# Patient Record
Sex: MALE | Race: BLACK OR AFRICAN AMERICAN | NOT HISPANIC OR LATINO | URBAN - METROPOLITAN AREA
[De-identification: names, ages, dates, MRNs, and addresses within clinical notes are randomized per-mention and may not be internally consistent; named-entity substitution may affect disease eponyms.]

---

## 2023-09-25 PROBLEM — M16.12 OSTEOARTHRITIS OF LEFT HIP: Status: ACTIVE | Noted: 2018-11-06

## 2023-09-25 PROBLEM — Z96.612 S/P REVERSE TOTAL SHOULDER ARTHROPLASTY, LEFT: Status: ACTIVE | Noted: 2019-03-12

## 2023-09-25 PROBLEM — M47.816 LUMBAR FACET ARTHROPATHY: Status: ACTIVE | Noted: 2018-06-12

## 2023-09-25 PROBLEM — R73.03 PREDIABETES: Status: ACTIVE | Noted: 2022-12-15

## 2023-09-25 PROBLEM — M53.3 SACROILIAC JOINT DYSFUNCTION OF RIGHT SIDE: Status: ACTIVE | Noted: 2018-06-12

## 2023-09-25 PROBLEM — I26.94 MULTIPLE SUBSEGMENTAL PULMONARY EMBOLI WITHOUT ACUTE COR PULMONALE (MULTI): Status: ACTIVE | Noted: 2023-09-06

## 2023-09-25 PROBLEM — M51.37 DDD (DEGENERATIVE DISC DISEASE), LUMBOSACRAL: Status: ACTIVE | Noted: 2018-06-12

## 2023-09-25 PROBLEM — Z86.19 HEPATITIS C VIRUS INFECTION CURED AFTER ANTIVIRAL DRUG THERAPY: Status: ACTIVE | Noted: 2020-03-24

## 2023-09-25 PROBLEM — F11.20 OPIOID DEPENDENCE ON AGONIST THERAPY (MULTI): Status: ACTIVE | Noted: 2020-05-18

## 2023-09-25 PROBLEM — K74.00 LIVER FIBROSIS: Status: ACTIVE | Noted: 2022-12-22

## 2023-09-25 PROBLEM — M47.816 SPONDYLOSIS OF LUMBAR REGION WITHOUT MYELOPATHY OR RADICULOPATHY: Status: ACTIVE | Noted: 2018-11-06

## 2023-09-25 PROBLEM — M51.379 DDD (DEGENERATIVE DISC DISEASE), LUMBOSACRAL: Status: ACTIVE | Noted: 2018-06-12

## 2023-09-25 RX ORDER — LANOLIN ALCOHOL/MO/W.PET/CERES
100 CREAM (GRAM) TOPICAL DAILY
COMMUNITY
Start: 2023-08-31 | End: 2023-10-02 | Stop reason: SDUPTHER

## 2023-09-26 PROBLEM — E43 SEVERE MALNUTRITION (MULTI): Status: ACTIVE | Noted: 2023-09-26

## 2023-09-26 PROBLEM — I27.82 CHRONIC PULMONARY EMBOLISM (MULTI): Status: ACTIVE | Noted: 2023-09-26

## 2023-09-26 PROBLEM — E78.5 HLD (HYPERLIPIDEMIA): Status: ACTIVE | Noted: 2023-09-26

## 2023-09-26 PROBLEM — B19.20 HEPATITIS C: Status: ACTIVE | Noted: 2023-09-26

## 2023-09-26 PROBLEM — Z99.81 OXYGEN DEPENDENT: Status: ACTIVE | Noted: 2023-09-26

## 2023-09-26 PROBLEM — J44.9 COPD (CHRONIC OBSTRUCTIVE PULMONARY DISEASE) (MULTI): Status: ACTIVE | Noted: 2023-09-26

## 2023-09-26 PROBLEM — F10.20 ALCOHOLISM (MULTI): Status: ACTIVE | Noted: 2023-09-26

## 2023-09-26 PROBLEM — I27.20 PULMONARY HYPERTENSION (MULTI): Status: ACTIVE | Noted: 2023-09-26

## 2023-09-26 PROBLEM — R09.02 HYPOXEMIA: Status: ACTIVE | Noted: 2023-09-26

## 2023-09-26 PROBLEM — R16.0 HEPATOMEGALY: Status: ACTIVE | Noted: 2023-09-26

## 2023-09-26 RX ORDER — IPRATROPIUM BROMIDE AND ALBUTEROL 20; 100 UG/1; UG/1
1 SPRAY, METERED RESPIRATORY (INHALATION) EVERY 6 HOURS PRN
COMMUNITY
Start: 2023-08-31 | End: 2023-10-12 | Stop reason: WASHOUT

## 2023-09-26 RX ORDER — FLUOCINONIDE 0.5 MG/G
OINTMENT TOPICAL
COMMUNITY
Start: 2022-09-09 | End: 2023-10-12 | Stop reason: WASHOUT

## 2023-09-26 RX ORDER — FLUOCINONIDE 0.5 MG/G
OINTMENT TOPICAL 2 TIMES DAILY
COMMUNITY
Start: 2012-12-07 | End: 2023-10-02 | Stop reason: SDUPTHER

## 2023-09-26 RX ORDER — FOLIC ACID 1 MG/1
1 TABLET ORAL DAILY
COMMUNITY
Start: 2023-08-31 | End: 2023-10-02 | Stop reason: SDUPTHER

## 2023-09-26 RX ORDER — SENNOSIDES 8.6 MG
TABLET ORAL
COMMUNITY
Start: 2023-08-31 | End: 2023-10-02 | Stop reason: SDUPTHER

## 2023-09-26 RX ORDER — LOSARTAN POTASSIUM 25 MG/1
TABLET ORAL
COMMUNITY
Start: 2023-06-07 | End: 2023-10-12 | Stop reason: ALTCHOICE

## 2023-09-26 RX ORDER — AMMONIUM LACTATE 12 G/100G
LOTION TOPICAL 2 TIMES DAILY
COMMUNITY
Start: 2021-09-09 | End: 2023-10-12 | Stop reason: WASHOUT

## 2023-09-26 RX ORDER — ATORVASTATIN CALCIUM 40 MG/1
40 TABLET, FILM COATED ORAL NIGHTLY
COMMUNITY
End: 2023-10-02 | Stop reason: SDUPTHER

## 2023-09-26 RX ORDER — ACETAMINOPHEN 325 MG/1
TABLET ORAL
COMMUNITY
Start: 2023-08-31 | End: 2023-10-02 | Stop reason: SDUPTHER

## 2023-09-26 RX ORDER — ATENOLOL 50 MG/1
50 TABLET ORAL DAILY
COMMUNITY
Start: 2023-07-31 | End: 2023-10-02 | Stop reason: SDUPTHER

## 2023-09-26 RX ORDER — GUAIFENESIN 600 MG/1
TABLET, EXTENDED RELEASE ORAL
COMMUNITY
Start: 2022-12-14 | End: 2023-10-12 | Stop reason: WASHOUT

## 2023-09-26 RX ORDER — HYDROCHLOROTHIAZIDE 12.5 MG/1
CAPSULE ORAL
COMMUNITY
Start: 2022-12-15 | End: 2023-10-12 | Stop reason: ALTCHOICE

## 2023-09-26 RX ORDER — POLYETHYLENE GLYCOL 3350 17 G/17G
POWDER, FOR SOLUTION ORAL
Status: ON HOLD | COMMUNITY
Start: 2023-08-31 | End: 2023-11-28 | Stop reason: SDUPTHER

## 2023-09-26 RX ORDER — IBUPROFEN 200 MG
TABLET ORAL
COMMUNITY
Start: 2023-08-31 | End: 2023-10-12 | Stop reason: WASHOUT

## 2023-09-26 RX ORDER — IPRATROPIUM BROMIDE AND ALBUTEROL SULFATE 2.5; .5 MG/3ML; MG/3ML
3 SOLUTION RESPIRATORY (INHALATION)
COMMUNITY
End: 2023-10-02 | Stop reason: SDUPTHER

## 2023-09-26 RX ORDER — ATENOLOL 50 MG/1
0.5 TABLET ORAL DAILY
COMMUNITY
Start: 2012-12-07 | End: 2023-10-02 | Stop reason: SDUPTHER

## 2023-09-26 RX ORDER — NALOXONE HYDROCHLORIDE 4 MG/.1ML
SPRAY NASAL
COMMUNITY
Start: 2023-06-07 | End: 2024-03-25 | Stop reason: WASHOUT

## 2023-09-26 RX ORDER — IBUPROFEN 200 MG
TABLET ORAL
COMMUNITY
End: 2023-10-02 | Stop reason: SDUPTHER

## 2023-09-26 RX ORDER — ACITRETIN 25 MG/1
2 CAPSULE ORAL DAILY
COMMUNITY
Start: 2012-12-07 | End: 2023-10-12 | Stop reason: WASHOUT

## 2023-09-26 RX ORDER — ATORVASTATIN CALCIUM 40 MG/1
40 TABLET, FILM COATED ORAL DAILY
COMMUNITY
Start: 2023-06-07 | End: 2024-03-25 | Stop reason: WASHOUT

## 2023-09-26 RX ORDER — AMLODIPINE BESYLATE 5 MG/1
5 TABLET ORAL DAILY
COMMUNITY
Start: 2023-08-30 | End: 2023-10-02 | Stop reason: SDUPTHER

## 2023-09-28 NOTE — PROGRESS NOTES
Consults    Reason For Consult  Initial evaluation for PAH following admission. He was referred by Dr. Magen Nguyen following discharge.     History Of Present Illness  Molina Godinez is a 68 y.o. male presenting with very few symptoms. He has a chronic dry cough. Patient states he had previous BLE edema, but not since receiving a cortisone shot. He denies SOB, fatigue, dizziness, syncope, chest pain, and wheezing. He does require oxygen since his last admission - 2L at rest, 4L with exertion. Recently admitted (8/26 - 9/1) for generalized weakness. Further work up showed hypoxia, COPD , uncontrolled hypertension, electrolyte abnormalities, and withdrawal from chronic alcohol abuse and opioid use. CTPE showed chronic filling defects. Mr. Godinez was scheduled for V/Q today but did not notice it on appointment sheet.      Past Medical History  Patient Active Problem List   Diagnosis    DDD (degenerative disc disease), lumbosacral    Essential hypertension, benign    Hepatitis C virus infection cured after antiviral drug therapy    Liver fibrosis    Lumbar facet arthropathy    Spondylosis of lumbar region without myelopathy or radiculopathy    Multiple subsegmental pulmonary emboli without acute cor pulmonale (CMS/HCC)    Opioid dependence on agonist therapy (CMS/HCC)    Osteoarthritis of left hip    Other psoriasis    Prediabetes    S/P reverse total shoulder arthroplasty, left    Sacroiliac joint dysfunction of right side    Tobacco use disorder    Alcoholism (CMS/HCC)    Chronic pulmonary embolism (CMS/HCC)    COPD (chronic obstructive pulmonary disease) (CMS/HCC)    Hepatitis C    Hepatomegaly    HLD (hyperlipidemia)    Hypoxemia    Oxygen dependent    Pulmonary hypertension (CMS/HCC)    Severe malnutrition (CMS/HCC)        Surgical History  He has no past surgical history on file.     Social History  He reports that he has been smoking cigarettes. He has been smoking an average of .5 packs per day. He does not  have any smokeless tobacco history on file. He reports current alcohol use. No history on file for drug use.    Family History  No family history on file.     Allergies  Aspirin and Nsaids (non-steroidal anti-inflammatory drug)    Meds    Current Outpatient Medications:     acetaminophen (Tylenol) 325 mg tablet, , Disp: , Rfl:     acitretin (Soriatane) 25 mg capsule, Take 2 capsules (50 mg) by mouth once daily. With dinner, Disp: , Rfl:     amLODIPine (Norvasc) 5 mg tablet, Take 1 tablet (5 mg) by mouth once daily. For blood pressure, Disp: , Rfl:     ammonium lactate (Lac-Hydrin) 12 % lotion, Apply topically twice a day. APPLY TOPICALLY 2 TIMES DAILY TO AFFECTED AREA., Disp: , Rfl:     apixaban (Eliquis) 5 mg tablet, Take 1 tablet (5 mg) by mouth 2 times a day., Disp: , Rfl:     atenolol (Tenormin) 50 mg tablet, Take 1 tablet (50 mg) by mouth once daily., Disp: , Rfl:     atenolol (Tenormin) 50 mg tablet, Take 0.5 tablets (25 mg) by mouth once daily., Disp: , Rfl:     atorvastatin (Lipitor) 40 mg tablet, Take 1 tablet (40 mg) by mouth once daily., Disp: , Rfl:     atorvastatin (Lipitor) 40 mg tablet, Take 1 tablet (40 mg) by mouth once daily at bedtime., Disp: , Rfl:     Combivent Respimat  mcg/actuation inhaler, , Disp: , Rfl:     fluocinonide (Lidex) 0.05 % ointment, APPLY THIN LAYER TO AFFECTED AREA TWICE A DAY AS NEEDED, Disp: , Rfl:     fluocinonide (Lidex) 0.05 % ointment, Apply topically twice a day., Disp: , Rfl:     folic acid (Folvite) 1 mg tablet, Take 1 tablet (1 mg) by mouth once daily. As directed., Disp: , Rfl:     hydroCHLOROthiazide (Microzide) 12.5 mg capsule, , Disp: , Rfl:     ipratropium-albuteroL (Duo-Neb) 0.5-2.5 mg/3 mL nebulizer solution, Take 3 mL by nebulization 3 times a day., Disp: , Rfl:     losartan (Cozaar) 25 mg tablet, , Disp: , Rfl:     Mucinex 600 mg 12 hr tablet, , Disp: , Rfl:     multivitamin with minerals tablet, Take 1 tablet by mouth once daily., Disp: , Rfl:      naloxone (Narcan) 4 mg/0.1 mL nasal spray, Use 1 Spray in one nostril (alternate sides) as needed for Drug Overdose (and call 911). every 2-3 min, until assistance arrives., Disp: , Rfl:     nicotine (Nicoderm CQ) 14 mg/24 hr patch, Use as directed, Disp: , Rfl:     nicotine (Nicoderm CQ) 21 mg/24 hr patch, , Disp: , Rfl:     polyethylene glycol (Glycolax, Miralax) 17 gram/dose powder, , Disp: , Rfl:     senna 8.6 mg tablet, Take as directed., Disp: , Rfl:     thiamine 100 mg tablet, Take 1 tablet (100 mg) by mouth once daily., Disp: , Rfl:      Review of Systems   Constitutional:  Positive for fatigue.   HENT:  Negative for nosebleeds.    Eyes:  Negative for visual disturbance.   Respiratory:  Positive for cough. Negative for shortness of breath.    Cardiovascular:  Negative for chest pain, palpitations and leg swelling.   Gastrointestinal:  Negative for diarrhea and nausea.   Endocrine: Negative for polyuria.   Musculoskeletal:  Positive for arthralgias.   Neurological:  Negative for dizziness and syncope.   Hematological:  Bruises/bleeds easily.   Psychiatric/Behavioral:  Positive for dysphoric mood. The patient is nervous/anxious.         Physical Exam  Constitutional:       Appearance: Normal appearance.   HENT:      Head: Normocephalic.      Nose: Nose normal.   Eyes:      Pupils: Pupils are equal, round, and reactive to light.   Neck:      Comments: No JVD  Cardiovascular:      Rate and Rhythm: Normal rate and regular rhythm.      Heart sounds: Murmur (I/VI KATHERINE LLSB) heard.   Pulmonary:      Effort: Pulmonary effort is normal. No respiratory distress.      Breath sounds: No wheezing.   Abdominal:      General: Abdomen is flat. Bowel sounds are normal.   Musculoskeletal:         General: No deformity.   Skin:     General: Skin is dry.   Neurological:      General: No focal deficit present.   Psychiatric:         Mood and Affect: Mood normal.         Judgment: Judgment normal.          Last Recorded  Vitals  There were no vitals taken for this visit.    Relevant Results    Spirometry (10/02/2023, [facility])  FVC = 2.19 L (69%)  FEV1=1.04 L (42%)  DLCO = 29%    6MWT (10/2/2023)  SpO2: 97-93% 4LPM  HR: 78-83  Michaelle: 0-0  Actual meters: 271; predicted 420    Echo (23): normal size RV with normal function, mildly dilated RA, negative bubble study, no evidence of AR, trace MR, mild to moderate TR, RVSP 60.2 mmHg  CTPE (23): multiple nonocclusive chronic appearing filling defects within numerous right upper lobe subsegmental pulmonary arterias, marked enlargement of the right heart     Assessment/Plan     Impression:    I have reviewed the following data during this visit: echocardiogram, CT chest, 6 MW, Pulmonary function testing    1) Pulmonary  A. Very small peripheral peripheral vascular changes without history of PE. These are of unclear significance.  B. Severe stable obstructive lung disease - oxygen dependence. On stable bronchodilators. Using nicotine replacement. CT consistent with apical emphysema and scarring.     C. Dilated RV - I disagree with echo report, his RV:LV ration is approximately 1.1:1. This is consistent with probable PH. Doubt primary TV disease. Considerations PAH vs CTEPH vs? Needs full workup.     2) Cardiovascular - significant hypertension    3) Gastroenterology - treated hepatitis C with liver fibrosis    4) Opiod dependence.    5) Endocrine - hyperlipidemia.    Plan:    1) Needs V/Q, this will stratify treatment of small vessel disease vs CTEPH (Assuming cath confirms)    2) Continue combivent for stable COPD    3) Follow up same day as perfusion (V/Q) scan to [facility] next step. Suspect if will be R and L heart cath, possible NO, possible angiogram depending on V/Q results.

## 2023-10-02 ENCOUNTER — HOSPITAL ENCOUNTER (OUTPATIENT)
Dept: RESPIRATORY THERAPY | Facility: HOSPITAL | Age: 68
Discharge: HOME | End: 2023-10-02
Payer: COMMERCIAL

## 2023-10-02 ENCOUNTER — OFFICE VISIT (OUTPATIENT)
Dept: PULMONOLOGY | Facility: HOSPITAL | Age: 68
End: 2023-10-02
Payer: COMMERCIAL

## 2023-10-02 VITALS — SYSTOLIC BLOOD PRESSURE: 130 MMHG | DIASTOLIC BLOOD PRESSURE: 81 MMHG | WEIGHT: 127 LBS | OXYGEN SATURATION: 84 %

## 2023-10-02 DIAGNOSIS — R93.1 ABNORMAL ECHOCARDIOGRAM: Primary | ICD-10-CM

## 2023-10-02 DIAGNOSIS — J43.8 OTHER EMPHYSEMA (MULTI): ICD-10-CM

## 2023-10-02 DIAGNOSIS — Z99.81 OXYGEN DEPENDENT: ICD-10-CM

## 2023-10-02 DIAGNOSIS — J43.9 PULMONARY EMPHYSEMA, UNSPECIFIED EMPHYSEMA TYPE (MULTI): ICD-10-CM

## 2023-10-02 DIAGNOSIS — R93.89 ABNORMAL CT OF THE CHEST: ICD-10-CM

## 2023-10-02 PROCEDURE — 3075F SYST BP GE 130 - 139MM HG: CPT | Performed by: INTERNAL MEDICINE

## 2023-10-02 PROCEDURE — 94729 DIFFUSING CAPACITY: CPT

## 2023-10-02 PROCEDURE — 3079F DIAST BP 80-89 MM HG: CPT | Performed by: INTERNAL MEDICINE

## 2023-10-02 PROCEDURE — 99215 OFFICE O/P EST HI 40 MIN: CPT | Performed by: INTERNAL MEDICINE

## 2023-10-02 PROCEDURE — 1159F MED LIST DOCD IN RCRD: CPT | Performed by: INTERNAL MEDICINE

## 2023-10-02 PROCEDURE — 99215 OFFICE O/P EST HI 40 MIN: CPT | Mod: 25 | Performed by: INTERNAL MEDICINE

## 2023-10-02 ASSESSMENT — ENCOUNTER SYMPTOMS
SHORTNESS OF BREATH: 0
COUGH: 1
DIARRHEA: 0
DIZZINESS: 0
FATIGUE: 1
DYSPHORIC MOOD: 1
ARTHRALGIAS: 1
NERVOUS/ANXIOUS: 1
BRUISES/BLEEDS EASILY: 1
PALPITATIONS: 0
NAUSEA: 0

## 2023-10-11 ENCOUNTER — HOSPITAL ENCOUNTER (INPATIENT)
Facility: HOSPITAL | Age: 68
LOS: 49 days | Discharge: NURSING, REHAB OR LONG-TERM CARE | End: 2023-11-30
Attending: EMERGENCY MEDICINE | Admitting: NURSE PRACTITIONER
Payer: COMMERCIAL

## 2023-10-11 ENCOUNTER — APPOINTMENT (OUTPATIENT)
Dept: RADIOLOGY | Facility: HOSPITAL | Age: 68
End: 2023-10-11
Payer: COMMERCIAL

## 2023-10-11 DIAGNOSIS — T17.500A MUCUS PLUGGING OF BRONCHI: ICD-10-CM

## 2023-10-11 DIAGNOSIS — N19 UREMIA: ICD-10-CM

## 2023-10-11 DIAGNOSIS — R93.89 ABNORMAL CT OF THE CHEST: ICD-10-CM

## 2023-10-11 DIAGNOSIS — I27.82 CHRONIC PULMONARY EMBOLISM, UNSPECIFIED PULMONARY EMBOLISM TYPE, UNSPECIFIED WHETHER ACUTE COR PULMONALE PRESENT (MULTI): ICD-10-CM

## 2023-10-11 DIAGNOSIS — F10.20 ALCOHOLISM (MULTI): ICD-10-CM

## 2023-10-11 DIAGNOSIS — I10 ESSENTIAL HYPERTENSION, BENIGN: ICD-10-CM

## 2023-10-11 DIAGNOSIS — R78.81 BACTEREMIA: ICD-10-CM

## 2023-10-11 DIAGNOSIS — F11.20 OPIOID DEPENDENCE ON AGONIST THERAPY (MULTI): ICD-10-CM

## 2023-10-11 DIAGNOSIS — J96.01 ACUTE HYPOXIC RESPIRATORY FAILURE (MULTI): ICD-10-CM
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
## 2023-10-11 DIAGNOSIS — J43.9 PULMONARY EMPHYSEMA, UNSPECIFIED EMPHYSEMA TYPE (MULTI): ICD-10-CM

## 2023-10-11 DIAGNOSIS — J18.9 HOSPITAL-ACQUIRED PNEUMONIA: Primary | ICD-10-CM

## 2023-10-11 DIAGNOSIS — B18.2 CHRONIC HEPATITIS C WITHOUT HEPATIC COMA (MULTI): ICD-10-CM

## 2023-10-11 DIAGNOSIS — E78.2 MODERATE MIXED HYPERLIPIDEMIA NOT REQUIRING STATIN THERAPY: ICD-10-CM

## 2023-10-11 DIAGNOSIS — Z13.6 ENCOUNTER FOR SCREENING FOR CARDIOVASCULAR DISORDERS: ICD-10-CM

## 2023-10-11 DIAGNOSIS — A49.8 INFECTION DUE TO STENOTROPHOMONAS MALTOPHILIA: ICD-10-CM

## 2023-10-11 DIAGNOSIS — R09.02 HYPOXEMIA: ICD-10-CM

## 2023-10-11 DIAGNOSIS — E44.1 MILD PROTEIN-CALORIE MALNUTRITION (MULTI): ICD-10-CM

## 2023-10-11 DIAGNOSIS — F17.200 TOBACCO USE DISORDER: ICD-10-CM

## 2023-10-11 DIAGNOSIS — Z99.81 OXYGEN DEPENDENT: ICD-10-CM

## 2023-10-11 DIAGNOSIS — G61.0 GBS (GUILLAIN BARRE SYNDROME) (MULTI): ICD-10-CM

## 2023-10-11 DIAGNOSIS — A49.8 PSEUDOMONAS INFECTION: ICD-10-CM

## 2023-10-11 DIAGNOSIS — D75.1 POLYCYTHEMIA: ICD-10-CM

## 2023-10-11 DIAGNOSIS — J98.11 ATELECTASIS OF LEFT LUNG: ICD-10-CM

## 2023-10-11 DIAGNOSIS — J38.3 VOCAL CORD DYSFUNCTION: ICD-10-CM

## 2023-10-11 DIAGNOSIS — I38 ENDOCARDITIS, VALVE UNSPECIFIED: ICD-10-CM

## 2023-10-11 DIAGNOSIS — I27.20 PULMONARY HYPERTENSION (MULTI): ICD-10-CM

## 2023-10-11 LAB
ALBUMIN SERPL BCP-MCNC: 3.5 G/DL (ref 3.4–5)
ALP SERPL-CCNC: 126 U/L (ref 33–136)
ALT SERPL W P-5'-P-CCNC: 10 U/L (ref 10–52)
ANION GAP SERPL CALC-SCNC: 18 MMOL/L (ref 10–20)
AST SERPL W P-5'-P-CCNC: 31 U/L (ref 9–39)
BASOPHILS # BLD AUTO: 0.09 X10*3/UL (ref 0–0.1)
BASOPHILS NFR BLD AUTO: 0.3 %
BILIRUB SERPL-MCNC: 1.6 MG/DL (ref 0–1.2)
BUN SERPL-MCNC: 17 MG/DL (ref 6–23)
CALCIUM SERPL-MCNC: 8.4 MG/DL (ref 8.6–10.6)
CARDIAC TROPONIN I PNL SERPL HS: 22 NG/L (ref 0–53)
CHLORIDE SERPL-SCNC: 87 MMOL/L (ref 98–107)
CK SERPL-CCNC: 311 U/L (ref 0–325)
CO2 SERPL-SCNC: 28 MMOL/L (ref 21–32)
CREAT SERPL-MCNC: 0.56 MG/DL (ref 0.5–1.3)
EOSINOPHIL # BLD AUTO: 0 X10*3/UL (ref 0–0.7)
EOSINOPHIL NFR BLD AUTO: 0 %
ERYTHROCYTE [DISTWIDTH] IN BLOOD BY AUTOMATED COUNT: 12.8 % (ref 11.5–14.5)
GFR SERPL CREATININE-BSD FRML MDRD: >90 ML/MIN/1.73M*2
GLUCOSE SERPL-MCNC: 101 MG/DL (ref 74–99)
HCT VFR BLD AUTO: 52.1 % (ref 41–52)
HGB BLD-MCNC: 18.5 G/DL (ref 13.5–17.5)
IMM GRANULOCYTES # BLD AUTO: 0.35 X10*3/UL (ref 0–0.7)
IMM GRANULOCYTES NFR BLD AUTO: 1.2 % (ref 0–0.9)
LYMPHOCYTES # BLD AUTO: 0.62 X10*3/UL (ref 1.2–4.8)
LYMPHOCYTES NFR BLD AUTO: 2.2 %
MCH RBC QN AUTO: 30.6 PG (ref 26–34)
MCHC RBC AUTO-ENTMCNC: 35.5 G/DL (ref 32–36)
MCV RBC AUTO: 86 FL (ref 80–100)
MONOCYTES # BLD AUTO: 1.22 X10*3/UL (ref 0.1–1)
MONOCYTES NFR BLD AUTO: 4.3 %
NEUTROPHILS # BLD AUTO: 26.02 X10*3/UL (ref 1.2–7.7)
NEUTROPHILS NFR BLD AUTO: 92 %
NRBC BLD-RTO: 0 /100 WBCS (ref 0–0)
PLATELET # BLD AUTO: 227 X10*3/UL (ref 150–450)
PMV BLD AUTO: 10.1 FL (ref 7.5–11.5)
POTASSIUM SERPL-SCNC: 3.3 MMOL/L (ref 3.5–5.3)
PROT SERPL-MCNC: 7.9 G/DL (ref 6.4–8.2)
RBC # BLD AUTO: 6.04 X10*6/UL (ref 4.5–5.9)
SODIUM SERPL-SCNC: 130 MMOL/L (ref 136–145)
WBC # BLD AUTO: 28.3 X10*3/UL (ref 4.4–11.3)

## 2023-10-11 PROCEDURE — 72148 MRI LUMBAR SPINE W/O DYE: CPT | Mod: 52

## 2023-10-11 PROCEDURE — 96361 HYDRATE IV INFUSION ADD-ON: CPT

## 2023-10-11 PROCEDURE — 71046 X-RAY EXAM CHEST 2 VIEWS: CPT

## 2023-10-11 PROCEDURE — 99285 EMERGENCY DEPT VISIT HI MDM: CPT | Performed by: EMERGENCY MEDICINE

## 2023-10-11 PROCEDURE — 72146 MRI CHEST SPINE W/O DYE: CPT | Mod: REDUCED SERVICES | Performed by: RADIOLOGY

## 2023-10-11 PROCEDURE — 72148 MRI LUMBAR SPINE W/O DYE: CPT | Mod: REDUCED SERVICES | Performed by: RADIOLOGY

## 2023-10-11 PROCEDURE — 70450 CT HEAD/BRAIN W/O DYE: CPT

## 2023-10-11 PROCEDURE — 96375 TX/PRO/DX INJ NEW DRUG ADDON: CPT

## 2023-10-11 PROCEDURE — 99285 EMERGENCY DEPT VISIT HI MDM: CPT | Mod: 25

## 2023-10-11 PROCEDURE — 71046 X-RAY EXAM CHEST 2 VIEWS: CPT | Performed by: RADIOLOGY

## 2023-10-11 PROCEDURE — 2500000004 HC RX 250 GENERAL PHARMACY W/ HCPCS (ALT 636 FOR OP/ED): Mod: SE

## 2023-10-11 PROCEDURE — 84484 ASSAY OF TROPONIN QUANT: CPT

## 2023-10-11 PROCEDURE — 72146 MRI CHEST SPINE W/O DYE: CPT | Mod: 52

## 2023-10-11 PROCEDURE — 85025 COMPLETE CBC W/AUTO DIFF WBC: CPT

## 2023-10-11 PROCEDURE — 80053 COMPREHEN METABOLIC PANEL: CPT

## 2023-10-11 PROCEDURE — 82550 ASSAY OF CK (CPK): CPT

## 2023-10-11 PROCEDURE — 70450 CT HEAD/BRAIN W/O DYE: CPT | Performed by: RADIOLOGY

## 2023-10-11 PROCEDURE — 96374 THER/PROPH/DIAG INJ IV PUSH: CPT

## 2023-10-11 PROCEDURE — 36415 COLL VENOUS BLD VENIPUNCTURE: CPT

## 2023-10-11 RX ORDER — POTASSIUM CHLORIDE 1.5 G/1.58G
20 POWDER, FOR SOLUTION ORAL 2 TIMES DAILY
Status: DISCONTINUED | OUTPATIENT
Start: 2023-10-11 | End: 2023-10-13

## 2023-10-11 RX ORDER — ACETAMINOPHEN 325 MG/1
650 TABLET ORAL ONCE
Status: COMPLETED | OUTPATIENT
Start: 2023-10-11 | End: 2023-10-11

## 2023-10-11 RX ORDER — VANCOMYCIN HYDROCHLORIDE 1 G/200ML
1000 INJECTION, SOLUTION INTRAVENOUS EVERY 12 HOURS
Status: DISCONTINUED | OUTPATIENT
Start: 2023-10-11 | End: 2023-10-11

## 2023-10-11 RX ADMIN — TAZOBACTAM SODIUM AND PIPERACILLIN SODIUM 4.5 G: 500; 4 INJECTION, SOLUTION INTRAVENOUS at 23:55

## 2023-10-11 RX ADMIN — ACETAMINOPHEN 650 MG: 325 TABLET ORAL at 21:57

## 2023-10-11 ASSESSMENT — COLUMBIA-SUICIDE SEVERITY RATING SCALE - C-SSRS
1. IN THE PAST MONTH, HAVE YOU WISHED YOU WERE DEAD OR WISHED YOU COULD GO TO SLEEP AND NOT WAKE UP?: NO
6. HAVE YOU EVER DONE ANYTHING, STARTED TO DO ANYTHING, OR PREPARED TO DO ANYTHING TO END YOUR LIFE?: NO
2. HAVE YOU ACTUALLY HAD ANY THOUGHTS OF KILLING YOURSELF?: NO

## 2023-10-11 NOTE — Clinical Note
Is the patient on isolation?: No   Do you expect the patient to require telemetry (informational-only for bed management): No   Do you expect the patient to require observation or inpt? (informational-only for bed management): Inpatient

## 2023-10-12 PROBLEM — J18.9 HOSPITAL-ACQUIRED PNEUMONIA: Status: ACTIVE | Noted: 2023-10-12
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
## 2023-10-12 PROBLEM — I26.94 MULTIPLE SUBSEGMENTAL PULMONARY EMBOLI WITHOUT ACUTE COR PULMONALE (MULTI): Status: RESOLVED | Noted: 2023-09-06 | Resolved: 2023-10-12

## 2023-10-12 LAB
APPEARANCE UR: CLEAR
APTT PPP: 41 SECONDS (ref 27–38)
BILIRUB UR STRIP.AUTO-MCNC: NEGATIVE MG/DL
COLOR UR: YELLOW
FOLATE SERPL-MCNC: >24 NG/ML
GLUCOSE UR STRIP.AUTO-MCNC: NEGATIVE MG/DL
HOLD SPECIMEN: NORMAL
INR PPP: 1.8 (ref 0.9–1.1)
KETONES UR STRIP.AUTO-MCNC: NEGATIVE MG/DL
LEGIONELLA AG UR QL: NEGATIVE
LEUKOCYTE ESTERASE UR QL STRIP.AUTO: NEGATIVE
MRSA DNA SPEC QL NAA+PROBE: NOT DETECTED
NITRITE UR QL STRIP.AUTO: NEGATIVE
PH UR STRIP.AUTO: 7 [PH]
PROT UR STRIP.AUTO-MCNC: NEGATIVE MG/DL
PROTHROMBIN TIME: 20.2 SECONDS (ref 9.8–12.8)
RBC # UR STRIP.AUTO: NEGATIVE /UL
S PNEUM AG UR QL: POSITIVE
SP GR UR STRIP.AUTO: 1.01
UROBILINOGEN UR STRIP.AUTO-MCNC: 2 MG/DL
VIT B12 SERPL-MCNC: 324 PG/ML (ref 211–911)

## 2023-10-12 PROCEDURE — 87899 AGENT NOS ASSAY W/OPTIC: CPT | Performed by: EMERGENCY MEDICINE

## 2023-10-12 PROCEDURE — 36415 COLL VENOUS BLD VENIPUNCTURE: CPT

## 2023-10-12 PROCEDURE — 81003 URINALYSIS AUTO W/O SCOPE: CPT

## 2023-10-12 PROCEDURE — 82746 ASSAY OF FOLIC ACID SERUM: CPT

## 2023-10-12 PROCEDURE — 2500000001 HC RX 250 WO HCPCS SELF ADMINISTERED DRUGS (ALT 637 FOR MEDICARE OP)

## 2023-10-12 PROCEDURE — 2500000001 HC RX 250 WO HCPCS SELF ADMINISTERED DRUGS (ALT 637 FOR MEDICARE OP): Performed by: INTERNAL MEDICINE

## 2023-10-12 PROCEDURE — 85730 THROMBOPLASTIN TIME PARTIAL: CPT

## 2023-10-12 PROCEDURE — 2500000004 HC RX 250 GENERAL PHARMACY W/ HCPCS (ALT 636 FOR OP/ED)

## 2023-10-12 PROCEDURE — 99222 1ST HOSP IP/OBS MODERATE 55: CPT | Performed by: STUDENT IN AN ORGANIZED HEALTH CARE EDUCATION/TRAINING PROGRAM

## 2023-10-12 PROCEDURE — 87641 MR-STAPH DNA AMP PROBE: CPT

## 2023-10-12 PROCEDURE — 2580000001 HC RX 258 IV SOLUTIONS: Mod: SE

## 2023-10-12 PROCEDURE — 99222 1ST HOSP IP/OBS MODERATE 55: CPT | Performed by: NURSE PRACTITIONER

## 2023-10-12 PROCEDURE — 2500000004 HC RX 250 GENERAL PHARMACY W/ HCPCS (ALT 636 FOR OP/ED): Performed by: EMERGENCY MEDICINE

## 2023-10-12 PROCEDURE — 1200000002 HC GENERAL ROOM WITH TELEMETRY DAILY

## 2023-10-12 PROCEDURE — 2500000004 HC RX 250 GENERAL PHARMACY W/ HCPCS (ALT 636 FOR OP/ED): Performed by: INTERNAL MEDICINE

## 2023-10-12 PROCEDURE — 87449 NOS EACH ORGANISM AG IA: CPT | Performed by: EMERGENCY MEDICINE

## 2023-10-12 PROCEDURE — C9113 INJ PANTOPRAZOLE SODIUM, VIA: HCPCS | Performed by: INTERNAL MEDICINE

## 2023-10-12 PROCEDURE — 82607 VITAMIN B-12: CPT

## 2023-10-12 PROCEDURE — 83921 ORGANIC ACID SINGLE QUANT: CPT

## 2023-10-12 PROCEDURE — 2500000001 HC RX 250 WO HCPCS SELF ADMINISTERED DRUGS (ALT 637 FOR MEDICARE OP): Performed by: NURSE PRACTITIONER

## 2023-10-12 RX ORDER — VANCOMYCIN HYDROCHLORIDE 1 G/200ML
1000 INJECTION, SOLUTION INTRAVENOUS EVERY 12 HOURS
Status: DISCONTINUED | OUTPATIENT
Start: 2023-10-12 | End: 2023-10-13

## 2023-10-12 RX ORDER — LORAZEPAM 2 MG/ML
2 INJECTION INTRAMUSCULAR EVERY 2 HOUR PRN
Status: DISCONTINUED | OUTPATIENT
Start: 2023-10-12 | End: 2023-10-13

## 2023-10-12 RX ORDER — PANTOPRAZOLE SODIUM 40 MG/10ML
40 INJECTION, POWDER, LYOPHILIZED, FOR SOLUTION INTRAVENOUS DAILY
Status: DISCONTINUED | OUTPATIENT
Start: 2023-10-12 | End: 2023-10-18

## 2023-10-12 RX ORDER — ACETAMINOPHEN 325 MG/1
650 TABLET ORAL EVERY 6 HOURS PRN
Status: DISCONTINUED | OUTPATIENT
Start: 2023-10-12 | End: 2023-10-18

## 2023-10-12 RX ORDER — LOPERAMIDE HYDROCHLORIDE 2 MG/1
2 CAPSULE ORAL 4 TIMES DAILY PRN
Status: DISCONTINUED | OUTPATIENT
Start: 2023-10-12 | End: 2023-10-20

## 2023-10-12 RX ORDER — FOLIC ACID 1 MG/1
1 TABLET ORAL DAILY
Status: DISCONTINUED | OUTPATIENT
Start: 2023-10-12 | End: 2023-10-12

## 2023-10-12 RX ORDER — LORAZEPAM 2 MG/ML
0.5 INJECTION INTRAMUSCULAR EVERY 2 HOUR PRN
Status: DISCONTINUED | OUTPATIENT
Start: 2023-10-12 | End: 2023-10-13

## 2023-10-12 RX ORDER — LORAZEPAM 2 MG/ML
1 INJECTION INTRAMUSCULAR EVERY 2 HOUR PRN
Status: DISCONTINUED | OUTPATIENT
Start: 2023-10-12 | End: 2023-10-13

## 2023-10-12 RX ORDER — FOLIC ACID 1 MG/1
1 TABLET ORAL DAILY
Status: DISCONTINUED | OUTPATIENT
Start: 2023-10-12 | End: 2023-10-13

## 2023-10-12 RX ORDER — MULTIVIT-MIN/IRON FUM/FOLIC AC 7.5 MG-4
1 TABLET ORAL DAILY
Status: DISCONTINUED | OUTPATIENT
Start: 2023-10-12 | End: 2023-11-30 | Stop reason: HOSPADM

## 2023-10-12 RX ORDER — CLONIDINE HYDROCHLORIDE 0.1 MG/1
0.1 TABLET ORAL EVERY 12 HOURS SCHEDULED
Status: DISCONTINUED | OUTPATIENT
Start: 2023-10-12 | End: 2023-10-13

## 2023-10-12 RX ORDER — HYDROXYZINE HYDROCHLORIDE 25 MG/1
25 TABLET, FILM COATED ORAL EVERY 4 HOURS PRN
Status: DISCONTINUED | OUTPATIENT
Start: 2023-10-12 | End: 2023-10-18

## 2023-10-12 RX ORDER — POTASSIUM CHLORIDE 1.5 G/1.58G
20 POWDER, FOR SOLUTION ORAL ONCE
Status: COMPLETED | OUTPATIENT
Start: 2023-10-12 | End: 2023-10-12

## 2023-10-12 RX ORDER — ONDANSETRON 4 MG/1
4 TABLET, FILM COATED ORAL EVERY 8 HOURS PRN
Status: DISCONTINUED | OUTPATIENT
Start: 2023-10-12 | End: 2023-10-13

## 2023-10-12 RX ADMIN — POTASSIUM CHLORIDE 20 MEQ: 1.5 FOR SOLUTION ORAL at 21:00

## 2023-10-12 RX ADMIN — POTASSIUM CHLORIDE 20 MEQ: 1.5 FOR SOLUTION ORAL at 09:00

## 2023-10-12 RX ADMIN — ACETAMINOPHEN 650 MG: 325 TABLET ORAL at 15:58

## 2023-10-12 RX ADMIN — Medication 1 TABLET: at 09:07

## 2023-10-12 RX ADMIN — PIPERACILLIN SODIUM AND TAZOBACTAM SODIUM 4.5 G: 4; .5 INJECTION, SOLUTION INTRAVENOUS at 09:09

## 2023-10-12 RX ADMIN — VANCOMYCIN HYDROCHLORIDE 1000 MG: 1 INJECTION, SOLUTION INTRAVENOUS at 13:25

## 2023-10-12 RX ADMIN — CLONIDINE HYDROCHLORIDE 0.1 MG: 0.1 TABLET ORAL at 09:07

## 2023-10-12 RX ADMIN — FOLIC ACID 1 MG: 1 TABLET ORAL at 09:08

## 2023-10-12 RX ADMIN — CLONIDINE HYDROCHLORIDE 0.1 MG: 0.1 TABLET ORAL at 21:00

## 2023-10-12 RX ADMIN — POTASSIUM CHLORIDE 20 MEQ: 1.5 POWDER, FOR SOLUTION ORAL at 09:08

## 2023-10-12 RX ADMIN — SODIUM CHLORIDE, POTASSIUM CHLORIDE, SODIUM LACTATE AND CALCIUM CHLORIDE 1000 ML: 600; 310; 30; 20 INJECTION, SOLUTION INTRAVENOUS at 01:40

## 2023-10-12 RX ADMIN — PIPERACILLIN SODIUM AND TAZOBACTAM SODIUM 4.5 G: 4; .5 INJECTION, SOLUTION INTRAVENOUS at 15:45

## 2023-10-12 RX ADMIN — VANCOMYCIN HYDROCHLORIDE 1000 MG: 1 INJECTION, SOLUTION INTRAVENOUS at 01:25

## 2023-10-12 RX ADMIN — LOPERAMIDE HYDROCHLORIDE 2 MG: 2 CAPSULE ORAL at 08:32

## 2023-10-12 RX ADMIN — PANTOPRAZOLE SODIUM 40 MG: 40 INJECTION, POWDER, FOR SOLUTION INTRAVENOUS at 09:09

## 2023-10-12 RX ADMIN — THIAMINE HYDROCHLORIDE 500 MG: 100 INJECTION, SOLUTION INTRAMUSCULAR; INTRAVENOUS at 09:10

## 2023-10-12 RX ADMIN — FOLIC ACID 1 MG: 5 INJECTION, SOLUTION INTRAMUSCULAR; INTRAVENOUS; SUBCUTANEOUS at 17:25

## 2023-10-12 RX ADMIN — HYDROXYZINE HYDROCHLORIDE 25 MG: 25 TABLET, FILM COATED ORAL at 09:08

## 2023-10-12 RX ADMIN — PIPERACILLIN SODIUM AND TAZOBACTAM SODIUM 4.5 G: 4; .5 INJECTION, SOLUTION INTRAVENOUS at 21:45

## 2023-10-12 ASSESSMENT — LIFESTYLE VARIABLES
REASON UNABLE TO ASSESS: YES
VISUAL DISTURBANCES: NOT PRESENT
AUDITORY DISTURBANCES: NOT PRESENT
REASON UNABLE TO ASSESS: NO
TREMOR: NO TREMOR
AGITATION: NORMAL ACTIVITY
TREMOR: NO TREMOR
TOTAL SCORE: 0
NAUSEA AND VOMITING: NO NAUSEA AND NO VOMITING
NAUSEA AND VOMITING: NO NAUSEA AND NO VOMITING
ANXIETY: NO ANXIETY, AT EASE
TREMOR: NO TREMOR
VISUAL DISTURBANCES: NOT PRESENT
TOTAL SCORE: 0
HEADACHE, FULLNESS IN HEAD: NOT PRESENT
NAUSEA AND VOMITING: NO NAUSEA AND NO VOMITING
ANXIETY: NO ANXIETY, AT EASE
ORIENTATION AND CLOUDING OF SENSORIUM: ORIENTED AND CAN DO SERIAL ADDITIONS
PAROXYSMAL SWEATS: NO SWEAT VISIBLE
TREMOR: NO TREMOR
TREMOR: NO TREMOR
VISUAL DISTURBANCES: NOT PRESENT
PAROXYSMAL SWEATS: NO SWEAT VISIBLE
AUDITORY DISTURBANCES: NOT PRESENT
HEADACHE, FULLNESS IN HEAD: NOT PRESENT
TOTAL SCORE: 0
TOTAL SCORE: 1
TREMOR: NO TREMOR
BLOOD PRESSURE: 126/86
ORIENTATION AND CLOUDING OF SENSORIUM: ORIENTED AND CAN DO SERIAL ADDITIONS
AUDITORY DISTURBANCES: NOT PRESENT
AUDITORY DISTURBANCES: NOT PRESENT
ANXIETY: NO ANXIETY, AT EASE
ORIENTATION AND CLOUDING OF SENSORIUM: ORIENTED AND CAN DO SERIAL ADDITIONS
AUDITORY DISTURBANCES: NOT PRESENT
PAROXYSMAL SWEATS: NO SWEAT VISIBLE
VISUAL DISTURBANCES: NOT PRESENT
ANXIETY: NO ANXIETY, AT EASE
ORIENTATION AND CLOUDING OF SENSORIUM: ORIENTED AND CAN DO SERIAL ADDITIONS
TREMOR: NO TREMOR
VISUAL DISTURBANCES: NOT PRESENT
TOTAL SCORE: 1
ANXIETY: NO ANXIETY, AT EASE
TREMOR: NO TREMOR
ORIENTATION AND CLOUDING OF SENSORIUM: ORIENTED AND CAN DO SERIAL ADDITIONS
AUDITORY DISTURBANCES: NOT PRESENT
PULSE: 94
VISUAL DISTURBANCES: NOT PRESENT
PAROXYSMAL SWEATS: NO SWEAT VISIBLE
AGITATION: NORMAL ACTIVITY
HEADACHE, FULLNESS IN HEAD: NOT PRESENT
AGITATION: NORMAL ACTIVITY
AUDITORY DISTURBANCES: NOT PRESENT
VISUAL DISTURBANCES: NOT PRESENT
AGITATION: NORMAL ACTIVITY
ANXIETY: NO ANXIETY, AT EASE
AGITATION: NORMAL ACTIVITY
BLOOD PRESSURE: 138/87
ANXIETY: NO ANXIETY, AT EASE
PAROXYSMAL SWEATS: NO SWEAT VISIBLE
HEADACHE, FULLNESS IN HEAD: VERY MILD
NAUSEA AND VOMITING: NO NAUSEA AND NO VOMITING
ORIENTATION AND CLOUDING OF SENSORIUM: ORIENTED AND CAN DO SERIAL ADDITIONS
HEADACHE, FULLNESS IN HEAD: NOT PRESENT
PAROXYSMAL SWEATS: NO SWEAT VISIBLE
AUDITORY DISTURBANCES: NOT PRESENT
AGITATION: NORMAL ACTIVITY
REASON UNABLE TO ASSESS: YES
AGITATION: NORMAL ACTIVITY
PULSE: 98
TOTAL SCORE: 0
VISUAL DISTURBANCES: NOT PRESENT
ORIENTATION AND CLOUDING OF SENSORIUM: ORIENTED AND CAN DO SERIAL ADDITIONS
HEADACHE, FULLNESS IN HEAD: NOT PRESENT
TOTAL SCORE: 0
ANXIETY: NO ANXIETY, AT EASE
HEADACHE, FULLNESS IN HEAD: VERY MILD
AGITATION: NORMAL ACTIVITY
NAUSEA AND VOMITING: NO NAUSEA AND NO VOMITING
TOTAL SCORE: 0
NAUSEA AND VOMITING: NO NAUSEA AND NO VOMITING
HEADACHE, FULLNESS IN HEAD: NOT PRESENT
NAUSEA AND VOMITING: NO NAUSEA AND NO VOMITING
NAUSEA AND VOMITING: NO NAUSEA AND NO VOMITING
ORIENTATION AND CLOUDING OF SENSORIUM: ORIENTED AND CAN DO SERIAL ADDITIONS

## 2023-10-12 ASSESSMENT — ENCOUNTER SYMPTOMS
TROUBLE SWALLOWING: 0
WEAKNESS: 1
DIZZINESS: 0
MYALGIAS: 0
HEADACHES: 0
COUGH: 1
NUMBNESS: 1
ABDOMINAL PAIN: 0
NAUSEA: 0
HEMATURIA: 0
SHORTNESS OF BREATH: 0
RHINORRHEA: 0
PALPITATIONS: 0
CHILLS: 0
DIARRHEA: 0
FLANK PAIN: 0
VOMITING: 0
FEVER: 0
DYSURIA: 0
DIAPHORESIS: 0
ACTIVITY CHANGE: 1
FREQUENCY: 0
CONSTIPATION: 0

## 2023-10-12 ASSESSMENT — PAIN SCALES - GENERAL: PAINLEVEL_OUTOF10: 3

## 2023-10-12 ASSESSMENT — PAIN - FUNCTIONAL ASSESSMENT: PAIN_FUNCTIONAL_ASSESSMENT: 0-10

## 2023-10-12 NOTE — PROGRESS NOTES
I received this patient during signout.  Please see previous provider's note for detailed H&P, labs and imaging.    Briefly, this is a 68-year-old male with medical history of PD, hypertension, alcohol use disorder, opioid use disorder, hepatitis C, PE without cor pulmonale on Eliquis who presents with inability to walk.  Patient states his legs gave out 3 days ago and he has not been able to walk since then.  He has full use of his upper body but no strength to stand up.  He denies any numbness or urinary incontinence.  He denies fever, vision changes, urinary or stool changes, abdominal pain, chest pain, shortness breath.  CT head was done prior to my shift which was overall unremarkable, reflected chronic changes.    Plan at the time of signout was await MRI results and consider neurology consult, follow-up urinalysis and final disposition.    Under my care, patient reassessed and remains clinically stable.  Neurology consulted as below.  Please see their full note for recommendations, they have less suspicion for Guillain-Barré syndrome and based off patient's prior presentations and constellation of symptoms suspect malnutrition and vitamin deficiencies may be contributing to weakness seen today.  Further recommendations, MMA, B12 and folate labs ordered given new onset of bilateral lower extremity weakness and hospital-acquired pneumonia requiring IV antibiotics I discussed case with admission coordinator who accepts patient for admission.    ED Course:  @  ED Course as of 10/12/23 0402   Thu Oct 12, 2023   0052 MR lumbar spine wo IV contrast [DW]   0052 MR thoracic spine wo IV contrast [DW]   0111 Coagulation screen negative given patient's history of Eliquis use, we considered lumbar puncture however given anticoagulation use, deferred and follow up inpatient with IR for LP [SA]   0115 Neurology consulted [SA]      ED Course User Index  [DW] Callum Pacheco DO  [SA] Olga Eckert DO         Diagnoses as  of 10/12/23 0402   Hospital-acquired pneumonia   @    Disposition: admit for HAP, new onset of b/l LE weakness and loss of reflexes, neurology following, defer LP to inpatient team due to AC use      Olga Eckert DO   EM PGY2

## 2023-10-12 NOTE — PROGRESS NOTES
"Vancomycin Dosing by Pharmacy- INITIAL    Molina Godinez is a 68 y.o. year old male who Pharmacy has been consulted for vancomycin dosing for pneumonia. Based on the patient's indication and renal status this patient will be dosed based on a goal AUC of 400-600.     Renal function is currently stable.    Visit Vitals  /76 (BP Location: Right arm)   Pulse 99   Resp 16        Lab Results   Component Value Date    CREATININE 0.56 10/11/2023    CREATININE 0.64 09/01/2023    CREATININE 0.61 08/31/2023    CREATININE 0.80 08/30/2023    CREATININE 0.76 08/29/2023        Patient weight is No results found for: \"PTWEIGHT\"    No results found for: \"CULTURE\"     No intake/output data recorded.  [unfilled]    Lab Results   Component Value Date    PATIENTTEMP 37.0 08/27/2023    PATIENTTEMP 37.0 08/26/2023    PATIENTTEMP 37.0 08/26/2023          Assessment/Plan     Patient will not be given a loading dose.  Will initiate vancomycin maintenance,  1000 mg every 12 hours.    This dosing regimen is predicted by [employer] to result in the following pharmacokinetic parameters:  AUC24,ss: 487 mg/L.hr  Probability of AUC24 > 400: 71 %  Ctrough,ss: 14 mg/L  Probability of Ctrough,ss > 20: 23 %    Follow-up level will be ordered on 10/13 at 0500, unless clinically indicated sooner.  Will continue to monitor renal function daily while on vancomycin and order serum creatinine at least every 48 hours if not already ordered.  Follow for continued vancomycin needs, clinical response, and signs/symptoms of toxicity.       Jillian Green, PharmD       "

## 2023-10-12 NOTE — H&P
"History Of Present Illness  Molina Godinez is a 68 y.o. male presenting with with a past medical history of HTN, COPD, EtOH abuse, chronic PE, opioid abuse, and HCV s/p treatment who presented to the ED with bilateral extremity weakness. He is a poor historian. He reports around 4 days ago he became \"wobbly\" when trying to walk. He fell over, and his neighbor picked him up and placed him in his recliner. He reports not eating for the lsat 3 days, however his female friend provided him 0.5 to 1 pints of liquor per day. He notes the weakness has become progressively worse over the last 3 days. He denies any recent illness, sick contacts, or recent travel. He denies any recent trauma. Notably, he was admitted to the hospital at the end of August with bilateral upper extremity weakness and numbness which was attributed to severe electrolyte abnormalities. He reports being inconsistently compliant with his home medications, including Eliquis.     Past Medical History  No past medical history on file.    Surgical History  No past surgical history on file.     Social History  He reports that he has been smoking cigarettes. He has been smoking an average of .5 packs per day. He does not have any smokeless tobacco history on file. He reports current alcohol use. No history on file for drug use.    Family History  No family history on file.     Allergies  Aspirin and Nsaids (non-steroidal anti-inflammatory drug)    Review of Systems   Constitutional:  Negative for chills and fever.   Respiratory:  Positive for cough. Negative for shortness of breath.    Cardiovascular:  Negative for chest pain and palpitations.   Gastrointestinal:  Negative for abdominal pain, constipation, diarrhea, nausea and vomiting.   Genitourinary:  Negative for dysuria, flank pain, frequency, hematuria and urgency.   Neurological:  Positive for weakness and numbness.   All other systems reviewed and are negative.       Physical Exam  Vitals reviewed. " "  Constitutional:       Comments: Older that stated age, thin-appearing   HENT:      Head: Normocephalic and atraumatic.   Cardiovascular:      Rate and Rhythm: Normal rate and regular rhythm.      Heart sounds: Normal heart sounds.   Pulmonary:      Effort: Pulmonary effort is normal.      Breath sounds: Normal air entry. Wheezing and rhonchi present.   Abdominal:      General: Bowel sounds are normal.      Palpations: Abdomen is soft.      Tenderness: There is no abdominal tenderness.   Musculoskeletal:         General: No deformity.   Skin:     General: Skin is warm and dry.   Neurological:      Mental Status: He is alert and oriented to person, place, and time.      Motor: Weakness present.   Psychiatric:         Mood and Affect: Mood normal.         Behavior: Behavior normal.          Last Recorded Vitals  Blood pressure 130/76, pulse 99, resp. rate 16, height 1.702 m (5' 7\"), weight 58.1 kg (128 lb), SpO2 92 %.    Relevant Results  Lab Results   Component Value Date    WBC 28.3 (H) 10/11/2023    HGB 18.5 (H) 10/11/2023    HCT 52.1 (H) 10/11/2023    MCV 86 10/11/2023     10/11/2023     Lab Results   Component Value Date    GLUCOSE 101 (H) 10/11/2023    CALCIUM 8.4 (L) 10/11/2023     (L) 10/11/2023    K 3.3 (L) 10/11/2023    CO2 28 10/11/2023    CL 87 (L) 10/11/2023    BUN 17 10/11/2023    CREATININE 0.56 10/11/2023     MR thoracic spine wo IV contrast, MR lumbar spine wo IV contrast  Narrative: Interpreted By:  Tyron Dixon,  and Luis Javed   STUDY:  MR THORACIC SPINE WO IV CONTRAST; MR LUMBAR SPINE WO IV CONTRAST;  10/11/2023 11:37 pm      INDICATION:  Signs/Symptoms:bilateral LE weakness.      Per EMR: 68-year-old male with a past medical history of PD,  hypertension, alcohol and opioid abuse, hepatitis C, PE without cor  pulmonale on Eliquis who presents today for an inability to walk.  Patient states that about 3 days ago his legs gave out and he has not  been able to walk since and has " been stuck in a recliner.      COMPARISON:  CT chest abdomen pelvis 08/15/2022.      ACCESSION NUMBER(S):  [id number]; [id number]      ORDERING CLINICIAN:  TEE BLAND      TECHNIQUE:  Limited nondiagnostic MRI of the thoracic and lumbar spine. Only T2  sagittal and coronal  images of the thoracic and lumbar spine  were obtained.  The patient requested termination of the examination  due to pain.      FINDINGS:  Limited MRI of thoracic and lumbar spine demonstrates scoliosis of  the thoracic spine. No definitive evidence of substantial central  canal stenosis. Mild cervical narrowing secondary spinal degenerative  change. Evaluation of neural foraminal stenosis is markedly limited.       images demonstrated T2 hyperintense right renal cyst.      Impression: 1. Nondiagnostic MRI of the thoracic and lumbar spine. The patient  requested termination of the examination due to pain. A repeat  examination can be obtained as clinically indicated after adequate  pain control.  2. Within the severe limitations of this examination, there is no  definite evidence of substantial central canal stenosis of the  thoracic or lumbar spine.              I personally reviewed the images/study and I agree with the findings  as stated. This study was interpreted at [place], [place].          Signed by: Tyron Dixon 10/12/2023 12:23 AM  Dictation workstation:   [id number]            Assessment/Plan   Principal Problem:    Hospital-acquired pneumonia  Active Problems:    Essential hypertension, benign    Hepatitis C virus infection cured after antiviral drug therapy    Alcoholism (CMS/HCC)    Chronic pulmonary embolism (CMS/HCC)    COPD (chronic obstructive pulmonary disease) (CMS/HCC)    HLD (hyperlipidemia)      #Bilateral lower extremity weakness  -Neuro input appreciated  -High dose thiamine  -MMA, B12, folate pending  -Further recs per neurology  -Falls  precautions  -PT/OT    #Pneumonia  #COPD  -With leukocytosis  -Vanc, Zosyn given in ED, continue  -Legionella, s. pneumo urine antigen pending    #Electrolyte derangement  -Likely in the setting of EtOH abuse and poor oral intake  -LR given in ED  -20 meq K+ PO given in ED  -LR @ 75 mL/hr  -Repeat BMP    #Polysubstance abuse  -Nicotine, EtOH, opioid  -CIWA protocol  -Encouraged cessation  -Nicotine patch ordered  - for possible treatment  -Zofran, clonidine, Atarax, loperamide for opiate w/d symptoms    #HTN  -Controlled  -Continue home medications    #Chronic PE  -Continue Romi Zambrano, APRN-CNP

## 2023-10-12 NOTE — ED PROVIDER NOTES
HPI   No chief complaint on file.      Patient is a 68-year-old male with a past medical history of PD, hypertension, alcohol and opioid abuse, hepatitis C, PE without cor pulmonale on Eliquis who presents today for an inability to walk.  Patient states that about 3 days ago his legs gave out and he has not been able to walk since and has been stuck in a recliner.  He states that he has full use of his upper body but has no strength to stand up.  He denies numbness or urinary incontinence, stating that a friend cut the top of a pop bottle which she has been urinating into when he needs to.  He denies fever, vision changes, urinary or stool changes, abdominal pain, loss or changes in sensation, or chest pain or shortness of breath.  He does state evidence of a headache and states that he often takes Tylenol for the headache.  He also states that he drinks about a half a pint of alcohol per day that he takes three 15 mg of oxycodone per day both of which she had taken earlier before coming in.  Also stated that he had not eaten in 5 days.                          Beatriz Coma Scale Score: 15                  Patient History   No past medical history on file.  No past surgical history on file.  No family history on file.  Social History     Tobacco Use    Smoking status: Every Day     Packs/day: .5     Types: Cigarettes    Smokeless tobacco: Not on file   Substance Use Topics    Alcohol use: Yes     Comment: 1.5 pints Ju daily    Drug use: Not on file       Physical Exam   ED Triage Vitals [10/11/23 2011]   Temp Heart Rate Resp BP   -- 99 16 130/76      SpO2 Temp src Heart Rate Source Patient Position   92 % -- -- --      BP Location FiO2 (%)     Right arm 36 %       Physical Exam  Vitals and nursing note reviewed.   Constitutional:       General: He is not in acute distress.     Appearance: He is underweight. He is ill-appearing.      Comments: Physical exam noted that the patient looked underweight and  potentially malnourished.   HENT:      Head: Normocephalic and atraumatic.   Eyes:      Conjunctiva/sclera: Conjunctivae normal.   Cardiovascular:      Rate and Rhythm: Normal rate and regular rhythm.      Heart sounds: No murmur heard.  Pulmonary:      Effort: Prolonged expiration present.      Breath sounds: Wheezing present.      Comments: Patient on 2 to 4 L periodically at baseline and physical exam noted for bilateral wheezing throughout lung fields  Abdominal:      Palpations: Abdomen is soft.      Tenderness: There is no abdominal tenderness.   Genitourinary:     Comments: A rectal exam was performed and showed adequate rectal tone without evidence of blood in the stool or vault.  Musculoskeletal:         General: No swelling.      Cervical back: Neck supple.      Comments: Exam exam notable for inability to lift legs against gravity.  Strength 1 out of 5 bilateral lower extremities   Skin:     General: Skin is warm and dry.      Capillary Refill: Capillary refill takes less than 2 seconds.   Neurological:      Mental Status: He is alert and oriented to person, place, and time.      Cranial Nerves: Cranial nerves 2-12 are intact.      Sensory: Sensation is intact.      Motor: No weakness.      Deep Tendon Reflexes: Reflexes abnormal.      Reflex Scores:       Tricep reflexes are 2+ on the right side and 2+ on the left side.       Bicep reflexes are 2+ on the right side and 2+ on the left side.       Brachioradialis reflexes are 2+ on the right side and 2+ on the left side.       Patellar reflexes are 0 on the right side and 0 on the left side.       Achilles reflexes are 0 on the right side and 0 on the left side.     Comments: Patient's exam is notable for lack of lateral patellar and Achilles reflexes reflexes   Psychiatric:         Mood and Affect: Mood normal.         ED Course & [facility]   ED Course as of 10/12/23 1940   Thu Oct 12, 2023   0052 MR lumbar spine wo IV contrast [DW]   0052 MR thoracic spine wo  IV contrast [DW]   0111 Coagulation screen negative given patient's history of Eliquis use, we considered lumbar puncture however given anticoagulation use, deferred and follow up inpatient with IR for LP [SA]   0115 Neurology consulted [SA]      ED Course User Index  [DW] Callum Pacheco DO  [SA] Olga Eckert DO         Diagnoses as of 10/12/23 1940   Hospital-acquired pneumonia       Medical Decision Making  Patient is a 68-year-old male with a past medical history of PD, hypertension, alcohol and opioid abuse, hepatitis C, PE without cor pulmonale on Eliquis who presents today for an inability to walk.  1 L lactated Ringer's was given as a bolus as patient appears dry upon exam.  Wide differential includes severe malnutrition, electrolyte disturbances, brain tumor, GBS, no abscess, spinal cord compression, cauda equina.  Patient states that he is not incontinent of urine and has been able to go on demand without numbness in the perineal region ruling out cauda equina.  Basic labs were ordered in addition to CK troponin urine analysis chest x-ray CK head and an MRI of the thoracic and lumbar spines. Chest X-ray resulted with new patchy left lower lobe airspace opacities, potential for pneumonia.  Troponins were 22 and CK was 211, making MI and rhabdo less likely.  His WBC was noted to be 28.3.  Electrolytes were low, with sodium 130, potassium 3.3, chloride 87.  Potassium chloride was ordered.  Given that he was hospitalized at the end of August is treated for hospital-acquired pneumonia with vancomycin and Zosyn.  Test for Legionella and strep pneumo were also ordered.    CT Head showed no evidence of acute cortical infarct or intracranial hemorrhage, Subcortical and periventricular white matter hypodensities consistent with the sequela of chronic microvascular ischemic changes, and Punctate remote left thalamic and anterior basal ganglia lacunar infarcts.    MRI of the thoracic and lumbar spine were  ordered.    Upon signout patient care was transferred to Dr. Eckert in stable condition.  Neurology was paged and awaiting their return call for possible admission.          Procedure  Procedures none         Callum Pacheco,   Resident  10/12/23 0113       Callum Pacheco DO  Resident  10/12/23 1940    -----------------------------------  This patient was seen by the resident physician. I have seen and examined the patient, agree with the workup, evaluation, management and diagnosis. The care plan has been discussed and I concur.    My assessment reveals the following:    HPI:  Patient is a 69 y/o male presenting via EMS after 3 days of BLE weakness. No h/o similar symptoms. No pain in legs. State that legs gave out. No trauma or injury. Unable to walk. No F/C/N/V. No CP/SOB. No recent infections. H/o drug use, but oral, not IV. H/o alcohol use. No abd pain.     PE:  Vital signs reviewed in nursing triage note, EMR flowsheets, and at patient's bedside  GEN: Patient is awake, alert, calm, cooperative, and in moderate distress.  HEAD: Normocephalic and atraumatic.  EYES: Anicteric sclera.  MOUTH: Mucous membranes moist.  CV: Regular rate and rhythm. (+) s1/s2. No murmurs/rubs/gallops.  PULM: CTAB. No wheezes, rales, or crackles.  GI: Soft, non-tender, non-distended without rebound or guarding.  EXT: No deformities noted.   NEURO: Moves all extremities. 5/5 strength in BUE. 1/5  strength in BLE. Able to lift both legs slightly.   SKIN: Warm, dry. No erythema or ecchymosis.      Labs Reviewed   STREPTOCOCCUS PNEUMONIAE ANTIGEN, URINE - Abnormal       Result Value    Streptococcus pneumoniae Ag, Urine Positive (*)    CBC WITH AUTO DIFFERENTIAL - Abnormal    WBC 28.3 (*)     nRBC 0.0      RBC 6.04 (*)     Hemoglobin 18.5 (*)     Hematocrit 52.1 (*)     MCV 86      MCH 30.6      MCHC 35.5      RDW 12.8      Platelets 227      MPV 10.1      Neutrophils % 92.0      Immature Granulocytes %, Automated 1.2 (*)      Lymphocytes % 2.2      Monocytes % 4.3      Eosinophils % 0.0      Basophils % 0.3      Neutrophils Absolute 26.02 (*)     Immature Granulocytes Absolute, Automated 0.35      Lymphocytes Absolute 0.62 (*)     Monocytes Absolute 1.22 (*)     Eosinophils Absolute 0.00      Basophils Absolute 0.09     COMPREHENSIVE METABOLIC PANEL - Abnormal    Glucose 101 (*)     Sodium 130 (*)     Potassium 3.3 (*)     Chloride 87 (*)     Bicarbonate 28      Anion Gap 18      Urea Nitrogen 17      Creatinine 0.56      eGFR >90      Calcium 8.4 (*)     Albumin 3.5      Alkaline Phosphatase 126      Total Protein 7.9      AST 31      Bilirubin, Total 1.6 (*)     ALT 10     URINALYSIS WITH REFLEX MICROSCOPIC AND CULTURE - Abnormal    Color, Urine Yellow      Appearance, Urine Clear      Specific Gravity, Urine 1.013      pH, Urine 7.0      Protein, Urine NEGATIVE      Glucose, Urine NEGATIVE      Blood, Urine NEGATIVE      Ketones, Urine NEGATIVE      Bilirubin, Urine NEGATIVE      Urobilinogen, Urine 2.0 (*)     Nitrite, Urine NEGATIVE      Leukocyte Esterase, Urine NEGATIVE     COAGULATION SCREEN - Abnormal    Protime 20.2 (*)     INR 1.8 (*)     aPTT 41 (*)     Narrative:     The APTT is no longer used for monitoring Unfractionated Heparin Therapy. For monitoring Heparin Therapy, use the Heparin Assay.  INTERFERENCE DETECTED. The result may be affected due to hemolysis, icterus, lipemia, or other interferents. Clinical correlation is recommended. Repeat testing may be considered.  INTERFERENCE DETECTED. The result may be affected due to hemolysis, icterus, lipemia, or other interferents. Clinical correlation is recommended. Repeat testing may be considered.   MRSA SURVEILLANCE FOR VANCOMYCIN DE-ESCALATION, PCR - Normal    MRSA PCR Not Detected      Narrative:     This assay is an FDA-approved in vitro diagnostic nucleic acid amplification test for the detection of methicillin-resistant Staphylococcus aureus (MRSA) DNA directly from  nasal swabs in patients at risk for nasal colonization. MRSA NxG is intended to aid in the prevention and control of MRSA infections in healthcare settings. This assay is NOT intended to diagnose, guide, or monitor treatment for MRSA infections, or provide results of susceptibility to methicillin. A negative result does not preclude MRSA nasal colonization. Test performance has not been evaluated in patients less than two years of age.   LEGIONELLA ANTIGEN, URINE - Normal    L. pneumophila Urine Ag Negative     TROPONIN I, HIGH SENSITIVITY - Normal    Troponin I, High Sensitivity 22      Narrative:     Less than 99th percentile of normal range cutoff-  Female and children under 18 years old <35 ng/L; Male <54 ng/L: Negative  Repeat testing should be performed if clinically indicated.     Female and children under 18 years old  ng/L; Male  ng/L:  Consistent with possible cardiac damage and possible increased clinical   risk. Serial measurements may help to assess extent of myocardial damage.     >120 ng/L: Consistent with cardiac damage, increased clinical risk and  myocardial infarction. Serial measurements may help assess extent of   myocardial damage.      NOTE: Children less than 1 year old may have higher baseline troponin   levels and results should be interpreted in conjunction with the overall   clinical context.    NOTE: Troponin I testing is performed using a different   testing methodology at [facility] than at other   [facility]. Direct result comparisons should only   be made within the same method.     CREATINE KINASE - Normal    Creatine Kinase 311     VITAMIN B12 - Normal    Vitamin B12 324     FOLATE - Normal    Folate, Serum >24.0      Narrative:     Low           <3.4  Borderline 3.4-5.0  Normal        >5.0    Patients receiving more than 5 mg/day of biotin may have interference in test results. A sample should be taken no sooner than eight hours after previous dose.  Contact the testing laboratory for additional information.    BLOOD CULTURE   URINALYSIS WITH REFLEX MICROSCOPIC AND CULTURE    Narrative:     The following orders were created for panel order Urinalysis with Reflex Microscopic and Culture.  Procedure                               Abnormality         Status                     ---------                               -----------         ------                     Urinalysis with Reflex M...[[id number]]  Abnormal            Final result               Extra Urine Gray Tube[[id number]]                            Final result                 Please view results for these tests on the individual orders.   EXTRA URINE GRAY TUBE    Extra Tube Hold for add-ons.     METHYLMALONIC ACID   VANCOMYCIN, TROUGH   CBC   COMPREHENSIVE METABOLIC PANEL     MR thoracic spine wo IV contrast   Final Result      MR lumbar spine wo IV contrast   Final Result   1. Nondiagnostic MRI of the thoracic and lumbar spine. The patient   requested termination of the examination due to pain. A repeat   examination can be obtained as clinically indicated after adequate   pain control.   2. Within the severe limitations of this examination, there is no   definite evidence of substantial central canal stenosis of the   thoracic or lumbar spine.                  I personally reviewed the images/study and I agree with the findings   as stated. This study was interpreted at [place], [place].             Signed by: Tyron Dixon 10/12/2023 12:23 AM   Dictation workstation:   [id number]      CT head wo IV contrast   Final Result   1. No evidence of acute cortical infarct or intracranial hemorrhage.   2. Subcortical and periventricular white matter hypodensities   consistent with the sequela of chronic microvascular ischemic changes.   3. Punctate remote left thalamic and anterior basal ganglia lacunar   infarcts.             I personally reviewed the  images/study and I agree with the findings   as stated. This study was interpreted at [place], [place].        MACRO:   None        Signed by: Tyron Dixon 10/11/2023 9:52 PM   Dictation workstation:   [id number]      XR chest 2 views   Final Result   1. New patchy left lower lobe airspace opacities. Consider pneumonia.   2. Findings of COPD/emphysema.        I personally reviewed the images/study and I agree with the findings   as stated. This study was interpreted at [place], [place].        MACRO:   None        Signed by: Tyron Dixon 10/11/2023 9:42 PM   Dictation workstation:   [id number]          Medical Decision Making:  - IVF  - Labs  - CXR  - CT head without contrast  - MRI T/L spine with and without contrast  - Abx for PNA  - Signed out to Dr. Ramon pending MRI results.   - Likely admit due to inability to ambulate.     Differential Diagnoses Considered: Spinal cord pathology, Guillain-Denmark Syndrome, Epidural abscess.    Chronic Medical Conditions Significantly Affecting Care: Alcohol and substance abuse.     Independent Interpretation of Studies:  I independently interpreted: CXR shows LLL infiltrate    Escalation of Care:  Appropriate for inpatient management    MD Gwyn Vogel MD  10/12/23 [id number]

## 2023-10-12 NOTE — ED TRIAGE NOTES
pt came in with bilateral leg weakness. pt has feeling in both of his legs but states thtat he has more feeling in his left side. endorses COPD with 4 L oxygen.

## 2023-10-12 NOTE — CONSULTS
"Consults    History Of Present Illness  Molina Godinez is a 68 y.o. male  with PMH HTN, alcohol use disorder, opioid use disorder, HCV s/p treatment, presenting with bilateral lower extremity weakness.    Mr. Godinez is a poor historian and was unable to elaborate much on his history of present illness. He describes that around 4 days ago he became \"wobbly\" when trying to walk (unable to describe the wobbliness further). He then fell over and his neighbor picked him up and put him in his recliner. He had been unable to leave the recliner for the past 3 days due to weakness in his legs. He reports that he was using a carton next to his recliner to urinate into and did not eat for the past 3 days, stating he had appetite. He does endorse that his \"girl\" came by and provided him which alcohol, and he has been drinking his typical 0.5 to 1 pints of liquor per day. He states that he has some numbness in his legs as well, but it appears to be near his baseline numbness. He says that his legs have felt they became \"progressively more weak\" over the past 3 days, but it is not apparent that there is any evidence of an ascending weakness, rather a progressive severity of weakness.    He denies any preceding illness, sick symptoms, or sick contacts. No recent vaccinations or travel. No recent trauma or injuries to his back.     In the ED, labs demonstrated a white count of 28.3, Hgb 18.5, plt 227, Na 130, K 3.3, Cl 87. . MRI T and L spine were performed without contrast and were motion degraded but no clear evidence of cord abnormality.     Of note, he was admitted at the end of August after waking up with weakness and numbness in his bilateral upper extremities, which started after multiple days of drinking heavily and using a large amount of opioids and cocaine, and decreased oral intake. He was found to have severe electrolyte abnormalities, and was admitted for electrolyte replacement, re-nourishment, and monitor for " refeeding syndrome. He was eventually discharged home.    Past Medical History  No past medical history on file.  Surgical History  No past surgical history on file.  Social History  Social History     Tobacco Use    Smoking status: Every Day     Packs/day: .5     Types: Cigarettes   Substance Use Topics    Alcohol use: Yes     Comment: 1.5 pints Ju daily     Allergies  Aspirin and Nsaids (non-steroidal anti-inflammatory drug)  (Not in a hospital admission)      Review of Systems   Constitutional:  Positive for activity change. Negative for chills, diaphoresis and fever.   HENT:  Negative for congestion, rhinorrhea and trouble swallowing.    Eyes:  Negative for visual disturbance.   Respiratory:  Positive for cough.    Musculoskeletal:  Positive for gait problem. Negative for myalgias.   Neurological:  Positive for weakness. Negative for dizziness and headaches.     Neuro Exam  GENERAL APPEARANCE:  No distress, alert, interactive and cooperative.      MENTAL STATE:   Orientation was normal to time, place and person. Language testing was normal for comprehension, repetition, expression, and naming. No dysarthria.    CRANIAL NERVES:   CN 2   Visual fields full to confrontation.   CN 3, 4, 6   Pupils round, 4 mm in diameter, equally reactive to light. Lids symmetric; no ptosis. EOMs normal alignment, full range with normal saccades, pursuit and convergence.   No nystagmus.   CN 5   Facial sensation intact bilaterally.   CN 7   Normal and symmetric facial strength. Nasolabial folds symmetric.   CN 8   Hearing intact to conversation.  CN 9/10  Palate elevates symmetrically.   CN 11   Normal strength of shoulder shrug and neck turning.   CN 12   Tongue midline, with normal bulk and strength; no fasciculations.     MOTOR:   Muscle bulk and tone were normal in both upper and lower extremities.   No fasciculations, tremor or other abnormal movements were present.                         R          L  Deltoids       4      "     4-  Biceps          4          4-  Triceps          4          4-  Wrist Flex      4          4-  Wrist Ext       5          5    Hip Flex         4          4  Knee Flex      3          3  Knee Ext        4          4  Dorsiflex        3          3  Plantarflex      3          3    REFLEXES:                       R          L  BR:               0         0  Biceps:         0          0  Triceps:        0          0  Knee:            0          0  Ankle:          0          0    Babinski: toes downgoing to plantar stimulation. No clonus or other pathologic reflexes present.     SENSORY:   Decreased sensation to light touch in bilateral lower extremities compared to bilateral upper extremities. Decreased sensation to vibration and temperature in a stocking-glove distribution. Sensation intact throughout to pinprick with no sensory level.     COORDINATION:    In both upper extremities, finger-nose-finger was intact without dysmetria or overshoot.   Unable to test coordination in lower extremities due to weakness.     GAIT:   Deferred due to weakness.    Last Recorded Vitals  Blood pressure 130/76, pulse 99, resp. rate 16, height 1.702 m (5' 7\"), weight 58.1 kg (128 lb), SpO2 92 %.    Relevant Results  Results for orders placed or performed during the hospital encounter of 10/11/23 (from the past 24 hour(s))   Troponin I, High Sensitivity   Result Value Ref Range    Troponin I, High Sensitivity 22 0 - 53 ng/L   Cardiac Enzymes - CPK   Result Value Ref Range    Creatine Kinase 311 0 - 325 U/L   CBC and Auto Differential   Result Value Ref Range    WBC 28.3 (H) 4.4 - 11.3 x10*3/uL    nRBC 0.0 0.0 - 0.0 /100 WBCs    RBC 6.04 (H) 4.50 - 5.90 x10*6/uL    Hemoglobin 18.5 (H) 13.5 - 17.5 g/dL    Hematocrit 52.1 (H) 41.0 - 52.0 %    MCV 86 80 - 100 fL    MCH 30.6 26.0 - 34.0 pg    MCHC 35.5 32.0 - 36.0 g/dL    RDW 12.8 11.5 - 14.5 %    Platelets 227 150 - 450 x10*3/uL    MPV 10.1 7.5 - 11.5 fL    Neutrophils % 92.0 40.0 - " 80.0 %    Immature Granulocytes %, Automated 1.2 (H) 0.0 - 0.9 %    Lymphocytes % 2.2 13.0 - 44.0 %    Monocytes % 4.3 2.0 - 10.0 %    Eosinophils % 0.0 0.0 - 6.0 %    Basophils % 0.3 0.0 - 2.0 %    Neutrophils Absolute 26.02 (H) 1.20 - 7.70 x10*3/uL    Immature Granulocytes Absolute, Automated 0.35 0.00 - 0.70 x10*3/uL    Lymphocytes Absolute 0.62 (L) 1.20 - 4.80 x10*3/uL    Monocytes Absolute 1.22 (H) 0.10 - 1.00 x10*3/uL    Eosinophils Absolute 0.00 0.00 - 0.70 x10*3/uL    Basophils Absolute 0.09 0.00 - 0.10 x10*3/uL   Comprehensive metabolic panel   Result Value Ref Range    Glucose 101 (H) 74 - 99 mg/dL    Sodium 130 (L) 136 - 145 mmol/L    Potassium 3.3 (L) 3.5 - 5.3 mmol/L    Chloride 87 (L) 98 - 107 mmol/L    Bicarbonate 28 21 - 32 mmol/L    Anion Gap 18 10 - 20 mmol/L    Urea Nitrogen 17 6 - 23 mg/dL    Creatinine 0.56 0.50 - 1.30 mg/dL    eGFR >90 >60 mL/min/1.73m*2    Calcium 8.4 (L) 8.6 - 10.6 mg/dL    Albumin 3.5 3.4 - 5.0 g/dL    Alkaline Phosphatase 126 33 - 136 U/L    Total Protein 7.9 6.4 - 8.2 g/dL    AST 31 9 - 39 U/L    Bilirubin, Total 1.6 (H) 0.0 - 1.2 mg/dL    ALT 10 10 - 52 U/L   Light Blue Top   Result Value Ref Range    Extra Tube Hold for add-ons.    Coagulation Screen   Result Value Ref Range    Protime 20.2 (H) 9.8 - 12.8 seconds    INR 1.8 (H) 0.9 - 1.1    aPTT 41 (H) 27 - 38 seconds   MRSA Surveillance for Vancomycin De-escalation, PCR    Specimen: Anterior Nares; Swab   Result Value Ref Range    MRSA PCR Not Detected Not Detected   Urinalysis with Reflex Microscopic and Culture   Result Value Ref Range    Color, Urine Yellow Straw, Yellow    Appearance, Urine Clear Clear    Specific Gravity, Urine 1.013 1.005 - 1.035    pH, Urine 7.0 5.0, 5.5, 6.0, 6.5, 7.0, 7.5, 8.0    Protein, Urine NEGATIVE NEGATIVE mg/dL    Glucose, Urine NEGATIVE NEGATIVE mg/dL    Blood, Urine NEGATIVE NEGATIVE    Ketones, Urine NEGATIVE NEGATIVE mg/dL    Bilirubin, Urine NEGATIVE NEGATIVE    Urobilinogen, Urine  2.0 (N) <2.0 mg/dL    Nitrite, Urine NEGATIVE NEGATIVE    Leukocyte Esterase, Urine NEGATIVE NEGATIVE   Extra Urine Gray Tube   Result Value Ref Range    Extra Tube Hold for add-ons.        MR thoracic spine wo IV contrast    Result Date: 10/12/2023  Interpreted By:  Tyron Dixon and Ebai Jerky STUDY: MR THORACIC SPINE WO IV CONTRAST; MR LUMBAR SPINE WO IV CONTRAST; 10/11/2023 11:37 pm   INDICATION: Signs/Symptoms:bilateral LE weakness.   Per EMR: 68-year-old male with a past medical history of PD, hypertension, alcohol and opioid abuse, hepatitis C, PE without cor pulmonale on Eliquis who presents today for an inability to walk. Patient states that about 3 days ago his legs gave out and he has not been able to walk since and has been stuck in a recliner.   COMPARISON: CT chest abdomen pelvis 08/15/2022.   ACCESSION NUMBER(S): [id number]; [id number]   ORDERING CLINICIAN: TEE BLAND   TECHNIQUE: Limited nondiagnostic MRI of the thoracic and lumbar spine. Only T2 sagittal and coronal  images of the thoracic and lumbar spine were obtained.  The patient requested termination of the examination due to pain.   FINDINGS: Limited MRI of thoracic and lumbar spine demonstrates scoliosis of the thoracic spine. No definitive evidence of substantial central canal stenosis. Mild cervical narrowing secondary spinal degenerative change. Evaluation of neural foraminal stenosis is markedly limited.    images demonstrated T2 hyperintense right renal cyst.       1. Nondiagnostic MRI of the thoracic and lumbar spine. The patient requested termination of the examination due to pain. A repeat examination can be obtained as clinically indicated after adequate pain control. 2. Within the severe limitations of this examination, there is no definite evidence of substantial central canal stenosis of the thoracic or lumbar spine.       I personally reviewed the images/study and I agree with the findings as stated.  This study was interpreted at [place], [place].     Signed by: Tyron Dixon 10/12/2023 12:23 AM Dictation workstation:   [id number]    MR lumbar spine wo IV contrast    Result Date: 10/12/2023  Interpreted By:  Tyron Dixon and Ebai Jerky STUDY: MR THORACIC SPINE WO IV CONTRAST; MR LUMBAR SPINE WO IV CONTRAST; 10/11/2023 11:37 pm   INDICATION: Signs/Symptoms:bilateral LE weakness.   Per EMR: 68-year-old male with a past medical history of PD, hypertension, alcohol and opioid abuse, hepatitis C, PE without cor pulmonale on Eliquis who presents today for an inability to walk. Patient states that about 3 days ago his legs gave out and he has not been able to walk since and has been stuck in a recliner.   COMPARISON: CT chest abdomen pelvis 08/15/2022.   ACCESSION NUMBER(S): [id number]; [id number]   ORDERING CLINICIAN: TEE BLAND   TECHNIQUE: Limited nondiagnostic MRI of the thoracic and lumbar spine. Only T2 sagittal and coronal  images of the thoracic and lumbar spine were obtained.  The patient requested termination of the examination due to pain.   FINDINGS: Limited MRI of thoracic and lumbar spine demonstrates scoliosis of the thoracic spine. No definitive evidence of substantial central canal stenosis. Mild cervical narrowing secondary spinal degenerative change. Evaluation of neural foraminal stenosis is markedly limited.    images demonstrated T2 hyperintense right renal cyst.       1. Nondiagnostic MRI of the thoracic and lumbar spine. The patient requested termination of the examination due to pain. A repeat examination can be obtained as clinically indicated after adequate pain control. 2. Within the severe limitations of this examination, there is no definite evidence of substantial central canal stenosis of the thoracic or lumbar spine.       I personally reviewed the images/study and I agree with the findings as stated. This  study was interpreted at [place], [place].     Signed by: Tyron Dixon 10/12/2023 12:23 AM Dictation workstation:   [id number]    CT head wo IV contrast    Result Date: 10/11/2023  Interpreted By:  Tyron Dixon and Dervishi Mario STUDY: CT HEAD WO IV CONTRAST;  10/11/2023 9:42 pm   INDICATION: bilateral LE weakness, headache.   COMPARISON: CT brain: 08/26/2023.   ACCESSION NUMBER(S): [id number]   ORDERING CLINICIAN: TEE BLAND   TECHNIQUE: Noncontrast axial CT scan of head was performed. Angled reformats in brain and bone windows were generated. The images were reviewed in bone, brain, blood and soft tissue windows.   FINDINGS: CSF Spaces: The ventricles, sulci and basal cisterns are concordant with patient's age and degree of global parenchymal atrophy.. There is no extraaxial fluid collection.   Parenchyma: There are hypodense focal areas in the subcortical and periventricular white matter regions consistent with the sequela of chronic microvascular ischemic changes. Punctate remote left thalamic and anterior limb internal capsule lacunar infarcts. Otherwise, the grey-white differentiation is overall preserved. There is no mass effect or midline shift.  There is no intracranial hemorrhage.   Calvarium: The calvarium is intact.   Paranasal sinuses and mastoids: Visualized paranasal sinuses and mastoids are clear.       1. No evidence of acute cortical infarct or intracranial hemorrhage. 2. Subcortical and periventricular white matter hypodensities consistent with the sequela of chronic microvascular ischemic changes. 3. Punctate remote left thalamic and anterior basal ganglia lacunar infarcts.     I personally reviewed the images/study and I agree with the findings as stated. This study was interpreted at [place], [place].   MACRO: None   Signed by: Tyron Dixon 10/11/2023 9:52 PM Dictation  workstation:   [id number]    XR chest 2 views    Result Date: 10/11/2023  Interpreted By:  Tyron Dixon and Stephens Katherine STUDY: XR CHEST 2 VIEWS;  10/11/2023 9:32 pm   INDICATION: Signs/Symptoms:bilater LE weakness.   COMPARISON: Chest radiograph and CT chest 08/26/2023   ACCESSION NUMBER(S): [id number]   ORDERING CLINICIAN: TEE BLAND   FINDINGS: PA and lateral radiographs of the chest were provided. New patchy left lower lobe airspace opacities. Consider pneumonia. Pulmonary hyperinflation and coarsened interstitial markings compatible with COPD/emphysema. No pleural effusion or pneumothorax diffuse osteopenia. No acute osseous abnormality. Partially visualized left shoulder arthroplasty. Upper abdomen is unremarkable.       1. New patchy left lower lobe airspace opacities. Consider pneumonia. 2. Findings of COPD/emphysema.   I personally reviewed the images/study and I agree with the findings as stated. This study was interpreted at [place], [place].   MACRO: None   Signed by: Tyron Dixon 10/11/2023 9:42 PM Dictation workstation:   [id number]             Beatriz Coma Scale  Best Eye Response: Spontaneous  Best Verbal Response: Oriented  Best Motor Response: Follows commands  McClure Coma Scale Score: 15       Assessment/Plan   Active Problems:  There are no active Hospital Problems.    Mr. Godinez is a 68 year old male with PMH HTN, alcohol use disorder, opioid use disorder, HCV s/p treatment, presenting with diffuse weakness, mostly affecting his bilateral lower extremity. Of note, he had a similar presentation at the end of August and was found to be severely dehydrated and malnourished.  Patient's history is not typical for GBS as the symptoms do not appear to be truly ascending in nature, however with LE weakness>upper extremity weakness and lack of reflexes it cannot be entirely ruled out. Spinal cord injury (I.e. spinal cord  stroke vs compression) is also less likely given lack of sensory level and absent reflexes. Myopathy (I.e. alcohol induced) is possible, but also less likely given CK within normal limits. Most likely etiology is secondary to nutritional deficiency/severe malnutrition.     Impression: Weakness most likely secondary to nutritional deficiency/severe malnutrition vs less likely spinal cord pathology, AIDP, or myopathy    Recommendations:  - Check for B12, MMA, folate  - Start thiamine 500mg IV TID for 3 days then transition to 100mg PO daily  - Consider repeat MRI C, T, and L spine WITH and without contrast to rule out spinal cord pathology  - Consider LP to look for albuminocytologic disassociation (CIDP)  - Consider EMG   - Admit to medicine for treatment of malnutrition and PT/OT; patient would likely benefit from ongoing therapy at discharge  - Will staff with attending in AM    Douglas Pereyra MD

## 2023-10-12 NOTE — ED NOTES
Pharmacy Medication History Review    Molina Godinez is a 68 year old male presenting with a chief complaint of bilateral leg weakness. Pharmacy reviewed the patient's [id number] medications and allergies for accuracy.    The list below reflectives the updated PTA list. Please review each medication in order reconciliation for additional clarification and justification.  Prior to Admission Medications   Prescriptions Last Dose Informant Patient Reported? Taking?   amLODIPine (Norvasc) 5 mg tablet 10/11/2023 Self, Other Yes Yes   Sig: TAKE 1 TABLET BY MOUTH ONCE DAILY   apixaban (Eliquis) 5 mg tablet 10/11/2023 Self, Other Yes Yes   Sig: TAKE 1 TABLET BY MOUTH EVERY 12 HOURS   atenolol (Tenormin) 50 mg tablet 10/11/2023 Self, Other Yes Yes   Sig: TAKE 1 TABLET BY MOUTH ONCE DAILY   atorvastatin (Lipitor) 40 mg tablet Past Week Self, Other Yes No   Sig: Take 1 tablet (40 mg) by mouth once daily.   folic acid (Folvite) 1 mg tablet Past Week Other No Yes   Sig: TAKE 1 TABLET BY MOUTH ONCE DAILY   Patient not taking: Reported on 10/2/2023   ipratropium-albuteroL (Combivent Respimat)  mcg/actuation inhaler  Self, Other Yes Yes   Sig: INHALE 1 PUFF EVERY 6 HOURS AS NEEDED FOR SHORTNESS OF BREATH   naloxone (Narcan) 4 mg/0.1 mL nasal spray  Self, Other No No   Sig: Use 1 Spray in one nostril (alternate sides) as needed for Drug Overdose (and call 911). every 2-3 min, until assistance arrives.   polyethylene glycol (Glycolax, Miralax) 17 gram/dose powder Unknown Other No No   sennosides (Senokot) 8.6 mg tablet Unknown Self, Other No No   Sig: TAKE 1 TABLET BY MOUTH TWO TIMES A DAY AS NEEDED FOR CONSTIPATION   Patient not taking: Reported on 10/2/2023   thiamine (Vitamin B-1) 100 mg tablet Past Week Self, Other Yes Yes   Sig: TAKE 1 TABLET BY MOUTH ONCE DAILY   Patient not taking: Reported on 10/2/2023      Facility-Administered Medications: None         The list below reflectives the updated allergy list. Please  review each documented allergy for additional clarification and justification.  Allergies  Reviewed by Timothy Zambrano, WOO-STEPHANIE on 10/12/2023        Severity Reactions Comments    Aspirin Not Specified Unknown States he gets excited    Nsaids (non-steroidal Anti-inflammatory Drug) Not Specified Unknown Elevates BP        Sources:      Below are additional concerns with the patient's PTA list.  -Patient reports taking all medications, but appears to have difficulty filling medications with an outside pharmacy.   -Specifically requested to be prescribed buprenorphine-naloxone therapy, possible opioid withdrawal symptoms (Last prescribed buprenorphine-naloxone 8-2 mg films,14 day supply 02/17/2022)  -Eliquis, amlodipine, and atenolol last doses taken yesterday, 10/11/2023 per patient.   -Supplements, vitamins, and atorvastatin were all reported to be taken approximately 1 week ago.   -Combivent Respimat inhaler overused by patient; States needing to use every day and taking beyond prescribed directions   -Previously prescribed polyethylene glycol and senna for constipation, patient reports not taking (no current symptoms). Requested therapies inpatient, inability to produce bowels past several days.     Emily ReedD, [facility] PGY1 Pharmacy Resident  Pleas reach out via Epic Chat for questions, or if no response call [employer] or [employer]  [facility]

## 2023-10-13 LAB
25(OH)D3 SERPL-MCNC: 8 NG/ML (ref 30–100)
ALBUMIN SERPL BCP-MCNC: 3.5 G/DL (ref 3.4–5)
ALBUMIN SERPL BCP-MCNC: 3.6 G/DL (ref 3.4–5)
ALBUMIN SERPL BCP-MCNC: 3.7 G/DL (ref 3.4–5)
ALP SERPL-CCNC: 107 U/L (ref 33–136)
ALP SERPL-CCNC: 115 U/L (ref 33–136)
ALP SERPL-CCNC: 131 U/L (ref 33–136)
ALT SERPL W P-5'-P-CCNC: 12 U/L (ref 10–52)
ALT SERPL W P-5'-P-CCNC: 14 U/L (ref 10–52)
ALT SERPL W P-5'-P-CCNC: 15 U/L (ref 10–52)
ANION GAP BLDV CALCULATED.4IONS-SCNC: 8 MMOL/L (ref 10–25)
ANION GAP SERPL CALC-SCNC: 15 MMOL/L (ref 10–20)
ANION GAP SERPL CALC-SCNC: 17 MMOL/L (ref 10–20)
ANION GAP SERPL CALC-SCNC: 18 MMOL/L (ref 10–20)
APTT PPP: 37 SECONDS (ref 27–38)
AST SERPL W P-5'-P-CCNC: 41 U/L (ref 9–39)
AST SERPL W P-5'-P-CCNC: 41 U/L (ref 9–39)
AST SERPL W P-5'-P-CCNC: 43 U/L (ref 9–39)
BASE EXCESS BLDV CALC-SCNC: 4.8 MMOL/L (ref -2–3)
BASOPHILS # BLD AUTO: 0.02 X10*3/UL (ref 0–0.1)
BASOPHILS # BLD AUTO: 0.04 X10*3/UL (ref 0–0.1)
BASOPHILS NFR BLD AUTO: 0.1 %
BASOPHILS NFR BLD AUTO: 0.2 %
BILIRUB SERPL-MCNC: 1.3 MG/DL (ref 0–1.2)
BILIRUB SERPL-MCNC: 1.4 MG/DL (ref 0–1.2)
BILIRUB SERPL-MCNC: 1.5 MG/DL (ref 0–1.2)
BODY TEMPERATURE: 37 DEGREES CELSIUS
BUN SERPL-MCNC: 10 MG/DL (ref 6–23)
BUN SERPL-MCNC: 12 MG/DL (ref 6–23)
BUN SERPL-MCNC: 9 MG/DL (ref 6–23)
CA-I BLDV-SCNC: 0.8 MMOL/L (ref 1.1–1.33)
CALCIUM SERPL-MCNC: 8.4 MG/DL (ref 8.6–10.6)
CALCIUM SERPL-MCNC: 8.7 MG/DL (ref 8.6–10.6)
CALCIUM SERPL-MCNC: 9.2 MG/DL (ref 8.6–10.6)
CHLORIDE BLDV-SCNC: 90 MMOL/L (ref 98–107)
CHLORIDE SERPL-SCNC: 88 MMOL/L (ref 98–107)
CHLORIDE SERPL-SCNC: 89 MMOL/L (ref 98–107)
CHLORIDE SERPL-SCNC: 91 MMOL/L (ref 98–107)
CO2 SERPL-SCNC: 25 MMOL/L (ref 21–32)
CO2 SERPL-SCNC: 26 MMOL/L (ref 21–32)
CO2 SERPL-SCNC: 27 MMOL/L (ref 21–32)
CREAT SERPL-MCNC: 0.52 MG/DL (ref 0.5–1.3)
CREAT SERPL-MCNC: 0.54 MG/DL (ref 0.5–1.3)
CREAT SERPL-MCNC: 0.63 MG/DL (ref 0.5–1.3)
EOSINOPHIL # BLD AUTO: 0 X10*3/UL (ref 0–0.7)
EOSINOPHIL # BLD AUTO: 0.01 X10*3/UL (ref 0–0.7)
EOSINOPHIL NFR BLD AUTO: 0 %
EOSINOPHIL NFR BLD AUTO: 0.1 %
ERYTHROCYTE [DISTWIDTH] IN BLOOD BY AUTOMATED COUNT: 12.4 % (ref 11.5–14.5)
ERYTHROCYTE [DISTWIDTH] IN BLOOD BY AUTOMATED COUNT: 12.7 % (ref 11.5–14.5)
ERYTHROCYTE [DISTWIDTH] IN BLOOD BY AUTOMATED COUNT: 12.7 % (ref 11.5–14.5)
ERYTHROCYTE [DISTWIDTH] IN BLOOD BY AUTOMATED COUNT: 12.8 % (ref 11.5–14.5)
GFR SERPL CREATININE-BSD FRML MDRD: >90 ML/MIN/1.73M*2
GLUCOSE BLD MANUAL STRIP-MCNC: 160 MG/DL (ref 74–99)
GLUCOSE BLDV-MCNC: 98 MG/DL (ref 74–99)
GLUCOSE SERPL-MCNC: 129 MG/DL (ref 74–99)
GLUCOSE SERPL-MCNC: 85 MG/DL (ref 74–99)
GLUCOSE SERPL-MCNC: 92 MG/DL (ref 74–99)
HCO3 BLDV-SCNC: 29.2 MMOL/L (ref 22–26)
HCT VFR BLD AUTO: 52.1 % (ref 41–52)
HCT VFR BLD AUTO: 52.9 % (ref 41–52)
HCT VFR BLD AUTO: 53.6 % (ref 41–52)
HCT VFR BLD AUTO: 55.1 % (ref 41–52)
HCT VFR BLD EST: 57 % (ref 41–52)
HGB BLD-MCNC: 18.1 G/DL (ref 13.5–17.5)
HGB BLD-MCNC: 18.9 G/DL (ref 13.5–17.5)
HGB BLD-MCNC: 19 G/DL (ref 13.5–17.5)
HGB BLD-MCNC: 19.8 G/DL (ref 13.5–17.5)
HGB BLDV-MCNC: 19.1 G/DL (ref 13.5–17.5)
IMM GRANULOCYTES # BLD AUTO: 0.08 X10*3/UL (ref 0–0.7)
IMM GRANULOCYTES # BLD AUTO: 0.08 X10*3/UL (ref 0–0.7)
IMM GRANULOCYTES NFR BLD AUTO: 0.5 % (ref 0–0.9)
IMM GRANULOCYTES NFR BLD AUTO: 0.6 % (ref 0–0.9)
INR PPP: 1.1 (ref 0.9–1.1)
LACTATE BLDV-SCNC: 1.1 MMOL/L (ref 0.4–2)
LACTATE SERPL-SCNC: 1.3 MMOL/L (ref 0.4–2)
LYMPHOCYTES # BLD AUTO: 0.36 X10*3/UL (ref 1.2–4.8)
LYMPHOCYTES # BLD AUTO: 0.45 X10*3/UL (ref 1.2–4.8)
LYMPHOCYTES NFR BLD AUTO: 2.5 %
LYMPHOCYTES NFR BLD AUTO: 2.7 %
MAGNESIUM SERPL-MCNC: 1.49 MG/DL (ref 1.6–2.4)
MCH RBC QN AUTO: 29.6 PG (ref 26–34)
MCH RBC QN AUTO: 30 PG (ref 26–34)
MCH RBC QN AUTO: 30 PG (ref 26–34)
MCH RBC QN AUTO: 30.5 PG (ref 26–34)
MCHC RBC AUTO-ENTMCNC: 34.7 G/DL (ref 32–36)
MCHC RBC AUTO-ENTMCNC: 35.4 G/DL (ref 32–36)
MCHC RBC AUTO-ENTMCNC: 35.7 G/DL (ref 32–36)
MCHC RBC AUTO-ENTMCNC: 35.9 G/DL (ref 32–36)
MCV RBC AUTO: 84 FL (ref 80–100)
MCV RBC AUTO: 84 FL (ref 80–100)
MCV RBC AUTO: 85 FL (ref 80–100)
MCV RBC AUTO: 86 FL (ref 80–100)
MONOCYTES # BLD AUTO: 0.07 X10*3/UL (ref 0.1–1)
MONOCYTES # BLD AUTO: 0.22 X10*3/UL (ref 0.1–1)
MONOCYTES NFR BLD AUTO: 0.5 %
MONOCYTES NFR BLD AUTO: 1.3 %
NEUTROPHILS # BLD AUTO: 13.78 X10*3/UL (ref 1.2–7.7)
NEUTROPHILS # BLD AUTO: 15.98 X10*3/UL (ref 1.2–7.7)
NEUTROPHILS NFR BLD AUTO: 95.3 %
NEUTROPHILS NFR BLD AUTO: 96.2 %
NRBC BLD-RTO: 0 /100 WBCS (ref 0–0)
OXYHGB MFR BLDV: 84.6 % (ref 45–75)
PCO2 BLDV: 41 MM HG (ref 41–51)
PH BLDV: 7.46 PH (ref 7.33–7.43)
PLATELET # BLD AUTO: 228 X10*3/UL (ref 150–450)
PLATELET # BLD AUTO: 243 X10*3/UL (ref 150–450)
PLATELET # BLD AUTO: 245 X10*3/UL (ref 150–450)
PLATELET # BLD AUTO: 271 X10*3/UL (ref 150–450)
PMV BLD AUTO: 10.8 FL (ref 7.5–11.5)
PMV BLD AUTO: 11.1 FL (ref 7.5–11.5)
PMV BLD AUTO: 11.2 FL (ref 7.5–11.5)
PMV BLD AUTO: 9.7 FL (ref 7.5–11.5)
PO2 BLDV: 57 MM HG (ref 35–45)
POTASSIUM BLDV-SCNC: 9.5 MMOL/L (ref 3.5–5.3)
POTASSIUM SERPL-SCNC: 3 MMOL/L (ref 3.5–5.3)
POTASSIUM SERPL-SCNC: 3.3 MMOL/L (ref 3.5–5.3)
POTASSIUM SERPL-SCNC: 3.4 MMOL/L (ref 3.5–5.3)
PROCALCITONIN SERPL-MCNC: 1.67 NG/ML
PROT SERPL-MCNC: 7.8 G/DL (ref 6.4–8.2)
PROT SERPL-MCNC: 8.5 G/DL (ref 6.4–8.2)
PROT SERPL-MCNC: 8.9 G/DL (ref 6.4–8.2)
PROTHROMBIN TIME: 12 SECONDS (ref 9.8–12.8)
RBC # BLD AUTO: 6.04 X10*6/UL (ref 4.5–5.9)
RBC # BLD AUTO: 6.3 X10*6/UL (ref 4.5–5.9)
RBC # BLD AUTO: 6.42 X10*6/UL (ref 4.5–5.9)
RBC # BLD AUTO: 6.49 X10*6/UL (ref 4.5–5.9)
SAO2 % BLDV: 87 % (ref 45–75)
SODIUM BLDV-SCNC: 118 MMOL/L (ref 136–145)
SODIUM SERPL-SCNC: 128 MMOL/L (ref 136–145)
SODIUM SERPL-SCNC: 129 MMOL/L (ref 136–145)
SODIUM SERPL-SCNC: 130 MMOL/L (ref 136–145)
UFH PPP CHRO-ACNC: 0.4 IU/ML
VANCOMYCIN TROUGH SERPL-MCNC: 11.9 UG/ML (ref 5–20)
WBC # BLD AUTO: 13.8 X10*3/UL (ref 4.4–11.3)
WBC # BLD AUTO: 14.3 X10*3/UL (ref 4.4–11.3)
WBC # BLD AUTO: 16.8 X10*3/UL (ref 4.4–11.3)
WBC # BLD AUTO: 20.4 X10*3/UL (ref 4.4–11.3)

## 2023-10-13 PROCEDURE — 85520 HEPARIN ASSAY: CPT

## 2023-10-13 PROCEDURE — 85027 COMPLETE CBC AUTOMATED: CPT

## 2023-10-13 PROCEDURE — 85027 COMPLETE CBC AUTOMATED: CPT | Performed by: INTERNAL MEDICINE

## 2023-10-13 PROCEDURE — 2500000001 HC RX 250 WO HCPCS SELF ADMINISTERED DRUGS (ALT 637 FOR MEDICARE OP): Performed by: NURSE PRACTITIONER

## 2023-10-13 PROCEDURE — 83605 ASSAY OF LACTIC ACID: CPT | Performed by: STUDENT IN AN ORGANIZED HEALTH CARE EDUCATION/TRAINING PROGRAM

## 2023-10-13 PROCEDURE — 86780 TREPONEMA PALLIDUM: CPT | Mod: CMCLAB

## 2023-10-13 PROCEDURE — 87040 BLOOD CULTURE FOR BACTERIA: CPT | Performed by: NURSE PRACTITIONER

## 2023-10-13 PROCEDURE — 2500000004 HC RX 250 GENERAL PHARMACY W/ HCPCS (ALT 636 FOR OP/ED): Performed by: NURSE PRACTITIONER

## 2023-10-13 PROCEDURE — 80202 ASSAY OF VANCOMYCIN: CPT

## 2023-10-13 PROCEDURE — 2500000004 HC RX 250 GENERAL PHARMACY W/ HCPCS (ALT 636 FOR OP/ED)

## 2023-10-13 PROCEDURE — 85018 HEMOGLOBIN: CPT

## 2023-10-13 PROCEDURE — 82947 ASSAY GLUCOSE BLOOD QUANT: CPT

## 2023-10-13 PROCEDURE — 86780 TREPONEMA PALLIDUM: CPT

## 2023-10-13 PROCEDURE — 99291 CRITICAL CARE FIRST HOUR: CPT

## 2023-10-13 PROCEDURE — 2500000004 HC RX 250 GENERAL PHARMACY W/ HCPCS (ALT 636 FOR OP/ED): Performed by: INTERNAL MEDICINE

## 2023-10-13 PROCEDURE — 2500000002 HC RX 250 W HCPCS SELF ADMINISTERED DRUGS (ALT 637 FOR MEDICARE OP, ALT 636 FOR OP/ED)

## 2023-10-13 PROCEDURE — 84132 ASSAY OF SERUM POTASSIUM: CPT | Mod: CMCLAB | Performed by: STUDENT IN AN ORGANIZED HEALTH CARE EDUCATION/TRAINING PROGRAM

## 2023-10-13 PROCEDURE — 82652 VIT D 1 25-DIHYDROXY: CPT | Mod: CMCLAB | Performed by: STUDENT IN AN ORGANIZED HEALTH CARE EDUCATION/TRAINING PROGRAM

## 2023-10-13 PROCEDURE — 85025 COMPLETE CBC W/AUTO DIFF WBC: CPT | Performed by: STUDENT IN AN ORGANIZED HEALTH CARE EDUCATION/TRAINING PROGRAM

## 2023-10-13 PROCEDURE — 82947 ASSAY GLUCOSE BLOOD QUANT: CPT | Mod: CMCLAB | Performed by: STUDENT IN AN ORGANIZED HEALTH CARE EDUCATION/TRAINING PROGRAM

## 2023-10-13 PROCEDURE — 99232 SBSQ HOSP IP/OBS MODERATE 35: CPT | Performed by: INTERNAL MEDICINE

## 2023-10-13 PROCEDURE — C9113 INJ PANTOPRAZOLE SODIUM, VIA: HCPCS | Performed by: INTERNAL MEDICINE

## 2023-10-13 PROCEDURE — 82306 VITAMIN D 25 HYDROXY: CPT | Performed by: STUDENT IN AN ORGANIZED HEALTH CARE EDUCATION/TRAINING PROGRAM

## 2023-10-13 PROCEDURE — 36415 COLL VENOUS BLD VENIPUNCTURE: CPT | Mod: CMCLAB | Performed by: INTERNAL MEDICINE

## 2023-10-13 PROCEDURE — 80053 COMPREHEN METABOLIC PANEL: CPT | Performed by: INTERNAL MEDICINE

## 2023-10-13 PROCEDURE — 84145 PROCALCITONIN (PCT): CPT | Performed by: STUDENT IN AN ORGANIZED HEALTH CARE EDUCATION/TRAINING PROGRAM

## 2023-10-13 PROCEDURE — 36415 COLL VENOUS BLD VENIPUNCTURE: CPT | Mod: CMCLAB

## 2023-10-13 PROCEDURE — 85730 THROMBOPLASTIN TIME PARTIAL: CPT

## 2023-10-13 PROCEDURE — 2020000001 HC ICU ROOM DAILY

## 2023-10-13 PROCEDURE — 85025 COMPLETE CBC W/AUTO DIFF WBC: CPT

## 2023-10-13 PROCEDURE — 84132 ASSAY OF SERUM POTASSIUM: CPT | Mod: CMCLAB

## 2023-10-13 PROCEDURE — 85610 PROTHROMBIN TIME: CPT

## 2023-10-13 PROCEDURE — 84132 ASSAY OF SERUM POTASSIUM: CPT | Mod: CMCLAB | Performed by: INTERNAL MEDICINE

## 2023-10-13 PROCEDURE — 83735 ASSAY OF MAGNESIUM: CPT | Performed by: STUDENT IN AN ORGANIZED HEALTH CARE EDUCATION/TRAINING PROGRAM

## 2023-10-13 PROCEDURE — 86318 IA INFECTIOUS AGENT ANTIBODY: CPT | Mod: CMCLAB

## 2023-10-13 RX ORDER — DEXTROSE, SODIUM CHLORIDE, SODIUM LACTATE, POTASSIUM CHLORIDE, AND CALCIUM CHLORIDE 5; .6; .31; .03; .02 G/100ML; G/100ML; G/100ML; G/100ML; G/100ML
75 INJECTION, SOLUTION INTRAVENOUS CONTINUOUS
Status: DISCONTINUED | OUTPATIENT
Start: 2023-10-13 | End: 2023-10-13

## 2023-10-13 RX ORDER — FOLIC ACID 1 MG/1
1 TABLET ORAL DAILY
Status: DISCONTINUED | OUTPATIENT
Start: 2023-10-13 | End: 2023-10-13

## 2023-10-13 RX ORDER — DIAZEPAM 5 MG/ML
20 INJECTION, SOLUTION INTRAMUSCULAR; INTRAVENOUS ONCE
Status: DISCONTINUED | OUTPATIENT
Start: 2023-10-13 | End: 2023-10-13

## 2023-10-13 RX ORDER — ATENOLOL 50 MG/1
50 TABLET ORAL DAILY
Status: DISCONTINUED | OUTPATIENT
Start: 2023-10-13 | End: 2023-10-13

## 2023-10-13 RX ORDER — HEPARIN SODIUM 5000 [USP'U]/ML
2000-4000 INJECTION, SOLUTION INTRAVENOUS; SUBCUTANEOUS EVERY 4 HOURS PRN
Status: DISCONTINUED | OUTPATIENT
Start: 2023-10-13 | End: 2023-11-21

## 2023-10-13 RX ORDER — SENNOSIDES 8.6 MG/1
1 TABLET ORAL NIGHTLY PRN
Status: DISCONTINUED | OUTPATIENT
Start: 2023-10-13 | End: 2023-11-08

## 2023-10-13 RX ORDER — IPRATROPIUM BROMIDE AND ALBUTEROL SULFATE 2.5; .5 MG/3ML; MG/3ML
3 SOLUTION RESPIRATORY (INHALATION)
Status: DISCONTINUED | OUTPATIENT
Start: 2023-10-13 | End: 2023-10-13

## 2023-10-13 RX ORDER — IPRATROPIUM BROMIDE AND ALBUTEROL SULFATE 2.5; .5 MG/3ML; MG/3ML
3 SOLUTION RESPIRATORY (INHALATION) ONCE
Status: COMPLETED | OUTPATIENT
Start: 2023-10-13 | End: 2023-10-13

## 2023-10-13 RX ORDER — HEPARIN SODIUM 10000 [USP'U]/100ML
0-4500 INJECTION, SOLUTION INTRAVENOUS CONTINUOUS
Status: DISCONTINUED | OUTPATIENT
Start: 2023-10-13 | End: 2023-11-21

## 2023-10-13 RX ORDER — DIAZEPAM 5 MG/ML
20 INJECTION, SOLUTION INTRAMUSCULAR; INTRAVENOUS ONCE
Status: COMPLETED | OUTPATIENT
Start: 2023-10-13 | End: 2023-10-13

## 2023-10-13 RX ORDER — AMLODIPINE BESYLATE 5 MG/1
5 TABLET ORAL DAILY
Status: DISCONTINUED | OUTPATIENT
Start: 2023-10-13 | End: 2023-10-20

## 2023-10-13 RX ORDER — LANOLIN ALCOHOL/MO/W.PET/CERES
100 CREAM (GRAM) TOPICAL DAILY
Status: DISCONTINUED | OUTPATIENT
Start: 2023-10-13 | End: 2023-10-13

## 2023-10-13 RX ORDER — ACETAMINOPHEN 500 MG
10 TABLET ORAL DAILY
Status: DISCONTINUED | OUTPATIENT
Start: 2023-10-13 | End: 2023-10-13

## 2023-10-13 RX ORDER — ATORVASTATIN CALCIUM 40 MG/1
40 TABLET, FILM COATED ORAL DAILY
Status: DISCONTINUED | OUTPATIENT
Start: 2023-10-13 | End: 2023-10-31

## 2023-10-13 RX ORDER — MAGNESIUM SULFATE HEPTAHYDRATE 40 MG/ML
2 INJECTION, SOLUTION INTRAVENOUS ONCE
Status: COMPLETED | OUTPATIENT
Start: 2023-10-13 | End: 2023-10-13

## 2023-10-13 RX ORDER — POTASSIUM CHLORIDE 1.5 G/1.58G
40 POWDER, FOR SOLUTION ORAL ONCE
Status: DISCONTINUED | OUTPATIENT
Start: 2023-10-13 | End: 2023-10-13

## 2023-10-13 RX ORDER — ACETAMINOPHEN 325 MG/1
650 TABLET ORAL EVERY 6 HOURS PRN
Status: DISCONTINUED | OUTPATIENT
Start: 2023-10-13 | End: 2023-10-13

## 2023-10-13 RX ORDER — HEPARIN SODIUM 5000 [USP'U]/ML
80 INJECTION, SOLUTION INTRAVENOUS; SUBCUTANEOUS ONCE
Status: COMPLETED | OUTPATIENT
Start: 2023-10-13 | End: 2023-10-13

## 2023-10-13 RX ORDER — DIAZEPAM 5 MG/ML
10 INJECTION, SOLUTION INTRAMUSCULAR; INTRAVENOUS ONCE
Status: DISCONTINUED | OUTPATIENT
Start: 2023-10-13 | End: 2023-10-13

## 2023-10-13 RX ORDER — CEFTRIAXONE 2 G/50ML
2 INJECTION, SOLUTION INTRAVENOUS EVERY 12 HOURS
Status: DISCONTINUED | OUTPATIENT
Start: 2023-10-13 | End: 2023-10-16

## 2023-10-13 RX ORDER — POTASSIUM CHLORIDE 14.9 MG/ML
20 INJECTION INTRAVENOUS
Status: COMPLETED | OUTPATIENT
Start: 2023-10-13 | End: 2023-10-13

## 2023-10-13 RX ORDER — VANCOMYCIN HYDROCHLORIDE 750 MG/150ML
750 INJECTION, SOLUTION INTRAVENOUS EVERY 12 HOURS
Status: DISCONTINUED | OUTPATIENT
Start: 2023-10-13 | End: 2023-10-13

## 2023-10-13 RX ORDER — IBUPROFEN 200 MG
1 TABLET ORAL DAILY
Status: DISCONTINUED | OUTPATIENT
Start: 2023-10-13 | End: 2023-11-20

## 2023-10-13 RX ORDER — ACETAMINOPHEN 500 MG
5 TABLET ORAL DAILY
Status: DISCONTINUED | OUTPATIENT
Start: 2023-10-13 | End: 2023-10-15

## 2023-10-13 RX ADMIN — CEFTRIAXONE SODIUM 2 G: 2 INJECTION, SOLUTION INTRAVENOUS at 17:36

## 2023-10-13 RX ADMIN — THIAMINE HYDROCHLORIDE 500 MG: 200 INJECTION, SOLUTION INTRAMUSCULAR; INTRAVENOUS at 18:18

## 2023-10-13 RX ADMIN — LORAZEPAM 2 MG: 2 INJECTION INTRAMUSCULAR; INTRAVENOUS at 09:05

## 2023-10-13 RX ADMIN — HYDROXYZINE HYDROCHLORIDE 25 MG: 25 TABLET, FILM COATED ORAL at 04:38

## 2023-10-13 RX ADMIN — METHYLPREDNISOLONE SODIUM SUCCINATE 125 MG: 125 INJECTION, POWDER, FOR SOLUTION INTRAMUSCULAR; INTRAVENOUS at 13:27

## 2023-10-13 RX ADMIN — THIAMINE HYDROCHLORIDE 500 MG: 200 INJECTION, SOLUTION INTRAMUSCULAR; INTRAVENOUS at 23:14

## 2023-10-13 RX ADMIN — POTASSIUM CHLORIDE 20 MEQ: 14.9 INJECTION, SOLUTION INTRAVENOUS at 20:20

## 2023-10-13 RX ADMIN — HEPARIN SODIUM 882 UNITS/HR: 10000 INJECTION, SOLUTION INTRAVENOUS at 17:40

## 2023-10-13 RX ADMIN — DIAZEPAM 20 MG: 5 INJECTION, SOLUTION INTRAMUSCULAR; INTRAVENOUS at 10:43

## 2023-10-13 RX ADMIN — LORAZEPAM 0.5 MG: 2 INJECTION INTRAMUSCULAR; INTRAVENOUS at 03:27

## 2023-10-13 RX ADMIN — IPRATROPIUM BROMIDE AND ALBUTEROL SULFATE 3 ML: .5; 3 SOLUTION RESPIRATORY (INHALATION) at 13:28

## 2023-10-13 RX ADMIN — VANCOMYCIN HYDROCHLORIDE 750 MG: 750 INJECTION, SOLUTION INTRAVENOUS at 14:37

## 2023-10-13 RX ADMIN — POTASSIUM CHLORIDE 20 MEQ: 14.9 INJECTION, SOLUTION INTRAVENOUS at 18:46

## 2023-10-13 RX ADMIN — LORAZEPAM 0.5 MG: 2 INJECTION INTRAMUSCULAR; INTRAVENOUS at 05:30

## 2023-10-13 RX ADMIN — HEPARIN SODIUM 3950 UNITS: 5000 INJECTION INTRAVENOUS; SUBCUTANEOUS at 17:58

## 2023-10-13 RX ADMIN — PANTOPRAZOLE SODIUM 40 MG: 40 INJECTION, POWDER, FOR SOLUTION INTRAVENOUS at 09:05

## 2023-10-13 RX ADMIN — VANCOMYCIN HYDROCHLORIDE 1000 MG: 1 INJECTION, SOLUTION INTRAVENOUS at 02:53

## 2023-10-13 RX ADMIN — PIPERACILLIN SODIUM AND TAZOBACTAM SODIUM 4.5 G: 4; .5 INJECTION, SOLUTION INTRAVENOUS at 13:35

## 2023-10-13 RX ADMIN — ACETAMINOPHEN 650 MG: 325 TABLET ORAL at 03:19

## 2023-10-13 RX ADMIN — FOLIC ACID 1 MG: 5 INJECTION, SOLUTION INTRAMUSCULAR; INTRAVENOUS; SUBCUTANEOUS at 20:18

## 2023-10-13 RX ADMIN — PIPERACILLIN SODIUM AND TAZOBACTAM SODIUM 4.5 G: 4; .5 INJECTION, SOLUTION INTRAVENOUS at 05:01

## 2023-10-13 RX ADMIN — SODIUM CHLORIDE, SODIUM LACTATE, POTASSIUM CHLORIDE, CALCIUM CHLORIDE AND DEXTROSE MONOHYDRATE 75 ML/HR: 5; 600; 310; 30; 20 INJECTION, SOLUTION INTRAVENOUS at 10:00

## 2023-10-13 RX ADMIN — MAGNESIUM SULFATE HEPTAHYDRATE 2 G: 40 INJECTION, SOLUTION INTRAVENOUS at 18:47

## 2023-10-13 SDOH — SOCIAL STABILITY: SOCIAL INSECURITY: DO YOU FEEL UNSAFE GOING BACK TO THE PLACE WHERE YOU ARE LIVING?: UNABLE TO ASSESS

## 2023-10-13 SDOH — SOCIAL STABILITY: SOCIAL INSECURITY

## 2023-10-13 SDOH — SOCIAL STABILITY: SOCIAL INSECURITY: ARE THERE ANY APPARENT SIGNS OF INJURIES/BEHAVIORS THAT COULD BE RELATED TO ABUSE/NEGLECT?: UNABLE TO ASSESS

## 2023-10-13 SDOH — SOCIAL STABILITY: SOCIAL INSECURITY: HAS ANYONE EVER THREATENED TO HURT YOUR FAMILY OR YOUR PETS?: UNABLE TO ASSESS

## 2023-10-13 SDOH — SOCIAL STABILITY: SOCIAL INSECURITY: WERE YOU ABLE TO COMPLETE ALL THE BEHAVIORAL HEALTH SCREENINGS?: NO

## 2023-10-13 SDOH — SOCIAL STABILITY: SOCIAL INSECURITY: ABUSE: ADULT

## 2023-10-13 SDOH — SOCIAL STABILITY: SOCIAL INSECURITY: ARE YOU OR HAVE YOU BEEN THREATENED OR ABUSED PHYSICALLY, EMOTIONALLY, OR SEXUALLY BY ANYONE?: UNABLE TO ASSESS

## 2023-10-13 SDOH — SOCIAL STABILITY: SOCIAL INSECURITY: DO YOU FEEL ANYONE HAS EXPLOITED OR TAKEN ADVANTAGE OF YOU FINANCIALLY OR OF YOUR PERSONAL PROPERTY?: UNABLE TO ASSESS

## 2023-10-13 SDOH — SOCIAL STABILITY: SOCIAL INSECURITY: DOES ANYONE TRY TO KEEP YOU FROM HAVING/CONTACTING OTHER FRIENDS OR DOING THINGS OUTSIDE YOUR HOME?: UNABLE TO ASSESS

## 2023-10-13 ASSESSMENT — LIFESTYLE VARIABLES
ANXIETY: MODERATELY ANXIOUS, OR GUARDED, SO ANXIETY IS INFERRED
VISUAL DISTURBANCES: MILD SENSITIVITY
VISUAL DISTURBANCES: MILD SENSITIVITY
TOTAL SCORE: 2
ORIENTATION AND CLOUDING OF SENSORIUM: ORIENTED AND CAN DO SERIAL ADDITIONS
PAROXYSMAL SWEATS: NO SWEAT VISIBLE
AUDITORY DISTURBANCES: MODERATE HARSHNESS OR ABILITY TO FRIGHTEN
TOTAL SCORE: 17
PAROXYSMAL SWEATS: BARELY PERCEPTIBLE SWEATING, PALMS MOIST
HEADACHE, FULLNESS IN HEAD: VERY MILD
ORIENTATION AND CLOUDING OF SENSORIUM: ORIENTED AND CAN DO SERIAL ADDITIONS
ANXIETY: NO ANXIETY, AT EASE
AUDITORY DISTURBANCES: NOT PRESENT
TREMOR: NO TREMOR
HEADACHE, FULLNESS IN HEAD: NOT PRESENT
ORIENTATION AND CLOUDING OF SENSORIUM: DISORIENTED FOR PLACE OR PERSON
NAUSEA AND VOMITING: MILD NAUSEA WITH NO VOMITING
ORIENTATION AND CLOUDING OF SENSORIUM: ORIENTED AND CAN DO SERIAL ADDITIONS
TREMOR: NO TREMOR
TACTILE DISTURBANCES: VERY MILD ITCHING, PINS AND NEEDLES, BURNING OR NUMBNESS
TREMOR: 2
AGITATION: NORMAL ACTIVITY
TOTAL SCORE: 0
ANXIETY: 2
AGITATION: SOMEWHAT MORE THAN NORMAL ACTIVITY
PAROXYSMAL SWEATS: NO SWEAT VISIBLE
PAROXYSMAL SWEATS: NO SWEAT VISIBLE
ANXIETY: 3
VISUAL DISTURBANCES: NOT PRESENT
NAUSEA AND VOMITING: NO NAUSEA AND NO VOMITING
ORIENTATION AND CLOUDING OF SENSORIUM: DISORIENTED FOR PLACE OR PERSON
TACTILE DISTURBANCES: MODERATE ITCHING, PINS AND NEEDLES, BURNING OR NUMBNESS
AGITATION: NORMAL ACTIVITY
VISUAL DISTURBANCES: NOT PRESENT
PAROXYSMAL SWEATS: NO SWEAT VISIBLE
AGITATION: SOMEWHAT MORE THAN NORMAL ACTIVITY
NAUSEA AND VOMITING: NO NAUSEA AND NO VOMITING
NAUSEA AND VOMITING: NO NAUSEA AND NO VOMITING
TOTAL SCORE: 8
ANXIETY: 2
NAUSEA AND VOMITING: NO NAUSEA AND NO VOMITING
HAVE YOU EVER FELT YOU SHOULD CUT DOWN ON YOUR DRINKING: NO
NAUSEA AND VOMITING: NO NAUSEA AND NO VOMITING
TACTILE DISTURBANCES: VERY MILD ITCHING, PINS AND NEEDLES, BURNING OR NUMBNESS
ANXIETY: 3
AUDITORY DISTURBANCES: VERY MILD HARSHNESS OR ABILITY TO FRIGHTEN
HEADACHE, FULLNESS IN HEAD: NOT PRESENT
EVER HAD A DRINK FIRST THING IN THE MORNING TO STEADY YOUR NERVES TO GET RID OF A HANGOVER: NO
ANXIETY: NO ANXIETY, AT EASE
AUDITORY DISTURBANCES: NOT PRESENT
TREMOR: NO TREMOR
AGITATION: MODERATELY FIDGETY AND RESTLESS
VISUAL DISTURBANCES: MILD SENSITIVITY
AUDITORY DISTURBANCES: MILD HARSHNESS OR ABILITY TO FRIGHTEN
VISUAL DISTURBANCES: NOT PRESENT
TREMOR: NO TREMOR
HEADACHE, FULLNESS IN HEAD: NOT PRESENT
EVER FELT BAD OR GUILTY ABOUT YOUR DRINKING: NO
AGITATION: 2
VISUAL DISTURBANCES: MILD SENSITIVITY
HEADACHE, FULLNESS IN HEAD: NOT PRESENT
HEADACHE, FULLNESS IN HEAD: MILD
NAUSEA AND VOMITING: NO NAUSEA AND NO VOMITING
AGITATION: MODERATELY FIDGETY AND RESTLESS
TREMOR: NO TREMOR
HEADACHE, FULLNESS IN HEAD: NOT PRESENT
ORIENTATION AND CLOUDING OF SENSORIUM: DISORIENTED FOR PLACE OR PERSON
PAROXYSMAL SWEATS: NO SWEAT VISIBLE
ORIENTATION AND CLOUDING OF SENSORIUM: CANNOT DO SERIAL ADDITIONS OR IS UNCERTAIN ABOUT DATE
REASON UNABLE TO ASSESS: YES
AUDITORY DISTURBANCES: NOT PRESENT
TACTILE DISTURBANCES: VERY MILD ITCHING, PINS AND NEEDLES, BURNING OR NUMBNESS
TOTAL SCORE: 16
PAROXYSMAL SWEATS: NO SWEAT VISIBLE

## 2023-10-13 ASSESSMENT — ACTIVITIES OF DAILY LIVING (ADL)
HEARING - LEFT EAR: UNABLE TO ASSESS
WALKS IN HOME: UNABLE TO ASSESS
WALKS IN HOME: UNABLE TO ASSESS
ADEQUATE_TO_COMPLETE_ADL: UNABLE TO ASSESS
FEEDING YOURSELF: UNABLE TO ASSESS
TOILETING: UNABLE TO ASSESS
BATHING: UNABLE TO ASSESS
GROOMING: UNABLE TO ASSESS
HEARING - RIGHT EAR: UNABLE TO ASSESS
PATIENT'S MEMORY ADEQUATE TO SAFELY COMPLETE DAILY ACTIVITIES?: UNABLE TO ASSESS
ADEQUATE_TO_COMPLETE_ADL: UNABLE TO ASSESS
JUDGMENT_ADEQUATE_SAFELY_COMPLETE_DAILY_ACTIVITIES: UNABLE TO ASSESS
FEEDING YOURSELF: UNABLE TO ASSESS
TOILETING: UNABLE TO ASSESS
GROOMING: UNABLE TO ASSESS
ADEQUATE_TO_COMPLETE_ADL: UNABLE TO ASSESS
DRESSING YOURSELF: UNABLE TO ASSESS
JUDGMENT_ADEQUATE_SAFELY_COMPLETE_DAILY_ACTIVITIES: UNABLE TO ASSESS
BATHING: UNABLE TO ASSESS
PATIENT'S MEMORY ADEQUATE TO SAFELY COMPLETE DAILY ACTIVITIES?: UNABLE TO ASSESS
HEARING - LEFT EAR: UNABLE TO ASSESS
JUDGMENT_ADEQUATE_SAFELY_COMPLETE_DAILY_ACTIVITIES: UNABLE TO ASSESS
DRESSING YOURSELF: UNABLE TO ASSESS
HEARING - RIGHT EAR: UNABLE TO ASSESS
DRESSING YOURSELF: UNABLE TO ASSESS
PATIENT'S MEMORY ADEQUATE TO SAFELY COMPLETE DAILY ACTIVITIES?: UNABLE TO ASSESS

## 2023-10-13 ASSESSMENT — COGNITIVE AND FUNCTIONAL STATUS - GENERAL: PATIENT BASELINE BEDBOUND: UNABLE TO ASSESS AT THIS TIME

## 2023-10-13 ASSESSMENT — PAIN - FUNCTIONAL ASSESSMENT
PAIN_FUNCTIONAL_ASSESSMENT: CPOT (CRITICAL CARE PAIN OBSERVATION TOOL)
PAIN_FUNCTIONAL_ASSESSMENT: CPOT (CRITICAL CARE PAIN OBSERVATION TOOL)
PAIN_FUNCTIONAL_ASSESSMENT: 0-10
PAIN_FUNCTIONAL_ASSESSMENT: CPOT (CRITICAL CARE PAIN OBSERVATION TOOL)

## 2023-10-13 ASSESSMENT — PAIN SCALES - GENERAL
PAINLEVEL_OUTOF10: 0 - NO PAIN

## 2023-10-13 NOTE — PROGRESS NOTES
"Molina Godinez is a 68 y.o. male on day 1 of admission presenting with Hospital-acquired pneumonia.    Subjective   Patient is drowsy, unable to speak.     RN reported that he is agitated, hallucinating and treated with iv Ativan. Required IV Diazepam for tachycardia and agitation after on going agitation after IV ativan.        Objective     Physical Exam  Constitutional:       Comments: Sleepy, confused,    HENT:      Head: Normocephalic.      Nose: Nose normal.   Eyes:      Extraocular Movements: Extraocular movements intact.      Pupils: Pupils are equal, round, and reactive to light.   Cardiovascular:      Rate and Rhythm: Normal rate and regular rhythm.      Heart sounds: Normal heart sounds.   Pulmonary:      Effort: Pulmonary effort is normal.      Breath sounds: No wheezing or rales.      Comments: Basal crackles left side,   Abdominal:      General: Abdomen is flat. Bowel sounds are normal. There is no distension.      Palpations: Abdomen is soft.      Tenderness: There is no abdominal tenderness.   Musculoskeletal:         General: Normal range of motion.      Cervical back: Normal range of motion and neck supple.   Skin:     General: Skin is warm.   Neurological:      General: No focal deficit present.      Comments: Sleepy, confused,   Moving all extremities,    Psychiatric:      Comments: Confused,          Last Recorded Vitals  Blood pressure (!) 166/109, pulse (!) 113, temperature 36.2 °C (97.2 °F), temperature source Temporal, resp. rate (!) 32, height 1.702 m (5' 7\"), weight 49.2 kg (108 lb 7.5 oz), SpO2 99 %.  Intake/Output last 3 Shifts:  I/O last 3 completed shifts:  In: - (0 mL/kg)   Out: 650 (11.2 mL/kg) [Urine:650 (0.3 mL/kg/hr)]  Weight: 58.1 kg     Relevant Results  Scheduled medications  amLODIPine, 5 mg, oral, Daily  [Held by provider] apixaban, 5 mg, oral, q12h  atorvastatin, 40 mg, oral, Daily  cefTRIAXone, 2 g, intravenous, q12h  folic acid, 1 mg, intravenous, Daily  magnesium sulfate, " 2 g, intravenous, Once  melatonin, 5 mg, oral, Daily  multivitamin with minerals, 1 tablet, oral, Daily  nicotine, 1 patch, transdermal, Daily  pantoprazole, 40 mg, intravenous, Daily  potassium chloride, 20 mEq, intravenous, q2h  thiamine, 500 mg, intravenous, TID      Continuous medications  heparin, 0-4,500 Units/hr, Last Rate: 882 Units/hr (10/13/23 1740)      PRN medications  PRN medications: acetaminophen, heparin, hydrOXYzine HCL, loperamide, sennosides    Results for orders placed or performed during the hospital encounter of 10/11/23 (from the past 24 hour(s))   Vancomycin, Trough   Result Value Ref Range    Vancomycin, Trough 11.9 5.0 - 20.0 ug/mL   CBC   Result Value Ref Range    WBC 20.4 (H) 4.4 - 11.3 x10*3/uL    nRBC 0.0 0.0 - 0.0 /100 WBCs    RBC 6.49 (H) 4.50 - 5.90 x10*6/uL    Hemoglobin 19.8 (H) 13.5 - 17.5 g/dL    Hematocrit 55.1 (H) 41.0 - 52.0 %    MCV 85 80 - 100 fL    MCH 30.5 26.0 - 34.0 pg    MCHC 35.9 32.0 - 36.0 g/dL    RDW 12.7 11.5 - 14.5 %    Platelets 243 150 - 450 x10*3/uL    MPV 9.7 7.5 - 11.5 fL   Comprehensive Metabolic Panel   Result Value Ref Range    Glucose 92 74 - 99 mg/dL    Sodium 130 (L) 136 - 145 mmol/L    Potassium 3.4 (L) 3.5 - 5.3 mmol/L    Chloride 89 (L) 98 - 107 mmol/L    Bicarbonate 26 21 - 32 mmol/L    Anion Gap 18 10 - 20 mmol/L    Urea Nitrogen 12 6 - 23 mg/dL    Creatinine 0.63 0.50 - 1.30 mg/dL    eGFR >90 >60 mL/min/1.73m*2    Calcium 9.2 8.6 - 10.6 mg/dL    Albumin 3.7 3.4 - 5.0 g/dL    Alkaline Phosphatase 107 33 - 136 U/L    Total Protein 8.9 (H) 6.4 - 8.2 g/dL    AST 41 (H) 9 - 39 U/L    Bilirubin, Total 1.4 (H) 0.0 - 1.2 mg/dL    ALT 15 10 - 52 U/L   Blood Culture    Specimen: Peripheral Venipuncture; Blood culture   Result Value Ref Range    Blood Culture Loaded on Instrument - Culture in progress    Blood Gas Venous Full Panel Unsolicited   Result Value Ref Range    POCT pH, Venous 7.46 (H) 7.33 - 7.43 pH    POCT pCO2, Venous 41 41 - 51 mm Hg    POCT  pO2, Venous 57 (H) 35 - 45 mm Hg    POCT SO2, Venous 87 (H) 45 - 75 %    POCT Oxy Hemoglobin, Venous 84.6 (H) 45.0 - 75.0 %    POCT Hematocrit Calculated, Venous 57.0 (H) 41.0 - 52.0 %    POCT Sodium, Venous 118 (LL) 136 - 145 mmol/L    POCT Potassium, Venous 9.5 (HH) 3.5 - 5.3 mmol/L    POCT Chloride, Venous 90 (L) 98 - 107 mmol/L    POCT Ionized Calicum, Venous 0.80 (L) 1.10 - 1.33 mmol/L    POCT Glucose, Venous 98 74 - 99 mg/dL    POCT Lactate, Venous 1.1 0.4 - 2.0 mmol/L    POCT Base Excess, Venous 4.8 (H) -2.0 - 3.0 mmol/L    POCT HCO3 Calculated, Venous 29.2 (H) 22.0 - 26.0 mmol/L    POCT Hemoglobin, Venous 19.1 (H) 13.5 - 17.5 g/dL    POCT Anion Gap, Venous 8.0 (L) 10.0 - 25.0 mmol/L    Patient Temperature 37.0 degrees Celsius   Comprehensive metabolic panel   Result Value Ref Range    Glucose 85 74 - 99 mg/dL    Sodium 128 (L) 136 - 145 mmol/L    Potassium 3.3 (L) 3.5 - 5.3 mmol/L    Chloride 91 (L) 98 - 107 mmol/L    Bicarbonate 25 21 - 32 mmol/L    Anion Gap 15 10 - 20 mmol/L    Urea Nitrogen 10 6 - 23 mg/dL    Creatinine 0.52 0.50 - 1.30 mg/dL    eGFR >90 >60 mL/min/1.73m*2    Calcium 8.4 (L) 8.6 - 10.6 mg/dL    Albumin 3.5 3.4 - 5.0 g/dL    Alkaline Phosphatase 131 33 - 136 U/L    Total Protein 7.8 6.4 - 8.2 g/dL    AST 43 (H) 9 - 39 U/L    Bilirubin, Total 1.3 (H) 0.0 - 1.2 mg/dL    ALT 12 10 - 52 U/L   CBC and Auto Differential   Result Value Ref Range    WBC 16.8 (H) 4.4 - 11.3 x10*3/uL    nRBC 0.0 0.0 - 0.0 /100 WBCs    RBC 6.04 (H) 4.50 - 5.90 x10*6/uL    Hemoglobin 18.1 (H) 13.5 - 17.5 g/dL    Hematocrit 52.1 (H) 41.0 - 52.0 %    MCV 86 80 - 100 fL    MCH 30.0 26.0 - 34.0 pg    MCHC 34.7 32.0 - 36.0 g/dL    RDW 12.8 11.5 - 14.5 %    Platelets 228 150 - 450 x10*3/uL    MPV 11.1 7.5 - 11.5 fL    Neutrophils % 95.3 40.0 - 80.0 %    Immature Granulocytes %, Automated 0.5 0.0 - 0.9 %    Lymphocytes % 2.7 13.0 - 44.0 %    Monocytes % 1.3 2.0 - 10.0 %    Eosinophils % 0.0 0.0 - 6.0 %    Basophils % 0.2  0.0 - 2.0 %    Neutrophils Absolute 15.98 (H) 1.20 - 7.70 x10*3/uL    Immature Granulocytes Absolute, Automated 0.08 0.00 - 0.70 x10*3/uL    Lymphocytes Absolute 0.45 (L) 1.20 - 4.80 x10*3/uL    Monocytes Absolute 0.22 0.10 - 1.00 x10*3/uL    Eosinophils Absolute 0.00 0.00 - 0.70 x10*3/uL    Basophils Absolute 0.04 0.00 - 0.10 x10*3/uL   Magnesium   Result Value Ref Range    Magnesium 1.49 (L) 1.60 - 2.40 mg/dL   Comprehensive metabolic panel   Result Value Ref Range    Glucose 129 (H) 74 - 99 mg/dL    Sodium 129 (L) 136 - 145 mmol/L    Potassium 3.0 (L) 3.5 - 5.3 mmol/L    Chloride 88 (L) 98 - 107 mmol/L    Bicarbonate 27 21 - 32 mmol/L    Anion Gap 17 10 - 20 mmol/L    Urea Nitrogen 9 6 - 23 mg/dL    Creatinine 0.54 0.50 - 1.30 mg/dL    eGFR >90 >60 mL/min/1.73m*2    Calcium 8.7 8.6 - 10.6 mg/dL    Albumin 3.6 3.4 - 5.0 g/dL    Alkaline Phosphatase 115 33 - 136 U/L    Total Protein 8.5 (H) 6.4 - 8.2 g/dL    AST 41 (H) 9 - 39 U/L    Bilirubin, Total 1.5 (H) 0.0 - 1.2 mg/dL    ALT 14 10 - 52 U/L   Lactate   Result Value Ref Range    Lactate 1.3 0.4 - 2.0 mmol/L   POCT GLUCOSE   Result Value Ref Range    POCT Glucose 160 (H) 74 - 99 mg/dL            Assessment/Plan   Principal Problem:    Hospital-acquired pneumonia  Active Problems:    Essential hypertension, benign    Hepatitis C virus infection cured after antiviral drug therapy    Alcoholism (CMS/HCC)    Chronic pulmonary embolism (CMS/HCC)    COPD (chronic obstructive pulmonary disease) (CMS/HCC)    HLD (hyperlipidemia)    Patient is a 68 year old male with PMH of COPD, HTN, EtOH use disorder, chronic PE, opioid abuse, and HCV s/p treatment who is presented to the ED with bilateral extremity weakness.  CXR showed left basal pneumonia. He is clinically withdrawing from alcohol.      #Bilateral lower extremity weakness  -Neuro input appreciated  -Continue thiamine 500 mg IV TID,   -B12 324, folate normal  -Neurology recs; MRI spine with contrast to investigate for  spine pathology, CSF analysis  -Falls precautions  -PT/OT     #Left basal Pneumonia  #COPD  -With leukocytosis  -Continue  Zosyn,   -Legionella negative  Urine Strep antigen positive,   DC vancomycin,      #Hypokalemia/ hypomagnesemia:   Likely in the setting of EtOH abuse and poor oral intake  -LR given in ED  Continue d5 LR  Replace potassium, Magnesium  Monitor,      #Alcohol withdrawal;  Monitor on CIWA  IV Diazepam PRN  Continue high dose Thiamine  Multivitamin supplements  Patient has h/o polysubstance abuse, check Utox,      #HTN  -Continue home medications     #Chronic PE  -Continue anticoagulation     Prophylaxis: PPI, on Xarelto,     Quincy Lopez MD    Patient CIWA is consistently high and > 15.  Treated with iv diazepam without improvement.   Patient is transferred to MICU for further care.

## 2023-10-13 NOTE — H&P
Critical Care Medicine History and Physical        Subjective   Patient is a 68 y.o. male admitted on 10/11/2023  7:54 PM  with chief complaint of weakness.     HPI:  Mr. Molina Godinez is a 68 year old male with PMHx of COPD, HTN, alcohol and opioid use disorder, hep C, PE presenting for inability to walk. On 10/9, his legs gave out and he has not been able to walk since. He uses a carton next to his recliner to urinate into, and has not eaten in 3 days. During that time, he had a girl came by, to give him his typical 0.5-1 pints of liquor/day.     In the ED, he was HDS. CXR with new patchy LLL opacities. Started on vanc/zosyn. Urine S pneumo positive. CT head without hemorrhage, microvascular ischemic changes, remote L thalamic, anterior basal ganglia lacunar infarcts. MR T/L spine was terminated early due to pain, motion degraded without clear evidence of cord anomaly. Neurology was consulted, thought weakness more likely 2/2 to malnutrition vs GBS vs spinal shock. While pt was responsive, he denied numbness, urinary incontinence, dysuria, hematuria, stool changes, shortness of breath, fevers, chills.    Of note, he was admitted 8/2023 after waking up with weakness and numbness in his bilateral upper extremities after multiple days of substantial alcohol and opioid use. He was found to have electrolyte abnormalities, admitted for renourishment.    This morning, rapid was called due to high CIWA scores, peaking at 17, given 3mg ativan, 20mg valium. He had visual and tactile disturbances. Transferred to the MICU 10/13.    In the ED:  Vitals: afebrile, HR 99, RR 16, /76, SpO2 92% RA  RFP Na 130/K 3.3/Cl 87/HCO3 28/BUN 17/Cr 0.56/Glu 101; Ca 8.4, albumin 3.5  CBC WBC 28.3/Hgb 18.5/Hct 52.1/Plt 227  PT 20.2/PTT 41/INR 1.8  VBG 7.46/41, lactate 1.1  B12 324, folate >24    8/2023 Anti-smooth muscle positive 1:40 (autoimmune hepatitis)    In the MICU:  Pt was afebrile, , RR 37, /108, 97% 2L O2.  Somnolent, non-interactive, unable to obtain additional history.    PMHx: COPD, alcohol use, opioid use, HTN, hep C, PE    PSHx: shoulder replacement per chart review    FHx: reviewed and noncontributory    SHx: Current smoker, 0.5 PPD. Alcohol and opioid use.    Medications: apixaban 5mg BID, amlodipine 5mg, atenolol 60mg, ipratropium-albuterol, thiamine, folate    Allergies: aspirin (gets excited), NSAIDs (elevates BP)    ROS: 10 point ROS reviewed and negative unless otherwise stated per HPI    History reviewed. No pertinent past medical history.  History reviewed. No pertinent surgical history.  Medications Prior to Admission   Medication Sig Dispense Refill Last Dose    acetaminophen (Tylenol) 325 mg tablet TAKE 2 TABLETS BY MOUTH EVERY 6 HOURS AS NEEDED FOR PAIN. (Patient not taking: Reported on 10/12/2023) 120 tablet 0 Unknown    amLODIPine (Norvasc) 5 mg tablet TAKE 1 TABLET BY MOUTH ONCE DAILY 90 tablet 0 10/11/2023    atenolol (Tenormin) 50 mg tablet TAKE 1 TABLET BY MOUTH ONCE DAILY 90 tablet 1 10/11/2023    atorvastatin (Lipitor) 40 mg tablet Take 1 tablet (40 mg) by mouth once daily.   Past Week    folic acid (Folvite) 1 mg tablet TAKE 1 TABLET BY MOUTH ONCE DAILY (Patient not taking: Reported on 10/2/2023) 100 tablet 0 Past Week    ipratropium-albuteroL (Combivent Respimat)  mcg/actuation inhaler INHALE 1 PUFF EVERY 6 HOURS AS NEEDED FOR SHORTNESS OF BREATH 4 g 2     naloxone (Narcan) 4 mg/0.1 mL nasal spray Use 1 Spray in one nostril (alternate sides) as needed for Drug Overdose (and call 911). every 2-3 min, until assistance arrives.       nicotine (Nicoderm CQ) 21 mg/24 hr patch APPLY 1 PATCH TRANSDERMALLY EVERY 24 HOURS. (Patient not taking: Reported on 10/2/2023) 28 patch 0     polyethylene glycol (Glycolax, Miralax) 17 gram/dose powder    Unknown    sennosides (Senokot) 8.6 mg tablet TAKE 1 TABLET BY MOUTH TWO TIMES A DAY AS NEEDED FOR CONSTIPATION (Patient not taking: Reported on  10/2/2023) 100 tablet 0 Unknown    thiamine (Vitamin B-1) 100 mg tablet TAKE 1 TABLET BY MOUTH ONCE DAILY (Patient not taking: Reported on 10/2/2023) 100 tablet 2 Past Week     Aspirin and Nsaids (non-steroidal anti-inflammatory drug)  Social History     Tobacco Use    Smoking status: Every Day     Packs/day: .5     Types: Cigarettes   Substance Use Topics    Alcohol use: Yes     Comment: 1.5 pints Ju daily     No family history on file.    Scheduled Medications:   amLODIPine, 5 mg, oral, Daily  apixaban, 5 mg, oral, q12h  atenolol, 50 mg, oral, Daily  atorvastatin, 40 mg, oral, Daily  cloNIDine, 0.1 mg, oral, q12h SAM  folic acid, 1 mg, oral, Daily  ipratropium-albuteroL, 3 mL, nebulization, 4x daily  melatonin, 10 mg, oral, Daily  multivitamin with minerals, 1 tablet, oral, Daily  nicotine, 1 patch, transdermal, Daily  pantoprazole, 40 mg, intravenous, Daily  piperacillin-tazobactam, 4.5 g, intravenous, q6h  potassium chloride, 40 mEq, oral, Once  thiamine, 100 mg, oral, Daily  vancomycin, 750 mg, intravenous, q12h         Continuous Medications:   dextrose 5 % and lactated Ringer's, 75 mL/hr, Last Rate: 75 mL/hr (10/13/23 1437)         PRN Medications:   PRN medications: acetaminophen, hydrOXYzine HCL, loperamide, LORazepam **OR** LORazepam **OR** LORazepam, ondansetron, sennosides    Review of Systems:  All review of systems are negative except as stated per HPI.    Objective   Vitals:  Most Recent:  Vitals:    10/13/23 1431   BP:    Pulse: 98   Resp: (!) 29   Temp:    SpO2: 97%       24hr Min/Max:  Temp  Min: 36.3 °C (97.3 °F)  Max: 36.3 °C (97.4 °F)  Pulse  Min: 91  Max: 116  BP  Min: 127/91  Max: 170/109  Resp  Min: 14  Max: 39  SpO2  Min: 90 %  Max: 100 %    GEN: cachectic, chronically ill appearing, lying in bed  CV: RRR, no m/r/g  PULM: Crackles in LLL  ABD: soft, nontender, nondistended  NEURO: somnolent, unable to respond  PSYCH: appropriate mood and affect  MSK: no joint swelling grossly noted  EXT:  no BLE edema  SKIN: warm and dry, no lesions grossly noted       LDA:        Vent settings:       Hemodynamic parameters for last 24 hours:         Intake/Output Summary (Last 24 hours) at 10/13/2023 1517  Last data filed at 10/13/2023 1452  Gross per 24 hour   Intake 346.25 ml   Output 800 ml   Net -453.75 ml         Lab/Radiology/Diagnostic Review:  Results for orders placed or performed during the hospital encounter of 10/11/23 (from the past 24 hour(s))   Vancomycin, Trough   Result Value Ref Range    Vancomycin, Trough 11.9 5.0 - 20.0 ug/mL   CBC   Result Value Ref Range    WBC 20.4 (H) 4.4 - 11.3 x10*3/uL    nRBC 0.0 0.0 - 0.0 /100 WBCs    RBC 6.49 (H) 4.50 - 5.90 x10*6/uL    Hemoglobin 19.8 (H) 13.5 - 17.5 g/dL    Hematocrit 55.1 (H) 41.0 - 52.0 %    MCV 85 80 - 100 fL    MCH 30.5 26.0 - 34.0 pg    MCHC 35.9 32.0 - 36.0 g/dL    RDW 12.7 11.5 - 14.5 %    Platelets 243 150 - 450 x10*3/uL    MPV 9.7 7.5 - 11.5 fL   Comprehensive Metabolic Panel   Result Value Ref Range    Glucose 92 74 - 99 mg/dL    Sodium 130 (L) 136 - 145 mmol/L    Potassium 3.4 (L) 3.5 - 5.3 mmol/L    Chloride 89 (L) 98 - 107 mmol/L    Bicarbonate 26 21 - 32 mmol/L    Anion Gap 18 10 - 20 mmol/L    Urea Nitrogen 12 6 - 23 mg/dL    Creatinine 0.63 0.50 - 1.30 mg/dL    eGFR >90 >60 mL/min/1.73m*2    Calcium 9.2 8.6 - 10.6 mg/dL    Albumin 3.7 3.4 - 5.0 g/dL    Alkaline Phosphatase 107 33 - 136 U/L    Total Protein 8.9 (H) 6.4 - 8.2 g/dL    AST 41 (H) 9 - 39 U/L    Bilirubin, Total 1.4 (H) 0.0 - 1.2 mg/dL    ALT 15 10 - 52 U/L   Blood Culture    Specimen: Peripheral Venipuncture; Blood culture   Result Value Ref Range    Blood Culture Loaded on Instrument - Culture in progress    Blood Gas Venous Full Panel Unsolicited   Result Value Ref Range    POCT pH, Venous 7.46 (H) 7.33 - 7.43 pH    POCT pCO2, Venous 41 41 - 51 mm Hg    POCT pO2, Venous 57 (H) 35 - 45 mm Hg    POCT SO2, Venous 87 (H) 45 - 75 %    POCT Oxy Hemoglobin, Venous 84.6 (H) 45.0 -  75.0 %    POCT Hematocrit Calculated, Venous 57.0 (H) 41.0 - 52.0 %    POCT Sodium, Venous 118 (LL) 136 - 145 mmol/L    POCT Potassium, Venous 9.5 (HH) 3.5 - 5.3 mmol/L    POCT Chloride, Venous 90 (L) 98 - 107 mmol/L    POCT Ionized Calicum, Venous 0.80 (L) 1.10 - 1.33 mmol/L    POCT Glucose, Venous 98 74 - 99 mg/dL    POCT Lactate, Venous 1.1 0.4 - 2.0 mmol/L    POCT Base Excess, Venous 4.8 (H) -2.0 - 3.0 mmol/L    POCT HCO3 Calculated, Venous 29.2 (H) 22.0 - 26.0 mmol/L    POCT Hemoglobin, Venous 19.1 (H) 13.5 - 17.5 g/dL    POCT Anion Gap, Venous 8.0 (L) 10.0 - 25.0 mmol/L    Patient Temperature 37.0 degrees Celsius   Comprehensive metabolic panel   Result Value Ref Range    Glucose 85 74 - 99 mg/dL    Sodium 128 (L) 136 - 145 mmol/L    Potassium 3.3 (L) 3.5 - 5.3 mmol/L    Chloride 91 (L) 98 - 107 mmol/L    Bicarbonate 25 21 - 32 mmol/L    Anion Gap 15 10 - 20 mmol/L    Urea Nitrogen 10 6 - 23 mg/dL    Creatinine 0.52 0.50 - 1.30 mg/dL    eGFR >90 >60 mL/min/1.73m*2    Calcium 8.4 (L) 8.6 - 10.6 mg/dL    Albumin 3.5 3.4 - 5.0 g/dL    Alkaline Phosphatase 131 33 - 136 U/L    Total Protein 7.8 6.4 - 8.2 g/dL    AST 43 (H) 9 - 39 U/L    Bilirubin, Total 1.3 (H) 0.0 - 1.2 mg/dL    ALT 12 10 - 52 U/L     MR thoracic spine wo IV contrast    Result Date: 10/12/2023  Interpreted By:  Tyron Dixon  and Luis Javed STUDY: MR THORACIC SPINE WO IV CONTRAST; MR LUMBAR SPINE WO IV CONTRAST; 10/11/2023 11:37 pm   INDICATION: Signs/Symptoms:bilateral LE weakness.   Per EMR: 68-year-old male with a past medical history of PD, hypertension, alcohol and opioid abuse, hepatitis C, PE without cor pulmonale on Eliquis who presents today for an inability to walk. Patient states that about 3 days ago his legs gave out and he has not been able to walk since and has been stuck in a recliner.   COMPARISON: CT chest abdomen pelvis 08/15/2022.   ACCESSION NUMBER(S): [id number]; [id number]   ORDERING CLINICIAN: TEE BLAND    TECHNIQUE: Limited nondiagnostic MRI of the thoracic and lumbar spine. Only T2 sagittal and coronal  images of the thoracic and lumbar spine were obtained.  The patient requested termination of the examination due to pain.   FINDINGS: Limited MRI of thoracic and lumbar spine demonstrates scoliosis of the thoracic spine. No definitive evidence of substantial central canal stenosis. Mild cervical narrowing secondary spinal degenerative change. Evaluation of neural foraminal stenosis is markedly limited.    images demonstrated T2 hyperintense right renal cyst.       1. Nondiagnostic MRI of the thoracic and lumbar spine. The patient requested termination of the examination due to pain. A repeat examination can be obtained as clinically indicated after adequate pain control. 2. Within the severe limitations of this examination, there is no definite evidence of substantial central canal stenosis of the thoracic or lumbar spine.       I personally reviewed the images/study and I agree with the findings as stated. This study was interpreted at [place], [place].     Signed by: Tyron Dixon 10/12/2023 12:23 AM Dictation workstation:   [id number]    MR lumbar spine wo IV contrast    Result Date: 10/12/2023  Interpreted By:  Tyron Dixon and Ebai Jerky STUDY: MR THORACIC SPINE WO IV CONTRAST; MR LUMBAR SPINE WO IV CONTRAST; 10/11/2023 11:37 pm   INDICATION: Signs/Symptoms:bilateral LE weakness.   Per EMR: 68-year-old male with a past medical history of PD, hypertension, alcohol and opioid abuse, hepatitis C, PE without cor pulmonale on Eliquis who presents today for an inability to walk. Patient states that about 3 days ago his legs gave out and he has not been able to walk since and has been stuck in a recliner.   COMPARISON: CT chest abdomen pelvis 08/15/2022.   ACCESSION NUMBER(S): [id number]; [id number]   ORDERING CLINICIAN: TEE BLAND    TECHNIQUE: Limited nondiagnostic MRI of the thoracic and lumbar spine. Only T2 sagittal and coronal  images of the thoracic and lumbar spine were obtained.  The patient requested termination of the examination due to pain.   FINDINGS: Limited MRI of thoracic and lumbar spine demonstrates scoliosis of the thoracic spine. No definitive evidence of substantial central canal stenosis. Mild cervical narrowing secondary spinal degenerative change. Evaluation of neural foraminal stenosis is markedly limited.    images demonstrated T2 hyperintense right renal cyst.       1. Nondiagnostic MRI of the thoracic and lumbar spine. The patient requested termination of the examination due to pain. A repeat examination can be obtained as clinically indicated after adequate pain control. 2. Within the severe limitations of this examination, there is no definite evidence of substantial central canal stenosis of the thoracic or lumbar spine.       I personally reviewed the images/study and I agree with the findings as stated. This study was interpreted at [place], [place].     Signed by: Tyron Dixon 10/12/2023 12:23 AM Dictation workstation:   [id number]    CT head wo IV contrast    Result Date: 10/11/2023  Interpreted By:  Tyron Dixon and Dervishi Mario STUDY: CT HEAD WO IV CONTRAST;  10/11/2023 9:42 pm   INDICATION: bilateral LE weakness, headache.   COMPARISON: CT brain: 08/26/2023.   ACCESSION NUMBER(S): [id number]   ORDERING CLINICIAN: TEE BLAND   TECHNIQUE: Noncontrast axial CT scan of head was performed. Angled reformats in brain and bone windows were generated. The images were reviewed in bone, brain, blood and soft tissue windows.   FINDINGS: CSF Spaces: The ventricles, sulci and basal cisterns are concordant with patient's age and degree of global parenchymal atrophy.. There is no extraaxial fluid collection.   Parenchyma: There are hypodense  focal areas in the subcortical and periventricular white matter regions consistent with the sequela of chronic microvascular ischemic changes. Punctate remote left thalamic and anterior limb internal capsule lacunar infarcts. Otherwise, the grey-white differentiation is overall preserved. There is no mass effect or midline shift.  There is no intracranial hemorrhage.   Calvarium: The calvarium is intact.   Paranasal sinuses and mastoids: Visualized paranasal sinuses and mastoids are clear.       1. No evidence of acute cortical infarct or intracranial hemorrhage. 2. Subcortical and periventricular white matter hypodensities consistent with the sequela of chronic microvascular ischemic changes. 3. Punctate remote left thalamic and anterior basal ganglia lacunar infarcts.     I personally reviewed the images/study and I agree with the findings as stated. This study was interpreted at [place], [place].   MACRO: None   Signed by: Tyron Dixon 10/11/2023 9:52 PM Dictation workstation:   [id number]    XR chest 2 views    Result Date: 10/11/2023  Interpreted By:  Tyron Dixon and Stephens Katherine STUDY: XR CHEST 2 VIEWS;  10/11/2023 9:32 pm   INDICATION: Signs/Symptoms:bilater LE weakness.   COMPARISON: Chest radiograph and CT chest 08/26/2023   ACCESSION NUMBER(S): [id number]   ORDERING CLINICIAN: TEE BLAND   FINDINGS: PA and lateral radiographs of the chest were provided. New patchy left lower lobe airspace opacities. Consider pneumonia. Pulmonary hyperinflation and coarsened interstitial markings compatible with COPD/emphysema. No pleural effusion or pneumothorax diffuse osteopenia. No acute osseous abnormality. Partially visualized left shoulder arthroplasty. Upper abdomen is unremarkable.       1. New patchy left lower lobe airspace opacities. Consider pneumonia. 2. Findings of COPD/emphysema.   I personally reviewed the images/study and I agree  with the findings as stated. This study was interpreted at [facility], [place], [place].   MACRO: None   Signed by: Tyron Dixon 10/11/2023 9:42 PM Dictation workstation:   [id number]    Assessment /Plan    Principal Problem:    Hospital-acquired pneumonia  Active Problems:    Essential hypertension, benign    Hepatitis C virus infection cured after antiviral drug therapy    Alcoholism (CMS/HCC)    Chronic pulmonary embolism (CMS/HCC)    COPD (chronic obstructive pulmonary disease) (CMS/HCC)    HLD (hyperlipidemia)    Mr. Molina Godinez is a 68 year old male with PMHx of COPD, HTN, alcohol and opioid use disorder, hep C, PE presenting for inability to walk found to have alcohol withdrawal refractory to ativan.    NEURO/PSYCH  #AMS  #Alcohol withdrawal  -CT without pathology  -CIWA 17 on the floor, s/p 3mg ativan, 20mg valium  PLAN:  -Currently sedated, RASS~ -3  -phenobarb 32.5mg IV TID x2 days  -high dose thiamine 500mg TID IV, folate 1mg, multivitamin  -fu HIV, RPR, folate, B12, TSH, ammonia, utox, serum tox    #Bilateral LE weakness  -Likely 2/2 chronic, had weakness during 8/2023 admission; less likely GBS  -CT head without pathology, MRI spine stopped early due to pain  PLAN:  -Will reassess once more awake  -Defer LP for now  -Neurology following, appreciate recs    CV  #HTN  -home amlodipine 5mg, atenolol 50mg  PLAN:  -continue home amlodipine, atenolol    #CAD  -continue home atorvastatin 40mg    PULM  #CAP  -Urine strep positive  -MRSA negative, Urine legionella negative  -vanc/zosyn 10/12-13  PLAN:  -ceftriaxone pending speciation  -fu procal, BCx    #Severe COPD   -8/2023 FEV1 42%  PLAN:  -Duonebs QID    #Subsegmental PE  -Requires anticoagulation for 3 months (per vasc med 8/2023), on home apixaban 5mg BID  PLAN:  -not tolerating PO, heparin gtt    GI  #HepC  -treated, 8/2023 HCV RNA undetected    /RENAL  -no acute issues    ENDOCRINE  #Secondary  HyperPTH  -8/28 .7  -Hypocalcemia 8.1 on admission  PLAN:  -1,25-dihydroxy, 25-hydroxy vit D levels    ID  #CAP  2/2 S pneumo, pending sensitivities  -ceftriaxone as above    HEME/ONC  #Leukocytosis  -2/2 to CAP  -Plan as above    MSK/RHEUM   #Chronic LE weakness  -Likely chronic from disuse and alcohol, less likely GBS  -Neurology following, appreciate recs    F: none  E: replete PRN  N: regular  A: PIV  DVT ppx: heparin gtt    Code status: (presumed) Full code  NOK: Nahomi (God-sister) [phone]; Sarah Godinez (sister) [phone]; Tonie (sister) [phone]     Rosa Gary   PGY-1  [facility]

## 2023-10-13 NOTE — CONSULTS
Vancomycin Dosing by Pharmacy- Cessation of Therapy    Consult to pharmacy for vancomycin dosing has been discontinued by the prescriber, pharmacy will sign off at this time.    Please call pharmacy if there are further questions or re-enter a consult if vancomycin is resumed.     Sunday Jara, PharmD

## 2023-10-13 NOTE — SIGNIFICANT EVENT
Rapid Response Note    Rapid Response Team contacted by ED nurse asking for help with getting patient accepted to MICU due to high CIWA scores.  Per report patient arrived to the ED on 10/11 and today his CIWA scores peaked at 17 notable for visual hallucination, tactile disturbances.  He had been getting ativan for withdrawal symptoms but he did receive one 20 mg Valium dose. I contacted MICU fellow Dr. Ryann Payne who evaluated patient and accepted patient to the MICU for management of CIWA   ED Charge and RN updated.

## 2023-10-13 NOTE — PROGRESS NOTES
"Vancomycin Dosing by Pharmacy- FOLLOW UP    Molina Godinez is a 68 y.o. year old male who Pharmacy has been consulted for vancomycin dosing for pneumonia. Based on the patient's indication and renal status this patient is being dosed based on a goal AUC of 400-600.     Renal function is currently stable.    Current vancomycin dose: 1000 mg given every 12 hours    Estimated vancomycin AUC on current dose: 609 mg/L.hr     Visit Vitals  BP (!) 149/102 (BP Location: Right arm, Patient Position: Sitting)   Pulse 102   Resp 18        Lab Results   Component Value Date    CREATININE 0.63 10/13/2023    CREATININE 0.56 10/11/2023    CREATININE 0.64 09/01/2023    CREATININE 0.61 08/31/2023    CREATININE 0.80 08/30/2023    CREATININE 0.76 08/29/2023        Patient weight is No results found for: \"PTWEIGHT\"    No results found for: \"CULTURE\"     No intake/output data recorded.  [unfilled]    Lab Results   Component Value Date    PATIENTTEMP 37.0 08/27/2023    PATIENTTEMP 37.0 08/26/2023    PATIENTTEMP 37.0 08/26/2023        Assessment/Plan    Above goal AUC. Orders placed for new vancomcyin regimen of 750mg every 12 hours to begin at 1325.    This dosing regimen is predicted by [employer] to result in the following pharmacokinetic parameters:  Loading dose: N/A  Regimen: 750 mg IV every 12 hours.  Start time: 14:53 on 10/13/2023  Exposure target: AUC24 (range)400-600 mg/L.hr   AUC24,ss: 460 mg/L.hr  Probability of AUC24 > 400: 75 %  Ctrough,ss: 14 mg/L  Probability of Ctrough,ss > 20: 9 %  Probability of nephrotoxicity (Lodise MONET 2009): 9 %  The next level will be obtained on 10/14 w/ Am labs. May be obtained sooner if clinically indicated.   Will continue to monitor renal function daily while on vancomycin and order serum creatinine at least every 48 hours if not already ordered.  Follow for continued vancomycin needs, clinical response, and signs/symptoms of toxicity.       Mckenzie Moon, PharmD           "

## 2023-10-13 NOTE — HOSPITAL COURSE
68 y.o. male with PMHx COPD, HTN,  alcohol and opioid use disorder, hepatitis C, chronic PE admitted 10/12 with BLE weakness, recently admitted in the MICU from 10/13-10/16 for suspected alcohol withdrawal refractory to ativan, started on phenobarb taper, treated for streptococcus pneumoniae PNA with Ceftriaxone, completed antifungals for fungemia and transferred to [facility].  RRT called for lethargy, transferred to MICU on 10/17 with new FiO2 requirements.  In SDU continued to make good progress, weaning O2 to baseline, continuing aggressive pulm hygiene and PT/OT/SLP.   He was recommended moderate intensity therapy and transfer to the floor pending placement for postacute care.  At this time his respiratory status has been stable.  He has pending SLP evaluation for his swallowing and decision regarding his ability to swallow versus need for a PEG tube.     -Respiratory status has been stable.  He is requiring 1 to 2 L at rest, likely what is going to go on.  He may not need any oxygen is borderline.  Cannot determine activity requirement.  Please make sure he leaves with his Acapella and incentive spirometer and brings it to the facility  -We need to figure out his hyponatremia, he is not getting any extra free water he is still low.  May want to talk to nephrology now about additional measures.  -Right now last up to discharge is his swallowing, MBS ordered for tomorrow.  Had full discussions with them over and over, if he passes his grading and started diet and if tolerating can go on Tuesday, if he feels please discuss with them PEG tube.  Would at least get confirmation from speech that they do not think this is something that is going to reverse in the next few days.  I have already discussed this with them and they are aware and will be awaiting conversation  -Completed antibiotics and antifungals, there was some concern for vegetation, cardiac surgery said not a candidate, repeat echo showed no vegetation but he  should have follow-up with cardiology to evaluate.  He should follow-up with ID as well given his multiple infections.  He needs neurology follow-up for the Guillain-Barré.  He needs pulmonary follow-up for the respiratory failure.  He will need PT OT and speech and nutrition at the facility.  I have counseled him extensively on therapy and getting out of bed, had more talks with the sister present.  Hopefully will stay motivated but we need to continue to to talk to nursing to encourage him to get out of bed.  Will also need GI follow-up for the hepatitis C.

## 2023-10-14 LAB
ALBUMIN SERPL BCP-MCNC: 3 G/DL (ref 3.4–5)
ALBUMIN SERPL BCP-MCNC: 3.2 G/DL (ref 3.4–5)
ANION GAP SERPL CALC-SCNC: 18 MMOL/L (ref 10–20)
ANION GAP SERPL CALC-SCNC: 20 MMOL/L (ref 10–20)
BUN SERPL-MCNC: 17 MG/DL (ref 6–23)
BUN SERPL-MCNC: 18 MG/DL (ref 6–23)
CALCIUM SERPL-MCNC: 8.2 MG/DL (ref 8.6–10.6)
CALCIUM SERPL-MCNC: 8.7 MG/DL (ref 8.6–10.6)
CHLORIDE SERPL-SCNC: 87 MMOL/L (ref 98–107)
CHLORIDE SERPL-SCNC: 90 MMOL/L (ref 98–107)
CO2 SERPL-SCNC: 22 MMOL/L (ref 21–32)
CO2 SERPL-SCNC: 24 MMOL/L (ref 21–32)
CREAT SERPL-MCNC: 0.65 MG/DL (ref 0.5–1.3)
CREAT SERPL-MCNC: 0.69 MG/DL (ref 0.5–1.3)
GFR SERPL CREATININE-BSD FRML MDRD: >90 ML/MIN/1.73M*2
GFR SERPL CREATININE-BSD FRML MDRD: >90 ML/MIN/1.73M*2
GLUCOSE SERPL-MCNC: 100 MG/DL (ref 74–99)
GLUCOSE SERPL-MCNC: 112 MG/DL (ref 74–99)
PHOSPHATE SERPL-MCNC: 3.3 MG/DL (ref 2.5–4.9)
PHOSPHATE SERPL-MCNC: 3.6 MG/DL (ref 2.5–4.9)
POTASSIUM SERPL-SCNC: 3.7 MMOL/L (ref 3.5–5.3)
POTASSIUM SERPL-SCNC: 3.8 MMOL/L (ref 3.5–5.3)
SODIUM SERPL-SCNC: 126 MMOL/L (ref 136–145)
SODIUM SERPL-SCNC: 127 MMOL/L (ref 136–145)
UFH PPP CHRO-ACNC: 0.2 IU/ML
UFH PPP CHRO-ACNC: 0.5 IU/ML
UFH PPP CHRO-ACNC: 0.6 IU/ML

## 2023-10-14 PROCEDURE — C9113 INJ PANTOPRAZOLE SODIUM, VIA: HCPCS | Performed by: STUDENT IN AN ORGANIZED HEALTH CARE EDUCATION/TRAINING PROGRAM

## 2023-10-14 PROCEDURE — 2500000005 HC RX 250 GENERAL PHARMACY W/O HCPCS

## 2023-10-14 PROCEDURE — 85520 HEPARIN ASSAY: CPT

## 2023-10-14 PROCEDURE — 36415 COLL VENOUS BLD VENIPUNCTURE: CPT | Mod: CMCLAB

## 2023-10-14 PROCEDURE — 2500000004 HC RX 250 GENERAL PHARMACY W/ HCPCS (ALT 636 FOR OP/ED)

## 2023-10-14 PROCEDURE — 82947 ASSAY GLUCOSE BLOOD QUANT: CPT

## 2023-10-14 PROCEDURE — S4991 NICOTINE PATCH NONLEGEND: HCPCS | Performed by: STUDENT IN AN ORGANIZED HEALTH CARE EDUCATION/TRAINING PROGRAM

## 2023-10-14 PROCEDURE — 2500000001 HC RX 250 WO HCPCS SELF ADMINISTERED DRUGS (ALT 637 FOR MEDICARE OP): Performed by: STUDENT IN AN ORGANIZED HEALTH CARE EDUCATION/TRAINING PROGRAM

## 2023-10-14 PROCEDURE — 36415 COLL VENOUS BLD VENIPUNCTURE: CPT | Performed by: NURSE PRACTITIONER

## 2023-10-14 PROCEDURE — 2500000004 HC RX 250 GENERAL PHARMACY W/ HCPCS (ALT 636 FOR OP/ED): Performed by: STUDENT IN AN ORGANIZED HEALTH CARE EDUCATION/TRAINING PROGRAM

## 2023-10-14 PROCEDURE — 2500000002 HC RX 250 W HCPCS SELF ADMINISTERED DRUGS (ALT 637 FOR MEDICARE OP, ALT 636 FOR OP/ED): Performed by: STUDENT IN AN ORGANIZED HEALTH CARE EDUCATION/TRAINING PROGRAM

## 2023-10-14 PROCEDURE — 80069 RENAL FUNCTION PANEL: CPT

## 2023-10-14 PROCEDURE — 2020000001 HC ICU ROOM DAILY

## 2023-10-14 PROCEDURE — 2500000004 HC RX 250 GENERAL PHARMACY W/ HCPCS (ALT 636 FOR OP/ED): Mod: JZ

## 2023-10-14 PROCEDURE — 99291 CRITICAL CARE FIRST HOUR: CPT

## 2023-10-14 PROCEDURE — 36415 COLL VENOUS BLD VENIPUNCTURE: CPT

## 2023-10-14 RX ORDER — FOLIC ACID 1 MG/1
1 TABLET ORAL DAILY
Status: DISCONTINUED | OUTPATIENT
Start: 2023-10-14 | End: 2023-10-14

## 2023-10-14 RX ORDER — NICARDIPINE HYDROCHLORIDE 0.2 MG/ML
2.5-15 INJECTION INTRAVENOUS CONTINUOUS
Status: DISCONTINUED | OUTPATIENT
Start: 2023-10-14 | End: 2023-10-15

## 2023-10-14 RX ORDER — PHENOBARBITAL 32.4 MG/1
32.4 TABLET ORAL 3 TIMES DAILY
Status: DISCONTINUED | OUTPATIENT
Start: 2023-10-14 | End: 2023-10-14

## 2023-10-14 RX ORDER — DEXTROSE, SODIUM CHLORIDE, SODIUM LACTATE, POTASSIUM CHLORIDE, AND CALCIUM CHLORIDE 5; .6; .31; .03; .02 G/100ML; G/100ML; G/100ML; G/100ML; G/100ML
50 INJECTION, SOLUTION INTRAVENOUS CONTINUOUS
Status: DISCONTINUED | OUTPATIENT
Start: 2023-10-14 | End: 2023-10-15

## 2023-10-14 RX ORDER — DEXTROSE MONOHYDRATE AND SODIUM CHLORIDE 5; .45 G/100ML; G/100ML
50 INJECTION, SOLUTION INTRAVENOUS CONTINUOUS
Status: DISCONTINUED | OUTPATIENT
Start: 2023-10-14 | End: 2023-10-14

## 2023-10-14 RX ORDER — DEXTROSE MONOHYDRATE 100 MG/ML
0.3 INJECTION, SOLUTION INTRAVENOUS ONCE AS NEEDED
Status: DISCONTINUED | OUTPATIENT
Start: 2023-10-14 | End: 2023-11-30 | Stop reason: HOSPADM

## 2023-10-14 RX ORDER — DEXTROSE 50 % IN WATER (D50W) INTRAVENOUS SYRINGE
25
Status: DISCONTINUED | OUTPATIENT
Start: 2023-10-14 | End: 2023-11-30 | Stop reason: HOSPADM

## 2023-10-14 RX ORDER — MULTIVIT-MIN/IRON FUM/FOLIC AC 7.5 MG-4
1 TABLET ORAL DAILY
Status: DISCONTINUED | OUTPATIENT
Start: 2023-10-14 | End: 2023-10-14

## 2023-10-14 RX ORDER — METOPROLOL TARTRATE 1 MG/ML
5 INJECTION, SOLUTION INTRAVENOUS ONCE
Status: COMPLETED | OUTPATIENT
Start: 2023-10-14 | End: 2023-10-14

## 2023-10-14 RX ORDER — PHENOBARBITAL SODIUM 65 MG/ML
32.5 INJECTION, SOLUTION INTRAMUSCULAR; INTRAVENOUS EVERY 8 HOURS
Status: DISCONTINUED | OUTPATIENT
Start: 2023-10-14 | End: 2023-10-15

## 2023-10-14 RX ADMIN — FOLIC ACID 1 MG: 5 INJECTION, SOLUTION INTRAMUSCULAR; INTRAVENOUS; SUBCUTANEOUS at 10:02

## 2023-10-14 RX ADMIN — PHENOBARBITAL SODIUM 32.5 MG: 65 INJECTION INTRAMUSCULAR at 12:56

## 2023-10-14 RX ADMIN — THIAMINE HYDROCHLORIDE 500 MG: 200 INJECTION, SOLUTION INTRAMUSCULAR; INTRAVENOUS at 10:02

## 2023-10-14 RX ADMIN — Medication 5 MG: at 19:56

## 2023-10-14 RX ADMIN — ACETAMINOPHEN 650 MG: 325 TABLET ORAL at 22:57

## 2023-10-14 RX ADMIN — SODIUM CHLORIDE, SODIUM LACTATE, POTASSIUM CHLORIDE, CALCIUM CHLORIDE AND DEXTROSE MONOHYDRATE 50 ML/HR: 5; 600; 310; 30; 20 INJECTION, SOLUTION INTRAVENOUS at 15:26

## 2023-10-14 RX ADMIN — METOPROLOL TARTRATE 5 MG: 1 INJECTION, SOLUTION INTRAVENOUS at 17:55

## 2023-10-14 RX ADMIN — HEPARIN SODIUM 985 UNITS/HR: 10000 INJECTION, SOLUTION INTRAVENOUS at 11:45

## 2023-10-14 RX ADMIN — NICARDIPINE HYDROCHLORIDE 2.5 MG/HR: 0.2 INJECTION, SOLUTION INTRAVENOUS at 01:39

## 2023-10-14 RX ADMIN — CEFTRIAXONE SODIUM 2 G: 2 INJECTION, SOLUTION INTRAVENOUS at 16:22

## 2023-10-14 RX ADMIN — HEPARIN SODIUM 2000 UNITS: 5000 INJECTION INTRAVENOUS; SUBCUTANEOUS at 11:00

## 2023-10-14 RX ADMIN — THIAMINE HYDROCHLORIDE 500 MG: 200 INJECTION, SOLUTION INTRAMUSCULAR; INTRAVENOUS at 15:28

## 2023-10-14 RX ADMIN — Medication 1 PATCH: at 10:01

## 2023-10-14 RX ADMIN — PANTOPRAZOLE SODIUM 40 MG: 40 INJECTION, POWDER, FOR SOLUTION INTRAVENOUS at 10:01

## 2023-10-14 RX ADMIN — CEFTRIAXONE SODIUM 2 G: 2 INJECTION, SOLUTION INTRAVENOUS at 03:45

## 2023-10-14 RX ADMIN — THIAMINE HYDROCHLORIDE 500 MG: 200 INJECTION, SOLUTION INTRAMUSCULAR; INTRAVENOUS at 20:28

## 2023-10-14 RX ADMIN — PHENOBARBITAL SODIUM 32.5 MG: 65 INJECTION INTRAMUSCULAR at 19:55

## 2023-10-14 ASSESSMENT — LIFESTYLE VARIABLES
AUDITORY DISTURBANCES: NOT PRESENT
ANXIETY: NO ANXIETY, AT EASE
TOTAL SCORE: 5
NAUSEA AND VOMITING: NO NAUSEA AND NO VOMITING
ORIENTATION AND CLOUDING OF SENSORIUM: ORIENTED AND CAN DO SERIAL ADDITIONS
ANXIETY: NO ANXIETY, AT EASE
ANXIETY: NO ANXIETY, AT EASE
PAROXYSMAL SWEATS: NO SWEAT VISIBLE
HEADACHE, FULLNESS IN HEAD: NOT PRESENT
AUDITORY DISTURBANCES: NOT PRESENT
TREMOR: NO TREMOR
NAUSEA AND VOMITING: NO NAUSEA AND NO VOMITING
VISUAL DISTURBANCES: NOT PRESENT
HEADACHE, FULLNESS IN HEAD: NOT PRESENT
TREMOR: NO TREMOR
ANXIETY: NO ANXIETY, AT EASE
HEADACHE, FULLNESS IN HEAD: NOT PRESENT
AGITATION: NORMAL ACTIVITY
TOTAL SCORE: 1
AUDITORY DISTURBANCES: NOT PRESENT
AGITATION: NORMAL ACTIVITY
ORIENTATION AND CLOUDING OF SENSORIUM: CANNOT DO SERIAL ADDITIONS OR IS UNCERTAIN ABOUT DATE
AUDITORY DISTURBANCES: NOT PRESENT
NAUSEA AND VOMITING: NO NAUSEA AND NO VOMITING
AGITATION: NORMAL ACTIVITY
TREMOR: NOT VISIBLE, BUT CAN BE FELT FINGERTIP TO FINGERTIP
ORIENTATION AND CLOUDING OF SENSORIUM: CANNOT DO SERIAL ADDITIONS OR IS UNCERTAIN ABOUT DATE
ORIENTATION AND CLOUDING OF SENSORIUM: CANNOT DO SERIAL ADDITIONS OR IS UNCERTAIN ABOUT DATE
ANXIETY: MILDLY ANXIOUS
AGITATION: NORMAL ACTIVITY
VISUAL DISTURBANCES: NOT PRESENT
AGITATION: NORMAL ACTIVITY
NAUSEA AND VOMITING: NO NAUSEA AND NO VOMITING
HEADACHE, FULLNESS IN HEAD: NOT PRESENT
VISUAL DISTURBANCES: NOT PRESENT
AUDITORY DISTURBANCES: NOT PRESENT
VISUAL DISTURBANCES: NOT PRESENT
VISUAL DISTURBANCES: NOT PRESENT
TOTAL SCORE: 1
NAUSEA AND VOMITING: NO NAUSEA AND NO VOMITING
TOTAL SCORE: 1
ORIENTATION AND CLOUDING OF SENSORIUM: CANNOT DO SERIAL ADDITIONS OR IS UNCERTAIN ABOUT DATE
ANXIETY: NO ANXIETY, AT EASE
PAROXYSMAL SWEATS: NO SWEAT VISIBLE
PAROXYSMAL SWEATS: NO SWEAT VISIBLE
AGITATION: NORMAL ACTIVITY
TOTAL SCORE: 1
ORIENTATION AND CLOUDING OF SENSORIUM: CANNOT DO SERIAL ADDITIONS OR IS UNCERTAIN ABOUT DATE
PAROXYSMAL SWEATS: NO SWEAT VISIBLE
NAUSEA AND VOMITING: NO NAUSEA AND NO VOMITING
ORIENTATION AND CLOUDING OF SENSORIUM: CANNOT DO SERIAL ADDITIONS OR IS UNCERTAIN ABOUT DATE
AUDITORY DISTURBANCES: NOT PRESENT
NAUSEA AND VOMITING: NO NAUSEA AND NO VOMITING
HEADACHE, FULLNESS IN HEAD: NOT PRESENT
ORIENTATION AND CLOUDING OF SENSORIUM: CANNOT DO SERIAL ADDITIONS OR IS UNCERTAIN ABOUT DATE
TREMOR: NO TREMOR
TREMOR: NOT VISIBLE, BUT CAN BE FELT FINGERTIP TO FINGERTIP
AUDITORY DISTURBANCES: NOT PRESENT
VISUAL DISTURBANCES: NOT PRESENT
VISUAL DISTURBANCES: NOT PRESENT
ANXIETY: NO ANXIETY, AT EASE
PAROXYSMAL SWEATS: NO SWEAT VISIBLE
ANXIETY: NO ANXIETY, AT EASE
TACTILE DISTURBANCES: VERY MILD ITCHING, PINS AND NEEDLES, BURNING OR NUMBNESS
NAUSEA AND VOMITING: NO NAUSEA AND NO VOMITING
AUDITORY DISTURBANCES: NOT PRESENT
TREMOR: NO TREMOR
TOTAL SCORE: 2
HEADACHE, FULLNESS IN HEAD: NOT PRESENT
TREMOR: NO TREMOR
PAROXYSMAL SWEATS: NO SWEAT VISIBLE
TOTAL SCORE: 1
TACTILE DISTURBANCES: VERY MILD ITCHING, PINS AND NEEDLES, BURNING OR NUMBNESS
TOTAL SCORE: 1
AGITATION: NORMAL ACTIVITY
HEADACHE, FULLNESS IN HEAD: NOT PRESENT
VISUAL DISTURBANCES: NOT PRESENT
AGITATION: SOMEWHAT MORE THAN NORMAL ACTIVITY
HEADACHE, FULLNESS IN HEAD: NOT PRESENT
TREMOR: NO TREMOR

## 2023-10-14 ASSESSMENT — PAIN - FUNCTIONAL ASSESSMENT
PAIN_FUNCTIONAL_ASSESSMENT: CPOT (CRITICAL CARE PAIN OBSERVATION TOOL)
PAIN_FUNCTIONAL_ASSESSMENT: 0-10

## 2023-10-14 ASSESSMENT — PAIN SCALES - GENERAL
PAINLEVEL_OUTOF10: 0 - NO PAIN
PAINLEVEL_OUTOF10: 6
PAINLEVEL_OUTOF10: 6
PAINLEVEL_OUTOF10: 9

## 2023-10-14 NOTE — SIGNIFICANT EVENT
BLE weakness though variailbility activity noted during my exam 10/12 where at one point, patient states cannot move it all and shows 2/5 BLE strength and at other times, spontaneously vigorously lifts up legs bilaterally    Exam 10/14; patient alert, following commands; consistently 4/5 throughout BUE/BLE except at wrists bilaterally (having trouble using cellphone)     Differential:  possible peripheral neuropathy (likely alcohol related but other etiologies need to be ruled out)     Advise:  - okay to resume anticoag from neuro standpoint as we do not plan on LP at this time  - at this time, though, LP or MRI pan spine is not inidicated as it is not clear if there is true weakness and observation will be indicated  - B12 repletion while awaiting MMA (as B12 low)  - MMA  - thiamine repletion  - eunatremia  - outpatient referral for possible EMG/NCS; patient's exam has been improving considerably since admitted in terms of strength but overall, questionable exam during alcohol withdrawal would be more reliable when not having this issue, in clinic       Evidence of punctate remote left thalamic     Advise:  - keep SBP < 160, contniue atorvastatin, resume anticoag when appropriate (okay from neuro standpoint as no LP planned); no need for antiplatelet strictly for this image finding    Dispo: refer to neuro clinic; to be seen within 4 weeks    Neurology signing off. Thank you for the consult.

## 2023-10-14 NOTE — PROGRESS NOTES
Critical Care Daily Progress        Subjective   Patient is a 68 y.o. male with PMHx COPD, Htn, alcohol and opioid use disorder, hepatitis C, PE who presented with inability to walk and admitted to MICU for alcohol withdrawal refractory to ativan.    Interval History: Aox2 this morning, not oriented to time. RASS approaching +2. Patient reports his last drink was the day he came to the emergency department (10/11).    Patient complains of aching in bilateral lower extremities, no other pain. Denies headache, nausea, abd pain.      Scheduled Medications:   amLODIPine, 5 mg, oral, Daily  [Held by provider] apixaban, 5 mg, oral, q12h  atorvastatin, 40 mg, oral, Daily  cefTRIAXone, 2 g, intravenous, q12h  folic acid, 1 mg, intravenous, Daily  melatonin, 5 mg, oral, Daily  multivitamin with minerals, 1 tablet, oral, Daily  nicotine, 1 patch, transdermal, Daily  pantoprazole, 40 mg, intravenous, Daily  thiamine, 500 mg, intravenous, TID      Continuous Medications:   heparin, 0-4,500 Units/hr, Last Rate: 882 Units/hr (10/14/23 0700)  niCARdipine, 2.5-15 mg/hr, Last Rate: 2.5 mg/hr (10/14/23 0700)      PRN Medications:   PRN medications: acetaminophen, heparin, hydrOXYzine HCL, loperamide, sennosides    Objective   Vitals:  Most Recent:  Vitals:    10/14/23 0600   BP: 156/85   Pulse: (!) 122   Resp: 26   Temp:    SpO2: 97%       24hr Min/Max:  Temp  Min: 36.2 °C (97.2 °F)  Max: 36.8 °C (98.2 °F)  Pulse  Min: 92  Max: 129  BP  Min: 127/91  Max: 200/114  Resp  Min: 20  Max: 42  SpO2  Min: 86 %  Max: 100 %    LDA:  PIVx3    I/O:    Intake/Output Summary (Last 24 hours) at 10/14/2023 0722  Last data filed at 10/14/2023 0448  Gross per 24 hour   Intake 953.62 ml   Output 1300 ml   Net -346.38 ml       Physical Exam:  GEN: cachectic, chronically ill appearing, lying in bed  CV: RRR, no m/r/g  PULM: Crackles in LLL  ABD: soft, nontender, nondistended  NEURO: Aox2, not oriented to time.   PSYCH: appropriate mood and affect.  MSK:  no joint swelling grossly noted, strength 4/5 in bilateral LE  EXT: no BLE edema  SKIN: warm and dry, no lesions grossly noted    LABS AND IMAGING   Results for orders placed or performed during the hospital encounter of 10/11/23 (from the past 24 hour(s))   Blood Culture    Specimen: Peripheral Venipuncture; Blood culture   Result Value Ref Range    Blood Culture Loaded on Instrument - Culture in progress    Blood Gas Venous Full Panel Unsolicited   Result Value Ref Range    POCT pH, Venous 7.46 (H) 7.33 - 7.43 pH    POCT pCO2, Venous 41 41 - 51 mm Hg    POCT pO2, Venous 57 (H) 35 - 45 mm Hg    POCT SO2, Venous 87 (H) 45 - 75 %    POCT Oxy Hemoglobin, Venous 84.6 (H) 45.0 - 75.0 %    POCT Hematocrit Calculated, Venous 57.0 (H) 41.0 - 52.0 %    POCT Sodium, Venous 118 (LL) 136 - 145 mmol/L    POCT Potassium, Venous 9.5 (HH) 3.5 - 5.3 mmol/L    POCT Chloride, Venous 90 (L) 98 - 107 mmol/L    POCT Ionized Calicum, Venous 0.80 (L) 1.10 - 1.33 mmol/L    POCT Glucose, Venous 98 74 - 99 mg/dL    POCT Lactate, Venous 1.1 0.4 - 2.0 mmol/L    POCT Base Excess, Venous 4.8 (H) -2.0 - 3.0 mmol/L    POCT HCO3 Calculated, Venous 29.2 (H) 22.0 - 26.0 mmol/L    POCT Hemoglobin, Venous 19.1 (H) 13.5 - 17.5 g/dL    POCT Anion Gap, Venous 8.0 (L) 10.0 - 25.0 mmol/L    Patient Temperature 37.0 degrees Celsius   Comprehensive metabolic panel   Result Value Ref Range    Glucose 85 74 - 99 mg/dL    Sodium 128 (L) 136 - 145 mmol/L    Potassium 3.3 (L) 3.5 - 5.3 mmol/L    Chloride 91 (L) 98 - 107 mmol/L    Bicarbonate 25 21 - 32 mmol/L    Anion Gap 15 10 - 20 mmol/L    Urea Nitrogen 10 6 - 23 mg/dL    Creatinine 0.52 0.50 - 1.30 mg/dL    eGFR >90 >60 mL/min/1.73m*2    Calcium 8.4 (L) 8.6 - 10.6 mg/dL    Albumin 3.5 3.4 - 5.0 g/dL    Alkaline Phosphatase 131 33 - 136 U/L    Total Protein 7.8 6.4 - 8.2 g/dL    AST 43 (H) 9 - 39 U/L    Bilirubin, Total 1.3 (H) 0.0 - 1.2 mg/dL    ALT 12 10 - 52 U/L   CBC and Auto Differential   Result Value Ref  Range    WBC 16.8 (H) 4.4 - 11.3 x10*3/uL    nRBC 0.0 0.0 - 0.0 /100 WBCs    RBC 6.04 (H) 4.50 - 5.90 x10*6/uL    Hemoglobin 18.1 (H) 13.5 - 17.5 g/dL    Hematocrit 52.1 (H) 41.0 - 52.0 %    MCV 86 80 - 100 fL    MCH 30.0 26.0 - 34.0 pg    MCHC 34.7 32.0 - 36.0 g/dL    RDW 12.8 11.5 - 14.5 %    Platelets 228 150 - 450 x10*3/uL    MPV 11.1 7.5 - 11.5 fL    Neutrophils % 95.3 40.0 - 80.0 %    Immature Granulocytes %, Automated 0.5 0.0 - 0.9 %    Lymphocytes % 2.7 13.0 - 44.0 %    Monocytes % 1.3 2.0 - 10.0 %    Eosinophils % 0.0 0.0 - 6.0 %    Basophils % 0.2 0.0 - 2.0 %    Neutrophils Absolute 15.98 (H) 1.20 - 7.70 x10*3/uL    Immature Granulocytes Absolute, Automated 0.08 0.00 - 0.70 x10*3/uL    Lymphocytes Absolute 0.45 (L) 1.20 - 4.80 x10*3/uL    Monocytes Absolute 0.22 0.10 - 1.00 x10*3/uL    Eosinophils Absolute 0.00 0.00 - 0.70 x10*3/uL    Basophils Absolute 0.04 0.00 - 0.10 x10*3/uL   Magnesium   Result Value Ref Range    Magnesium 1.49 (L) 1.60 - 2.40 mg/dL   Comprehensive metabolic panel   Result Value Ref Range    Glucose 129 (H) 74 - 99 mg/dL    Sodium 129 (L) 136 - 145 mmol/L    Potassium 3.0 (L) 3.5 - 5.3 mmol/L    Chloride 88 (L) 98 - 107 mmol/L    Bicarbonate 27 21 - 32 mmol/L    Anion Gap 17 10 - 20 mmol/L    Urea Nitrogen 9 6 - 23 mg/dL    Creatinine 0.54 0.50 - 1.30 mg/dL    eGFR >90 >60 mL/min/1.73m*2    Calcium 8.7 8.6 - 10.6 mg/dL    Albumin 3.6 3.4 - 5.0 g/dL    Alkaline Phosphatase 115 33 - 136 U/L    Total Protein 8.5 (H) 6.4 - 8.2 g/dL    AST 41 (H) 9 - 39 U/L    Bilirubin, Total 1.5 (H) 0.0 - 1.2 mg/dL    ALT 14 10 - 52 U/L   Lactate   Result Value Ref Range    Lactate 1.3 0.4 - 2.0 mmol/L   Procalcitonin   Result Value Ref Range    Procalcitonin 1.67 (H) <=0.07 ng/mL   Vitamin D 25-Hydroxy,Total (for eval of Vitamin D levels)   Result Value Ref Range    Vitamin D, 25-Hydroxy, Total 8 (L) 30 - 100 ng/mL   POCT GLUCOSE   Result Value Ref Range    POCT Glucose 160 (H) 74 - 99 mg/dL   aPTT -  baseline   Result Value Ref Range    aPTT 37 27 - 38 seconds   Protime-INR   Result Value Ref Range    Protime 12.0 9.8 - 12.8 seconds    INR 1.1 0.9 - 1.1   CBC and Auto Differential   Result Value Ref Range    WBC 14.3 (H) 4.4 - 11.3 x10*3/uL    nRBC 0.0 0.0 - 0.0 /100 WBCs    RBC 6.30 (H) 4.50 - 5.90 x10*6/uL    Hemoglobin 18.9 (H) 13.5 - 17.5 g/dL    Hematocrit 52.9 (H) 41.0 - 52.0 %    MCV 84 80 - 100 fL    MCH 30.0 26.0 - 34.0 pg    MCHC 35.7 32.0 - 36.0 g/dL    RDW 12.4 11.5 - 14.5 %    Platelets 271 150 - 450 x10*3/uL    MPV 11.2 7.5 - 11.5 fL    Neutrophils % 96.2 40.0 - 80.0 %    Immature Granulocytes %, Automated 0.6 0.0 - 0.9 %    Lymphocytes % 2.5 13.0 - 44.0 %    Monocytes % 0.5 2.0 - 10.0 %    Eosinophils % 0.1 0.0 - 6.0 %    Basophils % 0.1 0.0 - 2.0 %    Neutrophils Absolute 13.78 (H) 1.20 - 7.70 x10*3/uL    Immature Granulocytes Absolute, Automated 0.08 0.00 - 0.70 x10*3/uL    Lymphocytes Absolute 0.36 (L) 1.20 - 4.80 x10*3/uL    Monocytes Absolute 0.07 (L) 0.10 - 1.00 x10*3/uL    Eosinophils Absolute 0.01 0.00 - 0.70 x10*3/uL    Basophils Absolute 0.02 0.00 - 0.10 x10*3/uL   Heparin Assay, UFH   Result Value Ref Range    Heparin Unfractionated 0.4 See Comment Below for Therapeutic Ranges IU/mL   CBC   Result Value Ref Range    WBC 13.8 (H) 4.4 - 11.3 x10*3/uL    nRBC 0.0 0.0 - 0.0 /100 WBCs    RBC 6.42 (H) 4.50 - 5.90 x10*6/uL    Hemoglobin 19.0 (H) 13.5 - 17.5 g/dL    Hematocrit 53.6 (H) 41.0 - 52.0 %    MCV 84 80 - 100 fL    MCH 29.6 26.0 - 34.0 pg    MCHC 35.4 32.0 - 36.0 g/dL    RDW 12.7 11.5 - 14.5 %    Platelets 245 150 - 450 x10*3/uL    MPV 10.8 7.5 - 11.5 fL        MR thoracic spine wo IV contras  Result Date: 10/12/2023  1. Nondiagnostic MRI of the thoracic and lumbar spine. The patient requested termination of the examination due to pain. A repeat examination can be obtained as clinically indicated after adequate pain control. 2. Within the severe limitations of this examination, there  is no definite evidence of substantial central canal stenosis of the thoracic or lumbar spine.     MR lumbar spine wo IV contrast  Result Date: 10/12/2023  1. Nondiagnostic MRI of the thoracic and lumbar spine. The patient requested termination of the examination due to pain. A repeat examination can be obtained as clinically indicated after adequate pain control. 2. Within the severe limitations of this examination, there is no definite evidence of substantial central canal stenosis of the thoracic or lumbar spine.    CT head wo IV contrast  Result Date: 10/11/2023  1. No evidence of acute cortical infarct or intracranial hemorrhage. 2. Subcortical and periventricular white matter hypodensities consistent with the sequela of chronic microvascular ischemic changes. 3. Punctate remote left thalamic and anterior basal ganglia lacunar infarcts.     XR chest 2 views  Result Date: 10/11/2023  1. New patchy left lower lobe airspace opacities. Consider pneumonia. 2. Findings of COPD/emphysema.    Assessment/Plan   ASSESSMENT AND PLAN   Molina Godinez is a 68 y.o. male with PMHx of COPD, HTN, alcohol and opioid use disorder, hep C, PE presenting for inability to walk found to have alcohol withdrawal refractory to ativan.     Updates 10/14:  - RASS +2 this morning  - Start phenobarb 32.5mg IV TID x2 days (10/14-10/15)  - Discontinue IV folate since folate level wnl  - Continue ceftriaxone (10/13- )  Follow up  - Utox, serum tox  - Bcx (10/13) pending      NEURO/PSYCH  #AMS  #Alcohol withdrawal  -CT without pathology  -CIWA 17 on the floor, s/p 3mg ativan, 20mg valium on 10/13  -Folate, B12, TSH wnl  PLAN:  -currently awake, alert, RASS~ +2  -start phenobarb 32.5mg IV TID x2 days (10/14-10/15)  -continue high dose thiamine 500mg TID IV, multivitamin  -discontinue IV folate  -fu HIV, RPR, ammonia, utox, serum tox     #Bilateral LE weakness  -Likely 2/2 chronic, had weakness during 8/2023 admission; less likely GBS  -CT head  without pathology, MRI spine stopped early due to pain  -Patient able to move bilateral lower extremities, low c/f GBS  -No LP indicated at this time  -May follow up with neurology once no longer withdrawing     CV  #HTN  -Hold home amlodipine 5mg, atenolol 50mg     #CAD  -continue home atorvastatin 40mg     PULM  #CAP  -Urine strep positive  -MRSA negative, Urine legionella negative  -vanc/zosyn 10/12-13  -procal 1.67  PLAN:  -ceftriaxone (10/13- ) pending speciation  -Bcx (10/13) pending     #Severe COPD   -8/2023 FEV1 42%  PLAN:  -Duonebs QID     #Subsegmental PE  -Requires anticoagulation for 3 months (per vasc med 8/2023), on home apixaban 5mg BID  PLAN:  -not tolerating PO, heparin gtt     GI  #HepC  -treated, 8/2023 HCV RNA undetected     /RENAL  -no acute issues     ENDOCRINE  #Secondary HyperPTH  -8/28 .7  -Hypocalcemia 8.1 on admission  -25-hydroxy vit D 8  PLAN:  -1,25-dihydroxy vit D level pending  -Vit D supplementation     ID  #CAP  2/2 S pneumo, pending sensitivities  -Procal 1.67  -ceftriaxone as above     HEME/ONC  #Leukocytosis  -2/2 to CAP  -Plan as above     MSK/RHEUM   #Chronic LE weakness  -Likely chronic from disuse and alcohol, unlikely GBS since no paralysis  -Consider neurology c/s once no longer withdrawing     F: none  E: replete PRN  N: regular  A: PIV  DVT ppx: heparin gtt     Code status: Full Code (presumed)  NOK: Tonie (sister) [phone]; Nahomi (God-sister) [phone]; Sarah Godinez (sister) [phone]         Margo Yun MD  Emergency Medicine PGY1  [facility]

## 2023-10-14 NOTE — CARE PLAN
The patient's goals for the shift include comfort    The clinical goals for the shift include CIWA <4    Over the shift, the patient did not make progress toward the following goals. Barriers to progression include readiness to learn. Recommendations to address these barriers include addressing CIWA and mental status.

## 2023-10-14 NOTE — SIGNIFICANT EVENT
2200: Evening team rounded at bedside. RN with concerns that Pt is becoming progressively more hypertensive, with no orders.

## 2023-10-15 ENCOUNTER — APPOINTMENT (OUTPATIENT)
Dept: RADIOLOGY | Facility: HOSPITAL | Age: 68
End: 2023-10-15
Payer: COMMERCIAL

## 2023-10-15 LAB
ALBUMIN SERPL BCP-MCNC: 3.3 G/DL (ref 3.4–5)
ALBUMIN SERPL BCP-MCNC: 3.6 G/DL (ref 3.4–5)
AMMONIA PLAS-SCNC: 23 UMOL/L (ref 16–53)
AMPHETAMINES UR QL SCN: ABNORMAL
ANION GAP SERPL CALC-SCNC: 19 MMOL/L (ref 10–20)
ANION GAP SERPL CALC-SCNC: 22 MMOL/L (ref 10–20)
BARBITURATES UR QL SCN: ABNORMAL
BENZODIAZ UR QL SCN: ABNORMAL
BUN SERPL-MCNC: 13 MG/DL (ref 6–23)
BUN SERPL-MCNC: 18 MG/DL (ref 6–23)
BZE UR QL SCN: ABNORMAL
CALCIUM SERPL-MCNC: 8.5 MG/DL (ref 8.6–10.6)
CALCIUM SERPL-MCNC: 8.8 MG/DL (ref 8.6–10.6)
CANNABINOIDS UR QL SCN: ABNORMAL
CHLORIDE SERPL-SCNC: 86 MMOL/L (ref 98–107)
CHLORIDE SERPL-SCNC: 87 MMOL/L (ref 98–107)
CO2 SERPL-SCNC: 22 MMOL/L (ref 21–32)
CO2 SERPL-SCNC: 24 MMOL/L (ref 21–32)
CREAT SERPL-MCNC: 0.47 MG/DL (ref 0.5–1.3)
CREAT SERPL-MCNC: 0.59 MG/DL (ref 0.5–1.3)
ERYTHROCYTE [DISTWIDTH] IN BLOOD BY AUTOMATED COUNT: 12.3 % (ref 11.5–14.5)
FENTANYL+NORFENTANYL UR QL SCN: ABNORMAL
GFR SERPL CREATININE-BSD FRML MDRD: >90 ML/MIN/1.73M*2
GFR SERPL CREATININE-BSD FRML MDRD: >90 ML/MIN/1.73M*2
GLUCOSE BLD MANUAL STRIP-MCNC: 136 MG/DL (ref 74–99)
GLUCOSE BLD MANUAL STRIP-MCNC: 145 MG/DL (ref 74–99)
GLUCOSE BLD MANUAL STRIP-MCNC: 147 MG/DL (ref 74–99)
GLUCOSE SERPL-MCNC: 106 MG/DL (ref 74–99)
GLUCOSE SERPL-MCNC: 178 MG/DL (ref 74–99)
HCT VFR BLD AUTO: 51.2 % (ref 41–52)
HGB BLD-MCNC: 18.1 G/DL (ref 13.5–17.5)
MCH RBC QN AUTO: 29.9 PG (ref 26–34)
MCHC RBC AUTO-ENTMCNC: 35.4 G/DL (ref 32–36)
MCV RBC AUTO: 85 FL (ref 80–100)
METHYLMALONATE SERPL-SCNC: 0.12 UMOL/L (ref 0–0.4)
NRBC BLD-RTO: 0 /100 WBCS (ref 0–0)
OPIATES UR QL SCN: ABNORMAL
OXYCODONE+OXYMORPHONE UR QL SCN: ABNORMAL
PCP UR QL SCN: ABNORMAL
PHOSPHATE SERPL-MCNC: 3 MG/DL (ref 2.5–4.9)
PHOSPHATE SERPL-MCNC: 3.5 MG/DL (ref 2.5–4.9)
PLATELET # BLD AUTO: 263 X10*3/UL (ref 150–450)
PMV BLD AUTO: 10.5 FL (ref 7.5–11.5)
POTASSIUM SERPL-SCNC: 3.4 MMOL/L (ref 3.5–5.3)
POTASSIUM SERPL-SCNC: 3.4 MMOL/L (ref 3.5–5.3)
PROCALCITONIN SERPL-MCNC: 0.55 NG/ML
RBC # BLD AUTO: 6.06 X10*6/UL (ref 4.5–5.9)
SODIUM SERPL-SCNC: 124 MMOL/L (ref 136–145)
SODIUM SERPL-SCNC: 130 MMOL/L (ref 136–145)
UFH PPP CHRO-ACNC: 0.5 IU/ML
WBC # BLD AUTO: 17.7 X10*3/UL (ref 4.4–11.3)

## 2023-10-15 PROCEDURE — 80069 RENAL FUNCTION PANEL: CPT

## 2023-10-15 PROCEDURE — 2500000001 HC RX 250 WO HCPCS SELF ADMINISTERED DRUGS (ALT 637 FOR MEDICARE OP)

## 2023-10-15 PROCEDURE — 2500000005 HC RX 250 GENERAL PHARMACY W/O HCPCS

## 2023-10-15 PROCEDURE — 85027 COMPLETE CBC AUTOMATED: CPT

## 2023-10-15 PROCEDURE — 2020000001 HC ICU ROOM DAILY

## 2023-10-15 PROCEDURE — 82947 ASSAY GLUCOSE BLOOD QUANT: CPT

## 2023-10-15 PROCEDURE — 2500000004 HC RX 250 GENERAL PHARMACY W/ HCPCS (ALT 636 FOR OP/ED): Performed by: STUDENT IN AN ORGANIZED HEALTH CARE EDUCATION/TRAINING PROGRAM

## 2023-10-15 PROCEDURE — 84145 PROCALCITONIN (PCT): CPT

## 2023-10-15 PROCEDURE — 92610 EVALUATE SWALLOWING FUNCTION: CPT | Mod: GN

## 2023-10-15 PROCEDURE — 85520 HEPARIN ASSAY: CPT

## 2023-10-15 PROCEDURE — 2500000004 HC RX 250 GENERAL PHARMACY W/ HCPCS (ALT 636 FOR OP/ED): Mod: JZ

## 2023-10-15 PROCEDURE — C9113 INJ PANTOPRAZOLE SODIUM, VIA: HCPCS | Performed by: STUDENT IN AN ORGANIZED HEALTH CARE EDUCATION/TRAINING PROGRAM

## 2023-10-15 PROCEDURE — 82140 ASSAY OF AMMONIA: CPT

## 2023-10-15 PROCEDURE — 2500000004 HC RX 250 GENERAL PHARMACY W/ HCPCS (ALT 636 FOR OP/ED)

## 2023-10-15 PROCEDURE — 36415 COLL VENOUS BLD VENIPUNCTURE: CPT | Mod: CMCLAB

## 2023-10-15 PROCEDURE — 80307 DRUG TEST PRSMV CHEM ANLYZR: CPT

## 2023-10-15 PROCEDURE — 99291 CRITICAL CARE FIRST HOUR: CPT

## 2023-10-15 RX ORDER — POTASSIUM CHLORIDE 14.9 MG/ML
20 INJECTION INTRAVENOUS
Status: COMPLETED | OUTPATIENT
Start: 2023-10-15 | End: 2023-10-15

## 2023-10-15 RX ORDER — PHENOBARBITAL SODIUM 65 MG/ML
32.5 INJECTION, SOLUTION INTRAMUSCULAR; INTRAVENOUS EVERY 8 HOURS
Status: DISCONTINUED | OUTPATIENT
Start: 2023-10-17 | End: 2023-10-15

## 2023-10-15 RX ORDER — CHOLECALCIFEROL (VITAMIN D3) 25 MCG
1000 TABLET ORAL DAILY
Status: DISCONTINUED | OUTPATIENT
Start: 2023-10-15 | End: 2023-10-15

## 2023-10-15 RX ORDER — PHENOBARBITAL SODIUM 65 MG/ML
130 INJECTION, SOLUTION INTRAMUSCULAR; INTRAVENOUS
Status: DISCONTINUED | OUTPATIENT
Start: 2023-10-15 | End: 2023-10-16

## 2023-10-15 RX ORDER — LORAZEPAM 2 MG/ML
2 INJECTION INTRAMUSCULAR EVERY 2 HOUR PRN
Status: DISCONTINUED | OUTPATIENT
Start: 2023-10-15 | End: 2023-10-18

## 2023-10-15 RX ORDER — METOPROLOL TARTRATE 1 MG/ML
5 INJECTION, SOLUTION INTRAVENOUS ONCE
Status: COMPLETED | OUTPATIENT
Start: 2023-10-15 | End: 2023-10-15

## 2023-10-15 RX ORDER — LABETALOL HYDROCHLORIDE 5 MG/ML
20 INJECTION, SOLUTION INTRAVENOUS EVERY 6 HOURS PRN
Status: DISCONTINUED | OUTPATIENT
Start: 2023-10-15 | End: 2023-10-16

## 2023-10-15 RX ORDER — ACETAMINOPHEN 500 MG
50000 TABLET ORAL
Status: DISCONTINUED | OUTPATIENT
Start: 2023-10-16 | End: 2023-10-15

## 2023-10-15 RX ORDER — ACETAMINOPHEN 500 MG
5 TABLET ORAL NIGHTLY
Status: DISCONTINUED | OUTPATIENT
Start: 2023-10-15 | End: 2023-10-20

## 2023-10-15 RX ORDER — LORAZEPAM 2 MG/ML
1 INJECTION INTRAMUSCULAR EVERY 2 HOUR PRN
Status: DISCONTINUED | OUTPATIENT
Start: 2023-10-15 | End: 2023-10-18

## 2023-10-15 RX ORDER — ATENOLOL 50 MG/1
50 TABLET ORAL DAILY
Status: DISCONTINUED | OUTPATIENT
Start: 2023-10-15 | End: 2023-10-20

## 2023-10-15 RX ORDER — PHENOBARBITAL SODIUM 65 MG/ML
130 INJECTION, SOLUTION INTRAMUSCULAR; INTRAVENOUS EVERY 8 HOURS
Status: DISCONTINUED | OUTPATIENT
Start: 2023-10-15 | End: 2023-10-15

## 2023-10-15 RX ORDER — PHENOBARBITAL SODIUM 65 MG/ML
32.5 INJECTION, SOLUTION INTRAMUSCULAR; INTRAVENOUS EVERY 8 HOURS
Status: DISCONTINUED | OUTPATIENT
Start: 2023-10-15 | End: 2023-10-16

## 2023-10-15 RX ORDER — LORAZEPAM 2 MG/ML
0.5 INJECTION INTRAMUSCULAR EVERY 2 HOUR PRN
Status: DISCONTINUED | OUTPATIENT
Start: 2023-10-15 | End: 2023-10-18

## 2023-10-15 RX ORDER — PHENOBARBITAL SODIUM 65 MG/ML
130 INJECTION, SOLUTION INTRAMUSCULAR; INTRAVENOUS ONCE
Status: COMPLETED | OUTPATIENT
Start: 2023-10-15 | End: 2023-10-15

## 2023-10-15 RX ORDER — PETROLATUM 420 MG/G
OINTMENT TOPICAL
Status: COMPLETED
Start: 2023-10-15 | End: 2023-10-15

## 2023-10-15 RX ADMIN — METOPROLOL TARTRATE 5 MG: 5 INJECTION, SOLUTION INTRAVENOUS at 11:49

## 2023-10-15 RX ADMIN — THIAMINE HYDROCHLORIDE 500 MG: 200 INJECTION, SOLUTION INTRAMUSCULAR; INTRAVENOUS at 16:57

## 2023-10-15 RX ADMIN — PHENOBARBITAL SODIUM 130 MG: 65 INJECTION INTRAMUSCULAR; INTRAVENOUS at 02:30

## 2023-10-15 RX ADMIN — LABETALOL HYDROCHLORIDE 20 MG: 5 INJECTION, SOLUTION INTRAVENOUS at 22:48

## 2023-10-15 RX ADMIN — THIAMINE HYDROCHLORIDE 500 MG: 200 INJECTION, SOLUTION INTRAMUSCULAR; INTRAVENOUS at 09:59

## 2023-10-15 RX ADMIN — POTASSIUM CHLORIDE 20 MEQ: 14.9 INJECTION, SOLUTION INTRAVENOUS at 19:35

## 2023-10-15 RX ADMIN — PHENOBARBITAL SODIUM 130 MG: 65 INJECTION INTRAMUSCULAR at 01:29

## 2023-10-15 RX ADMIN — POTASSIUM CHLORIDE 20 MEQ: 14.9 INJECTION, SOLUTION INTRAVENOUS at 21:31

## 2023-10-15 RX ADMIN — PETROLATUM 1 APPLICATION: 1 OINTMENT TOPICAL at 10:30

## 2023-10-15 RX ADMIN — POTASSIUM CHLORIDE 20 MEQ: 14.9 INJECTION, SOLUTION INTRAVENOUS at 09:51

## 2023-10-15 RX ADMIN — POTASSIUM CHLORIDE 20 MEQ: 14.9 INJECTION, SOLUTION INTRAVENOUS at 05:15

## 2023-10-15 RX ADMIN — THIAMINE HYDROCHLORIDE 500 MG: 200 INJECTION, SOLUTION INTRAMUSCULAR; INTRAVENOUS at 21:31

## 2023-10-15 RX ADMIN — HEPARIN SODIUM 985 UNITS/HR: 10000 INJECTION, SOLUTION INTRAVENOUS at 19:33

## 2023-10-15 RX ADMIN — CEFTRIAXONE SODIUM 2 G: 2 INJECTION, SOLUTION INTRAVENOUS at 04:46

## 2023-10-15 RX ADMIN — CEFTRIAXONE SODIUM 2 G: 2 INJECTION, SOLUTION INTRAVENOUS at 16:07

## 2023-10-15 RX ADMIN — SODIUM CHLORIDE, SODIUM LACTATE, POTASSIUM CHLORIDE, CALCIUM CHLORIDE AND DEXTROSE MONOHYDRATE 50 ML/HR: 5; 600; 310; 30; 20 INJECTION, SOLUTION INTRAVENOUS at 12:06

## 2023-10-15 RX ADMIN — PANTOPRAZOLE SODIUM 40 MG: 40 INJECTION, POWDER, FOR SOLUTION INTRAVENOUS at 09:59

## 2023-10-15 RX ADMIN — PHENOBARBITAL SODIUM 32.5 MG: 65 INJECTION INTRAMUSCULAR at 04:37

## 2023-10-15 ASSESSMENT — LIFESTYLE VARIABLES
AGITATION: SOMEWHAT MORE THAN NORMAL ACTIVITY
ORIENTATION AND CLOUDING OF SENSORIUM: CANNOT DO SERIAL ADDITIONS OR IS UNCERTAIN ABOUT DATE
ANXIETY: MODERATELY ANXIOUS, OR GUARDED, SO ANXIETY IS INFERRED
HEADACHE, FULLNESS IN HEAD: NOT PRESENT
NAUSEA AND VOMITING: NO NAUSEA AND NO VOMITING
PAROXYSMAL SWEATS: NO SWEAT VISIBLE
HEADACHE, FULLNESS IN HEAD: VERY MILD
ORIENTATION AND CLOUDING OF SENSORIUM: CANNOT DO SERIAL ADDITIONS OR IS UNCERTAIN ABOUT DATE
TACTILE DISTURBANCES: MODERATELY SEVERE HALLUCINATIONS
ORIENTATION AND CLOUDING OF SENSORIUM: CANNOT DO SERIAL ADDITIONS OR IS UNCERTAIN ABOUT DATE
HEADACHE, FULLNESS IN HEAD: VERY MILD
AGITATION: 5
TOTAL SCORE: 3
VISUAL DISTURBANCES: VERY MILD SENSITIVITY
PAROXYSMAL SWEATS: NO SWEAT VISIBLE
AUDITORY DISTURBANCES: NOT PRESENT
HEADACHE, FULLNESS IN HEAD: NOT PRESENT
ORIENTATION AND CLOUDING OF SENSORIUM: CANNOT DO SERIAL ADDITIONS OR IS UNCERTAIN ABOUT DATE
AUDITORY DISTURBANCES: MODERATE HARSHNESS OR ABILITY TO FRIGHTEN
ANXIETY: MILDLY ANXIOUS
AGITATION: 2
HEADACHE, FULLNESS IN HEAD: NOT PRESENT
AGITATION: 6
TOTAL SCORE: 19
TACTILE DISTURBANCES: MILD ITCHING, PINS AND NEEDLES, BURNING OR NUMBNESS
AGITATION: SOMEWHAT MORE THAN NORMAL ACTIVITY
NAUSEA AND VOMITING: NO NAUSEA AND NO VOMITING
VISUAL DISTURBANCES: VERY MILD SENSITIVITY
PAROXYSMAL SWEATS: NO SWEAT VISIBLE
PAROXYSMAL SWEATS: NO SWEAT VISIBLE
AUDITORY DISTURBANCES: NOT PRESENT
ANXIETY: NO ANXIETY, AT EASE
VISUAL DISTURBANCES: SEVERE HALLUCINATIONS
TOTAL SCORE: 3
VISUAL DISTURBANCES: VERY MILD SENSITIVITY
NAUSEA AND VOMITING: NO NAUSEA AND NO VOMITING
TREMOR: 3
HEADACHE, FULLNESS IN HEAD: VERY MILD
TREMOR: NO TREMOR
AGITATION: MODERATELY FIDGETY AND RESTLESS
TREMOR: NO TREMOR
AUDITORY DISTURBANCES: VERY MILD HARSHNESS OR ABILITY TO FRIGHTEN
ORIENTATION AND CLOUDING OF SENSORIUM: CANNOT DO SERIAL ADDITIONS OR IS UNCERTAIN ABOUT DATE
ORIENTATION AND CLOUDING OF SENSORIUM: DISORIENTED FOR DATA BY NO MORE THAN 2 CALENDAR DAYS
NAUSEA AND VOMITING: NO NAUSEA AND NO VOMITING
ANXIETY: NO ANXIETY, AT EASE
TREMOR: NO TREMOR
AGITATION: 2
VISUAL DISTURBANCES: NOT PRESENT
TOTAL SCORE: 3
TACTILE DISTURBANCES: MODERATELY SEVERE HALLUCINATIONS
NAUSEA AND VOMITING: NO NAUSEA AND NO VOMITING
TOTAL SCORE: 18
ORIENTATION AND CLOUDING OF SENSORIUM: DISORIENTED FOR DATA BY NO MORE THAN 2 CALENDAR DAYS
PAROXYSMAL SWEATS: NO SWEAT VISIBLE
VISUAL DISTURBANCES: VERY MILD SENSITIVITY
PAROXYSMAL SWEATS: NO SWEAT VISIBLE
VISUAL DISTURBANCES: MODERATE SENSITIVITY
AUDITORY DISTURBANCES: NOT PRESENT
ANXIETY: NO ANXIETY, AT EASE
TACTILE DISTURBANCES: VERY MILD ITCHING, PINS AND NEEDLES, BURNING OR NUMBNESS
NAUSEA AND VOMITING: NO NAUSEA AND NO VOMITING
ANXIETY: 2
ANXIETY: NO ANXIETY, AT EASE
ORIENTATION AND CLOUDING OF SENSORIUM: CANNOT DO SERIAL ADDITIONS OR IS UNCERTAIN ABOUT DATE
AUDITORY DISTURBANCES: VERY MILD HARSHNESS OR ABILITY TO FRIGHTEN
TOTAL SCORE: 21
AGITATION: SOMEWHAT MORE THAN NORMAL ACTIVITY
HEADACHE, FULLNESS IN HEAD: NOT PRESENT
AUDITORY DISTURBANCES: NOT PRESENT
ANXIETY: 2
TREMOR: 2
PAROXYSMAL SWEATS: NO SWEAT VISIBLE
AUDITORY DISTURBANCES: NOT PRESENT
HEADACHE, FULLNESS IN HEAD: VERY MILD
ANXIETY: NO ANXIETY, AT EASE
PAROXYSMAL SWEATS: NO SWEAT VISIBLE
AUDITORY DISTURBANCES: NOT PRESENT
VISUAL DISTURBANCES: VERY MILD SENSITIVITY
NAUSEA AND VOMITING: NO NAUSEA AND NO VOMITING
TOTAL SCORE: 3
TOTAL SCORE: 5
VISUAL DISTURBANCES: VERY MILD SENSITIVITY
NAUSEA AND VOMITING: NO NAUSEA AND NO VOMITING
HEADACHE, FULLNESS IN HEAD: VERY MILD
AGITATION: SOMEWHAT MORE THAN NORMAL ACTIVITY
TREMOR: 2
TREMOR: NO TREMOR
PAROXYSMAL SWEATS: NO SWEAT VISIBLE
TOTAL SCORE: 9
TREMOR: NO TREMOR
NAUSEA AND VOMITING: NO NAUSEA AND NO VOMITING
TREMOR: NO TREMOR
ORIENTATION AND CLOUDING OF SENSORIUM: ORIENTED AND CAN DO SERIAL ADDITIONS
NAUSEA AND VOMITING: NO NAUSEA AND NO VOMITING

## 2023-10-15 ASSESSMENT — PAIN SCALES - GENERAL
PAINLEVEL_OUTOF10: 0 - NO PAIN
PAINLEVEL_OUTOF10: 4
PAINLEVEL_OUTOF10: 3

## 2023-10-15 ASSESSMENT — PAIN - FUNCTIONAL ASSESSMENT
PAIN_FUNCTIONAL_ASSESSMENT: 0-10

## 2023-10-15 ASSESSMENT — COGNITIVE AND FUNCTIONAL STATUS - GENERAL
DAILY ACTIVITIY SCORE: 24
TURNING FROM BACK TO SIDE WHILE IN FLAT BAD: A LITTLE
MOVING FROM LYING ON BACK TO SITTING ON SIDE OF FLAT BED WITH BEDRAILS: A LITTLE
MOBILITY SCORE: 22

## 2023-10-15 NOTE — PROGRESS NOTES
Speech-Language Pathology    Inpatient Speech-Language Pathology Clinical Swallow Evaluation    Patient Name: Molina Godinez  MRN: [id number]  Today's Date: 10/15/2023   Time Calculation  Start Time: 1105  Stop Time: 1120  Time Calculation (min): 15 min       Recommendations:  Solid Diet Recommendations : NPO  Liquid Diet Recommendations: NPO  Medication Administration Recommendations: Non Oral  -May consider alterative means nutrition/hydration given risk for aspiration and pt still undergoing CIWA  -Frequent oral care   -May also consider allowing x3-5 ice chips per hour for swallow stimulation/ to reduce oropharyngeal bacteria  SLP re-evaluation once pt managing oropharyngeal secretions and clinical status is stable      Assessment:  Clinical swallow evaluation revealing c/f aspiration/pharyngeal dysphagia in setting of weakness/AMS. Given that pt with PNA, thick secretions, congested coughing, and fluctuating mentation (CIWA) pt at high risk for respiratory complications 2/2 aspiration, thus would recommend NPO at this time.        Plan:  Inpatient/Swing Bed or Outpatient: Inpatient  SLP Plan: Skilled SLP  SLP Frequency: 1x per week  Duration: 2 weeks  SLP Discharge Recommendations:  (unclear at this time, refer to future notes)  Discussed POC: Patient, Nursing, Physician  Discussed Risks/Benefits: Yes, Nursing, Patient, Physician  Patient/Caregiver Agreeable: Yes  SLP - OK to Discharge: Yes      Subjective   Current Problem:  Patient is a 68 y.o. male with PMHx COPD, Htn, alcohol and opioid use disorder, hepatitis C, PE who presented with inability to walk and admitted to MICU for alcohol withdrawal refractory to ativan.     Baseline Assessment:  Respiratory Status: Room air  Behavior/Cognition: Alert, Confused, Requires cueing  Oriented x4  Speech is imprecise thus difficult to understand  RN reports that pt coughed up large volume of thick secretions previous date following a sip of water    Objective      Oral/Motor Assessment:  Oral Hygiene: Poor  Dentition: Poor Dental/Oral Hygiene (Most missing teeth)  Facial Symmetry: Within Functional Limits  Labial ROM: Within Functional Limits  Lingual ROM: Reduced right, Reduced left  Vocal Quality: Exceptions to WFL  Vocal Quality Impairment: Hoarse, Wet  Intelligibility: Intelligibility reduced  Intelligibility Ratin%-80%  Breath Support: Adequate for speech      Clinical Observations:  Consistencies Trialed: Ice Chips, Thin (IDDSI Level 0) - Spoon, Thin (IDDSI Level 0) - Straw  Adequate extraction via tsp and straw. Noted delayed coughing with tsp sip of water. When cued for a single sip via straw pt spontaneous administered consecutive sips resulting in immediate prolonged coughing spell, productive; oral suctioning removing min amount of secretions. This presentation is highly c/f aspiration; thus did not administer further trials at this time.          Inpatient Education:   Educated pt on results and recs, he indicated understanding but may benefit from further repetition.         Goal:  Pt will tolerate least restrictive diet without s/s aspiration 100% of the time.

## 2023-10-15 NOTE — PROGRESS NOTES
Critical Care Daily Progress        Subjective   Patient is a 68 y.o. male with PMHx COPD, Htn, alcohol and opioid use disorder, hepatitis C, PE who presented with inability to walk and admitted to MICU for alcohol withdrawal refractory to ativan.    Interval History: Extremely agitated overnight, CIWA low 20s even with phenobarb taper, started on loading dose of phenobarb 130x2 @2:30AM. This morning, he still is having tremors, tactile disturbances, visual hallucinations (thinks he is seeing his brother).    Scheduled Medications:   amLODIPine, 5 mg, oral, Daily  [Held by provider] apixaban, 5 mg, oral, q12h  atenolol, 50 mg, oral, Daily  atorvastatin, 40 mg, oral, Daily  cefTRIAXone, 2 g, intravenous, q12h  melatonin, 5 mg, oral, Daily  multivitamin with minerals, 1 tablet, oral, Daily  nicotine, 1 patch, transdermal, Daily  pantoprazole, 40 mg, intravenous, Daily  thiamine, 500 mg, intravenous, TID  white petrolatum, , ,       Continuous Medications:   dextrose 5 % and lactated Ringer's, 50 mL/hr, Last Rate: 50 mL/hr (10/15/23 1206)  heparin, 0-4,500 Units/hr, Last Rate: 9.8 Units/hr (10/14/23 1600)      PRN Medications:   PRN medications: acetaminophen, dextrose 10 % in water (D10W), dextrose, glucagon, heparin, hydrOXYzine HCL, loperamide, sennosides, white petrolatum    Objective   Vitals:  Most Recent:  Vitals:    10/15/23 0600   BP: 160/90   Pulse: 109   Resp: (!) 27   Temp: 35.5 °C (95.9 °F)   SpO2: 95%       24hr Min/Max:  Temp  Min: 35.5 °C (95.9 °F)  Max: 36.5 °C (97.7 °F)  Pulse  Min: 97  Max: 144  BP  Min: 146/81  Max: 180/106  Resp  Min: 14  Max: 34  SpO2  Min: 82 %  Max: 100 %    LDA:  PIVx3    I/O:    Intake/Output Summary (Last 24 hours) at 10/15/2023 1212  Last data filed at 10/15/2023 1206  Gross per 24 hour   Intake 1383.33 ml   Output 650 ml   Net 733.33 ml       Physical Exam:  GEN: cachectic, chronically ill appearing, lying in bed  CV: RRR, no m/r/g  PULM: crackles in LLL  ABD: soft,  nontender, nondistended  NEURO: unable to assess, not answering questions, tremors noted with arm movements  PSYCH: + visual hallucinations (seeing his brother), tactile disturbances (constantly wants to move legs)  MSK: no joint swelling grossly noted  EXT: no LE edema  SKIN: warm and dry, no lesions grossly noted    LABS AND IMAGING   Results for orders placed or performed during the hospital encounter of 10/11/23 (from the past 24 hour(s))   Heparin Assay, UFH   Result Value Ref Range    Heparin Unfractionated 0.6 See Comment Below for Therapeutic Ranges IU/mL   POCT GLUCOSE   Result Value Ref Range    POCT Glucose 147 (H) 74 - 99 mg/dL   Renal function panel   Result Value Ref Range    Glucose 112 (H) 74 - 99 mg/dL    Sodium 127 (L) 136 - 145 mmol/L    Potassium 3.8 3.5 - 5.3 mmol/L    Chloride 87 (L) 98 - 107 mmol/L    Bicarbonate 24 21 - 32 mmol/L    Anion Gap 20 10 - 20 mmol/L    Urea Nitrogen 18 6 - 23 mg/dL    Creatinine 0.65 0.50 - 1.30 mg/dL    eGFR >90 >60 mL/min/1.73m*2    Calcium 8.7 8.6 - 10.6 mg/dL    Phosphorus 3.6 2.5 - 4.9 mg/dL    Albumin 3.2 (L) 3.4 - 5.0 g/dL   Heparin Assay, UFH   Result Value Ref Range    Heparin Unfractionated 0.5 See Comment Below for Therapeutic Ranges IU/mL   CBC   Result Value Ref Range    WBC 17.7 (H) 4.4 - 11.3 x10*3/uL    nRBC 0.0 0.0 - 0.0 /100 WBCs    RBC 6.06 (H) 4.50 - 5.90 x10*6/uL    Hemoglobin 18.1 (H) 13.5 - 17.5 g/dL    Hematocrit 51.2 41.0 - 52.0 %    MCV 85 80 - 100 fL    MCH 29.9 26.0 - 34.0 pg    MCHC 35.4 32.0 - 36.0 g/dL    RDW 12.3 11.5 - 14.5 %    Platelets 263 150 - 450 x10*3/uL    MPV 10.5 7.5 - 11.5 fL   Renal function panel   Result Value Ref Range    Glucose 106 (H) 74 - 99 mg/dL    Sodium 130 (L) 136 - 145 mmol/L    Potassium 3.4 (L) 3.5 - 5.3 mmol/L    Chloride 87 (L) 98 - 107 mmol/L    Bicarbonate 24 21 - 32 mmol/L    Anion Gap 22 (H) 10 - 20 mmol/L    Urea Nitrogen 18 6 - 23 mg/dL    Creatinine 0.59 0.50 - 1.30 mg/dL    eGFR >90 >60  mL/min/1.73m*2    Calcium 8.5 (L) 8.6 - 10.6 mg/dL    Phosphorus 3.5 2.5 - 4.9 mg/dL    Albumin 3.3 (L) 3.4 - 5.0 g/dL   Heparin Assay, UFH   Result Value Ref Range    Heparin Unfractionated 0.5 See Comment Below for Therapeutic Ranges IU/mL   Ammonia   Result Value Ref Range    Ammonia 23 16 - 53 umol/L   Drug Screen, Urine   Result Value Ref Range    Amphetamine Screen, Urine Presumptive Negative Presumptive Negative    Barbiturate Screen, Urine Presumptive Positive (A) Presumptive Negative    Benzodiazepines Screen, Urine Presumptive Positive (A) Presumptive Negative    Cannabinoid Screen, Urine Presumptive Negative Presumptive Negative    Cocaine Metabolite Screen, Urine Presumptive Positive (A) Presumptive Negative    Fentanyl Screen, Urine Presumptive Positive (A) Presumptive Negative    Opiate Screen, Urine Presumptive Negative Presumptive Negative    Oxycodone Screen, Urine Presumptive Negative Presumptive Negative    PCP Screen, Urine Presumptive Negative Presumptive Negative     Assessment/Plan   ASSESSMENT AND PLAN   Molina Godinez is a 68 y.o. male with PMHx of COPD, HTN, alcohol and opioid use disorder, hep C, PE presenting for inability to walk found to have alcohol withdrawal refractory to ativan. He was originally started on CIWA, scores in the 17, given 3mg ativan, 20mg valium, transferred to the MICU 10/13. Has been increasingly agitated on phenobarb taper, loaded 10/15 [id number]. CIWA scores much improved this AM, will continue to watch for now.    10/15 Updates:  - RASS 1->2 this morning  - Holding off on additional phenobarb given improved CIWA scores, CTM  - Continue ceftriaxone (10/13- )    NEURO/PSYCH  #AMS  #Alcohol withdrawal  -CT without pathology  -CIWA 17 on the floor, s/p 3mg ativan, 20mg valium on 10/13  -Folate, B12, TSH, ammonia wnl  -Utox +leisa, BDZs (leisa and BDZs likely from hospital meds), cocaine, fentanyl  PLAN:  -Holding off on additional phenobarbital given improved CIWA scores  this morning, will continue to assess  -continue high dose thiamine 500mg TID IV, multivitamin  -fu HIV, RPR, serum tox     #Bilateral LE weakness  -Likely 2/2 chronic, had weakness during 8/2023 admission; less likely GBS  -CT head without pathology, MRI spine stopped early due to pain  -Patient able to move bilateral lower extremities, low c/f GBS  PLAN:  -No LP indicated at this time  -May follow up with neurology once no longer withdrawing     CV  #HTN  -Restarting home amlodipine 5mg, atenolol 50mg     #CAD  -continue home atorvastatin 40mg     PULM  #CAP  -Urine strep positive  -MRSA negative, Urine legionella negative  -vanc/zosyn 10/12-13  -procal 1.67  PLAN:  -ceftriaxone (10/13- ) pending speciation  -Bcx (10/13) pending     #Severe COPD   -8/2023 FEV1 42%  PLAN:  -Duonebs QID     #Subsegmental PE  -Requires anticoagulation for 3 months (per vasc med 8/2023), on home apixaban 5mg BID  PLAN:  -not tolerating PO, heparin gtt     GI  #HepC  -treated, 8/2023 HCV RNA undetected    #Nutrition  -Per SLP, not safe to swallow  PLAN:  -Dobhoff placement     /RENAL  #Hyponatremia - improving  -continue D5LR @50cc/hr     ENDOCRINE  #Secondary HyperPTH  -8/28 .7  -Hypocalcemia 8.1 on admission  -25-hydroxy vit D 8  PLAN:  -1,25-dihydroxy vit D level pending  -Consider vit D supplementation, follow outpatient     ID  #CAP  2/2 S pneumo, pending sensitivities  -Procal 1.67  -ceftriaxone as above     HEME/ONC  #Leukocytosis  -2/2 to CAP  -Plan as above     MSK/RHEUM   #Chronic LE weakness  -Likely chronic from disuse and alcohol, unlikely GBS since no paralysis  -Consider neurology outpatient referral for possible EMG, neurology signed off     F: D5LR @50cc/hr  E: replete PRN  N: NPO, pending dobhoff  A: PIV  DVT ppx: heparin gtt  GI ppx: none     Code status: Full Code (presumed)  NOK: Tonie (sister) [phone]; Nahomi (God-sister) [phone]; Sarah Godinez (sister) [phone]       Rosa Gary MD  Internal  Medicine, PGY-1  MICU [facility] Team

## 2023-10-15 NOTE — SIGNIFICANT EVENT
"Patient with low CIWA scores throughout the day. Patient has times where he appears to be more confused but will answer questions appropriately. Patient keeps wanting to get out of bed and get into a chair or stand and go to the bathroom. Patient is reminded that he is a falls risk and not quite ready to get out of bed just yet. Patient was c/o a headache earlier (CIWA vs HTN). This was discussed with team with no concern at this time; although one dose of Metoprolol 5mg IVP was administered with little to no change in HR or blood pressure. Orders were placed to maintain SBP < 180mmHg. Patient is currently having visual hallucinations where he sees his brother \"Kamlesh\" sitting in a chair by the window. Patient requiring constant reorientation to his surroundings. Patient has remained, calm, cooperative, and pleasant this entire shift.  "

## 2023-10-16 ENCOUNTER — APPOINTMENT (OUTPATIENT)
Dept: CARDIOLOGY | Facility: HOSPITAL | Age: 68
End: 2023-10-16
Payer: COMMERCIAL

## 2023-10-16 ENCOUNTER — APPOINTMENT (OUTPATIENT)
Dept: RADIOLOGY | Facility: HOSPITAL | Age: 68
End: 2023-10-16
Payer: COMMERCIAL

## 2023-10-16 LAB
1,25(OH)2D3 SERPL-MCNC: 44.4 PG/ML (ref 19.9–79.3)
ALBUMIN SERPL BCP-MCNC: 3.5 G/DL (ref 3.4–5)
ALBUMIN SERPL BCP-MCNC: 3.6 G/DL (ref 3.4–5)
ALBUMIN SERPL BCP-MCNC: 3.7 G/DL (ref 3.4–5)
ANION GAP SERPL CALC-SCNC: 16 MMOL/L (ref 10–20)
ANION GAP SERPL CALC-SCNC: 17 MMOL/L (ref 10–20)
ANION GAP SERPL CALC-SCNC: 17 MMOL/L (ref 10–20)
BASOPHILS # BLD AUTO: 0.03 X10*3/UL (ref 0–0.1)
BASOPHILS NFR BLD AUTO: 0.2 %
BUN SERPL-MCNC: 11 MG/DL (ref 6–23)
CALCIUM SERPL-MCNC: 8.7 MG/DL (ref 8.6–10.6)
CALCIUM SERPL-MCNC: 8.7 MG/DL (ref 8.6–10.6)
CALCIUM SERPL-MCNC: 8.8 MG/DL (ref 8.6–10.6)
CHLORIDE SERPL-SCNC: 87 MMOL/L (ref 98–107)
CHLORIDE SERPL-SCNC: 87 MMOL/L (ref 98–107)
CHLORIDE SERPL-SCNC: 88 MMOL/L (ref 98–107)
CHLORIDE UR-SCNC: 150 MMOL/L
CHLORIDE/CREATININE (MMOL/G) IN URINE: 472 MMOL/G CREAT (ref 23–275)
CO2 SERPL-SCNC: 21 MMOL/L (ref 21–32)
CO2 SERPL-SCNC: 22 MMOL/L (ref 21–32)
CO2 SERPL-SCNC: 22 MMOL/L (ref 21–32)
CREAT SERPL-MCNC: 0.43 MG/DL (ref 0.5–1.3)
CREAT SERPL-MCNC: 0.43 MG/DL (ref 0.5–1.3)
CREAT SERPL-MCNC: 0.52 MG/DL (ref 0.5–1.3)
CREAT UR-MCNC: 31.8 MG/DL (ref 20–370)
EOSINOPHIL # BLD AUTO: 0.01 X10*3/UL (ref 0–0.7)
EOSINOPHIL NFR BLD AUTO: 0.1 %
ERYTHROCYTE [DISTWIDTH] IN BLOOD BY AUTOMATED COUNT: 11.9 % (ref 11.5–14.5)
GFR SERPL CREATININE-BSD FRML MDRD: >90 ML/MIN/1.73M*2
GLUCOSE BLD MANUAL STRIP-MCNC: 110 MG/DL (ref 74–99)
GLUCOSE BLD MANUAL STRIP-MCNC: 117 MG/DL (ref 74–99)
GLUCOSE BLD MANUAL STRIP-MCNC: 118 MG/DL (ref 74–99)
GLUCOSE BLD MANUAL STRIP-MCNC: 120 MG/DL (ref 74–99)
GLUCOSE BLD MANUAL STRIP-MCNC: 156 MG/DL (ref 74–99)
GLUCOSE SERPL-MCNC: 100 MG/DL (ref 74–99)
GLUCOSE SERPL-MCNC: 126 MG/DL (ref 74–99)
GLUCOSE SERPL-MCNC: 99 MG/DL (ref 74–99)
HCT VFR BLD AUTO: 52.6 % (ref 41–52)
HGB BLD-MCNC: 18.2 G/DL (ref 13.5–17.5)
IMM GRANULOCYTES # BLD AUTO: 0.12 X10*3/UL (ref 0–0.7)
IMM GRANULOCYTES NFR BLD AUTO: 0.9 % (ref 0–0.9)
LYMPHOCYTES # BLD AUTO: 1.13 X10*3/UL (ref 1.2–4.8)
LYMPHOCYTES NFR BLD AUTO: 8.4 %
MAGNESIUM SERPL-MCNC: 1.5 MG/DL (ref 1.6–2.4)
MAGNESIUM SERPL-MCNC: 1.51 MG/DL (ref 1.6–2.4)
MCH RBC QN AUTO: 30.3 PG (ref 26–34)
MCHC RBC AUTO-ENTMCNC: 34.6 G/DL (ref 32–36)
MCV RBC AUTO: 88 FL (ref 80–100)
MONOCYTES # BLD AUTO: 1.18 X10*3/UL (ref 0.1–1)
MONOCYTES NFR BLD AUTO: 8.8 %
NEUTROPHILS # BLD AUTO: 10.95 X10*3/UL (ref 1.2–7.7)
NEUTROPHILS NFR BLD AUTO: 81.6 %
NRBC BLD-RTO: 0 /100 WBCS (ref 0–0)
OSMOLALITY SERPL: 248 MOSM/KG (ref 280–300)
PHOSPHATE SERPL-MCNC: 3 MG/DL (ref 2.5–4.9)
PHOSPHATE SERPL-MCNC: 3.2 MG/DL (ref 2.5–4.9)
PHOSPHATE SERPL-MCNC: 3.7 MG/DL (ref 2.5–4.9)
PLATELET # BLD AUTO: 303 X10*3/UL (ref 150–450)
PMV BLD AUTO: 10.1 FL (ref 7.5–11.5)
POTASSIUM SERPL-SCNC: 4.2 MMOL/L (ref 3.5–5.3)
POTASSIUM SERPL-SCNC: 4.7 MMOL/L (ref 3.5–5.3)
POTASSIUM SERPL-SCNC: 4.8 MMOL/L (ref 3.5–5.3)
POTASSIUM UR-SCNC: 34 MMOL/L
POTASSIUM/CREAT UR-RTO: 107 MMOL/G CREAT
RBC # BLD AUTO: 6.01 X10*6/UL (ref 4.5–5.9)
SODIUM SERPL-SCNC: 121 MMOL/L (ref 136–145)
SODIUM UR-SCNC: 173 MMOL/L
SODIUM/CREAT UR-RTO: 544 MMOL/G CREAT
T PALLIDUM AB SER QL: REACTIVE
TSH SERPL-ACNC: 1.93 MIU/L (ref 0.44–3.98)
UFH PPP CHRO-ACNC: 0.5 IU/ML
WBC # BLD AUTO: 13.4 X10*3/UL (ref 4.4–11.3)

## 2023-10-16 PROCEDURE — 74177 CT ABD & PELVIS W/CONTRAST: CPT | Performed by: RADIOLOGY

## 2023-10-16 PROCEDURE — 2500000004 HC RX 250 GENERAL PHARMACY W/ HCPCS (ALT 636 FOR OP/ED)

## 2023-10-16 PROCEDURE — 83735 ASSAY OF MAGNESIUM: CPT | Mod: CMCLAB

## 2023-10-16 PROCEDURE — 2500000004 HC RX 250 GENERAL PHARMACY W/ HCPCS (ALT 636 FOR OP/ED): Mod: JZ

## 2023-10-16 PROCEDURE — 84540 ASSAY OF URINE/UREA-N: CPT

## 2023-10-16 PROCEDURE — 36415 COLL VENOUS BLD VENIPUNCTURE: CPT | Mod: CMCLAB

## 2023-10-16 PROCEDURE — 2500000004 HC RX 250 GENERAL PHARMACY W/ HCPCS (ALT 636 FOR OP/ED): Performed by: STUDENT IN AN ORGANIZED HEALTH CARE EDUCATION/TRAINING PROGRAM

## 2023-10-16 PROCEDURE — 71260 CT THORAX DX C+: CPT | Performed by: RADIOLOGY

## 2023-10-16 PROCEDURE — 36415 COLL VENOUS BLD VENIPUNCTURE: CPT

## 2023-10-16 PROCEDURE — 83735 ASSAY OF MAGNESIUM: CPT

## 2023-10-16 PROCEDURE — 85025 COMPLETE CBC W/AUTO DIFF WBC: CPT

## 2023-10-16 PROCEDURE — 80069 RENAL FUNCTION PANEL: CPT

## 2023-10-16 PROCEDURE — 84443 ASSAY THYROID STIM HORMONE: CPT

## 2023-10-16 PROCEDURE — 74177 CT ABD & PELVIS W/CONTRAST: CPT

## 2023-10-16 PROCEDURE — 85520 HEPARIN ASSAY: CPT

## 2023-10-16 PROCEDURE — 80069 RENAL FUNCTION PANEL: CPT | Mod: CMCLAB

## 2023-10-16 PROCEDURE — 2550000001 HC RX 255 CONTRASTS: Performed by: INTERNAL MEDICINE

## 2023-10-16 PROCEDURE — 2500000001 HC RX 250 WO HCPCS SELF ADMINISTERED DRUGS (ALT 637 FOR MEDICARE OP): Performed by: STUDENT IN AN ORGANIZED HEALTH CARE EDUCATION/TRAINING PROGRAM

## 2023-10-16 PROCEDURE — 83930 ASSAY OF BLOOD OSMOLALITY: CPT

## 2023-10-16 PROCEDURE — 71260 CT THORAX DX C+: CPT

## 2023-10-16 PROCEDURE — 2500000002 HC RX 250 W HCPCS SELF ADMINISTERED DRUGS (ALT 637 FOR MEDICARE OP, ALT 636 FOR OP/ED): Performed by: STUDENT IN AN ORGANIZED HEALTH CARE EDUCATION/TRAINING PROGRAM

## 2023-10-16 PROCEDURE — 82947 ASSAY GLUCOSE BLOOD QUANT: CPT

## 2023-10-16 PROCEDURE — 84133 ASSAY OF URINE POTASSIUM: CPT | Mod: CMCLAB

## 2023-10-16 PROCEDURE — 83935 ASSAY OF URINE OSMOLALITY: CPT

## 2023-10-16 PROCEDURE — S4991 NICOTINE PATCH NONLEGEND: HCPCS | Performed by: STUDENT IN AN ORGANIZED HEALTH CARE EDUCATION/TRAINING PROGRAM

## 2023-10-16 PROCEDURE — 1100000001 HC PRIVATE ROOM DAILY

## 2023-10-16 PROCEDURE — C9113 INJ PANTOPRAZOLE SODIUM, VIA: HCPCS | Performed by: STUDENT IN AN ORGANIZED HEALTH CARE EDUCATION/TRAINING PROGRAM

## 2023-10-16 PROCEDURE — 84300 ASSAY OF URINE SODIUM: CPT

## 2023-10-16 PROCEDURE — 82570 ASSAY OF URINE CREATININE: CPT

## 2023-10-16 PROCEDURE — 99232 SBSQ HOSP IP/OBS MODERATE 35: CPT

## 2023-10-16 RX ORDER — MAGNESIUM SULFATE HEPTAHYDRATE 40 MG/ML
2 INJECTION, SOLUTION INTRAVENOUS ONCE
Status: COMPLETED | OUTPATIENT
Start: 2023-10-16 | End: 2023-10-16

## 2023-10-16 RX ORDER — CEFTRIAXONE 1 G/50ML
1 INJECTION, SOLUTION INTRAVENOUS ONCE
Status: DISCONTINUED | OUTPATIENT
Start: 2023-10-17 | End: 2023-10-16

## 2023-10-16 RX ORDER — MAGNESIUM SULFATE HEPTAHYDRATE 40 MG/ML
4 INJECTION, SOLUTION INTRAVENOUS ONCE
Status: COMPLETED | OUTPATIENT
Start: 2023-10-16 | End: 2023-10-16

## 2023-10-16 RX ORDER — HYDRALAZINE HYDROCHLORIDE 20 MG/ML
10 INJECTION INTRAMUSCULAR; INTRAVENOUS EVERY 6 HOURS PRN
Status: DISCONTINUED | OUTPATIENT
Start: 2023-10-16 | End: 2023-10-17

## 2023-10-16 RX ORDER — CEFTRIAXONE 2 G/50ML
2 INJECTION, SOLUTION INTRAVENOUS ONCE
Status: COMPLETED | OUTPATIENT
Start: 2023-10-17 | End: 2023-10-17

## 2023-10-16 RX ORDER — LABETALOL HYDROCHLORIDE 5 MG/ML
40 INJECTION, SOLUTION INTRAVENOUS ONCE
Status: COMPLETED | OUTPATIENT
Start: 2023-10-16 | End: 2023-10-16

## 2023-10-16 RX ORDER — PHENOBARBITAL 32.4 MG/1
32.4 TABLET ORAL 3 TIMES DAILY PRN
Status: DISCONTINUED | OUTPATIENT
Start: 2023-10-16 | End: 2023-10-18

## 2023-10-16 RX ADMIN — THIAMINE HYDROCHLORIDE 500 MG: 200 INJECTION, SOLUTION INTRAMUSCULAR; INTRAVENOUS at 08:11

## 2023-10-16 RX ADMIN — CEFTRIAXONE SODIUM 2 G: 2 INJECTION, SOLUTION INTRAVENOUS at 04:18

## 2023-10-16 RX ADMIN — LABETALOL HYDROCHLORIDE 40 MG: 5 INJECTION, SOLUTION INTRAVENOUS at 01:25

## 2023-10-16 RX ADMIN — PHENOBARBITAL SODIUM 32.5 MG: 65 INJECTION INTRAMUSCULAR at 00:20

## 2023-10-16 RX ADMIN — IOHEXOL 75 ML: 350 INJECTION, SOLUTION INTRAVENOUS at 12:31

## 2023-10-16 RX ADMIN — Medication 5 MG: at 20:41

## 2023-10-16 RX ADMIN — Medication 1 PATCH: at 08:11

## 2023-10-16 RX ADMIN — PANTOPRAZOLE SODIUM 40 MG: 40 INJECTION, POWDER, FOR SOLUTION INTRAVENOUS at 08:11

## 2023-10-16 RX ADMIN — MAGNESIUM SULFATE HEPTAHYDRATE 2 G: 40 INJECTION, SOLUTION INTRAVENOUS at 04:18

## 2023-10-16 RX ADMIN — LABETALOL HYDROCHLORIDE 20 MG: 5 INJECTION, SOLUTION INTRAVENOUS at 05:15

## 2023-10-16 RX ADMIN — PHENOBARBITAL SODIUM 32.5 MG: 65 INJECTION INTRAMUSCULAR at 08:11

## 2023-10-16 RX ADMIN — THIAMINE HYDROCHLORIDE 500 MG: 200 INJECTION, SOLUTION INTRAMUSCULAR; INTRAVENOUS at 20:41

## 2023-10-16 RX ADMIN — MAGNESIUM SULFATE HEPTAHYDRATE 4 G: 40 INJECTION, SOLUTION INTRAVENOUS at 09:03

## 2023-10-16 SDOH — ECONOMIC STABILITY: HOUSING INSECURITY
IN THE LAST 12 MONTHS, WAS THERE A TIME WHEN YOU DID NOT HAVE A STEADY PLACE TO SLEEP OR SLEPT IN A SHELTER (INCLUDING NOW)?: NO

## 2023-10-16 SDOH — ECONOMIC STABILITY: TRANSPORTATION INSECURITY
IN THE PAST 12 MONTHS, HAS LACK OF TRANSPORTATION KEPT YOU FROM MEETINGS, WORK, OR FROM GETTING THINGS NEEDED FOR DAILY LIVING?: NO

## 2023-10-16 SDOH — ECONOMIC STABILITY: INCOME INSECURITY: IN THE PAST 12 MONTHS HAS THE ELECTRIC, GAS, OIL, OR WATER COMPANY THREATENED TO SHUT OFF SERVICES IN YOUR HOME?: NO

## 2023-10-16 SDOH — ECONOMIC STABILITY: TRANSPORTATION INSECURITY
IN THE PAST 12 MONTHS, HAS THE LACK OF TRANSPORTATION KEPT YOU FROM MEDICAL APPOINTMENTS OR FROM GETTING MEDICATIONS?: NO

## 2023-10-16 SDOH — ECONOMIC STABILITY: HOUSING INSECURITY: IN THE LAST 12 MONTHS, WAS THERE A TIME WHEN YOU WERE NOT ABLE TO PAY THE MORTGAGE OR RENT ON TIME?: NO

## 2023-10-16 SDOH — ECONOMIC STABILITY: FOOD INSECURITY: WITHIN THE PAST 12 MONTHS, THE FOOD YOU BOUGHT JUST DIDN'T LAST AND YOU DIDN'T HAVE MONEY TO GET MORE.: NEVER TRUE

## 2023-10-16 SDOH — SOCIAL STABILITY: SOCIAL INSECURITY
WITHIN THE LAST YEAR, HAVE TO BEEN RAPED OR FORCED TO HAVE ANY KIND OF SEXUAL ACTIVITY BY YOUR PARTNER OR EX-PARTNER?: NO

## 2023-10-16 SDOH — SOCIAL STABILITY: SOCIAL INSECURITY
WITHIN THE LAST YEAR, HAVE YOU BEEN KICKED, HIT, SLAPPED, OR OTHERWISE PHYSICALLY HURT BY YOUR PARTNER OR EX-PARTNER?: NO

## 2023-10-16 SDOH — SOCIAL STABILITY: SOCIAL INSECURITY
WITHIN THE LAST YEAR, HAVE YOU BEEN RAPED OR FORCED TO HAVE ANY KIND OF SEXUAL ACTIVITY BY YOUR PARTNER OR EX-PARTNER?: NO

## 2023-10-16 SDOH — ECONOMIC STABILITY: INCOME INSECURITY: IN THE PAST 12 MONTHS, HAS THE ELECTRIC, GAS, OIL, OR WATER COMPANY THREATENED TO SHUT OFF SERVICE IN YOUR HOME?: NO

## 2023-10-16 SDOH — ECONOMIC STABILITY: FOOD INSECURITY: WITHIN THE PAST 12 MONTHS, YOU WORRIED THAT YOUR FOOD WOULD RUN OUT BEFORE YOU GOT MONEY TO BUY MORE.: NEVER TRUE

## 2023-10-16 SDOH — SOCIAL STABILITY: SOCIAL INSECURITY: WITHIN THE LAST YEAR, HAVE YOU BEEN AFRAID OF YOUR PARTNER OR EX-PARTNER?: NO

## 2023-10-16 SDOH — SOCIAL STABILITY: SOCIAL INSECURITY: WITHIN THE LAST YEAR, HAVE YOU BEEN HUMILIATED OR EMOTIONALLY ABUSED IN OTHER WAYS BY YOUR PARTNER OR EX-PARTNER?: NO

## 2023-10-16 SDOH — ECONOMIC STABILITY: TRANSPORTATION INSECURITY: IN THE PAST 12 MONTHS, HAS LACK OF TRANSPORTATION KEPT YOU FROM MEDICAL APPOINTMENTS OR FROM GETTING MEDICATIONS?: NO

## 2023-10-16 SDOH — ECONOMIC STABILITY: FOOD INSECURITY: WITHIN THE PAST 12 MONTHS, YOU WORRIED THAT YOUR FOOD WOULD RUN OUT BEFORE YOU GOT THE MONEY TO BUY MORE.: NEVER TRUE

## 2023-10-16 SDOH — ECONOMIC STABILITY: INCOME INSECURITY: IN THE LAST 12 MONTHS, WAS THERE A TIME WHEN YOU WERE NOT ABLE TO PAY THE MORTGAGE OR RENT ON TIME?: NO

## 2023-10-16 SDOH — ECONOMIC STABILITY: HOUSING INSECURITY: IN THE LAST 12 MONTHS, HOW MANY PLACES HAVE YOU LIVED?: 1

## 2023-10-16 ASSESSMENT — LIFESTYLE VARIABLES
TACTILE DISTURBANCES: MODERATE ITCHING, PINS AND NEEDLES, BURNING OR NUMBNESS
AUDITORY DISTURBANCES: VERY MILD HARSHNESS OR ABILITY TO FRIGHTEN
AGITATION: NORMAL ACTIVITY
ANXIETY: NO ANXIETY, AT EASE
ANXIETY: MILDLY ANXIOUS
HEADACHE, FULLNESS IN HEAD: NOT PRESENT
HEADACHE, FULLNESS IN HEAD: NOT PRESENT
AUDITORY DISTURBANCES: NOT PRESENT
ANXIETY: NO ANXIETY, AT EASE
NAUSEA AND VOMITING: NO NAUSEA AND NO VOMITING
PAROXYSMAL SWEATS: NO SWEAT VISIBLE
HEADACHE, FULLNESS IN HEAD: VERY MILD
NAUSEA AND VOMITING: NO NAUSEA AND NO VOMITING
HEADACHE, FULLNESS IN HEAD: VERY MILD
NAUSEA AND VOMITING: NO NAUSEA AND NO VOMITING
TACTILE DISTURBANCES: MILD ITCHING, PINS AND NEEDLES, BURNING OR NUMBNESS
ORIENTATION AND CLOUDING OF SENSORIUM: ORIENTED AND CAN DO SERIAL ADDITIONS
HEADACHE, FULLNESS IN HEAD: NOT PRESENT
TREMOR: NO TREMOR
ANXIETY: NO ANXIETY, AT EASE
ANXIETY: NO ANXIETY, AT EASE
AUDITORY DISTURBANCES: NOT PRESENT
AGITATION: 2
TOTAL SCORE: 4
AGITATION: NORMAL ACTIVITY
ANXIETY: NO ANXIETY, AT EASE
BLOOD PRESSURE: 170/113
TREMOR: NO TREMOR
PULSE: 95
PAROXYSMAL SWEATS: NO SWEAT VISIBLE
AGITATION: NORMAL ACTIVITY
VISUAL DISTURBANCES: NOT PRESENT
ORIENTATION AND CLOUDING OF SENSORIUM: CANNOT DO SERIAL ADDITIONS OR IS UNCERTAIN ABOUT DATE
PAROXYSMAL SWEATS: NO SWEAT VISIBLE
AUDITORY DISTURBANCES: NOT PRESENT
AUDITORY DISTURBANCES: MILD HARSHNESS OR ABILITY TO FRIGHTEN
VISUAL DISTURBANCES: MILD SENSITIVITY
VISUAL DISTURBANCES: NOT PRESENT
TREMOR: NO TREMOR
VISUAL DISTURBANCES: NOT PRESENT
NAUSEA AND VOMITING: NO NAUSEA AND NO VOMITING
NAUSEA AND VOMITING: NO NAUSEA AND NO VOMITING
PAROXYSMAL SWEATS: BARELY PERCEPTIBLE SWEATING, PALMS MOIST
TREMOR: NO TREMOR
TACTILE DISTURBANCES: MILD ITCHING, PINS AND NEEDLES, BURNING OR NUMBNESS
ORIENTATION AND CLOUDING OF SENSORIUM: ORIENTED AND CAN DO SERIAL ADDITIONS
AGITATION: NORMAL ACTIVITY
PAROXYSMAL SWEATS: NO SWEAT VISIBLE
PULSE: 96
PULSE: 96
VISUAL DISTURBANCES: NOT PRESENT
TOTAL SCORE: 3
TOTAL SCORE: 0
TREMOR: NOT VISIBLE, BUT CAN BE FELT FINGERTIP TO FINGERTIP
TOTAL SCORE: 11
HEADACHE, FULLNESS IN HEAD: NOT PRESENT
ORIENTATION AND CLOUDING OF SENSORIUM: DISORIENTED FOR DATA BY NO MORE THAN 2 CALENDAR DAYS
VISUAL DISTURBANCES: NOT PRESENT
AGITATION: NORMAL ACTIVITY
TREMOR: NO TREMOR
TOTAL SCORE: 2
ORIENTATION AND CLOUDING OF SENSORIUM: ORIENTED AND CAN DO SERIAL ADDITIONS
TOTAL SCORE: 4
NAUSEA AND VOMITING: NO NAUSEA AND NO VOMITING
PULSE: 97
ORIENTATION AND CLOUDING OF SENSORIUM: ORIENTED AND CAN DO SERIAL ADDITIONS
PAROXYSMAL SWEATS: NO SWEAT VISIBLE
BLOOD PRESSURE: 165/111
TACTILE DISTURBANCES: MILD ITCHING, PINS AND NEEDLES, BURNING OR NUMBNESS
AUDITORY DISTURBANCES: NOT PRESENT

## 2023-10-16 ASSESSMENT — COGNITIVE AND FUNCTIONAL STATUS - GENERAL
HELP NEEDED FOR BATHING: A LOT
WALKING IN HOSPITAL ROOM: TOTAL
CLIMB 3 TO 5 STEPS WITH RAILING: TOTAL
DRESSING REGULAR UPPER BODY CLOTHING: A LOT
TURNING FROM BACK TO SIDE WHILE IN FLAT BAD: A LOT
MOBILITY SCORE: 10
STANDING UP FROM CHAIR USING ARMS: A LOT
DAILY ACTIVITIY SCORE: 14
MOVING TO AND FROM BED TO CHAIR: A LOT
DRESSING REGULAR LOWER BODY CLOTHING: A LOT
PERSONAL GROOMING: A LITTLE
TOILETING: A LOT
EATING MEALS: A LITTLE
MOVING FROM LYING ON BACK TO SITTING ON SIDE OF FLAT BED WITH BEDRAILS: A LOT

## 2023-10-16 ASSESSMENT — PAIN - FUNCTIONAL ASSESSMENT
PAIN_FUNCTIONAL_ASSESSMENT: 0-10

## 2023-10-16 ASSESSMENT — PAIN DESCRIPTION - DESCRIPTORS: DESCRIPTORS: ACHING

## 2023-10-16 ASSESSMENT — PAIN SCALES - GENERAL
PAINLEVEL_OUTOF10: 0 - NO PAIN
PAINLEVEL_OUTOF10: 2
PAINLEVEL_OUTOF10: 0 - NO PAIN
PAINLEVEL_OUTOF10: 0 - NO PAIN

## 2023-10-16 ASSESSMENT — ACTIVITIES OF DAILY LIVING (ADL): LACK_OF_TRANSPORTATION: NO

## 2023-10-16 NOTE — PROGRESS NOTES
Critical Care Daily Progress        Subjective   Patient is a 68 y.o. male with PMHx COPD, Htn, alcohol and opioid use disorder, hepatitis C, chronic PE who presented with inability to walk and admitted to MICU for alcohol withdrawal refractory to ativan.    Interval History:   Overnight, Phenobarbital 32.5mg q8h was restarted due to hallucinations, CIWA 9, tachycardic, hypertensive. Also received 20mg Labetolol followed by 40mg IV for hypertension.    A&Ox4 this morning, reports some tactile hallucinations that we are holding his feet but no visual hallucinations. Has a light headache but this is not new for him. No chest pain, SOB, changes in vision, n/v/diarrhea/tremors reported.        Scheduled Medications:   amLODIPine, 5 mg, oral, Daily  [Held by provider] apixaban, 5 mg, oral, q12h  atenolol, 50 mg, oral, Daily  atorvastatin, 40 mg, oral, Daily  cefTRIAXone, 2 g, intravenous, q12h  magnesium sulfate, 4 g, intravenous, Once  melatonin, 5 mg, oral, Nightly  multivitamin with minerals, 1 tablet, oral, Daily  nicotine, 1 patch, transdermal, Daily  pantoprazole, 40 mg, intravenous, Daily  PHENobarbital, 32.5 mg, intravenous, q8h  thiamine, 500 mg, intravenous, TID      Continuous Medications:   heparin, 0-4,500 Units/hr, Last Rate: 985 Units/hr (10/16/23 0800)      PRN Medications:   PRN medications: acetaminophen, dextrose 10 % in water (D10W), dextrose, glucagon, heparin, hydrOXYzine HCL, labetaloL, loperamide, LORazepam **OR** LORazepam **OR** LORazepam, PHENobarbital, sennosides    Objective   Vitals:  Most Recent:  Vitals:    10/16/23 0800   BP: (!) 165/111   Pulse: 97   Resp: (!) 32   Temp:    SpO2: 100%       24hr Min/Max:  Temp  Min: 34.8 °C (94.6 °F)  Max: 37.3 °C (99.1 °F)  Pulse  Min: 95  Max: 133  BP  Min: 157/114  Max: 195/109  Resp  Min: 19  Max: 36  SpO2  Min: 88 %  Max: 100 %    LDA:  PIV x2 R arm, forearm      I/O:    Intake/Output Summary (Last 24 hours) at 10/16/2023 0822  Last data filed at  10/16/2023 0800  Gross per 24 hour   Intake 1765.52 ml   Output 1650 ml   Net 115.52 ml     Physical Exam:  GEN: cachectic, chronically ill appearing, lying in bed, RASS -1  CV: RRR, no m/r/g  PULM: crackles in LLL  ABD: soft, nontender, nondistended  NEURO: A&Ox4, able to move legs, sensation intact  PSYCH: No visual disturbances, endorses tactile disturbances and feels like we are touching his legs  MSK: no joint swelling grossly noted  EXT: no LE edema  SKIN: warm and dry, no lesions grossly noted    LABS AND IMAGING     Results for orders placed or performed during the hospital encounter of 10/11/23 (from the past 24 hour(s))   Ammonia   Result Value Ref Range    Ammonia 23 16 - 53 umol/L   Procalcitonin   Result Value Ref Range    Procalcitonin 0.55 (H) <=0.07 ng/mL   Drug Screen, Urine   Result Value Ref Range    Amphetamine Screen, Urine Presumptive Negative Presumptive Negative    Barbiturate Screen, Urine Presumptive Positive (A) Presumptive Negative    Benzodiazepines Screen, Urine Presumptive Positive (A) Presumptive Negative    Cannabinoid Screen, Urine Presumptive Negative Presumptive Negative    Cocaine Metabolite Screen, Urine Presumptive Positive (A) Presumptive Negative    Fentanyl Screen, Urine Presumptive Positive (A) Presumptive Negative    Opiate Screen, Urine Presumptive Negative Presumptive Negative    Oxycodone Screen, Urine Presumptive Negative Presumptive Negative    PCP Screen, Urine Presumptive Negative Presumptive Negative   POCT GLUCOSE   Result Value Ref Range    POCT Glucose 136 (H) 74 - 99 mg/dL   Renal function panel   Result Value Ref Range    Glucose 178 (H) 74 - 99 mg/dL    Sodium 124 (L) 136 - 145 mmol/L    Potassium 3.4 (L) 3.5 - 5.3 mmol/L    Chloride 86 (L) 98 - 107 mmol/L    Bicarbonate 22 21 - 32 mmol/L    Anion Gap 19 10 - 20 mmol/L    Urea Nitrogen 13 6 - 23 mg/dL    Creatinine 0.47 (L) 0.50 - 1.30 mg/dL    eGFR >90 >60 mL/min/1.73m*2    Calcium 8.8 8.6 - 10.6 mg/dL     Phosphorus 3.0 2.5 - 4.9 mg/dL    Albumin 3.6 3.4 - 5.0 g/dL   POCT GLUCOSE   Result Value Ref Range    POCT Glucose 120 (H) 74 - 99 mg/dL   CBC and Auto Differential   Result Value Ref Range    WBC 13.4 (H) 4.4 - 11.3 x10*3/uL    nRBC 0.0 0.0 - 0.0 /100 WBCs    RBC 6.01 (H) 4.50 - 5.90 x10*6/uL    Hemoglobin 18.2 (H) 13.5 - 17.5 g/dL    Hematocrit 52.6 (H) 41.0 - 52.0 %    MCV 88 80 - 100 fL    MCH 30.3 26.0 - 34.0 pg    MCHC 34.6 32.0 - 36.0 g/dL    RDW 11.9 11.5 - 14.5 %    Platelets 303 150 - 450 x10*3/uL    MPV 10.1 7.5 - 11.5 fL    Neutrophils % 81.6 40.0 - 80.0 %    Immature Granulocytes %, Automated 0.9 0.0 - 0.9 %    Lymphocytes % 8.4 13.0 - 44.0 %    Monocytes % 8.8 2.0 - 10.0 %    Eosinophils % 0.1 0.0 - 6.0 %    Basophils % 0.2 0.0 - 2.0 %    Neutrophils Absolute 10.95 (H) 1.20 - 7.70 x10*3/uL    Immature Granulocytes Absolute, Automated 0.12 0.00 - 0.70 x10*3/uL    Lymphocytes Absolute 1.13 (L) 1.20 - 4.80 x10*3/uL    Monocytes Absolute 1.18 (H) 0.10 - 1.00 x10*3/uL    Eosinophils Absolute 0.01 0.00 - 0.70 x10*3/uL    Basophils Absolute 0.03 0.00 - 0.10 x10*3/uL   Renal function panel   Result Value Ref Range    Glucose 99 74 - 99 mg/dL    Sodium 121 (L) 136 - 145 mmol/L    Potassium 4.7 3.5 - 5.3 mmol/L    Chloride 87 (L) 98 - 107 mmol/L    Bicarbonate 22 21 - 32 mmol/L    Anion Gap 17 10 - 20 mmol/L    Urea Nitrogen 11 6 - 23 mg/dL    Creatinine 0.43 (L) 0.50 - 1.30 mg/dL    eGFR >90 >60 mL/min/1.73m*2    Calcium 8.7 8.6 - 10.6 mg/dL    Phosphorus 3.0 2.5 - 4.9 mg/dL    Albumin 3.6 3.4 - 5.0 g/dL   Magnesium   Result Value Ref Range    Magnesium 1.51 (L) 1.60 - 2.40 mg/dL   Heparin Assay, UFH   Result Value Ref Range    Heparin Unfractionated 0.5 See Comment Below for Therapeutic Ranges IU/mL   Renal function panel   Result Value Ref Range    Glucose 100 (H) 74 - 99 mg/dL    Sodium 121 (L) 136 - 145 mmol/L    Potassium 4.8 3.5 - 5.3 mmol/L    Chloride 88 (L) 98 - 107 mmol/L    Bicarbonate 21 21 - 32  mmol/L    Anion Gap 17 10 - 20 mmol/L    Urea Nitrogen 11 6 - 23 mg/dL    Creatinine 0.43 (L) 0.50 - 1.30 mg/dL    eGFR >90 >60 mL/min/1.73m*2    Calcium 8.8 8.6 - 10.6 mg/dL    Phosphorus 3.2 2.5 - 4.9 mg/dL    Albumin 3.7 3.4 - 5.0 g/dL   Magnesium   Result Value Ref Range    Magnesium 1.50 (L) 1.60 - 2.40 mg/dL   Urine electrolytes   Result Value Ref Range    Sodium, Urine Random 173 mmol/L    Sodium/Creatinine Ratio 544 Not established. mmol/g Creat    Potassium, Urine Random 34 mmol/L    Potassium/Creatinine Ratio 107 Not established mmol/g Creat    Chloride, Urine Random 150 mmol/L    Chloride/Creatinine Ratio 472 (H) 23 - 275 mmol/g creat    Creatinine, Urine Random 31.8 20.0 - 370.0 mg/dL   POCT GLUCOSE   Result Value Ref Range    POCT Glucose 118 (H) 74 - 99 mg/dL     Assessment/Plan   ASSESSMENT AND PLAN   Molina Godinez is a 68 y.o. male with PMHx of COPD, HTN, alcohol and opioid use disorder, hep C, PE presenting for inability to walk found to have alcohol withdrawal refractory to ativan. He was originally started on CIWA, transferred to the MICU 10/13. Has been increasingly agitated on phenobarb taper, loaded 10/15 [id number]. CIWA held 10/15 due to improving scores and symptoms, but restarted overnight.    10/16 Updates:  - Phenobarbital restarted  - Continue ceftriaxone (10/13-), fu sensitivities  - Hyponatremia workup with Serum Osm, urine Osm, Q4h RFP, will touch base with night team and transfer to make sure Na continues to be monitor  - Echo w bubble study  - CT C/A/P w IV con  - Dobhoff vs NGT  - Transfer to floor- DC'd IV labetalol and IV pheno, once on floor and gets his dobhoff placed, OK to resume PO phenobarb and PRN PO labetalol    NEURO/PSYCH  #AMS- improving  #Alcohol withdrawal  -CT without pathology  -Folate, B12, TSH, ammonia wnl  -Utox +leisa, BDZs (leisa and BDZs likely from hospital meds), cocaine, fentanyl  - A&Ox 4 this morning, tactile hallucinations but no visual hallucinations  -  RASS -1   PLAN:  [] Phenobarbital restarted overnight 32.5mg q8h, s/p 130mg twice 10/15  [] continue high dose thiamine 500mg TID IV, multivitamin  [] fu HIV, RPR, serum tox     #Bilateral LE weakness  -Likely 2/2 chronic, had weakness during 8/2023 admission; less likely GBS  -CT head without pathology, MRI spine stopped early due to pain  -Patient able to move bilateral lower extremities, low c/f GBS  PLAN:  -No LP indicated at this time  -May follow up with neurology once no longer withdrawing     CV  #HTN  -home amlodipine 5mg, atenolol 50mg held as patient is NPO  - Initially started on Cardene drip, transitioned to Labetalol  - Required 20mg Labetalol and 40mg push overnight  [] Labetalol 20mg IV q6h PRN for SBP>160     #CAD  -home atorvastatin 40mg held as pt is NPO     PULM  #CAP  -Urine strep positive, unsure if active or chronic infection  -MRSA negative, Urine legionella negative  -vanc/zosyn 10/12-13  -procal 1.67  PLAN:  -ceftriaxone (10/13- ) pending sensitivities   -Bcx (10/13) pending     #Severe COPD   -8/2023 FEV1 42%  PLAN:  -Duonebs QID     #Subsegmental PE  -Requires anticoagulation for 3 months (per vasc med 8/2023), on home apixaban 5mg BID  PLAN:  -not tolerating PO, heparin gtt  [] CT C/A/P given hyponatremia, some c/f paraneoplastic syndrome     GI  #HepC  -treated, 8/2023 HCV RNA undetected    #Nutrition  -Per SLP, not safe to swallow, c/f aspiration given weakness and thick secretions I/s/o pna  PLAN:  -Dobhoff placement vs NGT     /RENAL  #Hyponatremia - worsening, now down to 121  - D5W discontinued   - Mg repleted  - Urine lytes with Na 173  [] Serum and urine Osm, q4h RFP       ENDOCRINE  #Secondary HyperPTH  -8/28 .7  -Hypocalcemic on admission, now resolved  -25-hydroxy vit D 8, 1,25 Vit D 44.4  PLAN:  -Consider vit D supplementation, follow up outpatient     ID  #CAP  2/2 S pneumo, pending sensitivities  -Procal 1.67  -ceftriaxone as above     HEME/ONC  #Leukocytosis  -2/2  to CAP  -Plan as above     MSK/RHEUM   #Chronic LE weakness  -Likely chronic from disuse and alcohol, unlikely GBS since no paralysis  -Consider neurology outpatient referral for possible EMG, neurology signed off     F: PRN  E: replete PRN  N: NPO, pending dobhoff vs NGT  A: PIV  DVT ppx: heparin gtt  GI ppx: none     Code status: Full Code (presumed)  NOK: Tonie (sister) [phone]; Nahomi (God-sister) [phone]; Sarah Herbert (sister) [phone]       Ever Paez MD  Internal Medicine, PGY-1    [facility]

## 2023-10-16 NOTE — PROGRESS NOTES
"   10/16/23 1304   Discharge Planning   Living Arrangements Alone   Support Systems Family members   Assistance Needed independent prior to admission   Type of Residence Private residence   Home or Post Acute Services   (TBD, patient is agreeable to facility)   Patient expects to be discharged to: preference is facility   Financial Resource Strain   How hard is it for you to pay for the very basics like food, housing, medical care, and heating? Not hard   Housing Stability   In the last 12 months, was there a time when you were not able to pay the mortgage or rent on time? N   In the last 12 months, how many places have you lived? 1   In the last 12 months, was there a time when you did not have a steady place to sleep or slept in a shelter (including now)? N   Transportation Needs   In the past 12 months, has lack of transportation kept you from medical appointments or from getting medications? no   In the past 12 months, has lack of transportation kept you from meetings, work, or from getting things needed for daily living? No   Patient Choice   Provider Choice list and CMS website (https://medicare.gov/care-compare#search) for post-acute Quality and Resource Measure Data were provided and reviewed with:   (PT/OT pending)       FABY met with patient and sister Christina ([phone]) at bedside for assessment. Patient was minimally able to engage due to mental status. Patient has been living at home independently, but \"it's not working out\". Patient reportedly had been trying to get additional help at home, but no details available. Per per prior chart, patient was discharged with [facility] but they have ended. Sister expressed interest in facility and treatment for substance use. FABY advised patient has to be agreeable and PT/OT has to evaluate. Social work to follow.  Michelle Nicolas, MSW, LISW-S ([id number])   "

## 2023-10-16 NOTE — CARE PLAN
Problem: Pain - Adult  Goal: Verbalizes/displays adequate comfort level or baseline comfort level  10/16/2023 0923 by Ceci Colorado RN  Outcome: Progressing  10/16/2023 0922 by Ceci Colorado RN  Outcome: Progressing     Problem: Safety - Adult  Goal: Free from fall injury  10/16/2023 0923 by Ceci Colorado RN  Outcome: Progressing  10/16/2023 0922 by Ceci Colorado RN  Outcome: Progressing     Problem: Discharge Planning  Goal: Discharge to home or other facility with appropriate resources  10/16/2023 0923 by Ceci Colorado RN  Outcome: Progressing  10/16/2023 0922 by Ceci Colorado RN  Outcome: Progressing     Problem: Chronic Conditions and Co-morbidities  Goal: Patient's chronic conditions and co-morbidity symptoms are monitored and maintained or improved  10/16/2023 0923 by Ceci Colorado RN  Outcome: Progressing  10/16/2023 0922 by Ceci Colorado RN  Outcome: Progressing     Problem: Skin  Goal: Participates in plan/prevention/treatment measures  10/16/2023 0923 by Ceci Colorado RN  Outcome: Progressing  10/16/2023 0922 by Ceci Colorado RN  Outcome: Progressing  Goal: Prevent/manage excess moisture  10/16/2023 0923 by Ceci Colorado RN  Outcome: Progressing  10/16/2023 0922 by Ceci Colorado RN  Outcome: Progressing  Goal: Prevent/minimize sheer/friction injuries  10/16/2023 0923 by Ceci Colorado RN  Outcome: Progressing  10/16/2023 0922 by Ceci Colorado RN  Outcome: Progressing  Goal: Promote/optimize nutrition  10/16/2023 0923 by Ceci Colorado RN  Outcome: Progressing  10/16/2023 0922 by Ceci Colorado RN  Outcome: Progressing   The patient's goals for the shift include PATITO    The clinical goals for the shift include PATITO

## 2023-10-16 NOTE — PROGRESS NOTES
Speech Language Pathology    Therapy Communication Note     Patient Name: Molina Godinez  MRN:  [id number]  Today's Date:  10/16/23  Time in: 1020  Discipline: Speech Language Pathology     Missed Visit Reason: At SLP arrival, RN indicated that pt altered, not consistently following commands. SLP re-evaluation not indicated given this presentation. Will to re-evaluate when pt able to adequately participate in evaluation.

## 2023-10-16 NOTE — CARE PLAN
Problem: Pain - Adult  Goal: Verbalizes/displays adequate comfort level or baseline comfort level  Outcome: Progressing     Problem: Safety - Adult  Goal: Free from fall injury  Outcome: Progressing     Problem: Discharge Planning  Goal: Discharge to home or other facility with appropriate resources  Outcome: Progressing     Problem: Chronic Conditions and Co-morbidities  Goal: Patient's chronic conditions and co-morbidity symptoms are monitored and maintained or improved  Outcome: Progressing     Problem: Skin  Goal: Participates in plan/prevention/treatment measures  Outcome: Progressing  Goal: Prevent/manage excess moisture  Outcome: Progressing  Goal: Prevent/minimize sheer/friction injuries  Outcome: Progressing  Goal: Promote/optimize nutrition  Outcome: Progressing   The patient's goals for the shift include PATITO    The clinical goals for the shift include PATITO    Over the shift, the patient did not make progress toward the following goals. Barriers to progression include cognitive ability. Recommendations to address these barriers include supportive measures.

## 2023-10-16 NOTE — SIGNIFICANT EVENT
Patient uncomfortable will not allow for proper BP at this time. Possible withdrawal (mild) CIWA 9

## 2023-10-16 NOTE — CARE PLAN
Problem: Pain - Adult  Goal: Verbalizes/displays adequate comfort level or baseline comfort level  10/16/2023 0258 by Isaac Leyva RN  Outcome: Progressing  10/16/2023 0258 by Isaac Leyva RN  Outcome: Progressing     Problem: Safety - Adult  Goal: Free from fall injury  10/16/2023 0258 by Isaac Leyva RN  Outcome: Progressing  10/16/2023 0258 by Isaac Leyva RN  Outcome: Progressing     Problem: Discharge Planning  Goal: Discharge to home or other facility with appropriate resources  10/16/2023 0258 by Isaac Leyva RN  Outcome: Progressing  10/16/2023 0258 by Isaac Leyva RN  Outcome: Progressing     Problem: Chronic Conditions and Co-morbidities  Goal: Patient's chronic conditions and co-morbidity symptoms are monitored and maintained or improved  10/16/2023 0258 by Isaac Leyva RN  Outcome: Progressing  10/16/2023 0258 by Isaac Leyva RN  Outcome: Progressing     Problem: Skin  Goal: Participates in plan/prevention/treatment measures  10/16/2023 0258 by Isaac Leyva RN  Outcome: Progressing  10/16/2023 0258 by Isaac Leyva RN  Outcome: Progressing  Goal: Prevent/manage excess moisture  10/16/2023 0258 by Isaac Leyva RN  Outcome: Progressing  10/16/2023 0258 by Isaac Leyva RN  Outcome: Progressing  Goal: Prevent/minimize sheer/friction injuries  10/16/2023 0258 by Isaac Leyva RN  Outcome: Progressing  10/16/2023 0258 by Isaac Leyva RN  Outcome: Progressing  Goal: Promote/optimize nutrition  10/16/2023 0258 by Isaac Leyva RN  Outcome: Progressing  10/16/2023 0258 by Isaac Leyva RN  Outcome: Progressing   The patient's goals for the shift include PATITO    The clinical goals for the shift include PATITO    Over the shift, the patient did not make progress toward the following goals. Barriers to progression include patient participation. Recommendations to address these barriers include resolving patient's delirium/disorientation.

## 2023-10-17 ENCOUNTER — APPOINTMENT (OUTPATIENT)
Dept: RADIOLOGY | Facility: HOSPITAL | Age: 68
End: 2023-10-17
Payer: COMMERCIAL

## 2023-10-17 ENCOUNTER — APPOINTMENT (OUTPATIENT)
Dept: CARDIOLOGY | Facility: HOSPITAL | Age: 68
End: 2023-10-17
Payer: COMMERCIAL

## 2023-10-17 LAB
ALBUMIN SERPL BCP-MCNC: 3.4 G/DL (ref 3.4–5)
ANION GAP BLDA CALCULATED.4IONS-SCNC: 8 MMO/L (ref 10–25)
ANION GAP SERPL CALC-SCNC: 16 MMOL/L (ref 10–20)
BACTERIA BLD CULT: NORMAL
BASE EXCESS BLDA CALC-SCNC: 0 MMOL/L (ref -2–3)
BODY TEMPERATURE: 37 DEGREES CELSIUS
BUN SERPL-MCNC: 20 MG/DL (ref 6–23)
CA-I BLDA-SCNC: 1.06 MMOL/L (ref 1.1–1.33)
CALCIUM SERPL-MCNC: 8.9 MG/DL (ref 8.6–10.6)
CHLORIDE BLDA-SCNC: 86 MMOL/L (ref 98–107)
CHLORIDE SERPL-SCNC: 86 MMOL/L (ref 98–107)
CO2 SERPL-SCNC: 23 MMOL/L (ref 21–32)
CREAT SERPL-MCNC: 0.56 MG/DL (ref 0.5–1.3)
GFR SERPL CREATININE-BSD FRML MDRD: >90 ML/MIN/1.73M*2
GLUCOSE BLD MANUAL STRIP-MCNC: 101 MG/DL (ref 74–99)
GLUCOSE BLD MANUAL STRIP-MCNC: 105 MG/DL (ref 74–99)
GLUCOSE BLD MANUAL STRIP-MCNC: 110 MG/DL (ref 74–99)
GLUCOSE BLD MANUAL STRIP-MCNC: 110 MG/DL (ref 74–99)
GLUCOSE BLD MANUAL STRIP-MCNC: 98 MG/DL (ref 74–99)
GLUCOSE BLDA-MCNC: 102 MG/DL (ref 74–99)
GLUCOSE SERPL-MCNC: 82 MG/DL (ref 74–99)
HCO3 BLDA-SCNC: 22.8 MMOL/L (ref 22–26)
HCT VFR BLD EST: 56 % (ref 41–52)
HGB BLDA-MCNC: 18.7 G/DL (ref 13.5–17.5)
LACTATE BLDA-SCNC: 1.1 MMOL/L (ref 0.4–2)
OSMOLALITY UR: 492 MOSM/KG (ref 200–1200)
OXYHGB MFR BLDA: 97 % (ref 94–98)
PCO2 BLDA: 32 MM HG (ref 38–42)
PH BLDA: 7.46 PH (ref 7.38–7.42)
PHOSPHATE SERPL-MCNC: 4.7 MG/DL (ref 2.5–4.9)
PO2 BLDA: 127 MM HG (ref 85–95)
POTASSIUM BLDA-SCNC: 4 MMOL/L (ref 3.5–5.3)
POTASSIUM SERPL-SCNC: 4.7 MMOL/L (ref 3.5–5.3)
SAO2 % BLDA: 100 % (ref 94–100)
SODIUM BLDA-SCNC: 113 MMOL/L (ref 136–145)
SODIUM SERPL-SCNC: 120 MMOL/L (ref 136–145)
UFH PPP CHRO-ACNC: 0.5 IU/ML

## 2023-10-17 PROCEDURE — 31720 CLEARANCE OF AIRWAYS: CPT

## 2023-10-17 PROCEDURE — 36600 WITHDRAWAL OF ARTERIAL BLOOD: CPT

## 2023-10-17 PROCEDURE — 93306 TTE W/DOPPLER COMPLETE: CPT | Performed by: STUDENT IN AN ORGANIZED HEALTH CARE EDUCATION/TRAINING PROGRAM

## 2023-10-17 PROCEDURE — 2500000004 HC RX 250 GENERAL PHARMACY W/ HCPCS (ALT 636 FOR OP/ED): Performed by: STUDENT IN AN ORGANIZED HEALTH CARE EDUCATION/TRAINING PROGRAM

## 2023-10-17 PROCEDURE — 2500000001 HC RX 250 WO HCPCS SELF ADMINISTERED DRUGS (ALT 637 FOR MEDICARE OP): Performed by: STUDENT IN AN ORGANIZED HEALTH CARE EDUCATION/TRAINING PROGRAM

## 2023-10-17 PROCEDURE — 2500000004 HC RX 250 GENERAL PHARMACY W/ HCPCS (ALT 636 FOR OP/ED)

## 2023-10-17 PROCEDURE — 36415 COLL VENOUS BLD VENIPUNCTURE: CPT | Mod: CMCLAB

## 2023-10-17 PROCEDURE — 2500000002 HC RX 250 W HCPCS SELF ADMINISTERED DRUGS (ALT 637 FOR MEDICARE OP, ALT 636 FOR OP/ED)

## 2023-10-17 PROCEDURE — S4991 NICOTINE PATCH NONLEGEND: HCPCS | Performed by: STUDENT IN AN ORGANIZED HEALTH CARE EDUCATION/TRAINING PROGRAM

## 2023-10-17 PROCEDURE — 43761 REPOSITION GASTROSTOMY TUBE: CPT | Performed by: NURSE PRACTITIONER

## 2023-10-17 PROCEDURE — 82330 ASSAY OF CALCIUM: CPT

## 2023-10-17 PROCEDURE — 93306 TTE W/DOPPLER COMPLETE: CPT

## 2023-10-17 PROCEDURE — 94660 CPAP INITIATION&MGMT: CPT

## 2023-10-17 PROCEDURE — 94760 N-INVAS EAR/PLS OXIMETRY 1: CPT

## 2023-10-17 PROCEDURE — 94799 UNLISTED PULMONARY SVC/PX: CPT

## 2023-10-17 PROCEDURE — 82947 ASSAY GLUCOSE BLOOD QUANT: CPT | Mod: CMCLAB

## 2023-10-17 PROCEDURE — 94667 MNPJ CHEST WALL 1ST: CPT

## 2023-10-17 PROCEDURE — 71045 X-RAY EXAM CHEST 1 VIEW: CPT | Performed by: STUDENT IN AN ORGANIZED HEALTH CARE EDUCATION/TRAINING PROGRAM

## 2023-10-17 PROCEDURE — 84132 ASSAY OF SERUM POTASSIUM: CPT | Mod: CMCLAB

## 2023-10-17 PROCEDURE — 99291 CRITICAL CARE FIRST HOUR: CPT

## 2023-10-17 PROCEDURE — 82947 ASSAY GLUCOSE BLOOD QUANT: CPT

## 2023-10-17 PROCEDURE — 2500000002 HC RX 250 W HCPCS SELF ADMINISTERED DRUGS (ALT 637 FOR MEDICARE OP, ALT 636 FOR OP/ED): Performed by: STUDENT IN AN ORGANIZED HEALTH CARE EDUCATION/TRAINING PROGRAM

## 2023-10-17 PROCEDURE — 85520 HEPARIN ASSAY: CPT | Mod: CMCLAB | Performed by: STUDENT IN AN ORGANIZED HEALTH CARE EDUCATION/TRAINING PROGRAM

## 2023-10-17 PROCEDURE — 94640 AIRWAY INHALATION TREATMENT: CPT

## 2023-10-17 PROCEDURE — 71045 X-RAY EXAM CHEST 1 VIEW: CPT | Performed by: RADIOLOGY

## 2023-10-17 PROCEDURE — 5A0935A ASSISTANCE WITH RESPIRATORY VENTILATION, LESS THAN 24 CONSECUTIVE HOURS, HIGH NASAL FLOW/VELOCITY: ICD-10-PCS | Performed by: STUDENT IN AN ORGANIZED HEALTH CARE EDUCATION/TRAINING PROGRAM

## 2023-10-17 PROCEDURE — 99233 SBSQ HOSP IP/OBS HIGH 50: CPT | Performed by: STUDENT IN AN ORGANIZED HEALTH CARE EDUCATION/TRAINING PROGRAM

## 2023-10-17 PROCEDURE — 74018 RADEX ABDOMEN 1 VIEW: CPT | Performed by: RADIOLOGY

## 2023-10-17 PROCEDURE — 36415 COLL VENOUS BLD VENIPUNCTURE: CPT | Mod: CMCLAB | Performed by: STUDENT IN AN ORGANIZED HEALTH CARE EDUCATION/TRAINING PROGRAM

## 2023-10-17 PROCEDURE — 36415 COLL VENOUS BLD VENIPUNCTURE: CPT

## 2023-10-17 PROCEDURE — 80069 RENAL FUNCTION PANEL: CPT

## 2023-10-17 PROCEDURE — 87081 CULTURE SCREEN ONLY: CPT

## 2023-10-17 PROCEDURE — 94668 MNPJ CHEST WALL SBSQ: CPT

## 2023-10-17 PROCEDURE — 2020000001 HC ICU ROOM DAILY

## 2023-10-17 PROCEDURE — 74018 RADEX ABDOMEN 1 VIEW: CPT

## 2023-10-17 PROCEDURE — 71045 X-RAY EXAM CHEST 1 VIEW: CPT

## 2023-10-17 RX ORDER — SODIUM CHLORIDE 9 MG/ML
100 INJECTION, SOLUTION INTRAVENOUS CONTINUOUS
Status: DISCONTINUED | OUTPATIENT
Start: 2023-10-17 | End: 2023-10-18

## 2023-10-17 RX ORDER — LABETALOL HYDROCHLORIDE 5 MG/ML
20 INJECTION, SOLUTION INTRAVENOUS EVERY 6 HOURS PRN
Status: DISCONTINUED | OUTPATIENT
Start: 2023-10-17 | End: 2023-10-20

## 2023-10-17 RX ORDER — PHENOBARBITAL SODIUM 65 MG/ML
130 INJECTION, SOLUTION INTRAMUSCULAR; INTRAVENOUS ONCE
Status: DISCONTINUED | OUTPATIENT
Start: 2023-10-17 | End: 2023-10-17 | Stop reason: ALTCHOICE

## 2023-10-17 RX ORDER — ALBUTEROL SULFATE 0.83 MG/ML
2.5 SOLUTION RESPIRATORY (INHALATION) EVERY 6 HOURS
Status: DISCONTINUED | OUTPATIENT
Start: 2023-10-17 | End: 2023-10-23

## 2023-10-17 RX ORDER — LIDOCAINE 560 MG/1
1 PATCH PERCUTANEOUS; TOPICAL; TRANSDERMAL
Status: DISCONTINUED | OUTPATIENT
Start: 2023-10-17 | End: 2023-10-20

## 2023-10-17 RX ORDER — HYDRALAZINE HYDROCHLORIDE 20 MG/ML
10 INJECTION INTRAMUSCULAR; INTRAVENOUS EVERY 6 HOURS PRN
Status: DISCONTINUED | OUTPATIENT
Start: 2023-10-17 | End: 2023-10-18

## 2023-10-17 RX ADMIN — Medication 1 PATCH: at 21:47

## 2023-10-17 RX ADMIN — SODIUM CHLORIDE 250 ML/HR: 9 INJECTION, SOLUTION INTRAVENOUS at 20:55

## 2023-10-17 RX ADMIN — LORAZEPAM 1 MG: 2 INJECTION INTRAMUSCULAR; INTRAVENOUS at 00:38

## 2023-10-17 RX ADMIN — LORAZEPAM 0.5 MG: 2 INJECTION INTRAMUSCULAR; INTRAVENOUS at 06:38

## 2023-10-17 RX ADMIN — CEFTRIAXONE SODIUM 2 G: 2 INJECTION, SOLUTION INTRAVENOUS at 09:52

## 2023-10-17 RX ADMIN — ALBUTEROL SULFATE 2.5 MG: 2.5 SOLUTION RESPIRATORY (INHALATION) at 14:47

## 2023-10-17 RX ADMIN — PERFLUTREN 10 ML OF DILUTION: 6.52 INJECTION, SUSPENSION INTRAVENOUS at 15:45

## 2023-10-17 RX ADMIN — ALBUTEROL SULFATE 2.5 MG: 2.5 SOLUTION RESPIRATORY (INHALATION) at 20:56

## 2023-10-17 RX ADMIN — Medication 5 MG: at 21:47

## 2023-10-17 RX ADMIN — HEPARIN SODIUM 980 UNITS/HR: 10000 INJECTION, SOLUTION INTRAVENOUS at 21:51

## 2023-10-17 RX ADMIN — ACETAMINOPHEN 650 MG: 325 TABLET ORAL at 00:38

## 2023-10-17 RX ADMIN — HEPARIN SODIUM 9.8 UNITS/HR: 10000 INJECTION, SOLUTION INTRAVENOUS at 00:38

## 2023-10-17 RX ADMIN — ATENOLOL 50 MG: 50 TABLET ORAL at 21:47

## 2023-10-17 ASSESSMENT — LIFESTYLE VARIABLES
ORIENTATION AND CLOUDING OF SENSORIUM: ORIENTED AND CAN DO SERIAL ADDITIONS
AGITATION: NORMAL ACTIVITY
PULSE: 94
TREMOR: NO TREMOR
PULSE: 96
TOTAL SCORE: 7
AGITATION: SOMEWHAT MORE THAN NORMAL ACTIVITY
ANXIETY: MILDLY ANXIOUS
ORIENTATION AND CLOUDING OF SENSORIUM: ORIENTED AND CAN DO SERIAL ADDITIONS
AUDITORY DISTURBANCES: NOT PRESENT
TACTILE DISTURBANCES: VERY MILD ITCHING, PINS AND NEEDLES, BURNING OR NUMBNESS
NAUSEA AND VOMITING: NO NAUSEA AND NO VOMITING
NAUSEA AND VOMITING: MILD NAUSEA WITH NO VOMITING
AGITATION: NORMAL ACTIVITY
VISUAL DISTURBANCES: NOT PRESENT
TACTILE DISTURBANCES: MILD ITCHING, PINS AND NEEDLES, BURNING OR NUMBNESS
TACTILE DISTURBANCES: MODERATE ITCHING, PINS AND NEEDLES, BURNING OR NUMBNESS
HEADACHE, FULLNESS IN HEAD: MODERATE
VISUAL DISTURBANCES: NOT PRESENT
ANXIETY: NO ANXIETY, AT EASE
PULSE: 100
AGITATION: NORMAL ACTIVITY
TREMOR: NO TREMOR
NAUSEA AND VOMITING: MILD NAUSEA WITH NO VOMITING
PAROXYSMAL SWEATS: NO SWEAT VISIBLE
TACTILE DISTURBANCES: MILD ITCHING, PINS AND NEEDLES, BURNING OR NUMBNESS
PAROXYSMAL SWEATS: NO SWEAT VISIBLE
TOTAL SCORE: 9
AUDITORY DISTURBANCES: NOT PRESENT
AUDITORY DISTURBANCES: NOT PRESENT
HEADACHE, FULLNESS IN HEAD: NOT PRESENT
TOTAL SCORE: 2
NAUSEA AND VOMITING: 2
ORIENTATION AND CLOUDING OF SENSORIUM: ORIENTED AND CAN DO SERIAL ADDITIONS
HEADACHE, FULLNESS IN HEAD: NOT PRESENT
VISUAL DISTURBANCES: NOT PRESENT
PAROXYSMAL SWEATS: BARELY PERCEPTIBLE SWEATING, PALMS MOIST
VISUAL DISTURBANCES: VERY MILD SENSITIVITY
ANXIETY: MILDLY ANXIOUS
ANXIETY: MILDLY ANXIOUS
HEADACHE, FULLNESS IN HEAD: MODERATE
AUDITORY DISTURBANCES: NOT PRESENT
PAROXYSMAL SWEATS: NO SWEAT VISIBLE
TOTAL SCORE: 8
TREMOR: 2
TREMOR: NO TREMOR
ORIENTATION AND CLOUDING OF SENSORIUM: ORIENTED AND CAN DO SERIAL ADDITIONS

## 2023-10-17 ASSESSMENT — PAIN - FUNCTIONAL ASSESSMENT: PAIN_FUNCTIONAL_ASSESSMENT: 0-10

## 2023-10-17 ASSESSMENT — PAIN SCALES - GENERAL: PAINLEVEL_OUTOF10: 7

## 2023-10-17 NOTE — CARE PLAN
Problem: Pain - Adult  Goal: Verbalizes/displays adequate comfort level or baseline comfort level  Outcome: Progressing     Problem: Safety - Adult  Goal: Free from fall injury  Outcome: Progressing     Problem: Discharge Planning  Goal: Discharge to home or other facility with appropriate resources  Outcome: Progressing     Problem: Chronic Conditions and Co-morbidities  Goal: Patient's chronic conditions and co-morbidity symptoms are monitored and maintained or improved  Outcome: Progressing     Problem: Skin  Goal: Participates in plan/prevention/treatment measures  Outcome: Progressing  Goal: Prevent/manage excess moisture  Outcome: Progressing  Goal: Prevent/minimize sheer/friction injuries  Outcome: Progressing  Goal: Promote/optimize nutrition  Outcome: Progressing   The patient's goals for the shift include PATITO    The clinical goals for the shift include Pt vitals will remain stable

## 2023-10-17 NOTE — PROCEDURES
#12 Fr, small bore feeding tube inserted into left nare with Enteral Access System CORTRAK 2 per provider orders, including insertion of nasal bridle and removal of wire stylet; patient tolerated procedure; feeding tube secured at 80 cm; bedside nurse notified; no bleeding or adverse reaction noted; KUB activated for placement verification.

## 2023-10-17 NOTE — PROGRESS NOTES
Occupational Therapy                 Therapy Communication Note    Patient Name: Molina Godinez  MRN: [id number]  Today's Date: 10/17/2023     Discipline: Occupational Therapy    Missed Visit Reason: Missed Visit Reason: Patient sleeping (Pt unable to participate due to being lethargic)    Missed Time: Attempt    Comment: see flowsheet for details; 2nd attempt this date to complete eval unsuccessful.  Physician present and acknowledges pt inability to participate this date.

## 2023-10-17 NOTE — PROGRESS NOTES
"Molina Godinez is a 68 y.o. male on hospital day 5 of admission presenting with Hospital-acquired pneumonia.    Subjective   Patient remains lethargic today, which this afternoon was minimally responding. Code white called and patient alkalotic with tachypnea. Patient will be transferred to MICU for closer monitoring.       Objective     GENERAL APPEARANCE: A&Ox0, obtunded  HEAD: normocephalic, atraumatic  THROAT: Oral cavity and pharynx normal. No inflammation, swelling, exudate, or lesions.  NECK: Neck supple, non-tender without lymphadenopathy, masses or thyromegaly.  CARDIAC: Normal S1 and S2. No S3, S4 or murmurs. tachycardic. There is no peripheral edema, cyanosis or pallor. Extremities are warm and well perfused. No carotid bruits.  LUNGS: Rhonchi in upper airway  ABDOMEN: Positive bowel sounds. Soft, nondistended, nontender. No guarding or rebound. No masses.  EXTREMITIES: No significant deformity or joint abnormality. No edema. Peripheral pulses intact. No varicosities.  SKIN: Skin normal color, texture and turgor with no lesions or rash  PSYCHIATRIC: not oriented      Last Recorded Vitals  Blood pressure (!) 136/91, pulse 109, temperature 36.5 °C (97.7 °F), resp. rate 22, height 1.702 m (5' 7\"), weight 49.7 kg (109 lb 9.1 oz), SpO2 98 %.  Intake/Output last 3 Shifts:  I/O last 3 completed shifts:  In: 1283.8 (25.8 mL/kg) [I.V.:933.8 (18.8 mL/kg); IV Piggyback:350]  Out: 1350 (27.2 mL/kg) [Urine:1350 (0.8 mL/kg/hr)]  Weight: 49.7 kg     Relevant Results  Lab Results   Component Value Date    WBC 13.4 (H) 10/16/2023    HGB 18.2 (H) 10/16/2023    HCT 52.6 (H) 10/16/2023    MCV 88 10/16/2023     10/16/2023      Lab Results   Component Value Date    GLUCOSE 82 10/17/2023    CALCIUM 8.9 10/17/2023     (LL) 10/17/2023    K 4.7 10/17/2023    CO2 23 10/17/2023    CL 86 (L) 10/17/2023    BUN 20 10/17/2023    CREATININE 0.56 10/17/2023     Scheduled medications  amLODIPine, 5 mg, oral, Daily  [Held by " provider] apixaban, 5 mg, oral, q12h  atenolol, 50 mg, oral, Daily  atorvastatin, 40 mg, oral, Daily  melatonin, 5 mg, oral, Nightly  multivitamin with minerals, 1 tablet, oral, Daily  nicotine, 1 patch, transdermal, Daily  pantoprazole, 40 mg, intravenous, Daily      Continuous medications  heparin, 0-4,500 Units/hr, Last Rate: 9.8 Units/hr (10/17/23 0038)      PRN medications  PRN medications: acetaminophen, dextrose 10 % in water (D10W), dextrose, glucagon, heparin, [MAR Hold] hydrALAZINE, hydrOXYzine HCL, loperamide, LORazepam **OR** LORazepam **OR** LORazepam, oxygen, [MAR Hold] PHENobarbitaL, sennosides    Assessment/Plan     The patient is a 68-year-old male with a hx of COPD, HTN, and alcohol and opioid use disorder, hep C, and PE presented in alcohol withdrawal. He was originally admitted to the MICU as he was refractory to ativan. Patient was treated with phenobarb and was initially improving and transferred to the floor. Patient became more obtunded on 10/17 with minimal response and was sent back to the MICU for closer monitoring.    Alcohol withdrawal  AMS  Hyponatremia  - Patient arrived to ED with altered mentation and found to be in alcohol withdrawal  - Initially sent to MICU but improved after phenobarb administration and sent to the floor  - Patient worsened on 10/17 and was sent back to MICU for further monitoring  - Cont. Thiamine, folate, and multivitamin as tolerated  - MICU care for further withdrawal  - Na - 120 this am, could benefit from nephrology consult    Community acquired PNA  COPD, not in exacerbation  Subsegmental PE  - Patient arrived with SOB  - Urine strep was positive  - Initially on vanc/zosyn, switched to Ceftriaxone on 10/13  - Blood culture no growth to date  - Cont. Heparin drip for PE while unable to tolerate PO intake    HTN  - Home meds amlodipine 5mg, atenolol 50mg  - Initially severely HTN and placed on cardene drip, then switched to Labetalol    Hepatitis C  - S/p  treatment with no RNA detection    Chronic LE weakness  - likely alcohol related  - Cont. Thiamine      Code status: FULL CODE  Dispo: transfer to MICU    Tito Olivo MD     The patient encounter includes all but not limited to; Evaluation of laboratory results, pertinent imaging, and vital signs. Daily updates are discussed with any consulting services and family/medical power of  as needed. The patient's discharge  process begins at admission and daily contact with the patient's TCC and SW is pertinent in their efficient and safe discharge.

## 2023-10-17 NOTE — PROGRESS NOTES
Occupational Therapy                 Therapy Communication Note    Patient Name: Molina Godinez  MRN: [id number]  Today's Date: 10/17/2023     Discipline: Occupational Therapy    Missed Visit Reason: Missed Visit Reason: Patient in a medical procedure (Pt going for Echo; Unable to complete evaluation at this time.)    Missed Time: Attempt

## 2023-10-17 NOTE — PROGRESS NOTES
SW met with pt at beside to discuss resources as well as substance use treatment. Pt reported being in a significant amount of pain and was having difficulty engaging in conversation. Pt asked SW to reach out to RN. SW shared she would speak with RN and will come back to see pt later when he was feeling better. RN messaged via secure chat. [facility] notified.  UPDATE ([id number]): SW attempted to speak to pt again regarding substance use resources but pt was no longer in room. SW informed that pt has transferred to MICU.    - Sumaya Wong MSW, LSW

## 2023-10-17 NOTE — NURSING NOTE
Received report. Patient resting with eyes closed. Bed locked in low position. Call bell within reach. Safety maintained.     0930 patient awake, alert x 3. Safety maintained.     Approximately 1204, rapid response called by .     Approximately 1300 report called to OLAYINKA Rodriguez and patient going to MICU. Safety maintained.

## 2023-10-17 NOTE — H&P
Critical Care Medicine History and Physical        Subjective   Patient is a 68 y.o. male admitted on 10/11/2023  7:54 PM  with chief complaint of lower extremity weakness and inability to move his legs.     HPI:  68 y.o. male with PMHx COPD, Htn, alcohol and opioid use disorder, hepatitis C, chronic PE who was recently admitted in the MICU from 10/13-10/16 for suspected alcohol withdrawal refractory to ativan.     While patient was in the MICU, he was on CIWA and became increasingly agitated on a phenobarbital taper. His CIWA and phenobarbital was held on 10/15 due to improving scores but was restarted that night due to agitation, hypertension, and tachycardia. He completed a course of ceftriaxone 10/13-10/17 for CAP given positive urine strep, and developed severe hyponatremia which was being worked up. On day of discharge to floor, he was A&Ox3 with no acute concerns. He had required IV labetalol for hypertension and was on IV phenobarbital, both of which were discontinued as he could not receive IV Labetalol on the floor. Plan was for Dobhoff placement for PO meds as patient was NPO given aspiration risk. On the floor, patient receiving Ativan for CIWA. Shortly before noon, rapid response called as patient was lethargic. Transferred to ICU where he was drowsy, had new high flow oxygen requirement, back pain. Symptomatically improved with HFNC, patient reportedly feeling a lot better prior to any respiratory treatment. Currently feeling better but weak, no SOB, back pain, otherwise no headaches, chest pain, changes in vision.           History reviewed. No pertinent past medical history.  History reviewed. No pertinent surgical history.  Medications Prior to Admission   Medication Sig Dispense Refill Last Dose    acetaminophen (Tylenol) 325 mg tablet TAKE 2 TABLETS BY MOUTH EVERY 6 HOURS AS NEEDED FOR PAIN. (Patient not taking: Reported on 10/12/2023) 120 tablet 0 Unknown    amLODIPine (Norvasc) 5 mg tablet TAKE  1 TABLET BY MOUTH ONCE DAILY 90 tablet 0 10/11/2023    atenolol (Tenormin) 50 mg tablet TAKE 1 TABLET BY MOUTH ONCE DAILY 90 tablet 1 10/11/2023    atorvastatin (Lipitor) 40 mg tablet Take 1 tablet (40 mg) by mouth once daily.   Past Week    folic acid (Folvite) 1 mg tablet TAKE 1 TABLET BY MOUTH ONCE DAILY (Patient not taking: Reported on 10/2/2023) 100 tablet 0 Past Week    ipratropium-albuteroL (Combivent Respimat)  mcg/actuation inhaler INHALE 1 PUFF EVERY 6 HOURS AS NEEDED FOR SHORTNESS OF BREATH 4 g 2     naloxone (Narcan) 4 mg/0.1 mL nasal spray Use 1 Spray in one nostril (alternate sides) as needed for Drug Overdose (and call 911). every 2-3 min, until assistance arrives.       nicotine (Nicoderm CQ) 21 mg/24 hr patch APPLY 1 PATCH TRANSDERMALLY EVERY 24 HOURS. (Patient not taking: Reported on 10/2/2023) 28 patch 0     polyethylene glycol (Glycolax, Miralax) 17 gram/dose powder    Unknown    sennosides (Senokot) 8.6 mg tablet TAKE 1 TABLET BY MOUTH TWO TIMES A DAY AS NEEDED FOR CONSTIPATION (Patient not taking: Reported on 10/2/2023) 100 tablet 0 Unknown    thiamine (Vitamin B-1) 100 mg tablet TAKE 1 TABLET BY MOUTH ONCE DAILY (Patient not taking: Reported on 10/2/2023) 100 tablet 2 Past Week     Aspirin and Nsaids (non-steroidal anti-inflammatory drug)  Social History     Tobacco Use    Smoking status: Every Day     Packs/day: .5     Types: Cigarettes   Substance Use Topics    Alcohol use: Yes     Comment: 1.5 pints Ju daily     No family history on file.    Scheduled Medications:   albuterol, 2.5 mg, nebulization, q6h  amLODIPine, 5 mg, oral, Daily  [Held by provider] apixaban, 5 mg, oral, q12h  atenolol, 50 mg, oral, Daily  atorvastatin, 40 mg, oral, Daily  melatonin, 5 mg, oral, Nightly  multivitamin with minerals, 1 tablet, oral, Daily  nicotine, 1 patch, transdermal, Daily  pantoprazole, 40 mg, intravenous, Daily         Continuous Medications:   heparin, 0-4,500 Units/hr, Last Rate: 9.8  "Units/hr (10/17/23 0038)         PRN Medications:   PRN medications: acetaminophen, dextrose 10 % in water (D10W), dextrose, glucagon, heparin, [MAR Hold] hydrALAZINE, hydrOXYzine HCL, loperamide, LORazepam **OR** LORazepam **OR** LORazepam, oxygen, [MAR Hold] PHENobarbitaL, sennosides    Review of Systems:  Review of systems per HPI and otherwise all other systems are negative    Objective   Vitals:  Most Recent:  Vitals:    10/17/23 1342   BP:    Pulse: (!) 121   Resp: (!) 34   Temp:    SpO2: (!) 72%       24hr Min/Max:  Temp  Min: 36.5 °C (97.7 °F)  Max: 36.7 °C (98.1 °F)  Pulse  Min: 94  Max: 122  BP  Min: 123/89  Max: 160/102  Resp  Min: 16  Max: 35  SpO2  Min: 72 %  Max: 100 %    LDA:   PIV x2 R       Vent settings:  HFNC 50%    Hemodynamic parameters for last 24 hours:         Intake/Output Summary (Last 24 hours) at 10/17/2023 1434  Last data filed at 10/17/2023 0325  Gross per 24 hour   Intake --   Output 200 ml   Net -200 ml           Physical exam:    BP (!) 136/91   Pulse (!) 122   Temp 36.5 °C (97.7 °F)   Resp (!) 35   Ht 1.702 m (5' 7\")   Wt 49.7 kg (109 lb 9.1 oz)   SpO2 (!) 72%   BMI 17.16 kg/m²     GEN: cachectic, chronically ill appearing, lying in bed, RASS 0, uncomfortable  CV: RRR, no m/r/g  PULM: Diffusely decreased breath sounds, worst in R LL  ABD: soft, nontender, nondistended  NEURO: A&Ox4, able to wiggle legs and toes, unable to lift legs  PSYCH: Appropriate mood and affect  MSK: no joint swelling grossly noted  EXT: no LE edema  SKIN: warm and dry, no lesions grossly noted      Lab/Radiology/Diagnostic Review:  Results for orders placed or performed during the hospital encounter of 10/11/23 (from the past 24 hour(s))   POCT GLUCOSE   Result Value Ref Range    POCT Glucose 110 (H) 74 - 99 mg/dL   POCT GLUCOSE   Result Value Ref Range    POCT Glucose 117 (H) 74 - 99 mg/dL   POCT GLUCOSE   Result Value Ref Range    POCT Glucose 105 (H) 74 - 99 mg/dL   POCT GLUCOSE   Result Value Ref " Range    POCT Glucose 98 74 - 99 mg/dL   Renal function panel   Result Value Ref Range    Glucose 82 74 - 99 mg/dL    Sodium 120 (LL) 136 - 145 mmol/L    Potassium 4.7 3.5 - 5.3 mmol/L    Chloride 86 (L) 98 - 107 mmol/L    Bicarbonate 23 21 - 32 mmol/L    Anion Gap 16 10 - 20 mmol/L    Urea Nitrogen 20 6 - 23 mg/dL    Creatinine 0.56 0.50 - 1.30 mg/dL    eGFR >90 >60 mL/min/1.73m*2    Calcium 8.9 8.6 - 10.6 mg/dL    Phosphorus 4.7 2.5 - 4.9 mg/dL    Albumin 3.4 3.4 - 5.0 g/dL   Heparin Assay   Result Value Ref Range    Heparin Unfractionated 0.5 See Comment Below for Therapeutic Ranges IU/mL   POCT GLUCOSE   Result Value Ref Range    POCT Glucose 110 (H) 74 - 99 mg/dL   Blood Gas Arterial Full Panel Unsolicited   Result Value Ref Range    POCT pH, Arterial 7.46 (H) 7.38 - 7.42 pH    POCT pCO2, Arterial 32 (L) 38 - 42 mm Hg    POCT pO2, Arterial 127 (H) 85 - 95 mm Hg    POCT SO2, Arterial 100 94 - 100 %    POCT Oxy Hemoglobin, Arterial 97.0 94.0 - 98.0 %    POCT Hematocrit Calculated, Arterial 56.0 (H) 41.0 - 52.0 %    POCT Sodium, Arterial 113 (LL) 136 - 145 mmol/L    POCT Potassium, Arterial 4.0 3.5 - 5.3 mmol/L    POCT Chloride, Arterial 86 (L) 98 - 107 mmol/L    POCT Ionized Calcium, Arterial 1.06 (L) 1.10 - 1.33 mmol/L    POCT Glucose, Arterial 102 (H) 74 - 99 mg/dL    POCT Lactate, Arterial 1.1 0.4 - 2.0 mmol/L    POCT Base Excess, Arterial 0.0 -2.0 - 3.0 mmol/L    POCT HCO3 Calculated, Arterial 22.8 22.0 - 26.0 mmol/L    POCT Hemoglobin, Arterial 18.7 (H) 13.5 - 17.5 g/dL    POCT Anion Gap, Arterial 8 (L) 10 - 25 mmo/L    Patient Temperature 37.0 degrees Celsius     CT chest abdomen pelvis w IV contrast  Result Date: 10/16/2023  1. No evidence of primary neoplasm or metastatic disease. 2. Right lower lobe mucous plugging which may reflect aspiration.   I personally reviewed the images/study and I agree with the findings as stated. This study was interpreted at [facility],  [place], [place].   MACRO: None   Signed by: Jeet Wang 10/16/2023 3:39 PM Dictation workstation:   [id number]    MR thoracic spine wo IV contrast  Result Date: 10/12/2023  1. Nondiagnostic MRI of the thoracic and lumbar spine. The patient requested termination of the examination due to pain. A repeat examination can be obtained as clinically indicated after adequate pain control. 2. Within the severe limitations of this examination, there is no definite evidence of substantial central canal stenosis of the thoracic or lumbar spine.           MR lumbar spine wo IV contrast  Result Date: 10/12/2023  1. Nondiagnostic MRI of the thoracic and lumbar spine. The patient requested termination of the examination due to pain. A repeat examination can be obtained as clinically indicated after adequate pain control. 2. Within the severe limitations of this examination, there is no definite evidence of substantial central canal stenosis of the thoracic or lumbar spine.          CT head wo IV contrast  Result Date: 10/11/2023  1. No evidence of acute cortical infarct or intracranial hemorrhage. 2. Subcortical and periventricular white matter hypodensities consistent with the sequela of chronic microvascular ischemic changes. 3. Punctate remote left thalamic and anterior basal ganglia lacunar infarcts.         XR chest 2 views  Result Date: 10/11/2023  1. New patchy left lower lobe airspace opacities. Consider pneumonia. 2. Findings of COPD/emphysema.           Assessment /Plan    Principal Problem:    Hospital-acquired pneumonia  Active Problems:    Essential hypertension, benign    Hepatitis C virus infection cured after antiviral drug therapy    Alcoholism (CMS/HCC)    Chronic pulmonary embolism (CMS/HCC)    COPD (chronic obstructive pulmonary disease) (CMS/HCC)    HLD (hyperlipidemia)       Molina Godinez is a 68 year old male with PMHx of COPD, HTN, AUD and opioid use disorder, HCV, chronic PE who was seen in MICU  10/13-10/16 for inability to walk 2/2 suspected alcohol use disorder. ICU course complicated by hypertension, altered mental status, and hyponatremia. Transferred back to MICU 10/17 after he was found lethargic this morning.     10/17 Updates:  Fu Echo, CXR  Respiratory hygiene  Dobhoff placement  Urine Osm, monitor serum Na  Starting Zosyn       NEURO/PSYCH  #AMS- improving  #Suspected alcohol withdrawal  -CT without pathology  -Folate, B12, TSH, ammonia wnl. Utox +leisa, BDZs (leisa and BDZs likely from hospital meds), cocaine, fentanyl  - Initially had visual and tactile hallucinations that have since resolved. Low concern for alcohol withdrawal at this point given last drink was 6 days ago and patient's AMS has typically been early morning and late night. Suspect more metabolic etiology of fluctuations in mental status, I/s/o acutely increased oxygen requirement.  PLAN:  [] Continue CIWA, holding off on Ativan/phenobarbital at this time 2/2 lethargy this morning and low concern of alcohol withdrawal etiology  [] continue high dose thiamine 500mg TID IV, multivitamin  [] fu HIV, RPR, serum tox     #Bilateral LE weakness  -Likely 2/2 chronic, had weakness during 8/2023 admission; less likely GBS  -CT head without pathology, MRI spine stopped early due to pain  -Patient able to move bilateral lower extremities, low c/f GBS  PLAN:  -No LP indicated at this time  -May follow up with neurology once no longer withdrawing     CV  #HTN  -home amlodipine 5mg, atenolol 50mg held as patient is NPO  - Initially started on Cardene drip, transitioned to Labetalol  [] Labetalol 20mg IV q6h PRN for SBP>160     #CAD  -home atorvastatin 40mg held as pt is NPO     PULM  #CAP  -Urine strep positive, unsure if active or chronic infection  -MRSA negative, Urine legionella negative  -vanc/zosyn 10/12-13  -procal 1.67  - Blood Cx NGTD 10/13  PLAN:  -pending sensitivities   [] Will recheck procal, MRSA     #Severe COPD   #Mucus  plugging  -8/2023 FEV1 42%  - CT C/A/P with RLL mucus plug  - Started on HFNC upon readmission to ICU, on 50% FiO2 satting at 93%  PLAN:  [] Repeat CXR  [] Albuterol q6h, chest PT, respiratory hygiene  [] Wean O2 as tolerated     #Subsegmental PE  -Requires anticoagulation for 3 months (per vasc med 8/2023), on home apixaban 5mg BID  PLAN:  -not tolerating PO,  on heparin gtt     GI  #HepC  -treated, 8/2023 HCV RNA undetected     #Nutrition  -Per SLP, not safe to swallow, c/f aspiration given weakness and thick secretions I/s/o pna  PLAN:  -Dobhoff placement      /RENAL  #Hyponatremia - worsening, now down to 120  - Na trending down over past week, as low as 120  - Urine lytes with Na 173  - Serum Osm 248  [] Urine Osm, q4h RFP, Fluid restriction    #Alkalosis  - Likely 2/2 tachypnea     ENDOCRINE  #Secondary HyperPTH  -8/28 .7  -Hypocalcemic on admission, now resolved  -25-hydroxy vit D 8, 1,25 Vit D 44.4  PLAN:  -Consider vit D supplementation, follow up outpatient     ID  #CAP  2/2 S pneumo, pending sensitivities  -Procal 1.67  - Completed CTX 10/13-10/17  [] Repeat Procal  [] Started on Zosyn 10/17     HEME/ONC  #Leukocytosis, improving  -2/2 to CAP     MSK/RHEUM   #Chronic LE weakness  -Likely chronic from disuse and alcohol, unlikely GBS since no paralysis  -Consider neurology outpatient referral for possible EMG, neurology signed off    Vent/Oxy: 50% FiO2 HFNC  Pressors/Drips: Heparin gtt  Abx: Zosyn 10/17  F: PRN  E: replete PRN  N: NPO, pending dobhoff   A: PIV R arm x2  DVT ppx: heparin gtt  GI ppx: Pantoprazole     Code status: Full Code (confirmed)  NOK: Tonie (sister) [phone]; Nahomi (God-sister) [phone]; Sarah Godinez (sister) [phone]      Ever Paez MD  Internal Medicine, PGY-1

## 2023-10-17 NOTE — PROGRESS NOTES
Physical Therapy                 Therapy Communication Note    Patient Name: Molina Godinez  MRN: [id number]  Today's Date: 10/17/2023     Discipline: Physical Therapy    Missed Visit Reason: Missed Visit Reason: Other (Comment)    Missed Time: Attempt    Comment:  Attempted PT eval x2; 9:45 pt going off floor to echo and RN needing to give Ativan. Patient did not go for procedure so re-attempted  at 11:28, family member in room. Patient lethargic, not following commands or arousing to stimuli. Family member attempting to wake patient as well. RN aware and reports medication has patient with decreased arousal. Will re-attempt as appropriate at later date.

## 2023-10-18 LAB
ALBUMIN SERPL BCP-MCNC: 2.9 G/DL (ref 3.4–5)
ALBUMIN SERPL BCP-MCNC: 3 G/DL (ref 3.4–5)
ALBUMIN SERPL BCP-MCNC: 3.3 G/DL (ref 3.4–5)
ALBUMIN SERPL BCP-MCNC: 3.5 G/DL (ref 3.4–5)
ANION GAP BLDA CALCULATED.4IONS-SCNC: 8 MMO/L (ref 10–25)
ANION GAP BLDA CALCULATED.4IONS-SCNC: 8 MMO/L (ref 10–25)
ANION GAP SERPL CALC-SCNC: 14 MMOL/L (ref 10–20)
ANION GAP SERPL CALC-SCNC: 17 MMOL/L (ref 10–20)
ANION GAP SERPL CALC-SCNC: 18 MMOL/L (ref 10–20)
ANION GAP SERPL CALC-SCNC: 18 MMOL/L (ref 10–20)
ANION GAP SERPL CALC-SCNC: 20 MMOL/L (ref 10–20)
BASE EXCESS BLDA CALC-SCNC: -2 MMOL/L (ref -2–3)
BASE EXCESS BLDA CALC-SCNC: -2.1 MMOL/L (ref -2–3)
BASOPHILS # BLD AUTO: 0.03 X10*3/UL (ref 0–0.1)
BASOPHILS NFR BLD AUTO: 0.2 %
BODY TEMPERATURE: 37 DEGREES CELSIUS
BODY TEMPERATURE: 37 DEGREES CELSIUS
BUN SERPL-MCNC: 20 MG/DL (ref 6–23)
BUN SERPL-MCNC: 21 MG/DL (ref 6–23)
BUN SERPL-MCNC: 23 MG/DL (ref 6–23)
BUN SERPL-MCNC: 24 MG/DL (ref 6–23)
BUN SERPL-MCNC: 26 MG/DL (ref 6–23)
CA-I BLDA-SCNC: 1.08 MMOL/L (ref 1.1–1.33)
CA-I BLDA-SCNC: 1.09 MMOL/L (ref 1.1–1.33)
CALCIUM SERPL-MCNC: 7.7 MG/DL (ref 8.6–10.6)
CALCIUM SERPL-MCNC: 7.8 MG/DL (ref 8.6–10.6)
CALCIUM SERPL-MCNC: 8.6 MG/DL (ref 8.6–10.6)
CALCIUM SERPL-MCNC: 8.6 MG/DL (ref 8.6–10.6)
CALCIUM SERPL-MCNC: 8.8 MG/DL (ref 8.6–10.6)
CHLORIDE BLDA-SCNC: 90 MMOL/L (ref 98–107)
CHLORIDE BLDA-SCNC: 91 MMOL/L (ref 98–107)
CHLORIDE SERPL-SCNC: 86 MMOL/L (ref 98–107)
CHLORIDE SERPL-SCNC: 88 MMOL/L (ref 98–107)
CHLORIDE SERPL-SCNC: 88 MMOL/L (ref 98–107)
CHLORIDE SERPL-SCNC: 89 MMOL/L (ref 98–107)
CHLORIDE SERPL-SCNC: 93 MMOL/L (ref 98–107)
CHLORIDE UR-SCNC: 47 MMOL/L
CHLORIDE/CREATININE (MMOL/G) IN URINE: 29 MMOL/G CREAT (ref 23–275)
CO2 SERPL-SCNC: 21 MMOL/L (ref 21–32)
CO2 SERPL-SCNC: 22 MMOL/L (ref 21–32)
CO2 SERPL-SCNC: 22 MMOL/L (ref 21–32)
CO2 SERPL-SCNC: 23 MMOL/L (ref 21–32)
CO2 SERPL-SCNC: 25 MMOL/L (ref 21–32)
CREAT SERPL-MCNC: 0.43 MG/DL (ref 0.5–1.3)
CREAT SERPL-MCNC: 0.54 MG/DL (ref 0.5–1.3)
CREAT SERPL-MCNC: 0.59 MG/DL (ref 0.5–1.3)
CREAT SERPL-MCNC: 0.6 MG/DL (ref 0.5–1.3)
CREAT SERPL-MCNC: 0.64 MG/DL (ref 0.5–1.3)
CREAT UR-MCNC: 163.3 MG/DL (ref 20–370)
CREAT UR-MCNC: 32.4 MG/DL (ref 20–370)
EOSINOPHIL # BLD AUTO: 0.01 X10*3/UL (ref 0–0.7)
EOSINOPHIL NFR BLD AUTO: 0.1 %
ERYTHROCYTE [DISTWIDTH] IN BLOOD BY AUTOMATED COUNT: 12.4 % (ref 11.5–14.5)
GFR SERPL CREATININE-BSD FRML MDRD: >90 ML/MIN/1.73M*2
GLUCOSE BLD MANUAL STRIP-MCNC: 117 MG/DL (ref 74–99)
GLUCOSE BLD MANUAL STRIP-MCNC: 131 MG/DL (ref 74–99)
GLUCOSE BLD MANUAL STRIP-MCNC: 84 MG/DL (ref 74–99)
GLUCOSE BLD MANUAL STRIP-MCNC: 90 MG/DL (ref 74–99)
GLUCOSE BLD MANUAL STRIP-MCNC: 93 MG/DL (ref 74–99)
GLUCOSE BLDA-MCNC: 85 MG/DL (ref 74–99)
GLUCOSE BLDA-MCNC: 94 MG/DL (ref 74–99)
GLUCOSE SERPL-MCNC: 107 MG/DL (ref 74–99)
GLUCOSE SERPL-MCNC: 176 MG/DL (ref 74–99)
GLUCOSE SERPL-MCNC: 65 MG/DL (ref 74–99)
GLUCOSE SERPL-MCNC: 67 MG/DL (ref 74–99)
GLUCOSE SERPL-MCNC: 78 MG/DL (ref 74–99)
HCO3 BLDA-SCNC: 21.4 MMOL/L (ref 22–26)
HCO3 BLDA-SCNC: 21.6 MMOL/L (ref 22–26)
HCT VFR BLD AUTO: 51.2 % (ref 41–52)
HCT VFR BLD EST: 55 % (ref 41–52)
HCT VFR BLD EST: 55 % (ref 41–52)
HGB BLD-MCNC: 18.3 G/DL (ref 13.5–17.5)
HGB BLDA-MCNC: 18.2 G/DL (ref 13.5–17.5)
HGB BLDA-MCNC: 18.2 G/DL (ref 13.5–17.5)
IMM GRANULOCYTES # BLD AUTO: 0.11 X10*3/UL (ref 0–0.7)
IMM GRANULOCYTES NFR BLD AUTO: 0.9 % (ref 0–0.9)
INHALED O2 CONCENTRATION: 96 %
INHALED O2 CONCENTRATION: 97 %
LACTATE BLDA-SCNC: 1.1 MMOL/L (ref 0.4–2)
LACTATE BLDA-SCNC: 1.2 MMOL/L (ref 0.4–2)
LYMPHOCYTES # BLD AUTO: 0.51 X10*3/UL (ref 1.2–4.8)
LYMPHOCYTES NFR BLD AUTO: 4.2 %
MAGNESIUM SERPL-MCNC: 1.69 MG/DL (ref 1.6–2.4)
MAGNESIUM SERPL-MCNC: 2.32 MG/DL (ref 1.6–2.4)
MCH RBC QN AUTO: 30.3 PG (ref 26–34)
MCHC RBC AUTO-ENTMCNC: 35.7 G/DL (ref 32–36)
MCV RBC AUTO: 85 FL (ref 80–100)
MONOCYTES # BLD AUTO: 1.26 X10*3/UL (ref 0.1–1)
MONOCYTES NFR BLD AUTO: 10.4 %
NEUTROPHILS # BLD AUTO: 10.16 X10*3/UL (ref 1.2–7.7)
NEUTROPHILS NFR BLD AUTO: 84.2 %
NRBC BLD-RTO: 0 /100 WBCS (ref 0–0)
OSMOLALITY UR: 718 MOSM/KG (ref 200–1200)
OXYHGB MFR BLDA: 92.1 % (ref 94–98)
OXYHGB MFR BLDA: 95.1 % (ref 94–98)
PCO2 BLDA: 33 MM HG (ref 38–42)
PCO2 BLDA: 34 MM HG (ref 38–42)
PH BLDA: 7.41 PH (ref 7.38–7.42)
PH BLDA: 7.42 PH (ref 7.38–7.42)
PHOSPHATE SERPL-MCNC: 3 MG/DL (ref 2.5–4.9)
PHOSPHATE SERPL-MCNC: 3.6 MG/DL (ref 2.5–4.9)
PHOSPHATE SERPL-MCNC: 4.3 MG/DL (ref 2.5–4.9)
PHOSPHATE SERPL-MCNC: 4.8 MG/DL (ref 2.5–4.9)
PLATELET # BLD AUTO: 281 X10*3/UL (ref 150–450)
PMV BLD AUTO: 10.1 FL (ref 7.5–11.5)
PO2 BLDA: 74 MM HG (ref 85–95)
PO2 BLDA: ABNORMAL MM[HG]
POTASSIUM BLDA-SCNC: 4.6 MMOL/L (ref 3.5–5.3)
POTASSIUM BLDA-SCNC: 4.7 MMOL/L (ref 3.5–5.3)
POTASSIUM SERPL-SCNC: 4 MMOL/L (ref 3.5–5.3)
POTASSIUM SERPL-SCNC: 4.1 MMOL/L (ref 3.5–5.3)
POTASSIUM SERPL-SCNC: 4.7 MMOL/L (ref 3.5–5.3)
POTASSIUM SERPL-SCNC: 4.8 MMOL/L (ref 3.5–5.3)
POTASSIUM SERPL-SCNC: 6.8 MMOL/L (ref 3.5–5.3)
POTASSIUM UR-SCNC: 52 MMOL/L
POTASSIUM/CREAT UR-RTO: 32 MMOL/G CREAT
PROCALCITONIN SERPL-MCNC: 0.63 NG/ML
RBC # BLD AUTO: 6.03 X10*6/UL (ref 4.5–5.9)
SAO2 % BLDA: 95 % (ref 94–100)
SAO2 % BLDA: 98 % (ref 94–100)
SODIUM BLDA-SCNC: 115 MMOL/L (ref 136–145)
SODIUM BLDA-SCNC: 116 MMOL/L (ref 136–145)
SODIUM SERPL-SCNC: 122 MMOL/L (ref 136–145)
SODIUM SERPL-SCNC: 122 MMOL/L (ref 136–145)
SODIUM SERPL-SCNC: 123 MMOL/L (ref 136–145)
SODIUM SERPL-SCNC: 124 MMOL/L (ref 136–145)
SODIUM SERPL-SCNC: 128 MMOL/L (ref 136–145)
SODIUM UR-SCNC: 32 MMOL/L
SODIUM/CREAT UR-RTO: 20 MMOL/G CREAT
T PALLIDUM AB SER QL AGGL: REACTIVE
UFH PPP CHRO-ACNC: 0.7 IU/ML
UREA/CREAT UR-SRTO: 8.5 G/G CREAT
UUN UR-MCNC: 276 MG/DL
WBC # BLD AUTO: 12.1 X10*3/UL (ref 4.4–11.3)

## 2023-10-18 PROCEDURE — C9113 INJ PANTOPRAZOLE SODIUM, VIA: HCPCS | Performed by: STUDENT IN AN ORGANIZED HEALTH CARE EDUCATION/TRAINING PROGRAM

## 2023-10-18 PROCEDURE — 2500000004 HC RX 250 GENERAL PHARMACY W/ HCPCS (ALT 636 FOR OP/ED)

## 2023-10-18 PROCEDURE — 84132 ASSAY OF SERUM POTASSIUM: CPT | Mod: CMCLAB

## 2023-10-18 PROCEDURE — 82947 ASSAY GLUCOSE BLOOD QUANT: CPT

## 2023-10-18 PROCEDURE — 80048 BASIC METABOLIC PNL TOTAL CA: CPT | Mod: CMCLAB

## 2023-10-18 PROCEDURE — 2020000001 HC ICU ROOM DAILY

## 2023-10-18 PROCEDURE — 82436 ASSAY OF URINE CHLORIDE: CPT

## 2023-10-18 PROCEDURE — 99291 CRITICAL CARE FIRST HOUR: CPT

## 2023-10-18 PROCEDURE — 36415 COLL VENOUS BLD VENIPUNCTURE: CPT | Mod: CMCLAB

## 2023-10-18 PROCEDURE — 84145 PROCALCITONIN (PCT): CPT

## 2023-10-18 PROCEDURE — 2500000004 HC RX 250 GENERAL PHARMACY W/ HCPCS (ALT 636 FOR OP/ED): Performed by: STUDENT IN AN ORGANIZED HEALTH CARE EDUCATION/TRAINING PROGRAM

## 2023-10-18 PROCEDURE — 85025 COMPLETE CBC W/AUTO DIFF WBC: CPT

## 2023-10-18 PROCEDURE — 31720 CLEARANCE OF AIRWAYS: CPT

## 2023-10-18 PROCEDURE — 3E0G76Z INTRODUCTION OF NUTRITIONAL SUBSTANCE INTO UPPER GI, VIA NATURAL OR ARTIFICIAL OPENING: ICD-10-PCS | Performed by: STUDENT IN AN ORGANIZED HEALTH CARE EDUCATION/TRAINING PROGRAM

## 2023-10-18 PROCEDURE — 2500000002 HC RX 250 W HCPCS SELF ADMINISTERED DRUGS (ALT 637 FOR MEDICARE OP, ALT 636 FOR OP/ED)

## 2023-10-18 PROCEDURE — 83735 ASSAY OF MAGNESIUM: CPT

## 2023-10-18 PROCEDURE — 2500000001 HC RX 250 WO HCPCS SELF ADMINISTERED DRUGS (ALT 637 FOR MEDICARE OP): Performed by: STUDENT IN AN ORGANIZED HEALTH CARE EDUCATION/TRAINING PROGRAM

## 2023-10-18 PROCEDURE — 36415 COLL VENOUS BLD VENIPUNCTURE: CPT

## 2023-10-18 PROCEDURE — 94668 MNPJ CHEST WALL SBSQ: CPT

## 2023-10-18 PROCEDURE — 2500000002 HC RX 250 W HCPCS SELF ADMINISTERED DRUGS (ALT 637 FOR MEDICARE OP, ALT 636 FOR OP/ED): Performed by: STUDENT IN AN ORGANIZED HEALTH CARE EDUCATION/TRAINING PROGRAM

## 2023-10-18 PROCEDURE — 85018 HEMOGLOBIN: CPT

## 2023-10-18 PROCEDURE — 83935 ASSAY OF URINE OSMOLALITY: CPT

## 2023-10-18 PROCEDURE — 80069 RENAL FUNCTION PANEL: CPT

## 2023-10-18 PROCEDURE — S4991 NICOTINE PATCH NONLEGEND: HCPCS | Performed by: STUDENT IN AN ORGANIZED HEALTH CARE EDUCATION/TRAINING PROGRAM

## 2023-10-18 PROCEDURE — 2500000001 HC RX 250 WO HCPCS SELF ADMINISTERED DRUGS (ALT 637 FOR MEDICARE OP)

## 2023-10-18 PROCEDURE — 83605 ASSAY OF LACTIC ACID: CPT

## 2023-10-18 PROCEDURE — 94640 AIRWAY INHALATION TREATMENT: CPT

## 2023-10-18 PROCEDURE — 87205 SMEAR GRAM STAIN: CPT | Performed by: STUDENT IN AN ORGANIZED HEALTH CARE EDUCATION/TRAINING PROGRAM

## 2023-10-18 PROCEDURE — 85520 HEPARIN ASSAY: CPT

## 2023-10-18 PROCEDURE — 94660 CPAP INITIATION&MGMT: CPT

## 2023-10-18 RX ORDER — LANOLIN ALCOHOL/MO/W.PET/CERES
100 CREAM (GRAM) TOPICAL DAILY
Status: DISCONTINUED | OUTPATIENT
Start: 2023-10-19 | End: 2023-11-08

## 2023-10-18 RX ORDER — ERGOCALCIFEROL (VITAMIN D2) 200 MCG/ML
8000 DROPS ORAL DAILY
Status: DISCONTINUED | OUTPATIENT
Start: 2023-10-18 | End: 2023-11-30 | Stop reason: HOSPADM

## 2023-10-18 RX ORDER — SODIUM CHLORIDE 9 MG/ML
75 INJECTION, SOLUTION INTRAVENOUS CONTINUOUS
Status: ACTIVE | OUTPATIENT
Start: 2023-10-18 | End: 2023-10-18

## 2023-10-18 RX ORDER — THIAMINE HYDROCHLORIDE 100 MG/ML
100 INJECTION, SOLUTION INTRAMUSCULAR; INTRAVENOUS DAILY
Status: DISCONTINUED | OUTPATIENT
Start: 2023-10-18 | End: 2023-10-18

## 2023-10-18 RX ORDER — ACETAMINOPHEN 325 MG/1
975 TABLET ORAL EVERY 8 HOURS PRN
Status: DISCONTINUED | OUTPATIENT
Start: 2023-10-18 | End: 2023-10-27

## 2023-10-18 RX ORDER — CALCIUM GLUCONATE 20 MG/ML
1 INJECTION, SOLUTION INTRAVENOUS ONCE
Status: COMPLETED | OUTPATIENT
Start: 2023-10-18 | End: 2023-10-18

## 2023-10-18 RX ORDER — FOLIC ACID 1 MG/1
1 TABLET ORAL DAILY
Status: DISCONTINUED | OUTPATIENT
Start: 2023-10-18 | End: 2023-11-08

## 2023-10-18 RX ORDER — ESOMEPRAZOLE MAGNESIUM 40 MG/1
40 GRANULE, DELAYED RELEASE ORAL
Status: DISCONTINUED | OUTPATIENT
Start: 2023-10-19 | End: 2023-11-03

## 2023-10-18 RX ORDER — SODIUM CHLORIDE 9 MG/ML
75 INJECTION, SOLUTION INTRAVENOUS CONTINUOUS
Status: DISCONTINUED | OUTPATIENT
Start: 2023-10-18 | End: 2023-10-18

## 2023-10-18 RX ORDER — PETROLATUM 420 MG/G
1 OINTMENT TOPICAL
Status: DISCONTINUED | OUTPATIENT
Start: 2023-10-18 | End: 2023-11-20

## 2023-10-18 RX ADMIN — Medication 8000 UNITS: at 16:35

## 2023-10-18 RX ADMIN — PANTOPRAZOLE SODIUM 40 MG: 40 INJECTION, POWDER, FOR SOLUTION INTRAVENOUS at 09:19

## 2023-10-18 RX ADMIN — HEPARIN SODIUM 980 UNITS/HR: 10000 INJECTION, SOLUTION INTRAVENOUS at 23:13

## 2023-10-18 RX ADMIN — Medication 1 PATCH: at 09:19

## 2023-10-18 RX ADMIN — Medication: at 08:17

## 2023-10-18 RX ADMIN — ATENOLOL 50 MG: 50 TABLET ORAL at 09:18

## 2023-10-18 RX ADMIN — ACETAMINOPHEN 650 MG: 325 TABLET ORAL at 01:24

## 2023-10-18 RX ADMIN — ACETAMINOPHEN 650 MG: 325 TABLET ORAL at 15:15

## 2023-10-18 RX ADMIN — ATORVASTATIN CALCIUM 40 MG: 40 TABLET ORAL at 09:19

## 2023-10-18 RX ADMIN — FOLIC ACID 1 MG: 1 TABLET ORAL at 15:16

## 2023-10-18 RX ADMIN — Medication 1 TABLET: at 09:18

## 2023-10-18 RX ADMIN — ALBUTEROL SULFATE 2.5 MG: 2.5 SOLUTION RESPIRATORY (INHALATION) at 19:34

## 2023-10-18 RX ADMIN — SODIUM CHLORIDE 75 ML/HR: 9 INJECTION, SOLUTION INTRAVENOUS at 01:47

## 2023-10-18 RX ADMIN — THIAMINE HYDROCHLORIDE 100 MG: 100 INJECTION, SOLUTION INTRAMUSCULAR; INTRAVENOUS at 15:16

## 2023-10-18 RX ADMIN — CALCIUM GLUCONATE 1 G: 20 INJECTION, SOLUTION INTRAVENOUS at 16:35

## 2023-10-18 RX ADMIN — HYDRALAZINE HYDROCHLORIDE 10 MG: 20 INJECTION INTRAMUSCULAR; INTRAVENOUS at 00:44

## 2023-10-18 RX ADMIN — ALBUTEROL SULFATE 2.5 MG: 2.5 SOLUTION RESPIRATORY (INHALATION) at 08:02

## 2023-10-18 RX ADMIN — Medication 5 MG: at 21:11

## 2023-10-18 RX ADMIN — ACETAMINOPHEN 650 MG: 325 TABLET ORAL at 09:18

## 2023-10-18 RX ADMIN — ALBUTEROL SULFATE 2.5 MG: 2.5 SOLUTION RESPIRATORY (INHALATION) at 13:51

## 2023-10-18 RX ADMIN — AMLODIPINE BESYLATE 5 MG: 5 TABLET ORAL at 09:18

## 2023-10-18 RX ADMIN — ALBUTEROL SULFATE 2.5 MG: 2.5 SOLUTION RESPIRATORY (INHALATION) at 03:53

## 2023-10-18 ASSESSMENT — PAIN DESCRIPTION - DESCRIPTORS: DESCRIPTORS: ACHING

## 2023-10-18 ASSESSMENT — PAIN - FUNCTIONAL ASSESSMENT: PAIN_FUNCTIONAL_ASSESSMENT: 0-10

## 2023-10-18 ASSESSMENT — PAIN SCALES - GENERAL
PAINLEVEL_OUTOF10: 3
PAINLEVEL_OUTOF10: 3

## 2023-10-18 NOTE — PROGRESS NOTES
Speech-Language Pathology             Therapy Communication Note     Patient Name: Molina Godinez  MRN:  [id number]  Today's Date:  10/18/23     Discipline: Speech Language Pathology     Missed Visit Reason:  Pt with transfer back to ICU, obtunded and not following commands per chart review. Not appropriate for swallow re-evaluation at this time. Rec continue NPO with non-oral nutrition/meds at this time with SLP swallow re-eval when pt alert and following commands.      Missed Time: 8540

## 2023-10-18 NOTE — PROGRESS NOTES
Physical Therapy                 Therapy Communication Note    Patient Name: Molina Godinez  MRN: [id number]  Today's Date: 10/18/2023     Discipline: Physical Therapy    Missed Visit Reason: Missed Visit Reason:  (patient obtunded, is not following commands; PT noticed patient leaning head/neck/trunk to R side; PT tried to re-position patient's head and noticed increase stiffness on R side of neck. PT informed RN and medical team.)    Missed Time: Attempt    Comment:

## 2023-10-18 NOTE — PROGRESS NOTES
Critical Care Daily Progress        Subjective   Patient is a 68 y.o. male admitted on 10/11/2023  7:54 PM with the following indication(s) for ICU care hyponatremia, altered mental status with multifactorial etiology.     Interval History: NAEON. Given 500ml bolus of D5NS for hyponatremia, continued as NS last night Has some throat discomfort from the Dobhoff and wants water. Back pain has slightly improved but still persistent. No headaches, chest pain, abdominal pain, nausea, vomiting. Wants to get stronger and eat.      Scheduled Medications:   albuterol, 2.5 mg, nebulization, q6h  amLODIPine, 5 mg, oral, Daily  [Held by provider] apixaban, 5 mg, oral, q12h  atenolol, 50 mg, oral, Daily  atorvastatin, 40 mg, oral, Daily  melatonin, 5 mg, oral, Nightly  multivitamin with minerals, 1 tablet, oral, Daily  nicotine, 1 patch, transdermal, Daily  pantoprazole, 40 mg, intravenous, Daily           Continuous Medications:   heparin, 0-4,500 Units/hr, Last Rate: 980 Units/hr (10/18/23 0100)  sodium chloride 0.9%, 75 mL/hr           PRN Medications:   PRN medications: acetaminophen, dextrose 10 % in water (D10W), dextrose, glucagon, heparin, [MAR Hold] hydrALAZINE, [Held by provider] hydrOXYzine HCL, labetaloL, lidocaine, loperamide, [Held by provider] LORazepam **OR** [Held by provider] LORazepam **OR** [Held by provider] LORazepam, oxygen, [MAR Hold] PHENobarbitaL, sennosides    Objective   Vitals:  Most Recent:  Visit Vitals  /73   Pulse 95   Temp 36.6 °C (97.9 °F) (Temporal)   Resp (!) 42      On HFNC 50L 50% FiO2 94%, needed as high as 70% FiO2 overnight    24hr Min/Max:  Temp  Min: 35.6 °C (96.1 °F)  Max: 36.6 °C (97.9 °F)  Pulse  Min: 92  Max: 131  BP  Min: 101/65  Max: 176/102  Resp  Min: 16  Max: 42  SpO2  Min: 72 %  Max: 100 %      GEN: cachectic, chronically ill appearing, lying in bed, RASS -1, uncomfortable  CV: RRR, no m/r/g  PULM: Diffusely decreased breath sounds, crackles LLL  ABD: soft, nontender,  nondistended  NEURO: A&Ox4, able to wiggle legs and toes, unable to lift legs, decreased UL strength b/l  PSYCH: Appropriate mood and affect  MSK: no joint swelling grossly noted  EXT: no LE edema  SKIN: warm and dry, no lesions grossly noted    LDA:    PIV x2 in R arm, Dobhoff      LABS AND IMAGING      Results from last 7 days   Lab Units 10/18/23  0451 10/13/23  2229 10/13/23  1539   SODIUM mmol/L 122*  123*   < > 129*   POTASSIUM mmol/L 6.8*  4.8   < > 3.0*   CHLORIDE mmol/L 88*  88*   < > 88*   CO2 mmol/L 21  22   < > 27   BUN mg/dL 26*  24*   < > 9   AST U/L  --   --  41*   ALT U/L  --   --  14   WBC AUTO x10*3/uL 12.1*   < > 16.8*   HEMOGLOBIN g/dL 18.3*   < > 18.1*   HEMATOCRIT % 51.2   < > 52.1*    < > = values in this interval not displayed.      Results for orders placed or performed during the hospital encounter of 10/11/23 (from the past 24 hour(s))   POCT GLUCOSE   Result Value Ref Range    POCT Glucose 98 74 - 99 mg/dL   Renal function panel   Result Value Ref Range    Glucose 82 74 - 99 mg/dL    Sodium 120 (LL) 136 - 145 mmol/L    Potassium 4.7 3.5 - 5.3 mmol/L    Chloride 86 (L) 98 - 107 mmol/L    Bicarbonate 23 21 - 32 mmol/L    Anion Gap 16 10 - 20 mmol/L    Urea Nitrogen 20 6 - 23 mg/dL    Creatinine 0.56 0.50 - 1.30 mg/dL    eGFR >90 >60 mL/min/1.73m*2    Calcium 8.9 8.6 - 10.6 mg/dL    Phosphorus 4.7 2.5 - 4.9 mg/dL    Albumin 3.4 3.4 - 5.0 g/dL   Heparin Assay   Result Value Ref Range    Heparin Unfractionated 0.5 See Comment Below for Therapeutic Ranges IU/mL   POCT GLUCOSE   Result Value Ref Range    POCT Glucose 110 (H) 74 - 99 mg/dL   Blood Gas Arterial Full Panel Unsolicited   Result Value Ref Range    POCT pH, Arterial 7.46 (H) 7.38 - 7.42 pH    POCT pCO2, Arterial 32 (L) 38 - 42 mm Hg    POCT pO2, Arterial 127 (H) 85 - 95 mm Hg    POCT SO2, Arterial 100 94 - 100 %    POCT Oxy Hemoglobin, Arterial 97.0 94.0 - 98.0 %    POCT Hematocrit Calculated, Arterial 56.0 (H) 41.0 - 52.0 %     POCT Sodium, Arterial 113 (LL) 136 - 145 mmol/L    POCT Potassium, Arterial 4.0 3.5 - 5.3 mmol/L    POCT Chloride, Arterial 86 (L) 98 - 107 mmol/L    POCT Ionized Calcium, Arterial 1.06 (L) 1.10 - 1.33 mmol/L    POCT Glucose, Arterial 102 (H) 74 - 99 mg/dL    POCT Lactate, Arterial 1.1 0.4 - 2.0 mmol/L    POCT Base Excess, Arterial 0.0 -2.0 - 3.0 mmol/L    POCT HCO3 Calculated, Arterial 22.8 22.0 - 26.0 mmol/L    POCT Hemoglobin, Arterial 18.7 (H) 13.5 - 17.5 g/dL    POCT Anion Gap, Arterial 8 (L) 10 - 25 mmo/L    Patient Temperature 37.0 degrees Celsius   POCT GLUCOSE   Result Value Ref Range    POCT Glucose 110 (H) 74 - 99 mg/dL   POCT GLUCOSE   Result Value Ref Range    POCT Glucose 101 (H) 74 - 99 mg/dL   Renal function panel   Result Value Ref Range    Glucose 78 74 - 99 mg/dL    Sodium 122 (L) 136 - 145 mmol/L    Potassium 4.7 3.5 - 5.3 mmol/L    Chloride 86 (L) 98 - 107 mmol/L    Bicarbonate 23 21 - 32 mmol/L    Anion Gap 18 10 - 20 mmol/L    Urea Nitrogen 21 6 - 23 mg/dL    Creatinine 0.59 0.50 - 1.30 mg/dL    eGFR >90 >60 mL/min/1.73m*2    Calcium 8.8 8.6 - 10.6 mg/dL    Phosphorus 4.3 2.5 - 4.9 mg/dL    Albumin 3.5 3.4 - 5.0 g/dL   Urine electrolytes   Result Value Ref Range    Sodium, Urine Random 32 mmol/L    Sodium/Creatinine Ratio 20 Not established. mmol/g Creat    Potassium, Urine Random 52 mmol/L    Potassium/Creatinine Ratio 32 Not established mmol/g Creat    Chloride, Urine Random 47 mmol/L    Chloride/Creatinine Ratio 29 23 - 275 mmol/g creat    Creatinine, Urine Random 163.3 20.0 - 370.0 mg/dL   Renal function panel   Result Value Ref Range    Glucose 65 (L) 74 - 99 mg/dL    Sodium 123 (L) 136 - 145 mmol/L    Potassium 4.8 3.5 - 5.3 mmol/L    Chloride 88 (L) 98 - 107 mmol/L    Bicarbonate 22 21 - 32 mmol/L    Anion Gap 18 10 - 20 mmol/L    Urea Nitrogen 24 (H) 6 - 23 mg/dL    Creatinine 0.60 0.50 - 1.30 mg/dL    eGFR >90 >60 mL/min/1.73m*2    Calcium 8.6 8.6 - 10.6 mg/dL    Phosphorus 4.8 2.5 -  4.9 mg/dL    Albumin 3.3 (L) 3.4 - 5.0 g/dL   CBC and Auto Differential   Result Value Ref Range    WBC 12.1 (H) 4.4 - 11.3 x10*3/uL    nRBC 0.0 0.0 - 0.0 /100 WBCs    RBC 6.03 (H) 4.50 - 5.90 x10*6/uL    Hemoglobin 18.3 (H) 13.5 - 17.5 g/dL    Hematocrit 51.2 41.0 - 52.0 %    MCV 85 80 - 100 fL    MCH 30.3 26.0 - 34.0 pg    MCHC 35.7 32.0 - 36.0 g/dL    RDW 12.4 11.5 - 14.5 %    Platelets 281 150 - 450 x10*3/uL    MPV 10.1 7.5 - 11.5 fL    Neutrophils % 84.2 40.0 - 80.0 %    Immature Granulocytes %, Automated 0.9 0.0 - 0.9 %    Lymphocytes % 4.2 13.0 - 44.0 %    Monocytes % 10.4 2.0 - 10.0 %    Eosinophils % 0.1 0.0 - 6.0 %    Basophils % 0.2 0.0 - 2.0 %    Neutrophils Absolute 10.16 (H) 1.20 - 7.70 x10*3/uL    Immature Granulocytes Absolute, Automated 0.11 0.00 - 0.70 x10*3/uL    Lymphocytes Absolute 0.51 (L) 1.20 - 4.80 x10*3/uL    Monocytes Absolute 1.26 (H) 0.10 - 1.00 x10*3/uL    Eosinophils Absolute 0.01 0.00 - 0.70 x10*3/uL    Basophils Absolute 0.03 0.00 - 0.10 x10*3/uL   Basic Metabolic Panel   Result Value Ref Range    Glucose 67 (L) 74 - 99 mg/dL    Sodium 122 (L) 136 - 145 mmol/L    Potassium 6.8 (HH) 3.5 - 5.3 mmol/L    Chloride 88 (L) 98 - 107 mmol/L    Bicarbonate 21 21 - 32 mmol/L    Anion Gap 20 10 - 20 mmol/L    Urea Nitrogen 26 (H) 6 - 23 mg/dL    Creatinine 0.64 0.50 - 1.30 mg/dL    eGFR >90 >60 mL/min/1.73m*2    Calcium 8.6 8.6 - 10.6 mg/dL   Magnesium   Result Value Ref Range    Magnesium 2.32 1.60 - 2.40 mg/dL   Heparin Assay, UFH   Result Value Ref Range    Heparin Unfractionated 0.7 See Comment Below for Therapeutic Ranges IU/mL   Green Top   Result Value Ref Range    Extra Tube Hold for add-ons.    POCT GLUCOSE   Result Value Ref Range    POCT Glucose 84 74 - 99 mg/dL        XR chest 1 view    Result Date: 10/17/2023  IMPRESSION:  1. Slight worsening of left basilar opacity compared to prior  imaging, suggesting atelectasis and/or pleural effusion.  2. Probable interstitial pulmonary edema  or fluid overload. Correlate  with patient's fluid status.      I personally reviewed the images/study and I agree with the findings  as stated. This study was interpreted at [place], [place].        Transthoracic Echo (TTE) Complete 10/  Result Date: 10/17/2023  CONCLUSIONS:  1. Left ventricular systolic function is hyperdynamic with a 75% estimated ejection fraction.  2. The left ventricular septal wall thickness is mildly increased.  3. There is left ventricular concentric remodeling.  4. Atrial septal aneurysm present.  5. A bubble study using agitated saline was performed. Bubble study is positive.  6. Mildly elevated RVSP.  7. Poorly visualized anatomical structures due to suboptimal image quality.  8. Compared with study from 8/29/2023, the RVSP today is mildly elevated, compared to moderately elevated in the prior echocardiogram (60 mmHg).     XR chest 1 view  Result Date: 10/17/2023  1. Hazy left basilar opacity appears slightly less conspicuous than prior and correlate with decreasing atelectasis/infiltrate. 2. Redemonstrated background diffuse emphysema.   I personally reviewed the images/study and I agree with the findings as stated by radiology resident Mignon Malone MD. This study was interpreted at [place], [place].   Signed by: Khanh Weaver 10/17/2023 3:23 PM Dictation workstation:   [id number]      CT chest abdomen pelvis w IV contrast  Result Date: 10/16/2023  1. No evidence of primary neoplasm or metastatic disease. 2. Right lower lobe mucous plugging which may reflect aspiration.   I personally reviewed the images/study and I agree with the findings as stated. This study was interpreted at [place], [place].   MACRO: None   Signed by: Jeet Wang 10/16/2023 3:39 PM Dictation workstation:   [id number]       CT head wo IV contrast  Result Date:  10/11/2023  1. No evidence of acute cortical infarct or intracranial hemorrhage. 2. Subcortical and periventricular white matter hypodensities consistent with the sequela of chronic microvascular ischemic changes. 3. Punctate remote left thalamic and anterior basal ganglia lacunar infarcts.          Assessment/Plan         Molina Godinez is a 68 year old male with PMHx of COPD, HTN, AUD and opioid use disorder, HCV, chronic PE who was seen in MICU 10/13-10/16 for inability to walk 2/2 suspected alcohol use disorder. ICU course complicated by hypertension, altered mental status, and hyponatremia. Transferred back to MICU 10/17 after he was found lethargic and hypoxic.      10/18 Updates to plan:  Continue to wean O2 with goal of 92% SpO2  Fluid restriction  RFP q4 to monitor hyponatremia  Glucose q4h, nutrition consult for TF diet  DC CIWA  Will adjust meds to be given through Dobhoff       NEURO/PSYCH  #AMS- fluctuating, unclear etiology as previously suspected alcohol withdrawal but could be 2/2 chronic hypoxia and retention vs hyponatremia or other etiology  -CT head without pathology  - Initially had visual and tactile hallucinations that have since resolved. Low concern for alcohol withdrawal at this point given last drink was 7 days ago and patient's AMS has typically been early morning and late night. Suspect more metabolic etiology of fluctuations in mental status, I/s/o acutely increased oxygen requirement.  - Concern today after rounds as patient obtunded with another episode of lethargy. Baseline RASS -1- -2, patient was -4 during after rounds exam. ABGs without significant abnormality, lethargic episode thought to be secondary to retention I/s/o severe COPD vs hyponatremia. FiO2 decreased with goal SpO2 around 90-92%  PLAN:  [] Discontinue CIWA, phenobarbital, Ativan  [] continue high dose thiamine 500mg TID IV, multivitamin  [] fu HIV, RPR, serum tox  [] Wean oxygen as tolerated       #Bilateral LE  weakness  -Likely 2/2 chronic, had weakness during 8/2023 admission; less likely GBS  -CT head without pathology, MRI spine stopped early due to pain  -Patient able to move bilateral lower extremities, low c/f GBS  PLAN:  -No LP indicated at this time  -May follow up with neurology once no longer withdrawing     CV  #HTN- resolved  - home amlodipine 5mg, atenolol 50mg  - Initially started on Cardene drip, transitioned to Labetalol prior to resolution  [] Has IV Labetalol PRN, will resume home BP meds     #CAD  -home atorvastatin 40mg (home med)     PULM  #CAP  -Urine strep positive, unsure if active or chronic infection  -MRSA negative, Urine legionella negative  -vanc/zosyn 10/12-13  -procal 1.67  - Blood Cx NGTD 10/13  PLAN:  -pending sensitivities   [] Fu recheck procal, MRSA    #Suspected PAH  - Echo with atrial septal aneurysm, positive bubble study  [] Will need outpatient workup, previously some suspicion for PAH but will need further evaluation    #Severe COPD  #Mucus plugging  -8/2023 FEV1 42%  - CT C/A/P with RLL mucus plug  - HFNC increased requirement overnight as high as 70% FiO2 50L  PLAN:  [] Albuterol q6h, chest PT, respiratory hygiene  [] Wean O2 as tolerated     #Subsegmental PE  -Requires anticoagulation for 3 months (per vasc med 8/2023), on home apixaban 5mg BID  PLAN:  -not tolerating PO, on heparin gtt       GI  #HepC  -treated, 8/2023 HCV RNA undetected     #Nutrition  -Per SLP, not safe to swallow, c/f aspiration given weakness and thick secretions I/s/o pna  - Dobhoff in place 10/17  - Nutrition following, appreciate recs  PLAN:  - Starting on TF with TwoCal HN at 15mL/hr, goal rate of 35ml/hr  - 100mg Thiamine, 1mg Folic acid, 8000u Vit D2     /RENAL  #Hyponatremia - Likely 2/2 SIADH but unclear etiology  - Na trending down over past week, as low as 120, slowly increasing on NS  - Urine lytes with Na 173  - Serum Osm 248, urine Osm 492, Urine Na 32  [] Fluid restriction  [] RFP q4h      #Alkalosis  - Likely 2/2 tachypnea     ENDOCRINE  #Secondary HyperPTH  -8/28 .7  -Hypocalcemic on admission, now resolved  -25-hydroxy vit D 8, 1,25 Vit D 44.4  PLAN:  Vit D2 8000u liquid daily     ID  #CAP- resolved  2/2 S pneumo  -Procal 1.67  - Completed CTX 10/13-10/17  - Zosyn 10/17-10/17  [] Repeat Procal       HEME/ONC  #Leukocytosis, improving  -2/2 to CAP, steroids       MSK/RHEUM   #Chronic LE weakness  -Likely chronic from disuse and alcohol, unlikely GBS since no paralysis  -Consider neurology outpatient referral for possible EMG, neurology signed off     Vent/Oxy: 35% FiO2 on HFNC   Pressors/Drips: Heparin gtt  Abx: None  F: PRN  E: replete PRN  N: TF via Dobhoff   A: PIV R arm x2, Dobhoff  DVT ppx: heparin gtt  GI ppx: Pantoprazole     Code status: Full Code (confirmed)  NOK: Tonie (sister) [phone]; Nahomi (God-sister) [phone]; Sarah Godinez (sister) [phone]       Ever Paez MD  Internal Medicine, PGY-1

## 2023-10-18 NOTE — CONSULTS
"Nutrition Assessment:   Reason for Assessment: Tube feeding recommendations    Patient is a 68 y.o. male presenting to MICU on 10/13 with inability to walk.  Course c/b elevated BP, change in mental status and hyponatremia.  He was eventually transferred to the floor but back in MICU now with lethargy.  Currently on 50% oxygen via [place].  Team placed Cortrak tube yesterday for enteral access as he is unsafe to be on a PO diet.     Pt's history includes: COPD, HTN, EtOH abuse, opioid abuse, HVC, chronic PE    Nutrition History:  Food and Nutrient History: Difficult to understand patient at visit.  Seems to indicate he was not eating well PTA but unable to obtain other details during interview.    Anthropometrics:  Height: 170.2 cm (5' 7.01\")  Weight: 49.7 kg (109 lb 9.1 oz)  BMI (Calculated): 17.16  IBW/kg (Dietitian Calculated): 67.3 kg  Percent of IBW: 74 %     Wt Readings from Last 5 Encounters:   10/18/23 49.7 kg (109 lb 9.1 oz)   10/02/23 57.6 kg (127 lb)          Nutrition Focused Physical Exam Findings:    Subcutaneous Fat Loss:   Orbital Fat Pads: Severe (dark circles, hollowing and loose skin)   Buccal Fat Pads: Severe (hollow, sunken and narrow face)   Triceps: Severe (negligible fat tissue)       Muscle Wasting:  Temporalis: Severe (hollowed scooping depression)  Pectoralis (Clavicular Region): Severe (protruding prominent clavicle)  Deltoid/Trapezius: Severe (squared shoulders, acromion process prominent)  Interosseous: Defer  Trapezius/Infraspinatus/Supraspinatus (Scapular Region): Defer  Quadriceps: Severe (depressions on inner and outer thigh)  Gastrocnemius: Severe (minimal muscle definition)  Edema:  Edema: none       Physical Findings:      Skin: Intact         Objective Data:  Nutrition Significant Labs:    Na+ 123  K+ 4.8  Chloride 88  Bicarb 22  BUN 24  Creatinine 0.6  Calcium 8.6  Phos 4.8  Mag 2.32  Vitamin D level 8 this month    Nutrition Specific Mediations:    Pertinent " meds:  Antibiotics  MVI with minerals    Appears that pt received 500mgs thiamin TID for 3 days during admission.  May benefit from additional thiamin and folic acid supplement.    I/O:     Stool: last BM on 10/17      Dietary Orders (From admission, onward)       Start     Ordered    10/17/23 1431  NPO Diet; Effective now  Diet effective now         10/17/23 1430                     Estimated Needs:   Total Energy Estimated Needs (kCal):  (8716-7648)   Method for Estimating Needs: MSJ= 1236    Total Protein Estimated Needs (g):  (70)   Method for Estimating Needs: 49.7kg x 1.4+       Nutrition Diagnosis:  Malnutrition Diagnosis  Patient has Malnutrition Diagnosis: Yes  Diagnosis Status: New  Malnutrition Diagnosis: Severe malnutrition related to chronic disease or condition  As Evidenced by: suspect poor PO intake PTA in light of EtOH abuse as well as noted severe muscle and fat loss on physical exam; he is underweight for height with a BMI of 17.2 and a DBW of 74%     Tube feed at goal= 1680kcals, 70grams protein    Nutrition Interventions and Recommendations:        Nutrition Prescription:  For tube feed: Two Reynaldo HN at start rate of 15mls/hr.  Increase by 10mls q8hrs until goal rate of 35mls/hr reached.  Water flushes per team's discretion while hyponatremic-- goal rate provides 590mls free water daily.  Would order 100mgs thiamin and 1mg folic acid to be given once daily  Would also order 8,000IUs liquid Vitamin D to be given via feeding tube for deficient Vitamin D level.         Time Spent/Follow-up Reminder:   Follow Up  Time Spent (min): 60 minutes  Last Date of Nutrition Visit: 10/18/23  Nutrition Follow-Up Needed?: Dietitian to reassess per policy  Follow up Comment: tube feed via cortrak

## 2023-10-19 ENCOUNTER — APPOINTMENT (OUTPATIENT)
Dept: RADIOLOGY | Facility: HOSPITAL | Age: 68
End: 2023-10-19
Payer: COMMERCIAL

## 2023-10-19 LAB
ALBUMIN SERPL BCP-MCNC: 1.9 G/DL (ref 3.4–5)
ALBUMIN SERPL BCP-MCNC: 3.1 G/DL (ref 3.4–5)
ALBUMIN SERPL BCP-MCNC: 3.2 G/DL (ref 3.4–5)
ANION GAP SERPL CALC-SCNC: 14 MMOL/L (ref 10–20)
ANION GAP SERPL CALC-SCNC: 15 MMOL/L (ref 10–20)
ANION GAP SERPL CALC-SCNC: 17 MMOL/L (ref 10–20)
BACTERIA SPEC RESP CULT: ABNORMAL
BASOPHILS # BLD AUTO: 0.04 X10*3/UL (ref 0–0.1)
BASOPHILS NFR BLD AUTO: 0.2 %
BUN SERPL-MCNC: 21 MG/DL (ref 6–23)
BUN SERPL-MCNC: 26 MG/DL (ref 6–23)
BUN SERPL-MCNC: 28 MG/DL (ref 6–23)
CALCIUM SERPL-MCNC: 5.2 MG/DL (ref 8.6–10.6)
CALCIUM SERPL-MCNC: 8.6 MG/DL (ref 8.6–10.6)
CALCIUM SERPL-MCNC: 9 MG/DL (ref 8.6–10.6)
CHLORIDE SERPL-SCNC: 103 MMOL/L (ref 98–107)
CHLORIDE SERPL-SCNC: 88 MMOL/L (ref 98–107)
CHLORIDE SERPL-SCNC: 91 MMOL/L (ref 98–107)
CO2 SERPL-SCNC: 14 MMOL/L (ref 21–32)
CO2 SERPL-SCNC: 21 MMOL/L (ref 21–32)
CO2 SERPL-SCNC: 23 MMOL/L (ref 21–32)
CREAT SERPL-MCNC: 0.48 MG/DL (ref 0.5–1.3)
CREAT SERPL-MCNC: 0.64 MG/DL (ref 0.5–1.3)
CREAT SERPL-MCNC: 0.84 MG/DL (ref 0.5–1.3)
EOSINOPHIL # BLD AUTO: 0.01 X10*3/UL (ref 0–0.7)
EOSINOPHIL NFR BLD AUTO: 0.1 %
ERYTHROCYTE [DISTWIDTH] IN BLOOD BY AUTOMATED COUNT: 12.6 % (ref 11.5–14.5)
GFR SERPL CREATININE-BSD FRML MDRD: >90 ML/MIN/1.73M*2
GLUCOSE BLD MANUAL STRIP-MCNC: 113 MG/DL (ref 74–99)
GLUCOSE BLD MANUAL STRIP-MCNC: 119 MG/DL (ref 74–99)
GLUCOSE BLD MANUAL STRIP-MCNC: 129 MG/DL (ref 74–99)
GLUCOSE BLD MANUAL STRIP-MCNC: 136 MG/DL (ref 74–99)
GLUCOSE BLD MANUAL STRIP-MCNC: 151 MG/DL (ref 74–99)
GLUCOSE SERPL-MCNC: 104 MG/DL (ref 74–99)
GLUCOSE SERPL-MCNC: 208 MG/DL (ref 74–99)
GLUCOSE SERPL-MCNC: 260 MG/DL (ref 74–99)
GRAM STN SPEC: ABNORMAL
HCT VFR BLD AUTO: 47.6 % (ref 41–52)
HGB BLD-MCNC: 15.9 G/DL (ref 13.5–17.5)
IMM GRANULOCYTES # BLD AUTO: 0.14 X10*3/UL (ref 0–0.7)
IMM GRANULOCYTES NFR BLD AUTO: 0.8 % (ref 0–0.9)
LYMPHOCYTES # BLD AUTO: 0.48 X10*3/UL (ref 1.2–4.8)
LYMPHOCYTES NFR BLD AUTO: 2.6 %
MAGNESIUM SERPL-MCNC: 1.34 MG/DL (ref 1.6–2.4)
MCH RBC QN AUTO: 30.3 PG (ref 26–34)
MCHC RBC AUTO-ENTMCNC: 33.4 G/DL (ref 32–36)
MCV RBC AUTO: 91 FL (ref 80–100)
MONOCYTES # BLD AUTO: 1.28 X10*3/UL (ref 0.1–1)
MONOCYTES NFR BLD AUTO: 7 %
NEUTROPHILS # BLD AUTO: 16.31 X10*3/UL (ref 1.2–7.7)
NEUTROPHILS NFR BLD AUTO: 89.3 %
NRBC BLD-RTO: 0 /100 WBCS (ref 0–0)
PHOSPHATE SERPL-MCNC: 1.9 MG/DL (ref 2.5–4.9)
PHOSPHATE SERPL-MCNC: 3.3 MG/DL (ref 2.5–4.9)
PHOSPHATE SERPL-MCNC: 3.7 MG/DL (ref 2.5–4.9)
PLATELET # BLD AUTO: 271 X10*3/UL (ref 150–450)
PMV BLD AUTO: 10.5 FL (ref 7.5–11.5)
POTASSIUM SERPL-SCNC: 2.7 MMOL/L (ref 3.5–5.3)
POTASSIUM SERPL-SCNC: 3.9 MMOL/L (ref 3.5–5.3)
POTASSIUM SERPL-SCNC: 4.4 MMOL/L (ref 3.5–5.3)
RBC # BLD AUTO: 5.24 X10*6/UL (ref 4.5–5.9)
SODIUM SERPL-SCNC: 122 MMOL/L (ref 136–145)
SODIUM SERPL-SCNC: 125 MMOL/L (ref 136–145)
SODIUM SERPL-SCNC: 128 MMOL/L (ref 136–145)
STAPHYLOCOCCUS SPEC CULT: NORMAL
UFH PPP CHRO-ACNC: 0.4 IU/ML
WBC # BLD AUTO: 18.3 X10*3/UL (ref 4.4–11.3)

## 2023-10-19 PROCEDURE — 36415 COLL VENOUS BLD VENIPUNCTURE: CPT | Mod: CMCLAB

## 2023-10-19 PROCEDURE — 31720 CLEARANCE OF AIRWAYS: CPT

## 2023-10-19 PROCEDURE — 2500000001 HC RX 250 WO HCPCS SELF ADMINISTERED DRUGS (ALT 637 FOR MEDICARE OP)

## 2023-10-19 PROCEDURE — 2500000004 HC RX 250 GENERAL PHARMACY W/ HCPCS (ALT 636 FOR OP/ED)

## 2023-10-19 PROCEDURE — 2500000002 HC RX 250 W HCPCS SELF ADMINISTERED DRUGS (ALT 637 FOR MEDICARE OP, ALT 636 FOR OP/ED): Performed by: STUDENT IN AN ORGANIZED HEALTH CARE EDUCATION/TRAINING PROGRAM

## 2023-10-19 PROCEDURE — 85520 HEPARIN ASSAY: CPT | Mod: CMCLAB

## 2023-10-19 PROCEDURE — 71045 X-RAY EXAM CHEST 1 VIEW: CPT

## 2023-10-19 PROCEDURE — 97166 OT EVAL MOD COMPLEX 45 MIN: CPT | Mod: GO

## 2023-10-19 PROCEDURE — 85025 COMPLETE CBC W/AUTO DIFF WBC: CPT

## 2023-10-19 PROCEDURE — 82947 ASSAY GLUCOSE BLOOD QUANT: CPT

## 2023-10-19 PROCEDURE — 97129 THER IVNTJ 1ST 15 MIN: CPT | Mod: GO

## 2023-10-19 PROCEDURE — S4991 NICOTINE PATCH NONLEGEND: HCPCS | Performed by: STUDENT IN AN ORGANIZED HEALTH CARE EDUCATION/TRAINING PROGRAM

## 2023-10-19 PROCEDURE — 2500000001 HC RX 250 WO HCPCS SELF ADMINISTERED DRUGS (ALT 637 FOR MEDICARE OP): Performed by: STUDENT IN AN ORGANIZED HEALTH CARE EDUCATION/TRAINING PROGRAM

## 2023-10-19 PROCEDURE — 80069 RENAL FUNCTION PANEL: CPT

## 2023-10-19 PROCEDURE — 2500000005 HC RX 250 GENERAL PHARMACY W/O HCPCS

## 2023-10-19 PROCEDURE — 2500000002 HC RX 250 W HCPCS SELF ADMINISTERED DRUGS (ALT 637 FOR MEDICARE OP, ALT 636 FOR OP/ED)

## 2023-10-19 PROCEDURE — 97162 PT EVAL MOD COMPLEX 30 MIN: CPT | Mod: GP

## 2023-10-19 PROCEDURE — 99233 SBSQ HOSP IP/OBS HIGH 50: CPT

## 2023-10-19 PROCEDURE — 2020000001 HC ICU ROOM DAILY

## 2023-10-19 PROCEDURE — 94640 AIRWAY INHALATION TREATMENT: CPT

## 2023-10-19 PROCEDURE — 71045 X-RAY EXAM CHEST 1 VIEW: CPT | Performed by: STUDENT IN AN ORGANIZED HEALTH CARE EDUCATION/TRAINING PROGRAM

## 2023-10-19 PROCEDURE — 97112 NEUROMUSCULAR REEDUCATION: CPT | Mod: GP

## 2023-10-19 PROCEDURE — 83735 ASSAY OF MAGNESIUM: CPT

## 2023-10-19 PROCEDURE — 92526 ORAL FUNCTION THERAPY: CPT | Mod: GN

## 2023-10-19 RX ORDER — MAGNESIUM SULFATE HEPTAHYDRATE 40 MG/ML
2 INJECTION, SOLUTION INTRAVENOUS ONCE
Status: COMPLETED | OUTPATIENT
Start: 2023-10-19 | End: 2023-10-19

## 2023-10-19 RX ORDER — POTASSIUM CHLORIDE 14.9 MG/ML
20 INJECTION INTRAVENOUS
Status: COMPLETED | OUTPATIENT
Start: 2023-10-19 | End: 2023-10-19

## 2023-10-19 RX ORDER — POTASSIUM CHLORIDE 14.9 MG/ML
20 INJECTION INTRAVENOUS
Status: DISCONTINUED | OUTPATIENT
Start: 2023-10-19 | End: 2023-10-19

## 2023-10-19 RX ADMIN — ATORVASTATIN CALCIUM 40 MG: 40 TABLET ORAL at 08:00

## 2023-10-19 RX ADMIN — Medication 8 L/MIN: at 08:34

## 2023-10-19 RX ADMIN — ALBUTEROL SULFATE 2.5 MG: 2.5 SOLUTION RESPIRATORY (INHALATION) at 19:34

## 2023-10-19 RX ADMIN — ESOMEPRAZOLE MAGNESIUM 40 MG: 40 FOR SUSPENSION ORAL at 08:03

## 2023-10-19 RX ADMIN — Medication 1 PATCH: at 08:00

## 2023-10-19 RX ADMIN — Medication 8000 UNITS: at 08:00

## 2023-10-19 RX ADMIN — Medication 1 TABLET: at 08:01

## 2023-10-19 RX ADMIN — ALBUTEROL SULFATE 2.5 MG: 2.5 SOLUTION RESPIRATORY (INHALATION) at 07:56

## 2023-10-19 RX ADMIN — ALBUTEROL SULFATE 2.5 MG: 2.5 SOLUTION RESPIRATORY (INHALATION) at 02:48

## 2023-10-19 RX ADMIN — ATENOLOL 50 MG: 50 TABLET ORAL at 08:00

## 2023-10-19 RX ADMIN — ACETAMINOPHEN 975 MG: 325 TABLET ORAL at 15:35

## 2023-10-19 RX ADMIN — THIAMINE HCL TAB 100 MG 100 MG: 100 TAB at 08:03

## 2023-10-19 RX ADMIN — POTASSIUM CHLORIDE 20 MEQ: 14.9 INJECTION, SOLUTION INTRAVENOUS at 15:35

## 2023-10-19 RX ADMIN — POTASSIUM CHLORIDE 20 MEQ: 14.9 INJECTION, SOLUTION INTRAVENOUS at 11:56

## 2023-10-19 RX ADMIN — Medication 5 MG: at 21:52

## 2023-10-19 RX ADMIN — ACETAMINOPHEN 975 MG: 325 TABLET ORAL at 08:00

## 2023-10-19 RX ADMIN — FOLIC ACID 1 MG: 1 TABLET ORAL at 08:00

## 2023-10-19 RX ADMIN — MAGNESIUM SULFATE HEPTAHYDRATE 2 G: 40 INJECTION, SOLUTION INTRAVENOUS at 18:24

## 2023-10-19 RX ADMIN — ALBUTEROL SULFATE 2.5 MG: 2.5 SOLUTION RESPIRATORY (INHALATION) at 14:30

## 2023-10-19 RX ADMIN — POTASSIUM PHOSPHATE, MONOBASIC POTASSIUM PHOSPHATE, DIBASIC 21 MMOL: 224; 236 INJECTION, SOLUTION, CONCENTRATE INTRAVENOUS at 15:36

## 2023-10-19 RX ADMIN — AMLODIPINE BESYLATE 5 MG: 5 TABLET ORAL at 08:00

## 2023-10-19 ASSESSMENT — COGNITIVE AND FUNCTIONAL STATUS - GENERAL
MOVING FROM LYING ON BACK TO SITTING ON SIDE OF FLAT BED WITH BEDRAILS: TOTAL
PERSONAL GROOMING: TOTAL
CLIMB 3 TO 5 STEPS WITH RAILING: TOTAL
DAILY ACTIVITIY SCORE: 6
TURNING FROM BACK TO SIDE WHILE IN FLAT BAD: TOTAL
STANDING UP FROM CHAIR USING ARMS: TOTAL
TOILETING: TOTAL
DRESSING REGULAR UPPER BODY CLOTHING: TOTAL
MOVING TO AND FROM BED TO CHAIR: TOTAL
EATING MEALS: TOTAL
DRESSING REGULAR LOWER BODY CLOTHING: TOTAL
MOBILITY SCORE: 6
WALKING IN HOSPITAL ROOM: TOTAL
HELP NEEDED FOR BATHING: TOTAL

## 2023-10-19 ASSESSMENT — PAIN - FUNCTIONAL ASSESSMENT
PAIN_FUNCTIONAL_ASSESSMENT: CPOT (CRITICAL CARE PAIN OBSERVATION TOOL)
PAIN_FUNCTIONAL_ASSESSMENT: 0-10
PAIN_FUNCTIONAL_ASSESSMENT: 0-10

## 2023-10-19 ASSESSMENT — PAIN DESCRIPTION - DESCRIPTORS: DESCRIPTORS: ACHING

## 2023-10-19 ASSESSMENT — PAIN SCALES - GENERAL
PAINLEVEL_OUTOF10: 5 - MODERATE PAIN

## 2023-10-19 ASSESSMENT — ACTIVITIES OF DAILY LIVING (ADL): BATHING_ASSISTANCE: TOTAL

## 2023-10-19 NOTE — PROGRESS NOTES
Critical Care Daily Progress        Subjective   Patient is a 68 y.o. male admitted on 10/11/2023  7:54 PM with the following indication(s) for ICU care hyponatremia, altered mental status with multifactorial etiology.     Interval History: NAEON. Difficult to wake this morning more so than usual, but A&Ox3. Wants some water for his dry throat and some pain meds for some generalized pain.      Scheduled Medications:   albuterol, 2.5 mg, nebulization, q6h  amLODIPine, 5 mg, oral, Daily  atenolol, 50 mg, oral, Daily  atorvastatin, 40 mg, oral, Daily  ergocalciferol, 8,000 Units, oral, Daily  esomeprazole, 40 mg, nasoduodenal tube, Daily before breakfast  folic acid, 1 mg, oral, Daily  melatonin, 5 mg, oral, Nightly  multivitamin with minerals, 1 tablet, oral, Daily  nicotine, 1 patch, transdermal, Daily  potassium chloride, 20 mEq, intravenous, q2h  potassium phosphate, 21 mmol, intravenous, Once  thiamine, 100 mg, oral, Daily           Continuous Medications:   heparin, 0-4,500 Units/hr, Last Rate: 980 Units/hr (10/19/23 1158)           PRN Medications:   PRN medications: acetaminophen, dextrose 10 % in water (D10W), dextrose, glucagon, heparin, labetaloL, lidocaine, loperamide, oxygen, sennosides, white petrolatum    Objective   Vitals:  Most Recent:  Visit Vitals  /74   Pulse 95   Temp 37 °C (98.6 °F) (Temporal)   Resp 26      93% SpO2 on 8L NC    24hr Min/Max:  Temp  Min: 35.9 °C (96.6 °F)  Max: 37 °C (98.6 °F)  Pulse  Min: 89  Max: 113  BP  Min: 98/66  Max: 164/107  Resp  Min: 24  Max: 39  SpO2  Min: 86 %  Max: 98 %      GEN: cachectic, chronically ill appearing, lying in bed, RASS -3  CV: RRR, no m/r/g  PULM: Diffusely decreased breath sounds, crackles LLL  ABD: soft, nontender, nondistended  NEURO: A&Ox3, able to wiggle legs and toes, unable to lift legs, decreased UL strength b/l  PSYCH: Appropriate mood and affect  MSK: no joint swelling grossly noted  EXT: no LE edema  SKIN: warm and dry, no lesions  grossly noted    LDA:    PIV x2 in R arm, Dobhoff      LABS AND IMAGING           Results from last 7 days   Lab Units 10/19/23  0804 10/19/23  0444 10/13/23  2229 10/13/23  1539   SODIUM mmol/L 128* 125*   < > 129*   POTASSIUM mmol/L 2.7* 4.4   < > 3.0*   CHLORIDE mmol/L 103 91*   < > 88*   CO2 mmol/L 14* 23   < > 27   BUN mg/dL 21 26*   < > 9   CREATININE mg/dL 0.48* 0.64   < > 0.54   AST U/L  --   --   --  41*   ALT U/L  --   --   --  14   WBC AUTO x10*3/uL  --  18.3*   < > 16.8*   HEMOGLOBIN g/dL  --  15.9   < > 18.1*   HEMATOCRIT %  --  47.6   < > 52.1*    < > = values in this interval not displayed.      Results for orders placed or performed during the hospital encounter of 10/11/23 (from the past 24 hour(s))      Respiratory Culture/Smear    Specimen: SPUTUM; Fluid   Result Value Ref Range    Respiratory Culture/Smear (A)      Culture not performed. See Gram stain findings. Recollect if clinically indicated.    Gram Stain (A)      Gram stain indicates specimen contains significant salivary contamination.   Magnesium   Result Value Ref Range    Magnesium 1.69 1.60 - 2.40 mg/dL   Renal function panel   Result Value Ref Range    Glucose 107 (H) 74 - 99 mg/dL    Sodium 128 (L) 136 - 145 mmol/L    Potassium 4.0 3.5 - 5.3 mmol/L    Chloride 93 (L) 98 - 107 mmol/L    Bicarbonate 22 21 - 32 mmol/L    Anion Gap 17 10 - 20 mmol/L    Urea Nitrogen 20 6 - 23 mg/dL    Creatinine 0.43 (L) 0.50 - 1.30 mg/dL    eGFR >90 >60 mL/min/1.73m*2    Calcium 7.8 (L) 8.6 - 10.6 mg/dL    Phosphorus 3.0 2.5 - 4.9 mg/dL    Albumin 2.9 (L) 3.4 - 5.0 g/dL   Renal function panel   Result Value Ref Range    Glucose 104 (H) 74 - 99 mg/dL    Sodium 125 (L) 136 - 145 mmol/L    Potassium 4.4 3.5 - 5.3 mmol/L    Chloride 91 (L) 98 - 107 mmol/L    Bicarbonate 23 21 - 32 mmol/L    Anion Gap 15 10 - 20 mmol/L    Urea Nitrogen 26 (H) 6 - 23 mg/dL    Creatinine 0.64 0.50 - 1.30 mg/dL    eGFR >90 >60 mL/min/1.73m*2    Calcium 9.0 8.6 - 10.6 mg/dL     Phosphorus 3.7 2.5 - 4.9 mg/dL    Albumin 3.2 (L) 3.4 - 5.0 g/dL   CBC and Auto Differential   Result Value Ref Range    WBC 18.3 (H) 4.4 - 11.3 x10*3/uL    nRBC 0.0 0.0 - 0.0 /100 WBCs    RBC 5.24 4.50 - 5.90 x10*6/uL    Hemoglobin 15.9 13.5 - 17.5 g/dL    Hematocrit 47.6 41.0 - 52.0 %    MCV 91 80 - 100 fL    MCH 30.3 26.0 - 34.0 pg    MCHC 33.4 32.0 - 36.0 g/dL    RDW 12.6 11.5 - 14.5 %    Platelets 271 150 - 450 x10*3/uL    MPV 10.5 7.5 - 11.5 fL    Neutrophils % 89.3 40.0 - 80.0 %    Immature Granulocytes %, Automated 0.8 0.0 - 0.9 %    Lymphocytes % 2.6 13.0 - 44.0 %    Monocytes % 7.0 2.0 - 10.0 %    Eosinophils % 0.1 0.0 - 6.0 %    Basophils % 0.2 0.0 - 2.0 %    Neutrophils Absolute 16.31 (H) 1.20 - 7.70 x10*3/uL    Immature Granulocytes Absolute, Automated 0.14 0.00 - 0.70 x10*3/uL    Lymphocytes Absolute 0.48 (L) 1.20 - 4.80 x10*3/uL    Monocytes Absolute 1.28 (H) 0.10 - 1.00 x10*3/uL    Eosinophils Absolute 0.01 0.00 - 0.70 x10*3/uL    Basophils Absolute 0.04 0.00 - 0.10 x10*3/uL   Heparin Assay, UFH   Result Value Ref Range    Heparin Unfractionated 0.4 See Comment Below for Therapeutic Ranges IU/mL   POCT GLUCOSE   Result Value Ref Range    POCT Glucose 119 (H) 74 - 99 mg/dL        XR chest 1 view    Result Date: 10/17/2023  IMPRESSION:  1. Slight worsening of left basilar opacity compared to prior  imaging, suggesting atelectasis and/or pleural effusion.  2. Probable interstitial pulmonary edema or fluid overload. Correlate  with patient's fluid status.      I personally reviewed the images/study and I agree with the findings  as stated. This study was interpreted at [facility], [place], [place].        Transthoracic Echo (TTE) Complete 10/  Result Date: 10/17/2023  CONCLUSIONS:  1. Left ventricular systolic function is hyperdynamic with a 75% estimated ejection fraction.  2. The left ventricular septal wall thickness is mildly increased.  3. There is left ventricular  concentric remodeling.  4. Atrial septal aneurysm present.  5. A bubble study using agitated saline was performed. Bubble study is positive.  6. Mildly elevated RVSP.  7. Poorly visualized anatomical structures due to suboptimal image quality.  8. Compared with study from 8/29/2023, the RVSP today is mildly elevated, compared to moderately elevated in the prior echocardiogram (60 mmHg).     XR chest 1 view  Result Date: 10/17/2023  1. Hazy left basilar opacity appears slightly less conspicuous than prior and correlate with decreasing atelectasis/infiltrate. 2. Redemonstrated background diffuse emphysema.   I personally reviewed the images/study and I agree with the findings as stated by radiology resident Mignon Malone MD. This study was interpreted at [place], [place].   Signed by: Khanh Weaver 10/17/2023 3:23 PM Dictation workstation:   [id number]      CT chest abdomen pelvis w IV contrast  Result Date: 10/16/2023  1. No evidence of primary neoplasm or metastatic disease. 2. Right lower lobe mucous plugging which may reflect aspiration.   I personally reviewed the images/study and I agree with the findings as stated. This study was interpreted at [place], [place].   MACRO: None   Signed by: Jeet Wang 10/16/2023 3:39 PM Dictation workstation:   [id number]       CT head wo IV contrast  Result Date: 10/11/2023  1. No evidence of acute cortical infarct or intracranial hemorrhage. 2. Subcortical and periventricular white matter hypodensities consistent with the sequela of chronic microvascular ischemic changes. 3. Punctate remote left thalamic and anterior basal ganglia lacunar infarcts.          Assessment/Plan         Molina Godinez is a 68 year old male with PMHx of COPD, HTN, AUD and opioid use disorder, HCV, chronic PE who was seen in MICU 10/13-10/16 for inability to walk 2/2 suspected alcohol use disorder. ICU  course complicated by hypertension, altered mental status, and hyponatremia. Transferred back to MICU 10/17 after he was found lethargic and hypoxic. Has had fairly inconsistent levels of rousability, RASS -4 to 0.     10/19 Updates to plan:  Continue to wean O2 with goal of 92% SpO2, weaned down to NC from HFNC   Continue fluid restriction  RFP with hypokalemia, hypocalcemia, hypophosphatemia. Will replete with Kcl and Kphos and reach out to Nutrition regarding titrating TF given c/f refeeding syndrome- rec holding current TF rate of 25ml/hr and repleting electrolytes  Repeat Sputum Cx  Will reach out to health department regarding hx of prior syphilis tx       NEURO/PSYCH  #AMS- fluctuating, unclear etiology as previously suspected alcohol withdrawal but could be 2/2 chronic hypoxia and retention vs hyponatremia or other etiology  -CT head without pathology  - Initially had visual and tactile hallucinations that have since resolved. Low concern for alcohol withdrawal at this point given last drink was 7 days ago and patient's AMS has typically been early morning and late night. Suspect more metabolic etiology of fluctuations in mental status, I/s/o acutely increased oxygen requirement.  - Concern today after rounds as patient obtunded with another episode of lethargy. Baseline RASS -1- -2, patient was -4 during after rounds exam. ABGs without significant abnormality, lethargic episode thought to be secondary to retention I/s/o severe COPD vs hyponatremia. 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 Syphilis total Ab positive, RPR negative  PLAN:  [] Will call health department to obtain information of any recent syphilis infection and discuss with ID/Neuro if positive  [] Wean oxygen as tolerated       #Bilateral LE weakness  -Likely 2/2 chronic, had weakness during 8/2023 admission; less likely GBS  -CT head without pathology,  MRI spine stopped early due to pain  -Patient able to move bilateral lower extremities, low c/f GBS  PLAN:  -No LP indicated at this time  -May follow up with neurology once no longer withdrawing     CV  #HTN- resolved  - home amlodipine 5mg, atenolol 50mg  - Initially started on Cardene drip, transitioned to Labetalol prior to resolution  [] Has IV Labetalol PRN, will resume home BP meds     #CAD  -home atorvastatin 40mg (home med)     PULM  #CAP  -Urine strep positive, unsure if active or chronic infection  -MRSA negative, Urine legionella negative  -vanc/zosyn 10/12-13  -procal 0.83, up from 0.55 10/15  - Blood Cx NGTD 10/13  PLAN:  [] Fu MRSA      #Suspected PAH  - Echo with atrial septal aneurysm, positive bubble study  [] Will need outpatient workup, previously some suspicion for PAH but will need further evaluation    #Severe COPD  #Mucus plugging  -8/2023 FEV1 42%  - CT C/A/P with RLL mucus plug  - Required 70% FiO2 HFNC during this MICU stay, now on NC  PLAN:  [] Albuterol q6h, chest PT, respiratory hygiene  [] Wean O2 as tolerated     #Subsegmental PE  -Requires anticoagulation for 3 months (per vasc med 8/2023), on home apixaban 5mg BID  PLAN:  -not tolerating PO, on heparin gtt       GI  #HepC  -treated, 8/2023 HCV RNA undetected     #Nutrition  -Per SLP 10/19, continue NPO with 1-2ice chips per hour with RN only  - Dobhoff in place 10/17  - Nutrition following, appreciate recs  PLAN:  - Starting on TF with TwoCal HN goal rate of 35ml/hr 10/19- currently held at 25ml/hr per nutrition recs  - 100mg Thiamine, 1mg Folic acid, 8000u Vit D2     /RENAL  #Hyponatremia - Likely SIADH 2/2 COPD exacerbation  - Na as low as 120, improving, now 125  - Serum Osm 248, urine Osm 492, Urine Na 32  [] Fluid restriction  [] RFP q4h    #C/f refeeding syndrome  - Hypokalemia 2.7, Phos 1.9, Ca 5.2  [] Repeat RFP  [] Replete K with 40mEq Kcl and Kphos 21mmol  [] Will touch base with Nutrition regarding TF rate- rec holding  current TF rate of 25ml/hr and repleting electrolytes    #Alkalosis  - Likely 2/2 tachypnea     ENDOCRINE  #Secondary HyperPTH  -8/28 .7  -Hypocalcemic on admission, now resolved  -25-hydroxy vit D 8, 1,25 Vit D 44.4  PLAN:  Vit D2 8000u liquid daily     ID  #Leukocytosis I/s/o recently treated strep pneumo CAP  -Procal 0.63, up from 0.55 10/15  - Completed CTX 10/13-10/17  - Zosyn 10/17-10/17  [] Repeat sputum Cx as previous attempt with significant salivary contamination         HEME/ONC  #Leukocytosis, worsening  -2/2 to CAP  - WBC initially downtrended to 12.1 yesterday, now elevated to 18.3  [] Monitor WBC, vitals       MSK/RHEUM   #Chronic LE weakness  -Likely chronic from disuse and alcohol, unlikely GBS since no paralysis  -Consider neurology outpatient referral for possible EMG, neurology signed off     Vent/Oxy: NC  Pressors/Drips: Heparin gtt  Abx: None  F: PRN  E: replete PRN  N: TF via Dobhoff   A: PIV R arm x2, Dobhoff  DVT ppx: heparin gtt  GI ppx: Pantoprazole     Code status: Full Code (confirmed)  NOK: Tonie (sister) [phone]; Nahmoi (God-sister) [phone]; Sarah Herbert (sister) [phone]       Ever Paez MD  Internal Medicine, PGY-1

## 2023-10-19 NOTE — PROGRESS NOTES
SOCIAL WORK NOTE   SW received VM from sister Sarah ([phone]) and returned call. SW provided explanation of discharge process. Patient is still in ICU and does not currently have any PT/OT recommendations. Initial admission assessment was completed with patient and other sister Romana. SW will follow up with sister and patient as appropriate when discharge plan is determined. Social work to follow.  AMANDO Peralta, ZOLTAN-S ([id number])     UPDATE: SW received message from Winsome SHERWOOD CM ([phone]) VM left. Per team, pateint is pending further work up for mental status from ID and Neurology. Currently recommended for Moderate intensity therapy. Social work to follow.  AMANDO Peralta, ZOLTAN-S ([id number])

## 2023-10-19 NOTE — PROGRESS NOTES
Occupational Therapy    Evaluation/Treatment    Patient Name: Molina Godinez  MRN: [id number]  : 1955  Today's Date: 10/19/23  Time Calculation  Start Time: 48  Stop Time: 1016  Time Calculation (min): 28 min       Assessment:  OT Assessment: Pt. demo global weakness with observed movements this date. Patient answering questions with increased time and cueing for alertness.Pt. would benefit from continued OT services to increase functional independence.  Prognosis: Fair  Barriers to Discharge: Decreased caregiver support  Evaluation/Treatment Tolerance: Patient limited by fatigue  Medical Staff Made Aware: Yes  End of Session Communication: Bedside nurse  End of Session Patient Position: Bed, 3 rail up  OT Assessment Results: Decreased ADL status, Decreased endurance, Decreased upper extremity range of motion, Decreased upper extremity strength, Decreased cognition  Prognosis: Fair  Barriers to Discharge: Decreased caregiver support  Evaluation/Treatment Tolerance: Patient limited by fatigue  Medical Staff Made Aware: Yes  Plan:  Treatment Interventions: ADL retraining, Functional transfer training, UE strengthening/ROM, Endurance training, Cognitive reorientation, Patient/family training, Neuromuscular reeducation, Compensatory technique education, Fine motor coordination activities  OT Frequency: 3 times per week  OT Discharge Recommendations: Moderate intensity level of continued care  OT Recommended Transfer Status:  (unsafe for OOB transfer at this time)  OT - Requires Next F/U Appointment: 10/20/23  Treatment Interventions: ADL retraining, Functional transfer training, UE strengthening/ROM, Endurance training, Cognitive reorientation, Patient/family training, Neuromuscular reeducation, Compensatory technique education, Fine motor coordination activities    Subjective   Current Problem:  1. Hospital-acquired pneumonia        2. Polycythemia  Transthoracic Echo (TTE) Complete    Transthoracic Echo (TTE)  "Complete    CANCELED: Transthoracic Echo (TTE) Complete    CANCELED: Transthoracic Echo (TTE) Complete      3. Encounter for screening for cardiovascular disorders  Transthoracic Echo (TTE) Complete        General:   OT Received On: 10/19/23  General  Reason for Referral: 69 y/o M presenting to hospital after multiple days of weakness. In ICU, pt with increased lethargy/AMS.  Past Medical History Relevant to Rehab: COPD, HTN, AUD and opioid use disorder, HCV, chronic PE  Family/Caregiver Present: Yes  Caregiver Feedback: sister present  Co-Treatment: PT  Patient Position Received: Bed, 4 rail up  Preferred Learning Style: verbal, visual, auditory  General Comment: Pt. seated in bed at start of session and decreased alertness. Improving alertness after nailbed pressure on L hand provided. Forward head/neck with difficulty extended and unable to tolerate full neck extension.  Precautions:  Medical Precautions: Fall precautions  Vital Signs:  Heart Rate: 95  SpO2: 93 % (on 8L)  BP: 116/74  Pain:  Pain Assessment  Pain Assessment: CPOT (Critical Care Pain Observation Tool)  Pain Score: 5 - Moderate pain  Pain Type: Chronic pain (stiffness)  Pain Location: Generalized  Pain Descriptors: Aching  Pain Interventions: Repositioned, Distraction, Emotional support  Response to Interventions: tolerated OT    Objective   Cognition:  Overall Cognitive Status: Within Functional Limits, Impaired  Orientation Level:  (\"I came in because I am sick.\")  Attention: Exceptions to WFL  Sustained Attention: Impaired  Processing Speed: Delayed        Gonzalez Agitation Sedation Scale  Gonzalez Agitation Sedation Scale (RASS):  (improving from -1 to 0 during session)  Home Living:  Type of Home: Apartment  Lives With: Alone  Home Access: Elevator (lives on 9th floor)  Prior Function:  Level of [place]: Independent with ADLs and functional transfers, Independent with homemaking with ambulation (per patient's sister, patient normally " indep with ambulation, ADLs; patient reported ambulating indep as well; questionable historian/accuracy of patient reporting.)  IADL History:     ADL:  Eating Assistance: Total  Grooming Assistance: Total  Bathing Assistance: Total  UE Dressing Assistance: Total  LE Dressing Assistance: Total  Toileting Assistance with Device: Total  Functional Assistance:  (unsafe to progress to functional mobility)       Bed Mobility/Transfers: Bed Mobility  Bed Mobility: Yes  Bed Mobility 1  Bed Mobility 1: Supine to sitting, Sitting to supine  Level of Assistance 1: Dependent (x2)  Bed Mobility Comments 1: HOB elevated and use of draw sheet to assist with change in position. Sitting EOB for ~10 min with MAX A to TOTAL A for sit balance.          Cognitive Skill Development:  Cognitive Skill Development Activity 1: Forward reaching x 5 reps for each arm with guiding assist to complete. Sensory stimulation with face washing for increasing alertness at start of session. Simple cues for increased engagement.       Vision:Vision - Basic Assessment  Current Vision: Wears glasses only for reading    Strength:  Strength Comments: global BUE and BLE weakness; B shoulder flexion <2+/5, elbow flexion/ext <3-/5,  (PF/DF <3-/5, knee ext </=3-/5, hip flexion </=2+/5; LLE appears weaker than L)        Hand Function:  Hand Function  Gross Grasp: Impaired (R hand 3-/5 grasp)  Extremities: RUE   RUE : Exceptions to WFL (AROM imparied; PROM WFL except B shoulder to ~90 degrees.) and LUE   LUE: Exceptions to WFL (AROM imparied; PROM WFL except B shoulder to ~90 degrees.)      Outcome Measures: [facility] Daily Activity  Putting on and taking off regular lower body clothing: Total  Bathing (including washing, rinsing, drying): Total  Putting on and taking off regular upper body clothing: Total  Toileting, which includes using toilet, bedpan or urinal: Total  Taking care of personal grooming such as brushing teeth: Total  Eating Meals: Total  Daily  Activity - Total Score: 6        Education Documentation  ADL Training, taught by Jessica Hurtado OT at 10/19/2023  3:29 PM.  Learner: Family, Patient  Readiness: Acceptance  Method: Explanation  Response: Verbalizes Understanding, Needs Reinforcement        Goals:  Encounter Problems       Encounter Problems (Active)       ADLs       Patient will complete grooming tasks with MOD A with min v/c.  (Progressing)       Start:  10/19/23    Expected End:  11/02/23               BALANCE       Pt. will sit EOB for at least 8 min with MOD A in prep for EOB ADLs.  (Progressing)       Start:  10/19/23    Expected End:  11/02/23                       COGNITION/SAFETY       Patient will follow 1 step commands with 75% accuracy and min v/c.  (Progressing)       Start:  10/19/23    Expected End:  11/02/23               EXERCISE/STRENGTHENING       Patient will demo BUE strength of 3/5 for B elbow, wrist, hand.  (Progressing)       Start:  10/19/23    Expected End:  11/02/23                            As certified below, I, or a nurse practitioner or physician assistant working with me, had a face-to-face encounter that meets the physician face-to-face encounter requirements.

## 2023-10-19 NOTE — PROGRESS NOTES
Speech-Language Pathology    Inpatient  Speech-Language Pathology Treatment     Patient Name: Molina Godinez  MRN: [id number]  Today's Date: 10/19/2023  Time Calculation  Start Time: 0910  Stop Time: 0930  Time Calculation (min): 20 min       Recommendations:  Solid Diet Recommendations : NPO  Liquid Diet Recommendations: NPO  Medication Administration Recommendations: Non Oral  -continue alternative means nutrition/hydration  -Frequent oral care   -May also consider allowing x1-2 ice chips per hour with RN only, for swallow stimulation/ to reduce oropharyngeal bacteria  SLP re-evaluation once pt managing oropharyngeal secretions and clinical status is stable        Assessment:  Clinical swallow evaluation revealing high c/f aspiration/pharyngeal dysphagia in setting of poor secretion management and compromised respiratory status. Given that pt with PNA, thick secretions, congested coughing, pt at high risk for respiratory complications 2/2 aspiration, thus would recommend NPO at this time.  Re-evaluation when pt managing secretions and respiratory status improved.     SLP Assessment:  SLP TX Intervention Outcome: No Progress Made  Prognosis: Fair  Treatment Tolerance: Treatment limited secondary to medical complications  Medical Staff Made Aware: Yes       Plan:  Inpatient/Swing Bed or Outpatient: Inpatient  SLP TX Plan: Continue Plan of Care  SLP Plan: Skilled SLP  SLP Frequency: 1x per week  Duration: 2 weeks  SLP Discharge Recommendations: Continue skilled SLP services at the next level of care  Discussed POC: Patient, Nursing, Physician  Discussed Risks/Benefits: Yes, Patient, Caregiver/Family, Nursing, Physician  Patient/Caregiver Agreeable: Yes  SLP - OK to Discharge: Yes      Subjective   Pt alert but required cues to sustain alerteness. Oriented to self and hospital. Sister at bedside. Pt breathing on supplemental O2 via NC, tachypneic and O2 sats 89-91% throughout exam.     Objective   Therapeutic  Swallow:  Intervention: Repeat swallow evaluation  Pt with wet voice at SLP arrival, RN had just NT suctioned, removing min amount of secretions. When cued to cough pt with weak congested/wet coughing, unproductive. Pt consumed x4 small ice chips, one at a time. Pt with no coughing, however voice perceptually wet and gurgly, worse than prior to presentation; highly suspect aspiration of ice chips. Did not present further trials given wet vocal quality/inability to clear secretions.         Inpatient Education:   Pt and sister educated on recommendations and rationale for recs. They both indicated understanding.       Goal:  Pt will tolerate least restrictive diet without s/s aspiration 100% of the time.

## 2023-10-19 NOTE — PROGRESS NOTES
Physical Therapy    Physical Therapy Evaluation & Treatment    Patient Name: Molina Godinez  MRN: [id number]  Today's Date: 10/19/2023   Time Calculation  Start Time: 0949  Stop Time: 1017  Time Calculation (min): 28 min    Assessment/Plan   PT Assessment  PT Assessment Results: Decreased strength, Impaired balance, Decreased mobility  Rehab Prognosis: Good  End of Session Communication: Bedside nurse, Physician  End of Session Patient Position: Bed, 3 rail up, Alarm off, not on at start of session  IP OR SWING BED PT PLAN  Inpatient or Swing Bed: Inpatient  PT Plan  Treatment/Interventions: Bed mobility, Transfer training, Gait training, Balance training, Neuromuscular re-education, Strengthening, Therapeutic exercise, Therapeutic activity, Positioning, Postural re-education  PT Plan: Skilled PT  PT Frequency: 4 times per week  PT Discharge Recommendations: Moderate intensity level of continued care  PT - OK to Discharge: Yes    Subjective     General Visit Information:  General  Reason for Referral: presenting to hospital after multiple days of weakness. In ICU, pt with increased lethargy/AMS. ICU course c/b HTN, AMS and hyponatremia. CTH without pathology.  Past Medical History Relevant to Rehab: COPD, HTN, AUD and opioid use disorder, HCV, chronic PE; recent falls?  Missed Visit: Yes  Missed Visit Reason:  (patient obtunded, is not following commands; PT noticed patient leaning head/neck/trunk to R side; PT tried to re-position patient's head and noticed increase stiffness on R side of neck. PT informed RN and medical team.)  Family/Caregiver Present: Yes  Caregiver Feedback: sister present  Co-Treatment: OT  Co-Treatment Reason: patient with reduced arousal; unsuccessful mobilization by RN staff; per RN report, patient globally very deconditioned.  Patient Position Received: Bed, 3 rail up, Alarm off, not on at start of session  Preferred Learning Style: verbal, visual  General Comment: Patient with decreased  alertness. Improving alertness after nailbed pressure on L hand digit provided d/t patient not moving LUE initially on command. Forward flexed head/neck posture with difficulty extended c-spine and unable to tolerate full neck extension via PROM.  Home Living:  Home Living  Type of Home: Apartment  Lives With: Alone  Home Access: Elevator (lives on 9th floor)  Prior Level of Function:  Prior Function Per Pt/Caregiver Report  Level of [place]:  (per patient's sister, patient normally indep with ambulation, ADLs; patient reported ambulating indep as well; questionable historian/accuracy of patient reporting.)  Precautions:  Precautions  Medical Precautions: Oxygen therapy device and L/min, Fall precautions  Vital Signs:  Vital Signs  Heart Rate:  (pre: 95 post: 96)  Resp:  (pre: 32 post: 29; RR into the high 30s-low 40s sitting EOB)  SpO2:  (pre: 93% post: 89% (RN aware); sitting EOB: desaturated to 88%)  BP:  (pre: 123/67 post: 115/65)    Objective   Pain:  Pain Assessment  Pain Assessment: 0-10  Pain Score: 5 - Moderate pain  Pain Type:  (stiffness reported)  Pain Location: Generalized  Pain Interventions: Repositioned  Cognition:  Cognition  Overall Cognitive Status: Impaired (reduced alertness)  Orientation Level:  (AxO x3)    Postural Control  Postural Control: Impaired  Head Control:  (favors forward flexed posturing, limited ability to actively extend c-spine/head; PT attempted to passively extend patient's c-spine, however only achieved minor incresae in cervical extension.)  Righting Reactions: unable to self correct posture, MAX-DEPx1 assist.  Protective Responses: limited ability to use BUEs to support self    Static Sitting Balance  Static Sitting-Level of Assistance: Maximum assistance, Dependent  Dynamic Sitting Balance  Dynamic Sitting-Comments: DEPx1     Functional Assessments:  Bed Mobility  Bed Mobility: Yes  Bed Mobility 1  Bed Mobility 1: Supine to sitting, Sitting to supine  Level of  Assistance 1: Dependent  Bed Mobility Comments 1: x2 assist, HOB elevated, draw sheet  Extremity/Trunk Assessments:  RUE   RUE :  (limited AROM globally, PROM appears grossly WFL)  LUE   LUE:  (limited AROM globally, PROM appears grossly WFL)  RLE   RLE :  (limited AROM globally, PROM appears grossly WFL)  LLE   LLE :  (limited AROM globally, PROM appears grossly WFL)  Treatments:     Balance/Neuromuscular Re-Education  Balance/Neuromuscular Re-Education Activity Performed: Yes  Balance/Neuromuscular Re-Education Activity 1: Patient sat EOB x10-12 minutes; MAX-DEPx1 assist, required tactile assist to place B hands on EOB in order to promote patient assisting self in maintain midline posture; patient had difficulty keeping B hands on EOB d/t BUE weakness. Favored increase FF posture, difficulty extending c-spine to lift head; PT provided passive pectoral stretch held 30 seconds x2 trials B. Cueing to shift bodyweight anteriorly in order to assist in maintain sitting balance. No righting reactions. Passive c-spine extension stretch held 30 seconds x3.    Bed Mobility  Bed Mobility: Yes  Bed Mobility 1  Bed Mobility 1: Supine to sitting, Sitting to supine  Level of Assistance 1: Dependent  Bed Mobility Comments 1: x2 assist, HOB elevated, draw sheet  Outcome Measures:  [facility] Basic Mobility  Turning from your back to your side while in a flat bed without using bedrails: Total  Moving from lying on your back to sitting on the side of a flat bed without using bedrails: Total  Moving to and from bed to chair (including a wheelchair): Total  Standing up from a chair using your arms (e.g. wheelchair or bedside chair): Total  To walk in hospital room: Total  Climbing 3-5 steps with railing: Total  Basic Mobility - Total Score: 6    FSS-ICU  Ambulation: Unable to attempt due to weakness  Rolling: Total assistance (performs 25% or requires another person)  Sitting: Total assistance (performs 25% or requires another  person)  Transfer Sit-to-Stand: Unable to perform  Transfer Supine-to-Sit: Total assistance (performs 25% or requires another person)  Total Score: 3      E = Exercise and Early Mobility  Current Activity: Sitting at edge of bed    Encounter Problems       Encounter Problems (Active)       Balance       Patient will complete static (Cgx1) and dynamic (MINx1) using BUE support as needed in order to maintain midline without acute LOB         Start:  10/19/23    Expected End:  11/09/23               Mobility       STG - Patient will ambulate >/=10 ft using LRD as needed with MODx1 without acute LOB        Start:  10/19/23    Expected End:  11/09/23            patient will complete BLE there-ex in order to improve strength and to assist with the completion of functional mobility tasks.        Start:  10/19/23    Expected End:  11/09/23               Pain - Adult          Transfers       STG - Transfer from bed to chair with MODx1 without acute LOB        Start:  10/19/23    Expected End:  11/09/23            STG - Patient will perform bed mobility with MINx1 assist        Start:  10/19/23    Expected End:  11/09/23            STG - Patient will transfer sit to and from stand with MODx1 using LRD as needed without acute LOB        Start:  10/19/23    Expected End:  11/09/23                   Education Documentation  Mobility Training, taught by Tameka Velasquez PT at 10/19/2023  1:15 PM.  Learner: Patient  Readiness: Acceptance  Method: Explanation  Response: Needs Reinforcement    Education Comments  No comments found.

## 2023-10-20 ENCOUNTER — APPOINTMENT (OUTPATIENT)
Dept: RADIOLOGY | Facility: HOSPITAL | Age: 68
End: 2023-10-20
Payer: COMMERCIAL

## 2023-10-20 LAB
ALBUMIN SERPL BCP-MCNC: 2.1 G/DL (ref 3.4–5)
ALBUMIN SERPL BCP-MCNC: <1.5 G/DL (ref 3.4–5)
ANION GAP BLDA CALCULATED.4IONS-SCNC: 6 MMO/L (ref 10–25)
ANION GAP BLDA CALCULATED.4IONS-SCNC: 9 MMO/L (ref 10–25)
ANION GAP SERPL CALC-SCNC: 14 MMOL/L (ref 10–20)
ANION GAP SERPL CALC-SCNC: 16 MMOL/L (ref 10–20)
BACTERIA SPEC RESP CULT: ABNORMAL
BACTERIA SPEC RESP CULT: ABNORMAL
BASE EXCESS BLDA CALC-SCNC: -0.3 MMOL/L (ref -2–3)
BASE EXCESS BLDA CALC-SCNC: -1.5 MMOL/L (ref -2–3)
BASOPHILS # BLD AUTO: 0.04 X10*3/UL (ref 0–0.1)
BASOPHILS NFR BLD AUTO: 0.2 %
BODY TEMPERATURE: 37 DEGREES CELSIUS
BODY TEMPERATURE: 37 DEGREES CELSIUS
BUN SERPL-MCNC: 12 MG/DL (ref 6–23)
BUN SERPL-MCNC: 23 MG/DL (ref 6–23)
CA-I BLDA-SCNC: 1.13 MMOL/L (ref 1.1–1.33)
CA-I BLDA-SCNC: 1.14 MMOL/L (ref 1.1–1.33)
CALCIUM SERPL-MCNC: 4.1 MG/DL (ref 8.6–10.6)
CALCIUM SERPL-MCNC: 6.2 MG/DL (ref 8.6–10.6)
CHLORIDE BLDA-SCNC: 93 MMOL/L (ref 98–107)
CHLORIDE BLDA-SCNC: 96 MMOL/L (ref 98–107)
CHLORIDE SERPL-SCNC: 100 MMOL/L (ref 98–107)
CHLORIDE SERPL-SCNC: 113 MMOL/L (ref 98–107)
CO2 SERPL-SCNC: 11 MMOL/L (ref 21–32)
CO2 SERPL-SCNC: 18 MMOL/L (ref 21–32)
CREAT SERPL-MCNC: 0.47 MG/DL (ref 0.5–1.3)
CREAT SERPL-MCNC: <0.2 MG/DL (ref 0.5–1.3)
EOSINOPHIL # BLD AUTO: 0.01 X10*3/UL (ref 0–0.7)
EOSINOPHIL NFR BLD AUTO: 0 %
ERYTHROCYTE [DISTWIDTH] IN BLOOD BY AUTOMATED COUNT: 13 % (ref 11.5–14.5)
GFR SERPL CREATININE-BSD FRML MDRD: >90 ML/MIN/1.73M*2
GFR SERPL CREATININE-BSD FRML MDRD: ABNORMAL ML/MIN/{1.73_M2}
GLUCOSE BLD MANUAL STRIP-MCNC: 102 MG/DL (ref 74–99)
GLUCOSE BLD MANUAL STRIP-MCNC: 104 MG/DL (ref 74–99)
GLUCOSE BLD MANUAL STRIP-MCNC: 106 MG/DL (ref 74–99)
GLUCOSE BLD MANUAL STRIP-MCNC: 145 MG/DL (ref 74–99)
GLUCOSE BLD MANUAL STRIP-MCNC: 93 MG/DL (ref 74–99)
GLUCOSE BLDA-MCNC: 114 MG/DL (ref 74–99)
GLUCOSE BLDA-MCNC: 148 MG/DL (ref 74–99)
GLUCOSE SERPL-MCNC: 102 MG/DL (ref 74–99)
GLUCOSE SERPL-MCNC: 170 MG/DL (ref 74–99)
GRAM STN SPEC: ABNORMAL
GRAM STN SPEC: ABNORMAL
HCO3 BLDA-SCNC: 22.1 MMOL/L (ref 22–26)
HCO3 BLDA-SCNC: 22.9 MMOL/L (ref 22–26)
HCT VFR BLD AUTO: 46.7 % (ref 41–52)
HCT VFR BLD EST: 48 % (ref 41–52)
HCT VFR BLD EST: 54 % (ref 41–52)
HGB BLD-MCNC: 16.1 G/DL (ref 13.5–17.5)
HGB BLDA-MCNC: 16.1 G/DL (ref 13.5–17.5)
HGB BLDA-MCNC: 18.1 G/DL (ref 13.5–17.5)
IMM GRANULOCYTES # BLD AUTO: 0.15 X10*3/UL (ref 0–0.7)
IMM GRANULOCYTES NFR BLD AUTO: 0.7 % (ref 0–0.9)
INHALED O2 CONCENTRATION: 100 %
INHALED O2 CONCENTRATION: 80 %
LACTATE BLDA-SCNC: 1.8 MMOL/L (ref 0.4–2)
LACTATE BLDA-SCNC: 2.1 MMOL/L (ref 0.4–2)
LYMPHOCYTES # BLD AUTO: 0.33 X10*3/UL (ref 1.2–4.8)
LYMPHOCYTES NFR BLD AUTO: 1.5 %
MAGNESIUM SERPL-MCNC: 0.75 MG/DL (ref 1.6–2.4)
MAGNESIUM SERPL-MCNC: 1.61 MG/DL (ref 1.6–2.4)
MCH RBC QN AUTO: 29.8 PG (ref 26–34)
MCHC RBC AUTO-ENTMCNC: 34.5 G/DL (ref 32–36)
MCV RBC AUTO: 87 FL (ref 80–100)
MONOCYTES # BLD AUTO: 1.17 X10*3/UL (ref 0.1–1)
MONOCYTES NFR BLD AUTO: 5.3 %
MRSA DNA SPEC QL NAA+PROBE: NOT DETECTED
MRSA DNA SPEC QL NAA+PROBE: NOT DETECTED
NEUTROPHILS # BLD AUTO: 20.5 X10*3/UL (ref 1.2–7.7)
NEUTROPHILS NFR BLD AUTO: 92.3 %
NRBC BLD-RTO: 0 /100 WBCS (ref 0–0)
OXYHGB MFR BLDA: 89.4 % (ref 94–98)
OXYHGB MFR BLDA: 93.2 % (ref 94–98)
PCO2 BLDA: 33 MM HG (ref 38–42)
PCO2 BLDA: 34 MM HG (ref 38–42)
PH BLDA: 7.42 PH (ref 7.38–7.42)
PH BLDA: 7.45 PH (ref 7.38–7.42)
PHOSPHATE SERPL-MCNC: 1.3 MG/DL (ref 2.5–4.9)
PHOSPHATE SERPL-MCNC: 2.4 MG/DL (ref 2.5–4.9)
PLATELET # BLD AUTO: 227 X10*3/UL (ref 150–450)
PMV BLD AUTO: 10.2 FL (ref 7.5–11.5)
PO2 BLDA: 61 MM HG (ref 85–95)
PO2 BLDA: 69 MM HG (ref 85–95)
POTASSIUM BLDA-SCNC: 4.6 MMOL/L (ref 3.5–5.3)
POTASSIUM BLDA-SCNC: 5.2 MMOL/L (ref 3.5–5.3)
POTASSIUM SERPL-SCNC: 3.2 MMOL/L (ref 3.5–5.3)
POTASSIUM SERPL-SCNC: 4.1 MMOL/L (ref 3.5–5.3)
PROCALCITONIN SERPL-MCNC: 0.48 NG/ML
RBC # BLD AUTO: 5.4 X10*6/UL (ref 4.5–5.9)
SAO2 % BLDA: 92 % (ref 94–100)
SAO2 % BLDA: 96 % (ref 94–100)
SODIUM BLDA-SCNC: 119 MMOL/L (ref 136–145)
SODIUM BLDA-SCNC: 120 MMOL/L (ref 136–145)
SODIUM SERPL-SCNC: 130 MMOL/L (ref 136–145)
SODIUM SERPL-SCNC: 135 MMOL/L (ref 136–145)
UFH PPP CHRO-ACNC: 0.7 IU/ML
WBC # BLD AUTO: 22.2 X10*3/UL (ref 4.4–11.3)

## 2023-10-20 PROCEDURE — 94667 MNPJ CHEST WALL 1ST: CPT

## 2023-10-20 PROCEDURE — S4991 NICOTINE PATCH NONLEGEND: HCPCS | Performed by: STUDENT IN AN ORGANIZED HEALTH CARE EDUCATION/TRAINING PROGRAM

## 2023-10-20 PROCEDURE — 36415 COLL VENOUS BLD VENIPUNCTURE: CPT | Mod: CMCLAB

## 2023-10-20 PROCEDURE — 2020000001 HC ICU ROOM DAILY

## 2023-10-20 PROCEDURE — 94002 VENT MGMT INPAT INIT DAY: CPT

## 2023-10-20 PROCEDURE — 70553 MRI BRAIN STEM W/O & W/DYE: CPT

## 2023-10-20 PROCEDURE — 0B938ZZ DRAINAGE OF RIGHT MAIN BRONCHUS, VIA NATURAL OR ARTIFICIAL OPENING ENDOSCOPIC: ICD-10-PCS | Performed by: STUDENT IN AN ORGANIZED HEALTH CARE EDUCATION/TRAINING PROGRAM

## 2023-10-20 PROCEDURE — 2500000001 HC RX 250 WO HCPCS SELF ADMINISTERED DRUGS (ALT 637 FOR MEDICARE OP): Performed by: STUDENT IN AN ORGANIZED HEALTH CARE EDUCATION/TRAINING PROGRAM

## 2023-10-20 PROCEDURE — 5A1955Z RESPIRATORY VENTILATION, GREATER THAN 96 CONSECUTIVE HOURS: ICD-10-PCS | Performed by: INTERNAL MEDICINE

## 2023-10-20 PROCEDURE — 2500000005 HC RX 250 GENERAL PHARMACY W/O HCPCS

## 2023-10-20 PROCEDURE — 2500000002 HC RX 250 W HCPCS SELF ADMINISTERED DRUGS (ALT 637 FOR MEDICARE OP, ALT 636 FOR OP/ED): Performed by: STUDENT IN AN ORGANIZED HEALTH CARE EDUCATION/TRAINING PROGRAM

## 2023-10-20 PROCEDURE — 36600 WITHDRAWAL OF ARTERIAL BLOOD: CPT

## 2023-10-20 PROCEDURE — 83735 ASSAY OF MAGNESIUM: CPT

## 2023-10-20 PROCEDURE — 2500000001 HC RX 250 WO HCPCS SELF ADMINISTERED DRUGS (ALT 637 FOR MEDICARE OP)

## 2023-10-20 PROCEDURE — 99222 1ST HOSP IP/OBS MODERATE 55: CPT | Performed by: INTERNAL MEDICINE

## 2023-10-20 PROCEDURE — 82435 ASSAY OF BLOOD CHLORIDE: CPT

## 2023-10-20 PROCEDURE — 80069 RENAL FUNCTION PANEL: CPT

## 2023-10-20 PROCEDURE — 72156 MRI NECK SPINE W/O & W/DYE: CPT

## 2023-10-20 PROCEDURE — 72158 MRI LUMBAR SPINE W/O & W/DYE: CPT | Performed by: RADIOLOGY

## 2023-10-20 PROCEDURE — 94640 AIRWAY INHALATION TREATMENT: CPT

## 2023-10-20 PROCEDURE — 84145 PROCALCITONIN (PCT): CPT

## 2023-10-20 PROCEDURE — 85520 HEPARIN ASSAY: CPT

## 2023-10-20 PROCEDURE — 71045 X-RAY EXAM CHEST 1 VIEW: CPT | Performed by: STUDENT IN AN ORGANIZED HEALTH CARE EDUCATION/TRAINING PROGRAM

## 2023-10-20 PROCEDURE — 85025 COMPLETE CBC W/AUTO DIFF WBC: CPT | Mod: CMCLAB

## 2023-10-20 PROCEDURE — 70553 MRI BRAIN STEM W/O & W/DYE: CPT | Performed by: RADIOLOGY

## 2023-10-20 PROCEDURE — 2550000001 HC RX 255 CONTRASTS: Performed by: INTERNAL MEDICINE

## 2023-10-20 PROCEDURE — 31720 CLEARANCE OF AIRWAYS: CPT

## 2023-10-20 PROCEDURE — 84132 ASSAY OF SERUM POTASSIUM: CPT | Mod: CMCLAB

## 2023-10-20 PROCEDURE — 84295 ASSAY OF SERUM SODIUM: CPT

## 2023-10-20 PROCEDURE — A9575 INJ GADOTERATE MEGLUMI 0.1ML: HCPCS | Performed by: INTERNAL MEDICINE

## 2023-10-20 PROCEDURE — 2500000004 HC RX 250 GENERAL PHARMACY W/ HCPCS (ALT 636 FOR OP/ED)

## 2023-10-20 PROCEDURE — 36415 COLL VENOUS BLD VENIPUNCTURE: CPT

## 2023-10-20 PROCEDURE — 2500000004 HC RX 250 GENERAL PHARMACY W/ HCPCS (ALT 636 FOR OP/ED): Performed by: INTERNAL MEDICINE

## 2023-10-20 PROCEDURE — 72156 MRI NECK SPINE W/O & W/DYE: CPT | Performed by: RADIOLOGY

## 2023-10-20 PROCEDURE — 94003 VENT MGMT INPAT SUBQ DAY: CPT

## 2023-10-20 PROCEDURE — 2500000002 HC RX 250 W HCPCS SELF ADMINISTERED DRUGS (ALT 637 FOR MEDICARE OP, ALT 636 FOR OP/ED)

## 2023-10-20 PROCEDURE — 72158 MRI LUMBAR SPINE W/O & W/DYE: CPT

## 2023-10-20 PROCEDURE — 87075 CULTR BACTERIA EXCEPT BLOOD: CPT

## 2023-10-20 PROCEDURE — 0BH17EZ INSERTION OF ENDOTRACHEAL AIRWAY INTO TRACHEA, VIA NATURAL OR ARTIFICIAL OPENING: ICD-10-PCS | Performed by: INTERNAL MEDICINE

## 2023-10-20 PROCEDURE — 0B978ZZ DRAINAGE OF LEFT MAIN BRONCHUS, VIA NATURAL OR ARTIFICIAL OPENING ENDOSCOPIC: ICD-10-PCS | Performed by: STUDENT IN AN ORGANIZED HEALTH CARE EDUCATION/TRAINING PROGRAM

## 2023-10-20 PROCEDURE — 82947 ASSAY GLUCOSE BLOOD QUANT: CPT

## 2023-10-20 PROCEDURE — 3E033XZ INTRODUCTION OF VASOPRESSOR INTO PERIPHERAL VEIN, PERCUTANEOUS APPROACH: ICD-10-PCS | Performed by: STUDENT IN AN ORGANIZED HEALTH CARE EDUCATION/TRAINING PROGRAM

## 2023-10-20 PROCEDURE — 31500 INSERT EMERGENCY AIRWAY: CPT | Performed by: STUDENT IN AN ORGANIZED HEALTH CARE EDUCATION/TRAINING PROGRAM

## 2023-10-20 PROCEDURE — 72157 MRI CHEST SPINE W/O & W/DYE: CPT | Performed by: RADIOLOGY

## 2023-10-20 PROCEDURE — 83605 ASSAY OF LACTIC ACID: CPT | Mod: CMCLAB

## 2023-10-20 PROCEDURE — 85018 HEMOGLOBIN: CPT | Performed by: INTERNAL MEDICINE

## 2023-10-20 PROCEDURE — 2500000005 HC RX 250 GENERAL PHARMACY W/O HCPCS: Performed by: INTERNAL MEDICINE

## 2023-10-20 PROCEDURE — 94668 MNPJ CHEST WALL SBSQ: CPT

## 2023-10-20 PROCEDURE — 87641 MR-STAPH DNA AMP PROBE: CPT

## 2023-10-20 PROCEDURE — 87205 SMEAR GRAM STAIN: CPT | Mod: CMCLAB

## 2023-10-20 PROCEDURE — 71045 X-RAY EXAM CHEST 1 VIEW: CPT | Mod: FY

## 2023-10-20 PROCEDURE — 31622 DX BRONCHOSCOPE/WASH: CPT

## 2023-10-20 PROCEDURE — 31500 INSERT EMERGENCY AIRWAY: CPT

## 2023-10-20 PROCEDURE — 87205 SMEAR GRAM STAIN: CPT

## 2023-10-20 PROCEDURE — 87205 SMEAR GRAM STAIN: CPT | Performed by: INTERNAL MEDICINE

## 2023-10-20 PROCEDURE — 84132 ASSAY OF SERUM POTASSIUM: CPT | Mod: CMCLAB | Performed by: INTERNAL MEDICINE

## 2023-10-20 PROCEDURE — 72157 MRI CHEST SPINE W/O & W/DYE: CPT

## 2023-10-20 PROCEDURE — 99291 CRITICAL CARE FIRST HOUR: CPT

## 2023-10-20 RX ORDER — FENTANYL CITRATE-0.9 % NACL/PF 10 MCG/ML
PLASTIC BAG, INJECTION (ML) INTRAVENOUS
Status: COMPLETED
Start: 2023-10-20 | End: 2023-10-20

## 2023-10-20 RX ORDER — ROCURONIUM BROMIDE 10 MG/ML
50 INJECTION, SOLUTION INTRAVENOUS ONCE
Status: COMPLETED | OUTPATIENT
Start: 2023-10-20 | End: 2023-10-20

## 2023-10-20 RX ORDER — NOREPINEPHRINE BITARTRATE/D5W 8 MG/250ML
PLASTIC BAG, INJECTION (ML) INTRAVENOUS
Status: COMPLETED
Start: 2023-10-20 | End: 2023-10-20

## 2023-10-20 RX ORDER — PHENYLEPHRINE HCL IN 0.9% NACL 100 MCG/10
400 SYRINGE (ML) INTRAVENOUS
Status: COMPLETED | OUTPATIENT
Start: 2023-10-20 | End: 2023-10-20

## 2023-10-20 RX ORDER — CALCIUM GLUCONATE 20 MG/ML
1 INJECTION, SOLUTION INTRAVENOUS EVERY 4 HOURS
Status: DISPENSED | OUTPATIENT
Start: 2023-10-20 | End: 2023-10-20

## 2023-10-20 RX ORDER — NOREPINEPHRINE BITARTRATE/D5W 8 MG/250ML
.01-1 PLASTIC BAG, INJECTION (ML) INTRAVENOUS CONTINUOUS
Status: DISCONTINUED | OUTPATIENT
Start: 2023-10-20 | End: 2023-10-23

## 2023-10-20 RX ORDER — MAGNESIUM SULFATE HEPTAHYDRATE 40 MG/ML
2 INJECTION, SOLUTION INTRAVENOUS ONCE
Status: DISCONTINUED | OUTPATIENT
Start: 2023-10-20 | End: 2023-10-20

## 2023-10-20 RX ORDER — FENTANYL CITRATE-0.9 % NACL/PF 10 MCG/ML
0-300 PLASTIC BAG, INJECTION (ML) INTRAVENOUS CONTINUOUS
Status: DISCONTINUED | OUTPATIENT
Start: 2023-10-20 | End: 2023-10-27

## 2023-10-20 RX ORDER — PROPOFOL 10 MG/ML
INJECTION, EMULSION INTRAVENOUS
Status: COMPLETED
Start: 2023-10-20 | End: 2023-10-20

## 2023-10-20 RX ORDER — MAGNESIUM SULFATE HEPTAHYDRATE 40 MG/ML
4 INJECTION, SOLUTION INTRAVENOUS ONCE
Status: COMPLETED | OUTPATIENT
Start: 2023-10-20 | End: 2023-10-20

## 2023-10-20 RX ORDER — VANCOMYCIN HYDROCHLORIDE 1 G/200ML
1000 INJECTION, SOLUTION INTRAVENOUS EVERY 12 HOURS
Status: DISCONTINUED | OUTPATIENT
Start: 2023-10-20 | End: 2023-10-20

## 2023-10-20 RX ORDER — ETOMIDATE 2 MG/ML
INJECTION INTRAVENOUS
Status: DISPENSED
Start: 2023-10-20 | End: 2023-10-20

## 2023-10-20 RX ORDER — FENTANYL CITRATE 50 UG/ML
INJECTION, SOLUTION INTRAMUSCULAR; INTRAVENOUS
Status: DISPENSED
Start: 2023-10-20 | End: 2023-10-20

## 2023-10-20 RX ORDER — POLYETHYLENE GLYCOL 3350 17 G/17G
17 POWDER, FOR SOLUTION ORAL DAILY
Status: DISCONTINUED | OUTPATIENT
Start: 2023-10-20 | End: 2023-11-03

## 2023-10-20 RX ORDER — ETOMIDATE 2 MG/ML
INJECTION INTRAVENOUS
Status: COMPLETED | OUTPATIENT
Start: 2023-10-20 | End: 2023-10-20

## 2023-10-20 RX ORDER — FENTANYL CITRATE 50 UG/ML
INJECTION, SOLUTION INTRAMUSCULAR; INTRAVENOUS CODE/TRAUMA/SEDATION MEDICATION
Status: COMPLETED | OUTPATIENT
Start: 2023-10-20 | End: 2023-10-20

## 2023-10-20 RX ORDER — GADOTERATE MEGLUMINE 376.9 MG/ML
9 INJECTION INTRAVENOUS
Status: COMPLETED | OUTPATIENT
Start: 2023-10-20 | End: 2023-10-20

## 2023-10-20 RX ORDER — VANCOMYCIN HYDROCHLORIDE 750 MG/150ML
15 INJECTION, SOLUTION INTRAVENOUS EVERY 12 HOURS
Status: DISCONTINUED | OUTPATIENT
Start: 2023-10-20 | End: 2023-10-20

## 2023-10-20 RX ORDER — PROPOFOL 10 MG/ML
5-50 INJECTION, EMULSION INTRAVENOUS CONTINUOUS
Status: DISCONTINUED | OUTPATIENT
Start: 2023-10-20 | End: 2023-10-27

## 2023-10-20 RX ORDER — POTASSIUM CHLORIDE 14.9 MG/ML
20 INJECTION INTRAVENOUS
Status: COMPLETED | OUTPATIENT
Start: 2023-10-20 | End: 2023-10-20

## 2023-10-20 RX ORDER — ROCURONIUM BROMIDE 10 MG/ML
INJECTION, SOLUTION INTRAVENOUS
Status: DISPENSED
Start: 2023-10-20 | End: 2023-10-20

## 2023-10-20 RX ADMIN — Medication: at 07:35

## 2023-10-20 RX ADMIN — POLYETHYLENE GLYCOL 3350 17 G: 17 POWDER, FOR SOLUTION ORAL at 13:53

## 2023-10-20 RX ADMIN — FENTANYL CITRATE 50 MCG: 50 INJECTION, SOLUTION INTRAMUSCULAR; INTRAVENOUS at 09:37

## 2023-10-20 RX ADMIN — FOLIC ACID 1 MG: 1 TABLET ORAL at 09:00

## 2023-10-20 RX ADMIN — Medication 25 MCG/HR: at 10:44

## 2023-10-20 RX ADMIN — FENTANYL CITRATE 50 MCG: 50 INJECTION, SOLUTION INTRAMUSCULAR; INTRAVENOUS at 09:35

## 2023-10-20 RX ADMIN — PROPOFOL 10 MCG/KG/MIN: 10 INJECTION, EMULSION INTRAVENOUS at 09:50

## 2023-10-20 RX ADMIN — HEPARIN SODIUM 980 UNITS/HR: 10000 INJECTION, SOLUTION INTRAVENOUS at 12:18

## 2023-10-20 RX ADMIN — Medication 25 MCG/HR: at 18:59

## 2023-10-20 RX ADMIN — VANCOMYCIN HYDROCHLORIDE 750 MG: 750 INJECTION, SOLUTION INTRAVENOUS at 01:17

## 2023-10-20 RX ADMIN — SODIUM CHLORIDE, SODIUM LACTATE, POTASSIUM CHLORIDE, AND CALCIUM CHLORIDE 500 ML: 600; 310; 30; 20 INJECTION, SOLUTION INTRAVENOUS at 10:00

## 2023-10-20 RX ADMIN — ALBUTEROL SULFATE 2.5 MG: 2.5 SOLUTION RESPIRATORY (INHALATION) at 07:35

## 2023-10-20 RX ADMIN — ALBUTEROL SULFATE 2.5 MG: 2.5 SOLUTION RESPIRATORY (INHALATION) at 03:09

## 2023-10-20 RX ADMIN — CALCIUM GLUCONATE 1 G: 20 INJECTION, SOLUTION INTRAVENOUS at 17:18

## 2023-10-20 RX ADMIN — PIPERACILLIN SODIUM AND TAZOBACTAM SODIUM 4.5 G: 4; .5 INJECTION, SOLUTION INTRAVENOUS at 06:08

## 2023-10-20 RX ADMIN — THIAMINE HCL TAB 100 MG 100 MG: 100 TAB at 09:00

## 2023-10-20 RX ADMIN — POTASSIUM CHLORIDE 20 MEQ: 14.9 INJECTION, SOLUTION INTRAVENOUS at 16:48

## 2023-10-20 RX ADMIN — PIPERACILLIN SODIUM AND TAZOBACTAM SODIUM 4.5 G: 4; .5 INJECTION, SOLUTION INTRAVENOUS at 12:15

## 2023-10-20 RX ADMIN — PROPOFOL 20 MCG/KG/MIN: 10 INJECTION, EMULSION INTRAVENOUS at 19:00

## 2023-10-20 RX ADMIN — ATORVASTATIN CALCIUM 40 MG: 40 TABLET ORAL at 09:00

## 2023-10-20 RX ADMIN — ALBUTEROL SULFATE 2.5 MG: 2.5 SOLUTION RESPIRATORY (INHALATION) at 14:14

## 2023-10-20 RX ADMIN — PIPERACILLIN SODIUM AND TAZOBACTAM SODIUM 4.5 G: 4; .5 INJECTION, SOLUTION INTRAVENOUS at 17:26

## 2023-10-20 RX ADMIN — POTASSIUM PHOSPHATE, MONOBASIC POTASSIUM PHOSPHATE, DIBASIC 30 MMOL: 224; 236 INJECTION, SOLUTION, CONCENTRATE INTRAVENOUS at 18:48

## 2023-10-20 RX ADMIN — Medication: at 09:40

## 2023-10-20 RX ADMIN — Medication 8000 UNITS: at 09:00

## 2023-10-20 RX ADMIN — POTASSIUM CHLORIDE 20 MEQ: 14.9 INJECTION, SOLUTION INTRAVENOUS at 14:52

## 2023-10-20 RX ADMIN — PIPERACILLIN SODIUM AND TAZOBACTAM SODIUM 4.5 G: 4; .5 INJECTION, SOLUTION INTRAVENOUS at 01:17

## 2023-10-20 RX ADMIN — GADOTERATE MEGLUMINE 9 ML: 376.9 INJECTION INTRAVENOUS at 23:44

## 2023-10-20 RX ADMIN — ESOMEPRAZOLE MAGNESIUM 40 MG: 40 FOR SUSPENSION ORAL at 09:00

## 2023-10-20 RX ADMIN — SODIUM CHLORIDE, POTASSIUM CHLORIDE, SODIUM LACTATE AND CALCIUM CHLORIDE 500 ML: 600; 310; 30; 20 INJECTION, SOLUTION INTRAVENOUS at 14:48

## 2023-10-20 RX ADMIN — Medication 400 MCG: at 09:47

## 2023-10-20 RX ADMIN — ETOMIDATE 20 MG: 2 INJECTION INTRAVENOUS at 09:35

## 2023-10-20 RX ADMIN — Medication 1 PATCH: at 09:00

## 2023-10-20 RX ADMIN — SODIUM CHLORIDE, SODIUM LACTATE, POTASSIUM CHLORIDE, AND CALCIUM CHLORIDE 500 ML: 600; 310; 30; 20 INJECTION, SOLUTION INTRAVENOUS at 09:45

## 2023-10-20 RX ADMIN — MAGNESIUM SULFATE HEPTAHYDRATE 4 G: 40 INJECTION, SOLUTION INTRAVENOUS at 18:47

## 2023-10-20 RX ADMIN — Medication 1 TABLET: at 09:00

## 2023-10-20 RX ADMIN — NOREPINEPHRINE BITARTRATE 0.1 MCG/KG/MIN: 8 INJECTION, SOLUTION INTRAVENOUS at 09:45

## 2023-10-20 RX ADMIN — ROCURONIUM 50 MG: 50 INJECTION, SOLUTION INTRAVENOUS at 09:36

## 2023-10-20 RX ADMIN — VANCOMYCIN HYDROCHLORIDE 1000 MG: 1 INJECTION, SOLUTION INTRAVENOUS at 15:41

## 2023-10-20 ASSESSMENT — PAIN SCALES - GENERAL
PAINLEVEL_OUTOF10: 0 - NO PAIN
PAINLEVEL_OUTOF10: 0 - NO PAIN

## 2023-10-20 ASSESSMENT — ENCOUNTER SYMPTOMS
SEIZURES: 0
CHILLS: 0
HEADACHES: 0
COUGH: 1
PALPITATIONS: 0
SHORTNESS OF BREATH: 1
ABDOMINAL PAIN: 0
FEVER: 0

## 2023-10-20 NOTE — PROGRESS NOTES
"Vancomycin Dosing by Pharmacy- INITIAL    Molina Godinez is a 68 y.o. year old male who Pharmacy has been consulted for vancomycin dosing for pneumonia. Based on the patient's indication and renal status this patient will be dosed based on a goal AUC of 400-600.     Renal function is currently stable.    Visit Vitals  /63   Pulse (!) 121   Temp 37.3 °C (99.1 °F)   Resp (!) 35        Lab Results   Component Value Date    CREATININE 0.47 (L) 10/20/2023    CREATININE 0.84 10/19/2023    CREATININE 0.48 (L) 10/19/2023    CREATININE 0.64 10/19/2023        Patient weight is No results found for: \"PTWEIGHT\"    No results found for: \"CULTURE\"     I/O last 3 completed shifts:  In: 1207.2 (24.3 mL/kg) [I.V.:702.2 (14.1 mL/kg); NG/GT:505]  Out: 300 (6 mL/kg) [Urine:300 (0.2 mL/kg/hr)]  Weight: 49.7 kg   [unfilled]    Lab Results   Component Value Date    PATIENTTEMP 37.0 10/20/2023    PATIENTTEMP 37.0 10/18/2023    PATIENTTEMP 37.0 10/18/2023          Assessment/Plan     Patient will not be given a loading dose.  Will initiate vancomycin maintenance,  1000 mg every 12 hours.    This dosing regimen is predicted by [employer] to result in the following pharmacokinetic parameters:  Loading dose: N/A  Regimen: 1000 mg IV every 12 hours.  Start time: 01:17 on 10/23/2023  Exposure target: AUC24 (range)400-600 mg/L.hr   AUC24,ss: 516 mg/L.hr  Probability of AUC24 > 400: 91 %  Ctrough,ss: 14.5 mg/L  Probability of Ctrough,ss > 20: 12 %  Probability of nephrotoxicity (Lodise MONET 2009): 10 %    Follow-up level will be ordered on 10/21 at 1st am labs unless clinically indicated sooner.  Will continue to monitor renal function daily while on vancomycin and order serum creatinine at least every 48 hours if not already ordered.  Follow for continued vancomycin needs, clinical response, and signs/symptoms of toxicity.       Wes Staples, PharmD       "

## 2023-10-20 NOTE — PROGRESS NOTES
Critical Care Daily Progress        Subjective   Patient is a 68 y.o. male admitted on 10/11/2023  7:54 PM with the following indication(s) for ICU care hyponatremia, altered mental status with multifactorial etiology.     Interval History: Overnight, had worsening O2 requirement. Was on 8L NC yesterday, dropped O2 sats to 86 overnight, started on HFNC with RT eval as well. He was tachycardic and tachypneic, uncomfortable. CXR with siginifcant worsening L basilar opacity, atelectasis vs consolidation. ABG this morning consistent with previous ABGs, pH 7.42, pCO2 34, PO2 61 (previous 74). Started on Airvo 100% FiO2 60L satting now at 99%.     He tells me he felt a little more short of breath than usual. SOB now, wants to be intubated and get it over with. Understands his body is weak and it is difficult to breathe so welcoming intubation. No fever, chills, nausea, vomiting. No cough, denies any aspiration that he knows of. Has some R leg pain but says he banged it on the side of the bed.       Scheduled Medications:   albuterol, 2.5 mg, nebulization, q6h  amLODIPine, 5 mg, oral, Daily  atenolol, 50 mg, oral, Daily  atorvastatin, 40 mg, oral, Daily  ergocalciferol, 8,000 Units, oral, Daily  esomeprazole, 40 mg, nasoduodenal tube, Daily before breakfast  folic acid, 1 mg, oral, Daily  melatonin, 5 mg, oral, Nightly  multivitamin with minerals, 1 tablet, oral, Daily  nicotine, 1 patch, transdermal, Daily  piperacillin-tazobactam, 4.5 g, intravenous, q6h  thiamine, 100 mg, oral, Daily  vancomycin, 1,000 mg, intravenous, q12h           Continuous Medications:   heparin, 0-4,500 Units/hr, Last Rate: 980 Units/hr (10/20/23 0000)           PRN Medications:   PRN medications: acetaminophen, dextrose 10 % in water (D10W), dextrose, glucagon, heparin, labetaloL, lidocaine, loperamide, oxygen, oxygen, sennosides, white petrolatum    Objective   Vitals:  Most Recent:  Visit Vitals  /72   Pulse (!) 124   Temp 37.3 °C (99.1  °F)   Resp (!) 37      93% SpO2 on 8L NC    24hr Min/Max:  Temp  Min: 36.3 °C (97.3 °F)  Max: 37.3 °C (99.1 °F)  Pulse  Min: 94  Max: 128  BP  Min: 94/60  Max: 162/93  Resp  Min: 28  Max: 45  SpO2  Min: 86 %  Max: 99 %      GEN: cachectic, chronically ill appearing, lying in bed, RASS 0  CV: RRR, no m/r/g  PULM: Rhonchi L lung, worse on inspiration, R clear  ABD: soft, nontender, nondistended  NEURO: A&Ox4, able to wiggle legs and toes, unable to lift legs, decreased UL strength b/l  PSYCH: Appropriate mood and affect  MSK: no joint swelling grossly noted  EXT: no LE edema  SKIN: warm and dry, no lesions grossly noted    LDA:    PIV x2 in R arm, Dobhoff      LABS AND IMAGING           Results from last 7 days   Lab Units 10/20/23  0332 10/13/23  2229 10/13/23  1539   SODIUM mmol/L 130*   < > 129*   POTASSIUM mmol/L 4.1   < > 3.0*   CHLORIDE mmol/L 100   < > 88*   CO2 mmol/L 18*   < > 27   BUN mg/dL 23   < > 9   CREATININE mg/dL 0.47*   < > 0.54   AST U/L  --   --  41*   ALT U/L  --   --  14   WBC AUTO x10*3/uL 22.2*   < > 16.8*   HEMOGLOBIN g/dL 16.1   < > 18.1*   HEMATOCRIT % 46.7   < > 52.1*    < > = values in this interval not displayed.           XR chest 1 view    Result Date: 10/19/2023  FINDINGS:  [AP radiograph of the chest was provided.]  [Enteric tube seen coursing below the level diaphragm and out of the field of view with tip [overlying the expected location of the distal stomach. Partially visualized postsurgical changes from left total shoulder arthroplasty.]] CARDIOMEDIASTINAL SILHOUETTE:  [The cardiomediastinal silhouette is partially obscured. ] LUNGS:  [Re-demonstration of coarsened interstitial markings bilaterally. Significant interval worsening in left retrocardiac and diffuse hazy and streaky opacities. Blunting of the left costophrenic angle. The right lung is essentially clear. No pneumothorax.] ABDOMEN:  [No remarkable upper abdominal findings.] BONES:  [No acute osseous changes.]  IMPRESSION:  [Significant interval worsening in left basilar opacity favored to represent atelectasis/consolidation and pleural effusion. Similar degree of pulmonary interstitial edema likely superimposed on chronic emphysematous changes.  [Postsurgical changes and] medical devices as described above. ]     XR chest 1 view    Result Date: 10/17/2023  IMPRESSION:  1. Slight worsening of left basilar opacity compared to prior  imaging, suggesting atelectasis and/or pleural effusion.  2. Probable interstitial pulmonary edema or fluid overload. Correlate  with patient's fluid status.            Transthoracic Echo (TTE) Complete 10/  Result Date: 10/17/2023  CONCLUSIONS:  1. Left ventricular systolic function is hyperdynamic with a 75% estimated ejection fraction.  2. The left ventricular septal wall thickness is mildly increased.  3. There is left ventricular concentric remodeling.  4. Atrial septal aneurysm present.  5. A bubble study using agitated saline was performed. Bubble study is positive.  6. Mildly elevated RVSP.  7. Poorly visualized anatomical structures due to suboptimal image quality.  8. Compared with study from 8/29/2023, the RVSP today is mildly elevated, compared to moderately elevated in the prior echocardiogram (60 mmHg).     XR chest 1 view  Result Date: 10/17/2023  1. Hazy left basilar opacity appears slightly less conspicuous than prior and correlate with decreasing atelectasis/infiltrate. 2. Redemonstrated background diffuse emphysema.   I personally reviewed the images/study and I agree with the findings as stated by radiology resident Mignon Malone MD. This study was interpreted at [facility], [place].   Signed by: Khanh Weaver 10/17/2023 3:23 PM Dictation workstation:   [id number]      CT chest abdomen pelvis w IV contrast  Result Date: 10/16/2023  1. No evidence of primary neoplasm or metastatic disease. 2. Right lower lobe mucous plugging which may  reflect aspiration.   I personally reviewed the images/study and I agree with the findings as stated. This study was interpreted at [facility], [place], [place].   MACRO: None   Signed by: Jeet Wang 10/16/2023 3:39 PM Dictation workstation:   [id number]       CT head wo IV contrast  Result Date: 10/11/2023  1. No evidence of acute cortical infarct or intracranial hemorrhage. 2. Subcortical and periventricular white matter hypodensities consistent with the sequela of chronic microvascular ischemic changes. 3. Punctate remote left thalamic and anterior basal ganglia lacunar infarcts.          Assessment/Plan         Molina Godinez is a 68 year old male with PMHx of COPD, HTN, AUD and opioid use disorder, HCV, chronic PE who was seen in MICU 10/13-10/16 for inability to walk 2/2 suspected alcohol use disorder. ICU course complicated by hypertension, altered mental status, and hyponatremia. Transferred back to MICU 10/17 after he was found lethargic and hypoxic.      10/20 Updates to plan:  Intubated this morning, plan for bronch later today/tomorrow  No concern of Refeeding, hyponatremia improved. Will change RFP to BID   Started on Vanc/Zosyn overnight, MRSA neg so will DC Vanc but continue Zosyn and fu sputum cx  ID consulted regarding positive Syphilis Ab, low concern for neurosyphilis as patient was treated back in 1998, but recommended MRI spine given concern of abscess causing his b/l LE weakness         NEURO/PSYCH  #AMS- fluctuating, unclear etiology as previously suspected alcohol withdrawal but could be 2/2 chronic hypoxia and retention vs hyponatremia or other etiology  -CT head without pathology  - Initially had visual and tactile hallucinations that have since resolved. Low concern for alcohol withdrawal at this point given last drink was 7 days ago and patient's AMS has typically been early morning and late night. Suspect more metabolic etiology of fluctuations in  mental status, I/s/o acutely increased oxygen requirement.  - Concern today after rounds as patient obtunded with another episode of lethargy. Baseline RASS -1- -2, patient was -4 during after rounds exam. ABGs without significant abnormality, lethargic episode thought to be secondary to retention I/s/o severe COPD vs hyponatremia. 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 Syphilis total Ab positive, RPR negative  PLAN:  [] Will call health department to obtain information of any recent syphilis infection and discuss with ID  [] Wean oxygen as tolerated  [] Intubated with Jhon, etom, fent after increased O2 requirement, c/f airway protection. On Fentanyl, Propofol. Will monitor BP after hypotension immediately after intubation, initially required Levo but BP stable and has been off of it       #Bilateral LE weakness- Neuro consulted earlier in admission  -Likely 2/2 chronic, had weakness during 8/2023 admission; less likely GBS  -CT head without pathology, MRI spine stopped early due to pain  -Patient able to move bilateral lower extremities, low c/f GBS  PLAN:  -No LP indicated at this time  -May follow up with neurology outpatient  [] ID consulted regarding weakness and positive Syphilis Ab. Pt was treated back in 1998 but rec MRI spine given concern of abscess  [] MRI brain, spine     CV  #HTN- resolved  - home amlodipine 5mg, atenolol 50mg  - Initially started on Cardene drip, transitioned to Labetalol prior to resolution  [] Home meds held as patient required Levo after hypotensive episode s/p intubation     #CAD  -home atorvastatin 40mg (home med)     PULM  #CAP- initially resolved  -Urine strep positive, unsure if active or chronic infection  -MRSA negative, Urine legionella negative  -vanc/zosyn 10/12-13  - Blood Cx NGTD 10/13    #L lung consolidation, c/f PNA  Initially treated for CAP given positive urine strep with CTX  10/13-10/17, one day of Zosyn10/17-10/17  Leukocytosis, neutrophil predominnace WBC 22.2 from 18.3, 12.1   C/f HAP vs incomplete resolution of previous PNA  [] Intubated today, bronch this afternoon/tomorrow    #Suspected PAH  - Echo with atrial septal aneurysm, positive bubble study  [] Will need outpatient workup, previously some suspicion for PAH but will need further evaluation    #Severe COPD  #Mucus plugging  -8/2023 FEV1 42%  - CT C/A/P with RLL mucus plug  - Required 70% FiO2 HFNC during this MICU stay, now on NC  PLAN:  [] Albuterol q6h, chest PT, respiratory hygiene  [] Intubated, bronchoscopy later today     #Subsegmental PE  -Requires anticoagulation for 3 months (per vasc med 8/2023), on home apixaban 5mg BID  PLAN:  -not tolerating PO, on heparin gtt       GI  #HepC  -treated, 8/2023 HCV RNA undetected     #Nutrition  -Per SLP 10/19, continue NPO with 1-2ice chips per hour with RN only  - Dobhoff in place 10/17  - Nutrition following, appreciate recs  PLAN:  - Starting on TF with TwoCal HN goal rate of 35ml/hr 10/19  - 100mg Thiamine, 1mg Folic acid, 8000u Vit D2     /RENAL  #Hyponatremia - Likely SIADH 2/2 COPD exacerbation- Resolved  - Na as low as 120  - Serum Osm 248, urine Osm 492, Urine Na 32    #C/f refeeding syndrome- resolved  - Hypokalemia 2.7, Phos 1.9, Ca 5.2  - Electrolytes repleted, will monitor    #Alkalosis  - Likely 2/2 tachypnea     ENDOCRINE  #Secondary HyperPTH  -8/28 .7  -Hypocalcemic on admission, now resolved  -25-hydroxy vit D 8, 1,25 Vit D 44.4  PLAN:  Vit D2 8000u liquid daily     ID  #Leukocytosis I/s/o recently treated strep pneumo CAP  -Procal 0.63, up from 0.55 10/15  - Completed CTX 10/13-10/17  - Zosyn 10/17-10/17  [] Repeat sputum Cx as previous attempt with significant salivary contamination       HEME/ONC  #Leukocytosis, worsening  -2/2 to CAP  - WBC initially downtrended to 12.1 yesterday, now elevated to 18.3  [] Monitor WBC, vitals       MSK/RHEUM    #Chronic LE weakness  -Likely chronic from disuse and alcohol, unlikely GBS since no paralysis  -Consider neurology outpatient referral for possible EMG, neurology signed off     Vent/Oxy: Intubated and sedated  Pressors/Drips: Heparin gtt, Fentanyl 25mcg, propofol 15mcg  Abx: Zosyn  F: PRN  E: replete PRN  N: TF via Dobhoff held  A: PIV R arm x2, Dobhoff  DVT ppx: heparin gtt  GI ppx: Pantoprazole     Code status: Full Code (confirmed)  NOK: Tonie (sister) [phone]; Nahomi (God-sister) [phone]; Sarah Godinez (sister) [phone]       Ever Paez MD  Internal Medicine, PGY-1

## 2023-10-20 NOTE — PROGRESS NOTES
SOCIAL WORK NOTE   SW received call from sister Sarah. She was interested in HCPOA. Patient currently has no document in chart. Sister reports that patient had stated to people that she would be it. SW explained that patient would have to have capacity to sign, which he does not have currently. SW will follow up to complete HCPOA as appropriate. Social work to follow.  AMANDO Peralta, DOROTHEAW-S ([id number])

## 2023-10-20 NOTE — CARE PLAN
Fall prevention continued, bed wheels locked, side rails up x3, bed in lowest position, bed alarm on, room door open and lights on for ease of visibility.    Skin care plan continued, site care performed, minimal linens placed underneath and PT turned every two hours and PT checked for incontinence.

## 2023-10-20 NOTE — CONSULTS
Vancomycin Dosing by Pharmacy- Cessation of Therapy    Consult to pharmacy for vancomycin dosing has been discontinued by the prescriber, pharmacy will sign off at this time.    Please call pharmacy if there are further questions or re-enter a consult if vancomycin is resumed.     Silverio Granados, DerekD

## 2023-10-20 NOTE — PROGRESS NOTES
"Vancomycin Dosing by Pharmacy- INITIAL    Molina Godinez is a 68 y.o. year old male who Pharmacy has been consulted for vancomycin dosing for pneumonia. Based on the patient's indication and renal status this patient will be dosed based on a goal AUC of 500-600.     Renal function is currently stable.    Visit Vitals  BP 85/53   Pulse 102   Temp 36.4 °C (97.5 °F) (Temporal)   Resp 18        Lab Results   Component Value Date    CREATININE <0.20 (L) 10/20/2023    CREATININE 0.47 (L) 10/20/2023    CREATININE 0.84 10/19/2023    CREATININE 0.48 (L) 10/19/2023        Patient weight is No results found for: \"PTWEIGHT\"    No results found for: \"CULTURE\"     I/O last 3 completed shifts:  In: 1920.7 (38.6 mL/kg) [I.V.:423.7 (8.5 mL/kg); NG/GT:890; IV Piggyback:607]  Out: 800 (16.1 mL/kg) [Urine:800 (0.4 mL/kg/hr)]  Weight: 49.7 kg   [unfilled]    Lab Results   Component Value Date    PATIENTTEMP 37.0 10/20/2023    PATIENTTEMP 37.0 10/20/2023    PATIENTTEMP 37.0 10/18/2023          Assessment/Plan     Patient will not be given a loading dose; started on 750 mg x 1 overnight.  Will initiate vancomycin maintenance,  1000 mg every 12 hours.    Dosing regimen per [facility] over-estimating renal function. Will reassess once doses given and first level obtained.    Follow-up level will be ordered on 10/21 at first morning draw unless clinically indicated sooner.  Will continue to monitor renal function daily while on vancomycin and order serum creatinine at least every 48 hours if not already ordered.  Follow for continued vancomycin needs, clinical response, and signs/symptoms of toxicity.       Seble Kaur, PharmD       "

## 2023-10-20 NOTE — CONSULTS
"Inpatient consult to Infectious Diseases  Consult performed by: Delano Howell MD  Consult ordered by: Jesenia Bui MD        Referred by Ever Paez MD      Primary MD: No primary care provider on file.    Reason For Consult  Mental status change with leukocytosis.   ? neurosyphilis    History Of Present Illness  Molina Godinez is a 68 y.o. male with hx of alcohol and substance use disorder ( cocaine, fentanyl based on our toxicology) ,  treated hepatitis C, COPD and pulmonary embolism.   He presented on 10/13 for bilateral leg weakness \" feeling wobbly and  could not walk himself \" .   He was coughinh and found to have wheeze and rhonchi at that time.   WBC was elevated at 28k.  And chest x-ray showed LLL patchy opacity,   he was given piperacillin-tazobactam and vancomycin for pneumonia.  Then his urine pneumococcal ag came back positive and antibiotic was switched to ceftriaxone.  For his leg weakness,  CT head and lumbar spine MR were unremarkable.   Given absence of paralysis,  GBS was thought to be less likely.   Vitamin B12 folate and TSH all normal.   He became agitation, tachycardiac and hypertensive from alcohol withdrawal not responsive to lorazepam . And transferred to ICU for iv phenobarbital and lv labetalol.  His alcohol withdrawal was getting better but his leg weakness persists to the point that he could not move his legs.,   he completed 5 days course of ceftriaxone on 10/17.  Then on 10/17, he became very lethargic with respiratory failure requiring high flow oxygen and transferred back to ICU, and put on piperacillin-tazobactam , and his encephalopathy and respiratory failure continued to deteriorate and he ended up getting intubated today .   He is getting bronchoscopy later today , brain MRI has been ordered.   One of the tests which were done for his leg weakness and encephalopathy was syphilis total Ab which was positive but RPR was negative ,     we dont have hx about previous syphilis or " sexual hx.        Past Medical History  He has no past medical history on file.    Surgical History  He has no past surgical history on file.     Social History     Occupational History    Not on file   Tobacco Use    Smoking status: Every Day     Packs/day: .5     Types: Cigarettes    Smokeless tobacco: Not on file   Substance and Sexual Activity    Alcohol use: Yes     Comment: 1.5 pints Ju daily    Drug use: Not on file    Sexual activity: Not on file     Travel History   Travel since 09/20/23    No documented travel since 09/20/23            Pets: unknown  Hobbies: unknown    Family History  No family history on file.  Allergies  Aspirin and Nsaids (non-steroidal anti-inflammatory drug)     Immunization History   Administered Date(s) Administered    Flu vaccine (IIV4), preservative free *Check age/dose* 10/28/2015, 10/14/2016, 10/12/2017, 10/09/2019, 10/19/2020, 12/14/2022    Flu vaccine, quadrivalent, high-dose, preservative free, age 65y+ (FLUZONE) 10/05/2021    Hepatitis A vaccine, age 19 years and greater (HAVRIX) 06/15/2020    Hepatitis A vaccine, pediatric/adolescent (HAVRIX, VAQTA) 05/01/2017    Influenza, Unspecified 10/17/2012, 10/24/2013    Influenza, seasonal, injectable 10/23/2014, 10/03/2018    Pfizer Purple Cap SARS-CoV-2 03/26/2021, 04/16/2021, 12/01/2021    Pneumococcal conjugate vaccine, 13-valent (PREVNAR 13) 07/08/2021    Pneumococcal polysaccharide vaccine, 23-valent, age 2 years and older (PNEUMOVAX 23) 03/06/2017    Tdap vaccine, age 7 year and older (BOOSTRIX) 08/27/2012, 12/14/2022    Zoster, live 04/11/2017     Medications  Home medications:  Medications Prior to Admission   Medication Sig Dispense Refill Last Dose    acetaminophen (Tylenol) 325 mg tablet TAKE 2 TABLETS BY MOUTH EVERY 6 HOURS AS NEEDED FOR PAIN. (Patient not taking: Reported on 10/12/2023) 120 tablet 0 Unknown    amLODIPine (Norvasc) 5 mg tablet TAKE 1 TABLET BY MOUTH ONCE DAILY 90 tablet 0 10/11/2023    atenolol  (Tenormin) 50 mg tablet TAKE 1 TABLET BY MOUTH ONCE DAILY 90 tablet 1 10/11/2023    atorvastatin (Lipitor) 40 mg tablet Take 1 tablet (40 mg) by mouth once daily.   Past Week    folic acid (Folvite) 1 mg tablet TAKE 1 TABLET BY MOUTH ONCE DAILY (Patient not taking: Reported on 10/2/2023) 100 tablet 0 Past Week    ipratropium-albuteroL (Combivent Respimat)  mcg/actuation inhaler INHALE 1 PUFF EVERY 6 HOURS AS NEEDED FOR SHORTNESS OF BREATH 4 g 2     naloxone (Narcan) 4 mg/0.1 mL nasal spray Use 1 Spray in one nostril (alternate sides) as needed for Drug Overdose (and call 911). every 2-3 min, until assistance arrives.       nicotine (Nicoderm CQ) 21 mg/24 hr patch APPLY 1 PATCH TRANSDERMALLY EVERY 24 HOURS. (Patient not taking: Reported on 10/2/2023) 28 patch 0     polyethylene glycol (Glycolax, Miralax) 17 gram/dose powder    Unknown    sennosides (Senokot) 8.6 mg tablet TAKE 1 TABLET BY MOUTH TWO TIMES A DAY AS NEEDED FOR CONSTIPATION (Patient not taking: Reported on 10/2/2023) 100 tablet 0 Unknown    thiamine (Vitamin B-1) 100 mg tablet TAKE 1 TABLET BY MOUTH ONCE DAILY (Patient not taking: Reported on 10/2/2023) 100 tablet 2 Past Week     Current medications:  Scheduled medications  albuterol, 2.5 mg, nebulization, q6h  atorvastatin, 40 mg, oral, Daily  ergocalciferol, 8,000 Units, oral, Daily  esomeprazole, 40 mg, nasoduodenal tube, Daily before breakfast  etomidate, , ,   fentaNYL PF, , ,   folic acid, 1 mg, oral, Daily  lactated Ringer's, 500 mL, intravenous, Once  melatonin, 5 mg, oral, Nightly  multivitamin with minerals, 1 tablet, oral, Daily  nicotine, 1 patch, transdermal, Daily  norepinephrine in dextrose 5 %, , ,   piperacillin-tazobactam, 4.5 g, intravenous, q6h  propofol, , ,   rocuronium, , ,   rocuronium, 50 mg, intravenous, Once  surgical lubricant, , ,   thiamine, 100 mg, oral, Daily  vancomycin, 1,000 mg, intravenous, q12h      Continuous medications  etomidate,   heparin, 0-4,500 Units/hr,  Last Rate: 980 Units/hr (10/20/23 0000)  norepinephrine, 0.01-1 mcg/kg/min      PRN medications  PRN medications: acetaminophen, dextrose 10 % in water (D10W), dextrose, etomidate, etomidate, fentaNYL PF, fentaNYL PF, glucagon, heparin, labetaloL, lidocaine, loperamide, norepinephrine in dextrose 5 %, oxygen, oxygen, propofol, rocuronium, sennosides, surgical lubricant, white petrolatum    Review of Systems   Constitutional:  Negative for chills and fever.   Respiratory:  Positive for cough and shortness of breath.    Cardiovascular:  Negative for chest pain, palpitations and leg swelling.   Gastrointestinal:  Negative for abdominal pain.   Neurological:  Negative for seizures and headaches.        Objective  Range of Vitals (last 24 hours)  Heart Rate:  []   Temp:  [36.6 °C (97.9 °F)-37.3 °C (99.1 °F)]   Resp:  [15-45]   BP: ()/(46-93)   SpO2:  [86 %-99 %]   Daily Weight  10/19/23 : 49.7 kg (109 lb 9.1 oz)    Body mass index is 17.16 kg/m².     Physical Exam  Constitutional:       Appearance: He is ill-appearing.      Interventions: He is intubated.   Cardiovascular:      Rate and Rhythm: Normal rate and regular rhythm.   Pulmonary:      Effort: He is intubated.      Breath sounds: Normal breath sounds.   Abdominal:      General: Bowel sounds are normal.   Neurological:      Mental Status: He is alert.      Motor: Weakness present.      Deep Tendon Reflexes:      Reflex Scores:       Patellar reflexes are 1+ on the left side.       Achilles reflexes are 1+ on the left side.     Comments: Both legs weakness. Diminished DTR           Relevant Results  Outside Hospital Results  No  Labs  Results from last 72 hours   Lab Units 10/20/23  0332 10/19/23  0444 10/18/23  0451   WBC AUTO x10*3/uL 22.2* 18.3* 12.1*   HEMOGLOBIN g/dL 16.1 15.9 18.3*   HEMATOCRIT % 46.7 47.6 51.2   PLATELETS AUTO x10*3/uL 227 271 281   NEUTROS PCT AUTO % 92.3 89.3 84.2   LYMPHS PCT AUTO % 1.5 2.6 4.2   MONOS PCT AUTO % 5.3 7.0 10.4  "  EOS PCT AUTO % 0.0 0.1 0.1     Results from last 72 hours   Lab Units 10/20/23  0332 10/19/23  1159 10/19/23  0804   SODIUM mmol/L 130* 122* 128*   POTASSIUM mmol/L 4.1 3.9 2.7*   CHLORIDE mmol/L 100 88* 103   CO2 mmol/L 18* 21 14*   BUN mg/dL 23 28* 21   CREATININE mg/dL 0.47* 0.84 0.48*   GLUCOSE mg/dL 170* 260* 208*   CALCIUM mg/dL 6.2* 8.6 5.2*   ANION GAP mmol/L 16 17 14   EGFR mL/min/1.73m*2 >90 >90 >90   PHOSPHORUS mg/dL 2.4* 3.3 1.9*     Results from last 72 hours   Lab Units 10/20/23  0332 10/19/23  1159 10/19/23  0804   ALBUMIN g/dL 2.1* 3.1* 1.9*     Estimated Creatinine Clearance: 107.2 mL/min (A) (by C-G formula based on SCr of 0.47 mg/dL (L)).  No results found for: \"CRP\", \"SEDRATE\"  HIV 1 and 2 Screen   Date Value Ref Range Status   08/31/2023 NONREACTIVE NONREACTIVE Final     Comment:      HIV Ag/Ab screen is performed using the Siemens LVL6   HIV Ag/Ab Combo assay which detects the presence of HIV    p24 antigen as well as antibodies to HIV-1   (Group M and O) and HIV-2.  .  No laboratory evidence of HIV infection. If acute HIV infection is   suspected, consider testing for HIV RNA by PCR (viral load).       Hepatitis C Ab   Date Value Ref Range Status   08/28/2023 REACTIVE (A) NONREACTIVE Final     Comment:      Results from patients taking biotin supplements or receiving   high-dose biotin therapy should be interpreted with caution   due to possible interference with this test. Providers may    contact their local laboratory for further information.   HCV antibody detected. HCV RNA PCR has been ordered   to evaluate the possibility of a current infection.       HCV PCR Quant   Date Value Ref Range Status   08/28/2023 NOT DETECTED IU/mL Final     Comment:     REF VALUE  NOT DETECTED         Chest x-ray 10/19  Significant interval worsening in left mid basilar lung  predominant opacification which may represent increasing atelectasis  and/or pleural effusions with or without superimposed " consolidation.  Also correlate clinically with concern for central mucous plugging.  2. Background chronic lung changes.  3.  Postsurgical changes and medical devices as described above.      MICRO  10/12 MRSA screen  negative  10/12 legionella ag negative  10/12 pneumococcal ag  positive   10/18  respiratory culture no growth        ASSESSMENT   69yo M with hx of substance abuse hx ( including IV drug and alcohol ) presents with low extremity weakness.  Could not get spine MRI due to back pain,   Hospital course complicated with alcohol withdrawal and aspiration pneumonia with respiratory failure,    positive syphilis Antibody test with negative RPR.  Remote hx of syphilis ( 1998 . Treated with 2 dose of IM PCN according to public health record ).  Elevated wbc     Bilateral lower extremity weakness   Leukocytosis  Lethargy.  Encephalopathy  Acute respiratory failure,  aspiration pneumonia   Hx of substance use disorder.   6 . Low level positive of syphilis Antibody         RECOMMENDATION   Get spine and brain MRI to rule out infectious discitis or epidural abscess given hx of IVDA and leukocytosis. And mindi progressively worsening leg weakness  CONTINUE piperacillin-tazobactam and vancomycin for aspiration pneumonia   No evidence of neurosyphilis .  Weak positive syphilis Antibody secondary to remote treated syphilis.  And doesn't warrant further work up      ID will follow  This case was seen, examined and discussed with my attending, Dr. Julissa Scanlon, who agreed with my assessment and plan.     DOREEN OTTO.  M.D  Infectious disease fellow PGY-4  Available on [employer]  or [employer] team [phone]

## 2023-10-21 ENCOUNTER — APPOINTMENT (OUTPATIENT)
Dept: RADIOLOGY | Facility: HOSPITAL | Age: 68
End: 2023-10-21
Payer: COMMERCIAL

## 2023-10-21 LAB
ALBUMIN SERPL BCP-MCNC: 2.5 G/DL (ref 3.4–5)
ALBUMIN SERPL BCP-MCNC: 2.6 G/DL (ref 3.4–5)
ANION GAP SERPL CALC-SCNC: 17 MMOL/L (ref 10–20)
ANION GAP SERPL CALC-SCNC: 19 MMOL/L (ref 10–20)
BASOPHILS # BLD AUTO: 0.09 X10*3/UL (ref 0–0.1)
BASOPHILS NFR BLD AUTO: 0.4 %
BUN SERPL-MCNC: 13 MG/DL (ref 6–23)
BUN SERPL-MCNC: 13 MG/DL (ref 6–23)
CALCIUM SERPL-MCNC: 8 MG/DL (ref 8.6–10.6)
CALCIUM SERPL-MCNC: 8.1 MG/DL (ref 8.6–10.6)
CHLORIDE SERPL-SCNC: 88 MMOL/L (ref 98–107)
CHLORIDE SERPL-SCNC: 90 MMOL/L (ref 98–107)
CO2 SERPL-SCNC: 23 MMOL/L (ref 21–32)
CO2 SERPL-SCNC: 23 MMOL/L (ref 21–32)
CREAT SERPL-MCNC: 0.44 MG/DL (ref 0.5–1.3)
CREAT SERPL-MCNC: 0.44 MG/DL (ref 0.5–1.3)
EOSINOPHIL # BLD AUTO: 0.07 X10*3/UL (ref 0–0.7)
EOSINOPHIL NFR BLD AUTO: 0.3 %
ERYTHROCYTE [DISTWIDTH] IN BLOOD BY AUTOMATED COUNT: 13.1 % (ref 11.5–14.5)
GFR SERPL CREATININE-BSD FRML MDRD: >90 ML/MIN/1.73M*2
GFR SERPL CREATININE-BSD FRML MDRD: >90 ML/MIN/1.73M*2
GLUCOSE BLD MANUAL STRIP-MCNC: 64 MG/DL (ref 74–99)
GLUCOSE BLD MANUAL STRIP-MCNC: 86 MG/DL (ref 74–99)
GLUCOSE BLD MANUAL STRIP-MCNC: 87 MG/DL (ref 74–99)
GLUCOSE BLD MANUAL STRIP-MCNC: 88 MG/DL (ref 74–99)
GLUCOSE BLD MANUAL STRIP-MCNC: 95 MG/DL (ref 74–99)
GLUCOSE SERPL-MCNC: 126 MG/DL (ref 74–99)
GLUCOSE SERPL-MCNC: 86 MG/DL (ref 74–99)
HCT VFR BLD AUTO: 41.4 % (ref 41–52)
HGB BLD-MCNC: 14.7 G/DL (ref 13.5–17.5)
IMM GRANULOCYTES # BLD AUTO: 0.19 X10*3/UL (ref 0–0.7)
IMM GRANULOCYTES NFR BLD AUTO: 0.8 % (ref 0–0.9)
LYMPHOCYTES # BLD AUTO: 1.16 X10*3/UL (ref 1.2–4.8)
LYMPHOCYTES NFR BLD AUTO: 5.2 %
MCH RBC QN AUTO: 30.1 PG (ref 26–34)
MCHC RBC AUTO-ENTMCNC: 35.5 G/DL (ref 32–36)
MCV RBC AUTO: 85 FL (ref 80–100)
MONOCYTES # BLD AUTO: 1.57 X10*3/UL (ref 0.1–1)
MONOCYTES NFR BLD AUTO: 7 %
NEUTROPHILS # BLD AUTO: 19.34 X10*3/UL (ref 1.2–7.7)
NEUTROPHILS NFR BLD AUTO: 86.3 %
NRBC BLD-RTO: 0 /100 WBCS (ref 0–0)
PHOSPHATE SERPL-MCNC: 5.6 MG/DL (ref 2.5–4.9)
PHOSPHATE SERPL-MCNC: 5.8 MG/DL (ref 2.5–4.9)
PLATELET # BLD AUTO: 184 X10*3/UL (ref 150–450)
PMV BLD AUTO: 10.8 FL (ref 7.5–11.5)
POTASSIUM SERPL-SCNC: 4.5 MMOL/L (ref 3.5–5.3)
POTASSIUM SERPL-SCNC: 4.8 MMOL/L (ref 3.5–5.3)
RBC # BLD AUTO: 4.88 X10*6/UL (ref 4.5–5.9)
RPR SER QL: NONREACTIVE
SODIUM SERPL-SCNC: 125 MMOL/L (ref 136–145)
SODIUM SERPL-SCNC: 125 MMOL/L (ref 136–145)
UFH PPP CHRO-ACNC: 0.2 IU/ML
UFH PPP CHRO-ACNC: 0.2 IU/ML
UFH PPP CHRO-ACNC: 0.3 IU/ML
UFH PPP CHRO-ACNC: 0.4 IU/ML
WBC # BLD AUTO: 22.4 X10*3/UL (ref 4.4–11.3)

## 2023-10-21 PROCEDURE — 2500000002 HC RX 250 W HCPCS SELF ADMINISTERED DRUGS (ALT 637 FOR MEDICARE OP, ALT 636 FOR OP/ED): Performed by: STUDENT IN AN ORGANIZED HEALTH CARE EDUCATION/TRAINING PROGRAM

## 2023-10-21 PROCEDURE — 2020000001 HC ICU ROOM DAILY

## 2023-10-21 PROCEDURE — 99291 CRITICAL CARE FIRST HOUR: CPT | Performed by: STUDENT IN AN ORGANIZED HEALTH CARE EDUCATION/TRAINING PROGRAM

## 2023-10-21 PROCEDURE — 2500000001 HC RX 250 WO HCPCS SELF ADMINISTERED DRUGS (ALT 637 FOR MEDICARE OP)

## 2023-10-21 PROCEDURE — 71045 X-RAY EXAM CHEST 1 VIEW: CPT

## 2023-10-21 PROCEDURE — 80069 RENAL FUNCTION PANEL: CPT

## 2023-10-21 PROCEDURE — 94667 MNPJ CHEST WALL 1ST: CPT

## 2023-10-21 PROCEDURE — 94668 MNPJ CHEST WALL SBSQ: CPT

## 2023-10-21 PROCEDURE — 82947 ASSAY GLUCOSE BLOOD QUANT: CPT

## 2023-10-21 PROCEDURE — 2500000005 HC RX 250 GENERAL PHARMACY W/O HCPCS

## 2023-10-21 PROCEDURE — 94640 AIRWAY INHALATION TREATMENT: CPT

## 2023-10-21 PROCEDURE — 99232 SBSQ HOSP IP/OBS MODERATE 35: CPT | Performed by: INTERNAL MEDICINE

## 2023-10-21 PROCEDURE — 71045 X-RAY EXAM CHEST 1 VIEW: CPT | Performed by: RADIOLOGY

## 2023-10-21 PROCEDURE — 85025 COMPLETE CBC W/AUTO DIFF WBC: CPT

## 2023-10-21 PROCEDURE — 2500000002 HC RX 250 W HCPCS SELF ADMINISTERED DRUGS (ALT 637 FOR MEDICARE OP, ALT 636 FOR OP/ED)

## 2023-10-21 PROCEDURE — 2500000004 HC RX 250 GENERAL PHARMACY W/ HCPCS (ALT 636 FOR OP/ED)

## 2023-10-21 PROCEDURE — 94003 VENT MGMT INPAT SUBQ DAY: CPT

## 2023-10-21 PROCEDURE — 94762 N-INVAS EAR/PLS OXIMTRY CONT: CPT

## 2023-10-21 PROCEDURE — 36415 COLL VENOUS BLD VENIPUNCTURE: CPT | Performed by: NURSE PRACTITIONER

## 2023-10-21 PROCEDURE — 36415 COLL VENOUS BLD VENIPUNCTURE: CPT | Mod: CMCLAB

## 2023-10-21 PROCEDURE — 2500000001 HC RX 250 WO HCPCS SELF ADMINISTERED DRUGS (ALT 637 FOR MEDICARE OP): Performed by: STUDENT IN AN ORGANIZED HEALTH CARE EDUCATION/TRAINING PROGRAM

## 2023-10-21 PROCEDURE — 85520 HEPARIN ASSAY: CPT

## 2023-10-21 PROCEDURE — S4991 NICOTINE PATCH NONLEGEND: HCPCS | Performed by: STUDENT IN AN ORGANIZED HEALTH CARE EDUCATION/TRAINING PROGRAM

## 2023-10-21 PROCEDURE — 93010 ELECTROCARDIOGRAM REPORT: CPT | Performed by: INTERNAL MEDICINE

## 2023-10-21 RX ADMIN — PROPOFOL 10 MCG/KG/MIN: 10 INJECTION, EMULSION INTRAVENOUS at 15:28

## 2023-10-21 RX ADMIN — NOREPINEPHRINE BITARTRATE 0.01 MCG/KG/MIN: 8 INJECTION, SOLUTION INTRAVENOUS at 00:51

## 2023-10-21 RX ADMIN — PIPERACILLIN SODIUM AND TAZOBACTAM SODIUM 4.5 G: 4; .5 INJECTION, SOLUTION INTRAVENOUS at 00:41

## 2023-10-21 RX ADMIN — FOLIC ACID 1 MG: 1 TABLET ORAL at 09:00

## 2023-10-21 RX ADMIN — PIPERACILLIN SODIUM AND TAZOBACTAM SODIUM 4.5 G: 4; .5 INJECTION, SOLUTION INTRAVENOUS at 06:07

## 2023-10-21 RX ADMIN — THIAMINE HCL TAB 100 MG 100 MG: 100 TAB at 09:00

## 2023-10-21 RX ADMIN — HEPARIN SODIUM 1080 UNITS/HR: 10000 INJECTION, SOLUTION INTRAVENOUS at 15:27

## 2023-10-21 RX ADMIN — POLYETHYLENE GLYCOL 3350 17 G: 17 POWDER, FOR SOLUTION ORAL at 09:01

## 2023-10-21 RX ADMIN — ALBUTEROL SULFATE 2.5 MG: 2.5 SOLUTION RESPIRATORY (INHALATION) at 02:40

## 2023-10-21 RX ADMIN — PIPERACILLIN SODIUM AND TAZOBACTAM SODIUM 4.5 G: 4; .5 INJECTION, SOLUTION INTRAVENOUS at 11:48

## 2023-10-21 RX ADMIN — Medication 50 MCG/HR: at 10:25

## 2023-10-21 RX ADMIN — PIPERACILLIN SODIUM AND TAZOBACTAM SODIUM 4.5 G: 4; .5 INJECTION, SOLUTION INTRAVENOUS at 17:48

## 2023-10-21 RX ADMIN — Medication 8000 UNITS: at 09:01

## 2023-10-21 RX ADMIN — ALBUTEROL SULFATE 2.5 MG: 2.5 SOLUTION RESPIRATORY (INHALATION) at 20:06

## 2023-10-21 RX ADMIN — Medication 1 TABLET: at 09:00

## 2023-10-21 RX ADMIN — Medication 1 PATCH: at 09:05

## 2023-10-21 RX ADMIN — PROPOFOL 10 MCG/KG/MIN: 10 INJECTION, EMULSION INTRAVENOUS at 00:50

## 2023-10-21 RX ADMIN — ATORVASTATIN CALCIUM 40 MG: 40 TABLET ORAL at 09:10

## 2023-10-21 RX ADMIN — ESOMEPRAZOLE MAGNESIUM 40 MG: 40 FOR SUSPENSION ORAL at 09:02

## 2023-10-21 RX ADMIN — ALBUTEROL SULFATE 2.5 MG: 2.5 SOLUTION RESPIRATORY (INHALATION) at 07:27

## 2023-10-21 RX ADMIN — ALBUTEROL SULFATE 2.5 MG: 2.5 SOLUTION RESPIRATORY (INHALATION) at 14:14

## 2023-10-21 RX ADMIN — Medication 50 MCG/HR: at 00:53

## 2023-10-21 ASSESSMENT — PAIN - FUNCTIONAL ASSESSMENT
PAIN_FUNCTIONAL_ASSESSMENT: CPOT (CRITICAL CARE PAIN OBSERVATION TOOL)

## 2023-10-21 ASSESSMENT — ACTIVITIES OF DAILY LIVING (ADL)
PATIENT'S MEMORY ADEQUATE TO SAFELY COMPLETE DAILY ACTIVITIES?: UNABLE TO ASSESS
GROOMING: UNABLE TO ASSESS
JUDGMENT_ADEQUATE_SAFELY_COMPLETE_DAILY_ACTIVITIES: UNABLE TO ASSESS
TOILETING: UNABLE TO ASSESS
HEARING - LEFT EAR: UNABLE TO ASSESS
BATHING: UNABLE TO ASSESS
WALKS IN HOME: UNABLE TO ASSESS
DRESSING YOURSELF: UNABLE TO ASSESS
FEEDING YOURSELF: UNABLE TO ASSESS
HEARING - RIGHT EAR: UNABLE TO ASSESS
ADEQUATE_TO_COMPLETE_ADL: UNABLE TO ASSESS

## 2023-10-21 ASSESSMENT — PAIN SCALES - GENERAL: PAINLEVEL_OUTOF10: 0 - NO PAIN

## 2023-10-21 ASSESSMENT — PAIN SCALES - WONG BAKER: WONGBAKER_NUMERICALRESPONSE: HURTS LITTLE BIT

## 2023-10-21 NOTE — CARE PLAN
The patient's goals for the shift include PATITO    The clinical goals for the shift include Pt will remain stable throughout shift

## 2023-10-21 NOTE — PROCEDURES
Bronchoscopy    Date/Time: 10/20/2023 6:00 PM    Performed by: Elisa Mascorro MD  Authorized by: Jesenia Bui MD    Consent:     Consent obtained:  Verbal and written    Consent given by:  Patient and healthcare agent    The patient had appropriate oxygen, blood pressure, heart rate, and respiratory rate monitoring applied and monitored continuously throughout the procedure. Continuous infusions of propofol and fentanyl used for sedation. The bronchoscope was inserted into a pre-existing endotracheal tube. This was advanced to the crispin. There were turbid, purulent secretions seen bilaterally within both the right and left main stem bronchi.  Copious purulent secretions were seen throughout airway, left greater than right. Secretions were suctioned throughout the airway. Complete airway exam was then completed. Secretions collected to send for evaluation.     The bronchoscope was then removed, and the procedure was terminated.  The patient tolerated the procedure well.

## 2023-10-21 NOTE — PROGRESS NOTES
Molina Godinez is a 68 y.o. male on day 9 of admission presenting with Hospital-acquired pneumonia.    Subjective   Interval History: no significant change over last 24 hours, stays intubated, and afebrile wbc today pending      BAL result pending  Had cervical thoracic lumbar spine MRI which didn't show evidence of disciitis or epidural abscess,         Review of Systems  The patient is not able to provide histories including review of systems because of being intubated     Objective   Range of Vitals (last 24 hours)  Heart Rate:  []   Temp:  [36.4 °C (97.5 °F)-36.9 °C (98.4 °F)]   Resp:  [15-41]   BP: ()/(44-93)   SpO2:  [88 %-100 %]   Daily Weight  10/02/23 : 57.6 kg (127 lb)    Body mass index is 17.16 kg/m².    Physical Exam  Vitals reviewed.   Constitutional:       General: He is not in acute distress.     Appearance: He is not ill-appearing.   Cardiovascular:      Rate and Rhythm: Normal rate and regular rhythm.   Pulmonary:      Effort: Pulmonary effort is normal.      Breath sounds: Normal breath sounds.   Abdominal:      Palpations: Abdomen is soft.   Skin:     General: Skin is warm.   Neurological:      Mental Status: He is alert.             Relevant Results  Labs  Results from last 72 hours   Lab Units 10/21/23  0342 10/20/23  0332 10/19/23  0444   WBC AUTO x10*3/uL 22.4* 22.2* 18.3*   HEMOGLOBIN g/dL 14.7 16.1 15.9   HEMATOCRIT % 41.4 46.7 47.6   PLATELETS AUTO x10*3/uL 184 227 271   NEUTROS PCT AUTO % 86.3 92.3 89.3   LYMPHS PCT AUTO % 5.2 1.5 2.6   MONOS PCT AUTO % 7.0 5.3 7.0   EOS PCT AUTO % 0.3 0.0 0.1     Results from last 72 hours   Lab Units 10/20/23  1018 10/20/23  0332 10/19/23  1159 10/19/23  0804   SODIUM mmol/L 135* 130* 122* 128*   POTASSIUM mmol/L 3.2* 4.1 3.9 2.7*   CHLORIDE mmol/L 113* 100 88* 103   CO2 mmol/L 11* 18* 21 14*   BUN mg/dL 12 23 28* 21   CREATININE mg/dL <0.20* 0.47* 0.84 0.48*   GLUCOSE mg/dL 102* 170* 260* 208*   CALCIUM mg/dL 4.1* 6.2* 8.6 5.2*   ANION GAP  mmol/L 14 16 17 14   EGFR mL/min/1.73m*2  --  >90 >90 >90   PHOSPHORUS mg/dL 1.3* 2.4* 3.3 1.9*     Results from last 72 hours   Lab Units 10/20/23  1018 10/20/23  0332 10/19/23  1159   ALBUMIN g/dL <1.5* 2.1* 3.1*       MRI of brain C/T/L spine 10/20  Brain:  1. No acute ischemic infarct or intracranial hemorrhage. No abnormal  parenchymal enhancement.  2. Nonspecific subcortical and periventricular T2/FLAIR  hyperintensities may represent sequelae of chronic small vessel  ischemic changes.  3. Remote lacunar infarcts in the left thalamus.          Cervical spine:  1. Multilevel degenerative disc disease and facet arthrosis most  pronounced at C3-C4 with moderate to severe spinal canal stenosis and  moderate to severe bilateral neural foraminal stenosis. There is  flattening of the cervical cord with question subtle increased cord  signal on the STIR sequence. Please correlate clinically for symptoms  of myelopathy. No abnormal cord enhancement.  2. Patchy consolidative opacities throughout the left lung with a  small left pleural effusion. Correlate with dedicated chest imaging.      Thoracic spine:  1. No significant spinal canal or neural foraminal stenosis. No  abnormal enhancement.  2. Nonacute minimal compression deformity at T3. Mild compression  deformity at T11 with a proximally 25% loss of vertebral body height  and no osseous retropulsion into the spinal canal. This was present  on prior exam 10/16/2023.          Lumbar spine:  1. Multilevel lumbar spondylosis as detailed above. No spinal canal  stenosis.  2. No abnormal signal or enhancement within the distal cord or nerve  roots.          ASSESSMENT      67yo M with hx of substance use disorder.  Presenting with leg weakness.   Syphilis Antibody positive with negative RPR titer, remote hx of treated syphilis.    Respiratory failure from aspiration pneumonia.   ID Consult was mainly for questionable neurosyphilis     Leg weakness . No radiologic  evidence of epidural abscess or CNS infection  No evidence of neurosyphilis      ID will sign off ,  please call back if you have any further questions.   This case was discussed with my attending, Dr. Julissa Scanlon, who agreed with my assessment and plan.     DOREEN OTTO.  M.D  Infectious disease fellow PGY-4  Available on [employer]  or [employer] team [phone]

## 2023-10-21 NOTE — PROGRESS NOTES
"Molina Godinez is a 68 y.o. male on day 9 of admission presenting with Hospital-acquired pneumonia.    Subjective   No acute events overnight. Remains intubated and sedated. Underwent bronchoscopy yesterday       Objective     Physical Exam  Cardiovascular:      Rate and Rhythm: Normal rate and regular rhythm.      Pulses: Normal pulses.      Heart sounds: Normal heart sounds.   Pulmonary:      Effort: Pulmonary effort is normal.      Breath sounds: Normal breath sounds.         Last Recorded Vitals  Blood pressure 149/86, pulse 104, temperature 36.5 °C (97.7 °F), temperature source Temporal, resp. rate 18, height 1.702 m (5' 7.01\"), weight 49.7 kg (109 lb 9.1 oz), SpO2 98 %.  Intake/Output last 3 Shifts:  I/O last 3 completed shifts:  In: 3713.3 (74.7 mL/kg) [I.V.:461.3 (9.3 mL/kg); NG/GT:445; IV Piggyback:2807]  Out: 1900 (38.2 mL/kg) [Urine:1900 (1.1 mL/kg/hr)]  Weight: 49.7 kg     Relevant Results                  This patient is intubated   Reason for patient to remain intubated today? they are unable to protect their airway        Malnutrition Diagnosis Status: New  Malnutrition Diagnosis: Severe malnutrition related to chronic disease or condition  As Evidenced by: suspect poor PO intake PTA in light of EtOH abuse as well as noted severe muscle and fat loss on physical exam; he is underweight for height with a BMI of 17.2 and a DBW of 74%  I agree with the dietitian's malnutrition diagnosis.      Assessment/Plan   Principal Problem:    Hospital-acquired pneumonia  Active Problems:    Essential hypertension, benign    Hepatitis C virus infection cured after antiviral drug therapy    Alcoholism (CMS/HCC)    Chronic pulmonary embolism (CMS/HCC)    COPD (chronic obstructive pulmonary disease) (CMS/HCC)    HLD (hyperlipidemia)    Molina Godinez is a 68 year old male with PMHx of COPD, HTN, AUD and opioid use disorder, HCV, chronic PE who was seen in MICU 10/13-10/16 for inability to walk 2/2 suspected alcohol use " disorder. ICU course complicated by hypertension, altered mental status, and hyponatremia. Transferred back to MICU 10/17 after he was found lethargic and hypoxic.      10/20 Updates to plan:  Intubated this morning, plan for bronch later today/tomorrow  No concern of Refeeding, hyponatremia improved. Will change RFP to BID   Started on Vanc/Zosyn overnight, MRSA neg so will DC Vanc but continue Zosyn and fu sputum cx  ID consulted regarding positive Syphilis Ab, low concern for neurosyphilis as patient was treated back in 1998, but recommended MRI spine given concern of abscess causing his b/l LE weakness    10/21 Updates to plan:     Remains intubated and sedated. Imaging (MRI brain, C spine, T spine, L spine) negative for any acute process. Will get a repeat CXR today and will need to call sister to determine his mental status prior to his admission.       NEURO/PSYCH  #AMS- fluctuating, unclear etiology as previously suspected alcohol withdrawal but could be 2/2 chronic hypoxia and retention vs hyponatremia or other etiology  -CT head without pathology  - Initially had visual and tactile hallucinations that have since resolved. Low concern for alcohol withdrawal at this point given last drink was 7 days ago and patient's AMS has typically been early morning and late night. Suspect more metabolic etiology of fluctuations in mental status, I/s/o acutely increased oxygen requirement.  - Concern today after rounds as patient obtunded with another episode of lethargy. Baseline RASS -1- -2, patient was -4 during after rounds exam. ABGs without significant abnormality, lethargic episode thought to be secondary to retention I/s/o severe COPD vs hyponatremia. 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 Syphilis total Ab positive, RPR negative  PLAN:  [] Will call health department to obtain information of any recent syphilis infection  and discuss with ID  [] Wean oxygen as tolerated  [] Intubated (10/21) after increased O2 requirement, c/f airway protection. On Fentanyl, Propofol. Will monitor BP after hypotension immediately after intubation, initially required Levo but BP stable and has been off of it        #Bilateral LE weakness- Neuro consulted earlier in admission  -Likely 2/2 chronic, had weakness during 8/2023 admission; less likely GBS  -CT head without pathology, MRI spine stopped early due to pain  -Patient able to move bilateral lower extremities, low c/f GBS  - MRI brain, c spine, l spine, t spine are negative for any acute process  PLAN:  -No LP indicated at this time  -May follow up with neurology outpatient  [] ID consulted regarding weakness and positive Syphilis Ab. Pt was treated back in 1998     CV  #HTN- resolved  - home amlodipine 5mg, atenolol 50mg  - Initially started on Cardene drip, transitioned to Labetalol prior to resolution  [] Home meds held      #CAD  -home atorvastatin 40mg (home med)     PULM  #CAP- initially resolved  -Urine strep positive, unsure if active or chronic infection  -MRSA negative, Urine legionella negative  -vanc/zosyn 10/12-13  - Blood Cx NGTD 10/13     #L lung consolidation, c/f PNA  Initially treated for CAP given positive urine strep with CTX 10/13-10/17, one day of Zosyn10/17-10/17  Leukocytosis, neutrophil predominnace WBC 22.2 from 18.3, 12.1   C/f HAP vs incomplete resolution of previous PNA  Bronchoscopy (10/20) showed purulent fluid with bronchial washings collected and sent for culture.   []  bronchial washings culture report      #Suspected PAH  - Echo with atrial septal aneurysm, positive bubble study  [] Will need outpatient workup, previously some suspicion for PAH but will need further evaluation     #Severe COPD  #Mucus plugging  -8/2023 FEV1 42%  - CT C/A/P with RLL mucus plug  - Required 70% FiO2 HFNC during this MICU stay, now on NC  - Bronchoscopy (10/20) showed purulent  fluid with bronchial washings collected and sent for culture.   PLAN:  [] Albuterol q6h, chest PT, respiratory hygiene  [] Intubated  [] FU bronchial washings culture report      #Subsegmental PE  -Requires anticoagulation for 3 months (per vasc med 8/2023), on home apixaban 5mg BID  PLAN:  -not tolerating PO, on heparin gtt        GI  #HepC  -treated, 8/2023 HCV RNA undetected     #Nutrition  -Per SLP 10/19, continue NPO with 1-2ice chips per hour with RN only  - Dobhoff in place 10/17  - Nutrition following, appreciate recs  PLAN:  - On TF with TwoCal HN goal rate of 35ml/hr 10/19  - 100mg Thiamine, 1mg Folic acid, 8000u Vit D2     /RENAL  #Hyponatremia - Likely SIADH 2/2 COPD exacerbation  - Na as low as 120  - Serum Osm 248, urine Osm 492, Urine Na 32     #C/f refeeding syndrome- resolved  - Hypokalemia 2.7, Phos 1.9, Ca 5.2  - Electrolytes repleted, will monitor     #Alkalosis  - Likely 2/2 tachypnea     ENDOCRINE  #Secondary HyperPTH  -8/28 .7  -Hypocalcemic on admission, now resolved  -25-hydroxy vit D 8, 1,25 Vit D 44.4  PLAN:  Vit D2 8000u liquid daily     ID  #Leukocytosis I/s/o recently treated strep pneumo CAP  -Procal 0.63, up from 0.55 10/15  - Completed CTX 10/13-10/17  - Zosyn 10/17-10/17  [] Repeat sputum Cx as previous attempt with significant salivary contamination        HEME/ONC  #Leukocytosis, worsening  -2/2 to CAP  - WBC initially downtrended to 12.1 yesterday, now elevated to 18.3  [] Monitor WBC, vitals        MSK/RHEUM   #Chronic LE weakness  -Likely chronic from disuse and alcohol, unlikely GBS since no paralysis  -Consider neurology outpatient referral for possible EMG, neurology signed off     Vent/Oxy: Intubated and sedated  Pressors/Drips: Heparin gtt, Fentanyl 25mcg, propofol 15mcg  Abx: Zosyn  F: PRN  E: replete PRN  N: TF via Dobhoff held  A: PIV R arm x2, Dobhoff  DVT ppx: heparin gtt  GI ppx: Pantoprazole     Code status: Full Code (confirmed)  NOK: Tonie (sister)  [phone]; Nahomi (God-sister) [phone]; Sarah Godinez (sister) [phone]               Jose Edward MD

## 2023-10-21 NOTE — PROCEDURES
Intubation    Date/Time: 10/20/2023 9:00 AM    Performed by: Elisa Mascorro MD  Authorized by: Jesenia Bui MD    Consent:     Consent obtained:  Verbal    Consent given by:  Patient  Pre-procedure details:     Indications: respiratory failure      Patient status:  Awake    Neck mobility: reduced      Pharmacologic strategy: RSI      Induction agents:  Etomidate    Paralytics:  Rocuronium  Procedure details:     Preoxygenation:  Bag valve mask    CPR in progress: no      Number of attempts:  1  Successful intubation attempt details:     Intubation method:  Oral    Intubation technique: video assisted      Laryngoscope blade:  Mac 4    Grade view: I      Tube size (mm):  8.0  Placement assessment:     Tube secured with:  Adhesive tape and ETT pyle    Breath sounds:  Equal    Placement verification: chest rise, colorimetric ETCO2 and equal breath sounds    Post-procedure details:     Procedure completion:  Tolerated well, no immediate complications    Procedure Details     Hands were washed immediately prior to the procedure, and a surgical mask and gloves worn throughout the procedure. The patient was placed on a cardiac monitor including continuous pulse oximetry.  The patient received 100mg fentanyl, followed by 20mg etomidate and 50 rocuronium for the procedure. Using a glidescope and a size 8 endotracheal tube with stylet, the patient was intubated on the first attempt. The stylet was removed and cuff balloon was inflated. Appropriate endotracheal tube position was confirmed by direct visualization of vocal cord passage, fogging of the tube, CO2 colormetric indicator and symmetric breath sounds. Post intubation chest x-ray ordered.

## 2023-10-22 LAB
ALBUMIN SERPL BCP-MCNC: 2.4 G/DL (ref 3.4–5)
ALBUMIN SERPL BCP-MCNC: 2.5 G/DL (ref 3.4–5)
ALBUMIN SERPL BCP-MCNC: 2.6 G/DL (ref 3.4–5)
ANION GAP SERPL CALC-SCNC: 16 MMOL/L (ref 10–20)
ANION GAP SERPL CALC-SCNC: 17 MMOL/L (ref 10–20)
ANION GAP SERPL CALC-SCNC: 17 MMOL/L (ref 10–20)
BUN SERPL-MCNC: 10 MG/DL (ref 6–23)
BUN SERPL-MCNC: 11 MG/DL (ref 6–23)
BUN SERPL-MCNC: 13 MG/DL (ref 6–23)
CA-I BLD-SCNC: 1.08 MMOL/L (ref 1.1–1.33)
CALCIUM SERPL-MCNC: 7.8 MG/DL (ref 8.6–10.6)
CALCIUM SERPL-MCNC: 8.2 MG/DL (ref 8.6–10.6)
CALCIUM SERPL-MCNC: 8.2 MG/DL (ref 8.6–10.6)
CHLORIDE SERPL-SCNC: 87 MMOL/L (ref 98–107)
CHLORIDE SERPL-SCNC: 88 MMOL/L (ref 98–107)
CHLORIDE SERPL-SCNC: 89 MMOL/L (ref 98–107)
CHLORIDE UR-SCNC: 47 MMOL/L
CHLORIDE/CREATININE (MMOL/G) IN URINE: 128 MMOL/G CREAT (ref 23–275)
CO2 SERPL-SCNC: 21 MMOL/L (ref 21–32)
CO2 SERPL-SCNC: 22 MMOL/L (ref 21–32)
CO2 SERPL-SCNC: 23 MMOL/L (ref 21–32)
CREAT SERPL-MCNC: 0.56 MG/DL (ref 0.5–1.3)
CREAT SERPL-MCNC: 0.58 MG/DL (ref 0.5–1.3)
CREAT SERPL-MCNC: 0.66 MG/DL (ref 0.5–1.3)
CREAT UR-MCNC: 36.7 MG/DL (ref 20–370)
ERYTHROCYTE [DISTWIDTH] IN BLOOD BY AUTOMATED COUNT: 12.6 % (ref 11.5–14.5)
GFR SERPL CREATININE-BSD FRML MDRD: >90 ML/MIN/1.73M*2
GLUCOSE BLD MANUAL STRIP-MCNC: 103 MG/DL (ref 74–99)
GLUCOSE BLD MANUAL STRIP-MCNC: 117 MG/DL (ref 74–99)
GLUCOSE BLD MANUAL STRIP-MCNC: 118 MG/DL (ref 74–99)
GLUCOSE BLD MANUAL STRIP-MCNC: 122 MG/DL (ref 74–99)
GLUCOSE BLD MANUAL STRIP-MCNC: 126 MG/DL (ref 74–99)
GLUCOSE BLD MANUAL STRIP-MCNC: 136 MG/DL (ref 74–99)
GLUCOSE BLD MANUAL STRIP-MCNC: 137 MG/DL (ref 74–99)
GLUCOSE SERPL-MCNC: 112 MG/DL (ref 74–99)
GLUCOSE SERPL-MCNC: 112 MG/DL (ref 74–99)
GLUCOSE SERPL-MCNC: 115 MG/DL (ref 74–99)
HCT VFR BLD AUTO: 37.1 % (ref 41–52)
HGB BLD-MCNC: 13.4 G/DL (ref 13.5–17.5)
MAGNESIUM SERPL-MCNC: 1.78 MG/DL (ref 1.6–2.4)
MCH RBC QN AUTO: 30.2 PG (ref 26–34)
MCHC RBC AUTO-ENTMCNC: 36.1 G/DL (ref 32–36)
MCV RBC AUTO: 84 FL (ref 80–100)
NRBC BLD-RTO: 0 /100 WBCS (ref 0–0)
OSMOLALITY SERPL: 246 MOSM/KG (ref 280–300)
OSMOLALITY UR: 353 MOSM/KG (ref 200–1200)
PHOSPHATE SERPL-MCNC: 4.4 MG/DL (ref 2.5–4.9)
PHOSPHATE SERPL-MCNC: 4.8 MG/DL (ref 2.5–4.9)
PHOSPHATE SERPL-MCNC: 4.8 MG/DL (ref 2.5–4.9)
PLATELET # BLD AUTO: 261 X10*3/UL (ref 150–450)
PMV BLD AUTO: 10.5 FL (ref 7.5–11.5)
POTASSIUM SERPL-SCNC: 4.1 MMOL/L (ref 3.5–5.3)
POTASSIUM SERPL-SCNC: 4.8 MMOL/L (ref 3.5–5.3)
POTASSIUM SERPL-SCNC: 6.6 MMOL/L (ref 3.5–5.3)
POTASSIUM UR-SCNC: 24 MMOL/L
POTASSIUM/CREAT UR-RTO: 65 MMOL/G CREAT
RBC # BLD AUTO: 4.43 X10*6/UL (ref 4.5–5.9)
SODIUM SERPL-SCNC: 120 MMOL/L (ref 136–145)
SODIUM SERPL-SCNC: 122 MMOL/L (ref 136–145)
SODIUM SERPL-SCNC: 122 MMOL/L (ref 136–145)
SODIUM UR-SCNC: 92 MMOL/L
SODIUM/CREAT UR-RTO: 251 MMOL/G CREAT
UFH PPP CHRO-ACNC: 0.3 IU/ML
WBC # BLD AUTO: 21.8 X10*3/UL (ref 4.4–11.3)

## 2023-10-22 PROCEDURE — 82330 ASSAY OF CALCIUM: CPT

## 2023-10-22 PROCEDURE — 80069 RENAL FUNCTION PANEL: CPT

## 2023-10-22 PROCEDURE — 94668 MNPJ CHEST WALL SBSQ: CPT

## 2023-10-22 PROCEDURE — 36415 COLL VENOUS BLD VENIPUNCTURE: CPT | Mod: CMCLAB

## 2023-10-22 PROCEDURE — 2500000002 HC RX 250 W HCPCS SELF ADMINISTERED DRUGS (ALT 637 FOR MEDICARE OP, ALT 636 FOR OP/ED)

## 2023-10-22 PROCEDURE — 83930 ASSAY OF BLOOD OSMOLALITY: CPT

## 2023-10-22 PROCEDURE — 82436 ASSAY OF URINE CHLORIDE: CPT

## 2023-10-22 PROCEDURE — 2500000002 HC RX 250 W HCPCS SELF ADMINISTERED DRUGS (ALT 637 FOR MEDICARE OP, ALT 636 FOR OP/ED): Performed by: STUDENT IN AN ORGANIZED HEALTH CARE EDUCATION/TRAINING PROGRAM

## 2023-10-22 PROCEDURE — 94003 VENT MGMT INPAT SUBQ DAY: CPT

## 2023-10-22 PROCEDURE — 2500000004 HC RX 250 GENERAL PHARMACY W/ HCPCS (ALT 636 FOR OP/ED)

## 2023-10-22 PROCEDURE — 82947 ASSAY GLUCOSE BLOOD QUANT: CPT

## 2023-10-22 PROCEDURE — 2500000001 HC RX 250 WO HCPCS SELF ADMINISTERED DRUGS (ALT 637 FOR MEDICARE OP)

## 2023-10-22 PROCEDURE — 2500000001 HC RX 250 WO HCPCS SELF ADMINISTERED DRUGS (ALT 637 FOR MEDICARE OP): Performed by: STUDENT IN AN ORGANIZED HEALTH CARE EDUCATION/TRAINING PROGRAM

## 2023-10-22 PROCEDURE — 83935 ASSAY OF URINE OSMOLALITY: CPT

## 2023-10-22 PROCEDURE — 83735 ASSAY OF MAGNESIUM: CPT | Mod: CMCLAB

## 2023-10-22 PROCEDURE — 82570 ASSAY OF URINE CREATININE: CPT

## 2023-10-22 PROCEDURE — 99291 CRITICAL CARE FIRST HOUR: CPT

## 2023-10-22 PROCEDURE — 2020000001 HC ICU ROOM DAILY

## 2023-10-22 PROCEDURE — 94640 AIRWAY INHALATION TREATMENT: CPT

## 2023-10-22 PROCEDURE — 85520 HEPARIN ASSAY: CPT

## 2023-10-22 PROCEDURE — S4991 NICOTINE PATCH NONLEGEND: HCPCS | Performed by: STUDENT IN AN ORGANIZED HEALTH CARE EDUCATION/TRAINING PROGRAM

## 2023-10-22 PROCEDURE — 85027 COMPLETE CBC AUTOMATED: CPT

## 2023-10-22 RX ORDER — SULFAMETHOXAZOLE AND TRIMETHOPRIM 200; 40 MG/5ML; MG/5ML
320 SUSPENSION ORAL EVERY 12 HOURS SCHEDULED
Status: DISCONTINUED | OUTPATIENT
Start: 2023-10-22 | End: 2023-10-23

## 2023-10-22 RX ORDER — CALCIUM GLUCONATE 20 MG/ML
1 INJECTION, SOLUTION INTRAVENOUS EVERY 4 HOURS
Status: COMPLETED | OUTPATIENT
Start: 2023-10-22 | End: 2023-10-22

## 2023-10-22 RX ADMIN — CALCIUM GLUCONATE 1 G: 20 INJECTION, SOLUTION INTRAVENOUS at 21:56

## 2023-10-22 RX ADMIN — Medication 50 MCG/HR: at 23:40

## 2023-10-22 RX ADMIN — ALBUTEROL SULFATE 2.5 MG: 2.5 SOLUTION RESPIRATORY (INHALATION) at 03:37

## 2023-10-22 RX ADMIN — SODIUM ZIRCONIUM CYCLOSILICATE 10 G: 10 POWDER, FOR SUSPENSION ORAL at 18:53

## 2023-10-22 RX ADMIN — PIPERACILLIN SODIUM AND TAZOBACTAM SODIUM 4.5 G: 4; .5 INJECTION, SOLUTION INTRAVENOUS at 06:17

## 2023-10-22 RX ADMIN — HEPARIN SODIUM 1080 UNITS/HR: 10000 INJECTION, SOLUTION INTRAVENOUS at 16:05

## 2023-10-22 RX ADMIN — PIPERACILLIN SODIUM AND TAZOBACTAM SODIUM 4.5 G: 4; .5 INJECTION, SOLUTION INTRAVENOUS at 17:27

## 2023-10-22 RX ADMIN — Medication 1 TABLET: at 09:00

## 2023-10-22 RX ADMIN — POLYETHYLENE GLYCOL 3350 17 G: 17 POWDER, FOR SOLUTION ORAL at 08:32

## 2023-10-22 RX ADMIN — SULFAMETHOXAZOLE AND TRIMETHOPRIM 320 MG OF TRIMETHOPRIM: 200; 40 SUSPENSION ORAL at 16:01

## 2023-10-22 RX ADMIN — FOLIC ACID 1 MG: 1 TABLET ORAL at 08:32

## 2023-10-22 RX ADMIN — PROPOFOL 15 MCG/KG/MIN: 10 INJECTION, EMULSION INTRAVENOUS at 16:01

## 2023-10-22 RX ADMIN — ALBUTEROL SULFATE 2.5 MG: 2.5 SOLUTION RESPIRATORY (INHALATION) at 20:38

## 2023-10-22 RX ADMIN — CALCIUM GLUCONATE 1 G: 20 INJECTION, SOLUTION INTRAVENOUS at 18:42

## 2023-10-22 RX ADMIN — Medication 50 MCG/HR: at 04:34

## 2023-10-22 RX ADMIN — ALBUTEROL SULFATE 2.5 MG: 2.5 SOLUTION RESPIRATORY (INHALATION) at 07:33

## 2023-10-22 RX ADMIN — ALBUTEROL SULFATE 2.5 MG: 2.5 SOLUTION RESPIRATORY (INHALATION) at 13:55

## 2023-10-22 RX ADMIN — THIAMINE HCL TAB 100 MG 100 MG: 100 TAB at 08:32

## 2023-10-22 RX ADMIN — PIPERACILLIN SODIUM AND TAZOBACTAM SODIUM 4.5 G: 4; .5 INJECTION, SOLUTION INTRAVENOUS at 00:04

## 2023-10-22 RX ADMIN — Medication 1 PATCH: at 08:31

## 2023-10-22 RX ADMIN — Medication 8000 UNITS: at 08:32

## 2023-10-22 RX ADMIN — PIPERACILLIN SODIUM AND TAZOBACTAM SODIUM 4.5 G: 4; .5 INJECTION, SOLUTION INTRAVENOUS at 11:45

## 2023-10-22 RX ADMIN — ESOMEPRAZOLE MAGNESIUM 40 MG: 40 FOR SUSPENSION ORAL at 08:32

## 2023-10-22 RX ADMIN — ATORVASTATIN CALCIUM 40 MG: 40 TABLET ORAL at 08:32

## 2023-10-22 RX ADMIN — PIPERACILLIN SODIUM AND TAZOBACTAM SODIUM 4.5 G: 4; .5 INJECTION, SOLUTION INTRAVENOUS at 23:40

## 2023-10-22 ASSESSMENT — PAIN - FUNCTIONAL ASSESSMENT
PAIN_FUNCTIONAL_ASSESSMENT: CPOT (CRITICAL CARE PAIN OBSERVATION TOOL)
PAIN_FUNCTIONAL_ASSESSMENT: CPOT (CRITICAL CARE PAIN OBSERVATION TOOL)

## 2023-10-22 ASSESSMENT — PAIN SCALES - GENERAL: PAINLEVEL_OUTOF10: 1

## 2023-10-22 NOTE — CARE PLAN
The patient's goals for the shift include PATITO    The clinical goals for the shift include Pt will remain will have no respiratory distress and tolerate minimal vent settings throughout shift

## 2023-10-22 NOTE — PROGRESS NOTES
Critical Care Daily Progress        Subjective   Patient is a 68 y.o. male admitted on 10/11/2023  7:54 PM with the following indication(s) for ICU care hyponatremia, altered mental status with multifactorial etiology.     Interval History: Overnight, tachycardic 120s NSR.       Scheduled Medications:   albuterol, 2.5 mg, nebulization, q6h  atorvastatin, 40 mg, oral, Daily  ergocalciferol, 8,000 Units, oral, Daily  esomeprazole, 40 mg, nasoduodenal tube, Daily before breakfast  folic acid, 1 mg, oral, Daily  multivitamin with minerals, 1 tablet, oral, Daily  nicotine, 1 patch, transdermal, Daily  piperacillin-tazobactam, 4.5 g, intravenous, q6h  polyethylene glycol, 17 g, oral, Daily  thiamine, 100 mg, oral, Daily           Continuous Medications:   fentaNYL, 0-300 mcg/hr, Last Rate: 50 mcg/hr (10/22/23 0600)  heparin, 0-4,500 Units/hr, Last Rate: 1,098 Units/hr (10/22/23 0600)  norepinephrine, 0.01-1 mcg/kg/min, Last Rate: Stopped (10/21/23 0053)  propofol, 5-50 mcg/kg/min, Last Rate: 15 mcg/kg/min (10/22/23 0600)           PRN Medications:   PRN medications: acetaminophen, dextrose 10 % in water (D10W), dextrose, fentaNYL, fentaNYL, glucagon, heparin, oxygen, oxygen, sennosides, white petrolatum    Objective   Vitals:  Most Recent:  Visit Vitals  /67   Pulse (!) 120   Temp 36 °C (96.8 °F)   Resp 20      93% SpO2 on 8L NC    24hr Min/Max:  Temp  Min: 36 °C (96.8 °F)  Max: 37.1 °C (98.8 °F)  Pulse  Min: 101  Max: 123  BP  Min: 93/59  Max: 149/86  Resp  Min: 17  Max: 24  SpO2  Min: 93 %  Max: 98 %      GEN: Intubated and sedated, RASS -4  CV: Tachycardic, no m/r/g  PULM: Rhonchi L lung, worse on inspiration, R clear  ABD: soft, nontender, nondistended  NEURO: A&Ox4, able to wiggle legs and toes, unable to lift legs, decreased UL strength b/l  PSYCH: Appropriate mood and affect  MSK: no joint swelling grossly noted  EXT: no LE edema  SKIN: warm and dry, no lesions grossly noted    Vent Mode: Volume  control/assist control  FiO2 (%):  [40 %] 40 %  S RR:  [18] 18  S VT:  [400 mL] 400 mL  PEEP/CPAP (cm H2O):  [5 cm H20] 5 cm H20  MAP (cm H2O):  [8-10] 8     LDA:    PIV x2 in R arm, Dobhoff, intubated      LABS AND IMAGING           Results from last 7 days   Lab Units 10/22/23  0506 10/22/23  0452 10/21/23  0826 10/21/23  0342 10/20/23  1018 10/20/23  0332   SODIUM mmol/L  --  122*   < >  --    < > 130*   POTASSIUM mmol/L  --  4.8   < >  --    < > 4.1   CHLORIDE mmol/L  --  89*   < >  --    < > 100   CO2 mmol/L  --  21   < >  --    < > 18*   BUN mg/dL  --  13   < >  --    < > 23   CREATININE mg/dL  --  0.58   < >  --    < > 0.47*   WBC AUTO x10*3/uL 21.8*  --   --  22.4*  --  22.2*   HEMOGLOBIN g/dL 13.4*  --   --  14.7  --  16.1   PLATELETS AUTO x10*3/uL 261  --   --  184  --  227    < > = values in this interval not displayed.        CXR 10/21  1. Improved aeration of the left lower lung consolidative opacities. Decreased left-sided pleural effusion. 2. Coarse interstitial, and ground-glass opacity involving the left upper lung, midlung, slightly improved. 3. Cardiac silhouette is moderately enlarged. Aorta is tortuous. Pulmonary vessels are congested with mild pulmonary edema. 4. Perihilar and basilar atelectasis of the right lung. 5. No right-sided pleural effusion or pneumothorax seen 6. ETT 7.2 cm above the crispin. Enteric tube with the tip below the lower margin of study.      Assessment/Plan         Molina Godinez is a 68 year old male with PMHx of COPD, HTN, AUD and opioid use disorder, HCV, chronic PE who was seen in MICU 10/13-10/16 for inability to walk 2/2 suspected alcohol use disorder. ICU course complicated by hypertension, altered mental status, and hyponatremia. Transferred back to MICU 10/17 after he was found lethargic and hypoxic.      10/22 Updates to plan:  Decrease free water flushes to 100ml BID for further fluid restriction given hyponatremia thought to be 2/2 SIADH  Fu bronchial washings-  growing stenotrophomonas, started on Bactrim as well as continued Zosyn, fu further speciation  Will talk to RT regarding NIF, measures towards SBT         NEURO/PSYCH  #AMS- fluctuating, unclear etiology as previously suspected alcohol withdrawal but could be 2/2 chronic hypoxia and retention vs hyponatremia or other etiology  -CT head without pathology  - Initially had visual and tactile hallucinations that have since resolved. Suspect more metabolic etiology of fluctuations in mental status, I/s/o acutely increased oxygen requirement.  PLAN:  Intubated and sedated on Fentanyl, Propofol.       #Bilateral LE weakness- Neuro consulted earlier in admission  -Likely 2/2 chronic, had weakness during 8/2023 admission; less likely GBS  -CT head without pathology, MRI spine stopped early due to pain  -Patient able to move bilateral lower extremities, low c/f GBS  PLAN:  -No LP indicated at this time  -May follow up with neurology outpatient  -ID consulted regarding weakness and positive Syphilis Ab. Pt was treated back in 1998 but rec MRI spine given concern of abscess, MRI negative for acute process       CV  #HTN- resolved  - home amlodipine 5mg, atenolol 50mg  - Initially started on Cardene drip, transitioned to Labetalol prior to resolution  [] Home meds held      #CAD  -home atorvastatin 40mg (home med)     PULM  #CAP- initially resolved  -Urine strep positive, unsure if active or chronic infection  -MRSA negative, Urine legionella negative  - CTX 5 day course completed 10/17    #L lung consolidation, c/f PNA  Initially treated for CAP given positive urine strep with CTX 10/13-10/17, one day of Zosyn10/17-10/17  Leukocytosis, neutrophil predominnace WBC 22.2 from 18.3, 12.1   C/f HAP vs incomplete resolution of previous PNA  - S/p Bronch 10/20 with purulent fluid with bronchial washings sent for cx  [] Fu bronchial washings- growing stenotrophomonas, started on Bactrim as well as continued Zosyn, fu further  speciation  [] Bactrim 10/22-, Zosyn 10/20-    #Suspected PAH  - Echo with atrial septal aneurysm, positive bubble study  [] Will need outpatient workup, previously some suspicion for PAH but will need further evaluation    #Severe COPD  #Mucus plugging  -8/2023 FEV1 42%  - CT C/A/P with RLL mucus plug  - Intubated  PLAN:  [] Albuterol q6h, chest PT, respiratory hygiene       #Subsegmental PE  -Requires anticoagulation for 3 months (per vasc med 8/2023), on home apixaban 5mg BID  PLAN:  -not tolerating PO, on heparin gtt      GI  #HepC  -treated, 8/2023 HCV RNA undetected     #Nutrition  -Per SLP 10/19, continue NPO with 1-2ice chips per hour with RN only  - Dobhoff in place 10/17  - Nutrition following, appreciate recs  PLAN:  - Starting on TF with TwoCal HN goal rate of 35ml/hr 10/19  - 100mg Thiamine, 1mg Folic acid, 8000u Vit D2     /RENAL  #Hyponatremia - Likely SIADH 2/2 COPD exacerbation- worsening  - Na as low as 120  - Serum Osm 248, urine Osm 492, Urine Na 32  - Na slowly downtrending after resolution, now 120   [] Decrease free water flushes to 100ml BID for further fluid restriction given hyponatremia thought to be 2/2 SIADH  [] Urine lytes, Osm, Serum Osm    #C/f refeeding syndrome- resolved  - Hypokalemia 2.7, Phos 1.9, Ca 5.2  - Electrolytes repleted, will monitor    #Alkalosis  - Likely 2/2 tachypnea     ENDOCRINE  #Secondary HyperPTH  -8/28 .7  -25-hydroxy vit D 8, 1,25 Vit D 44.4  PLAN:  Vit D2 8000u liquid daily     ID  #Leukocytosis I/s/o recently treated strep pneumo CAP  -Procal 0.63, up from 0.55 10/15  - Completed CTX 10/13-10/17  - Zosyn 10/20-, Bactrim 10/22-  [] Fu bronchial washings- growing stenotrophomonas, started on Bactrim as well as continued Zosyn, fu further speciation  [] Fu Procal     HEME/ONC  #Leukocytosis, stanle  -2/2 to PNA  [] Monitor WBC, vitals       MSK/RHEUM   #Chronic LE weakness  -Likely chronic from disuse and alcohol, unlikely GBS since no  paralysis  -Consider neurology outpatient referral for possible EMG, neurology signed off     Vent/Oxy: Intubated and sedated  Pressors/Drips: Heparin gtt, Fentanyl 50mcg, propofol 15mcg  Abx: Zosyn, Bactrim  F: Restricted  E: replete PRN  N: TF via Dobhoff   A: PIV R arm x2, Dobhoff  DVT ppx: heparin gtt  GI ppx: Pantoprazole     Code status: Full Code (confirmed)  NOK: Tonie (sister) [phone]; Nahomi (God-sister) [phone]; Sarah Godinez (sister) [phone]       Ever Paez MD  Internal Medicine, PGY-1

## 2023-10-23 LAB
ALBUMIN SERPL BCP-MCNC: 2.4 G/DL (ref 3.4–5)
ALBUMIN SERPL BCP-MCNC: 2.4 G/DL (ref 3.4–5)
ALBUMIN SERPL BCP-MCNC: 2.5 G/DL (ref 3.4–5)
ALBUMIN SERPL BCP-MCNC: 2.5 G/DL (ref 3.4–5)
ALBUMIN SERPL BCP-MCNC: 2.6 G/DL (ref 3.4–5)
ANION GAP SERPL CALC-SCNC: 15 MMOL/L (ref 10–20)
ANION GAP SERPL CALC-SCNC: 16 MMOL/L (ref 10–20)
ANION GAP SERPL CALC-SCNC: 16 MMOL/L (ref 10–20)
ANION GAP SERPL CALC-SCNC: 19 MMOL/L (ref 10–20)
ANION GAP SERPL CALC-SCNC: 19 MMOL/L (ref 10–20)
BASOPHILS # BLD AUTO: 0.12 X10*3/UL (ref 0–0.1)
BASOPHILS NFR BLD AUTO: 0.5 %
BUN SERPL-MCNC: 10 MG/DL (ref 6–23)
BUN SERPL-MCNC: 11 MG/DL (ref 6–23)
BUN SERPL-MCNC: 22 MG/DL (ref 6–23)
BUN SERPL-MCNC: 9 MG/DL (ref 6–23)
BUN SERPL-MCNC: 9 MG/DL (ref 6–23)
CA-I BLD-SCNC: 0.93 MMOL/L (ref 1.1–1.33)
CA-I BLD-SCNC: 1.06 MMOL/L (ref 1.1–1.33)
CA-I BLD-SCNC: 1.06 MMOL/L (ref 1.1–1.33)
CALCIUM SERPL-MCNC: 8.1 MG/DL (ref 8.6–10.6)
CALCIUM SERPL-MCNC: 8.3 MG/DL (ref 8.6–10.6)
CALCIUM SERPL-MCNC: 8.4 MG/DL (ref 8.6–10.6)
CALCIUM SERPL-MCNC: 8.7 MG/DL (ref 8.6–10.6)
CALCIUM SERPL-MCNC: 8.8 MG/DL (ref 8.6–10.6)
CHLORIDE SERPL-SCNC: 87 MMOL/L (ref 98–107)
CHLORIDE SERPL-SCNC: 88 MMOL/L (ref 98–107)
CO2 SERPL-SCNC: 22 MMOL/L (ref 21–32)
CO2 SERPL-SCNC: 22 MMOL/L (ref 21–32)
CO2 SERPL-SCNC: 23 MMOL/L (ref 21–32)
CO2 SERPL-SCNC: 23 MMOL/L (ref 21–32)
CO2 SERPL-SCNC: 25 MMOL/L (ref 21–32)
CREAT SERPL-MCNC: 0.51 MG/DL (ref 0.5–1.3)
CREAT SERPL-MCNC: 0.55 MG/DL (ref 0.5–1.3)
CREAT SERPL-MCNC: 0.56 MG/DL (ref 0.5–1.3)
EOSINOPHIL # BLD AUTO: 0.18 X10*3/UL (ref 0–0.7)
EOSINOPHIL NFR BLD AUTO: 0.7 %
ERYTHROCYTE [DISTWIDTH] IN BLOOD BY AUTOMATED COUNT: 12.4 % (ref 11.5–14.5)
GFR SERPL CREATININE-BSD FRML MDRD: >90 ML/MIN/1.73M*2
GLUCOSE BLD MANUAL STRIP-MCNC: 121 MG/DL (ref 74–99)
GLUCOSE BLD MANUAL STRIP-MCNC: 128 MG/DL (ref 74–99)
GLUCOSE BLD MANUAL STRIP-MCNC: 128 MG/DL (ref 74–99)
GLUCOSE BLD MANUAL STRIP-MCNC: 135 MG/DL (ref 74–99)
GLUCOSE SERPL-MCNC: 108 MG/DL (ref 74–99)
GLUCOSE SERPL-MCNC: 110 MG/DL (ref 74–99)
GLUCOSE SERPL-MCNC: 113 MG/DL (ref 74–99)
GLUCOSE SERPL-MCNC: 116 MG/DL (ref 74–99)
GLUCOSE SERPL-MCNC: 94 MG/DL (ref 74–99)
HCT VFR BLD AUTO: 38.9 % (ref 41–52)
HGB BLD-MCNC: 13.6 G/DL (ref 13.5–17.5)
IMM GRANULOCYTES # BLD AUTO: 0.27 X10*3/UL (ref 0–0.7)
IMM GRANULOCYTES NFR BLD AUTO: 1.1 % (ref 0–0.9)
LACTATE BLDV-SCNC: 2.3 MMOL/L (ref 0.4–2)
LYMPHOCYTES # BLD AUTO: 0.88 X10*3/UL (ref 1.2–4.8)
LYMPHOCYTES NFR BLD AUTO: 3.6 %
MAGNESIUM SERPL-MCNC: 1.65 MG/DL (ref 1.6–2.4)
MAGNESIUM SERPL-MCNC: 1.76 MG/DL (ref 1.6–2.4)
MCH RBC QN AUTO: 30.4 PG (ref 26–34)
MCHC RBC AUTO-ENTMCNC: 35 G/DL (ref 32–36)
MCV RBC AUTO: 87 FL (ref 80–100)
MONOCYTES # BLD AUTO: 1.49 X10*3/UL (ref 0.1–1)
MONOCYTES NFR BLD AUTO: 6.1 %
NEUTROPHILS # BLD AUTO: 21.53 X10*3/UL (ref 1.2–7.7)
NEUTROPHILS NFR BLD AUTO: 88 %
NRBC BLD-RTO: 0 /100 WBCS (ref 0–0)
PHOSPHATE SERPL-MCNC: 4 MG/DL (ref 2.5–4.9)
PHOSPHATE SERPL-MCNC: 4.3 MG/DL (ref 2.5–4.9)
PHOSPHATE SERPL-MCNC: 4.3 MG/DL (ref 2.5–4.9)
PHOSPHATE SERPL-MCNC: 4.4 MG/DL (ref 2.5–4.9)
PHOSPHATE SERPL-MCNC: 4.5 MG/DL (ref 2.5–4.9)
PLATELET # BLD AUTO: 265 X10*3/UL (ref 150–450)
PMV BLD AUTO: 10.3 FL (ref 7.5–11.5)
POTASSIUM SERPL-SCNC: 4.1 MMOL/L (ref 3.5–5.3)
POTASSIUM SERPL-SCNC: 4.2 MMOL/L (ref 3.5–5.3)
POTASSIUM SERPL-SCNC: 4.3 MMOL/L (ref 3.5–5.3)
POTASSIUM SERPL-SCNC: 4.3 MMOL/L (ref 3.5–5.3)
POTASSIUM SERPL-SCNC: 4.4 MMOL/L (ref 3.5–5.3)
PROCALCITONIN SERPL-MCNC: 0.38 NG/ML
RBC # BLD AUTO: 4.48 X10*6/UL (ref 4.5–5.9)
SODIUM SERPL-SCNC: 122 MMOL/L (ref 136–145)
SODIUM SERPL-SCNC: 123 MMOL/L (ref 136–145)
SODIUM SERPL-SCNC: 124 MMOL/L (ref 136–145)
SODIUM SERPL-SCNC: 125 MMOL/L (ref 136–145)
SODIUM SERPL-SCNC: 125 MMOL/L (ref 136–145)
UFH PPP CHRO-ACNC: 0.3 IU/ML
WBC # BLD AUTO: 24.5 X10*3/UL (ref 4.4–11.3)

## 2023-10-23 PROCEDURE — 2500000001 HC RX 250 WO HCPCS SELF ADMINISTERED DRUGS (ALT 637 FOR MEDICARE OP): Performed by: STUDENT IN AN ORGANIZED HEALTH CARE EDUCATION/TRAINING PROGRAM

## 2023-10-23 PROCEDURE — 2500000004 HC RX 250 GENERAL PHARMACY W/ HCPCS (ALT 636 FOR OP/ED)

## 2023-10-23 PROCEDURE — 85025 COMPLETE CBC W/AUTO DIFF WBC: CPT | Mod: CMCLAB

## 2023-10-23 PROCEDURE — S4991 NICOTINE PATCH NONLEGEND: HCPCS | Performed by: STUDENT IN AN ORGANIZED HEALTH CARE EDUCATION/TRAINING PROGRAM

## 2023-10-23 PROCEDURE — 2500000002 HC RX 250 W HCPCS SELF ADMINISTERED DRUGS (ALT 637 FOR MEDICARE OP, ALT 636 FOR OP/ED): Performed by: INTERNAL MEDICINE

## 2023-10-23 PROCEDURE — 94003 VENT MGMT INPAT SUBQ DAY: CPT

## 2023-10-23 PROCEDURE — 94668 MNPJ CHEST WALL SBSQ: CPT

## 2023-10-23 PROCEDURE — 94640 AIRWAY INHALATION TREATMENT: CPT

## 2023-10-23 PROCEDURE — 2020000001 HC ICU ROOM DAILY

## 2023-10-23 PROCEDURE — 83735 ASSAY OF MAGNESIUM: CPT

## 2023-10-23 PROCEDURE — 82330 ASSAY OF CALCIUM: CPT

## 2023-10-23 PROCEDURE — 2500000002 HC RX 250 W HCPCS SELF ADMINISTERED DRUGS (ALT 637 FOR MEDICARE OP, ALT 636 FOR OP/ED): Performed by: STUDENT IN AN ORGANIZED HEALTH CARE EDUCATION/TRAINING PROGRAM

## 2023-10-23 PROCEDURE — 2500000001 HC RX 250 WO HCPCS SELF ADMINISTERED DRUGS (ALT 637 FOR MEDICARE OP)

## 2023-10-23 PROCEDURE — 36415 COLL VENOUS BLD VENIPUNCTURE: CPT | Mod: CMCLAB

## 2023-10-23 PROCEDURE — 99291 CRITICAL CARE FIRST HOUR: CPT

## 2023-10-23 PROCEDURE — 84145 PROCALCITONIN (PCT): CPT

## 2023-10-23 PROCEDURE — 80069 RENAL FUNCTION PANEL: CPT | Mod: CMCLAB

## 2023-10-23 PROCEDURE — 2500000002 HC RX 250 W HCPCS SELF ADMINISTERED DRUGS (ALT 637 FOR MEDICARE OP, ALT 636 FOR OP/ED)

## 2023-10-23 PROCEDURE — 80069 RENAL FUNCTION PANEL: CPT

## 2023-10-23 PROCEDURE — 85520 HEPARIN ASSAY: CPT

## 2023-10-23 PROCEDURE — 82947 ASSAY GLUCOSE BLOOD QUANT: CPT

## 2023-10-23 RX ORDER — CALCIUM GLUCONATE 20 MG/ML
2 INJECTION, SOLUTION INTRAVENOUS ONCE
Status: COMPLETED | OUTPATIENT
Start: 2023-10-23 | End: 2023-10-23

## 2023-10-23 RX ORDER — ALBUTEROL SULFATE 0.83 MG/ML
2.5 SOLUTION RESPIRATORY (INHALATION) 3 TIMES DAILY
Status: DISCONTINUED | OUTPATIENT
Start: 2023-10-23 | End: 2023-10-28

## 2023-10-23 RX ORDER — LEVOFLOXACIN 750 MG/1
750 TABLET ORAL
Status: DISCONTINUED | OUTPATIENT
Start: 2023-10-23 | End: 2023-10-25

## 2023-10-23 RX ORDER — SULFAMETHOXAZOLE AND TRIMETHOPRIM 200; 40 MG/5ML; MG/5ML
320 SUSPENSION ORAL EVERY 8 HOURS
Status: COMPLETED | OUTPATIENT
Start: 2023-10-23 | End: 2023-10-24

## 2023-10-23 RX ADMIN — Medication 15 G: at 15:19

## 2023-10-23 RX ADMIN — PIPERACILLIN SODIUM AND TAZOBACTAM SODIUM 4.5 G: 4; .5 INJECTION, SOLUTION INTRAVENOUS at 05:35

## 2023-10-23 RX ADMIN — CALCIUM GLUCONATE 2 G: 20 INJECTION, SOLUTION INTRAVENOUS at 10:40

## 2023-10-23 RX ADMIN — SULFAMETHOXAZOLE AND TRIMETHOPRIM 320 MG OF TRIMETHOPRIM: 200; 40 SUSPENSION ORAL at 15:19

## 2023-10-23 RX ADMIN — ATORVASTATIN CALCIUM 40 MG: 40 TABLET ORAL at 08:47

## 2023-10-23 RX ADMIN — SULFAMETHOXAZOLE AND TRIMETHOPRIM 320 MG OF TRIMETHOPRIM: 200; 40 SUSPENSION ORAL at 04:09

## 2023-10-23 RX ADMIN — HEPARIN SODIUM 1080 UNITS/HR: 10000 INJECTION, SOLUTION INTRAVENOUS at 22:18

## 2023-10-23 RX ADMIN — PIPERACILLIN SODIUM AND TAZOBACTAM SODIUM 4.5 G: 4; .5 INJECTION, SOLUTION INTRAVENOUS at 18:04

## 2023-10-23 RX ADMIN — Medication 1 TABLET: at 09:00

## 2023-10-23 RX ADMIN — POLYETHYLENE GLYCOL 3350 17 G: 17 POWDER, FOR SOLUTION ORAL at 08:47

## 2023-10-23 RX ADMIN — SODIUM ZIRCONIUM CYCLOSILICATE 10 G: 10 POWDER, FOR SUSPENSION ORAL at 08:47

## 2023-10-23 RX ADMIN — THIAMINE HCL TAB 100 MG 100 MG: 100 TAB at 08:47

## 2023-10-23 RX ADMIN — ALBUTEROL SULFATE 2.5 MG: 2.5 SOLUTION RESPIRATORY (INHALATION) at 02:21

## 2023-10-23 RX ADMIN — ALBUTEROL SULFATE 2.5 MG: 2.5 SOLUTION RESPIRATORY (INHALATION) at 07:21

## 2023-10-23 RX ADMIN — PROPOFOL 15 MCG/KG/MIN: 10 INJECTION, EMULSION INTRAVENOUS at 04:14

## 2023-10-23 RX ADMIN — PROPOFOL 15 MCG/KG/MIN: 10 INJECTION, EMULSION INTRAVENOUS at 22:18

## 2023-10-23 RX ADMIN — ESOMEPRAZOLE MAGNESIUM 40 MG: 40 FOR SUSPENSION ORAL at 08:47

## 2023-10-23 RX ADMIN — ALBUTEROL SULFATE 2.5 MG: 2.5 SOLUTION RESPIRATORY (INHALATION) at 14:04

## 2023-10-23 RX ADMIN — Medication 8000 UNITS: at 08:48

## 2023-10-23 RX ADMIN — Medication 50 MCG/HR: at 17:56

## 2023-10-23 RX ADMIN — SULFAMETHOXAZOLE AND TRIMETHOPRIM 320 MG OF TRIMETHOPRIM: 200; 40 SUSPENSION ORAL at 19:25

## 2023-10-23 RX ADMIN — PIPERACILLIN SODIUM AND TAZOBACTAM SODIUM 4.5 G: 4; .5 INJECTION, SOLUTION INTRAVENOUS at 12:35

## 2023-10-23 RX ADMIN — LEVOFLOXACIN 750 MG: 750 TABLET, FILM COATED ORAL at 15:19

## 2023-10-23 RX ADMIN — HEPARIN SODIUM 1080 UNITS/HR: 10000 INJECTION, SOLUTION INTRAVENOUS at 18:40

## 2023-10-23 RX ADMIN — Medication 1 PATCH: at 08:48

## 2023-10-23 RX ADMIN — PIPERACILLIN SODIUM AND TAZOBACTAM SODIUM 4.5 G: 4; .5 INJECTION, SOLUTION INTRAVENOUS at 23:52

## 2023-10-23 RX ADMIN — FOLIC ACID 1 MG: 1 TABLET ORAL at 08:47

## 2023-10-23 RX ADMIN — ALBUTEROL SULFATE 2.5 MG: 2.5 SOLUTION RESPIRATORY (INHALATION) at 20:33

## 2023-10-23 ASSESSMENT — PAIN SCALES - GENERAL
PAINLEVEL_OUTOF10: 0 - NO PAIN

## 2023-10-23 ASSESSMENT — PAIN - FUNCTIONAL ASSESSMENT
PAIN_FUNCTIONAL_ASSESSMENT: CPOT (CRITICAL CARE PAIN OBSERVATION TOOL)

## 2023-10-23 NOTE — PROGRESS NOTES
Critical Care Daily Progress        Subjective   Patient is a 68 y.o. male admitted on 10/11/2023  7:54 PM with the following indication(s) for ICU care hyponatremia, altered mental status with multifactorial etiology.     Interval History: NAEON. Patient more awake and interactive this morning, able to somewhat converse and nod head to answer yes/no questions. ETT is uncomfortable but not overly painful. No pain, sleeping well.       Scheduled Medications:   albuterol, 2.5 mg, nebulization, q6h  atorvastatin, 40 mg, oral, Daily  ergocalciferol, 8,000 Units, oral, Daily  esomeprazole, 40 mg, nasoduodenal tube, Daily before breakfast  folic acid, 1 mg, oral, Daily  multivitamin with minerals, 1 tablet, oral, Daily  nicotine, 1 patch, transdermal, Daily  piperacillin-tazobactam, 4.5 g, intravenous, q6h  polyethylene glycol, 17 g, oral, Daily  sodium zirconium cyclosilicate, 10 g, oral, Daily  sulfamethoxazole-trimethoprim, 320 mg of trimethoprim, nasogastric tube, q12h SAM  thiamine, 100 mg, oral, Daily           Continuous Medications:   fentaNYL, 0-300 mcg/hr, Last Rate: 50 mcg/hr (10/23/23 0614)  heparin, 0-4,500 Units/hr, Last Rate: 1,080 Units/hr (10/23/23 0614)  norepinephrine, 0.01-1 mcg/kg/min, Last Rate: Stopped (10/21/23 0053)  propofol, 5-50 mcg/kg/min, Last Rate: 15 mcg/kg/min (10/23/23 0614)          PRN Medications:   PRN medications: acetaminophen, dextrose 10 % in water (D10W), dextrose, fentaNYL, fentaNYL, glucagon, heparin, oxygen, oxygen, sennosides, white petrolatum    Objective   Vitals:  Most Recent:  Visit Vitals  /80   Pulse 98   Temp 36.3 °C (97.3 °F)   Resp 19        24hr Min/Max:  Temp  Min: 36.3 °C (97.3 °F)  Max: 37.5 °C (99.5 °F)  Pulse  Min: 96  Max: 124  BP  Min: 101/67  Max: 150/93  Resp  Min: 17  Max: 24  SpO2  Min: 90 %  Max: 97 %      Vent Mode: Assist control/Volume control plus  FiO2 (%):  [30 %-40 %] 30 %  S RR:  [18] 18  S VT:  [400 mL] 400 mL  PEEP/CPAP (cm H2O):  [5 cm  H20] 5 cm H20  MAP (cm H2O):  [8-20] 9     GEN: Intubated and sedated, RASS -1 to 0.  CV: Tachycardic, no m/r/g  PULM: Coarse breath sounds b/l, rhonchi LLL, improved  ABD: soft, nontender, nondistended  NEURO: Intubated and sedated, nods to questions  MSK: no joint swelling grossly noted  EXT: no LE edema  SKIN: warm and dry, no lesions grossly noted    LDA:    PIV x2 in R arm, Dobhoff, intubated      LABS AND IMAGING     Results from last 7 days   Lab Units 10/23/23  0536 10/22/23  1649 10/22/23  0506 10/21/23  0826 10/21/23  0342   SODIUM mmol/L 124*   < >  --    < >  --    POTASSIUM mmol/L 4.3   < >  --    < >  --    CHLORIDE mmol/L 87*   < >  --    < >  --    CO2 mmol/L 22   < >  --    < >  --    BUN mg/dL 9   < >  --    < >  --    CREATININE mg/dL 0.56   < >  --    < >  --    WBC AUTO x10*3/uL 24.5*  --  21.8*  --  22.4*   HEMOGLOBIN g/dL 13.6  --  13.4*  --  14.7   PLATELETS AUTO x10*3/uL 265  --  261  --  184    < > = values in this interval not displayed.   Ca 8.1 8.7, 8.2  Na 122, 122, 120  Serum Osm 246  Urine studies  Urine Osm 353, Urine Na 92, K       Assessment/Plan         Molina Godinez is a 68 year old male with PMHx of COPD, HTN, AUD and opioid use disorder, HCV, chronic PE who was seen in MICU 10/13-10/16 for inability to walk 2/2 suspected alcohol use disorder. ICU course complicated by hypertension, altered mental status, and hyponatremia. Transferred back to MICU 10/17 after he was found lethargic and hypoxic. Now intubated and sedated, bronchial washings growing stenotrophomonas.     10/23 Updates to plan:  Monitor Na with q6h RFP, seems to be improving with more strict fluid restriction  Urea packets for SIADH treatment  Adding Levaquin for appropriate Steno coverage under new guidelines, appreciate Pharmacy assistance  Cont Zosyn, last dose tomorrow, adjusted Bactrim dosing, continue for 7d duration of therapy for Steno         NEURO/PSYCH  #AMS- fluctuating, unclear etiology as previously  suspected alcohol withdrawal but could be 2/2 chronic hypoxia and retention vs hyponatremia or other etiology. Now intubated and sedated  -CT head without pathology  - Initially had visual and tactile hallucinations that have since resolved. Suspect more metabolic etiology of fluctuations in mental status, I/s/o acutely increased oxygen requirement.  PLAN:  Intubated and sedated on Fentanyl 50, Propofol 15, stable sedation dose.       #Bilateral LE weakness- Neuro consulted earlier in admission  -Likely 2/2 chronic, had weakness during 8/2023 admission; less likely GBS  -CT head without pathology, MRI spine stopped early due to pain  -Patient able to move bilateral lower extremities, low c/f GBS  PLAN:  -May follow up with neurology outpatient  -ID consulted regarding weakness and positive Syphilis Ab. Pt was treated back in 1998 but rec MRI spine given concern of abscess, MRI negative for acute process       CV  #HTN- resolved  - home amlodipine 5mg, atenolol 50mg  - Initially started on Cardene drip, transitioned to Labetalol prior to resolution  [] Home meds held      #CAD  -home atorvastatin 40mg (home med)     PULM  #CAP- initially resolved  -Urine strep positive, unsure if active or chronic infection  -MRSA negative, Urine legionella negative  - CTX 5 day course completed 10/17    #L lung consolidation, c/f PNA  Initially treated for CAP given positive urine strep with CTX 10/13-10/17, one day of Zosyn10/17-10/17  Leukocytosis, neutrophil predominnace WBC 22.2 from 18.3, 12.1   C/f HAP vs incomplete resolution of previous PNA  - S/p Bronch 10/20 with purulent fluid with bronchial washings sent for cx  [] Fu bronchial washings- growing stenotrophomonas, started on Bactrim as well as continued Zosyn, fu further speciation  [] Levaquin 10/23- Bactrim 10/22-, Zosyn 10/20-10/24. Will need 7d duration of therapy, end date pending sensitivities    #Suspected PAH  - Echo with atrial septal aneurysm, positive bubble  study  [] Will need outpatient workup, previously some suspicion for PAH but will need further evaluation    #Severe COPD  #Mucus plugging  -8/2023 FEV1 42%  - CT C/A/P with RLL mucus plug  - Intubated  PLAN:  [] Albuterol q6h, chest PT, respiratory hygiene     #Subsegmental PE  -Requires anticoagulation for 3 months (per vasc med 8/2023), on home apixaban 5mg BID  PLAN:  -Heparin gtt    GI  #HepC  -treated, 8/2023 HCV RNA undetected     #Nutrition  - Dobhoff in place 10/17  - Nutrition following, appreciate recs  PLAN:  - Starting on TF with TwoCal HN goal rate of 35ml/hr  - 100mg Thiamine, 1mg Folic acid, 8000u Vit D2     /RENAL  #Hyponatremia - Likely SIADH 2/2 COPD exacerbation but unclear if additional etiologies  - Na as low as 120, slowly improving  - Serum Osm 246, Urine Osm 353, Na 92  [] Free water flushes 100ml BID for further fluid restriction given hyponatremia thought to be 2/2 SIADH    #Hyperkalemia  - K 6.6 but hemolysis yesterday afternoon, s/p Lokelma  [] Monitor electrolytes, q6h RFP  [] If hyperkalemic again, will reach out to Nutrition regarding adjusting TF    #C/f refeeding syndrome- resolved  - Hypokalemia 2.7, Phos 1.9, Ca 5.2  - Electrolytes repleted, will monitor    #Alkalosis  - Likely 2/2 tachypnea     ENDOCRINE  #Secondary HyperPTH  -8/28 .7  -25-hydroxy vit D 8, 1,25 Vit D 44.4  PLAN:  Vit D2 8000u liquid daily     ID  #Leukocytosis I/s/o recently treated strep pneumo CAP  -Procal 0.38, decreasing trend  - Completed CTX 10/13-10/17  - Zosyn 10/20-10/24, Bactrim 10/22-, Levaquin 10/23-  [] Fu bronchial washings- growing stenotrophomonas, started on Bactrim as well as continued Zosyn, fu further speciation, sensitivities    HEME/ONC  #Leukocytosis, stable  -2/2 to PNA  [] Monitor WBC, vitals       MSK/RHEUM   #Chronic LE weakness  -Likely chronic from disuse and alcohol, unlikely GBS since no paralysis  -Consider neurology outpatient referral for possible EMG, neurology signed  off     Vent/Oxy: Intubated and sedated  Pressors/Drips: Heparin gtt, Fentanyl 50mcg, propofol 15mcg  Abx: Zosyn, Bactrim, levaquin  F: Restricted  E: replete PRN  N: TF via Dobhoff   A: PIV R arm x2, Dobhoff  DVT ppx: heparin gtt  GI ppx: Pantoprazole     Code status: Full Code (confirmed)  NOK: Tonie (sister) [phone]; Nahomi (God-sister) [phone]; Sarah Godinez (sister) [phone]       Ever Paez MD  Internal Medicine, PGY-1

## 2023-10-23 NOTE — CARE PLAN
The patient's goals for the shift include PATITO    The clinical goals for the shift include Pt will maintain patent airway during shift

## 2023-10-24 ENCOUNTER — APPOINTMENT (OUTPATIENT)
Dept: RADIOLOGY | Facility: HOSPITAL | Age: 68
End: 2023-10-24
Payer: COMMERCIAL

## 2023-10-24 LAB
ALBUMIN SERPL BCP-MCNC: 2.3 G/DL (ref 3.4–5)
ALBUMIN SERPL BCP-MCNC: 2.4 G/DL (ref 3.4–5)
ALBUMIN SERPL BCP-MCNC: 2.5 G/DL (ref 3.4–5)
ANION GAP SERPL CALC-SCNC: 16 MMOL/L (ref 10–20)
ANION GAP SERPL CALC-SCNC: 16 MMOL/L (ref 10–20)
ANION GAP SERPL CALC-SCNC: 19 MMOL/L (ref 10–20)
BASOPHILS # BLD AUTO: 0.1 X10*3/UL (ref 0–0.1)
BASOPHILS NFR BLD AUTO: 0.4 %
BUN SERPL-MCNC: 19 MG/DL (ref 6–23)
BUN SERPL-MCNC: 28 MG/DL (ref 6–23)
BUN SERPL-MCNC: 34 MG/DL (ref 6–23)
CA-I BLD-SCNC: 1.07 MMOL/L (ref 1.1–1.33)
CALCIUM SERPL-MCNC: 8.1 MG/DL (ref 8.6–10.6)
CALCIUM SERPL-MCNC: 8.2 MG/DL (ref 8.6–10.6)
CALCIUM SERPL-MCNC: 8.3 MG/DL (ref 8.6–10.6)
CHLORIDE SERPL-SCNC: 86 MMOL/L (ref 98–107)
CHLORIDE SERPL-SCNC: 87 MMOL/L (ref 98–107)
CHLORIDE SERPL-SCNC: 88 MMOL/L (ref 98–107)
CO2 SERPL-SCNC: 21 MMOL/L (ref 21–32)
CO2 SERPL-SCNC: 23 MMOL/L (ref 21–32)
CO2 SERPL-SCNC: 24 MMOL/L (ref 21–32)
CREAT SERPL-MCNC: 0.53 MG/DL (ref 0.5–1.3)
CREAT SERPL-MCNC: 0.6 MG/DL (ref 0.5–1.3)
CREAT SERPL-MCNC: 0.61 MG/DL (ref 0.5–1.3)
EOSINOPHIL # BLD AUTO: 0.17 X10*3/UL (ref 0–0.7)
EOSINOPHIL NFR BLD AUTO: 0.7 %
ERYTHROCYTE [DISTWIDTH] IN BLOOD BY AUTOMATED COUNT: 12.8 % (ref 11.5–14.5)
GFR SERPL CREATININE-BSD FRML MDRD: >90 ML/MIN/1.73M*2
GLUCOSE BLD MANUAL STRIP-MCNC: 133 MG/DL (ref 74–99)
GLUCOSE BLD MANUAL STRIP-MCNC: 139 MG/DL (ref 74–99)
GLUCOSE SERPL-MCNC: 111 MG/DL (ref 74–99)
GLUCOSE SERPL-MCNC: 124 MG/DL (ref 74–99)
GLUCOSE SERPL-MCNC: 132 MG/DL (ref 74–99)
HCT VFR BLD AUTO: 37 % (ref 41–52)
HGB BLD-MCNC: 13.2 G/DL (ref 13.5–17.5)
IMM GRANULOCYTES # BLD AUTO: 0.31 X10*3/UL (ref 0–0.7)
IMM GRANULOCYTES NFR BLD AUTO: 1.2 % (ref 0–0.9)
LYMPHOCYTES # BLD AUTO: 0.66 X10*3/UL (ref 1.2–4.8)
LYMPHOCYTES NFR BLD AUTO: 2.6 %
MAGNESIUM SERPL-MCNC: 2.4 MG/DL (ref 1.6–2.4)
MCH RBC QN AUTO: 30.3 PG (ref 26–34)
MCHC RBC AUTO-ENTMCNC: 35.7 G/DL (ref 32–36)
MCV RBC AUTO: 85 FL (ref 80–100)
MONOCYTES # BLD AUTO: 1.44 X10*3/UL (ref 0.1–1)
MONOCYTES NFR BLD AUTO: 5.8 %
NEUTROPHILS # BLD AUTO: 22.24 X10*3/UL (ref 1.2–7.7)
NEUTROPHILS NFR BLD AUTO: 89.3 %
NRBC BLD-RTO: 0 /100 WBCS (ref 0–0)
PHOSPHATE SERPL-MCNC: 3.9 MG/DL (ref 2.5–4.9)
PHOSPHATE SERPL-MCNC: 4 MG/DL (ref 2.5–4.9)
PHOSPHATE SERPL-MCNC: 4.1 MG/DL (ref 2.5–4.9)
PLATELET # BLD AUTO: 261 X10*3/UL (ref 150–450)
PMV BLD AUTO: 9.8 FL (ref 7.5–11.5)
POTASSIUM SERPL-SCNC: 4.2 MMOL/L (ref 3.5–5.3)
POTASSIUM SERPL-SCNC: 4.3 MMOL/L (ref 3.5–5.3)
POTASSIUM SERPL-SCNC: 4.7 MMOL/L (ref 3.5–5.3)
RBC # BLD AUTO: 4.36 X10*6/UL (ref 4.5–5.9)
SODIUM SERPL-SCNC: 121 MMOL/L (ref 136–145)
SODIUM SERPL-SCNC: 122 MMOL/L (ref 136–145)
SODIUM SERPL-SCNC: 124 MMOL/L (ref 136–145)
UFH PPP CHRO-ACNC: 0.2 IU/ML
UFH PPP CHRO-ACNC: 0.3 IU/ML
UFH PPP CHRO-ACNC: 0.8 IU/ML
WBC # BLD AUTO: 24.9 X10*3/UL (ref 4.4–11.3)

## 2023-10-24 PROCEDURE — 36415 COLL VENOUS BLD VENIPUNCTURE: CPT

## 2023-10-24 PROCEDURE — 94668 MNPJ CHEST WALL SBSQ: CPT

## 2023-10-24 PROCEDURE — 71045 X-RAY EXAM CHEST 1 VIEW: CPT

## 2023-10-24 PROCEDURE — 36415 COLL VENOUS BLD VENIPUNCTURE: CPT | Mod: CMCLAB

## 2023-10-24 PROCEDURE — 99291 CRITICAL CARE FIRST HOUR: CPT

## 2023-10-24 PROCEDURE — 2500000004 HC RX 250 GENERAL PHARMACY W/ HCPCS (ALT 636 FOR OP/ED)

## 2023-10-24 PROCEDURE — 2020000001 HC ICU ROOM DAILY

## 2023-10-24 PROCEDURE — S4991 NICOTINE PATCH NONLEGEND: HCPCS | Performed by: STUDENT IN AN ORGANIZED HEALTH CARE EDUCATION/TRAINING PROGRAM

## 2023-10-24 PROCEDURE — 84100 ASSAY OF PHOSPHORUS: CPT | Mod: CMCLAB

## 2023-10-24 PROCEDURE — 83735 ASSAY OF MAGNESIUM: CPT

## 2023-10-24 PROCEDURE — 2500000001 HC RX 250 WO HCPCS SELF ADMINISTERED DRUGS (ALT 637 FOR MEDICARE OP)

## 2023-10-24 PROCEDURE — 94003 VENT MGMT INPAT SUBQ DAY: CPT

## 2023-10-24 PROCEDURE — 71045 X-RAY EXAM CHEST 1 VIEW: CPT | Performed by: RADIOLOGY

## 2023-10-24 PROCEDURE — 2500000001 HC RX 250 WO HCPCS SELF ADMINISTERED DRUGS (ALT 637 FOR MEDICARE OP): Performed by: STUDENT IN AN ORGANIZED HEALTH CARE EDUCATION/TRAINING PROGRAM

## 2023-10-24 PROCEDURE — 80069 RENAL FUNCTION PANEL: CPT

## 2023-10-24 PROCEDURE — 85520 HEPARIN ASSAY: CPT

## 2023-10-24 PROCEDURE — 94640 AIRWAY INHALATION TREATMENT: CPT

## 2023-10-24 PROCEDURE — 82330 ASSAY OF CALCIUM: CPT

## 2023-10-24 PROCEDURE — 2500000002 HC RX 250 W HCPCS SELF ADMINISTERED DRUGS (ALT 637 FOR MEDICARE OP, ALT 636 FOR OP/ED): Performed by: STUDENT IN AN ORGANIZED HEALTH CARE EDUCATION/TRAINING PROGRAM

## 2023-10-24 PROCEDURE — 82947 ASSAY GLUCOSE BLOOD QUANT: CPT

## 2023-10-24 PROCEDURE — 85025 COMPLETE CBC W/AUTO DIFF WBC: CPT

## 2023-10-24 PROCEDURE — 2500000002 HC RX 250 W HCPCS SELF ADMINISTERED DRUGS (ALT 637 FOR MEDICARE OP, ALT 636 FOR OP/ED): Performed by: INTERNAL MEDICINE

## 2023-10-24 RX ORDER — CALCIUM GLUCONATE 20 MG/ML
1 INJECTION, SOLUTION INTRAVENOUS EVERY 4 HOURS
Status: COMPLETED | OUTPATIENT
Start: 2023-10-24 | End: 2023-10-24

## 2023-10-24 RX ORDER — MAGNESIUM SULFATE HEPTAHYDRATE 40 MG/ML
4 INJECTION, SOLUTION INTRAVENOUS ONCE
Status: COMPLETED | OUTPATIENT
Start: 2023-10-24 | End: 2023-10-24

## 2023-10-24 RX ORDER — CALCIUM GLUCONATE 20 MG/ML
1 INJECTION, SOLUTION INTRAVENOUS EVERY 4 HOURS
Status: DISPENSED | OUTPATIENT
Start: 2023-10-24 | End: 2023-10-24

## 2023-10-24 RX ORDER — FENTANYL CITRATE 50 UG/ML
50 INJECTION, SOLUTION INTRAMUSCULAR; INTRAVENOUS ONCE
Status: COMPLETED | OUTPATIENT
Start: 2023-10-24 | End: 2023-10-24

## 2023-10-24 RX ORDER — IPRATROPIUM BROMIDE AND ALBUTEROL SULFATE 2.5; .5 MG/3ML; MG/3ML
SOLUTION RESPIRATORY (INHALATION)
Status: DISPENSED
Start: 2023-10-24 | End: 2023-10-24

## 2023-10-24 RX ADMIN — HEPARIN SODIUM 1180 UNITS/HR: 10000 INJECTION, SOLUTION INTRAVENOUS at 06:13

## 2023-10-24 RX ADMIN — ALBUTEROL SULFATE 2.5 MG: 2.5 SOLUTION RESPIRATORY (INHALATION) at 20:43

## 2023-10-24 RX ADMIN — CALCIUM GLUCONATE 1 G: 20 INJECTION, SOLUTION INTRAVENOUS at 19:37

## 2023-10-24 RX ADMIN — PIPERACILLIN SODIUM AND TAZOBACTAM SODIUM 4.5 G: 4; .5 INJECTION, SOLUTION INTRAVENOUS at 11:56

## 2023-10-24 RX ADMIN — SULFAMETHOXAZOLE AND TRIMETHOPRIM 320 MG OF TRIMETHOPRIM: 200; 40 SUSPENSION ORAL at 04:21

## 2023-10-24 RX ADMIN — SULFAMETHOXAZOLE AND TRIMETHOPRIM 320 MG OF TRIMETHOPRIM: 200; 40 SUSPENSION ORAL at 19:39

## 2023-10-24 RX ADMIN — MAGNESIUM SULFATE HEPTAHYDRATE 4 G: 40 INJECTION, SOLUTION INTRAVENOUS at 04:21

## 2023-10-24 RX ADMIN — CALCIUM GLUCONATE 1 G: 20 INJECTION, SOLUTION INTRAVENOUS at 08:59

## 2023-10-24 RX ADMIN — PIPERACILLIN SODIUM AND TAZOBACTAM SODIUM 4.5 G: 4; .5 INJECTION, SOLUTION INTRAVENOUS at 05:53

## 2023-10-24 RX ADMIN — Medication 50 MCG/HR: at 14:37

## 2023-10-24 RX ADMIN — PIPERACILLIN SODIUM AND TAZOBACTAM SODIUM 4.5 G: 4; .5 INJECTION, SOLUTION INTRAVENOUS at 17:53

## 2023-10-24 RX ADMIN — FENTANYL CITRATE 50 MCG: 50 INJECTION, SOLUTION INTRAMUSCULAR; INTRAVENOUS at 14:30

## 2023-10-24 RX ADMIN — PROPOFOL 15 MCG/KG/MIN: 10 INJECTION, EMULSION INTRAVENOUS at 10:31

## 2023-10-24 RX ADMIN — ALBUTEROL SULFATE 2.5 MG: 2.5 SOLUTION RESPIRATORY (INHALATION) at 14:03

## 2023-10-24 RX ADMIN — SULFAMETHOXAZOLE AND TRIMETHOPRIM 320 MG OF TRIMETHOPRIM: 200; 40 SUSPENSION ORAL at 11:56

## 2023-10-24 RX ADMIN — ALBUTEROL SULFATE 2.5 MG: 2.5 SOLUTION RESPIRATORY (INHALATION) at 08:22

## 2023-10-24 RX ADMIN — FOLIC ACID 1 MG: 1 TABLET ORAL at 09:00

## 2023-10-24 RX ADMIN — ACETAMINOPHEN 975 MG: 325 TABLET ORAL at 09:01

## 2023-10-24 RX ADMIN — Medication 15 G: at 09:00

## 2023-10-24 RX ADMIN — Medication 8000 UNITS: at 09:00

## 2023-10-24 RX ADMIN — THIAMINE HCL TAB 100 MG 100 MG: 100 TAB at 09:02

## 2023-10-24 RX ADMIN — CALCIUM GLUCONATE 1 G: 20 INJECTION, SOLUTION INTRAVENOUS at 22:02

## 2023-10-24 RX ADMIN — ESOMEPRAZOLE MAGNESIUM 40 MG: 40 FOR SUSPENSION ORAL at 09:02

## 2023-10-24 RX ADMIN — LEVOFLOXACIN 750 MG: 750 TABLET, FILM COATED ORAL at 08:59

## 2023-10-24 RX ADMIN — POLYETHYLENE GLYCOL 3350 17 G: 17 POWDER, FOR SOLUTION ORAL at 09:00

## 2023-10-24 RX ADMIN — PIPERACILLIN SODIUM AND TAZOBACTAM SODIUM 4.5 G: 4; .5 INJECTION, SOLUTION INTRAVENOUS at 23:41

## 2023-10-24 RX ADMIN — ATORVASTATIN CALCIUM 40 MG: 40 TABLET ORAL at 09:00

## 2023-10-24 RX ADMIN — Medication 1 PATCH: at 09:00

## 2023-10-24 RX ADMIN — Medication 1 TABLET: at 09:00

## 2023-10-24 ASSESSMENT — PAIN SCALES - GENERAL
PAINLEVEL_OUTOF10: 0 - NO PAIN
PAINLEVEL_OUTOF10: 5 - MODERATE PAIN
PAINLEVEL_OUTOF10: 5 - MODERATE PAIN

## 2023-10-24 NOTE — PROGRESS NOTES
SOCIAL WORK NOTE  SW participated in interdisciplinary rounds. Patient is being treated for respiratory failure and infection following bronch.  Mental status remains the same. Patient is intubated. Currently reccommended for moderate intensity. Social work to follow.  AMANDO Peralta, LISW-S ([id number])

## 2023-10-24 NOTE — PROGRESS NOTES
Critical Care Daily Progress        Subjective   Patient is a 68 y.o. male admitted on 10/11/2023  7:54 PM with the following indication(s) for ICU care hyponatremia, altered mental status with multifactorial etiology.     Interval History: NAEON. Electrolytes repleted. Interactive this morning, more so than yesterday.       Scheduled Medications:   albuterol, 2.5 mg, nebulization, TID  atorvastatin, 40 mg, oral, Daily  calcium gluconate, 1 g, intravenous, q4h  ergocalciferol, 8,000 Units, oral, Daily  esomeprazole, 40 mg, nasoduodenal tube, Daily before breakfast  folic acid, 1 mg, oral, Daily  levoFLOXacin, 750 mg, oral, q24h SAM  magnesium sulfate, 4 g, intravenous, Once  multivitamin with minerals, 1 tablet, oral, Daily  nicotine, 1 patch, transdermal, Daily  piperacillin-tazobactam, 4.5 g, intravenous, q6h  polyethylene glycol, 17 g, oral, Daily  sulfamethoxazole-trimethoprim, 320 mg of trimethoprim, nasogastric tube, q8h  thiamine, 100 mg, oral, Daily  urea, 15 g, oral, Daily           Continuous Medications:   fentaNYL, 0-300 mcg/hr, Last Rate: 50 mcg/hr (10/23/23 1913)  heparin, 0-4,500 Units/hr, Last Rate: 1,180 Units/hr (10/24/23 0613)  propofol, 5-50 mcg/kg/min, Last Rate: 15 mcg/kg/min (10/23/23 2218)          PRN Medications:   PRN medications: acetaminophen, dextrose 10 % in water (D10W), dextrose, fentaNYL, fentaNYL, glucagon, heparin, oxygen, oxygen, sennosides, white petrolatum    Objective   Vitals:  Most Recent:  Visit Vitals  BP 97/63   Pulse 103   Temp 36.6 °C (97.9 °F) (Temporal)   Resp 18        24hr Min/Max:  Temp  Min: 35.8 °C (96.4 °F)  Max: 36.7 °C (98.1 °F)  Pulse  Min: 96  Max: 121  BP  Min: 97/63  Max: 149/88  Resp  Min: 8  Max: 22  SpO2  Min: 93 %  Max: 98 %      Vent Mode: Volume control/assist control  FiO2 (%):  [30 %] 30 %  S RR:  [18] 18  S VT:  [400 mL] 400 mL  PEEP/CPAP (cm H2O):  [5 cm H20] 5 cm H20  MAP (cm H2O):  [8-15] 9     GEN: Intubated and sedated, RASS -1.  CV:  Tachycardic, no m/r/g  PULM: Coarse breath sounds b/l, rhonchi LLL, improved  ABD: soft, nontender, nondistended  NEURO: Intubated and sedated, nods to questions  MSK: no joint swelling grossly noted  EXT: no LE edema  SKIN: warm and dry, no lesions grossly noted    LDA:    PIV x2 in R arm, Dobhoff, intubated      LABS AND IMAGING     Results from last 7 days   Lab Units 10/24/23  0425 10/23/23  0841 10/23/23  0536 10/22/23  1649 10/22/23  0506   SODIUM mmol/L 124*   < > 124*   < >  --    POTASSIUM mmol/L 4.2   < > 4.3   < >  --    CHLORIDE mmol/L 88*   < > 87*   < >  --    CO2 mmol/L 24   < > 22   < >  --    BUN mg/dL 19   < > 9   < >  --    CREATININE mg/dL 0.53   < > 0.56   < >  --    WBC AUTO x10*3/uL 24.9*  --  24.5*  --  21.8*   HEMOGLOBIN g/dL 13.2*  --  13.6  --  13.4*   PLATELETS AUTO x10*3/uL 261  --  265  --  261    < > = values in this interval not displayed.   iCa 1.06, from 1.06  Na 124, 123, 122, 122  Mg 1.65, 1.76       Assessment/Plan         Molina Godinez is a 68 year old male with PMHx of COPD, HTN, AUD and opioid use disorder, HCV, chronic PE who was seen in MICU 10/13-10/16 for inability to walk 2/2 suspected alcohol use disorder. ICU course complicated by hypertension, altered mental status, and hyponatremia. Transferred back to MICU 10/17 after he was found lethargic and hypoxic. Now intubated and sedated, bronchial washings growing stenotrophomonas.    Updates to plan:  CXR given Leukocytosis while on Levaquin, Bactrim for Steno PNA  Nephrology consult for SIADH      NEURO/PSYCH  #AMS- fluctuating, unclear etiology as previously suspected alcohol withdrawal but could be 2/2 chronic hypoxia and retention vs hyponatremia or other etiology. Now intubated and sedated  -CT head without pathology  - Initially had visual and tactile hallucinations that have since resolved. Suspect more metabolic etiology of fluctuations in mental status, I/s/o acutely increased oxygen requirement.  PLAN:  Intubated  and sedated on Fentanyl 25, Propofol 15  Will give one push of Fentanyl 50 for pain, discomfort, tachycardia and evaluate RASS, vitals afterwards     #Bilateral LE weakness- Neuro consulted earlier in admission  -Likely 2/2 chronic, had weakness during 8/2023 admission; less likely GBS  -CT head without pathology, MRI spine stopped early due to pain  -Patient able to move bilateral lower extremities, low c/f GBS  PLAN:  -May follow up with neurology outpatient  -ID consulted regarding weakness and positive Syphilis Ab. Pt was treated back in 1998 but rec MRI spine given concern of abscess, MRI negative for acute process      CV  #HTN- resolved  - home amlodipine 5mg, atenolol 50mg  - Initially started on Cardene drip, transitioned to Labetalol prior to resolution  [] Home meds held      #CAD  -home atorvastatin 40mg (home med)       PULM  #CAP- initially resolved  -Urine strep positive, unsure if active or chronic infection  -MRSA negative, Urine legionella negative  - CTX 5 day course completed 10/17    #L lung consolidation, c/f HAP  Initially treated for CAP given positive urine strep with CTX 10/13-10/17, one day of Zosyn10/17-10/17  Leukocytosis stable ~24  C/f HAP vs incomplete resolution of previous PNA  - S/p Bronch 10/20 with purulent fluid with bronchial washings sent for cx  [] Fu bronchial washings- growing stenotrophomonas, started on Bactrim as well as continued Zosyn, fu further speciation  [] Levaquin 10/23- Bactrim 10/22-,  Will need 7d duration of therapy, end date pending sensitivities  [] Zosyn 10/20-10/24 completion today    #Suspected PAH  - Echo with atrial septal aneurysm, positive bubble study  [] Will need outpatient workup, previously some suspicion for PAH but will need further evaluation    #Severe COPD  #Mucus plugging  -8/2023 FEV1 42%  - CT C/A/P with RLL mucus plug  - Intubated  PLAN:  [] Albuterol q6h, chest PT, respiratory hygiene     #Subsegmental PE  -Requires anticoagulation for  3 months (per vas med 8/2023), on home apixaban 5mg BID  PLAN:  -Heparin gtt    GI  #HepC  -treated, 8/2023 HCV RNA undetected     #Nutrition  - Dobhoff in place 10/17  - Nutrition following, appreciate recs  PLAN:  - Starting on TF with TwoCal HN goal rate of 35ml/hr  - 100mg Thiamine, 1mg Folic acid, 8000u Vit D2     /RENAL  #Hyponatremia - Likely SIADH 2/2 COPD exacerbation but unclear if additional etiologies  - Na as low as 120, slowly improving, stable 123-124, unchanged after Urea initiated  - Serum Osm 246, Urine Osm 353, Na 92  [] Free water flushes 100ml BID for further fluid restriction given hyponatremia thought to be 2/2 SIADH  [] Urea 15g daily  [] Nephro consult for SIADH evaluation, management. Appreciate assistance.       #C/f refeeding syndrome- resolved  - Hypokalemia 2.7, Phos 1.9, Ca 5.2  - Electrolytes repleted, will monitor    #Alkalosis  - Likely 2/2 tachypnea     ENDOCRINE  #Secondary HyperPTH  -8/28 .7  -25-hydroxy vit D 8, 1,25 Vit D 44.4  PLAN:  Vit D2 8000u liquid daily     ID  #Leukocytosis I/s/o recently treated strep pneumo CAP  -Procal 0.38, decreasing trend  - Completed CTX 10/13-10/17  - Zosyn 10/20-10/24, Bactrim 10/22-, Levaquin 10/23-  [] Fu bronchial washings- growing stenotrophomonas, started on Bactrim as well as continued Zosyn, fu further speciation, sensitivities    HEME/ONC  #Leukocytosis, stable  -2/2 to PNA  [] Monitor WBC, vitals     MSK/RHEUM   #Chronic LE weakness  -Likely chronic from disuse and alcohol, unlikely GBS since no paralysis  -Consider neurology outpatient referral for possible EMG, neurology signed off     Vent/Oxy: Intubated and sedated  Pressors/Drips: Heparin gtt, Fentanyl 25mcg, propofol 15mcg  Abx: Zosyn, Bactrim, levaquin  F: Restricted  E: replete PRN  N: TF via Dobhoff   A: PIV R arm x2, Dobhoff, ETT  DVT ppx: heparin gtt  GI ppx: Pantoprazole     Code status: Full Code (confirmed)  NOK: Tonie (sister) [phone]; Nahomi (God-sister)  [phone]; Sarah Godinez (sister) [phone]       Ever Paez MD  Internal Medicine, PGY-1

## 2023-10-25 ENCOUNTER — APPOINTMENT (OUTPATIENT)
Dept: RADIOLOGY | Facility: HOSPITAL | Age: 68
End: 2023-10-25
Payer: COMMERCIAL

## 2023-10-25 LAB
ALBUMIN SERPL BCP-MCNC: 2.5 G/DL (ref 3.4–5)
ALBUMIN SERPL BCP-MCNC: 2.5 G/DL (ref 3.4–5)
ALBUMIN SERPL BCP-MCNC: 2.6 G/DL (ref 3.4–5)
ALBUMIN SERPL BCP-MCNC: 2.6 G/DL (ref 3.4–5)
ALP SERPL-CCNC: 246 U/L (ref 33–136)
ALT SERPL W P-5'-P-CCNC: 64 U/L (ref 10–52)
ANION GAP SERPL CALC-SCNC: 17 MMOL/L (ref 10–20)
ANION GAP SERPL CALC-SCNC: 18 MMOL/L (ref 10–20)
ANION GAP SERPL CALC-SCNC: 18 MMOL/L (ref 10–20)
AST SERPL W P-5'-P-CCNC: 87 U/L (ref 9–39)
BILIRUB DIRECT SERPL-MCNC: 0.2 MG/DL (ref 0–0.3)
BILIRUB SERPL-MCNC: 0.4 MG/DL (ref 0–1.2)
BUN SERPL-MCNC: 21 MG/DL (ref 6–23)
BUN SERPL-MCNC: 24 MG/DL (ref 6–23)
BUN SERPL-MCNC: 25 MG/DL (ref 6–23)
CA-I BLD-SCNC: 1.15 MMOL/L (ref 1.1–1.33)
CALCIUM SERPL-MCNC: 8.4 MG/DL (ref 8.6–10.6)
CALCIUM SERPL-MCNC: 8.4 MG/DL (ref 8.6–10.6)
CALCIUM SERPL-MCNC: 8.6 MG/DL (ref 8.6–10.6)
CHLORIDE SERPL-SCNC: 86 MMOL/L (ref 98–107)
CO2 SERPL-SCNC: 22 MMOL/L (ref 21–32)
CO2 SERPL-SCNC: 22 MMOL/L (ref 21–32)
CO2 SERPL-SCNC: 23 MMOL/L (ref 21–32)
CORTIS AM PEAK SERPL-MSCNC: 34.2 UG/DL (ref 5–20)
CREAT SERPL-MCNC: 0.62 MG/DL (ref 0.5–1.3)
CREAT SERPL-MCNC: 0.76 MG/DL (ref 0.5–1.3)
CREAT SERPL-MCNC: 0.79 MG/DL (ref 0.5–1.3)
ERYTHROCYTE [DISTWIDTH] IN BLOOD BY AUTOMATED COUNT: 12.6 % (ref 11.5–14.5)
GFR SERPL CREATININE-BSD FRML MDRD: >90 ML/MIN/1.73M*2
GGT SERPL-CCNC: 306 U/L (ref 5–64)
GLUCOSE SERPL-MCNC: 101 MG/DL (ref 74–99)
GLUCOSE SERPL-MCNC: 106 MG/DL (ref 74–99)
GLUCOSE SERPL-MCNC: 99 MG/DL (ref 74–99)
HCT VFR BLD AUTO: 36.8 % (ref 41–52)
HGB BLD-MCNC: 13.1 G/DL (ref 13.5–17.5)
MCH RBC QN AUTO: 30.7 PG (ref 26–34)
MCHC RBC AUTO-ENTMCNC: 35.6 G/DL (ref 32–36)
MCV RBC AUTO: 86 FL (ref 80–100)
NRBC BLD-RTO: 0 /100 WBCS (ref 0–0)
PHOSPHATE SERPL-MCNC: 4.3 MG/DL (ref 2.5–4.9)
PHOSPHATE SERPL-MCNC: 4.4 MG/DL (ref 2.5–4.9)
PHOSPHATE SERPL-MCNC: 6.8 MG/DL (ref 2.5–4.9)
PLATELET # BLD AUTO: 292 X10*3/UL (ref 150–450)
PMV BLD AUTO: 9.6 FL (ref 7.5–11.5)
POTASSIUM SERPL-SCNC: 4.9 MMOL/L (ref 3.5–5.3)
POTASSIUM SERPL-SCNC: 5.1 MMOL/L (ref 3.5–5.3)
POTASSIUM SERPL-SCNC: 5.2 MMOL/L (ref 3.5–5.3)
PROT SERPL-MCNC: 6.2 G/DL (ref 6.4–8.2)
RBC # BLD AUTO: 4.27 X10*6/UL (ref 4.5–5.9)
SODIUM SERPL-SCNC: 121 MMOL/L (ref 136–145)
UFH PPP CHRO-ACNC: 0.2 IU/ML
UFH PPP CHRO-ACNC: 0.2 IU/ML
UFH PPP CHRO-ACNC: 0.3 IU/ML
UFH PPP CHRO-ACNC: 0.3 IU/ML
UFH PPP CHRO-ACNC: 0.4 IU/ML
WBC # BLD AUTO: 24.9 X10*3/UL (ref 4.4–11.3)

## 2023-10-25 PROCEDURE — 76705 ECHO EXAM OF ABDOMEN: CPT | Performed by: RADIOLOGY

## 2023-10-25 PROCEDURE — 76705 ECHO EXAM OF ABDOMEN: CPT

## 2023-10-25 PROCEDURE — 2500000004 HC RX 250 GENERAL PHARMACY W/ HCPCS (ALT 636 FOR OP/ED)

## 2023-10-25 PROCEDURE — 2500000001 HC RX 250 WO HCPCS SELF ADMINISTERED DRUGS (ALT 637 FOR MEDICARE OP)

## 2023-10-25 PROCEDURE — 71045 X-RAY EXAM CHEST 1 VIEW: CPT

## 2023-10-25 PROCEDURE — 94003 VENT MGMT INPAT SUBQ DAY: CPT

## 2023-10-25 PROCEDURE — S4991 NICOTINE PATCH NONLEGEND: HCPCS | Performed by: STUDENT IN AN ORGANIZED HEALTH CARE EDUCATION/TRAINING PROGRAM

## 2023-10-25 PROCEDURE — 2500000001 HC RX 250 WO HCPCS SELF ADMINISTERED DRUGS (ALT 637 FOR MEDICARE OP): Performed by: STUDENT IN AN ORGANIZED HEALTH CARE EDUCATION/TRAINING PROGRAM

## 2023-10-25 PROCEDURE — 2500000002 HC RX 250 W HCPCS SELF ADMINISTERED DRUGS (ALT 637 FOR MEDICARE OP, ALT 636 FOR OP/ED): Performed by: STUDENT IN AN ORGANIZED HEALTH CARE EDUCATION/TRAINING PROGRAM

## 2023-10-25 PROCEDURE — 80053 COMPREHEN METABOLIC PANEL: CPT

## 2023-10-25 PROCEDURE — 82977 ASSAY OF GGT: CPT

## 2023-10-25 PROCEDURE — 93010 ELECTROCARDIOGRAM REPORT: CPT | Performed by: INTERNAL MEDICINE

## 2023-10-25 PROCEDURE — 80069 RENAL FUNCTION PANEL: CPT

## 2023-10-25 PROCEDURE — 71045 X-RAY EXAM CHEST 1 VIEW: CPT | Performed by: RADIOLOGY

## 2023-10-25 PROCEDURE — 74018 RADEX ABDOMEN 1 VIEW: CPT | Performed by: RADIOLOGY

## 2023-10-25 PROCEDURE — 74018 RADEX ABDOMEN 1 VIEW: CPT

## 2023-10-25 PROCEDURE — 82330 ASSAY OF CALCIUM: CPT

## 2023-10-25 PROCEDURE — 94640 AIRWAY INHALATION TREATMENT: CPT

## 2023-10-25 PROCEDURE — 94668 MNPJ CHEST WALL SBSQ: CPT

## 2023-10-25 PROCEDURE — 82533 TOTAL CORTISOL: CPT

## 2023-10-25 PROCEDURE — 80069 RENAL FUNCTION PANEL: CPT | Mod: CCI

## 2023-10-25 PROCEDURE — 2020000001 HC ICU ROOM DAILY

## 2023-10-25 PROCEDURE — 99255 IP/OBS CONSLTJ NEW/EST HI 80: CPT

## 2023-10-25 PROCEDURE — 2500000004 HC RX 250 GENERAL PHARMACY W/ HCPCS (ALT 636 FOR OP/ED): Performed by: STUDENT IN AN ORGANIZED HEALTH CARE EDUCATION/TRAINING PROGRAM

## 2023-10-25 PROCEDURE — 36415 COLL VENOUS BLD VENIPUNCTURE: CPT

## 2023-10-25 PROCEDURE — 82040 ASSAY OF SERUM ALBUMIN: CPT

## 2023-10-25 PROCEDURE — 85520 HEPARIN ASSAY: CPT

## 2023-10-25 PROCEDURE — 85027 COMPLETE CBC AUTOMATED: CPT

## 2023-10-25 PROCEDURE — 82248 BILIRUBIN DIRECT: CPT

## 2023-10-25 PROCEDURE — 2500000002 HC RX 250 W HCPCS SELF ADMINISTERED DRUGS (ALT 637 FOR MEDICARE OP, ALT 636 FOR OP/ED): Performed by: INTERNAL MEDICINE

## 2023-10-25 RX ORDER — FUROSEMIDE 10 MG/ML
20 INJECTION INTRAMUSCULAR; INTRAVENOUS ONCE
Status: COMPLETED | OUTPATIENT
Start: 2023-10-25 | End: 2023-10-25

## 2023-10-25 RX ORDER — SULFAMETHOXAZOLE AND TRIMETHOPRIM 200; 40 MG/5ML; MG/5ML
320 SUSPENSION ORAL EVERY 8 HOURS
Status: DISPENSED | OUTPATIENT
Start: 2023-10-25 | End: 2023-10-28

## 2023-10-25 RX ORDER — SULFAMETHOXAZOLE AND TRIMETHOPRIM 200; 40 MG/5ML; MG/5ML
320 SUSPENSION ORAL EVERY 8 HOURS
Status: DISCONTINUED | OUTPATIENT
Start: 2023-10-25 | End: 2023-10-25

## 2023-10-25 RX ADMIN — HEPARIN SODIUM 2000 UNITS: 5000 INJECTION INTRAVENOUS; SUBCUTANEOUS at 08:57

## 2023-10-25 RX ADMIN — Medication 50 MCG/HR: at 04:01

## 2023-10-25 RX ADMIN — ALBUTEROL SULFATE 2.5 MG: 2.5 SOLUTION RESPIRATORY (INHALATION) at 20:42

## 2023-10-25 RX ADMIN — Medication 15 G: at 16:44

## 2023-10-25 RX ADMIN — THIAMINE HCL TAB 100 MG 100 MG: 100 TAB at 08:10

## 2023-10-25 RX ADMIN — PIPERACILLIN SODIUM AND TAZOBACTAM SODIUM 4.5 G: 4; .5 INJECTION, SOLUTION INTRAVENOUS at 06:15

## 2023-10-25 RX ADMIN — PROPOFOL 15 MCG/KG/MIN: 10 INJECTION, EMULSION INTRAVENOUS at 04:02

## 2023-10-25 RX ADMIN — ATORVASTATIN CALCIUM 40 MG: 40 TABLET ORAL at 08:10

## 2023-10-25 RX ADMIN — SULFAMETHOXAZOLE AND TRIMETHOPRIM 320 MG OF TRIMETHOPRIM: 200; 40 SUSPENSION ORAL at 16:44

## 2023-10-25 RX ADMIN — Medication 1 TABLET: at 09:00

## 2023-10-25 RX ADMIN — HEPARIN SODIUM 1300 UNITS/HR: 10000 INJECTION, SOLUTION INTRAVENOUS at 08:58

## 2023-10-25 RX ADMIN — HEPARIN SODIUM 1300 UNITS/HR: 10000 INJECTION, SOLUTION INTRAVENOUS at 16:34

## 2023-10-25 RX ADMIN — LEVOFLOXACIN 750 MG: 750 TABLET, FILM COATED ORAL at 08:12

## 2023-10-25 RX ADMIN — ALBUTEROL SULFATE 2.5 MG: 2.5 SOLUTION RESPIRATORY (INHALATION) at 14:08

## 2023-10-25 RX ADMIN — FUROSEMIDE 20 MG: 10 INJECTION, SOLUTION INTRAVENOUS at 13:04

## 2023-10-25 RX ADMIN — Medication 15 G: at 08:11

## 2023-10-25 RX ADMIN — ESOMEPRAZOLE MAGNESIUM 40 MG: 40 FOR SUSPENSION ORAL at 08:10

## 2023-10-25 RX ADMIN — Medication 1 PATCH: at 08:10

## 2023-10-25 RX ADMIN — FUROSEMIDE 20 MG: 10 INJECTION, SOLUTION INTRAVENOUS at 12:30

## 2023-10-25 RX ADMIN — SULFAMETHOXAZOLE AND TRIMETHOPRIM 320 MG OF TRIMETHOPRIM: 200; 40 SUSPENSION ORAL at 10:43

## 2023-10-25 RX ADMIN — FOLIC ACID 1 MG: 1 TABLET ORAL at 08:10

## 2023-10-25 RX ADMIN — ALBUTEROL SULFATE 2.5 MG: 2.5 SOLUTION RESPIRATORY (INHALATION) at 07:02

## 2023-10-25 RX ADMIN — PROPOFOL 15 MCG/KG/MIN: 10 INJECTION, EMULSION INTRAVENOUS at 12:30

## 2023-10-25 RX ADMIN — Medication 8000 UNITS: at 08:11

## 2023-10-25 RX ADMIN — POLYETHYLENE GLYCOL 3350 17 G: 17 POWDER, FOR SOLUTION ORAL at 08:10

## 2023-10-25 ASSESSMENT — PAIN - FUNCTIONAL ASSESSMENT
PAIN_FUNCTIONAL_ASSESSMENT: CPOT (CRITICAL CARE PAIN OBSERVATION TOOL)

## 2023-10-25 ASSESSMENT — PAIN SCALES - GENERAL: PAINLEVEL_OUTOF10: 0 - NO PAIN

## 2023-10-25 NOTE — CONSULTS
Molina Godinez   68 y.o.    @WT@  [facility]/Room: [id number]/02/02-A  DOA: 10/11/2023    Reason for consult: Hyponatremia  Requesting physician: Dr. Michaels  ___________________________________________________________________________________    HPI:  Molina Godinez is a 68 y.o. male with PMH of HTN, COPD, EtOH use disorder, PE on eliquis, HCV s/p treatment w/out viral DNA in blood, presented on 10/12 to ED w/ complaint of LE weakness. Patient had previously been hospitalized in August 2023 w/ similar complaint, treated for electrolyte abnormalities. Upon admission, patient was found to have consolidation on CXR w/ positive strep pneumo antigen, was treated w/ ceftriaxone. On 10/13, patient was admitted to MICU for increasing CIWA scores requiring increasing doses of benzos, and was treated w/ phenobarbital. Patient was stabilized w/ respect to that, and was transferred back to the floor on 10/16. On 10/17, patient was found to be lethargic and hypoxic on the floor, rapid response was called and patient was transferred back to MICU for requirement of high flow nasal cannula for hypoxia. Patient was subsequently intubated on 10/20 for persistent hypoxia. He did have bronchoscopy on 10/22 after CT chest found mucus plugging, and washings from that bronch were significant for stenotrophomonas, and patient was started on regimen of Zosyn (now completed), bactrim, and levaquin.     Nephrology was consulted for hyponatremia. Patient did have mild hyponatremia on admission of 130. Patient's sodium was in 126-130 range until 10/15 when decreased to 124, and then on 10/16 sodium was found to be 121. On 10/20, was as high as back to 130, and then on 10/21 started downtrending to 125, then 122 on 10/22, and has been in the low 120s since 10/22. Serum osmolality was 248 on 10/16, and then 246 again on 10/22. Urine osmolality was 492 on 10/16, 718 on 10/18, and 353 on 10/22. Patient was felt to be euvolemic on exam, and laboratory studies  "most consistent w/ SIADH and patient was started on urea 15g daily on 10/23 without much interval improvement in serum sodium levels.     Patient was started on tube feeds on 10/18 after patient was recommended to be NPO, free water flushes were decreased to 100mL BID on 10/22 for worsening hyponatremia.     Other workup:   - Creatinine and BUN normal and stable  - Echo w/ positive bubble study and atrial septal aneurysm, recommended outpatient workup for pulmonary hypertension   - AM cortisol from 10/25 elevated   - Modest elevation in AST, previously positive anti-smooth muscle antibody; rest of liver enzymes WNL   - Bronchoscopy showing stenotrophomonas       ROS: Other than noted in the HPI 12 point review of system was negative.      In The ER: /86   Pulse (!) 112   Temp 37.3 °C (99.1 °F)   Resp 15   Ht 1.702 m (5' 7.01\")   Wt 49.7 kg (109 lb 9.1 oz)   SpO2 91%   BMI 17.16 kg/m²      Past Medical History  He has no past medical history on file.    Surgical History  He has no past surgical history on file.     Social History  He reports that he has been smoking cigarettes. He has been smoking an average of .5 packs per day. He does not have any smokeless tobacco history on file. He reports current alcohol use. No history on file for drug use.    Family History  No family history on file.    Meds:   albuterol, 2.5 mg, TID  atorvastatin, 40 mg, Daily  ergocalciferol, 8,000 Units, Daily  esomeprazole, 40 mg, Daily before breakfast  folic acid, 1 mg, Daily  levoFLOXacin, 750 mg, q24h [facility]  multivitamin with minerals, 1 tablet, Daily  nicotine, 1 patch, Daily  polyethylene glycol, 17 g, Daily  sulfamethoxazole-trimethoprim, 320 mg of trimethoprim, q8h  thiamine, 100 mg, Daily  urea, 15 g, BID      fentaNYL, Last Rate: 50 mcg/hr (10/25/23 0800)  heparin, Last Rate: 1,300 Units/hr (10/25/23 0858)  propofol, Last Rate: 15 mcg/kg/min (10/25/23 1230)      acetaminophen, 975 mg, q8h PRN  dextrose 10 % in water " (D10W), 0.3 g/kg/hr, Once PRN  dextrose, 25 g, q15 min PRN  fentaNYL, 25 mcg, q1h PRN  fentaNYL, 25 mcg, q1h PRN  glucagon, 1 mg, q15 min PRN  heparin, 2,000-4,000 Units, q4h PRN  oxygen, , Continuous PRN - O2/gases  oxygen, , Continuous PRN - O2/gases  sennosides, 1 tablet, Nightly PRN  white petrolatum, 1 Application, q1h PRN      Current Outpatient Medications   Medication Instructions    acetaminophen (Tylenol) 325 mg tablet TAKE 2 TABLETS BY MOUTH EVERY 6 HOURS AS NEEDED FOR PAIN.    amLODIPine (Norvasc) 5 mg tablet TAKE 1 TABLET BY MOUTH ONCE DAILY    atenolol (Tenormin) 50 mg tablet TAKE 1 TABLET BY MOUTH ONCE DAILY    atorvastatin (LIPITOR) 40 mg, oral, Daily    folic acid (Folvite) 1 mg tablet TAKE 1 TABLET BY MOUTH ONCE DAILY    ipratropium-albuteroL (Combivent Respimat)  mcg/actuation inhaler INHALE 1 PUFF EVERY 6 HOURS AS NEEDED FOR SHORTNESS OF BREATH    naloxone (Narcan) 4 mg/0.1 mL nasal spray Use 1 Spray in one nostril (alternate sides) as needed for Drug Overdose (and call 911). every 2-3 min, until assistance arrives.<BR>    nicotine (Nicoderm CQ) 21 mg/24 hr patch APPLY 1 PATCH TRANSDERMALLY EVERY 24 HOURS.    polyethylene glycol (Glycolax, Miralax) 17 gram/dose powder     sennosides (Senokot) 8.6 mg tablet TAKE 1 TABLET BY MOUTH TWO TIMES A DAY AS NEEDED FOR CONSTIPATION    thiamine (Vitamin B-1) 100 mg tablet TAKE 1 TABLET BY MOUTH ONCE DAILY       ___________________________________________________________________________________  VITALS:  Temp:  [35.9 °C (96.6 °F)-37.3 °C (99.1 °F)] 37.3 °C (99.1 °F)  Heart Rate:  [100-120] 112  Resp:  [15-23] 15  BP: (103-140)/(62-86) 140/86  FiO2 (%):  [30 %] 30 %     Intake/Output Summary (Last 24 hours) at 10/25/2023 1325  Last data filed at 10/25/2023 1300  Gross per 24 hour   Intake 1632.88 ml   Output 1250 ml   Net 382.88 ml      I/O last 3 completed shifts:  In: 1939.4 (39 mL/kg) [I.V.:869.4 (17.5 mL/kg); NG/GT:920; IV Piggyback:150]  Out: 2125  "(42.8 mL/kg) [Urine:2125 (1.2 mL/kg/hr)]  Weight: 49.7 kg        PHYSICAL EXAMINATION:  General appearance: Awake and alert, oriented, sedated but interactive  HEENT: supple, poor dentition; intubated   Neck: lymphadenopathy, elevated JVP  Skin: no apparent rash  Heart: tachycardia noted, heart sounds 1 & 2 present and normal, no murmurs heard or friction rub  Lungs: Adequate air entry, breath sounds coarse bilaterally R>L.  No wheezing/crackles  Abdomen: soft, non-tender  Extremities: No edema   Neuro: No FND, no asterixis   ACCESS: [facility]    ___________________________________________________________________________________  INVESTIGATIONS:  Results from last 7 days   Lab Units 10/25/23  0432   WBC AUTO x10*3/uL 24.9*   RBC AUTO x10*6/uL 4.27*   HEMOGLOBIN g/dL 13.1*   HEMATOCRIT % 36.8*     Results from last 7 days   Lab Units 10/25/23  0432 10/24/23  1755 10/24/23  1210   SODIUM mmol/L 121*   < > 122*   POTASSIUM mmol/L 5.1   < > 4.3   CHLORIDE mmol/L 86*   < > 86*   CO2 mmol/L 22   < > 21   BUN mg/dL 21   < > 34*   CREATININE mg/dL 0.62   < > 0.60   CALCIUM mg/dL 8.6   < > 8.3*   PHOSPHORUS mg/dL 4.3   < > 3.9   MAGNESIUM mg/dL  --   --  2.40   BILIRUBIN TOTAL mg/dL 0.4  --   --    ALT U/L 64*  --   --    AST U/L 87*  --   --     < > = values in this interval not displayed.         No results found for: \"ALBUR\", \"[id number]\"   Susceptibility data from last 90 days.  Collected Specimen Info Organism   10/20/23 Fluid from Bronchial Washings  Stenotrophomonas maltophilia group       ___________________________________________________________________________________  ASSESSMENT:  Molina Godinez is a 68 y.o. male with PMH of HTN, COPD, EtOH use disorder, PE on eliquis, HCV s/p treatment w/out viral DNA in blood, presented on 10/12 to ED w/ complaint of LE weakness. Patient w/ complicated hospital course, originally in MICU for suspected EtOH withdrawal, then transferred to floor. Patient had hypoxia, transferred back to " MICU. Subsequently intubated and found to have Stenotrophomonas pneumonia, now on bactrim and Levaquin. Nephro consulted for hyponatremia.     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 context of pulmonary pathology   - Currently being treated for pneumonia, mechanically ventilated, history of severe COPD  - Patient started on Urea 15g daily 10/23 w/out significant improvement of serum sodium     ___________________________________________________________________________________  RECOMMENDATIONS:  Workup:  - Agree with workup per primary ICU team as listed above    Management:   - Would increase urea to twice daily   - Would ensure that patient's current drips are mixed with normal saline as opposed to hypotonic D5W and concentrate drips as able to aid in fluid restriction   - Would start lasix 40mg IV to set urine sodium loss and to assist w/ free water loss   - Continue frequent RFP/BMP checks to avoid rapid over correction of chronic hyponatremia  - No more correction than 6-8mEq in 24 hrs   -Keep MAP >65 or SBP >90, avoid nephrotoxic medications, radiocontrast if possible, follow medication trough levels as appropriate.  -Strict I/O monitoring, daily weights  - Will continue to follow     Patient seen and discussed w/ attending Dr. Gutierrez.       Urbano Portillo MD  Internal Medicine PGY-1    Daytime / Weekend Renal Pager [phone]  After 7 pm Emergencies [phone] Pager [phone]

## 2023-10-25 NOTE — CARE PLAN
Problem: Pain - Adult  Goal: Verbalizes/displays adequate comfort level or baseline comfort level  Outcome: Progressing     Problem: Discharge Planning  Goal: Discharge to home or other facility with appropriate resources  Outcome: Progressing     Problem: Chronic Conditions and Co-morbidities  Goal: Patient's chronic conditions and co-morbidity symptoms are monitored and maintained or improved  Outcome: Progressing     Problem: Skin  Goal: Participates in plan/prevention/treatment measures  Outcome: Progressing  Goal: Prevent/manage excess moisture  Outcome: Progressing  Goal: Prevent/minimize sheer/friction injuries  Outcome: Progressing  Goal: Promote/optimize nutrition  Outcome: Progressing

## 2023-10-25 NOTE — PROGRESS NOTES
"Nutrition Follow Up Assessment:       Patient is a 68 y.o. male who remains in MICU for care.  Initially admitted for inability to walk from EtOH abuse.  Course c/b hypertension, change in mental status and hyponatremia.  Required transfer to ICU on 10/17 for lethargy and hypoxia.  He was intubated on 10/20 and remains intubated this morning.  Sedated with fentanyl and propofol.  Bronch cultures +stenotrophomonas.  Also with hyponatremia from SIADH-- getting urea packets.      Pt with a cortrak tube in place for enteral access.  He is on Two Reynaldo HN@ 30mls/hr.  Goal per RDN note is 35mls/hr.  Orders show both a rate of 30mls/hr and 35mls/hr.  Will clarify with RN.  Per team last week, concern had been for refeeding syndrome based on labs.  Lytes appears normal now with uptrending potassium.      Anthropometrics:  Height: 170.2 cm (5' 7.01\")  Weight: 49.7 kg (109 lb 9.1 oz)  BMI (Calculated): 17.16         10/19/23: 49.7kg  10/18/23: 49.7kg  10/2/23: 57.6kg      Nutrition Focused Physical Exam Findings:  Defer-- see initial assessment     Edema:  Edema: none       Physical Findings:      Skin: intact          Objective Data:  Nutrition Significant Labs:  Na+ 121  K+ 5.1  Chloride 86  Bicarb 22  BUN 21  Creatinine 0.62  Calcium 8.6  Phos 4.3  Mag 2.4  Sugars 106, 124, 132  Vitamin D 8 this month    Nutrition Specific Mediations:  Pertinent meds:   Folic acid  Thiamin  MVI  Vitamin D  Miralax      I/O:     Stool: last BM on 10/24      Dietary Orders (From admission, onward)       Start     Ordered    10/22/23 1800  Enteral feeding with NPO TwoCal HN; 30; 100; Water; Tap water; Other (specify); BID  Diet effective now        Comments: Continue at currently rate, increase by 10mL q8hr until goal rate of 35mL/hr reached   Question Answer Comment   Tube feeding formula: TwoCal HN    Tube feeding continuous rate (mL/hr): 30    Tube feeding flush (mL): 100    Flush type: Water    Water type: Tap water    Flush frequency: " Other (specify)    Additional Details BID        10/22/23 1800    10/18/23 1541  Oral nutritional supplements  Until discontinued        Question Answer Comment   Deliver with  Once daily   Select supplement: Pro-Stat AWC        10/18/23 1540                     Estimated Needs:   Total Energy Estimated Needs (kCal):  (1773-7793)   Method for Estimating Needs: MSJ= 1236; PSU/RMR= 1355, MV= 8.8     Total Protein Estimated Needs (g):  (70)   Method for Estimating Needs: 49.7kg x 1.4+        Nutrition Diagnosis:  Malnutrition Diagnosis  Patient has Malnutrition Diagnosis: Yes  Diagnosis Status: New  Malnutrition Diagnosis: Severe malnutrition related to chronic disease or condition  As Evidenced by: suspect poor PO intake PTA in light of EtOH abuse as well as noted severe muscle and fat loss on physical exam; he is underweight for height with a BMI of 17.2 and a DBW of 74%     Serum potassium is trending up.  Can make recs for a lower electrolyte formula as needed if potassium level becomes abnormal.      TF at goal= 1680kcals, 70grams protein     Nutrition Interventions and Recommendations:        Nutrition Prescription:   Adjust order for tube feed to be Two Reynaldo HN at 35mls/hr, not 30mls/hr.  New goal will provide 590mls free water daily.   Discontinue order for Pro-Stat AWC  Can also consider stopping folic acid supplement.  May benefit from continuing thiamin supplement for now.             Time Spent/Follow-up Reminder:   Follow Up  Time Spent (min): 60 minutes  Last Date of Nutrition Visit: 10/25/23  Nutrition Follow-Up Needed?: Dietitian to reassess per policy  Follow up Comment: tube feed/cortrak/vent

## 2023-10-25 NOTE — PROGRESS NOTES
Critical Care Daily Progress        Subjective   Patient is a 68 y.o. male admitted on 10/11/2023  7:54 PM with the following indication(s) for ICU care hyponatremia, altered mental status with multifactorial etiology.     Interval History: NAEON. Concern for worsening secretions this morning, seemed to be consistent over the past few days. No acute concerns at this time, still able to nod head yes/no. Having continued back pain.     Scheduled Medications:   albuterol, 2.5 mg, nebulization, TID  atorvastatin, 40 mg, oral, Daily  ergocalciferol, 8,000 Units, oral, Daily  esomeprazole, 40 mg, nasoduodenal tube, Daily before breakfast  folic acid, 1 mg, oral, Daily  levoFLOXacin, 750 mg, oral, q24h [facility]  multivitamin with minerals, 1 tablet, oral, Daily  nicotine, 1 patch, transdermal, Daily  polyethylene glycol, 17 g, oral, Daily  sulfamethoxazole-trimethoprim, 320 mg of trimethoprim, nasogastric tube, q8h  thiamine, 100 mg, oral, Daily  urea, 15 g, oral, Daily           Continuous Medications:   fentaNYL, 0-300 mcg/hr, Last Rate: 50 mcg/hr (10/25/23 0800)  heparin, 0-4,500 Units/hr, Last Rate: 1,200 Units/hr (10/25/23 0800)  propofol, 5-50 mcg/kg/min, Last Rate: 15 mcg/kg/min (10/25/23 0800)          PRN Medications:   PRN medications: acetaminophen, dextrose 10 % in water (D10W), dextrose, fentaNYL, fentaNYL, glucagon, heparin, oxygen, oxygen, sennosides, white petrolatum    Objective   Vitals:  Most Recent:  Visit Vitals  /62   Pulse (!) 116   Temp 37.3 °C (99.1 °F)   Resp 16        24hr Min/Max:  Temp  Min: 35.9 °C (96.6 °F)  Max: 37.3 °C (99.1 °F)  Pulse  Min: 99  Max: 120  BP  Min: 103/66  Max: 136/77  Resp  Min: 16  Max: 25  SpO2  Min: 90 %  Max: 100 %      Vent Mode: Assist control/Volume control plus  FiO2 (%):  [30 %] 30 %  S RR:  [18] 18  S VT:  [400 mL] 400 mL  PEEP/CPAP (cm H2O):  [5 cm H20] 5 cm H20  MAP (cm H2O):  [7-11] 9     GEN: Intubated and sedated, RASS -1.  CV: Tachycardic, no m/r/g  PULM:  Coarse breath sounds b/l, rhonchi LLL, improved  ABD: Soft, acutely tender RUQ, nondistended  NEURO: Intubated and sedated, nods to questions  MSK: no joint swelling grossly noted  EXT: no LE edema  SKIN: warm and dry, no lesions grossly noted    LDA:    PIV x2 in R arm, Dobhoff, intubated      LABS AND IMAGING     Results from last 7 days   Lab Units 10/25/23  0432 10/24/23  1210 10/24/23  0425 10/23/23  0841 10/23/23  0536   SODIUM mmol/L 121*   < > 124*   < > 124*   POTASSIUM mmol/L 5.1   < > 4.2   < > 4.3   CHLORIDE mmol/L 86*   < > 88*   < > 87*   CO2 mmol/L 22   < > 24   < > 22   BUN mg/dL 21   < > 19   < > 9   CREATININE mg/dL 0.62   < > 0.53   < > 0.56   WBC AUTO x10*3/uL 24.9*  --  24.9*  --  24.5*   HEMOGLOBIN g/dL 13.1*  --  13.2*  --  13.6   PLATELETS AUTO x10*3/uL 292  --  261  --  265    < > = values in this interval not displayed.   AM cortisol 32.4    CXR from this AM:  1. Persistent left basilar pleural effusion and  atelectasis/infiltrate with slight improvement in the aeration of the  left lung. Correlate with left basilar/left lower lobe  atelectasis/collapse and mucous plugging.  2. Residual patchy airspace opacity in the left mid and upper lung  which may be due to edema or infiltrate.       Assessment/Plan         Molina Godinez is a 68 year old male with PMHx of COPD, HTN, AUD and opioid use disorder, HCV, chronic PE who was seen in MICU 10/13-10/16 for inability to walk 2/2 suspected alcohol use disorder. ICU course complicated by hypertension, altered mental status, and hyponatremia. Transferred back to MICU 10/17 after he was found lethargic and hypoxic. Now intubated and sedated, bronchial washings growing stenotrophomonas.    Updates to plan:  Course of Zosyn complete, Levaquin discontinued as Steno susceptible to Bactrim  Nephrology following, appreciate recs  KUB, LFTs +/- RUQ US to evaluate acute RUQ pain, persistent WBC      NEURO/PSYCH  #AMS- fluctuating, unclear etiology as  previously suspected alcohol withdrawal but could be 2/2 chronic hypoxia and retention vs hyponatremia or other etiology. Now intubated and sedated  -CT head without pathology  - Initially had visual and tactile hallucinations that have since resolved. Suspect more metabolic etiology of fluctuations in mental status, I/s/o acutely increased oxygen requirement.  PLAN:  Intubated and sedated on Fentanyl 50, Propofol 15       #Bilateral LE weakness- Neuro consulted earlier in admission  -Likely 2/2 chronic, had weakness during 8/2023 admission; less likely GBS  -CT head without pathology, MRI spine stopped early due to pain  -Patient able to move bilateral lower extremities, low c/f GBS  PLAN:  -May follow up with neurology outpatient  -ID consulted regarding weakness and positive Syphilis Ab. Pt was treated back in 1998 but rec MRI spine given concern of abscess, MRI negative for acute process      CV  #HTN- resolved  - home amlodipine 5mg, atenolol 50mg  - Initially started on Cardene drip, transitioned to Labetalol prior to resolution  [] Home meds held     #Tachycardia  - Sinus rhythm  - Etiology includes pain, infection, hypovolemia  - Poor correlatoin with fentanyl pushes, less suspicious for pain etiology     #CAD  -home atorvastatin 40mg (home med)       PULM  #CAP- initially resolved  -Urine strep positive, unsure if active or chronic infection  -MRSA negative, Urine legionella negative  - CTX 5 day course completed 10/17    #L lung consolidation, c/f HAP  Initially treated for CAP given positive urine strep with CTX 10/13-10/17, one day of Zosyn10/17-10/17  Leukocytosis stable ~24  C/f HAP vs incomplete resolution of previous PNA  - S/p Bronch 10/20 with purulent fluid with bronchial washings sent for cx  -Bronchial washings with stenotrophomonas susceptible to Bactrim  [] Bactrim 10/22-10/28, DC Levaquin    #Suspected PAH  - Echo with atrial septal aneurysm, positive bubble study  [] Will need outpatient  workup, previously some suspicion for PAH but will need further evaluation    #Severe COPD  #Mucus plugging  -8/2023 FEV1 42%  - CT C/A/P with RLL mucus plug  - Intubated  PLAN:  [] Continue aggressive RT: Albuterol q6h, chest PT, respiratory hygiene     #Subsegmental PE  -Requires anticoagulation for 3 months (per vasc med 8/2023), on home apixaban 5mg BID  PLAN:  -Heparin gtt    GI  #HepC  -treated, 8/2023 HCV RNA undetected     #Nutrition  - Dobhoff in place 10/17  - Nutrition following, appreciate recs  PLAN:  - Starting on TF with TwoCal HN goal rate of 30ml/hr  - 100mg Thiamine, 1mg Folic acid, 8000u Vit D2     /RENAL  #Hyponatremia - Likely SIADH 2/2 COPD exacerbation but unclear if additional etiologies- stable 120-125  - Na as low as 120, slowly improving, stable 123-124, unchanged after Urea initiated  - Serum Osm 246, Urine Osm 353, Na 92  - AM cortisol wnl  [] Free water flushes 100ml BID for further fluid restriction given hyponatremia thought to be 2/2 SIADH  [] Urea 15g BID  [] Lasix 40mg daily  [] Will change drips to NS bag solution if possible, concentrate solutions  [] Nephro following, appreciate recs    #C/f refeeding syndrome- resolved  - Hypokalemia 2.7, Phos 1.9, Ca 5.2  - Electrolytes repleted, will monitor    #Alkalosis  - Likely 2/2 tachypnea     ENDOCRINE  #Secondary HyperPTH  -8/28 .7  -25-hydroxy vit D 8, 1,25 Vit D 44.4  PLAN:  Vit D2 8000u liquid daily     ID  #Leukocytosis I/s/o recently treated strep pneumo CAP  -Procal 0.38, decreasing trend  - Completed CTX 10/13-10/17  - Zosyn 10/20-10/24, Bactrim 10/22-, Levaquin 10/23-10/25  -Bronchial washings with stenotrophomonas susceptible to Bactrim  [] Bactrim 10/22-10/28, DC Levaquin    HEME/ONC  #Leukocytosis, stable  -2/2 to PNA  [] Monitor WBC, vitals     MSK/RHEUM   #Chronic LE weakness  -Likely chronic from disuse and alcohol, unlikely GBS since no paralysis  -Consider neurology outpatient referral for possible EMG,  neurology signed off     Vent/Oxy: Intubated and sedated  Pressors/Drips: Heparin gtt, Fentanyl 50mcg, propofol 15mcg  Abx: Bactrim  F: Restricted  E: replete PRN  N: TF via Dobhoff   A: PIV R arm x2, Dobhoff, ETT  DVT ppx: heparin gtt  GI ppx: Pantoprazole     Code status: Full Code (confirmed)  NOK: Tonie (sister) [phone]; Nahomi (God-sister) [phone]; Sarah Godinez (sister) [phone]       Ever Paez MD  Internal Medicine, PGY-1

## 2023-10-26 LAB
ALBUMIN SERPL BCP-MCNC: 2.6 G/DL (ref 3.4–5)
ALBUMIN SERPL BCP-MCNC: 2.6 G/DL (ref 3.4–5)
ALBUMIN SERPL BCP-MCNC: 2.8 G/DL (ref 3.4–5)
ALP SERPL-CCNC: 288 U/L (ref 33–136)
ALT SERPL W P-5'-P-CCNC: 70 U/L (ref 10–52)
ANION GAP SERPL CALC-SCNC: 20 MMOL/L (ref 10–20)
ANION GAP SERPL CALC-SCNC: 20 MMOL/L (ref 10–20)
AST SERPL W P-5'-P-CCNC: 96 U/L (ref 9–39)
BACTERIA SPEC RESP CULT: ABNORMAL
BACTERIA SPEC RESP CULT: ABNORMAL
BILIRUB DIRECT SERPL-MCNC: 0.2 MG/DL (ref 0–0.3)
BILIRUB SERPL-MCNC: 0.4 MG/DL (ref 0–1.2)
BUN SERPL-MCNC: 34 MG/DL (ref 6–23)
BUN SERPL-MCNC: 48 MG/DL (ref 6–23)
CA-I BLD-SCNC: 0.97 MMOL/L (ref 1.1–1.33)
CALCIUM SERPL-MCNC: 8.6 MG/DL (ref 8.6–10.6)
CALCIUM SERPL-MCNC: 9 MG/DL (ref 8.6–10.6)
CHLORIDE SERPL-SCNC: 84 MMOL/L (ref 98–107)
CHLORIDE SERPL-SCNC: 86 MMOL/L (ref 98–107)
CO2 SERPL-SCNC: 23 MMOL/L (ref 21–32)
CO2 SERPL-SCNC: 23 MMOL/L (ref 21–32)
CREAT SERPL-MCNC: 0.72 MG/DL (ref 0.5–1.3)
CREAT SERPL-MCNC: 0.73 MG/DL (ref 0.5–1.3)
ERYTHROCYTE [DISTWIDTH] IN BLOOD BY AUTOMATED COUNT: 12.9 % (ref 11.5–14.5)
GFR SERPL CREATININE-BSD FRML MDRD: >90 ML/MIN/1.73M*2
GFR SERPL CREATININE-BSD FRML MDRD: >90 ML/MIN/1.73M*2
GLUCOSE SERPL-MCNC: 100 MG/DL (ref 74–99)
GLUCOSE SERPL-MCNC: 113 MG/DL (ref 74–99)
GRAM STN SPEC: ABNORMAL
GRAM STN SPEC: ABNORMAL
HCT VFR BLD AUTO: 36 % (ref 41–52)
HGB BLD-MCNC: 12.7 G/DL (ref 13.5–17.5)
MAGNESIUM SERPL-MCNC: 1.85 MG/DL (ref 1.6–2.4)
MCH RBC QN AUTO: 29.7 PG (ref 26–34)
MCHC RBC AUTO-ENTMCNC: 35.3 G/DL (ref 32–36)
MCV RBC AUTO: 84 FL (ref 80–100)
NRBC BLD-RTO: 0 /100 WBCS (ref 0–0)
PHOSPHATE SERPL-MCNC: 5.4 MG/DL (ref 2.5–4.9)
PHOSPHATE SERPL-MCNC: 6.2 MG/DL (ref 2.5–4.9)
PLATELET # BLD AUTO: 288 X10*3/UL (ref 150–450)
PMV BLD AUTO: 9.9 FL (ref 7.5–11.5)
POTASSIUM SERPL-SCNC: 4.8 MMOL/L (ref 3.5–5.3)
POTASSIUM SERPL-SCNC: 4.9 MMOL/L (ref 3.5–5.3)
PROT SERPL-MCNC: 6.2 G/DL (ref 6.4–8.2)
RBC # BLD AUTO: 4.28 X10*6/UL (ref 4.5–5.9)
SODIUM SERPL-SCNC: 122 MMOL/L (ref 136–145)
SODIUM SERPL-SCNC: 124 MMOL/L (ref 136–145)
UFH PPP CHRO-ACNC: 0.4 IU/ML
WBC # BLD AUTO: 18 X10*3/UL (ref 4.4–11.3)

## 2023-10-26 PROCEDURE — 2500000002 HC RX 250 W HCPCS SELF ADMINISTERED DRUGS (ALT 637 FOR MEDICARE OP, ALT 636 FOR OP/ED): Performed by: INTERNAL MEDICINE

## 2023-10-26 PROCEDURE — 94640 AIRWAY INHALATION TREATMENT: CPT

## 2023-10-26 PROCEDURE — 94668 MNPJ CHEST WALL SBSQ: CPT

## 2023-10-26 PROCEDURE — 2500000004 HC RX 250 GENERAL PHARMACY W/ HCPCS (ALT 636 FOR OP/ED)

## 2023-10-26 PROCEDURE — 2500000001 HC RX 250 WO HCPCS SELF ADMINISTERED DRUGS (ALT 637 FOR MEDICARE OP): Performed by: STUDENT IN AN ORGANIZED HEALTH CARE EDUCATION/TRAINING PROGRAM

## 2023-10-26 PROCEDURE — 82040 ASSAY OF SERUM ALBUMIN: CPT

## 2023-10-26 PROCEDURE — 82330 ASSAY OF CALCIUM: CPT

## 2023-10-26 PROCEDURE — 83735 ASSAY OF MAGNESIUM: CPT

## 2023-10-26 PROCEDURE — 85027 COMPLETE CBC AUTOMATED: CPT

## 2023-10-26 PROCEDURE — 2500000001 HC RX 250 WO HCPCS SELF ADMINISTERED DRUGS (ALT 637 FOR MEDICARE OP)

## 2023-10-26 PROCEDURE — 99233 SBSQ HOSP IP/OBS HIGH 50: CPT

## 2023-10-26 PROCEDURE — 99291 CRITICAL CARE FIRST HOUR: CPT | Performed by: PEDIATRICS

## 2023-10-26 PROCEDURE — 80053 COMPREHEN METABOLIC PANEL: CPT

## 2023-10-26 PROCEDURE — 84100 ASSAY OF PHOSPHORUS: CPT

## 2023-10-26 PROCEDURE — 94003 VENT MGMT INPAT SUBQ DAY: CPT

## 2023-10-26 PROCEDURE — 83690 ASSAY OF LIPASE: CPT

## 2023-10-26 PROCEDURE — 2020000001 HC ICU ROOM DAILY

## 2023-10-26 PROCEDURE — 82150 ASSAY OF AMYLASE: CPT

## 2023-10-26 PROCEDURE — 2500000002 HC RX 250 W HCPCS SELF ADMINISTERED DRUGS (ALT 637 FOR MEDICARE OP, ALT 636 FOR OP/ED): Performed by: STUDENT IN AN ORGANIZED HEALTH CARE EDUCATION/TRAINING PROGRAM

## 2023-10-26 PROCEDURE — 36415 COLL VENOUS BLD VENIPUNCTURE: CPT

## 2023-10-26 PROCEDURE — 82248 BILIRUBIN DIRECT: CPT

## 2023-10-26 PROCEDURE — S4991 NICOTINE PATCH NONLEGEND: HCPCS | Performed by: STUDENT IN AN ORGANIZED HEALTH CARE EDUCATION/TRAINING PROGRAM

## 2023-10-26 PROCEDURE — 85520 HEPARIN ASSAY: CPT

## 2023-10-26 RX ORDER — CALCIUM GLUCONATE 20 MG/ML
2 INJECTION, SOLUTION INTRAVENOUS ONCE
Status: DISCONTINUED | OUTPATIENT
Start: 2023-10-26 | End: 2023-10-27

## 2023-10-26 RX ORDER — FUROSEMIDE 10 MG/ML
40 INJECTION INTRAMUSCULAR; INTRAVENOUS DAILY
Status: DISCONTINUED | OUTPATIENT
Start: 2023-10-27 | End: 2023-10-28

## 2023-10-26 RX ORDER — MAGNESIUM SULFATE HEPTAHYDRATE 40 MG/ML
2 INJECTION, SOLUTION INTRAVENOUS ONCE
Status: COMPLETED | OUTPATIENT
Start: 2023-10-26 | End: 2023-10-26

## 2023-10-26 RX ORDER — FUROSEMIDE 10 MG/ML
40 INJECTION INTRAMUSCULAR; INTRAVENOUS ONCE
Status: COMPLETED | OUTPATIENT
Start: 2023-10-26 | End: 2023-10-26

## 2023-10-26 RX ADMIN — PROPOFOL 15 MCG/KG/MIN: 10 INJECTION, EMULSION INTRAVENOUS at 05:03

## 2023-10-26 RX ADMIN — SULFAMETHOXAZOLE AND TRIMETHOPRIM 320 MG OF TRIMETHOPRIM: 200; 40 SUSPENSION ORAL at 00:13

## 2023-10-26 RX ADMIN — SULFAMETHOXAZOLE AND TRIMETHOPRIM 320 MG OF TRIMETHOPRIM: 200; 40 SUSPENSION ORAL at 10:26

## 2023-10-26 RX ADMIN — Medication 8000 UNITS: at 09:28

## 2023-10-26 RX ADMIN — Medication 1 TABLET: at 09:00

## 2023-10-26 RX ADMIN — ALBUTEROL SULFATE 2.5 MG: 2.5 SOLUTION RESPIRATORY (INHALATION) at 07:05

## 2023-10-26 RX ADMIN — Medication 50 MCG/HR: at 01:00

## 2023-10-26 RX ADMIN — ATORVASTATIN CALCIUM 40 MG: 40 TABLET ORAL at 09:27

## 2023-10-26 RX ADMIN — SULFAMETHOXAZOLE AND TRIMETHOPRIM 320 MG OF TRIMETHOPRIM: 200; 40 SUSPENSION ORAL at 16:15

## 2023-10-26 RX ADMIN — FUROSEMIDE 40 MG: 10 INJECTION, SOLUTION INTRAVENOUS at 10:25

## 2023-10-26 RX ADMIN — PROPOFOL 15 MCG/KG/MIN: 10 INJECTION, EMULSION INTRAVENOUS at 20:02

## 2023-10-26 RX ADMIN — MAGNESIUM SULFATE HEPTAHYDRATE 2 G: 40 INJECTION, SOLUTION INTRAVENOUS at 07:01

## 2023-10-26 RX ADMIN — FOLIC ACID 1 MG: 1 TABLET ORAL at 09:26

## 2023-10-26 RX ADMIN — ALBUTEROL SULFATE 2.5 MG: 2.5 SOLUTION RESPIRATORY (INHALATION) at 15:58

## 2023-10-26 RX ADMIN — PROPOFOL 15 MCG/KG/MIN: 10 INJECTION, EMULSION INTRAVENOUS at 13:53

## 2023-10-26 RX ADMIN — Medication 1 PATCH: at 09:26

## 2023-10-26 RX ADMIN — Medication 50 MCG/HR: at 19:20

## 2023-10-26 RX ADMIN — HEPARIN SODIUM 1300 UNITS/HR: 10000 INJECTION, SOLUTION INTRAVENOUS at 10:34

## 2023-10-26 RX ADMIN — ALBUTEROL SULFATE 2.5 MG: 2.5 SOLUTION RESPIRATORY (INHALATION) at 20:05

## 2023-10-26 RX ADMIN — Medication 15 G: at 09:27

## 2023-10-26 RX ADMIN — Medication 15 G: at 20:02

## 2023-10-26 RX ADMIN — THIAMINE HCL TAB 100 MG 100 MG: 100 TAB at 09:26

## 2023-10-26 RX ADMIN — POLYETHYLENE GLYCOL 3350 17 G: 17 POWDER, FOR SOLUTION ORAL at 09:26

## 2023-10-26 RX ADMIN — ESOMEPRAZOLE MAGNESIUM 40 MG: 40 FOR SUSPENSION ORAL at 09:27

## 2023-10-26 ASSESSMENT — PAIN - FUNCTIONAL ASSESSMENT
PAIN_FUNCTIONAL_ASSESSMENT: CPOT (CRITICAL CARE PAIN OBSERVATION TOOL)

## 2023-10-26 NOTE — PROGRESS NOTES
Critical Care Daily Progress        Subjective   Patient is a 68 y.o. male admitted on 10/11/2023  7:54 PM with the following indication(s) for ICU care hyponatremia, altered mental status with multifactorial etiology.     Interval History: NAEON. Abdominal pain has improved, but having a lot of discomfort from the ETT.     Scheduled Medications:   albuterol, 2.5 mg, nebulization, TID  atorvastatin, 40 mg, oral, Daily  ergocalciferol, 8,000 Units, oral, Daily  esomeprazole, 40 mg, nasoduodenal tube, Daily before breakfast  folic acid, 1 mg, oral, Daily  magnesium sulfate, 2 g, intravenous, Once  multivitamin with minerals, 1 tablet, oral, Daily  nicotine, 1 patch, transdermal, Daily  polyethylene glycol, 17 g, oral, Daily  sulfamethoxazole-trimethoprim, 320 mg of trimethoprim, nasogastric tube, q8h  thiamine, 100 mg, oral, Daily  urea, 15 g, oral, BID           Continuous Medications:   fentaNYL, 0-300 mcg/hr, Last Rate: 50 mcg/hr (10/26/23 0100)  heparin, 0-4,500 Units/hr, Last Rate: 1,300 Units/hr (10/25/23 1826)  propofol, 5-50 mcg/kg/min, Last Rate: 15 mcg/kg/min (10/25/23 1826)          PRN Medications:   PRN medications: acetaminophen, dextrose 10 % in water (D10W), dextrose, fentaNYL, fentaNYL, glucagon, heparin, oxygen, oxygen, sennosides, white petrolatum    Objective   Vitals:  Most Recent:  Visit Vitals  /73   Pulse 110   Temp 37.3 °C (99.1 °F) (Esophageal)   Resp 21        24hr Min/Max:  Temp  Min: 37.3 °C (99.1 °F)  Max: 37.6 °C (99.7 °F)  Pulse  Min: 103  Max: 120  BP  Min: 108/76  Max: 140/86  Resp  Min: 15  Max: 25  SpO2  Min: 89 %  Max: 97 %      Vent Mode: Volume control/assist control  FiO2 (%):  [30 %] 30 %  S RR:  [18] 18  S VT:  [400 mL] 400 mL  PEEP/CPAP (cm H2O):  [5 cm H20] 5 cm H20  MAP (cm H2O):  [8.4-12] 9     GEN: Intubated and sedated, RASS -1.  CV: Tachycardic, no m/r/g  PULM: Coarse breath sounds b/l, rhonchi LLL, improved  ABD: Soft, minimally tender abdomen, improved  NEURO:  Intubated and sedated, nods to questions  MSK: no joint swelling grossly noted  EXT: no LE edema  SKIN: warm and dry, no lesions grossly noted    LDA:    PIV x2 in R arm, Dobhoff, intubated      LABS AND IMAGING     Results from last 7 days   Lab Units 10/26/23  0447 10/25/23  1243 10/25/23  0432 10/24/23  1210 10/24/23  0425   SODIUM mmol/L 124*   < > 121*   < > 124*   POTASSIUM mmol/L 4.8   < > 5.1   < > 4.2   CHLORIDE mmol/L 86*   < > 86*   < > 88*   CO2 mmol/L 23   < > 22   < > 24   BUN mg/dL 34*   < > 21   < > 19   CREATININE mg/dL 0.72   < > 0.62   < > 0.53   WBC AUTO x10*3/uL 18.0*  --  24.9*  --  24.9*   HEMOGLOBIN g/dL 12.7*  --  13.1*  --  13.2*   PLATELETS AUTO x10*3/uL 288  --  292  --  261    < > = values in this interval not displayed.       ALT 64  AST 87  Tbili 0.4      KUB  Decreased degree of small bowel gas compared to prior radiograph,  which may reflect decompressed or fluid-filled bowel loops. Overall  nonobstructive bowel gas pattern    RUQ US with biliary sludge, no wall thickening, duct dilatation    CXR 10/26:  1. Persistent left basilar pleural effusion and  atelectasis/infiltrate with slight improvement in the aeration of the  left lung. Correlate with left basilar/left lower lobe  atelectasis/collapse and mucous plugging.  2. Residual patchy airspace opacity in the left mid and upper lung  which may be due to edema or infiltrate.       Assessment/Plan         Molina Godinez is a 68 year old male with PMHx of COPD, HTN, AUD and opioid use disorder, HCV, chronic PE who was seen in MICU 10/13-10/16 for inability to walk 2/2 suspected alcohol use disorder. ICU course complicated by hypertension, altered mental status, and hyponatremia. Transferred back to MICU 10/17 after he was found lethargic and hypoxic. Now intubated and sedated, bronchial washings growing stenotrophomonas.    Updates to plan:  Failed SBT today, dropped sats to 84%, tachypneic  Continue tx for PNA,  SIADH      NEURO/PSYCH  #AMS- fluctuating, unclear etiology as previously suspected alcohol withdrawal but could be 2/2 chronic hypoxia and retention vs hyponatremia or other etiology. Now intubated and sedated  -CT head without pathology  - Initially had visual and tactile hallucinations that have since resolved. Suspect more metabolic etiology of fluctuations in mental status, I/s/o acutely increased oxygen requirement.  PLAN:  Intubated and sedated on Fentanyl 50, Propofol 15       #Bilateral LE weakness- Neuro consulted earlier in admission  -Likely 2/2 chronic, had weakness during 8/2023 admission; less likely GBS  -CT head without pathology, MRI spine stopped early due to pain  -Patient able to move bilateral lower extremities, low c/f GBS  PLAN:  -May follow up with neurology outpatient  -ID consulted regarding weakness and positive Syphilis Ab. Pt was treated back in 1998 but rec MRI spine given concern of abscess, MRI negative for acute process      CV  #HTN- resolved  - home amlodipine 5mg, atenolol 50mg  - Initially started on Cardene drip, transitioned to Labetalol prior to resolution  [] Home meds held     #Tachycardia  - Sinus rhythm  - Etiology includes pain, infection, hypovolemia  - Poor correlatoin with fentanyl pushes, less suspicious for pain etiology     #CAD  -home atorvastatin 40mg (home med)       PULM  #CAP- initially resolved  -Urine strep positive, unsure if active or chronic infection  -MRSA negative, Urine legionella negative  - CTX 5 day course completed 10/17    #L lung consolidation, c/f HAP  Initially treated for CAP given positive urine strep with CTX 10/13-10/17, one day of Zosyn10/17-10/17  Leukocytosis stable ~24  C/f HAP vs incomplete resolution of previous PNA  - S/p Bronch 10/20 with purulent fluid with bronchial washings sent for cx  -Bronchial washings with stenotrophomonas susceptible to Bactrim  [] Bactrim 10/22-10/28  [] weaning parameters today, SBT    #Suspected PAH  -  Echo with atrial septal aneurysm, positive bubble study  [] Will need outpatient workup, previously some suspicion for PAH but will need further evaluation    #Severe COPD  #Mucus plugging  -8/2023 FEV1 42%  - CT C/A/P with RLL mucus plug  - Intubated  PLAN:  [] Continue aggressive RT: Albuterol q6h, chest PT, respiratory hygiene     #Subsegmental PE  -Requires anticoagulation for 3 months (per vasc med 8/2023), on home apixaban 5mg BID  PLAN:  -Heparin gtt    GI  #HepC  -treated, 8/2023 HCV RNA undetected     #Nutrition  - Dobhoff in place 10/17  - Nutrition following, appreciate recs  PLAN:  - Starting on TF with TwoCal HN goal rate of 30ml/hr  - 100mg Thiamine, 1mg Folic acid, 8000u Vit D2    #Abdominal pain- improved  - Improved, KUB unremarkable, RUQ US with sludge  [] If pain worsens, WBC elevates, considering CT A/P     /RENAL  #Hyponatremia - Likely SIADH 2/2 COPD exacerbation but unclear if additional etiologies- stable 120-125  - Na as low as 120, slowly improving, stable 123-124  - Serum Osm 246, Urine Osm 353, Na 92  - AM cortisol wnl  [] Free water flushes 100ml BID for further fluid restriction given hyponatremia thought to be 2/2 SIADH  [] Urea 15g BID  [] Lasix 40mg daily  [] Nephro following, appreciate recs    #C/f refeeding syndrome- resolved  - Hypokalemia 2.7, Phos 1.9, Ca 5.2  - Electrolytes repleted, will monitor    #Alkalosis  - Likely 2/2 tachypnea     ENDOCRINE  #Secondary HyperPTH  -8/28 .7  -25-hydroxy vit D 8, 1,25 Vit D 44.4  PLAN:  Vit D2 8000u liquid daily     ID  #Leukocytosis I/s/o recently treated strep pneumo CAP- Improving  -Procal 0.38, decreasing trend  - Completed CTX 10/13-10/17  - Zosyn 10/20-10/24, Bactrim 10/22-, Levaquin 10/23-10/25  -Bronchial washings with stenotrophomonas susceptible to Bactrim  [] Bactrim 10/22-10/28    HEME/ONC  #Leukocytosis, stable  -2/2 to PNA  [] Monitor WBC, vitals     MSK/RHEUM   #Chronic LE weakness  -Likely chronic from disuse and  alcohol, unlikely GBS since no paralysis  -Consider neurology outpatient referral for possible EMG, neurology signed off     Vent/Oxy: Intubated and sedated  Pressors/Drips: Heparin gtt, Fentanyl 50mcg, propofol 15mcg  Abx: Bactrim  F: Restricted  E: replete PRN  N: TF via Dobhoff   A: PIV R arm x2, Dobhoff, ETT  DVT ppx: heparin gtt  GI ppx: Pantoprazole     Code status: Full Code (confirmed)  ANSELMO: Tonie (sister) [phone]; Nahomi ([facility]-sister) [phone]; Sarah Godinez (sister) [phone]       Ever Paez MD  Internal Medicine, PGY-1

## 2023-10-26 NOTE — PROGRESS NOTES
"Molina Godinez is a 68 y.o. male on day 14 of admission presenting with Hospital-acquired pneumonia.    Subjective   NAEO. Patient's Na better this . Patient continues to be intubated, white count improving. Reports he is thirsty.        Objective     Physical Exam  Constitutional:       Comments: Sedated, intubated; still able to answer questions; underweight   HENT:      Head: Normocephalic and atraumatic.      Mouth/Throat:      Mouth: Mucous membranes are dry.      Pharynx: No oropharyngeal exudate.   Eyes:      General: No scleral icterus.     Conjunctiva/sclera: Conjunctivae normal.      Pupils: Pupils are equal, round, and reactive to light.   Cardiovascular:      Rate and Rhythm: Regular rhythm. Tachycardia present.      Pulses: Normal pulses.      Heart sounds: No murmur heard.     No friction rub.   Pulmonary:      Comments: Intubated, mechanical breath sounds; coarse rhonchi bilaterally   Abdominal:      General: Abdomen is flat. Bowel sounds are normal. There is no distension.      Palpations: Abdomen is soft.      Tenderness: There is no abdominal tenderness.   Musculoskeletal:         General: No swelling or tenderness.      Cervical back: Normal range of motion.   Skin:     General: Skin is warm and dry.      Coloration: Skin is not jaundiced.         Last Recorded Vitals  Blood pressure 122/75, pulse (!) 116, temperature 36.7 °C (98.1 °F), temperature source Esophageal, resp. rate 12, height 1.702 m (5' 7.01\"), weight 49.7 kg (109 lb 9.1 oz), SpO2 94 %.  Intake/Output last 3 Shifts:  I/O last 3 completed shifts:  In: 2161.9 (43.5 mL/kg) [I.V.:831.9 (16.7 mL/kg); NG/GT:1230; IV Piggyback:100]  Out: 2850 (57.3 mL/kg) [Urine:2850 (1.6 mL/kg/hr)]  Weight: 49.7 kg     Relevant Results  Scheduled medications  albuterol, 2.5 mg, nebulization, TID  atorvastatin, 40 mg, oral, Daily  calcium gluconate, 2 g, intravenous, Once  ergocalciferol, 8,000 Units, oral, Daily  esomeprazole, 40 mg, nasoduodenal " tube, Daily before breakfast  folic acid, 1 mg, oral, Daily  [START ON 10/27/2023] furosemide, 40 mg, intravenous, Daily  multivitamin with minerals, 1 tablet, oral, Daily  nicotine, 1 patch, transdermal, Daily  polyethylene glycol, 17 g, oral, Daily  sulfamethoxazole-trimethoprim, 320 mg of trimethoprim, nasogastric tube, q8h  thiamine, 100 mg, oral, Daily  urea, 15 g, oral, BID      Continuous medications  fentaNYL, 0-300 mcg/hr, Last Rate: 50 mcg/hr (10/26/23 1353)  heparin, 0-4,500 Units/hr, Last Rate: 1,300 Units/hr (10/26/23 1200)  propofol, 5-50 mcg/kg/min, Last Rate: 15 mcg/kg/min (10/26/23 1353)      PRN medications  PRN medications: acetaminophen, dextrose 10 % in water (D10W), dextrose, fentaNYL, fentaNYL, glucagon, heparin, oxygen, oxygen, sennosides, white petrolatum  Results for orders placed or performed during the hospital encounter of 10/11/23 (from the past 24 hour(s))   Renal function panel   Result Value Ref Range    Glucose 99 74 - 99 mg/dL    Sodium 121 (L) 136 - 145 mmol/L    Potassium 5.2 3.5 - 5.3 mmol/L    Chloride 86 (L) 98 - 107 mmol/L    Bicarbonate 22 21 - 32 mmol/L    Anion Gap 18 10 - 20 mmol/L    Urea Nitrogen 24 (H) 6 - 23 mg/dL    Creatinine 0.79 0.50 - 1.30 mg/dL    eGFR >90 >60 mL/min/1.73m*2    Calcium 8.4 (L) 8.6 - 10.6 mg/dL    Phosphorus 6.8 (H) 2.5 - 4.9 mg/dL    Albumin 2.5 (L) 3.4 - 5.0 g/dL   Heparin Assay, UFH   Result Value Ref Range    Heparin Unfractionated 0.3 See Comment Below for Therapeutic Ranges IU/mL   CBC   Result Value Ref Range    WBC 18.0 (H) 4.4 - 11.3 x10*3/uL    nRBC 0.0 0.0 - 0.0 /100 WBCs    RBC 4.28 (L) 4.50 - 5.90 x10*6/uL    Hemoglobin 12.7 (L) 13.5 - 17.5 g/dL    Hematocrit 36.0 (L) 41.0 - 52.0 %    MCV 84 80 - 100 fL    MCH 29.7 26.0 - 34.0 pg    MCHC 35.3 32.0 - 36.0 g/dL    RDW 12.9 11.5 - 14.5 %    Platelets 288 150 - 450 x10*3/uL    MPV 9.9 7.5 - 11.5 fL   Renal function panel   Result Value Ref Range    Glucose 113 (H) 74 - 99 mg/dL    Sodium  124 (L) 136 - 145 mmol/L    Potassium 4.8 3.5 - 5.3 mmol/L    Chloride 86 (L) 98 - 107 mmol/L    Bicarbonate 23 21 - 32 mmol/L    Anion Gap 20 10 - 20 mmol/L    Urea Nitrogen 34 (H) 6 - 23 mg/dL    Creatinine 0.72 0.50 - 1.30 mg/dL    eGFR >90 >60 mL/min/1.73m*2    Calcium 8.6 8.6 - 10.6 mg/dL    Phosphorus 5.4 (H) 2.5 - 4.9 mg/dL    Albumin 2.6 (L) 3.4 - 5.0 g/dL   Calcium, ionized   Result Value Ref Range    POCT Calcium, Ionized 0.97 (L) 1.1 - 1.33 mmol/L   Magnesium   Result Value Ref Range    Magnesium 1.85 1.60 - 2.40 mg/dL   Heparin Assay, UFH   Result Value Ref Range    Heparin Unfractionated 0.4 See Comment Below for Therapeutic Ranges IU/mL   Hepatic function panel   Result Value Ref Range    Albumin 2.6 (L) 3.4 - 5.0 g/dL    Bilirubin, Total 0.4 0.0 - 1.2 mg/dL    Bilirubin, Direct 0.2 0.0 - 0.3 mg/dL    Alkaline Phosphatase 288 (H) 33 - 136 U/L    ALT 70 (H) 10 - 52 U/L    AST 96 (H) 9 - 39 U/L    Total Protein 6.2 (L) 6.4 - 8.2 g/dL   Renal function panel   Result Value Ref Range    Glucose 100 (H) 74 - 99 mg/dL    Sodium 122 (L) 136 - 145 mmol/L    Potassium 4.9 3.5 - 5.3 mmol/L    Chloride 84 (L) 98 - 107 mmol/L    Bicarbonate 23 21 - 32 mmol/L    Anion Gap 20 10 - 20 mmol/L    Urea Nitrogen 48 (H) 6 - 23 mg/dL    Creatinine 0.73 0.50 - 1.30 mg/dL    eGFR >90 >60 mL/min/1.73m*2    Calcium 9.0 8.6 - 10.6 mg/dL    Phosphorus 6.2 (H) 2.5 - 4.9 mg/dL    Albumin 2.8 (L) 3.4 - 5.0 g/dL            Assessment/Plan   Principal Problem:    Hospital-acquired pneumonia  Active Problems:    Essential hypertension, benign    Hepatitis C virus infection cured after antiviral drug therapy    Alcoholism (CMS/HCC)    Chronic pulmonary embolism (CMS/HCC)    COPD (chronic obstructive pulmonary disease) (CMS/HCC)    HLD (hyperlipidemia)    Molina Godinez is a 68 y.o. male with PMH of HTN, COPD, EtOH use disorder, PE on eliquis, HCV s/p treatment w/out viral DNA in blood, presented on 10/12 to ED w/ complaint of LE  weakness. Patient w/ complicated hospital course, originally in MICU for suspected EtOH withdrawal, then transferred to floor. Patient had hypoxia, transferred back to MICU. Subsequently intubated and found to have Stenotrophomonas pneumonia, now on bactrim and Levaquin. Nephro consulted for hyponatremia.      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 context of pulmonary pathology              - Currently being treated for pneumonia, mechanically ventilated, history of severe COPD  - Patient started on lasix 40mg IV on 10/25 and urea was increased to 15mg BID  - Na 124 this AM, 122 this PM   - Patient's drips currently concentrated, heparin necessary to be mixed in D5W    Patient is more hypovolemic on exam today, decreased skin turgor and continued tachycardia.      RECOMMENDATIONS:  Management:   - Continue urea 15mg BID and lasix 40mg IV  - Continue fluid restriction  - If patient becomes hypotensive w/ diuresis, can bolus with normal saline  - If sodium continues to drop, can correct with hypertonic saline   - Continue frequent RFP/BMP checks to avoid rapid over correction of chronic hyponatremia  - No more correction than 6-8mEq in 24 hrs   -Keep MAP >65 or SBP >90, avoid nephrotoxic medications, radiocontrast if possible, follow medication trough levels as appropriate.  -Strict I/O monitoring, daily weights  - Will continue to follow      Patient seen and discussed w/ attending Dr. Gutierrez.         Urbano Portillo MD  Internal Medicine  PGY-1     Daytime / Weekend Renal Pager [phone]  After 7 pm Emergencies [phone] Pager [phone]

## 2023-10-27 ENCOUNTER — APPOINTMENT (OUTPATIENT)
Dept: CARDIOLOGY | Facility: HOSPITAL | Age: 68
End: 2023-10-27
Payer: COMMERCIAL

## 2023-10-27 ENCOUNTER — APPOINTMENT (OUTPATIENT)
Dept: RADIOLOGY | Facility: HOSPITAL | Age: 68
End: 2023-10-27
Payer: COMMERCIAL

## 2023-10-27 LAB
ALBUMIN SERPL BCP-MCNC: 2.3 G/DL (ref 3.4–5)
ALBUMIN SERPL BCP-MCNC: 2.9 G/DL (ref 3.4–5)
ALBUMIN SERPL BCP-MCNC: 3 G/DL (ref 3.4–5)
ALP SERPL-CCNC: 310 U/L (ref 33–136)
ALT SERPL W P-5'-P-CCNC: 127 U/L (ref 10–52)
AMYLASE SERPL-CCNC: 95 U/L (ref 29–103)
ANION GAP SERPL CALC-SCNC: 19 MMOL/L (ref 10–20)
ANION GAP SERPL CALC-SCNC: 21 MMOL/L (ref 10–20)
ANION GAP SERPL CALC-SCNC: 21 MMOL/L (ref 10–20)
AST SERPL W P-5'-P-CCNC: 218 U/L (ref 9–39)
ATRIAL RATE: 107 BPM
BASOPHILS # BLD AUTO: 0.13 X10*3/UL (ref 0–0.1)
BASOPHILS NFR BLD AUTO: 0.6 %
BILIRUB DIRECT SERPL-MCNC: 0.2 MG/DL (ref 0–0.3)
BILIRUB SERPL-MCNC: 0.4 MG/DL (ref 0–1.2)
BUN SERPL-MCNC: 63 MG/DL (ref 6–23)
BUN SERPL-MCNC: 66 MG/DL (ref 6–23)
BUN SERPL-MCNC: 78 MG/DL (ref 6–23)
CA-I BLD-SCNC: 1.06 MMOL/L (ref 1.1–1.33)
CA-I BLD-SCNC: 1.06 MMOL/L (ref 1.1–1.33)
CALCIUM SERPL-MCNC: 6.9 MG/DL (ref 8.6–10.6)
CALCIUM SERPL-MCNC: 9 MG/DL (ref 8.6–10.6)
CALCIUM SERPL-MCNC: 9.4 MG/DL (ref 8.6–10.6)
CHLORIDE SERPL-SCNC: 85 MMOL/L (ref 98–107)
CHLORIDE SERPL-SCNC: 86 MMOL/L (ref 98–107)
CHLORIDE SERPL-SCNC: 93 MMOL/L (ref 98–107)
CO2 SERPL-SCNC: 19 MMOL/L (ref 21–32)
CO2 SERPL-SCNC: 23 MMOL/L (ref 21–32)
CO2 SERPL-SCNC: 23 MMOL/L (ref 21–32)
CREAT SERPL-MCNC: 0.79 MG/DL (ref 0.5–1.3)
CREAT SERPL-MCNC: 0.83 MG/DL (ref 0.5–1.3)
CREAT SERPL-MCNC: 0.93 MG/DL (ref 0.5–1.3)
EOSINOPHIL # BLD AUTO: 0.12 X10*3/UL (ref 0–0.7)
EOSINOPHIL NFR BLD AUTO: 0.6 %
ERYTHROCYTE [DISTWIDTH] IN BLOOD BY AUTOMATED COUNT: 12.9 % (ref 11.5–14.5)
GFR SERPL CREATININE-BSD FRML MDRD: 90 ML/MIN/1.73M*2
GFR SERPL CREATININE-BSD FRML MDRD: >90 ML/MIN/1.73M*2
GFR SERPL CREATININE-BSD FRML MDRD: >90 ML/MIN/1.73M*2
GLUCOSE SERPL-MCNC: 103 MG/DL (ref 74–99)
GLUCOSE SERPL-MCNC: 121 MG/DL (ref 74–99)
GLUCOSE SERPL-MCNC: 152 MG/DL (ref 74–99)
HCT VFR BLD AUTO: 38.6 % (ref 41–52)
HGB BLD-MCNC: 13.4 G/DL (ref 13.5–17.5)
IMM GRANULOCYTES # BLD AUTO: 0.34 X10*3/UL (ref 0–0.7)
IMM GRANULOCYTES NFR BLD AUTO: 1.6 % (ref 0–0.9)
LIPASE SERPL-CCNC: 63 U/L (ref 9–82)
LYMPHOCYTES # BLD AUTO: 0.9 X10*3/UL (ref 1.2–4.8)
LYMPHOCYTES NFR BLD AUTO: 4.3 %
MAGNESIUM SERPL-MCNC: 2.35 MG/DL (ref 1.6–2.4)
MCH RBC QN AUTO: 29.1 PG (ref 26–34)
MCHC RBC AUTO-ENTMCNC: 34.7 G/DL (ref 32–36)
MCV RBC AUTO: 84 FL (ref 80–100)
MONOCYTES # BLD AUTO: 1.31 X10*3/UL (ref 0.1–1)
MONOCYTES NFR BLD AUTO: 6.3 %
NEUTROPHILS # BLD AUTO: 18.08 X10*3/UL (ref 1.2–7.7)
NEUTROPHILS NFR BLD AUTO: 86.6 %
NRBC BLD-RTO: 0 /100 WBCS (ref 0–0)
P AXIS: 78 DEGREES
P OFFSET: 188 MS
P ONSET: 148 MS
PHOSPHATE SERPL-MCNC: 6.4 MG/DL (ref 2.5–4.9)
PHOSPHATE SERPL-MCNC: 6.8 MG/DL (ref 2.5–4.9)
PLATELET # BLD AUTO: 309 X10*3/UL (ref 150–450)
PMV BLD AUTO: 10 FL (ref 7.5–11.5)
POTASSIUM SERPL-SCNC: 4.9 MMOL/L (ref 3.5–5.3)
POTASSIUM SERPL-SCNC: 4.9 MMOL/L (ref 3.5–5.3)
POTASSIUM SERPL-SCNC: 5 MMOL/L (ref 3.5–5.3)
PR INTERVAL: 138 MS
PROT SERPL-MCNC: 8 G/DL (ref 6.4–8.2)
Q ONSET: 217 MS
QRS COUNT: 18 BEATS
QRS DURATION: 90 MS
QT INTERVAL: 336 MS
QTC CALCULATION(BAZETT): 448 MS
QTC FREDERICIA: 407 MS
R AXIS: -68 DEGREES
RBC # BLD AUTO: 4.61 X10*6/UL (ref 4.5–5.9)
SODIUM SERPL-SCNC: 123 MMOL/L (ref 136–145)
SODIUM SERPL-SCNC: 124 MMOL/L (ref 136–145)
SODIUM SERPL-SCNC: 128 MMOL/L (ref 136–145)
T AXIS: 88 DEGREES
T OFFSET: 385 MS
UFH PPP CHRO-ACNC: 0.4 IU/ML
VENTRICULAR RATE: 107 BPM
WBC # BLD AUTO: 20.9 X10*3/UL (ref 4.4–11.3)

## 2023-10-27 PROCEDURE — 84075 ASSAY ALKALINE PHOSPHATASE: CPT

## 2023-10-27 PROCEDURE — 99291 CRITICAL CARE FIRST HOUR: CPT

## 2023-10-27 PROCEDURE — 2500000002 HC RX 250 W HCPCS SELF ADMINISTERED DRUGS (ALT 637 FOR MEDICARE OP, ALT 636 FOR OP/ED): Performed by: INTERNAL MEDICINE

## 2023-10-27 PROCEDURE — 94667 MNPJ CHEST WALL 1ST: CPT

## 2023-10-27 PROCEDURE — 83735 ASSAY OF MAGNESIUM: CPT

## 2023-10-27 PROCEDURE — 85520 HEPARIN ASSAY: CPT

## 2023-10-27 PROCEDURE — 5A09357 ASSISTANCE WITH RESPIRATORY VENTILATION, LESS THAN 24 CONSECUTIVE HOURS, CONTINUOUS POSITIVE AIRWAY PRESSURE: ICD-10-PCS | Performed by: STUDENT IN AN ORGANIZED HEALTH CARE EDUCATION/TRAINING PROGRAM

## 2023-10-27 PROCEDURE — 71045 X-RAY EXAM CHEST 1 VIEW: CPT | Performed by: RADIOLOGY

## 2023-10-27 PROCEDURE — 2500000005 HC RX 250 GENERAL PHARMACY W/O HCPCS

## 2023-10-27 PROCEDURE — 80048 BASIC METABOLIC PNL TOTAL CA: CPT

## 2023-10-27 PROCEDURE — 93005 ELECTROCARDIOGRAM TRACING: CPT

## 2023-10-27 PROCEDURE — 2500000005 HC RX 250 GENERAL PHARMACY W/O HCPCS: Performed by: STUDENT IN AN ORGANIZED HEALTH CARE EDUCATION/TRAINING PROGRAM

## 2023-10-27 PROCEDURE — 2500000002 HC RX 250 W HCPCS SELF ADMINISTERED DRUGS (ALT 637 FOR MEDICARE OP, ALT 636 FOR OP/ED): Performed by: STUDENT IN AN ORGANIZED HEALTH CARE EDUCATION/TRAINING PROGRAM

## 2023-10-27 PROCEDURE — 71045 X-RAY EXAM CHEST 1 VIEW: CPT

## 2023-10-27 PROCEDURE — 94668 MNPJ CHEST WALL SBSQ: CPT

## 2023-10-27 PROCEDURE — 97530 THERAPEUTIC ACTIVITIES: CPT | Mod: GP

## 2023-10-27 PROCEDURE — 97112 NEUROMUSCULAR REEDUCATION: CPT | Mod: GP

## 2023-10-27 PROCEDURE — 2500000001 HC RX 250 WO HCPCS SELF ADMINISTERED DRUGS (ALT 637 FOR MEDICARE OP)

## 2023-10-27 PROCEDURE — 2500000004 HC RX 250 GENERAL PHARMACY W/ HCPCS (ALT 636 FOR OP/ED)

## 2023-10-27 PROCEDURE — 2500000001 HC RX 250 WO HCPCS SELF ADMINISTERED DRUGS (ALT 637 FOR MEDICARE OP): Performed by: STUDENT IN AN ORGANIZED HEALTH CARE EDUCATION/TRAINING PROGRAM

## 2023-10-27 PROCEDURE — 80069 RENAL FUNCTION PANEL: CPT | Mod: CCI

## 2023-10-27 PROCEDURE — 2020000001 HC ICU ROOM DAILY

## 2023-10-27 PROCEDURE — 82330 ASSAY OF CALCIUM: CPT

## 2023-10-27 PROCEDURE — 99233 SBSQ HOSP IP/OBS HIGH 50: CPT | Performed by: INTERNAL MEDICINE

## 2023-10-27 PROCEDURE — 31720 CLEARANCE OF AIRWAYS: CPT

## 2023-10-27 PROCEDURE — S4991 NICOTINE PATCH NONLEGEND: HCPCS | Performed by: STUDENT IN AN ORGANIZED HEALTH CARE EDUCATION/TRAINING PROGRAM

## 2023-10-27 PROCEDURE — 94660 CPAP INITIATION&MGMT: CPT

## 2023-10-27 PROCEDURE — 94003 VENT MGMT INPAT SUBQ DAY: CPT

## 2023-10-27 PROCEDURE — 36415 COLL VENOUS BLD VENIPUNCTURE: CPT

## 2023-10-27 PROCEDURE — 80053 COMPREHEN METABOLIC PANEL: CPT

## 2023-10-27 PROCEDURE — 85025 COMPLETE CBC W/AUTO DIFF WBC: CPT

## 2023-10-27 PROCEDURE — 94640 AIRWAY INHALATION TREATMENT: CPT

## 2023-10-27 RX ORDER — ACETAMINOPHEN 325 MG/1
650 TABLET ORAL EVERY 8 HOURS PRN
Status: DISCONTINUED | OUTPATIENT
Start: 2023-10-27 | End: 2023-11-30 | Stop reason: HOSPADM

## 2023-10-27 RX ORDER — SODIUM CHLORIDE FOR INHALATION 3 %
3 VIAL, NEBULIZER (ML) INHALATION EVERY 6 HOURS SCHEDULED
Status: DISCONTINUED | OUTPATIENT
Start: 2023-10-27 | End: 2023-10-31

## 2023-10-27 RX ORDER — METOPROLOL TARTRATE 1 MG/ML
5 INJECTION, SOLUTION INTRAVENOUS ONCE
Status: COMPLETED | OUTPATIENT
Start: 2023-10-27 | End: 2023-10-27

## 2023-10-27 RX ORDER — FUROSEMIDE 10 MG/ML
40 INJECTION INTRAMUSCULAR; INTRAVENOUS ONCE
Status: DISCONTINUED | OUTPATIENT
Start: 2023-10-27 | End: 2023-10-27

## 2023-10-27 RX ORDER — LIDOCAINE 560 MG/1
1 PATCH PERCUTANEOUS; TOPICAL; TRANSDERMAL DAILY
Status: DISCONTINUED | OUTPATIENT
Start: 2023-10-27 | End: 2023-11-24

## 2023-10-27 RX ORDER — OXYCODONE HYDROCHLORIDE 5 MG/1
5 TABLET ORAL EVERY 8 HOURS PRN
Status: DISCONTINUED | OUTPATIENT
Start: 2023-10-27 | End: 2023-11-01

## 2023-10-27 RX ADMIN — THIAMINE HCL TAB 100 MG 100 MG: 100 TAB at 08:15

## 2023-10-27 RX ADMIN — SULFAMETHOXAZOLE AND TRIMETHOPRIM 320 MG OF TRIMETHOPRIM: 200; 40 SUSPENSION ORAL at 08:16

## 2023-10-27 RX ADMIN — PROPOFOL 15 MCG/KG/MIN: 10 INJECTION, EMULSION INTRAVENOUS at 07:55

## 2023-10-27 RX ADMIN — ALBUTEROL SULFATE 2.5 MG: 2.5 SOLUTION RESPIRATORY (INHALATION) at 07:29

## 2023-10-27 RX ADMIN — ESOMEPRAZOLE MAGNESIUM 40 MG: 40 FOR SUSPENSION ORAL at 08:15

## 2023-10-27 RX ADMIN — OXYCODONE HYDROCHLORIDE 5 MG: 5 TABLET ORAL at 16:54

## 2023-10-27 RX ADMIN — ACETAMINOPHEN 650 MG: 325 TABLET ORAL at 19:51

## 2023-10-27 RX ADMIN — METOPROLOL TARTRATE 5 MG: 1 INJECTION, SOLUTION INTRAVENOUS at 23:07

## 2023-10-27 RX ADMIN — SODIUM CHLORIDE 30 MG/ML INHALATION SOLUTION 15 ML: 30 SOLUTION INHALANT at 20:30

## 2023-10-27 RX ADMIN — ATORVASTATIN CALCIUM 40 MG: 40 TABLET ORAL at 08:15

## 2023-10-27 RX ADMIN — ALBUTEROL SULFATE 2.5 MG: 2.5 SOLUTION RESPIRATORY (INHALATION) at 15:24

## 2023-10-27 RX ADMIN — LIDOCAINE 1 PATCH: 4 PATCH TOPICAL at 13:24

## 2023-10-27 RX ADMIN — Medication 15 G: at 08:15

## 2023-10-27 RX ADMIN — SULFAMETHOXAZOLE AND TRIMETHOPRIM 320 MG OF TRIMETHOPRIM: 200; 40 SUSPENSION ORAL at 01:28

## 2023-10-27 RX ADMIN — ALBUTEROL SULFATE 2.5 MG: 2.5 SOLUTION RESPIRATORY (INHALATION) at 20:31

## 2023-10-27 RX ADMIN — SULFAMETHOXAZOLE AND TRIMETHOPRIM 320 MG OF TRIMETHOPRIM: 200; 40 SUSPENSION ORAL at 16:16

## 2023-10-27 RX ADMIN — Medication 30 G: at 19:51

## 2023-10-27 RX ADMIN — Medication 1 TABLET: at 08:16

## 2023-10-27 RX ADMIN — Medication 1 PATCH: at 08:16

## 2023-10-27 RX ADMIN — Medication 8000 UNITS: at 08:15

## 2023-10-27 RX ADMIN — ACETAMINOPHEN 975 MG: 325 TABLET ORAL at 14:56

## 2023-10-27 RX ADMIN — HEPARIN SODIUM 1300 UNITS/HR: 10000 INJECTION, SOLUTION INTRAVENOUS at 21:41

## 2023-10-27 RX ADMIN — HEPARIN SODIUM 1300 UNITS/HR: 10000 INJECTION, SOLUTION INTRAVENOUS at 04:41

## 2023-10-27 RX ADMIN — POLYETHYLENE GLYCOL 3350 17 G: 17 POWDER, FOR SOLUTION ORAL at 08:16

## 2023-10-27 RX ADMIN — FOLIC ACID 1 MG: 1 TABLET ORAL at 08:15

## 2023-10-27 RX ADMIN — FUROSEMIDE 40 MG: 10 INJECTION, SOLUTION INTRAVENOUS at 08:15

## 2023-10-27 ASSESSMENT — COGNITIVE AND FUNCTIONAL STATUS - GENERAL
CLIMB 3 TO 5 STEPS WITH RAILING: TOTAL
STANDING UP FROM CHAIR USING ARMS: TOTAL
MOBILITY SCORE: 6
MOVING FROM LYING ON BACK TO SITTING ON SIDE OF FLAT BED WITH BEDRAILS: TOTAL
WALKING IN HOSPITAL ROOM: TOTAL
TURNING FROM BACK TO SIDE WHILE IN FLAT BAD: TOTAL
MOVING TO AND FROM BED TO CHAIR: TOTAL

## 2023-10-27 ASSESSMENT — PAIN - FUNCTIONAL ASSESSMENT
PAIN_FUNCTIONAL_ASSESSMENT: 0-10
PAIN_FUNCTIONAL_ASSESSMENT: CPOT (CRITICAL CARE PAIN OBSERVATION TOOL)
PAIN_FUNCTIONAL_ASSESSMENT: 0-10

## 2023-10-27 ASSESSMENT — PAIN SCALES - GENERAL
PAINLEVEL_OUTOF10: 8
PAINLEVEL_OUTOF10: 8
PAINLEVEL_OUTOF10: 0 - NO PAIN
PAINLEVEL_OUTOF10: 5 - MODERATE PAIN
PAINLEVEL_OUTOF10: 6
PAINLEVEL_OUTOF10: 6

## 2023-10-27 ASSESSMENT — PAIN DESCRIPTION - DESCRIPTORS
DESCRIPTORS: ACHING

## 2023-10-27 NOTE — PROGRESS NOTES
Physical Therapy    Physical Therapy Treatment    Patient Name: Molina Godinez  MRN: [id number]  Today's Date: 10/27/2023  Time Calculation  Start Time: 1405  Stop Time: 1428  Time Calculation (min): 23 min       Assessment/Plan   PT Assessment  PT Assessment Results: Decreased strength, Impaired balance, Decreased mobility  Rehab Prognosis: Good  End of Session Communication: Bedside nurse  End of Session Patient Position: Bed, 3 rail up, Alarm off, not on at start of session     PT Plan  Treatment/Interventions: Bed mobility, Transfer training, Gait training, Balance training, Neuromuscular re-education, Strengthening, Therapeutic exercise, Therapeutic activity, Positioning, Postural re-education  PT Plan: Skilled PT  PT Frequency: 4 times per week  PT Discharge Recommendations: Moderate intensity level of continued care  PT - OK to Discharge: Yes    General Visit Information:   PT  Visit  PT Received On: 10/27/23  General  Reason for Referral: patient now extubated, on nasal cannula  Family/Caregiver Present: Yes  Caregiver Feedback: sister initially in the room, left during session  Prior to Session Communication: Bedside nurse  Patient Position Received: Bed, 3 rail up, Alarm off, not on at start of session  Preferred Learning Style: verbal, visual  General Comment: limited verbal response, soft voice, frequent oral secretions, needing oral suctioning; global weakness, poor trunk control; favors excessive cervical flexion, unable to extend c-spine to neutral.    Subjective   Precautions:  Precautions  Medical Precautions: Oxygen therapy device and L/min, Fall precautions  Vital Signs:  Vital Signs  Heart Rate:  (pre: 125 post: 128)  Resp:  (pre: 33 post: 29)  SpO2:  (upon PT entering room, patient saturating 84% on 6L; increased to low 90s with increase time; post: 89% on 6L, RN in room and aware.)  BP:  (pre: 151/93 post: 135/85)    Objective   Pain:  Pain Assessment  Pain Assessment: 0-10  Pain Score: 0 - No  pain  Pain Type:  (stiffness reported)  Pain Location: Generalized  Pain Interventions: Repositioned  Cognition:  Cognition  Overall Cognitive Status: Impaired (reduced alertness)  Orientation Level:  (AxO x3; CAM ICU (-))  Postural Control:  Postural Control  Postural Control: Impaired  Head Control: forward flexed posture, unable to extend c-spine to look upright/forward  Trunk Control: poor trunk control; MAX-DEPx1 sitting EOB, multidirectional acute LOB, decrease ability to use BUEs to support self in midline.  Righting Reactions: unable to self correct posture, MAX-DEPx1 assist.  Protective Responses: limited ability to use BUEs to support self  Extremity/Trunk Assessments:  Demo's global weakness, especially trunk/head control; also demo's L>R sided weakness. PT informed RN of this.   Treatments:        Balance/Neuromuscular Re-Education  Balance/Neuromuscular Re-Education Activity Performed: Yes  Balance/Neuromuscular Re-Education Activity 1: patient sat EOB x12 minutes with MAX-DEPx1 assist, with multi-directional acute LOB; with cueing and assist, able to lean anteriorly to reduce retrolean, however, patient would then have an anterior LOB, unable to self-correct without assist. Reaching with RUE into RLQ, unable to reach target d/t RUE weakness. Attempted LAQ B, L>RLE weakness.    Bed Mobility  Bed Mobility: Yes  Bed Mobility 1  Bed Mobility 1: Supine to sitting, Sitting to supine  Level of Assistance 1: Dependent, Minimal verbal cues, Minimal tactile cues  Bed Mobility Comments 1: x1 with HOB elevated, draw sheet    Outcome Measures:  [facility] Basic Mobility  Turning from your back to your side while in a flat bed without using bedrails: Total  Moving from lying on your back to sitting on the side of a flat bed without using bedrails: Total  Moving to and from bed to chair (including a wheelchair): Total  Standing up from a chair using your arms (e.g. wheelchair or bedside chair): Total  To walk in hospital  room: Total  Climbing 3-5 steps with railing: Total  Basic Mobility - Total Score: 6    Confusion Assessment Method-ICU (CAM-ICU)  Feature 1: Acute Onset or Fluctuating Course: Negative  Feature 2: Inattention: Negative  Overall CAM-ICU: Negative    FSS-ICU  Ambulation: Unable to attempt due to weakness  Rolling: Total assistance (performs 25% or requires another person)  Sitting: Total assistance (performs 25% or requires another person)  Transfer Sit-to-Stand: Unable to perform  Transfer Supine-to-Sit: Total assistance (performs 25% or requires another person)  Total Score: 3      E = Exercise and Early Mobility  Current Activity: Sitting at edge of bed    Education Documentation  Mobility Training, taught by Tameka Velasquez PT at 10/27/2023  3:40 PM.  Learner: Patient  Readiness: Acceptance  Method: Explanation  Response: Needs Reinforcement    Education Comments  No comments found.           Encounter Problems       Encounter Problems (Active)       Balance       Patient will complete static (Cgx1) and dynamic (MINx1) using BUE support as needed in order to maintain midline without acute LOB   (Progressing)       Start:  10/19/23    Expected End:  11/09/23               Mobility       STG - Patient will ambulate >/=10 ft using LRD as needed with MODx1 without acute LOB  (Not Progressing)       Start:  10/19/23    Expected End:  11/09/23            patient will complete BLE there-ex in order to improve strength and to assist with the completion of functional mobility tasks.  (Progressing)       Start:  10/19/23    Expected End:  11/09/23               Pain - Adult          Transfers       STG - Transfer from bed to chair with MODx1 without acute LOB  (Not Progressing)       Start:  10/19/23    Expected End:  11/09/23            STG - Patient will perform bed mobility with MINx1 assist  (Progressing)       Start:  10/19/23    Expected End:  11/09/23            STG - Patient will transfer sit to and from stand with  MODx1 using LRD as needed without acute LOB  (Not Progressing)       Start:  10/19/23    Expected End:  11/09/23

## 2023-10-27 NOTE — PROGRESS NOTES
"Molina Godinez is a 68 y.o. male on day 15 of admission presenting with Hospital-acquired pneumonia.    Subjective   NAEO. Patient currently on CPAP to evaluate breathing parameters for possible extubation.        Objective     Physical Exam  Constitutional:       Comments: Sedated, intubated; still able to answer questions; underweight   HENT:      Head: Normocephalic and atraumatic.      Mouth/Throat:      Mouth: Mucous membranes are moist.      Pharynx: No oropharyngeal exudate.   Eyes:      General: No scleral icterus.     Conjunctiva/sclera: Conjunctivae normal.      Pupils: Pupils are equal, round, and reactive to light.   Cardiovascular:      Rate and Rhythm: Regular rhythm. Tachycardia present.      Pulses: Normal pulses.      Heart sounds: No murmur heard.     No friction rub.   Pulmonary:      Comments: Intubated, mechanical breath sounds; rhonchi on R improved over previous, decreased breath sounds on L   Abdominal:      General: Abdomen is flat. Bowel sounds are normal. There is no distension.      Palpations: Abdomen is soft.      Tenderness: There is no abdominal tenderness.   Musculoskeletal:         General: No swelling or tenderness.      Cervical back: Normal range of motion.   Skin:     General: Skin is warm and dry.      Coloration: Skin is not jaundiced.         Last Recorded Vitals  Blood pressure 147/88, pulse (!) 125, temperature 36.8 °C (98.2 °F), temperature source Temporal, resp. rate (!) 27, height 1.702 m (5' 7.01\"), weight 49.7 kg (109 lb 9.1 oz), SpO2 93 %.  Intake/Output last 3 Shifts:  I/O last 3 completed shifts:  In: 1612.3 (32.4 mL/kg) [I.V.:782.3 (15.7 mL/kg); NG/GT:830]  Out: 2500 (50.3 mL/kg) [Urine:2500 (1.4 mL/kg/hr)]  Weight: 49.7 kg     Relevant Results  Scheduled medications  albuterol, 2.5 mg, nebulization, TID  atorvastatin, 40 mg, oral, Daily  calcium gluconate, 2 g, intravenous, Once  ergocalciferol, 8,000 Units, oral, Daily  esomeprazole, 40 mg, nasoduodenal tube, " Daily before breakfast  folic acid, 1 mg, oral, Daily  furosemide, 40 mg, intravenous, Daily  lidocaine, 1 patch, transdermal, Daily  multivitamin with minerals, 1 tablet, oral, Daily  nicotine, 1 patch, transdermal, Daily  polyethylene glycol, 17 g, oral, Daily  sulfamethoxazole-trimethoprim, 320 mg of trimethoprim, nasogastric tube, q8h  thiamine, 100 mg, oral, Daily  urea, 30 g, oral, BID      Continuous medications  fentaNYL, 0-300 mcg/hr, Last Rate: Stopped (10/27/23 0905)  heparin, 0-4,500 Units/hr, Last Rate: 1,300 Units/hr (10/27/23 1200)  propofol, 5-50 mcg/kg/min, Last Rate: Stopped (10/27/23 0905)      PRN medications  PRN medications: acetaminophen, dextrose 10 % in water (D10W), dextrose, fentaNYL, fentaNYL, glucagon, heparin, oxygen, sennosides, white petrolatum  Results for orders placed or performed during the hospital encounter of 10/11/23 (from the past 24 hour(s))   Magnesium   Result Value Ref Range    Magnesium 2.35 1.60 - 2.40 mg/dL   Basic Metabolic Panel   Result Value Ref Range    Glucose 121 (H) 74 - 99 mg/dL    Sodium 123 (L) 136 - 145 mmol/L    Potassium 4.9 3.5 - 5.3 mmol/L    Chloride 86 (L) 98 - 107 mmol/L    Bicarbonate 23 21 - 32 mmol/L    Anion Gap 19 10 - 20 mmol/L    Urea Nitrogen 63 (H) 6 - 23 mg/dL    Creatinine 0.83 0.50 - 1.30 mg/dL    eGFR >90 >60 mL/min/1.73m*2    Calcium 9.4 8.6 - 10.6 mg/dL   CBC and Auto Differential   Result Value Ref Range    WBC 20.9 (H) 4.4 - 11.3 x10*3/uL    nRBC 0.0 0.0 - 0.0 /100 WBCs    RBC 4.61 4.50 - 5.90 x10*6/uL    Hemoglobin 13.4 (L) 13.5 - 17.5 g/dL    Hematocrit 38.6 (L) 41.0 - 52.0 %    MCV 84 80 - 100 fL    MCH 29.1 26.0 - 34.0 pg    MCHC 34.7 32.0 - 36.0 g/dL    RDW 12.9 11.5 - 14.5 %    Platelets 309 150 - 450 x10*3/uL    MPV 10.0 7.5 - 11.5 fL    Neutrophils % 86.6 40.0 - 80.0 %    Immature Granulocytes %, Automated 1.6 (H) 0.0 - 0.9 %    Lymphocytes % 4.3 13.0 - 44.0 %    Monocytes % 6.3 2.0 - 10.0 %    Eosinophils % 0.6 0.0 - 6.0 %     Basophils % 0.6 0.0 - 2.0 %    Neutrophils Absolute 18.08 (H) 1.20 - 7.70 x10*3/uL    Immature Granulocytes Absolute, Automated 0.34 0.00 - 0.70 x10*3/uL    Lymphocytes Absolute 0.90 (L) 1.20 - 4.80 x10*3/uL    Monocytes Absolute 1.31 (H) 0.10 - 1.00 x10*3/uL    Eosinophils Absolute 0.12 0.00 - 0.70 x10*3/uL    Basophils Absolute 0.13 (H) 0.00 - 0.10 x10*3/uL   Hepatic function panel   Result Value Ref Range    Albumin 3.0 (L) 3.4 - 5.0 g/dL    Bilirubin, Total 0.4 0.0 - 1.2 mg/dL    Bilirubin, Direct 0.2 0.0 - 0.3 mg/dL    Alkaline Phosphatase 310 (H) 33 - 136 U/L     (H) 10 - 52 U/L     (H) 9 - 39 U/L    Total Protein 8.0 6.4 - 8.2 g/dL   Calcium, ionized   Result Value Ref Range    POCT Calcium, Ionized 1.06 (L) 1.1 - 1.33 mmol/L   Heparin Assay, UFH   Result Value Ref Range    Heparin Unfractionated 0.4 See Comment Below for Therapeutic Ranges IU/mL   ECG 12 lead   Result Value Ref Range    Ventricular Rate 107 BPM    Atrial Rate 107 BPM    KS Interval 138 ms    QRS Duration 90 ms    QT Interval 336 ms    QTC Calculation(Bazett) 448 ms    P Axis 78 degrees    R Axis -68 degrees    T Axis 88 degrees    QRS Count 18 beats    Q Onset 217 ms    P Onset 148 ms    P Offset 188 ms    T Offset 385 ms    QTC Fredericia 407 ms   Renal function panel   Result Value Ref Range    Glucose 103 (H) 74 - 99 mg/dL    Sodium 124 (L) 136 - 145 mmol/L    Potassium 4.9 3.5 - 5.3 mmol/L    Chloride 85 (L) 98 - 107 mmol/L    Bicarbonate 23 21 - 32 mmol/L    Anion Gap 21 (H) 10 - 20 mmol/L    Urea Nitrogen 78 (H) 6 - 23 mg/dL    Creatinine 0.93 0.50 - 1.30 mg/dL    eGFR 90 >60 mL/min/1.73m*2    Calcium 9.0 8.6 - 10.6 mg/dL    Phosphorus 6.8 (H) 2.5 - 4.9 mg/dL    Albumin 2.9 (L) 3.4 - 5.0 g/dL   Calcium, ionized   Result Value Ref Range    POCT Calcium, Ionized 1.06 (L) 1.1 - 1.33 mmol/L            Assessment/Plan   Principal Problem:    Hospital-acquired pneumonia  Active Problems:    Essential hypertension, benign     Hepatitis C virus infection cured after antiviral drug therapy    Alcoholism (CMS/HCC)    Chronic pulmonary embolism (CMS/HCC)    COPD (chronic obstructive pulmonary disease) (CMS/HCC)    HLD (hyperlipidemia)    Molina Godinez is a 68 y.o. male with PMH of HTN, COPD, EtOH use disorder, PE on eliquis, HCV s/p treatment w/out viral DNA in blood, presented on 10/12 to ED w/ complaint of LE weakness. Patient w/ complicated hospital course, originally in MICU for suspected EtOH withdrawal, then transferred to floor. Patient had hypoxia, transferred back to MICU. Subsequently intubated and found to have Stenotrophomonas pneumonia, now on bactrim and Levaquin. Nephro consulted for hyponatremia.      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 context of pulmonary pathology              - Patient did show some improvement with addition of urea + lasix, 120 to 124, but has stabilized now w/ sodium levels 123-124  - Patient started on lasix 40mg IV on 10/25   - Patient's drips currently concentrated, heparin necessary to be mixed in D5W     RECOMMENDATIONS:  Management:   - Increase urea to 45g daily total   - Continue fluid restriction  - If patient becomes hypotensive w/ diuresis, can bolus with normal saline  - If sodium continues to drop <120 and patient is symptomatic, can correct with hypertonic saline   - Continue frequent RFP/BMP checks to avoid rapid over correction of chronic hyponatremia  - No more correction than 6-8mEq in 24 hrs   -Keep MAP >65  or SBP >90, avoid nephrotoxic medications, radiocontrast if possible, follow medication trough levels as appropriate.  -Strict I/O monitoring, daily weights  - Will continue to follow      Patient seen and discussed w/ attending Dr. Gutierrez.         Urbano Portillo MD  Internal Medicine PGY-1     Daytime / Weekend Renal Pager [phone]  After 7 pm Emergencies [phone] Pager [phone]

## 2023-10-27 NOTE — PROGRESS NOTES
Critical Care Daily Progress        Subjective   Patient is a 68 y.o. male admitted on 10/11/2023  7:54 PM with the following indication(s) for ICU care hyponatremia, altered mental status with multifactorial etiology.     Interval History: NAEON. Extubated today to [place], currently on 6L NC and feeling well. Abdominal pain has resolved, having intermittent back pain worse when he is sitting up.       Scheduled Medications:   albuterol, 2.5 mg, nebulization, TID  atorvastatin, 40 mg, oral, Daily  calcium gluconate, 2 g, intravenous, Once  ergocalciferol, 8,000 Units, oral, Daily  esomeprazole, 40 mg, nasoduodenal tube, Daily before breakfast  folic acid, 1 mg, oral, Daily  furosemide, 40 mg, intravenous, Daily  furosemide, 40 mg, intravenous, Once  multivitamin with minerals, 1 tablet, oral, Daily  nicotine, 1 patch, transdermal, Daily  polyethylene glycol, 17 g, oral, Daily  sulfamethoxazole-trimethoprim, 320 mg of trimethoprim, nasogastric tube, q8h  thiamine, 100 mg, oral, Daily  urea, 15 g, oral, BID           Continuous Medications:   fentaNYL, 0-300 mcg/hr, Last Rate: 50 mcg/hr (10/26/23 2306)  heparin, 0-4,500 Units/hr, Last Rate: 1,300 Units/hr (10/27/23 0441)  propofol, 5-50 mcg/kg/min, Last Rate: 5 mcg/kg/min (10/26/23 2306)          PRN Medications:   PRN medications: acetaminophen, dextrose 10 % in water (D10W), dextrose, fentaNYL, fentaNYL, glucagon, heparin, oxygen, oxygen, sennosides, white petrolatum    Objective   Vitals:  Most Recent:  Visit Vitals  /73   Pulse 108   Temp 36.2 °C (97.2 °F) (Temporal)   Resp 13        24hr Min/Max:  Temp  Min: 36.2 °C (97.2 °F)  Max: 37.5 °C (99.5 °F)  Pulse  Min: 101  Max: 126  BP  Min: 108/75  Max: 137/86  Resp  Min: 12  Max: 23  SpO2  Min: 93 %  Max: 98 %        GEN: In NAD  CV: Tachycardic, no m/r/g  PULM: Coarse breath sounds b/l, rhonchi LLL, improved  ABD: Soft, tender to palpation b/l flank, improved  NEURO: A&Ox4  MSK: no joint swelling grossly  noted  EXT: no LE edema  SKIN: warm and dry, no lesions grossly noted        Intake/Output Summary (Last 24 hours) at 10/27/2023 0821  Last data filed at 10/27/2023 0800  Gross per 24 hour   Intake 1500.47 ml   Output 2000 ml   Net -499.53 ml    BM yesterday    LDA:    PIV x2 in R arm, Dobhoff,       LABS AND IMAGING     Results from last 7 days   Lab Units 10/27/23  0446 10/26/23  1225 10/26/23  0447 10/25/23  1243 10/25/23  0432   SODIUM mmol/L 123*   < > 124*   < > 121*   POTASSIUM mmol/L 4.9   < > 4.8   < > 5.1   CHLORIDE mmol/L 86*   < > 86*   < > 86*   CO2 mmol/L 23   < > 23   < > 22   BUN mg/dL 63*   < > 34*   < > 21   CREATININE mg/dL 0.83   < > 0.72   < > 0.62   WBC AUTO x10*3/uL 20.9*  --  18.0*  --  24.9*   HEMOGLOBIN g/dL 13.4*  --  12.7*  --  13.1*   PLATELETS AUTO x10*3/uL 309  --  288  --  292    < > = values in this interval not displayed.     --> 310  ALT 64--> 127  AST 87--> 218  Tbili 0.4        RUQ US with biliary sludge, no wall thickening, duct dilatation    CXR 10/26:  1. Persistent left basilar pleural effusion and  atelectasis/infiltrate with slight improvement in the aeration of the  left lung. Correlate with left basilar/left lower lobe  atelectasis/collapse and mucous plugging.  2. Residual patchy airspace opacity in the left mid and upper lung  which may be due to edema or infiltrate.       Assessment/Plan         Molina Godinez is a 68 year old male with PMHx of COPD, HTN, AUD and opioid use disorder, HCV, chronic PE who was seen in MICU 10/13-10/16 for inability to walk 2/2 suspected alcohol use disorder. ICU course complicated by hypertension, altered mental status, and hyponatremia. Transferred back to MICU 10/17 after he was found lethargic and hypoxic. Now intubated and sedated, bronchial washings growing stenotrophomonas.    Updates to plan:  Extubated today, resuming diet on trickle feeds and titrate as tolerated  Increase Urea to 30mg BID  Amylase, Lipase, monitor  LFTs      NEURO/PSYCH  #AMS- fluctuating, unclear etiology as previously suspected alcohol withdrawal but could be 2/2 chronic hypoxia and retention vs hyponatremia or other etiology. Now intubated and sedated  -CT head without pathology  - Initially had visual and tactile hallucinations that have since resolved. Suspect more metabolic etiology of fluctuations in mental status, I/s/o acutely increased oxygen requirement.  - Largely resolved       #Bilateral LE weakness- Neuro consulted earlier in admission  -Likely 2/2 chronic, had weakness during 8/2023 admission; less likely GBS  -CT head without pathology, MRI spine stopped early due to pain  -Patient able to move bilateral lower extremities, low c/f GBS  PLAN:  -May follow up with neurology outpatient  -ID consulted regarding weakness and positive Syphilis Ab. Pt was treated back in 1998 but rec MRI spine given concern of abscess, MRI negative for acute process      CV  #HTN- resolved  - home amlodipine 5mg, atenolol 50mg  - Initially started on Cardene drip, transitioned to Labetalol prior to resolution  [] Home meds held     #Tachycardia  - Sinus rhythm  - Etiology includes pain, infection, hypovolemia  - Poor correlatoin with fentanyl pushes, less suspicious for pain etiology     #CAD  -home atorvastatin 40mg (home med)       PULM  #CAP- initially resolved  -Urine strep positive, unsure if active or chronic infection  -MRSA negative, Urine legionella negative  - CTX 5 day course completed 10/17    #L lung consolidation, c/f HAP  Initially treated for CAP given positive urine strep with CTX 10/13-10/17, one day of Zosyn10/17-10/17  Leukocytosis stable ~24  C/f HAP vs incomplete resolution of previous PNA  - S/p Bronch 10/20 with purulent fluid with bronchial washings sent for cx  -Bronchial washings with stenotrophomonas susceptible to Bactrim  [] Bactrim 10/22-10/28      #Suspected PAH  - Echo with atrial septal aneurysm, positive bubble study  [] Will need  outpatient workup, previously some suspicion for PAH but will need further evaluation    #Severe COPD  #Mucus plugging  -8/2023 FEV1 42%  - CT C/A/P with RLL mucus plug  - CXR today with L sided pleural effusion vs mucus plugging  PLAN:  [] Continue aggressive RT: Albuterol q6h, chest PT, respiratory hygiene     #Subsegmental PE  -Requires anticoagulation for 3 months (per vasc med 8/2023), on home apixaban 5mg BID  PLAN:  -Heparin gtt    GI  #HepC  -treated, 8/2023 HCV RNA undetected     #Nutrition  - Dobhoff in place 10/17  - Nutrition following, appreciate recs  PLAN:  - Starting on TF with TwoCal HN goal rate of 30ml/hr  - 100mg Thiamine, 1mg Folic acid, 8000u Vit D2    #Elevated transaminases, hx of treated HCV  - LFTs rising over past few days, predominantly ALP/AST?A:T with normal bili  - RUQ US with steatosis, sludge in GB  - Sulfa drug induced vs intrinsic process, though Bactrim typically takes 2 weeks for hepatotoxicity  [] Trend LFTs     /RENAL  #Hyponatremia - Likely SIADH 2/2 COPD exacerbation but unclear if additional etiologies- stable 120-125  - Na as low as 120, slowly improving, stable 123-124  - Serum Osm 246, Urine Osm 353, Na 92  - AM cortisol wnl  [] Free water flushes 100ml BID for further fluid restriction given hyponatremia thought to be 2/2 SIADH  [] Urea 30g BID  [] Lasix 40mg daily  [] Nephro following, appreciate recs      #C/f refeeding syndrome- resolved  - Hypokalemia 2.7, Phos 1.9, Ca 5.2  - Electrolytes repleted, will monitor    #Alkalosis  - Likely 2/2 tachypnea     ENDOCRINE  #Secondary HyperPTH  -8/28 .7  -25-hydroxy vit D 8, 1,25 Vit D 44.4  PLAN:  Vit D2 8000u liquid daily     ID  #Leukocytosis I/s/o recently treated strep pneumo CAP- Improving  - Completed CTX 10/13-10/17  - Zosyn 10/20-10/24, Bactrim 10/22-, Levaquin 10/23-10/25  -Bronchial washings with stenotrophomonas susceptible to Bactrim  [] Bactrim 10/22-10/28    HEME/ONC  #Leukocytosis, intermittently  ranging 18-24  -Likely 2/2 to PNA  [] Monitor WBC, vitals       MSK/RHEUM   #Chronic LE weakness  -Likely chronic from disuse and alcohol, unlikely GBS since no paralysis  -Consider neurology outpatient referral for possible EMG, neurology signed off     Vent/Oxy: NC  Pressors/Drips: Heparin gtt  Abx: Bactrim  F: Restricted  E: replete PRN  N: TF via Dobhoff   A: PIV R arm x2, Dobhoff  DVT ppx: heparin gtt  GI ppx: Pantoprazole     Code status: Full Code (confirmed)  NOK: Tonie (sister) [phone]; Nahomi (God-sister) [phone]; Sarah Herbert (sister) [phone]       Ever Paez MD  Internal Medicine, PGY-1

## 2023-10-27 NOTE — CARE PLAN
Problem: Safety - Medical Restraint  Goal: Free from restraint(s) (Restraint for Interference with Medical Device)  Outcome: Not Progressing    The clinical goals for the shift include pt will remain comfortable through the shift

## 2023-10-28 ENCOUNTER — APPOINTMENT (OUTPATIENT)
Dept: RADIOLOGY | Facility: HOSPITAL | Age: 68
End: 2023-10-28
Payer: COMMERCIAL

## 2023-10-28 LAB
ALBUMIN SERPL BCP-MCNC: 3.1 G/DL (ref 3.4–5)
ALBUMIN SERPL BCP-MCNC: 3.3 G/DL (ref 3.4–5)
ALP SERPL-CCNC: 321 U/L (ref 33–136)
ALT SERPL W P-5'-P-CCNC: 158 U/L (ref 10–52)
ANION GAP SERPL CALC-SCNC: 24 MMOL/L (ref 10–20)
ANION GAP SERPL CALC-SCNC: 25 MMOL/L (ref 10–20)
AST SERPL W P-5'-P-CCNC: 218 U/L (ref 9–39)
BASOPHILS # BLD AUTO: 0.13 X10*3/UL (ref 0–0.1)
BASOPHILS NFR BLD AUTO: 0.5 %
BILIRUB DIRECT SERPL-MCNC: 0.1 MG/DL (ref 0–0.3)
BILIRUB SERPL-MCNC: 0.9 MG/DL (ref 0–1.2)
BUN SERPL-MCNC: 106 MG/DL (ref 6–23)
BUN SERPL-MCNC: 128 MG/DL (ref 6–23)
BUN SERPL-MCNC: 138 MG/DL (ref 6–23)
BUN SERPL-MCNC: 142 MG/DL (ref 6–23)
CA-I BLD-SCNC: 1.13 MMOL/L (ref 1.1–1.33)
CALCIUM SERPL-MCNC: 9.3 MG/DL (ref 8.6–10.6)
CALCIUM SERPL-MCNC: 9.3 MG/DL (ref 8.6–10.6)
CALCIUM SERPL-MCNC: 9.4 MG/DL (ref 8.6–10.6)
CALCIUM SERPL-MCNC: 9.4 MG/DL (ref 8.6–10.6)
CARDIAC TROPONIN I PNL SERPL HS: 14 NG/L (ref 0–53)
CHLORIDE SERPL-SCNC: 83 MMOL/L (ref 98–107)
CHLORIDE SERPL-SCNC: 84 MMOL/L (ref 98–107)
CHLORIDE SERPL-SCNC: 87 MMOL/L (ref 98–107)
CHLORIDE SERPL-SCNC: 91 MMOL/L (ref 98–107)
CHLORIDE UR-SCNC: 30 MMOL/L
CHLORIDE/CREATININE (MMOL/G) IN URINE: 76 MMOL/G CREAT (ref 23–275)
CO2 SERPL-SCNC: 21 MMOL/L (ref 21–32)
CO2 SERPL-SCNC: 22 MMOL/L (ref 21–32)
CORTIS SERPL-MCNC: 38.8 UG/DL (ref 2.5–20)
CREAT SERPL-MCNC: 1.07 MG/DL (ref 0.5–1.3)
CREAT SERPL-MCNC: 1.25 MG/DL (ref 0.5–1.3)
CREAT SERPL-MCNC: 1.25 MG/DL (ref 0.5–1.3)
CREAT SERPL-MCNC: 1.33 MG/DL (ref 0.5–1.3)
CREAT UR-MCNC: 39.5 MG/DL (ref 20–370)
CREAT UR-MCNC: 39.5 MG/DL (ref 20–370)
EOSINOPHIL # BLD AUTO: 0.01 X10*3/UL (ref 0–0.7)
EOSINOPHIL NFR BLD AUTO: 0 %
ERYTHROCYTE [DISTWIDTH] IN BLOOD BY AUTOMATED COUNT: 13.3 % (ref 11.5–14.5)
GFR SERPL CREATININE-BSD FRML MDRD: 59 ML/MIN/1.73M*2
GFR SERPL CREATININE-BSD FRML MDRD: 63 ML/MIN/1.73M*2
GFR SERPL CREATININE-BSD FRML MDRD: 63 ML/MIN/1.73M*2
GFR SERPL CREATININE-BSD FRML MDRD: 76 ML/MIN/1.73M*2
GLUCOSE SERPL-MCNC: 121 MG/DL (ref 74–99)
GLUCOSE SERPL-MCNC: 125 MG/DL (ref 74–99)
GLUCOSE SERPL-MCNC: 147 MG/DL (ref 74–99)
GLUCOSE SERPL-MCNC: 223 MG/DL (ref 74–99)
HCT VFR BLD AUTO: 40.9 % (ref 41–52)
HGB BLD-MCNC: 14.3 G/DL (ref 13.5–17.5)
IMM GRANULOCYTES # BLD AUTO: 0.28 X10*3/UL (ref 0–0.7)
IMM GRANULOCYTES NFR BLD AUTO: 1.1 % (ref 0–0.9)
LYMPHOCYTES # BLD AUTO: 0.75 X10*3/UL (ref 1.2–4.8)
LYMPHOCYTES NFR BLD AUTO: 2.9 %
MAGNESIUM SERPL-MCNC: 2.86 MG/DL (ref 1.6–2.4)
MAGNESIUM SERPL-MCNC: 2.86 MG/DL (ref 1.6–2.4)
MCH RBC QN AUTO: 30.4 PG (ref 26–34)
MCHC RBC AUTO-ENTMCNC: 35 G/DL (ref 32–36)
MCV RBC AUTO: 87 FL (ref 80–100)
MONOCYTES # BLD AUTO: 1.17 X10*3/UL (ref 0.1–1)
MONOCYTES NFR BLD AUTO: 4.4 %
NEUTROPHILS # BLD AUTO: 23.96 X10*3/UL (ref 1.2–7.7)
NEUTROPHILS NFR BLD AUTO: 91.1 %
NRBC BLD-RTO: 0 /100 WBCS (ref 0–0)
OSMOLALITY SERPL: 315 MOSM/KG (ref 280–300)
OSMOLALITY UR: 542 MOSM/KG (ref 200–1200)
PHOSPHATE SERPL-MCNC: 7.2 MG/DL (ref 2.5–4.9)
PHOSPHATE SERPL-MCNC: 7.4 MG/DL (ref 2.5–4.9)
PHOSPHATE SERPL-MCNC: 8 MG/DL (ref 2.5–4.9)
PLATELET # BLD AUTO: 374 X10*3/UL (ref 150–450)
PMV BLD AUTO: 12.4 FL (ref 7.5–11.5)
POTASSIUM SERPL-SCNC: 4.4 MMOL/L (ref 3.5–5.3)
POTASSIUM SERPL-SCNC: 4.7 MMOL/L (ref 3.5–5.3)
POTASSIUM SERPL-SCNC: 4.7 MMOL/L (ref 3.5–5.3)
POTASSIUM SERPL-SCNC: 7.9 MMOL/L (ref 3.5–5.3)
POTASSIUM UR-SCNC: 33 MMOL/L
POTASSIUM/CREAT UR-RTO: 84 MMOL/G CREAT
PROT SERPL-MCNC: 8.4 G/DL (ref 6.4–8.2)
RBC # BLD AUTO: 4.7 X10*6/UL (ref 4.5–5.9)
SODIUM SERPL-SCNC: 122 MMOL/L (ref 136–145)
SODIUM SERPL-SCNC: 125 MMOL/L (ref 136–145)
SODIUM SERPL-SCNC: 128 MMOL/L (ref 136–145)
SODIUM SERPL-SCNC: 132 MMOL/L (ref 136–145)
SODIUM UR-SCNC: 38 MMOL/L
SODIUM/CREAT UR-RTO: 96 MMOL/G CREAT
UFH PPP CHRO-ACNC: 0.6 IU/ML
UREA/CREAT UR-SRTO: 28.6 G/G CREAT
UUN UR-MCNC: 1129 MG/DL
WBC # BLD AUTO: 26.3 X10*3/UL (ref 4.4–11.3)

## 2023-10-28 PROCEDURE — 82436 ASSAY OF URINE CHLORIDE: CPT

## 2023-10-28 PROCEDURE — 2500000005 HC RX 250 GENERAL PHARMACY W/O HCPCS

## 2023-10-28 PROCEDURE — 2020000001 HC ICU ROOM DAILY

## 2023-10-28 PROCEDURE — 36415 COLL VENOUS BLD VENIPUNCTURE: CPT

## 2023-10-28 PROCEDURE — 83930 ASSAY OF BLOOD OSMOLALITY: CPT

## 2023-10-28 PROCEDURE — 80053 COMPREHEN METABOLIC PANEL: CPT

## 2023-10-28 PROCEDURE — 2500000002 HC RX 250 W HCPCS SELF ADMINISTERED DRUGS (ALT 637 FOR MEDICARE OP, ALT 636 FOR OP/ED): Performed by: INTERNAL MEDICINE

## 2023-10-28 PROCEDURE — 83935 ASSAY OF URINE OSMOLALITY: CPT

## 2023-10-28 PROCEDURE — 2500000001 HC RX 250 WO HCPCS SELF ADMINISTERED DRUGS (ALT 637 FOR MEDICARE OP)

## 2023-10-28 PROCEDURE — 82248 BILIRUBIN DIRECT: CPT

## 2023-10-28 PROCEDURE — 93010 ELECTROCARDIOGRAM REPORT: CPT | Performed by: INTERNAL MEDICINE

## 2023-10-28 PROCEDURE — 2500000004 HC RX 250 GENERAL PHARMACY W/ HCPCS (ALT 636 FOR OP/ED)

## 2023-10-28 PROCEDURE — 83735 ASSAY OF MAGNESIUM: CPT

## 2023-10-28 PROCEDURE — 99291 CRITICAL CARE FIRST HOUR: CPT

## 2023-10-28 PROCEDURE — 85025 COMPLETE CBC W/AUTO DIFF WBC: CPT

## 2023-10-28 PROCEDURE — 82040 ASSAY OF SERUM ALBUMIN: CPT

## 2023-10-28 PROCEDURE — 82330 ASSAY OF CALCIUM: CPT

## 2023-10-28 PROCEDURE — 2500000002 HC RX 250 W HCPCS SELF ADMINISTERED DRUGS (ALT 637 FOR MEDICARE OP, ALT 636 FOR OP/ED): Performed by: STUDENT IN AN ORGANIZED HEALTH CARE EDUCATION/TRAINING PROGRAM

## 2023-10-28 PROCEDURE — 2500000002 HC RX 250 W HCPCS SELF ADMINISTERED DRUGS (ALT 637 FOR MEDICARE OP, ALT 636 FOR OP/ED)

## 2023-10-28 PROCEDURE — 94640 AIRWAY INHALATION TREATMENT: CPT

## 2023-10-28 PROCEDURE — 85520 HEPARIN ASSAY: CPT

## 2023-10-28 PROCEDURE — 84540 ASSAY OF URINE/UREA-N: CPT

## 2023-10-28 PROCEDURE — 82533 TOTAL CORTISOL: CPT

## 2023-10-28 PROCEDURE — 94664 DEMO&/EVAL PT USE INHALER: CPT

## 2023-10-28 PROCEDURE — 84100 ASSAY OF PHOSPHORUS: CPT

## 2023-10-28 PROCEDURE — 80069 RENAL FUNCTION PANEL: CPT

## 2023-10-28 PROCEDURE — 84300 ASSAY OF URINE SODIUM: CPT

## 2023-10-28 PROCEDURE — 71045 X-RAY EXAM CHEST 1 VIEW: CPT | Performed by: RADIOLOGY

## 2023-10-28 PROCEDURE — 2500000001 HC RX 250 WO HCPCS SELF ADMINISTERED DRUGS (ALT 637 FOR MEDICARE OP): Performed by: STUDENT IN AN ORGANIZED HEALTH CARE EDUCATION/TRAINING PROGRAM

## 2023-10-28 PROCEDURE — 82570 ASSAY OF URINE CREATININE: CPT

## 2023-10-28 PROCEDURE — 71045 X-RAY EXAM CHEST 1 VIEW: CPT

## 2023-10-28 PROCEDURE — 2500000005 HC RX 250 GENERAL PHARMACY W/O HCPCS: Performed by: STUDENT IN AN ORGANIZED HEALTH CARE EDUCATION/TRAINING PROGRAM

## 2023-10-28 PROCEDURE — 84484 ASSAY OF TROPONIN QUANT: CPT

## 2023-10-28 PROCEDURE — S4991 NICOTINE PATCH NONLEGEND: HCPCS | Performed by: STUDENT IN AN ORGANIZED HEALTH CARE EDUCATION/TRAINING PROGRAM

## 2023-10-28 PROCEDURE — 94668 MNPJ CHEST WALL SBSQ: CPT

## 2023-10-28 RX ORDER — NITROGLYCERIN 0.4 MG/1
0.4 TABLET SUBLINGUAL EVERY 5 MIN PRN
Status: DISCONTINUED | OUTPATIENT
Start: 2023-10-28 | End: 2023-10-29

## 2023-10-28 RX ORDER — METOPROLOL TARTRATE 1 MG/ML
5 INJECTION, SOLUTION INTRAVENOUS ONCE
Status: COMPLETED | OUTPATIENT
Start: 2023-10-28 | End: 2023-10-28

## 2023-10-28 RX ORDER — NITROGLYCERIN 0.4 MG/1
TABLET SUBLINGUAL
Status: COMPLETED
Start: 2023-10-28 | End: 2023-10-28

## 2023-10-28 RX ORDER — CALCIUM GLUCONATE 20 MG/ML
2 INJECTION, SOLUTION INTRAVENOUS ONCE
Status: DISCONTINUED | OUTPATIENT
Start: 2023-10-28 | End: 2023-11-07

## 2023-10-28 RX ORDER — TALC
6 POWDER (GRAM) TOPICAL NIGHTLY
Status: DISCONTINUED | OUTPATIENT
Start: 2023-10-28 | End: 2023-10-30

## 2023-10-28 RX ORDER — ALBUTEROL SULFATE 0.83 MG/ML
2.5 SOLUTION RESPIRATORY (INHALATION) 4 TIMES DAILY
Status: DISCONTINUED | OUTPATIENT
Start: 2023-10-28 | End: 2023-11-02

## 2023-10-28 RX ORDER — ATENOLOL 50 MG/1
50 TABLET ORAL DAILY
Status: DISCONTINUED | OUTPATIENT
Start: 2023-10-28 | End: 2023-10-29

## 2023-10-28 RX ADMIN — ATENOLOL 50 MG: 50 TABLET ORAL at 20:11

## 2023-10-28 RX ADMIN — POLYETHYLENE GLYCOL 3350 17 G: 17 POWDER, FOR SOLUTION ORAL at 08:43

## 2023-10-28 RX ADMIN — SODIUM CHLORIDE 30 MG/ML INHALATION SOLUTION 3 ML: 30 SOLUTION INHALANT at 08:49

## 2023-10-28 RX ADMIN — LIDOCAINE 1 PATCH: 4 PATCH TOPICAL at 08:52

## 2023-10-28 RX ADMIN — METOPROLOL TARTRATE 5 MG: 1 INJECTION, SOLUTION INTRAVENOUS at 04:19

## 2023-10-28 RX ADMIN — NITROGLYCERIN 0.4 MG: 0.4 TABLET SUBLINGUAL at 09:51

## 2023-10-28 RX ADMIN — ALBUTEROL SULFATE 2.5 MG: 2.5 SOLUTION RESPIRATORY (INHALATION) at 08:51

## 2023-10-28 RX ADMIN — SODIUM CHLORIDE 250 ML: 9 INJECTION, SOLUTION INTRAVENOUS at 14:36

## 2023-10-28 RX ADMIN — ESOMEPRAZOLE MAGNESIUM 40 MG: 40 FOR SUSPENSION ORAL at 08:43

## 2023-10-28 RX ADMIN — ALBUTEROL SULFATE 2.5 MG: 2.5 SOLUTION RESPIRATORY (INHALATION) at 20:17

## 2023-10-28 RX ADMIN — Medication 6 MG: at 20:10

## 2023-10-28 RX ADMIN — Medication 1 PATCH: at 08:52

## 2023-10-28 RX ADMIN — HEPARIN SODIUM 1300 UNITS/HR: 10000 INJECTION, SOLUTION INTRAVENOUS at 15:17

## 2023-10-28 RX ADMIN — Medication 30 G: at 20:11

## 2023-10-28 RX ADMIN — FUROSEMIDE 40 MG: 10 INJECTION, SOLUTION INTRAVENOUS at 08:42

## 2023-10-28 RX ADMIN — THIAMINE HCL TAB 100 MG 100 MG: 100 TAB at 08:42

## 2023-10-28 RX ADMIN — Medication 8000 UNITS: at 08:46

## 2023-10-28 RX ADMIN — SULFAMETHOXAZOLE AND TRIMETHOPRIM 320 MG OF TRIMETHOPRIM: 200; 40 SUSPENSION ORAL at 08:41

## 2023-10-28 RX ADMIN — SODIUM CHLORIDE 30 MG/ML INHALATION SOLUTION 3 ML: 30 SOLUTION INHALANT at 15:14

## 2023-10-28 RX ADMIN — Medication 30 G: at 08:25

## 2023-10-28 RX ADMIN — SODIUM CHLORIDE 30 MG/ML INHALATION SOLUTION 3 ML: 30 SOLUTION INHALANT at 20:17

## 2023-10-28 RX ADMIN — OXYCODONE HYDROCHLORIDE 5 MG: 5 TABLET ORAL at 02:32

## 2023-10-28 RX ADMIN — SODIUM CHLORIDE 30 MG/ML INHALATION SOLUTION 15 ML: 30 SOLUTION INHALANT at 00:08

## 2023-10-28 RX ADMIN — SULFAMETHOXAZOLE AND TRIMETHOPRIM 320 MG OF TRIMETHOPRIM: 200; 40 SUSPENSION ORAL at 16:33

## 2023-10-28 RX ADMIN — FOLIC ACID 1 MG: 1 TABLET ORAL at 08:43

## 2023-10-28 RX ADMIN — ALBUTEROL SULFATE 2.5 MG: 2.5 SOLUTION RESPIRATORY (INHALATION) at 15:13

## 2023-10-28 RX ADMIN — Medication 1 TABLET: at 09:00

## 2023-10-28 RX ADMIN — ACETAMINOPHEN 650 MG: 325 TABLET ORAL at 20:11

## 2023-10-28 ASSESSMENT — PAIN - FUNCTIONAL ASSESSMENT
PAIN_FUNCTIONAL_ASSESSMENT: 0-10

## 2023-10-28 ASSESSMENT — PAIN SCALES - PAIN ASSESSMENT IN ADVANCED DEMENTIA (PAINAD)
FACIALEXPRESSION: SMILING OR INEXPRESSIVE
BODYLANGUAGE: RELAXED
BREATHING: NORMAL
CONSOLABILITY: NO NEED TO CONSOLE
TOTALSCORE: 0

## 2023-10-28 ASSESSMENT — PAIN SCALES - GENERAL
PAINLEVEL_OUTOF10: 0 - NO PAIN
PAINLEVEL_OUTOF10: 5 - MODERATE PAIN
PAINLEVEL_OUTOF10: 0 - NO PAIN
PAINLEVEL_OUTOF10: 5 - MODERATE PAIN
PAINLEVEL_OUTOF10: 0 - NO PAIN
PAINLEVEL_OUTOF10: 1

## 2023-10-28 NOTE — PROGRESS NOTES
Critical Care Daily Progress        Subjective   Patient is a 68 y.o. male admitted on 10/11/2023  7:54 PM with the following indication(s) for ICU care hyponatremia, altered mental status with multifactorial etiology.     Interval History: NAEON. Extubated this morning to CPAP, now on HFNC. HFNC is very uncomfortable. Has had some new chest pain this morning as well as continued back pain.       Scheduled Medications:   albuterol, 2.5 mg, nebulization, TID  [Held by provider] atorvastatin, 40 mg, oral, Daily  calcium gluconate, 2 g, intravenous, Once  ergocalciferol, 8,000 Units, oral, Daily  esomeprazole, 40 mg, nasoduodenal tube, Daily before breakfast  folic acid, 1 mg, oral, Daily  furosemide, 40 mg, intravenous, Daily  lidocaine, 1 patch, transdermal, Daily  multivitamin with minerals, 1 tablet, oral, Daily  nicotine, 1 patch, transdermal, Daily  polyethylene glycol, 17 g, oral, Daily  sodium chloride, 3 mL, nebulization, q6h SAM  sulfamethoxazole-trimethoprim, 320 mg of trimethoprim, nasogastric tube, q8h  surgical lubricant, , ,   thiamine, 100 mg, oral, Daily  urea, 30 g, oral, BID           Continuous Medications:   heparin, 0-4,500 Units/hr, Last Rate: 1,300 Units/hr (10/28/23 0630)          PRN Medications:   PRN medications: acetaminophen, dextrose 10 % in water (D10W), dextrose, glucagon, heparin, oxyCODONE, oxygen, sennosides, surgical lubricant, white petrolatum    Objective   Vitals:  Most Recent:  Visit Vitals  /75   Pulse (!) 127   Temp 37.5 °C (99.5 °F) (Temporal)   Resp (!) 35        24hr Min/Max:  Temp  Min: 36 °C (96.8 °F)  Max: 37.5 °C (99.5 °F)  Pulse  Min: 110  Max: 137  BP  Min: 122/76  Max: 156/95  Resp  Min: 17  Max: 41  SpO2  Min: 86 %  Max: 99 %        GEN: In NAD  CV: Tachycardic, no m/r/g  PULM: Coarse breath sounds b/l, rhonchi LLL, improved  ABD: Soft, tender to palpation b/l flank, improved  NEURO: A&Ox4  MSK: no joint swelling grossly noted  EXT: no LE edema  SKIN: warm and  dry, no lesions grossly noted        Intake/Output Summary (Last 24 hours) at 10/28/2023 0834  Last data filed at 10/28/2023 0630  Gross per 24 hour   Intake 552.76 ml   Output 1550 ml   Net -997.24 ml    BM yesterday    LDA:    PIV x2 in R arm, Dobhoff,       LABS AND IMAGING     Results from last 7 days   Lab Units 10/28/23  0648 10/28/23  0414 10/27/23  0948 10/27/23  0446 10/26/23  1225 10/26/23  0447   SODIUM mmol/L 122*  --    < > 123*   < > 124*   POTASSIUM mmol/L 7.9*  --    < > 4.9   < > 4.8   CHLORIDE mmol/L 83*  --    < > 86*   < > 86*   CO2 mmol/L 22  --    < > 23   < > 23   BUN mg/dL 106*  --    < > 63*   < > 34*   CREATININE mg/dL 1.07  --    < > 0.83   < > 0.72   WBC AUTO x10*3/uL  --  26.3*  --  20.9*  --  18.0*   HEMOGLOBIN g/dL  --  14.3  --  13.4*  --  12.7*   PLATELETS AUTO x10*3/uL  --  374  --  309  --  288    < > = values in this interval not displayed.     --> 310  ALT 64--> 127  AST 87--> 218  Tbili 0.4        RUQ US with biliary sludge, no wall thickening, duct dilatation    CXR 10/26:  1. Persistent left basilar pleural effusion and  atelectasis/infiltrate with slight improvement in the aeration of the  left lung. Correlate with left basilar/left lower lobe  atelectasis/collapse and mucous plugging.  2. Residual patchy airspace opacity in the left mid and upper lung  which may be due to edema or infiltrate.       Assessment/Plan         Molina Godinez is a 68 year old male with PMHx of COPD, HTN, AUD and opioid use disorder, HCV, chronic PE who was seen in MICU 10/13-10/16 for inability to walk 2/2 suspected alcohol use disorder. ICU course complicated by hypertension, altered mental status, and hyponatremia. Transferred back to MICU 10/17 after he was found lethargic and hypoxic. Now intubated and sedated, bronchial washings growing stenotrophomonas.      NEURO/PSYCH  #AMS- fluctuating, unclear etiology as previously suspected alcohol withdrawal but could be 2/2 chronic  hypoxia and retention vs hyponatremia or other etiology. Now intubated and sedated  -CT head without pathology  - Initially had visual and tactile hallucinations that have since resolved. Suspect more metabolic etiology of fluctuations in mental status, I/s/o acutely increased oxygen requirement.  - Largely resolved       #Bilateral LE weakness-  -Likely 2/2 chronic, had weakness during 8/2023 admission; less likely GBS  -CT head without pathology, MRI spine stopped early due to pain  -Patient able to move bilateral lower extremities, low c/f GBS  - MRI brain/total spine unremarkable for acute process  PLAN:  -May follow up with neurology outpatient  -ID consulted regarding weakness and positive Syphilis Ab. Pt was treated back in 1998 but rec MRI spine given concern of abscess, MRI negative for acute process  [] Neurology consulted as patient is now extubated and able to follow commands      CV  #HTN- resolved  - home amlodipine 5mg, atenolol 50mg  - Initially started on Cardene drip, transitioned to Labetalol prior to resolution  [] home atenolol restarted     #Tachycardia  - Sinus rhythm  - Etiology includes pain, infection, hypovolemia  - Poor correlatoin with fentanyl pushes, less suspicious for pain etiology  [] On home atenolol     #CAD  -home atorvastatin 40mg held due to elevated transaminases     #Chest pain- resolved  - New onset chest pain this AM, had hyperkalemia on RFP  - RFP rechecked, K wnl  - Trop 14  - ECG without acute changes, stable from prior  - Given sublingual nitroglycerin with resolution of pain, but he did not notice alleviation of pain with timing of sublingual nitroglycerin  - Some concern of uremic pericarditis but unlikely  [] continue to monitor    PULM  #CAP- resolved  -Urine strep positive, unsure if active or chronic infection  -MRSA negative, Urine legionella negative  - CTX 5 day course completed 10/17    #L lung consolidation, steno HAP  Initially treated for CAP given positive  urine strep with CTX 10/13-10/17, one day of Zosyn10/17-10/17  Leukocytosis stable ~24  C/f HAP vs incomplete resolution of previous PNA  - S/p Bronch 10/20 with purulent fluid with bronchial washings sent for cx  -Bronchial washings with stenotrophomonas susceptible to Bactrim  [] Bactrim 10/22-10/28    #Suspected PAH  - Echo with atrial septal aneurysm, positive bubble study  [] Will need outpatient workup, previously some suspicion for PAH but will need further evaluation    #Severe COPD  #Mucus plugging  -8/2023 FEV1 42%  - CT C/A/P with RLL mucus plug  - CXR today with L sided pleural effusion vs mucus plugging  PLAN:  [] Continue aggressive RT: Albuterol q6h, chest PT, respiratory hygiene  [] Ezpap, incentive spirometer  [] 3% NS neb q6h  [] Encourage sitting, mobility as able. PT following     #Subsegmental PE  -Requires anticoagulation for 3 months (per vasc med 8/2023), on home apixaban 5mg BID  PLAN:  -Heparin gtt    GI  #HepC  -treated, 8/2023 HCV RNA undetected     #Nutrition  - Dobhoff in place 10/17  - Nutrition following, appreciate recs  PLAN:  - Starting on TF with TwoCal HN goal rate of 30ml/hr  - 100mg Thiamine, 1mg Folic acid, 8000u Vit D2    #Elevated transaminases, hx of treated HCV- increasing, semi stable  - LFTs rising over past few days, predominantly ALP/AST?A:T with normal bili  - RUQ US with steatosis, sludge in GB  - Sulfa drug induced vs intrinsic process, though Bactrim typically takes 2 weeks for hepatotoxicity  [] Trend LFTs     /RENAL  #Hyponatremia - Likely SIADH 2/2 COPD exacerbation but unclear if additional etiologies- improving  - Na as low as 120, slowly improving  [] Free water flushes 100ml BID for further fluid restriction given hyponatremia thought to be 2/2 SIADH  [] Urea 30g BID  [] Lasix discontinued  [] Will recheck serum osm, urine osm, lytes, urea  [] Nephro following, appreciate recs    #C/f refeeding syndrome- resolved  - Hypokalemia 2.7, Phos 1.9, Ca 5.2  -  Electrolytes repleted, will monitor    #Hyperkalemia  - K 7.9, rechecked to 4.7    #Asymptomatic uremia  #KRYSTEN  - BUN elevating over past few days, likely 2/2 urea packets, KRYSTEN, TF  - Fluid restricted, likely pre-renal KRYSTEN  [] Monitor for uremic symptoms, loss of appetite, hypoactive changes in mental status, nausea, pericarditis       ENDOCRINE  #Secondary HyperPTH  -8/28 .7  -25-hydroxy vit D 8, 1,25 Vit D 44.4  PLAN:  Vit D2 8000u liquid daily     ID  #Leukocytosis I/s/o recently treated strep pneumo CAP- increasing  - Completed CTX 10/13-10/17  - Zosyn 10/20-10/24, Bactrim 10/22-, Levaquin 10/23-10/25  -Bronchial washings with stenotrophomonas susceptible to Bactrim  [] Bactrim 10/22-10/28  [] Considering further imaging to guide workup    HEME/ONC  #Leukocytosis, intermittently ranging 18-24  -Likely 2/2 to PNA  [] Monitor WBC, vitals       MSK/RHEUM   #Chronic LE weakness  -Likely chronic from disuse and alcohol, unlikely GBS since no paralysis  -Consider neurology outpatient referral for possible EMG, neurology signed off     Vent/Oxy: HFNC  Pressors/Drips: Heparin gtt  Abx: Bactrim ending today  F: Restricted  E: replete PRN  N: TF via Dobhoff   A: PIV R arm x2, Dobhoff  DVT ppx: heparin gtt  GI ppx: Pantoprazole     Code status: Full Code (confirmed)  NOK: Tonie (sister) [phone]; Nahomi (God-sister) [phone]; Sarah Godinez (sister) [phone]       Ever Paez MD  Internal Medicine, PGY-1

## 2023-10-28 NOTE — PROGRESS NOTES
"Molina Godinez is a 68 y.o. male on day 16 of admission presenting with Hospital-acquired pneumonia.    Subjective   Extubated overnight, now on Airvo for hypoxia. Patient w/ increase in creatinine and BUN this AM, Na somewhat improved. Indicates he is thirsty on interview.        Objective     Physical Exam  Constitutional:       Comments: Now extubated, hoarse voice noted   HENT:      Head: Normocephalic and atraumatic.      Mouth/Throat:      Mouth: Mucous membranes are moist.      Pharynx: No oropharyngeal exudate.   Eyes:      General: No scleral icterus.     Conjunctiva/sclera: Conjunctivae normal.      Pupils: Pupils are equal, round, and reactive to light.   Cardiovascular:      Rate and Rhythm: Regular rhythm. Tachycardia present.      Pulses: Normal pulses.      Heart sounds: No murmur heard.     No friction rub.   Pulmonary:      Comments: Tachypneic, coarse breath sounds on R, decreased breath sounds on L   Abdominal:      General: Abdomen is flat. Bowel sounds are normal. There is no distension.      Palpations: Abdomen is soft.      Tenderness: There is no abdominal tenderness.   Musculoskeletal:         General: No swelling or tenderness.      Cervical back: Normal range of motion.   Skin:     General: Skin is warm and dry.      Coloration: Skin is not jaundiced.      Comments: Decreased skin turgor         Last Recorded Vitals  Blood pressure 136/89, pulse (!) 141, temperature 36.9 °C (98.4 °F), temperature source Temporal, resp. rate (!) 39, height 1.702 m (5' 7.01\"), weight 49.7 kg (109 lb 9.1 oz), SpO2 96 %.  Intake/Output last 3 Shifts:  I/O last 3 completed shifts:  In: 1487.5 (29.9 mL/kg) [I.V.:612.5 (12.3 mL/kg); NG/GT:875]  Out: 2450 (49.3 mL/kg) [Urine:2450 (1.4 mL/kg/hr)]  Weight: 49.7 kg     Relevant Results  Scheduled medications  albuterol, 2.5 mg, nebulization, TID  [Held by provider] atorvastatin, 40 mg, oral, Daily  calcium gluconate, 2 g, intravenous, Once  ergocalciferol, 8,000 " Units, oral, Daily  esomeprazole, 40 mg, nasoduodenal tube, Daily before breakfast  folic acid, 1 mg, oral, Daily  furosemide, 40 mg, intravenous, Daily  lidocaine, 1 patch, transdermal, Daily  melatonin, 6 mg, oral, Nightly  multivitamin with minerals, 1 tablet, oral, Daily  nicotine, 1 patch, transdermal, Daily  polyethylene glycol, 17 g, oral, Daily  sodium chloride, 3 mL, nebulization, q6h SAM  sulfamethoxazole-trimethoprim, 320 mg of trimethoprim, nasogastric tube, q8h  thiamine, 100 mg, oral, Daily  urea, 30 g, oral, BID      Continuous medications  heparin, 0-4,500 Units/hr, Last Rate: 1,300 Units/hr (10/28/23 0700)      PRN medications  PRN medications: acetaminophen, dextrose 10 % in water (D10W), dextrose, glucagon, heparin, nitroglycerin, oxyCODONE, oxygen, sennosides, white petrolatum  Results for orders placed or performed during the hospital encounter of 10/11/23 (from the past 24 hour(s))   Renal function panel   Result Value Ref Range    Glucose 152 (H) 74 - 99 mg/dL    Sodium 128 (L) 136 - 145 mmol/L    Potassium 5.0 3.5 - 5.3 mmol/L    Chloride 93 (L) 98 - 107 mmol/L    Bicarbonate 19 (L) 21 - 32 mmol/L    Anion Gap 21 (H) 10 - 20 mmol/L    Urea Nitrogen 66 (H) 6 - 23 mg/dL    Creatinine 0.79 0.50 - 1.30 mg/dL    eGFR >90 >60 mL/min/1.73m*2    Calcium 6.9 (L) 8.6 - 10.6 mg/dL    Phosphorus 6.4 (H) 2.5 - 4.9 mg/dL    Albumin 2.3 (L) 3.4 - 5.0 g/dL   CBC and Auto Differential   Result Value Ref Range    WBC 26.3 (H) 4.4 - 11.3 x10*3/uL    nRBC 0.0 0.0 - 0.0 /100 WBCs    RBC 4.70 4.50 - 5.90 x10*6/uL    Hemoglobin 14.3 13.5 - 17.5 g/dL    Hematocrit 40.9 (L) 41.0 - 52.0 %    MCV 87 80 - 100 fL    MCH 30.4 26.0 - 34.0 pg    MCHC 35.0 32.0 - 36.0 g/dL    RDW 13.3 11.5 - 14.5 %    Platelets 374 150 - 450 x10*3/uL    MPV 12.4 (H) 7.5 - 11.5 fL    Neutrophils % 91.1 40.0 - 80.0 %    Immature Granulocytes %, Automated 1.1 (H) 0.0 - 0.9 %    Lymphocytes % 2.9 13.0 - 44.0 %    Monocytes % 4.4 2.0 - 10.0 %     Eosinophils % 0.0 0.0 - 6.0 %    Basophils % 0.5 0.0 - 2.0 %    Neutrophils Absolute 23.96 (H) 1.20 - 7.70 x10*3/uL    Immature Granulocytes Absolute, Automated 0.28 0.00 - 0.70 x10*3/uL    Lymphocytes Absolute 0.75 (L) 1.20 - 4.80 x10*3/uL    Monocytes Absolute 1.17 (H) 0.10 - 1.00 x10*3/uL    Eosinophils Absolute 0.01 0.00 - 0.70 x10*3/uL    Basophils Absolute 0.13 (H) 0.00 - 0.10 x10*3/uL   Heparin Assay, UFH   Result Value Ref Range    Heparin Unfractionated 0.6 See Comment Below for Therapeutic Ranges IU/mL   Magnesium   Result Value Ref Range    Magnesium 2.86 (H) 1.60 - 2.40 mg/dL   Basic Metabolic Panel   Result Value Ref Range    Glucose 121 (H) 74 - 99 mg/dL    Sodium 122 (L) 136 - 145 mmol/L    Potassium 7.9 (HH) 3.5 - 5.3 mmol/L    Chloride 83 (L) 98 - 107 mmol/L    Bicarbonate 22 21 - 32 mmol/L    Anion Gap 25 (H) 10 - 20 mmol/L    Urea Nitrogen 106 (HH) 6 - 23 mg/dL    Creatinine 1.07 0.50 - 1.30 mg/dL    eGFR 76 >60 mL/min/1.73m*2    Calcium 9.3 8.6 - 10.6 mg/dL   Hepatic function panel   Result Value Ref Range    Albumin 3.3 (L) 3.4 - 5.0 g/dL    Bilirubin, Total 0.9 0.0 - 1.2 mg/dL    Bilirubin, Direct 0.1 0.0 - 0.3 mg/dL    Alkaline Phosphatase 321 (H) 33 - 136 U/L     (H) 10 - 52 U/L     (H) 9 - 39 U/L    Total Protein 8.4 (H) 6.4 - 8.2 g/dL   Renal function panel   Result Value Ref Range    Glucose 223 (H) 74 - 99 mg/dL    Sodium 125 (L) 136 - 145 mmol/L    Potassium 4.7 3.5 - 5.3 mmol/L    Chloride 84 (L) 98 - 107 mmol/L    Bicarbonate 22 21 - 32 mmol/L    Anion Gap 24 (H) 10 - 20 mmol/L    Urea Nitrogen 128 (HH) 6 - 23 mg/dL    Creatinine 1.25 0.50 - 1.30 mg/dL    eGFR 63 >60 mL/min/1.73m*2    Calcium 9.4 8.6 - 10.6 mg/dL    Phosphorus 7.4 (H) 2.5 - 4.9 mg/dL    Albumin 3.3 (L) 3.4 - 5.0 g/dL   Troponin I, High Sensitivity   Result Value Ref Range    Troponin I, High Sensitivity 14 0 - 53 ng/L   Cortisol   Result Value Ref Range    Cortisol 38.8 (H) 2.5 - 20.0 ug/dL             Assessment/Plan   Principal Problem:    Hospital-acquired pneumonia  Active Problems:    Essential hypertension, benign    Hepatitis C virus infection cured after antiviral drug therapy    Alcoholism (CMS/HCC)    Chronic pulmonary embolism (CMS/HCC)    COPD (chronic obstructive pulmonary disease) (CMS/HCC)    HLD (hyperlipidemia)    Molina Godinez is a 68 y.o. male with PMH of HTN, COPD, EtOH use disorder, PE on eliquis, HCV s/p treatment w/out viral DNA in blood, presented on 10/12 to ED w/ complaint of LE weakness. Patient w/ complicated hospital course, originally in MICU for suspected EtOH withdrawal, then transferred to floor. Patient had hypoxia, transferred back to MICU. Subsequently intubated and found to have Stenotrophomonas pneumonia, now on bactrim and Levaquin. Nephro consulted for hyponatremia.     Patient extubated 10/27.     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 context of pulmonary pathology  - Patient started on lasix 40mg IV on 10/25   - Patient's drips currently concentrated, heparin necessary to be mixed in D5W  - Patient Na 125 this AM, increasing BUN and Creatinine    2. KRYSTEN  nonoliguric, Stage I   - baseline creatinine: 0.6-0.8  - Etiology: likely from lasix for hyponatremia, unable to be certain until new lab studies performed  - Repeat Urine electrolytes, urea, serum osm, urine osm  - Possible causative meds (Zosyn and vancomycin, NSAIDs, ACE/ARBs, etc): no  - Any nephrotoxins (contrast, hyperglycemia, NSAIDs,  ischemia/anemia): no  - Any hypotension or relative hypotension: no       RECOMMENDATIONS:  Management:   - Given KRYSTEN and increase in BUN, stop lasix  - Repeat urine electrolytes, urine urea, serum osmolality, urine osmolality  - Bolus with 250mL normal saline  - Renally dose all medications   - If sodium continues to drop <120 and patient is symptomatic, can correct with hypertonic saline   - Continue frequent RFP/BMP checks to avoid rapid over correction of chronic hyponatremia  - No more correction than 6-8mEq in 24 hrs   -Keep MAP >65 or SBP >90, avoid nephrotoxic medications, radiocontrast if possible, follow medication trough levels as appropriate.  -Strict I/O monitoring, daily weights  - Will continue to follow      Patient seen and discussed w/ attending Dr. Myers.         Urbano Portillo MD  Internal Medicine PGY-1     Daytime / Weekend Renal Pager [phone]  After 7 pm Emergencies [phone] Pager [phone]

## 2023-10-29 LAB
ALBUMIN SERPL BCP-MCNC: 3.1 G/DL (ref 3.4–5)
ALBUMIN SERPL BCP-MCNC: 3.1 G/DL (ref 3.4–5)
ALBUMIN SERPL BCP-MCNC: 3.2 G/DL (ref 3.4–5)
ALBUMIN SERPL BCP-MCNC: 3.3 G/DL (ref 3.4–5)
ALBUMIN SERPL BCP-MCNC: 3.3 G/DL (ref 3.4–5)
ALP SERPL-CCNC: 377 U/L (ref 33–136)
ALT SERPL W P-5'-P-CCNC: 143 U/L (ref 10–52)
ANION GAP BLDA CALCULATED.4IONS-SCNC: 11 MMO/L (ref 10–25)
ANION GAP SERPL CALC-SCNC: 17 MMOL/L (ref 10–20)
ANION GAP SERPL CALC-SCNC: 19 MMOL/L (ref 10–20)
ANION GAP SERPL CALC-SCNC: 19 MMOL/L (ref 10–20)
ANION GAP SERPL CALC-SCNC: 22 MMOL/L (ref 10–20)
AST SERPL W P-5'-P-CCNC: 180 U/L (ref 9–39)
BASE EXCESS BLDA CALC-SCNC: 2.4 MMOL/L (ref -2–3)
BASOPHILS # BLD AUTO: 0.18 X10*3/UL (ref 0–0.1)
BASOPHILS NFR BLD AUTO: 0.6 %
BILIRUB DIRECT SERPL-MCNC: 0.2 MG/DL (ref 0–0.3)
BILIRUB SERPL-MCNC: 0.5 MG/DL (ref 0–1.2)
BODY TEMPERATURE: 37 DEGREES CELSIUS
BUN SERPL-MCNC: 143 MG/DL (ref 6–23)
BUN SERPL-MCNC: 151 MG/DL (ref 6–23)
BUN SERPL-MCNC: 151 MG/DL (ref 6–23)
BUN SERPL-MCNC: 174 MG/DL (ref 6–23)
CA-I BLDA-SCNC: 1.18 MMOL/L (ref 1.1–1.33)
CALCIUM SERPL-MCNC: 9.3 MG/DL (ref 8.6–10.6)
CALCIUM SERPL-MCNC: 9.3 MG/DL (ref 8.6–10.6)
CALCIUM SERPL-MCNC: 9.7 MG/DL (ref 8.6–10.6)
CALCIUM SERPL-MCNC: 9.9 MG/DL (ref 8.6–10.6)
CARDIAC TROPONIN I PNL SERPL HS: 15 NG/L (ref 0–53)
CHLORIDE BLDA-SCNC: 95 MMOL/L (ref 98–107)
CHLORIDE SERPL-SCNC: 93 MMOL/L (ref 98–107)
CHLORIDE SERPL-SCNC: 93 MMOL/L (ref 98–107)
CHLORIDE SERPL-SCNC: 94 MMOL/L (ref 98–107)
CHLORIDE SERPL-SCNC: 95 MMOL/L (ref 98–107)
CHLORIDE UR-SCNC: <15 MMOL/L
CHLORIDE/CREATININE (MMOL/G) IN URINE: NORMAL
CO2 SERPL-SCNC: 23 MMOL/L (ref 21–32)
CO2 SERPL-SCNC: 23 MMOL/L (ref 21–32)
CO2 SERPL-SCNC: 24 MMOL/L (ref 21–32)
CO2 SERPL-SCNC: 25 MMOL/L (ref 21–32)
CREAT SERPL-MCNC: 1.15 MG/DL (ref 0.5–1.3)
CREAT SERPL-MCNC: 1.24 MG/DL (ref 0.5–1.3)
CREAT SERPL-MCNC: 1.31 MG/DL (ref 0.5–1.3)
CREAT SERPL-MCNC: 1.42 MG/DL (ref 0.5–1.3)
CREAT UR-MCNC: 43.7 MG/DL (ref 20–370)
CREAT UR-MCNC: 43.7 MG/DL (ref 20–370)
EOSINOPHIL # BLD AUTO: 0.03 X10*3/UL (ref 0–0.7)
EOSINOPHIL NFR BLD AUTO: 0.1 %
ERYTHROCYTE [DISTWIDTH] IN BLOOD BY AUTOMATED COUNT: 13 % (ref 11.5–14.5)
GFR SERPL CREATININE-BSD FRML MDRD: 54 ML/MIN/1.73M*2
GFR SERPL CREATININE-BSD FRML MDRD: 60 ML/MIN/1.73M*2
GFR SERPL CREATININE-BSD FRML MDRD: 64 ML/MIN/1.73M*2
GFR SERPL CREATININE-BSD FRML MDRD: 70 ML/MIN/1.73M*2
GLUCOSE BLD MANUAL STRIP-MCNC: 143 MG/DL (ref 74–99)
GLUCOSE BLDA-MCNC: 159 MG/DL (ref 74–99)
GLUCOSE SERPL-MCNC: 125 MG/DL (ref 74–99)
GLUCOSE SERPL-MCNC: 142 MG/DL (ref 74–99)
GLUCOSE SERPL-MCNC: 148 MG/DL (ref 74–99)
GLUCOSE SERPL-MCNC: 148 MG/DL (ref 74–99)
HCO3 BLDA-SCNC: 26.4 MMOL/L (ref 22–26)
HCT VFR BLD AUTO: 40.1 % (ref 41–52)
HCT VFR BLD EST: 44 % (ref 41–52)
HGB BLD-MCNC: 13.9 G/DL (ref 13.5–17.5)
HGB BLDA-MCNC: 14.7 G/DL (ref 13.5–17.5)
IMM GRANULOCYTES # BLD AUTO: 0.28 X10*3/UL (ref 0–0.7)
IMM GRANULOCYTES NFR BLD AUTO: 1 % (ref 0–0.9)
INHALED O2 CONCENTRATION: 60 %
LACTATE BLDA-SCNC: 1.4 MMOL/L (ref 0.4–2)
LYMPHOCYTES # BLD AUTO: 0.8 X10*3/UL (ref 1.2–4.8)
LYMPHOCYTES NFR BLD AUTO: 2.8 %
MCH RBC QN AUTO: 29.7 PG (ref 26–34)
MCHC RBC AUTO-ENTMCNC: 34.7 G/DL (ref 32–36)
MCV RBC AUTO: 86 FL (ref 80–100)
MONOCYTES # BLD AUTO: 1.62 X10*3/UL (ref 0.1–1)
MONOCYTES NFR BLD AUTO: 5.6 %
NEUTROPHILS # BLD AUTO: 26.05 X10*3/UL (ref 1.2–7.7)
NEUTROPHILS NFR BLD AUTO: 89.9 %
NRBC BLD-RTO: 0 /100 WBCS (ref 0–0)
OXYHGB MFR BLDA: 93.8 % (ref 94–98)
PCO2 BLDA: 38 MM HG (ref 38–42)
PH BLDA: 7.45 PH (ref 7.38–7.42)
PHOSPHATE SERPL-MCNC: 5.4 MG/DL (ref 2.5–4.9)
PHOSPHATE SERPL-MCNC: 5.6 MG/DL (ref 2.5–4.9)
PHOSPHATE SERPL-MCNC: 6 MG/DL (ref 2.5–4.9)
PHOSPHATE SERPL-MCNC: 7.7 MG/DL (ref 2.5–4.9)
PLATELET # BLD AUTO: 338 X10*3/UL (ref 150–450)
PMV BLD AUTO: 10 FL (ref 7.5–11.5)
PO2 BLDA: 81 MM HG (ref 85–95)
POTASSIUM BLDA-SCNC: 4.3 MMOL/L (ref 3.5–5.3)
POTASSIUM SERPL-SCNC: 4.1 MMOL/L (ref 3.5–5.3)
POTASSIUM SERPL-SCNC: 4.4 MMOL/L (ref 3.5–5.3)
POTASSIUM SERPL-SCNC: 4.5 MMOL/L (ref 3.5–5.3)
POTASSIUM SERPL-SCNC: 6 MMOL/L (ref 3.5–5.3)
POTASSIUM UR-SCNC: 17 MMOL/L
POTASSIUM/CREAT UR-RTO: 39 MMOL/G CREAT
PROT SERPL-MCNC: 7.9 G/DL (ref 6.4–8.2)
RBC # BLD AUTO: 4.68 X10*6/UL (ref 4.5–5.9)
SAO2 % BLDA: 97 % (ref 94–100)
SODIUM BLDA-SCNC: 128 MMOL/L (ref 136–145)
SODIUM SERPL-SCNC: 130 MMOL/L (ref 136–145)
SODIUM SERPL-SCNC: 131 MMOL/L (ref 136–145)
SODIUM SERPL-SCNC: 133 MMOL/L (ref 136–145)
SODIUM SERPL-SCNC: 134 MMOL/L (ref 136–145)
SODIUM UR-SCNC: 24 MMOL/L
SODIUM/CREAT UR-RTO: 55 MMOL/G CREAT
UFH PPP CHRO-ACNC: 0.7 IU/ML
UREA/CREAT UR-SRTO: 37.9 G/G CREAT
UUN UR-MCNC: 1655 MG/DL
WBC # BLD AUTO: 29 X10*3/UL (ref 4.4–11.3)

## 2023-10-29 PROCEDURE — 82570 ASSAY OF URINE CREATININE: CPT

## 2023-10-29 PROCEDURE — 85025 COMPLETE CBC W/AUTO DIFF WBC: CPT

## 2023-10-29 PROCEDURE — 99291 CRITICAL CARE FIRST HOUR: CPT

## 2023-10-29 PROCEDURE — 2500000002 HC RX 250 W HCPCS SELF ADMINISTERED DRUGS (ALT 637 FOR MEDICARE OP, ALT 636 FOR OP/ED)

## 2023-10-29 PROCEDURE — 84540 ASSAY OF URINE/UREA-N: CPT

## 2023-10-29 PROCEDURE — 2500000004 HC RX 250 GENERAL PHARMACY W/ HCPCS (ALT 636 FOR OP/ED)

## 2023-10-29 PROCEDURE — 99223 1ST HOSP IP/OBS HIGH 75: CPT | Performed by: STUDENT IN AN ORGANIZED HEALTH CARE EDUCATION/TRAINING PROGRAM

## 2023-10-29 PROCEDURE — 2500000001 HC RX 250 WO HCPCS SELF ADMINISTERED DRUGS (ALT 637 FOR MEDICARE OP)

## 2023-10-29 PROCEDURE — 80069 RENAL FUNCTION PANEL: CPT

## 2023-10-29 PROCEDURE — 2020000001 HC ICU ROOM DAILY

## 2023-10-29 PROCEDURE — 84484 ASSAY OF TROPONIN QUANT: CPT

## 2023-10-29 PROCEDURE — 82436 ASSAY OF URINE CHLORIDE: CPT

## 2023-10-29 PROCEDURE — 94640 AIRWAY INHALATION TREATMENT: CPT

## 2023-10-29 PROCEDURE — 94668 MNPJ CHEST WALL SBSQ: CPT

## 2023-10-29 PROCEDURE — 84132 ASSAY OF SERUM POTASSIUM: CPT

## 2023-10-29 PROCEDURE — 2500000001 HC RX 250 WO HCPCS SELF ADMINISTERED DRUGS (ALT 637 FOR MEDICARE OP): Performed by: STUDENT IN AN ORGANIZED HEALTH CARE EDUCATION/TRAINING PROGRAM

## 2023-10-29 PROCEDURE — 99232 SBSQ HOSP IP/OBS MODERATE 35: CPT | Performed by: INTERNAL MEDICINE

## 2023-10-29 PROCEDURE — 82805 BLOOD GASES W/O2 SATURATION: CPT

## 2023-10-29 PROCEDURE — 2500000005 HC RX 250 GENERAL PHARMACY W/O HCPCS: Performed by: STUDENT IN AN ORGANIZED HEALTH CARE EDUCATION/TRAINING PROGRAM

## 2023-10-29 PROCEDURE — 2500000005 HC RX 250 GENERAL PHARMACY W/O HCPCS

## 2023-10-29 PROCEDURE — 82947 ASSAY GLUCOSE BLOOD QUANT: CPT

## 2023-10-29 PROCEDURE — 36415 COLL VENOUS BLD VENIPUNCTURE: CPT

## 2023-10-29 PROCEDURE — S4991 NICOTINE PATCH NONLEGEND: HCPCS | Performed by: STUDENT IN AN ORGANIZED HEALTH CARE EDUCATION/TRAINING PROGRAM

## 2023-10-29 PROCEDURE — 85520 HEPARIN ASSAY: CPT

## 2023-10-29 PROCEDURE — 2500000002 HC RX 250 W HCPCS SELF ADMINISTERED DRUGS (ALT 637 FOR MEDICARE OP, ALT 636 FOR OP/ED): Performed by: STUDENT IN AN ORGANIZED HEALTH CARE EDUCATION/TRAINING PROGRAM

## 2023-10-29 RX ORDER — NITROGLYCERIN 0.4 MG/1
0.4 TABLET SUBLINGUAL EVERY 5 MIN PRN
Status: DISCONTINUED | OUTPATIENT
Start: 2023-10-29 | End: 2023-10-29

## 2023-10-29 RX ORDER — LIDOCAINE HYDROCHLORIDE 20 MG/ML
1 JELLY TOPICAL ONCE
Status: COMPLETED | OUTPATIENT
Start: 2023-10-29 | End: 2023-10-29

## 2023-10-29 RX ORDER — NITROGLYCERIN 0.4 MG/1
TABLET SUBLINGUAL
Status: COMPLETED
Start: 2023-10-29 | End: 2023-10-29

## 2023-10-29 RX ORDER — NITROGLYCERIN 0.4 MG/1
0.4 TABLET SUBLINGUAL EVERY 5 MIN PRN
Status: DISCONTINUED | OUTPATIENT
Start: 2023-10-29 | End: 2023-10-30

## 2023-10-29 RX ORDER — METOPROLOL TARTRATE 25 MG/1
12.5 TABLET, FILM COATED ORAL 2 TIMES DAILY
Status: DISCONTINUED | OUTPATIENT
Start: 2023-10-29 | End: 2023-10-31

## 2023-10-29 RX ORDER — DEXTROSE MONOHYDRATE 50 MG/ML
100 INJECTION, SOLUTION INTRAVENOUS CONTINUOUS
Status: DISCONTINUED | OUTPATIENT
Start: 2023-10-29 | End: 2023-10-29

## 2023-10-29 RX ADMIN — ALBUTEROL SULFATE 2.5 MG: 2.5 SOLUTION RESPIRATORY (INHALATION) at 09:10

## 2023-10-29 RX ADMIN — Medication 8000 UNITS: at 10:10

## 2023-10-29 RX ADMIN — ESOMEPRAZOLE MAGNESIUM 40 MG: 40 FOR SUSPENSION ORAL at 10:10

## 2023-10-29 RX ADMIN — SODIUM CHLORIDE 30 MG/ML INHALATION SOLUTION 3 ML: 30 SOLUTION INHALANT at 15:16

## 2023-10-29 RX ADMIN — NITROGLYCERIN 0.4 MG: 0.4 TABLET SUBLINGUAL at 13:04

## 2023-10-29 RX ADMIN — SODIUM CHLORIDE 30 MG/ML INHALATION SOLUTION 3 ML: 30 SOLUTION INHALANT at 20:19

## 2023-10-29 RX ADMIN — FOLIC ACID 1 MG: 1 TABLET ORAL at 10:10

## 2023-10-29 RX ADMIN — METOPROLOL TARTRATE 12.5 MG: 25 TABLET, FILM COATED ORAL at 10:10

## 2023-10-29 RX ADMIN — ACETAMINOPHEN 650 MG: 325 TABLET ORAL at 20:34

## 2023-10-29 RX ADMIN — DEXTROSE MONOHYDRATE 100 ML/HR: 50 INJECTION, SOLUTION INTRAVENOUS at 02:29

## 2023-10-29 RX ADMIN — THIAMINE HCL TAB 100 MG 100 MG: 100 TAB at 10:10

## 2023-10-29 RX ADMIN — HEPARIN SODIUM 1300 UNITS/HR: 10000 INJECTION, SOLUTION INTRAVENOUS at 13:27

## 2023-10-29 RX ADMIN — LIDOCAINE 1 PATCH: 4 PATCH TOPICAL at 10:10

## 2023-10-29 RX ADMIN — ALBUTEROL SULFATE 2.5 MG: 2.5 SOLUTION RESPIRATORY (INHALATION) at 15:13

## 2023-10-29 RX ADMIN — ALBUTEROL SULFATE 2.5 MG: 2.5 SOLUTION RESPIRATORY (INHALATION) at 20:19

## 2023-10-29 RX ADMIN — Medication 1 TABLET: at 10:10

## 2023-10-29 RX ADMIN — METOPROLOL TARTRATE 12.5 MG: 25 TABLET, FILM COATED ORAL at 20:34

## 2023-10-29 RX ADMIN — SODIUM CHLORIDE 30 MG/ML INHALATION SOLUTION 3 ML: 30 SOLUTION INHALANT at 09:12

## 2023-10-29 RX ADMIN — SODIUM CHLORIDE 30 MG/ML INHALATION SOLUTION 3 ML: 30 SOLUTION INHALANT at 01:43

## 2023-10-29 RX ADMIN — POLYETHYLENE GLYCOL 3350 17 G: 17 POWDER, FOR SOLUTION ORAL at 10:10

## 2023-10-29 RX ADMIN — LIDOCAINE HYDROCHLORIDE 1 APPLICATION: 20 JELLY TOPICAL at 10:52

## 2023-10-29 RX ADMIN — Medication 6 MG: at 20:34

## 2023-10-29 RX ADMIN — Medication: at 09:12

## 2023-10-29 RX ADMIN — Medication 1 PATCH: at 10:10

## 2023-10-29 ASSESSMENT — PAIN SCALES - GENERAL
PAINLEVEL_OUTOF10: 5 - MODERATE PAIN
PAINLEVEL_OUTOF10: 3
PAINLEVEL_OUTOF10: 0 - NO PAIN

## 2023-10-29 ASSESSMENT — PAIN - FUNCTIONAL ASSESSMENT
PAIN_FUNCTIONAL_ASSESSMENT: 0-10
PAIN_FUNCTIONAL_ASSESSMENT: CPOT (CRITICAL CARE PAIN OBSERVATION TOOL)
PAIN_FUNCTIONAL_ASSESSMENT: 0-10
PAIN_FUNCTIONAL_ASSESSMENT: 0-10

## 2023-10-29 NOTE — PROGRESS NOTES
Critical Care Daily Progress      Subjective   Patient is a 68 y.o. male admitted on 10/11/2023  7:54 PM with the following indication(s) for ICU care hyponatremia, altered mental status with multifactorial etiology.     Interval History: NAEON. Somnolent this morning on rounds, able to nod or shake his head yes/no in response to about half of the questions asked of him. ABG drawn at bedside d/t short shallow breathing on 30L on 60% FiO2. Denies chest or abdominal pain.     Scheduled Medications:   albuterol, 2.5 mg, nebulization, 4x daily  [Held by provider] atorvastatin, 40 mg, oral, Daily  calcium gluconate, 2 g, intravenous, Once  ergocalciferol, 8,000 Units, oral, Daily  esomeprazole, 40 mg, nasoduodenal tube, Daily before breakfast  folic acid, 1 mg, oral, Daily  lidocaine, 1 Application, urethral, Once  lidocaine, 1 patch, transdermal, Daily  melatonin, 6 mg, oral, Nightly  metoprolol tartrate, 12.5 mg, oral, BID  multivitamin with minerals, 1 tablet, oral, Daily  nicotine, 1 patch, transdermal, Daily  polyethylene glycol, 17 g, oral, Daily  sodium chloride, 3 mL, nebulization, q6h SAM  thiamine, 100 mg, oral, Daily      Continuous Medications:   heparin, 0-4,500 Units/hr, Last Rate: 1,300 Units/hr (10/28/23 1517)      PRN Medications:   PRN medications: acetaminophen, dextrose 10 % in water (D10W), dextrose, glucagon, heparin, oxyCODONE, oxygen, sennosides, white petrolatum    Objective   Vitals:  Most Recent:  Visit Vitals  /77   Pulse 103   Temp 36.5 °C (97.7 °F)   Resp (!) 32        24hr Min/Max:  Temp  Min: 36.5 °C (97.7 °F)  Max: 36.9 °C (98.4 °F)  Pulse  Min: 97  Max: 142  BP  Min: 108/73  Max: 137/96  Resp  Min: 21  Max: 42  SpO2  Min: 91 %  Max: 98 %      GEN: In NAD, somnolent  CV: Tachycardic, no m/r/g. Pulses 2+ and equal in both upper and lower extremities.   PULM: Coarse breath sounds b/l, rhonchi LLL, improved  ABD: Soft, tender to palpation b/l flank  NEURO: A&Ox3  MSK: no joint swelling  grossly noted  EXT: no LE edema  SKIN: warm and dry, no lesions grossly noted        Intake/Output Summary (Last 24 hours) at 10/29/2023 1047  Last data filed at 10/29/2023 0600  Gross per 24 hour   Intake 1626.67 ml   Output 750 ml   Net 876.67 ml    BM x 3 past 24 hours    LDA:  PIV x 3 in R arm, Dobhoff.      LABS AND IMAGING     Results from last 7 days   Lab Units 10/29/23  0832 10/29/23  0235 10/28/23  0648 10/28/23  0414 10/27/23  0948 10/27/23  0446   SODIUM mmol/L 130* 134*   < >  --    < > 123*   POTASSIUM mmol/L 4.1 4.4   < >  --    < > 4.9   CHLORIDE mmol/L 94* 93*   < >  --    < > 86*   CO2 mmol/L 23 23   < >  --    < > 23   BUN mg/dL 143* 174*   < >  --    < > 63*   CREATININE mg/dL 1.24 1.42*   < >  --    < > 0.83   WBC AUTO x10*3/uL  --  29.0*  --  26.3*  --  20.9*   HEMOGLOBIN g/dL  --  13.9  --  14.3  --  13.4*   PLATELETS AUTO x10*3/uL  --  338  --  374  --  309    < > = values in this interval not displayed.     --> 310 --> 377  ALT 64--> 127 --> 143  AST 87--> 218--> 180  Tbili (10/29) 0.5  GGT (10/25) 306    RUQ US from 10/25 showing biliary sludge, no wall thickening, duct dilatation. Mild hepatomegaly noted.    CXR 10/28:  1.  Status post extubation. Continued left basilar  consolidation/effusion and correlate with underlying  pneumonia/aspiration       Assessment/Plan     Molina Godinez is a 68 year old male with PMHx of COPD, HTN, AUD and opioid use disorder, HCV, chronic PE who was seen in MICU 10/13-10/16 for inability to walk 2/2 suspected alcohol use disorder. ICU course complicated by hypertension, altered mental status, and hyponatremia. Transferred back to MICU 10/17 after he was found lethargic and hypoxic. Now intubated and sedated, bronchial washings growing stenotrophomonas.      NEURO/PSYCH  #AMS- fluctuating, unclear etiology as previously suspected alcohol withdrawal but could be 2/2 chronic hypoxia and retention vs hyponatremia or other etiology. Previously  intubated/sedated, extubated on 10/27.   - CT head without pathology from 10/11  - Initially had visual and tactile hallucinations, have since resolved. Suspect metabolic etiology of fluctuations in mental status, i/s/o acutely increased oxygen requirement.  - Largely resolved     #Bilateral LE weakness  -Likely chronic, had weakness during 8/2023 admission; less likely GBS  -CT head from 10/11 without pathology, MRI spine stopped early due to pain (on 10/11)  -Patient able to move bilateral lower extremities  -Repeat MRI brain/total spine on 10/20 unremarkable for acute process  PLAN:  -May follow up with neurology outpatient  -ID consulted regarding weakness and positive Syphilis Ab. Pt was treated back in 1998 but rec MRI spine given concern of abscess, MRI negative for acute process  -Neurology consulted. Suspicion that weakness is likely a peripheral (LMN + muscle) inflammatory or degenerative disorder considering distribution and chronology.      - recommend EMG/NGS after patient is downgraded      - low suspicion for GBS    CV  #HTN- resolved  - home amlodipine 5mg, atenolol 50mg  - Initially started on Cardene drip, transitioned to Labetalol prior to resolution  - home atenolol restarted on 10/28, switched to metop 12.5 BID on 10/29 I/s/o renal function    #Tachycardia  - Sinus rhythm  - Etiology includes pain, infection, hypovolemia  - Poor correlatoin with fentanyl pushes, less suspicious for pain etiology  - home atenolol switched to metop 12.5     #CAD  - home atorvastatin 40mg held in the setting of elevated transaminases     #Chest pain- resolved  - New onset chest pain 10/28, had hyperkalemia on RFP  - RFP rechecked, K wnl  - Trop 14  - ECG without acute changes, stable from prior  - Given sublingual nitroglycerin, but had resolution of pain prior to administration.  - Some concern of uremic pericarditis but unlikely. No pericardial rub appreciated on exam.  [ ] continue to monitor    PULM  #CAP-  resolved  -Urine strep positive, unsure if active or chronic infection  -MRSA negative, Urine legionella negative  -CTX 5 day course completed 10/17    #L lung consolidation, steno HAP  Initially treated for CAP given positive urine strep with CTX 10/13-10/17, one day of Zosyn10/17-10/17  Leukocytosis stable ~24  C/f HAP vs incomplete resolution of previous PNA  -S/p Bronch 10/20 with purulent fluid with bronchial washings sent for cx  -Bronchial washings with stenotrophomonas susceptible to Bactrim  -Bactrim 10/22-10/28    #Suspected PAH  - Echo with atrial septal aneurysm, positive bubble study  [] Will need outpatient workup, previously some suspicion for PAH but will need further evaluation    #Severe COPD  #Mucus plugging  -8/2023 FEV1 42%  - CT C/A/P with RLL mucus plug  - CXR 10/28 with L sided pleural effusion vs mucus plugging  PLAN:  [] Continue aggressive RT: Albuterol q6h, chest PT, respiratory hygiene  [] Ezpap, incentive spirometer  [] 3% NS neb q6h  [] Encourage sitting, mobility as able. PT following     #Subsegmental PE  -Requires anticoagulation for 3 months (per vasc med 8/2023), on home apixaban 5mg BID  PLAN:  -Heparin gtt    GI  #HepC  -treated, 8/2023 HCV RNA undetected     #Nutrition  - Dobhoff in place 10/17  - Nutrition following, appreciate recs  PLAN:  - TF with TwoCal HN at goal rate of 35ml/hr  - 100mg Thiamine, 1mg Folic acid, 8000u Vit D2    #Elevated transaminases, hx of treated HCV- increasing, semi stable  - LFTs rising over past few days, predominantly ALP/AST/ALT with normal bili  - RUQ US with steatosis, sludge in GB  - Sulfa drug induced vs intrinsic process, though Bactrim typically takes 2 weeks for hepatotoxicity  [] Trend LFTs     /RENAL  #Hyponatremia - Likely SIADH 2/2 COPD exacerbation but unclear if additional etiologies- improving  - Na as low as 120, slowly improving, 134 on 10/29/23  - check Na and urea q4hr  - D/c urea in setting of BUN of 172 and AMS  - place  marie for accurate I&Os in setting of strict fluid monitoring  - Free water flushes 100ml BID for further fluid restriction given hyponatremia thought to be 2/2 SIADH  - Daily urine osm, lytes, urea  [] Nephro following, appreciate recs    #C/f refeeding syndrome- resolved  - Hypokalemia 2.7, Phos 1.9, Ca 5.2  - Electrolytes repleted, will monitor    #Hyperkalemia - resolved  - K 7.9, rechecked to 4.7  - 4.4 on 10/29    #Asymptomatic uremia  #KRYSTEN  - BUN elevating over past few days, likely 2/2 urea packets, KRSYTEN, TF  - Fluid restricted, likely pre-renal KRYSTEN  [] Monitor for uremic symptoms, loss of appetite, hypoactive changes in mental status, nausea, pericarditis       ENDOCRINE  #Secondary HyperPTH  -8/28 .7  -25-hydroxy vit D 8, 1,25 Vit D 44.4  PLAN:  Vit D2 8000u liquid daily     ID  #Leukocytosis I/s/o recently treated strep pneumo CAP- increasing  - Completed CTX 10/13-10/17  - Zosyn 10/20-10/24, Bactrim 10/22-10/28, Levaquin 10/23-10/25  - Bronchial washings with stenotrophomonas susceptible to Bactrim  - Leukocytosis increased today (29 from 26), will continue to monitor  - Restart bactrim if signs of sepsis    HEME/ONC  #Leukocytosis, intermittently ranging 18-24  -Likely 2/2 to PNA  -worsening today (29 from 26).   -restart bactrim if developing signs of sepsis.   [] Monitor WBC, vitals       MSK/RHEUM   #Chronic LE weakness  -Likely chronic from disuse and alcohol, unlikely GBS since no paralysis  -possible EMG once downgraded, neuro concern for peripheral (LMN + muscle) inflammatory or degenerative disorder considering distribution and chronology     Vent/Oxy: HFNC  Pressors/Drips: Heparin gtt  Abx: none  F: Restricted  E: replete PRN  N: TF via Dobhoff   A: PIV R arm x 3, Dobhoff. Marie to be placed today for accurate I&Os  DVT ppx: heparin gtt  GI ppx: Pantoprazole 40mg daily     Code status: Full Code (confirmed)  NOK: Tonie (sister) [phone]; Nahomi (God-sister) [phone]; Sarah Godinez  (sister) [phone]      Arianna Bills,   Family Med, PGY-1

## 2023-10-29 NOTE — PROGRESS NOTES
Patient seen, examined on rounds this AM  No acute events    Tachy improved  Hemodynamically stable  Nonoliguric  Satting well on high flow  No tremor No rub  Confused, sleepy    Nonoliguric KRYSTEN, significant azotemia  Hyponatremia, improving though want to avoid overly rapid correction    ABG  Stop urea  IV D5W  Follow serial Na q 4 hours  Strict I&Os, follow urine output closely given multiple derangements  Daily urine osms  Would favor metoprolol over atenolol given accumulation in KRYSTEN  Discussed with ICU team

## 2023-10-29 NOTE — CONSULTS
NEUROLOGY CONSULTATION NOTE  Subjective   Consult Question: evaluation of diffuse weakness  History Of Presenting Illness  Molina Godinez is a 68 y.o. right handed male with a history notable for HTN, severe COPD, chronic PE, PSUD (EtOH, opiates), and HCV who is admitted to the [facility] MICU for acute hypoxemic RF in the setting of CAP & HAP. Neurology is re-engaged for diffuse weakness.    Patient has had a long stay at [facility] dating to 10/11/23, where he presented to the ED complaining of walking difficulties. He was evaluated by neurology at that time, where his exam was notable for symmetric fluctuating BUE weakness (4/5 proximally; 4-5/5 distally), symmetric fluctuating BLE weakness (4/5 proximally, 3/5 distally), diminished BLE sensation to pinprick, and global areflexia. Observation and serum workup was indicated at that time d/t fluctuating exam. Patient was admitted to the hospitalist service and quickly upgraded to MICU for encephalopathy and PNA ultimately requiring intubation.    Given a history of syphilis (treated in 1980s), ID was consulted and recommended MRI Neuraxis to evaluate for evidence of neurosyphilis; these were obtained on 10/20/23. I have personally read and reviewed the MRI Brain and MRI C/T/L-Spine. These reveal 1) moderate subcortical cerebral T2/FLAIR hyperintensities with leukoaraiosis suggestive of chronic microvascular disease, 2) mild-moderate C2-C4 canal stenosis (most prominent at the C2/3 and C3/4 disc bulges) without cord changes, 3) lateral disc protrusions most pronounced C8-T1, and 4) diffuse epidural lipomatosis without any significant cord compression.      ROS:  A comprehensive review-of-systems was obtained and negative unless noted above in the HPI.    Past Medical History  History reviewed. No pertinent past medical history.  Surgical History  History reviewed. No pertinent surgical history.  Social History  Social History     Tobacco Use    Smoking status: Every Day      "Packs/day: .5     Types: Cigarettes   Substance Use Topics    Alcohol use: Yes     Comment: 1.5 pints Ju daily     Allergies  Aspirin and Nsaids (non-steroidal anti-inflammatory drug)      =========  Medications  Scheduled medications  albuterol, 2.5 mg, nebulization, 4x daily  atenolol, 50 mg, oral, Daily  [Held by provider] atorvastatin, 40 mg, oral, Daily  calcium gluconate, 2 g, intravenous, Once  ergocalciferol, 8,000 Units, oral, Daily  esomeprazole, 40 mg, nasoduodenal tube, Daily before breakfast  folic acid, 1 mg, oral, Daily  lidocaine, 1 patch, transdermal, Daily  melatonin, 6 mg, oral, Nightly  multivitamin with minerals, 1 tablet, oral, Daily  nicotine, 1 patch, transdermal, Daily  polyethylene glycol, 17 g, oral, Daily  sodium chloride, 3 mL, nebulization, q6h SAM  sulfamethoxazole-trimethoprim, 320 mg of trimethoprim, nasogastric tube, q8h  thiamine, 100 mg, oral, Daily  urea, 30 g, oral, BID      Continuous medications  heparin, 0-4,500 Units/hr, Last Rate: 1,300 Units/hr (10/28/23 1517)      PRN medications  PRN medications: acetaminophen, dextrose 10 % in water (D10W), dextrose, glucagon, heparin, nitroglycerin, oxyCODONE, oxygen, sennosides, white petrolatum    =========      Objective   Vital Signs  /81   Pulse (!) 136   Temp 36.9 °C (98.4 °F) (Temporal)   Resp (!) 32   Ht 1.702 m (5' 7.01\")   Wt 49.7 kg (109 lb 9.1 oz)   SpO2 96%   BMI 17.16 kg/m²     Physical Exam  GENERAL: laying in bed comfortably; on AirVo. In NAD.  HEART: RRR on monitor.  PSYCHIATRIC/MSE: conversational; full euthymic affect; logical thought process; cognition intact.  NEUROLOGICAL:  Cognitive Status Exam:  Orientation: alert and oriented to person, place, time, and situation  Language: expression, naming, repetition, and comprehension intact             Information/Knowledge: believes Jose Ramon Moreno is current president and that Kin Mcgraw was the prior president  Concentration: can remain focused during " interview    Cranial Nerves:  II: pupils-PERRL (4 -->2) bilaterally      VF-full   III, IV, VI: EOMI with smooth pursuits and no nystagmus  V: intact to LT  VII: intact facial strength and symmetry; nasolabial folds symmetric; no facial droop  VIII: intact hearing to conversation  IX and X: mild flaccid dysarthria, worst with alveolar/postalveolar consonants  XI: SCM and trapezius have 4/5 strength  XII: midline tongue position at rest and intact movement, bulk, and strength    Fundoscopic (nondilated exam with direct ophthalmoscopy):  Deferred    Motor:   Bulk: Diminished    Tone: Diminished  Tremor: Absent  Other: No adventitious movements.     Strength:    R L  Deltoid  3 2  Biceps  4 3  Triceps  3 2  Wrist Flex 3 2  Wrist Ext  3 3    4- 3  FDI  3 3  ADM  3 3  APB  3 3  Iliopsoas  2 2  Quadriceps 2 2  Hamstrings 3 2  TA  2 2  Gastroc 3 2    Reflexes:    R L  Biceps  abs abs  Triceps  tr abs  Brachioradialis abs 1+  Patellar  abs abs  Achilles abs abs                                 Toes: flexor plantar bilaterally  Feng's: absent  Ankle Clonus: absent    Sensory:   Intact and symmetric to light touch & pinprick    Coordination:   Unable to test d/t weakness    Rapid Movements:  Unable to test d/t weakness    Gait:   Unable to test d/t weakness       Recent/Relevant Labs:        BNP   Date/Time Value Ref Range Status   08/27/2023 09:07  (H) 0 - 99 pg/mL Final     Comment:     .  <100 pg/mL - Heart failure unlikely  100-299 pg/mL - Intermediate probability of acute heart  .               failure exacerbation. Correlate with clinical  .               context and patient history.    >=300 pg/mL - Heart Failure likely. Correlate with clinical  .               context and patient history.   Biotin interference may cause falsely decreased results.   Patients taking a Biotin dose of up to 5 mg/day should   refrain from taking Biotin for 24 hours before sample   collection. Providers may contact their local  laboratory   for further information.               Recent/Relevant Imaging:  MR brain w and wo IV contrast    Result Date: 10/21/2023  Interpreted By:  Leslie Olson  and [place] Ya[place] STUDY: MR BRAIN W AND WO IV CONTRAST; MR LUMBAR SPINE W AND WO IV CONTRAST; MR THORACIC SPINE W AND WO IV CONTRAST; MR CERVICAL SPINE W AND WO IV CONTRAST;  10/20/2023 11:41 pm   INDICATION: Signs/Symptoms:Altered mental status with b/l LE weakness; Signs/Symptoms:Altered mental status, b/l LE weakness, concern for epidural abscess; Signs/Symptoms:altered mental status, b/l LE weakness, c/f epidural abscess; Signs/Symptoms:Altered mental status, b/l LE weakness, concern for spinal/epidural abscess.   COMPARISON: CT head, 10/11/2023 Thoracic and lumbar spine, 10/11/2023 CT cervical spine, 08/26/2023   ACCESSION NUMBER(S): [id number]; [id number]; [id number]; [id number]   ORDERING CLINICIAN: THOMAS AU   TECHNIQUE: Multiplanar, multi-sequence images of the brain, cervical, thoracic, and lumbar spine were obtained before and after the intravenous administration of 9 mL Dotarem gadolinium.   FINDINGS: Evaluation is markedly limited due to patient motion.   Diffusion weighted images show no evidence of acute ischemic infarct.   There is no evidence of an acute intraparenchymal hemorrhage, mass, mass-effect, midline shift or extra-axial fluid collection. No abnormal parenchymal enhancement.   Nonspecific subcortical and periventricular T2/FLAIR hyperintensities may represent sequelae of chronic small vessel ischemic changes.   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   The ventricular size and cerebral volume are age-concordant.   The orbits and globes are unremarkable.   The paranasal sinuses show no hemorrhage or air-fluid levels.   Bilateral mastoid effusions, left more than right.     Cervical Spine:   Incidental note is made of an enteric tube.   PARASPINAL SOFT TISSUES: No significant abnormality.    SPINAL CORD: The cervical cord has a normal caliber and signal intensity.   BONE MARROW/VERTEBRAL BODIES: Overall bone marrow signal is within normal limits. Vertebral body heights are maintained. No acute fracture.   ALIGNMENT: There is reversal of the normal cervical lordosis and minimal anterolisthesis of C3-C4.   C1-C2: The atlantodental joint is intact. The predental space is not widened.   C2-C3: Disc osteophyte complex, uncovertebral joint hypertrophy and facet arthrosis. Mild spinal canal and mild-to-moderate bilateral neural foraminal stenosis.   C3-C4: Disc osteophyte complex, uncovertebral joint hypertrophy and facet arthrosis. Moderate to severe spinal canal and neural foraminal stenosis. There is mild flattening of the cervical cord question very subtle increased cord signal on the STIR sequence.   C4-C5: Disc osteophyte complex, uncovertebral joint hypertrophy and facet arthrosis. No significant spinal canal stenosis. Moderate bilateral neural foraminal stenosis.   C5-C6: Disc osteophyte complex, uncovertebral joint hypertrophy and facet arthrosis. Mild spinal canal stenosis. Moderate neural foraminal narrowing, left greater than right.   C6-C7: Disc osteophyte complex, uncovertebral joint hypertrophy and facet arthrosis. No significant spinal canal stenosis. Moderate to severe neural foraminal stenosis, right greater than left.   C7-T1: Disc osteophyte complex and uncovertebral joint hypertrophy and facet arthrosis. Mild spinal canal stenosis. Moderate to severe bilateral neural foraminal stenosis.     Thoracic spine:   Visualized chest/abdomen: Scattered consolidative opacities in the left lung with a small left pleural effusion.   SPINAL CORD: The thoracic cord has a normal caliber and signal intensity. No cord compression.   There is prominent posterior epidural fat. No epidural fluid collection is noted.   No abnormal enhancement. There is incomplete fat suppression within the lower cervical cord  on the postcontrast images. No significant spinal canal or neural foraminal stenosis.   BONE MARROW/VERTEBRAL BODIES: Overall bone marrow signal is within normal limits. There is compression of the superior endplate of T11 no associated increased T2/STIR signal. This was present on prior CT chest 10/16/2023. There is a proximally 25% loss of vertebral body height anteriorly and no osseous retropulsion into the spinal canal. There is subtle height loss of the superior endplate of the T3 vertebral body with no associated T2/STIR signal. This is also unchanged from the prior CT chest 10/16/2023. The remaining vertebral body heights are maintained. No acute fracture. Intervertebral disc height loss and disc desiccation at T11-T12.   ALIGNMENT: No spondylolisthesis.     Lumbar spine:   VISUALIZED ABDOMEN: T1 hyperintense lesion in the left upper renal pole and T1 hypointense, T2 hyperintense lesions in the kidneys bilaterally. These may represent cysts and/or hemorrhagic cysts. Please see dedicated imaging of the abdomen for further evaluation.   SPINAL CORD/CONUS: The conus medullaris terminates at L1-L2. There is no abnormal signal or enhancement within the distal cord or nerve roots.   BONE MARROW/VERTEBRAL BODIES: There are 5 lumbar type vertebral bodies. Overall bone marrow signal is within normal limits. Vertebral body heights are maintained. No acute fracture. Multilevel intervertebral disc height loss, most significant at L4-L5 and L5-S1.   ALIGNMENT: There is minimal retrolisthesis of L5-S1.   SPINAL CANAL, INTERVERTEBRAL DISCS, AND NEURAL FORAMINA:   T11-T12: No significant disc bulge, canal stenosis, or foraminal stenosis.   T12-L1: No significant disc bulge, canal stenosis, or foraminal stenosis.   L1-L2: No significant disc bulge, canal stenosis, or foraminal stenosis.   L2-L3: Trace bilateral paracentral disc protrusions. No spinal canal or neural foraminal stenosis.   L3-L4: Broad-based central disc bulge  which results in minimal narrowing of bilateral neural foramen. No spinal canal stenosis.   L4-L5: Broad-based central disc bulge which results in mild narrowing of bilateral neural foramen, right more than left. No spinal canal stenosis.   L5-S1: Posterior disc osteophyte complex with mild narrowing of bilateral neural foramen, right more than left. No spinal canal stenosis.         Brain: 1. No acute ischemic infarct or intracranial hemorrhage. No abnormal parenchymal enhancement. 2. Nonspecific subcortical and periventricular T2/FLAIR hyperintensities may represent sequelae of chronic small vessel ischemic changes. 3. Remote lacunar infarcts in the left thalamus.     Cervical spine: 1. Multilevel degenerative disc disease and facet arthrosis most pronounced at C3-C4 with moderate to severe spinal canal stenosis and moderate to severe bilateral neural foraminal stenosis. There is flattening of the cervical cord with question subtle increased cord signal on the STIR sequence. Please correlate clinically for symptoms of myelopathy. No abnormal cord enhancement. 2. Patchy consolidative opacities throughout the left lung with a small left pleural effusion. Correlate with dedicated chest imaging.   Thoracic spine: 1. No significant spinal canal or neural foraminal stenosis. No abnormal enhancement. 2. Nonacute minimal compression deformity at T3. Mild compression deformity at T11 with a proximally 25% loss of vertebral body height and no osseous retropulsion into the spinal canal. This was present on prior exam 10/16/2023.     Lumbar spine: 1. Multilevel lumbar spondylosis as detailed above. No spinal canal stenosis. 2. No abnormal signal or enhancement within the distal cord or nerve roots.     I personally reviewed the images/study and I agree with the findings as stated by resident physician Dr. Otilio Pate.   MACRO: None   Signed by: Leslie Olson 10/21/2023 1:29 AM Dictation workstation:   [id number]   No CT  head results found for the past 14 days    Other Recent/Relevant Studies:  Transthoracic Echo (TTE) Complete    Result Date: 10/17/2023   [facility], [address] [place] [address]                Tel [phone] and Fax [phone] TRANSTHORACIC ECHOCARDIOGRAM REPORT  Patient Name:      PIA RALPH        Reading Physician:    [id number] Kirit Cain MD Study Date:        10/17/2023           Ordering Provider:    [id number] JEAN BURNETT MRN/PID:           [id number]             Fellow: Accession#:        [id number]         Nurse: Date of Birth/Age: 1955 / 68 years Sonographer:          Nani Garcia RDCS Gender:            M                    Additional Staff: Height:            170.18 cm            Admit Date: Weight:            49.44 kg             Admission Status:     Inpatient -                                                               Routine BSA:               1.56 m2              Encounter#:           [id number]                                         [address] Blood Pressure: 136 /91 mmHg Study Type:    TRANSTHORACIC ECHO (TTE) COMPLETE Diagnosis/ICD: Encounter for screening for cardiovascular disorders-Z13.6 Indication:    Polycythemia CPT Code:      Echo Complete w Full Doppler-[id number] Patient History: Pertinent History: HTN and Hyperlipidemia. PHTN, Liver fibrosis, Hypoxemia,                    COPD, ETOH, Opiod usage,. Study Detail: The following Echo studies were performed: 2D, M-Mode, Doppler and               color flow. Technically challenging study due to poor acoustic               windows and body habitus. Definity used as a contrast agent for               endocardial border definition and agitated saline used as a               contrast agent for intraseptal flow evaluation. Total contrast               used for this procedure was 2.0 mL via IV push.  PHYSICIAN INTERPRETATION:  Left Ventricle: The left ventricular systolic function is hyperdynamic, with an estimated ejection fraction of 75%. There are no regional wall motion abnormalities. The left ventricular cavity size is normal. The left ventricular septal wall thickness is mildly increased. There is left ventricular concentric remodeling. Left ventricular diastolic filling was not assessed. Left Atrium: The left atrium is normal in size. A bubble study using agitated saline was performed. Bubble study is positive. There is evidence of an atrial septal aneurysm. Right Ventricle: The right ventricle is normal in size. There is normal right ventricular global systolic function. Right Atrium: The right atrium is normal in size. Aortic Valve: The aortic valve is trileaflet. There is minimal aortic valve cusp calcification. There is no aortic valve thickening. There is no evidence of aortic valve regurgitation. The peak instantaneous gradient of the aortic valve is 3.2 mmHg. Mitral Valve: The mitral valve is normal in structure. There is trace mitral valve regurgitation. Tricuspid Valve: The tricuspid valve is structurally normal. There is mild tricuspid regurgitation. The Doppler estimated RVSP is mildly elevated at 39.2 mmHg. Pulmonic Valve: The pulmonic valve is not well visualized. Pulmonic valve regurgitation was not assessed. Pericardium: There is no pericardial effusion noted. Aorta: The aortic root is normal. Systemic Veins: The inferior vena cava size appears small. In comparison to the previous echocardiogram(s): Compared with study from 8/29/2023, the RVSP today is mildly elevated, compared to moderately elevated in the prior echocardiogram (60 mmHg).  CONCLUSIONS:  1. Left ventricular systolic function is hyperdynamic with a 75% estimated ejection fraction.  2. The left ventricular septal wall thickness is mildly increased.  3. There is left ventricular concentric remodeling.  4. Atrial septal aneurysm present.  5. A bubble  study using agitated saline was performed. Bubble study is positive.  6. Mildly elevated RVSP.  7. Poorly visualized anatomical structures due to suboptimal image quality.  8. Compared with study from 8/29/2023, the RVSP today is mildly elevated, compared to moderately elevated in the prior echocardiogram (60 mmHg). QUANTITATIVE DATA SUMMARY: 2D MEASUREMENTS:                          Normal Ranges: Ao Root d:     2.50 cm   (2.0-3.7cm) IVSd:          1.20 cm   (0.6-1.1cm) LVPWd:         0.90 cm   (0.6-1.1cm) LVIDd:         3.20 cm   (3.9-5.9cm) LVIDs:         2.50 cm LV Mass Index: 62.2 g/m2 LV % FS        21.9 % LA VOLUME:                               Normal Ranges: LA Vol A4C:        37.5 ml    (22+/-6mL/m2) LA Vol A2C:        36.5 ml LA Vol BP:         37.9 ml LA Vol Index A4C:  24.0ml/m2 LA Vol Index A2C:  23.3 ml/m2 LA Vol Index BP:   24.2 ml/m2 LA Area A4C:       15.9 cm2 LA Area A2C:       15.3 cm2 LA Major Axis A4C: 5.8 cm LA Major Axis A2C: 5.5 cm LA Volume Index:   24.0 ml/m2 AORTA MEASUREMENTS:                      Normal Ranges: Ao Sinus, d: 3.15 cm (2.1-3.5cm) AORTIC VALVE:                         Normal Ranges: AoV Vmax:      0.89 m/s (<=1.7m/s) AoV Peak PG:   3.2 mmHg (<20mmHg) LVOT Max Dony:  0.63 m/s (<=1.1m/s) LVOT VTI:      10.50 cm LVOT Diameter: 2.00 cm  (1.8-2.4cm) AoV Area,Vmax: 2.24 cm2 (2.5-4.5cm2)  RIGHT VENTRICLE: RV s' 0.12 m/s TRICUSPID VALVE/RVSP:                             Normal Ranges: Peak TR Velocity: 3.01 m/s Est. RA Pressure: 3 mmHg RV Syst Pressure: 39.2 mmHg (< 30mmHg)  [id number] Kirit Cain MD Electronically signed on 10/17/2023 at 7:03:57 PM  ** Final **        =========  Assessment/Plan   Assessment:  Molina Godinez is a 68 y.o. right handed male with a history notable for HTN, severe COPD, chronic PE, PSUD (EtOH, opiates), and HCV who is admitted to the [facility] MICU for acute hypoxemic RF in the setting of CAP & HAP. Neurology is re-engaged for subacute diffuse weakness  beginning prior to admission which has significantly worsened in the setting of hospitalization. While patient's exam was initially notable for 4/5 weakness with some degree of embellishment, the current manifestation is significantly worse with dysarthria and diffuse inability to resist gravity. There is additionally areflexia and no obvious sensory manifestation. Considering distribution and chronology, hypothesis for this weakness is likely a peripheral (LMN + muscle) inflammatory or degenerative disorder. When considering differential diagnosis, critical illness polyneuropathy and myopathy is the most likely candidate, with other acute progressive neuropathies such as GBS remaining in the differential (though given lack of sensory component, favor MARTHA over AIDP). A subacute progressive myopathy is possible but considered less-likely given the areflexia and relatively equal distribution between proximal and distal structures. Despite mild/moderate central canal stenosis and epidural lipomatosis, neither imaging nor exam support a myelopathic origin at this time. Given patient is nearly a month out from symptom onset, electrodiagnosis may be the most prudent measure at this time to guide further treatment directions.    Impression: Diffuse Flaccid Weakness & Areflexia, CIPM vs GBS vs Deconditioning    Recommendations:  - consider EMG/NCS when patient is downgraded from MICU.   - will discuss utility of LP with CSF studies with attending in the AM  - if Dx thought to be GBS, will consider utility of PLEX given chronicity (since evidence only supports use within 4 wks of symptom onset)      Thank you for this consult. The patient's case will be staffed with the attending physician in the morning. Please note that these are preliminary recommendations; they are not finalized until this note is signed by the attending.    Hunter Montenegro MD PGY-3  [facility] Inpatient Neurology Team  General Neurology Pager [phone]

## 2023-10-29 NOTE — SIGNIFICANT EVENT
Molina Godinez is a 68 y.o. right handed male with a history notable for HTN, severe COPD, chronic PE, PSUD (EtOH, opiates), and HCV who is admitted to the [facility] MICU for acute hypoxemic RF in the setting of CAP & HAP. Neurology is re-engaged for subacute diffuse weakness beginning prior to admission which has significantly worsened in the setting of hospitalization. While patient's exam was initially notable for 4/5 weakness with some degree of embellishment, the current manifestation is significantly worse with dysarthria and diffuse inability to resist gravity. There is additionally areflexia and no obvious sensory manifestation.     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     Recommendations:  1) EMG would be unlikely to  at this time as his current presentation is much less likely and inconsistent with CIDP or GBS.   2) Stabilize underlying metabolic stressors, optimize nutrition, rehab with PT/OT.   3) Neurology will sign off.     Silvino Man DO  PGY-4 Neurology Team Sr.     Case staffed with general neurology attending Dr. Tejada.

## 2023-10-30 ENCOUNTER — APPOINTMENT (OUTPATIENT)
Dept: RADIOLOGY | Facility: HOSPITAL | Age: 68
End: 2023-10-30
Payer: COMMERCIAL

## 2023-10-30 LAB
ALBUMIN SERPL BCP-MCNC: 1.7 G/DL (ref 3.4–5)
ALBUMIN SERPL BCP-MCNC: 3.1 G/DL (ref 3.4–5)
ALBUMIN SERPL BCP-MCNC: 3.1 G/DL (ref 3.4–5)
ALBUMIN SERPL BCP-MCNC: 3.2 G/DL (ref 3.4–5)
ALBUMIN SERPL BCP-MCNC: 3.2 G/DL (ref 3.4–5)
ANION GAP SERPL CALC-SCNC: 15 MMOL/L
ANION GAP SERPL CALC-SCNC: 21 MMOL/L (ref 10–20)
ANION GAP SERPL CALC-SCNC: 22 MMOL/L (ref 10–20)
ANION GAP SERPL CALC-SCNC: 22 MMOL/L (ref 10–20)
ANION GAP SERPL CALC-SCNC: 27 MMOL/L (ref 10–20)
BASOPHILS # BLD AUTO: 0.16 X10*3/UL (ref 0–0.1)
BASOPHILS NFR BLD AUTO: 0.5 %
BUN SERPL-MCNC: 129 MG/DL (ref 6–23)
BUN SERPL-MCNC: 166 MG/DL (ref 6–23)
BUN SERPL-MCNC: 170 MG/DL (ref 6–23)
BUN SERPL-MCNC: 178 MG/DL (ref 6–23)
BUN SERPL-MCNC: 184 MG/DL (ref 6–23)
CALCIUM SERPL-MCNC: 5.3 MG/DL (ref 8.6–10.6)
CALCIUM SERPL-MCNC: 9 MG/DL (ref 8.6–10.6)
CALCIUM SERPL-MCNC: 9 MG/DL (ref 8.6–10.6)
CALCIUM SERPL-MCNC: 9.2 MG/DL (ref 8.6–10.6)
CALCIUM SERPL-MCNC: 9.4 MG/DL (ref 8.6–10.6)
CHLORIDE SERPL-SCNC: 114 MMOL/L (ref 98–107)
CHLORIDE SERPL-SCNC: 92 MMOL/L (ref 98–107)
CHLORIDE SERPL-SCNC: 92 MMOL/L (ref 98–107)
CHLORIDE SERPL-SCNC: 94 MMOL/L (ref 98–107)
CHLORIDE SERPL-SCNC: 94 MMOL/L (ref 98–107)
CHLORIDE UR-SCNC: <15 MMOL/L
CHLORIDE/CREATININE (MMOL/G) IN URINE: NORMAL
CO2 SERPL-SCNC: 15 MMOL/L (ref 21–32)
CO2 SERPL-SCNC: 20 MMOL/L (ref 21–32)
CO2 SERPL-SCNC: 22 MMOL/L (ref 21–32)
CO2 SERPL-SCNC: 23 MMOL/L (ref 21–32)
CO2 SERPL-SCNC: 23 MMOL/L (ref 21–32)
CREAT SERPL-MCNC: 1.51 MG/DL (ref 0.5–1.3)
CREAT SERPL-MCNC: 1.74 MG/DL (ref 0.5–1.3)
CREAT SERPL-MCNC: 2.01 MG/DL (ref 0.5–1.3)
CREAT SERPL-MCNC: 2.3 MG/DL (ref 0.5–1.3)
CREAT SERPL-MCNC: 2.32 MG/DL (ref 0.5–1.3)
CREAT UR-MCNC: 107.3 MG/DL (ref 20–370)
EOSINOPHIL # BLD AUTO: 0.02 X10*3/UL (ref 0–0.7)
EOSINOPHIL NFR BLD AUTO: 0.1 %
ERYTHROCYTE [DISTWIDTH] IN BLOOD BY AUTOMATED COUNT: 13.1 % (ref 11.5–14.5)
GFR SERPL CREATININE-BSD FRML MDRD: 30 ML/MIN/1.73M*2
GFR SERPL CREATININE-BSD FRML MDRD: 30 ML/MIN/1.73M*2
GFR SERPL CREATININE-BSD FRML MDRD: 36 ML/MIN/1.73M*2
GFR SERPL CREATININE-BSD FRML MDRD: 42 ML/MIN/1.73M*2
GFR SERPL CREATININE-BSD FRML MDRD: 50 ML/MIN/1.73M*2
GLUCOSE BLD MANUAL STRIP-MCNC: 105 MG/DL (ref 74–99)
GLUCOSE BLD MANUAL STRIP-MCNC: 131 MG/DL (ref 74–99)
GLUCOSE BLD MANUAL STRIP-MCNC: 139 MG/DL (ref 74–99)
GLUCOSE BLD MANUAL STRIP-MCNC: 147 MG/DL (ref 74–99)
GLUCOSE BLD MANUAL STRIP-MCNC: 157 MG/DL (ref 74–99)
GLUCOSE SERPL-MCNC: 105 MG/DL (ref 74–99)
GLUCOSE SERPL-MCNC: 141 MG/DL (ref 74–99)
GLUCOSE SERPL-MCNC: 151 MG/DL (ref 74–99)
GLUCOSE SERPL-MCNC: 183 MG/DL (ref 74–99)
GLUCOSE SERPL-MCNC: 84 MG/DL (ref 74–99)
HCT VFR BLD AUTO: 41.2 % (ref 41–52)
HGB BLD-MCNC: 14.1 G/DL (ref 13.5–17.5)
IMM GRANULOCYTES # BLD AUTO: 0.37 X10*3/UL (ref 0–0.7)
IMM GRANULOCYTES NFR BLD AUTO: 1.3 % (ref 0–0.9)
LYMPHOCYTES # BLD AUTO: 0.98 X10*3/UL (ref 1.2–4.8)
LYMPHOCYTES NFR BLD AUTO: 3.3 %
MCH RBC QN AUTO: 29.1 PG (ref 26–34)
MCHC RBC AUTO-ENTMCNC: 34.2 G/DL (ref 32–36)
MCV RBC AUTO: 85 FL (ref 80–100)
MONOCYTES # BLD AUTO: 1.95 X10*3/UL (ref 0.1–1)
MONOCYTES NFR BLD AUTO: 6.7 %
NEUTROPHILS # BLD AUTO: 25.8 X10*3/UL (ref 1.2–7.7)
NEUTROPHILS NFR BLD AUTO: 88.1 %
NRBC BLD-RTO: 0 /100 WBCS (ref 0–0)
OSMOLALITY UR: 455 MOSM/KG (ref 200–1200)
PHOSPHATE SERPL-MCNC: 2.8 MG/DL (ref 2.5–4.9)
PHOSPHATE SERPL-MCNC: 4.3 MG/DL (ref 2.5–4.9)
PHOSPHATE SERPL-MCNC: 4.9 MG/DL (ref 2.5–4.9)
PHOSPHATE SERPL-MCNC: 4.9 MG/DL (ref 2.5–4.9)
PHOSPHATE SERPL-MCNC: 5.1 MG/DL (ref 2.5–4.9)
PLATELET # BLD AUTO: 326 X10*3/UL (ref 150–450)
PMV BLD AUTO: 10 FL (ref 7.5–11.5)
POTASSIUM SERPL-SCNC: 3.6 MMOL/L (ref 3.5–5.3)
POTASSIUM SERPL-SCNC: 5 MMOL/L (ref 3.5–5.3)
POTASSIUM SERPL-SCNC: 5.3 MMOL/L (ref 3.5–5.3)
POTASSIUM SERPL-SCNC: 5.4 MMOL/L (ref 3.5–5.3)
POTASSIUM SERPL-SCNC: 5.9 MMOL/L (ref 3.5–5.3)
POTASSIUM UR-SCNC: 74 MMOL/L
POTASSIUM/CREAT UR-RTO: 69 MMOL/G CREAT
RBC # BLD AUTO: 4.85 X10*6/UL (ref 4.5–5.9)
SODIUM SERPL-SCNC: 131 MMOL/L (ref 136–145)
SODIUM SERPL-SCNC: 133 MMOL/L (ref 136–145)
SODIUM SERPL-SCNC: 133 MMOL/L (ref 136–145)
SODIUM SERPL-SCNC: 134 MMOL/L (ref 136–145)
SODIUM SERPL-SCNC: 140 MMOL/L (ref 136–145)
SODIUM UR-SCNC: 22 MMOL/L
SODIUM/CREAT UR-RTO: 21 MMOL/G CREAT
UFH PPP CHRO-ACNC: 0.7 IU/ML
UFH PPP CHRO-ACNC: 1 IU/ML
WBC # BLD AUTO: 29.3 X10*3/UL (ref 4.4–11.3)

## 2023-10-30 PROCEDURE — 31720 CLEARANCE OF AIRWAYS: CPT

## 2023-10-30 PROCEDURE — 85520 HEPARIN ASSAY: CPT

## 2023-10-30 PROCEDURE — 86706 HEP B SURFACE ANTIBODY: CPT

## 2023-10-30 PROCEDURE — 2500000004 HC RX 250 GENERAL PHARMACY W/ HCPCS (ALT 636 FOR OP/ED)

## 2023-10-30 PROCEDURE — 85025 COMPLETE CBC W/AUTO DIFF WBC: CPT

## 2023-10-30 PROCEDURE — 2500000002 HC RX 250 W HCPCS SELF ADMINISTERED DRUGS (ALT 637 FOR MEDICARE OP, ALT 636 FOR OP/ED): Performed by: STUDENT IN AN ORGANIZED HEALTH CARE EDUCATION/TRAINING PROGRAM

## 2023-10-30 PROCEDURE — 2500000005 HC RX 250 GENERAL PHARMACY W/O HCPCS: Performed by: STUDENT IN AN ORGANIZED HEALTH CARE EDUCATION/TRAINING PROGRAM

## 2023-10-30 PROCEDURE — 94640 AIRWAY INHALATION TREATMENT: CPT

## 2023-10-30 PROCEDURE — 99291 CRITICAL CARE FIRST HOUR: CPT

## 2023-10-30 PROCEDURE — 36415 COLL VENOUS BLD VENIPUNCTURE: CPT

## 2023-10-30 PROCEDURE — 2500000004 HC RX 250 GENERAL PHARMACY W/ HCPCS (ALT 636 FOR OP/ED): Mod: JZ

## 2023-10-30 PROCEDURE — 71045 X-RAY EXAM CHEST 1 VIEW: CPT | Performed by: RADIOLOGY

## 2023-10-30 PROCEDURE — P9045 ALBUMIN (HUMAN), 5%, 250 ML: HCPCS | Mod: JZ

## 2023-10-30 PROCEDURE — 2500000001 HC RX 250 WO HCPCS SELF ADMINISTERED DRUGS (ALT 637 FOR MEDICARE OP): Performed by: STUDENT IN AN ORGANIZED HEALTH CARE EDUCATION/TRAINING PROGRAM

## 2023-10-30 PROCEDURE — 87340 HEPATITIS B SURFACE AG IA: CPT

## 2023-10-30 PROCEDURE — 99232 SBSQ HOSP IP/OBS MODERATE 35: CPT

## 2023-10-30 PROCEDURE — S4991 NICOTINE PATCH NONLEGEND: HCPCS | Performed by: STUDENT IN AN ORGANIZED HEALTH CARE EDUCATION/TRAINING PROGRAM

## 2023-10-30 PROCEDURE — 82436 ASSAY OF URINE CHLORIDE: CPT

## 2023-10-30 PROCEDURE — 36556 INSERT NON-TUNNEL CV CATH: CPT | Mod: GC | Performed by: STUDENT IN AN ORGANIZED HEALTH CARE EDUCATION/TRAINING PROGRAM

## 2023-10-30 PROCEDURE — 84133 ASSAY OF URINE POTASSIUM: CPT

## 2023-10-30 PROCEDURE — 2500000001 HC RX 250 WO HCPCS SELF ADMINISTERED DRUGS (ALT 637 FOR MEDICARE OP)

## 2023-10-30 PROCEDURE — 83935 ASSAY OF URINE OSMOLALITY: CPT

## 2023-10-30 PROCEDURE — 36556 INSERT NON-TUNNEL CV CATH: CPT | Performed by: STUDENT IN AN ORGANIZED HEALTH CARE EDUCATION/TRAINING PROGRAM

## 2023-10-30 PROCEDURE — 80069 RENAL FUNCTION PANEL: CPT

## 2023-10-30 PROCEDURE — 2500000002 HC RX 250 W HCPCS SELF ADMINISTERED DRUGS (ALT 637 FOR MEDICARE OP, ALT 636 FOR OP/ED)

## 2023-10-30 PROCEDURE — 84100 ASSAY OF PHOSPHORUS: CPT

## 2023-10-30 PROCEDURE — 82947 ASSAY GLUCOSE BLOOD QUANT: CPT

## 2023-10-30 PROCEDURE — 2020000001 HC ICU ROOM DAILY

## 2023-10-30 PROCEDURE — 71045 X-RAY EXAM CHEST 1 VIEW: CPT | Mod: FY

## 2023-10-30 PROCEDURE — 94668 MNPJ CHEST WALL SBSQ: CPT

## 2023-10-30 PROCEDURE — 5A1D70Z PERFORMANCE OF URINARY FILTRATION, INTERMITTENT, LESS THAN 6 HOURS PER DAY: ICD-10-PCS

## 2023-10-30 RX ORDER — ALBUMIN HUMAN 50 G/1000ML
SOLUTION INTRAVENOUS
Status: COMPLETED
Start: 2023-10-30 | End: 2023-10-30

## 2023-10-30 RX ORDER — HEPARIN SODIUM 1000 [USP'U]/ML
INJECTION, SOLUTION INTRAVENOUS; SUBCUTANEOUS
Status: DISPENSED
Start: 2023-10-30 | End: 2023-10-31

## 2023-10-30 RX ORDER — ALBUMIN HUMAN 50 G/1000ML
12.5 SOLUTION INTRAVENOUS ONCE
Status: COMPLETED | OUTPATIENT
Start: 2023-10-30 | End: 2023-10-30

## 2023-10-30 RX ORDER — TALC
9 POWDER (GRAM) TOPICAL NIGHTLY
Status: DISCONTINUED | OUTPATIENT
Start: 2023-10-30 | End: 2023-10-30

## 2023-10-30 RX ORDER — ACETAMINOPHEN 500 MG
10 TABLET ORAL NIGHTLY
Status: DISCONTINUED | OUTPATIENT
Start: 2023-10-30 | End: 2023-11-06

## 2023-10-30 RX ORDER — SODIUM CHLORIDE 0.9 % (FLUSH) 0.9 %
SYRINGE (ML) INJECTION
Status: DISPENSED
Start: 2023-10-30 | End: 2023-10-31

## 2023-10-30 RX ADMIN — ALBUTEROL SULFATE 2.5 MG: 2.5 SOLUTION RESPIRATORY (INHALATION) at 22:54

## 2023-10-30 RX ADMIN — ALBUTEROL SULFATE 2.5 MG: 2.5 SOLUTION RESPIRATORY (INHALATION) at 08:18

## 2023-10-30 RX ADMIN — POLYETHYLENE GLYCOL 3350 17 G: 17 POWDER, FOR SOLUTION ORAL at 09:28

## 2023-10-30 RX ADMIN — SODIUM CHLORIDE 30 MG/ML INHALATION SOLUTION 3 ML: 30 SOLUTION INHALANT at 22:55

## 2023-10-30 RX ADMIN — SODIUM CHLORIDE 30 MG/ML INHALATION SOLUTION 3 ML: 30 SOLUTION INHALANT at 16:45

## 2023-10-30 RX ADMIN — SODIUM CHLORIDE 30 MG/ML INHALATION SOLUTION 3 ML: 30 SOLUTION INHALANT at 03:42

## 2023-10-30 RX ADMIN — ALBUMIN HUMAN 12.5 G: 50 SOLUTION INTRAVENOUS at 22:00

## 2023-10-30 RX ADMIN — Medication 8000 UNITS: at 09:28

## 2023-10-30 RX ADMIN — SODIUM ZIRCONIUM CYCLOSILICATE 15 G: 5 POWDER, FOR SUSPENSION ORAL at 15:05

## 2023-10-30 RX ADMIN — ESOMEPRAZOLE MAGNESIUM 40 MG: 40 FOR SUSPENSION ORAL at 09:27

## 2023-10-30 RX ADMIN — ALBUMIN HUMAN 12.5 G: 0.05 INJECTION, SOLUTION INTRAVENOUS at 22:00

## 2023-10-30 RX ADMIN — FOLIC ACID 1 MG: 1 TABLET ORAL at 09:28

## 2023-10-30 RX ADMIN — Medication 0.3 L/MIN: at 16:00

## 2023-10-30 RX ADMIN — SODIUM CHLORIDE 30 MG/ML INHALATION SOLUTION 3 ML: 30 SOLUTION INHALANT at 08:19

## 2023-10-30 RX ADMIN — Medication 1 PATCH: at 09:28

## 2023-10-30 RX ADMIN — Medication 1 TABLET: at 09:27

## 2023-10-30 RX ADMIN — THIAMINE HCL TAB 100 MG 100 MG: 100 TAB at 09:28

## 2023-10-30 RX ADMIN — METOPROLOL TARTRATE 12.5 MG: 25 TABLET, FILM COATED ORAL at 09:28

## 2023-10-30 RX ADMIN — HEPARIN SODIUM 1300 UNITS/HR: 10000 INJECTION, SOLUTION INTRAVENOUS at 06:48

## 2023-10-30 RX ADMIN — ALBUMIN HUMAN 12.5 G: 0.05 INJECTION, SOLUTION INTRAVENOUS at 13:32

## 2023-10-30 RX ADMIN — ALBUTEROL SULFATE 2.5 MG: 2.5 SOLUTION RESPIRATORY (INHALATION) at 16:45

## 2023-10-30 RX ADMIN — Medication 10 MG: at 20:17

## 2023-10-30 RX ADMIN — Medication 30 L/MIN: at 08:18

## 2023-10-30 ASSESSMENT — PAIN - FUNCTIONAL ASSESSMENT
PAIN_FUNCTIONAL_ASSESSMENT: 0-10

## 2023-10-30 ASSESSMENT — PAIN SCALES - GENERAL
PAINLEVEL_OUTOF10: 0 - NO PAIN

## 2023-10-30 NOTE — PROGRESS NOTES
"Molina Godinez is a 68 y.o. male on day 18 of admission presenting with Hospital-acquired pneumonia.    Subjective   NAEON. Patient seen and examined at bedside this AM, denies any acute complaints. Aox3 on HFNC 30L/60%. Remains tachy in 120-130s overnight and this AM, asymptomtatic. Only 495 ml urine output over last 24 hours.        Objective     Physical Exam  Constitutional:       General: He is not in acute distress.     Appearance: He is cachectic. He is ill-appearing.      Interventions: Nasal cannula in place.   HENT:      Head: Normocephalic and atraumatic.      Mouth/Throat:      Mouth: Mucous membranes are dry.      Pharynx: Oropharynx is clear.   Eyes:      Extraocular Movements: Extraocular movements intact.      Conjunctiva/sclera: Conjunctivae normal.   Cardiovascular:      Rate and Rhythm: Regular rhythm. Tachycardia present.      Pulses: Normal pulses.      Heart sounds: No murmur heard.     No friction rub. No gallop.   Pulmonary:      Effort: Pulmonary effort is normal. No respiratory distress.      Breath sounds: Normal breath sounds. No wheezing, rhonchi or rales.   Abdominal:      General: Abdomen is flat.      Palpations: Abdomen is soft.      Tenderness: There is no abdominal tenderness.   Musculoskeletal:         General: No swelling or deformity.   Skin:     General: Skin is dry.   Neurological:      General: No focal deficit present.      Mental Status: He is oriented to person, place, and time.       Last Recorded Vitals  Blood pressure 84/56, pulse (!) 130, temperature 37.6 °C (99.7 °F), resp. rate (!) 32, height 1.702 m (5' 7.01\"), weight 49.7 kg (109 lb 9.1 oz), SpO2 (!) 88 %.  Intake/Output last 3 Shifts:  I/O last 3 completed shifts:  In: 2208.2 (44.4 mL/kg) [I.V.:813.2 (16.4 mL/kg); NG/GT:1145; IV Piggyback:250]  Out: 1650 (33.2 mL/kg) [Urine:1650 (0.9 mL/kg/hr)]  Weight: 49.7 kg     Relevant Results  Scheduled medications  albumin human, 12.5 g, intravenous, Once  albuterol, 2.5 mg, " nebulization, 4x daily  [Held by provider] atorvastatin, 40 mg, oral, Daily  calcium gluconate, 2 g, intravenous, Once  ergocalciferol, 8,000 Units, oral, Daily  esomeprazole, 40 mg, nasoduodenal tube, Daily before breakfast  folic acid, 1 mg, oral, Daily  lidocaine, 1 patch, transdermal, Daily  melatonin, 9 mg, oral, Nightly  metoprolol tartrate, 12.5 mg, oral, BID  multivitamin with minerals, 1 tablet, oral, Daily  nicotine, 1 patch, transdermal, Daily  polyethylene glycol, 17 g, oral, Daily  sodium chloride, 3 mL, nebulization, q6h SAM  sodium zirconium cyclosilicate, 15 g, oral, Once  thiamine, 100 mg, oral, Daily      Continuous medications  heparin, 0-4,500 Units/hr, Last Rate: Stopped (10/30/23 1300)      PRN medications  PRN medications: acetaminophen, dextrose 10 % in water (D10W), dextrose, glucagon, heparin, nitroglycerin, oxyCODONE, oxygen, sennosides, white petrolatum     Results for orders placed or performed during the hospital encounter of 10/11/23 (from the past 24 hour(s))   Renal function panel   Result Value Ref Range    Glucose 148 (H) 74 - 99 mg/dL    Sodium 133 (L) 136 - 145 mmol/L    Potassium 4.5 3.5 - 5.3 mmol/L    Chloride 95 (L) 98 - 107 mmol/L    Bicarbonate 24 21 - 32 mmol/L    Anion Gap 19 10 - 20 mmol/L    Urea Nitrogen 151 (HH) 6 - 23 mg/dL    Creatinine 1.31 (H) 0.50 - 1.30 mg/dL    eGFR 60 (L) >60 mL/min/1.73m*2    Calcium 9.7 8.6 - 10.6 mg/dL    Phosphorus 5.4 (H) 2.5 - 4.9 mg/dL    Albumin 3.3 (L) 3.4 - 5.0 g/dL   POCT GLUCOSE   Result Value Ref Range    POCT Glucose 143 (H) 74 - 99 mg/dL   Renal function panel   Result Value Ref Range    Glucose 183 (H) 74 - 99 mg/dL    Sodium 131 (L) 136 - 145 mmol/L    Potassium 5.3 3.5 - 5.3 mmol/L    Chloride 92 (L) 98 - 107 mmol/L    Bicarbonate 23 21 - 32 mmol/L    Anion Gap 21 (H) 10 - 20 mmol/L    Urea Nitrogen 166 (HH) 6 - 23 mg/dL    Creatinine 1.74 (H) 0.50 - 1.30 mg/dL    eGFR 42 (L) >60 mL/min/1.73m*2    Calcium 9.2 8.6 - 10.6 mg/dL     Phosphorus 4.9 2.5 - 4.9 mg/dL    Albumin 3.2 (L) 3.4 - 5.0 g/dL   POCT GLUCOSE   Result Value Ref Range    POCT Glucose 147 (H) 74 - 99 mg/dL   POCT GLUCOSE   Result Value Ref Range    POCT Glucose 157 (H) 74 - 99 mg/dL   Renal function panel   Result Value Ref Range    Glucose 141 (H) 74 - 99 mg/dL    Sodium 134 (L) 136 - 145 mmol/L    Potassium 5.4 (H) 3.5 - 5.3 mmol/L    Chloride 94 (L) 98 - 107 mmol/L    Bicarbonate 23 21 - 32 mmol/L    Anion Gap 22 (H) 10 - 20 mmol/L    Urea Nitrogen 170 (HH) 6 - 23 mg/dL    Creatinine 2.01 (H) 0.50 - 1.30 mg/dL    eGFR 36 (L) >60 mL/min/1.73m*2    Calcium 9.0 8.6 - 10.6 mg/dL    Phosphorus 4.3 2.5 - 4.9 mg/dL    Albumin 3.1 (L) 3.4 - 5.0 g/dL   CBC and Auto Differential   Result Value Ref Range    WBC 29.3 (H) 4.4 - 11.3 x10*3/uL    nRBC 0.0 0.0 - 0.0 /100 WBCs    RBC 4.85 4.50 - 5.90 x10*6/uL    Hemoglobin 14.1 13.5 - 17.5 g/dL    Hematocrit 41.2 41.0 - 52.0 %    MCV 85 80 - 100 fL    MCH 29.1 26.0 - 34.0 pg    MCHC 34.2 32.0 - 36.0 g/dL    RDW 13.1 11.5 - 14.5 %    Platelets 326 150 - 450 x10*3/uL    MPV 10.0 7.5 - 11.5 fL    Neutrophils % 88.1 40.0 - 80.0 %    Immature Granulocytes %, Automated 1.3 (H) 0.0 - 0.9 %    Lymphocytes % 3.3 13.0 - 44.0 %    Monocytes % 6.7 2.0 - 10.0 %    Eosinophils % 0.1 0.0 - 6.0 %    Basophils % 0.5 0.0 - 2.0 %    Neutrophils Absolute 25.80 (H) 1.20 - 7.70 x10*3/uL    Immature Granulocytes Absolute, Automated 0.37 0.00 - 0.70 x10*3/uL    Lymphocytes Absolute 0.98 (L) 1.20 - 4.80 x10*3/uL    Monocytes Absolute 1.95 (H) 0.10 - 1.00 x10*3/uL    Eosinophils Absolute 0.02 0.00 - 0.70 x10*3/uL    Basophils Absolute 0.16 (H) 0.00 - 0.10 x10*3/uL   Heparin Assay, UFH   Result Value Ref Range    Heparin Unfractionated 1.0 See Comment Below for Therapeutic Ranges IU/mL   Osmolality, urine   Result Value Ref Range    Osmolality, Urine Random 455 200 - 1,200 mOsm/kg   Urine electrolytes   Result Value Ref Range    Sodium, Urine Random 22 mmol/L     Sodium/Creatinine Ratio 21 Not established. mmol/g Creat    Potassium, Urine Random 74 mmol/L    Potassium/Creatinine Ratio 69 Not established mmol/g Creat    Chloride, Urine Random <15 mmol/L    Chloride/Creatinine Ratio      Creatinine, Urine Random 107.3 20.0 - 370.0 mg/dL   Renal function panel   Result Value Ref Range    Glucose 151 (H) 74 - 99 mg/dL    Sodium 133 (L) 136 - 145 mmol/L    Potassium 5.9 (H) 3.5 - 5.3 mmol/L    Chloride 92 (L) 98 - 107 mmol/L    Bicarbonate 20 (L) 21 - 32 mmol/L    Anion Gap 27 (H) 10 - 20 mmol/L    Urea Nitrogen 178 (HH) 6 - 23 mg/dL    Creatinine 2.32 (H) 0.50 - 1.30 mg/dL    eGFR 30 (L) >60 mL/min/1.73m*2    Calcium 9.4 8.6 - 10.6 mg/dL    Phosphorus 5.1 (H) 2.5 - 4.9 mg/dL    Albumin 3.1 (L) 3.4 - 5.0 g/dL   Heparin Assay, UFH   Result Value Ref Range    Heparin Unfractionated 0.7 See Comment Below for Therapeutic Ranges IU/mL      XR chest 1 view    Result Date: 10/28/2023  Interpreted By:  Nathan Lu, STUDY: XR CHEST 1 VIEW;  10/28/2023 9:24 am   INDICATION: Signs/Symptoms:Atelectasis/Mucous plugging.   COMPARISON: 10/27/2023   ACCESSION NUMBER(S): [id number]   ORDERING CLINICIAN: CORY PHILLIPSADJAYDYANA   FINDINGS: Status post extubation. Question left-sided endobronchial disease/mucoid impaction   CARDIOMEDIASTINAL SILHOUETTE: Left-sided mediastinal shift   LUNGS: Left basilar consolidation/effusions. Correlate with underlying volume loss   ABDOMEN: No remarkable upper abdominal findings.   BONES: No acute osseous changes.       1.  Status post extubation. Continued left basilar consolidation/effusion and correlate with underlying pneumonia/aspiration     Signed by: Nathan Lu 10/28/2023 11:55 AM Dictation workstation:   [id number]    XR chest 1 view    Result Date: 10/27/2023  Interpreted By:  Nathan Lu,  and Viv Tong STUDY: XR CHEST 1 VIEW;  10/27/2023 9:26 am   INDICATION: Signs/Symptoms:Failed SBT, recurrent secretions, pneumonia.    COMPARISON: Chest radiographs 10/25/2023 and 10/24/2023, CT chest abdomen pelvis 10/16/2023.   ACCESSION NUMBER(S): [id number]   ORDERING CLINICIAN: THOMAS AU   FINDINGS: Single AP semi-upright portable radiograph of the chest was provided.   Endotracheal tube tip terminates 6.1 cm above the crispin. Enteric tube overlies the mid thorax with tip terminating at the level of the gastric antrum.   CARDIOMEDIASTINAL SILHOUETTE: The left border of the cardiomediastinal silhouette is obscured. Otherwise stable appearance of the cardiac silhouette.   LUNGS: Interval development of a moderate to large left-sided pleural effusion with associated overlying compressive atelectasis. The right lung is clear. No pneumothorax.   ABDOMEN: No remarkable upper abdominal findings.   BONES: No acute osseous changes.       1. Interval development of moderate to large left-sided pleural effusion with associated atelectasis. Correlate with developing left basilar pneumonia/atelectasis/aspiration follow-up to resolution recommended 2. Medical devices as above.   I personally reviewed the image(s)/study and resident interpretation as dictated by Dr. Alycia Nam MD. I agree with the findings as stated. Data analyzed and images were interpreted at [facility], [place], [place].   MACRO: None   Signed by: Nathan Lu 10/27/2023 10:07 AM Dictation workstation:   [id number]    ECG 12 lead    Result Date: 10/27/2023  Sinus tachycardia Left axis deviation Low voltage QRS  in limb leads Cannot rule out Anterior infarct Cannot rule out Inferior infarct Abnormal ECG Confirmed by Terrell Silva ([id number]) on 10/27/2023 8:30:52 AM    US right upper quadrant    Result Date: 10/25/2023  Interpreted By:  Jason Means and Liller Gregory STUDY: US RIGHT UPPER QUADRANT;  10/25/2023 3:19 pm   INDICATION: Signs/Symptoms:New RUQ abdominal pain with newly elevated LFTs.   COMPARISON: Right upper quadrant  ultrasound 08/27/2023   ACCESSION NUMBER(S): [id number]   ORDERING CLINICIAN: ANTONI ARNDT   TECHNIQUE: Multiple images of the right upper quadrant were obtained.   FINDINGS: LIVER: The liver is mildly enlarged measuring up to 18 cm in maximal craniocaudal dimension. The liver parenchyma is diffusely coarsened echotexture. Liver contour is smooth.   GALLBLADDER: The gallbladder is mildly distended. Sludge seen layering within the dependent portion of the gallbladder. There is no evidence of gallbladder wall thickening or pericholecystic edema. Gallbladder wall measures up to 2 mm. No cholelithiasis.   BILIARY TREE: No intra or extrahepatic biliary dilatation is identified. The common bile duct measures 0.36 cm.   PANCREAS: The pancreas is poorly visualized due to overlying bowel gas.   RIGHT KIDNEY: The right kidney is normal in size, measuring 10 cm in craniocaudal dimension. The renal cortical echogenicity and thickness are within normal limits. No hydronephrosis or renal calculi are seen. Small renal cysts seen within the inferior pole measuring up to 1.0 x 0.7 x 0.8 cm.       1. Mild hepatomegaly with the diffuse coarse echotexture of the liver parenchyma. Correlate with liver function tests for liver parenchymal disease. 2. Gallbladder sludge is seen layering within the dependent portion of the gallbladder without evidence of cholecystitis.   I personally reviewed the images/study and I agree with the findings as stated above by resident physician, Dr. Timothy Hernández. The study was interpreted at [facility] in [facility].   MACRO: None   Signed by: Jason Means 10/25/2023 10:31 PM Dictation workstation:   [id number]    XR abdomen 1 view    Result Date: 10/25/2023  Interpreted By:  Nathan Lu and Sullivan Shannon STUDY: XR ABDOMEN 1 VIEW;  10/25/2023 1:00 pm   INDICATION: Signs/Symptoms:new abdominal pain.   COMPARISON: Correlation with CT chest  abdomen pelvis 10/16/2023, KUB 10/17/2023   ACCESSION NUMBER(S): [id number]   ORDERING CLINICIAN: ANTONI ARNDT   FINDINGS: Single frontal radiograph of the abdomen was provided.   Enteric tube courses midline below the level of the diaphragm with the tip projecting over the gastric antrum.     Relative paucity of small bowel gas in an overall nonobstructive bowel gas pattern, decreased compared to prior exam 10/17/2023 and which may reflect decompressed or fluid-filled bowel loops. Limited evaluation of pneumoperitoneum on supine imaging, however no gross evidence of free air is noted.   Visualized lungs are clear.   Osseous structures demonstrate no acute bony changes.       1.  Decreased degree of small bowel gas compared to prior radiograph, which may reflect decompressed or fluid-filled bowel loops. Overall nonobstructive bowel gas pattern. 2. Enteric tube as above.     I personally reviewed the images/study and I agree with the findings as stated. This study was interpreted at [place], [place].   Signed by: Nathan Lu 10/25/2023 3:50 PM Dictation workstation:   [id number]    XR chest 1 view    Result Date: 10/25/2023  Interpreted By:  Gilberto Waldrop, STUDY: XR CHEST 1 VIEW;  10/25/2023 7:41 am   INDICATION: Signs/Symptoms:worsening secretions and oxygenation level.   COMPARISON: Radiograph dated 10/24/2023   ACCESSION NUMBER(S): [id number]   ORDERING CLINICIAN: DEBORAH YEAGER   FINDINGS: ET tube is terminating 7.6 cm from the crispin. Enteric tube is in place with the tip projecting over proximal duodenum.   Cardiac silhouette size is grossly stable.   Interval improvement in the aeration of the left lung with residual left basilar consolidation and patchy airspace opacity in the left mid and upper lung. Right lung is grossly clear. No sizable pneumothorax.   No acute osseous abnormality.       1. Persistent left basilar pleural effusion and  atelectasis/infiltrate with slight improvement in the aeration of the left lung. Correlate with left basilar/left lower lobe atelectasis/collapse and mucous plugging. 2. Residual patchy airspace opacity in the left mid and upper lung which may be due to edema or infiltrate. 3. Medical appliances as described above       Signed by: Gilberto Solano 10/25/2023 12:41 PM Dictation workstation:   [id number]    XR chest 1 view    Result Date: 10/24/2023  Interpreted By:  Gilberto Waldrop, STUDY: XR CHEST 1 VIEW;  10/24/2023 2:49 pm   INDICATION: Signs/Symptoms:Evaluate interval status of lungs.   COMPARISON: Radiograph dated 10/21/2023   ACCESSION NUMBER(S): [id number]   ORDERING CLINICIAN: KASIA PERRY   FINDINGS: ET tube is terminating 5.8 cm from the crispin. Enteric tube is in place with the tip outside of the liver.   The cardiac silhouette size is grossly stable, however the left heart border is obscured.   Persistent left basilar and retrocardiac opacity with component of volume loss. Patchy airspace opacity in the left upper lung field. No sizable pneumothorax. Right lung is grossly clear.   No acute osseous abnormality.       1. Left mid and lower lung consolidative opacity with component of volume loss. Correlate with concern for infection and aspiration as well as mucous plugging and partial/complete left lower lobe collapse. 2. Small left pleural effusion.       Signed by: Gilberto Solano 10/24/2023 2:55 PM Dictation workstation:   [id number]    XR chest 1 view    Result Date: 10/21/2023  Interpreted By:  Beth Guy, STUDY: XR CHEST 1 VIEW;  10/21/2023 1:34 pm   INDICATION: Signs/Symptoms:pneumonia.   COMPARISON: 10/20/2023.   ACCESSION NUMBER(S): [id number]   ORDERING CLINICIAN: ANTONI ARNDT       1.  Improved aeration of the left lower lung consolidative opacities. Decreased left-sided pleural effusion. 2. Coarse interstitial, and ground-glass opacity involving the left upper  lung, midlung, slightly improved. 3. Cardiac silhouette is moderately enlarged. Aorta is tortuous. Pulmonary vessels are congested with mild pulmonary edema. 4. Perihilar and basilar atelectasis of the right lung. 5. No right-sided pleural effusion or pneumothorax seen 6. ETT 7.2 cm above the crispin. Enteric tube with the tip below the lower margin of study. 7.       MACRO: None   Signed by: Beth Guy 10/21/2023 2:46 PM Dictation workstation:   [id number]    MR brain w and wo IV contrast    Result Date: 10/21/2023  Interpreted By:  Leslie Olson,  and Rio Yañez STUDY: MR BRAIN W AND WO IV CONTRAST; MR LUMBAR SPINE W AND WO IV CONTRAST; MR THORACIC SPINE W AND WO IV CONTRAST; MR CERVICAL SPINE W AND WO IV CONTRAST;  10/20/2023 11:41 pm   INDICATION: Signs/Symptoms:Altered mental status with b/l LE weakness; Signs/Symptoms:Altered mental status, b/l LE weakness, concern for epidural abscess; Signs/Symptoms:altered mental status, b/l LE weakness, c/f epidural abscess; Signs/Symptoms:Altered mental status, b/l LE weakness, concern for spinal/epidural abscess.   COMPARISON: CT head, 10/11/2023 Thoracic and lumbar spine, 10/11/2023 CT cervical spine, 08/26/2023   ACCESSION NUMBER(S): [id number]; [id number]; [id number]; [id number]   ORDERING CLINICIAN: THOMAS AU   TECHNIQUE: Multiplanar, multi-sequence images of the brain, cervical, thoracic, and lumbar spine were obtained before and after the intravenous administration of 9 mL Dotarem gadolinium.   FINDINGS: Evaluation is markedly limited due to patient motion.   Diffusion weighted images show no evidence of acute ischemic infarct.   There is no evidence of an acute intraparenchymal hemorrhage, mass, mass-effect, midline shift or extra-axial fluid collection. No abnormal parenchymal enhancement.   Nonspecific subcortical and periventricular T2/FLAIR hyperintensities may represent sequelae of chronic small vessel ischemic changes.   There is a  remote lacunar infarct in the left thalamus.   The ventricular size and cerebral volume are age-concordant.   The orbits and globes are unremarkable.   The paranasal sinuses show no hemorrhage or air-fluid levels.   Bilateral mastoid effusions, left more than right.     Cervical Spine:   Incidental note is made of an enteric tube.   PARASPINAL SOFT TISSUES: No significant abnormality.   SPINAL CORD: The cervical cord has a normal caliber and signal intensity.   BONE MARROW/VERTEBRAL BODIES: Overall bone marrow signal is within normal limits. Vertebral body heights are maintained. No acute fracture.   ALIGNMENT: There is reversal of the normal cervical lordosis and minimal anterolisthesis of C3-C4.   C1-C2: The atlantodental joint is intact. The predental space is not widened.   C2-C3: Disc osteophyte complex, uncovertebral joint hypertrophy and facet arthrosis. Mild spinal canal and mild-to-moderate bilateral neural foraminal stenosis.   C3-C4: Disc osteophyte complex, uncovertebral joint hypertrophy and facet arthrosis. Moderate to severe spinal canal and neural foraminal stenosis. There is mild flattening of the cervical cord question very subtle increased cord signal on the STIR sequence.   C4-C5: Disc osteophyte complex, uncovertebral joint hypertrophy and facet arthrosis. No significant spinal canal stenosis. Moderate bilateral neural foraminal stenosis.   C5-C6: Disc osteophyte complex, uncovertebral joint hypertrophy and facet arthrosis. Mild spinal canal stenosis. Moderate neural foraminal narrowing, left greater than right.   C6-C7: Disc osteophyte complex, uncovertebral joint hypertrophy and facet arthrosis. No significant spinal canal stenosis. Moderate to severe neural foraminal stenosis, right greater than left.   C7-T1: Disc osteophyte complex and uncovertebral joint hypertrophy and facet arthrosis. Mild spinal canal stenosis. Moderate to severe bilateral neural foraminal stenosis.     Thoracic spine:    Visualized chest/abdomen: Scattered consolidative opacities in the left lung with a small left pleural effusion.   SPINAL CORD: The thoracic cord has a normal caliber and signal intensity. No cord compression.   There is prominent posterior epidural fat. No epidural fluid collection is noted.   No abnormal enhancement. There is incomplete fat suppression within the lower cervical cord on the postcontrast images. No significant spinal canal or neural foraminal stenosis.   BONE MARROW/VERTEBRAL BODIES: Overall bone marrow signal is within normal limits. There is compression of the superior endplate of T11 no associated increased T2/STIR signal. This was present on prior CT chest 10/16/2023. There is a proximally 25% loss of vertebral body height anteriorly and no osseous retropulsion into the spinal canal. There is subtle height loss of the superior endplate of the T3 vertebral body with no associated T2/STIR signal. This is also unchanged from the prior CT chest 10/16/2023. The remaining vertebral body heights are maintained. No acute fracture. Intervertebral disc height loss and disc desiccation at T11-T12.   ALIGNMENT: No spondylolisthesis.     Lumbar spine:   VISUALIZED ABDOMEN: T1 hyperintense lesion in the left upper renal pole and T1 hypointense, T2 hyperintense lesions in the kidneys bilaterally. These may represent cysts and/or hemorrhagic cysts. Please see dedicated imaging of the abdomen for further evaluation.   SPINAL CORD/CONUS: The conus medullaris terminates at L1-L2. There is no abnormal signal or enhancement within the distal cord or nerve roots.   BONE MARROW/VERTEBRAL BODIES: There are 5 lumbar type vertebral bodies. Overall bone marrow signal is within normal limits. Vertebral body heights are maintained. No acute fracture. Multilevel intervertebral disc height loss, most significant at L4-L5 and L5-S1.   ALIGNMENT: There is minimal retrolisthesis of L5-S1.   SPINAL CANAL, INTERVERTEBRAL DISCS,  AND NEURAL FORAMINA:   T11-T12: No significant disc bulge, canal stenosis, or foraminal stenosis.   T12-L1: No significant disc bulge, canal stenosis, or foraminal stenosis.   L1-L2: No significant disc bulge, canal stenosis, or foraminal stenosis.   L2-L3: Trace bilateral paracentral disc protrusions. No spinal canal or neural foraminal stenosis.   L3-L4: Broad-based central disc bulge which results in minimal narrowing of bilateral neural foramen. No spinal canal stenosis.   L4-L5: Broad-based central disc bulge which results in mild narrowing of bilateral neural foramen, right more than left. No spinal canal stenosis.   L5-S1: Posterior disc osteophyte complex with mild narrowing of bilateral neural foramen, right more than left. No spinal canal stenosis.         Brain: 1. No acute ischemic infarct or intracranial hemorrhage. No abnormal parenchymal enhancement. 2. Nonspecific subcortical and periventricular T2/FLAIR hyperintensities may represent sequelae of chronic small vessel ischemic changes. 3. Remote lacunar infarcts in the left thalamus.     Cervical spine: 1. Multilevel degenerative disc disease and facet arthrosis most pronounced at C3-C4 with moderate to severe spinal canal stenosis and moderate to severe bilateral neural foraminal stenosis. There is flattening of the cervical cord with question subtle increased cord signal on the STIR sequence. Please correlate clinically for symptoms of myelopathy. No abnormal cord enhancement. 2. Patchy consolidative opacities throughout the left lung with a small left pleural effusion. Correlate with dedicated chest imaging.   Thoracic spine: 1. No significant spinal canal or neural foraminal stenosis. No abnormal enhancement. 2. Nonacute minimal compression deformity at T3. Mild compression deformity at T11 with a proximally 25% loss of vertebral body height and no osseous retropulsion into the spinal canal. This was present on prior exam 10/16/2023.     Lumbar  spine: 1. Multilevel lumbar spondylosis as detailed above. No spinal canal stenosis. 2. No abnormal signal or enhancement within the distal cord or nerve roots.     I personally reviewed the images/study and I agree with the findings as stated by resident physician Dr. Otilio Pate.   MACRO: None   Signed by: Leslie Olson 10/21/2023 1:29 AM Dictation workstation:   [id number]    MR cervical spine w and wo IV contrast    Result Date: 10/21/2023  Interpreted By:  Leslie Olson,  and Rio Yañez STUDY: MR BRAIN W AND WO IV CONTRAST; MR LUMBAR SPINE W AND WO IV CONTRAST; MR THORACIC SPINE W AND WO IV CONTRAST; MR CERVICAL SPINE W AND WO IV CONTRAST;  10/20/2023 11:41 pm   INDICATION: Signs/Symptoms:Altered mental status with b/l LE weakness; Signs/Symptoms:Altered mental status, b/l LE weakness, concern for epidural abscess; Signs/Symptoms:altered mental status, b/l LE weakness, c/f epidural abscess; Signs/Symptoms:Altered mental status, b/l LE weakness, concern for spinal/epidural abscess.   COMPARISON: CT head, 10/11/2023 Thoracic and lumbar spine, 10/11/2023 CT cervical spine, 08/26/2023   ACCESSION NUMBER(S): [id number]; [id number]; [id number]; [id number]   ORDERING CLINICIAN: THOMAS AU   TECHNIQUE: Multiplanar, multi-sequence images of the brain, cervical, thoracic, and lumbar spine were obtained before and after the intravenous administration of 9 mL Dotarem gadolinium.   FINDINGS: Evaluation is markedly limited due to patient motion.   Diffusion weighted images show no evidence of acute ischemic infarct.   There is no evidence of an acute intraparenchymal hemorrhage, mass, mass-effect, midline shift or extra-axial fluid collection. No abnormal parenchymal enhancement.   Nonspecific subcortical and periventricular T2/FLAIR hyperintensities may represent sequelae of chronic small vessel ischemic changes.   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   The ventricular size and  cerebral volume are age-concordant.   The orbits and globes are unremarkable.   The paranasal sinuses show no hemorrhage or air-fluid levels.   Bilateral mastoid effusions, left more than right.     Cervical Spine:   Incidental note is made of an enteric tube.   PARASPINAL SOFT TISSUES: No significant abnormality.   SPINAL CORD: The cervical cord has a normal caliber and signal intensity.   BONE MARROW/VERTEBRAL BODIES: Overall bone marrow signal is within normal limits. Vertebral body heights are maintained. No acute fracture.   ALIGNMENT: There is reversal of the normal cervical lordosis and minimal anterolisthesis of C3-C4.   C1-C2: The atlantodental joint is intact. The predental space is not widened.   C2-C3: Disc osteophyte complex, uncovertebral joint hypertrophy and facet arthrosis. Mild spinal canal and mild-to-moderate bilateral neural foraminal stenosis.   C3-C4: Disc osteophyte complex, uncovertebral joint hypertrophy and facet arthrosis. Moderate to severe spinal canal and neural foraminal stenosis. There is mild flattening of the cervical cord question very subtle increased cord signal on the STIR sequence.   C4-C5: Disc osteophyte complex, uncovertebral joint hypertrophy and facet arthrosis. No significant spinal canal stenosis. Moderate bilateral neural foraminal stenosis.   C5-C6: Disc osteophyte complex, uncovertebral joint hypertrophy and facet arthrosis. Mild spinal canal stenosis. Moderate neural foraminal narrowing, left greater than right.   C6-C7: Disc osteophyte complex, uncovertebral joint hypertrophy and facet arthrosis. No significant spinal canal stenosis. Moderate to severe neural foraminal stenosis, right greater than left.   C7-T1: Disc osteophyte complex and uncovertebral joint hypertrophy and facet arthrosis. Mild spinal canal stenosis. Moderate to severe bilateral neural foraminal stenosis.     Thoracic spine:   Visualized chest/abdomen: Scattered consolidative opacities in the  left lung with a small left pleural effusion.   SPINAL CORD: The thoracic cord has a normal caliber and signal intensity. No cord compression.   There is prominent posterior epidural fat. No epidural fluid collection is noted.   No abnormal enhancement. There is incomplete fat suppression within the lower cervical cord on the postcontrast images. No significant spinal canal or neural foraminal stenosis.   BONE MARROW/VERTEBRAL BODIES: Overall bone marrow signal is within normal limits. There is compression of the superior endplate of T11 no associated increased T2/STIR signal. This was present on prior CT chest 10/16/2023. There is a proximally 25% loss of vertebral body height anteriorly and no osseous retropulsion into the spinal canal. There is subtle height loss of the superior endplate of the T3 vertebral body with no associated T2/STIR signal. This is also unchanged from the prior CT chest 10/16/2023. The remaining vertebral body heights are maintained. No acute fracture. Intervertebral disc height loss and disc desiccation at T11-T12.   ALIGNMENT: No spondylolisthesis.     Lumbar spine:   VISUALIZED ABDOMEN: T1 hyperintense lesion in the left upper renal pole and T1 hypointense, T2 hyperintense lesions in the kidneys bilaterally. These may represent cysts and/or hemorrhagic cysts. Please see dedicated imaging of the abdomen for further evaluation.   SPINAL CORD/CONUS: The conus medullaris terminates at L1-L2. There is no abnormal signal or enhancement within the distal cord or nerve roots.   BONE MARROW/VERTEBRAL BODIES: There are 5 lumbar type vertebral bodies. Overall bone marrow signal is within normal limits. Vertebral body heights are maintained. No acute fracture. Multilevel intervertebral disc height loss, most significant at L4-L5 and L5-S1.   ALIGNMENT: There is minimal retrolisthesis of L5-S1.   SPINAL CANAL, INTERVERTEBRAL DISCS, AND NEURAL FORAMINA:   T11-T12: No significant disc bulge, canal  stenosis, or foraminal stenosis.   T12-L1: No significant disc bulge, canal stenosis, or foraminal stenosis.   L1-L2: No significant disc bulge, canal stenosis, or foraminal stenosis.   L2-L3: Trace bilateral paracentral disc protrusions. No spinal canal or neural foraminal stenosis.   L3-L4: Broad-based central disc bulge which results in minimal narrowing of bilateral neural foramen. No spinal canal stenosis.   L4-L5: Broad-based central disc bulge which results in mild narrowing of bilateral neural foramen, right more than left. No spinal canal stenosis.   L5-S1: Posterior disc osteophyte complex with mild narrowing of bilateral neural foramen, right more than left. No spinal canal stenosis.         Brain: 1. No acute ischemic infarct or intracranial hemorrhage. No abnormal parenchymal enhancement. 2. Nonspecific subcortical and periventricular T2/FLAIR hyperintensities may represent sequelae of chronic small vessel ischemic changes. 3. Remote lacunar infarcts in the left thalamus.     Cervical spine: 1. Multilevel degenerative disc disease and facet arthrosis most pronounced at C3-C4 with moderate to severe spinal canal stenosis and moderate to severe bilateral neural foraminal stenosis. There is flattening of the cervical cord with question subtle increased cord signal on the STIR sequence. Please correlate clinically for symptoms of myelopathy. No abnormal cord enhancement. 2. Patchy consolidative opacities throughout the left lung with a small left pleural effusion. Correlate with dedicated chest imaging.   Thoracic spine: 1. No significant spinal canal or neural foraminal stenosis. No abnormal enhancement. 2. Nonacute minimal compression deformity at T3. Mild compression deformity at T11 with a proximally 25% loss of vertebral body height and no osseous retropulsion into the spinal canal. This was present on prior exam 10/16/2023.     Lumbar spine: 1. Multilevel lumbar spondylosis as detailed above. No  spinal canal stenosis. 2. No abnormal signal or enhancement within the distal cord or nerve roots.     I personally reviewed the images/study and I agree with the findings as stated by resident physician Dr. Otilio Pate.   MACRO: None   Signed by: Leslie Olson 10/21/2023 1:29 AM Dictation workstation:   [id number]    MR thoracic spine w and wo IV contrast    Result Date: 10/21/2023  Interpreted By:  Leslie Olson,  and Rio Yañez STUDY: MR BRAIN W AND WO IV CONTRAST; MR LUMBAR SPINE W AND WO IV CONTRAST; MR THORACIC SPINE W AND WO IV CONTRAST; MR CERVICAL SPINE W AND WO IV CONTRAST;  10/20/2023 11:41 pm   INDICATION: Signs/Symptoms:Altered mental status with b/l LE weakness; Signs/Symptoms:Altered mental status, b/l LE weakness, concern for epidural abscess; Signs/Symptoms:altered mental status, b/l LE weakness, c/f epidural abscess; Signs/Symptoms:Altered mental status, b/l LE weakness, concern for spinal/epidural abscess.   COMPARISON: CT head, 10/11/2023 Thoracic and lumbar spine, 10/11/2023 CT cervical spine, 08/26/2023   ACCESSION NUMBER(S): [id number]; [id number]; [id number]; [id number]   ORDERING CLINICIAN: THOMAS AU   TECHNIQUE: Multiplanar, multi-sequence images of the brain, cervical, thoracic, and lumbar spine were obtained before and after the intravenous administration of 9 mL Dotarem gadolinium.   FINDINGS: Evaluation is markedly limited due to patient motion.   Diffusion weighted images show no evidence of acute ischemic infarct.   There is no evidence of an acute intraparenchymal hemorrhage, mass, mass-effect, midline shift or extra-axial fluid collection. No abnormal parenchymal enhancement.   Nonspecific subcortical and periventricular T2/FLAIR hyperintensities may represent sequelae of chronic small vessel ischemic changes.   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   The ventricular size and cerebral volume are age-concordant.   The orbits and globes are  unremarkable.   The paranasal sinuses show no hemorrhage or air-fluid levels.   Bilateral mastoid effusions, left more than right.     Cervical Spine:   Incidental note is made of an enteric tube.   PARASPINAL SOFT TISSUES: No significant abnormality.   SPINAL CORD: The cervical cord has a normal caliber and signal intensity.   BONE MARROW/VERTEBRAL BODIES: Overall bone marrow signal is within normal limits. Vertebral body heights are maintained. No acute fracture.   ALIGNMENT: There is reversal of the normal cervical lordosis and minimal anterolisthesis of C3-C4.   C1-C2: The atlantodental joint is intact. The predental space is not widened.   C2-C3: Disc osteophyte complex, uncovertebral joint hypertrophy and facet arthrosis. Mild spinal canal and mild-to-moderate bilateral neural foraminal stenosis.   C3-C4: Disc osteophyte complex, uncovertebral joint hypertrophy and facet arthrosis. Moderate to severe spinal canal and neural foraminal stenosis. There is mild flattening of the cervical cord question very subtle increased cord signal on the STIR sequence.   C4-C5: Disc osteophyte complex, uncovertebral joint hypertrophy and facet arthrosis. No significant spinal canal stenosis. Moderate bilateral neural foraminal stenosis.   C5-C6: Disc osteophyte complex, uncovertebral joint hypertrophy and facet arthrosis. Mild spinal canal stenosis. Moderate neural foraminal narrowing, left greater than right.   C6-C7: Disc osteophyte complex, uncovertebral joint hypertrophy and facet arthrosis. No significant spinal canal stenosis. Moderate to severe neural foraminal stenosis, right greater than left.   C7-T1: Disc osteophyte complex and uncovertebral joint hypertrophy and facet arthrosis. Mild spinal canal stenosis. Moderate to severe bilateral neural foraminal stenosis.     Thoracic spine:   Visualized chest/abdomen: Scattered consolidative opacities in the left lung with a small left pleural effusion.   SPINAL CORD: The  thoracic cord has a normal caliber and signal intensity. No cord compression.   There is prominent posterior epidural fat. No epidural fluid collection is noted.   No abnormal enhancement. There is incomplete fat suppression within the lower cervical cord on the postcontrast images. No significant spinal canal or neural foraminal stenosis.   BONE MARROW/VERTEBRAL BODIES: Overall bone marrow signal is within normal limits. There is compression of the superior endplate of T11 no associated increased T2/STIR signal. This was present on prior CT chest 10/16/2023. There is a proximally 25% loss of vertebral body height anteriorly and no osseous retropulsion into the spinal canal. There is subtle height loss of the superior endplate of the T3 vertebral body with no associated T2/STIR signal. This is also unchanged from the prior CT chest 10/16/2023. The remaining vertebral body heights are maintained. No acute fracture. Intervertebral disc height loss and disc desiccation at T11-T12.   ALIGNMENT: No spondylolisthesis.     Lumbar spine:   VISUALIZED ABDOMEN: T1 hyperintense lesion in the left upper renal pole and T1 hypointense, T2 hyperintense lesions in the kidneys bilaterally. These may represent cysts and/or hemorrhagic cysts. Please see dedicated imaging of the abdomen for further evaluation.   SPINAL CORD/CONUS: The conus medullaris terminates at L1-L2. There is no abnormal signal or enhancement within the distal cord or nerve roots.   BONE MARROW/VERTEBRAL BODIES: There are 5 lumbar type vertebral bodies. Overall bone marrow signal is within normal limits. Vertebral body heights are maintained. No acute fracture. Multilevel intervertebral disc height loss, most significant at L4-L5 and L5-S1.   ALIGNMENT: There is minimal retrolisthesis of L5-S1.   SPINAL CANAL, INTERVERTEBRAL DISCS, AND NEURAL FORAMINA:   T11-T12: No significant disc bulge, canal stenosis, or foraminal stenosis.   T12-L1: No significant disc bulge,  canal stenosis, or foraminal stenosis.   L1-L2: No significant disc bulge, canal stenosis, or foraminal stenosis.   L2-L3: Trace bilateral paracentral disc protrusions. No spinal canal or neural foraminal stenosis.   L3-L4: Broad-based central disc bulge which results in minimal narrowing of bilateral neural foramen. No spinal canal stenosis.   L4-L5: Broad-based central disc bulge which results in mild narrowing of bilateral neural foramen, right more than left. No spinal canal stenosis.   L5-S1: Posterior disc osteophyte complex with mild narrowing of bilateral neural foramen, right more than left. No spinal canal stenosis.         Brain: 1. No acute ischemic infarct or intracranial hemorrhage. No abnormal parenchymal enhancement. 2. Nonspecific subcortical and periventricular T2/FLAIR hyperintensities may represent sequelae of chronic small vessel ischemic changes. 3. Remote lacunar infarcts in the left thalamus.     Cervical spine: 1. Multilevel degenerative disc disease and facet arthrosis most pronounced at C3-C4 with moderate to severe spinal canal stenosis and moderate to severe bilateral neural foraminal stenosis. There is flattening of the cervical cord with question subtle increased cord signal on the STIR sequence. Please correlate clinically for symptoms of myelopathy. No abnormal cord enhancement. 2. Patchy consolidative opacities throughout the left lung with a small left pleural effusion. Correlate with dedicated chest imaging.   Thoracic spine: 1. No significant spinal canal or neural foraminal stenosis. No abnormal enhancement. 2. Nonacute minimal compression deformity at T3. Mild compression deformity at T11 with a proximally 25% loss of vertebral body height and no osseous retropulsion into the spinal canal. This was present on prior exam 10/16/2023.     Lumbar spine: 1. Multilevel lumbar spondylosis as detailed above. No spinal canal stenosis. 2. No abnormal signal or enhancement within the  distal cord or nerve roots.     I personally reviewed the images/study and I agree with the findings as stated by resident physician Dr. Otilio Pate.   MACRO: None   Signed by: Leslie Olson 10/21/2023 1:29 AM Dictation workstation:   [id number]    MR lumbar spine w and wo IV contrast    Result Date: 10/21/2023  Interpreted By:  Leslie Olson  and Rio Yañez STUDY: MR BRAIN W AND WO IV CONTRAST; MR LUMBAR SPINE W AND WO IV CONTRAST; MR THORACIC SPINE W AND WO IV CONTRAST; MR CERVICAL SPINE W AND WO IV CONTRAST;  10/20/2023 11:41 pm   INDICATION: Signs/Symptoms:Altered mental status with b/l LE weakness; Signs/Symptoms:Altered mental status, b/l LE weakness, concern for epidural abscess; Signs/Symptoms:altered mental status, b/l LE weakness, c/f epidural abscess; Signs/Symptoms:Altered mental status, b/l LE weakness, concern for spinal/epidural abscess.   COMPARISON: CT head, 10/11/2023 Thoracic and lumbar spine, 10/11/2023 CT cervical spine, 08/26/2023   ACCESSION NUMBER(S): [id number]; [id number]; [id number]; [id number]   ORDERING CLINICIAN: THOMAS AU   TECHNIQUE: Multiplanar, multi-sequence images of the brain, cervical, thoracic, and lumbar spine were obtained before and after the intravenous administration of 9 mL Dotarem gadolinium.   FINDINGS: Evaluation is markedly limited due to patient motion.   Diffusion weighted images show no evidence of acute ischemic infarct.   There is no evidence of an acute intraparenchymal hemorrhage, mass, mass-effect, midline shift or extra-axial fluid collection. No abnormal parenchymal enhancement.   Nonspecific subcortical and periventricular T2/FLAIR hyperintensities may represent sequelae of chronic small vessel ischemic changes.   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   The ventricular size and cerebral volume are age-concordant.   The orbits and globes are unremarkable.   The paranasal sinuses show no hemorrhage or air-fluid  levels.   Bilateral mastoid effusions, left more than right.     Cervical Spine:   Incidental note is made of an enteric tube.   PARASPINAL SOFT TISSUES: No significant abnormality.   SPINAL CORD: The cervical cord has a normal caliber and signal intensity.   BONE MARROW/VERTEBRAL BODIES: Overall bone marrow signal is within normal limits. Vertebral body heights are maintained. No acute fracture.   ALIGNMENT: There is reversal of the normal cervical lordosis and minimal anterolisthesis of C3-C4.   C1-C2: The atlantodental joint is intact. The predental space is not widened.   C2-C3: Disc osteophyte complex, uncovertebral joint hypertrophy and facet arthrosis. Mild spinal canal and mild-to-moderate bilateral neural foraminal stenosis.   C3-C4: Disc osteophyte complex, uncovertebral joint hypertrophy and facet arthrosis. Moderate to severe spinal canal and neural foraminal stenosis. There is mild flattening of the cervical cord question very subtle increased cord signal on the STIR sequence.   C4-C5: Disc osteophyte complex, uncovertebral joint hypertrophy and facet arthrosis. No significant spinal canal stenosis. Moderate bilateral neural foraminal stenosis.   C5-C6: Disc osteophyte complex, uncovertebral joint hypertrophy and facet arthrosis. Mild spinal canal stenosis. Moderate neural foraminal narrowing, left greater than right.   C6-C7: Disc osteophyte complex, uncovertebral joint hypertrophy and facet arthrosis. No significant spinal canal stenosis. Moderate to severe neural foraminal stenosis, right greater than left.   C7-T1: Disc osteophyte complex and uncovertebral joint hypertrophy and facet arthrosis. Mild spinal canal stenosis. Moderate to severe bilateral neural foraminal stenosis.     Thoracic spine:   Visualized chest/abdomen: Scattered consolidative opacities in the left lung with a small left pleural effusion.   SPINAL CORD: The thoracic cord has a normal caliber and signal intensity. No cord  compression.   There is prominent posterior epidural fat. No epidural fluid collection is noted.   No abnormal enhancement. There is incomplete fat suppression within the lower cervical cord on the postcontrast images. No significant spinal canal or neural foraminal stenosis.   BONE MARROW/VERTEBRAL BODIES: Overall bone marrow signal is within normal limits. There is compression of the superior endplate of T11 no associated increased T2/STIR signal. This was present on prior CT chest 10/16/2023. There is a proximally 25% loss of vertebral body height anteriorly and no osseous retropulsion into the spinal canal. There is subtle height loss of the superior endplate of the T3 vertebral body with no associated T2/STIR signal. This is also unchanged from the prior CT chest 10/16/2023. The remaining vertebral body heights are maintained. No acute fracture. Intervertebral disc height loss and disc desiccation at T11-T12.   ALIGNMENT: No spondylolisthesis.     Lumbar spine:   VISUALIZED ABDOMEN: T1 hyperintense lesion in the left upper renal pole and T1 hypointense, T2 hyperintense lesions in the kidneys bilaterally. These may represent cysts and/or hemorrhagic cysts. Please see dedicated imaging of the abdomen for further evaluation.   SPINAL CORD/CONUS: The conus medullaris terminates at L1-L2. There is no abnormal signal or enhancement within the distal cord or nerve roots.   BONE MARROW/VERTEBRAL BODIES: There are 5 lumbar type vertebral bodies. Overall bone marrow signal is within normal limits. Vertebral body heights are maintained. No acute fracture. Multilevel intervertebral disc height loss, most significant at L4-L5 and L5-S1.   ALIGNMENT: There is minimal retrolisthesis of L5-S1.   SPINAL CANAL, INTERVERTEBRAL DISCS, AND NEURAL FORAMINA:   T11-T12: No significant disc bulge, canal stenosis, or foraminal stenosis.   T12-L1: No significant disc bulge, canal stenosis, or foraminal stenosis.   L1-L2: No significant  disc bulge, canal stenosis, or foraminal stenosis.   L2-L3: Trace bilateral paracentral disc protrusions. No spinal canal or neural foraminal stenosis.   L3-L4: Broad-based central disc bulge which results in minimal narrowing of bilateral neural foramen. No spinal canal stenosis.   L4-L5: Broad-based central disc bulge which results in mild narrowing of bilateral neural foramen, right more than left. No spinal canal stenosis.   L5-S1: Posterior disc osteophyte complex with mild narrowing of bilateral neural foramen, right more than left. No spinal canal stenosis.         Brain: 1. No acute ischemic infarct or intracranial hemorrhage. No abnormal parenchymal enhancement. 2. Nonspecific subcortical and periventricular T2/FLAIR hyperintensities may represent sequelae of chronic small vessel ischemic changes. 3. Remote lacunar infarcts in the left thalamus.     Cervical spine: 1. Multilevel degenerative disc disease and facet arthrosis most pronounced at C3-C4 with moderate to severe spinal canal stenosis and moderate to severe bilateral neural foraminal stenosis. There is flattening of the cervical cord with question subtle increased cord signal on the STIR sequence. Please correlate clinically for symptoms of myelopathy. No abnormal cord enhancement. 2. Patchy consolidative opacities throughout the left lung with a small left pleural effusion. Correlate with dedicated chest imaging.   Thoracic spine: 1. No significant spinal canal or neural foraminal stenosis. No abnormal enhancement. 2. Nonacute minimal compression deformity at T3. Mild compression deformity at T11 with a proximally 25% loss of vertebral body height and no osseous retropulsion into the spinal canal. This was present on prior exam 10/16/2023.     Lumbar spine: 1. Multilevel lumbar spondylosis as detailed above. No spinal canal stenosis. 2. No abnormal signal or enhancement within the distal cord or nerve roots.     I personally reviewed the  images/study and I agree with the findings as stated by resident physician Dr. Otilio Pate.   MACRO: None   Signed by: Leslie Olson 10/21/2023 1:29 AM Dictation workstation:   [id number]    XR chest 1 view    Result Date: 10/20/2023  Interpreted By:  Khanh Weaver, STUDY: XR CHEST 1 VIEW;  10/20/2023 10:40 am   INDICATION: Signs/Symptoms:intubated.   COMPARISON: Chest radiograph dated 10/19/2023and CT scan 10/16/2023   ACCESSION NUMBER(S): [id number]   ORDERING CLINICIAN: ANTONI ARNDT   FINDINGS: AP radiograph of the chest Enteric tube is again seen coursing below diaphragm and tip not included in field of view. Interval intubation with endotracheal tube tip approximately 5.5 cm superior to crispin   CARDIOMEDIASTINAL SILHOUETTE: Cardiomediastinal silhouette is again significantly obscured on the left, similar to prior.   LUNGS: There is again demonstration extensive left mid basilar lung predominant opacification with blunting of left costophrenic angle. Background emphysema. There is no pneumothorax.   ABDOMEN: No remarkable upper abdominal findings.   BONES: No acute osseous abnormality. Partially visualized left shoulder arthroplasty prosthesis.       1. No significant change in left mid basilar lung predominant atelectasis/consolidation with left pleural effusion. Also correlate clinically with concern for central mucous plugging. 2. Medical devices as above. Endotracheal tube is in satisfactory position.   Signed by: Khanh Weaver 10/20/2023 11:05 AM Dictation workstation:   [id number]    XR chest 1 view    Result Date: 10/19/2023  Interpreted By:  Khanh Weaver,  and Lb Whitehead STUDY: XR CHEST 1 VIEW;  10/19/2023 11:44 pm   INDICATION: Signs/Symptoms:worsening hypoxia.   COMPARISON: Chest radiograph 10/17/2023 and CT chest 10/16/2023   ACCESSION NUMBER(S): [id number]   ORDERING CLINICIAN: ROBERT SUGGS   FINDINGS: AP radiograph of the chest was provided.   Enteric tube seen coursing below the  level diaphragm with tip overlying the expected location of the proximal duodenum. Partially visualized postsurgical changes from left total shoulder arthroplasty.   CARDIOMEDIASTINAL SILHOUETTE: The cardiomediastinal silhouette is now significantly obscured on the left.   LUNGS: Re-demonstration of coarsened interstitial markings bilaterally. Significant interval worsening in left mid basilar lung predominant extensive consolidative opacities with blunting of the left costophrenic angle. The right lung is essentially clear. No pneumothorax.   ABDOMEN: No remarkable upper abdominal findings.   BONES: No acute osseous changes.       1.  Significant interval worsening in left mid basilar lung predominant opacification which may represent increasing atelectasis and/or pleural effusions with or without superimposed consolidation. Also correlate clinically with concern for central mucous plugging. 2. Background chronic lung changes. 3.  Postsurgical changes and medical devices as described above.   I personally reviewed the images/study and I agree with the findings as stated. This study was interpreted at [place], [place].   MACRO: None   Signed by: Khanh Weaver 10/19/2023 11:54 PM Dictation workstation:   [id number]    XR abdomen 1 view    Result Date: 10/18/2023  Interpreted By:  Gilberto Waldrop  and Hemal Marie STUDY: XR ABDOMEN 1 VIEW;  10/17/2023 5:59 pm   INDICATION: Signs/Symptoms:Confirm Dobhoff placement.   COMPARISON: Abdominal radiograph 08/31/2023. CT chest abdomen pelvis 10/16/2023.   ACCESSION NUMBER(S): [id number]   ORDERING CLINICIAN: THOMAS AU   FINDINGS: AP radiograph of the abdomen is provided for review.   Enteric tube courses below the diaphragm with the tip overlying the expected location of the distal stomach or proximal duodenum.   Nonobstructive bowel gas pattern.   There are hazy opacities within the left lung base.   Osseous  structures demonstrate no acute bony changes.       1. Enteric tube with the tip overlying the distal stomach/proximal duodenum. 2. Nonobstructive bowel gas pattern. 3. Left lung base atelectasis versus consolidation.   MACRO: None   Signed by: Kartiknimisha Uribe Xiomara 10/18/2023 9:40 AM Dictation workstation:   [id number]    XR chest 1 view    Result Date: 10/17/2023  Interpreted By:  Bang Owens and Dervishi Mario STUDY: XR CHEST 1 VIEW;  10/17/2023 8:48 pm   INDICATION: Signs/Symptoms:hypoxia.   COMPARISON: Chest radiograph 10/17/2023   ACCESSION NUMBER(S): [id number]   ORDERING CLINICIAN: JANINE ELLIS   FINDINGS: AP portable upright radiograph of the chest was provided with patient's rotation to the left.   Enteric tube crossing the diaphragm with the tip extending beyond the field of view.   CARDIOMEDIASTINAL SILHOUETTE: Cardiomediastinal silhouette is stable in size and configuration.   LUNGS: Redemonstration of coarse interstitial lung markings with hyperinflation. Obscuration of the left hemidiaphragm, suggesting pleural effusion and/or atelectasis. Slight interval worsening of left basilar and retrocardiac hazy opacity. Somewhat increased interstitial lung markings in the right lower lung field, peripherally. Right perihilar haziness. Increased lucency in the right lung apex with pulmonary vascular markings and otherwise no obvious pneumothorax.   ABDOMEN: No remarkable upper abdominal findings.   BONES: No acute osseous changes.       1. Slight worsening of left basilar opacity compared to prior imaging, suggesting atelectasis and/or pleural effusion. 2. Probable interstitial pulmonary edema or fluid overload. Correlate with patient's fluid status.   I personally reviewed the images/study and I agree with the findings as stated. This study was interpreted at [facility], [place], [place].   MACRO: NONE.   Signed by: Bang Owens 10/17/2023 9:54 PM Dictation  workstation:   [id number]    Transthoracic Echo (TTE) Complete    Result Date: 10/17/2023   [facility], [address] [place] [address]                Tel [phone] and Fax [phone] TRANSTHORACIC ECHOCARDIOGRAM REPORT  Patient Name:      PIA RALPH        Reading Physician:    [id number] Kirit Cain MD Study Date:        10/17/2023           Ordering Provider:    [id number] JEAN SHANNAN LEX MRN/PID:           [id number]             Fellow: Accession#:        [id number]         Nurse: Date of Birth/Age: 1955 / 68 years Sonographer:          Nani Garcia RDCS Gender:            M                    Additional Staff: Height:            170.18 cm            Admit Date: Weight:            49.44 kg             Admission Status:     Inpatient -                                                               Routine BSA:               1.56 m2              Encounter#:           [id number]                                         Department Location:  [place] [address] MICU Blood Pressure: 136 /91 mmHg Study Type:    TRANSTHORACIC ECHO (TTE) COMPLETE Diagnosis/ICD: Encounter for screening for cardiovascular disorders-Z13.6 Indication:    Polycythemia CPT Code:      Echo Complete w Full Doppler-46160 Patient History: Pertinent History: HTN and Hyperlipidemia. PHTN, Liver fibrosis, Hypoxemia,                    COPD, ETOH, Opiod usage,. Study Detail: The following Echo studies were performed: 2D, M-Mode, Doppler and               color flow. Technically challenging study due to poor acoustic               windows and body habitus. Definity used as a contrast agent for               endocardial border definition and agitated saline used as a               contrast agent for intraseptal flow evaluation. Total contrast               used for this procedure was 2.0 mL via IV push.  PHYSICIAN INTERPRETATION: Left Ventricle: The left ventricular  systolic function is hyperdynamic, with an estimated ejection fraction of 75%. There are no regional wall motion abnormalities. The left ventricular cavity size is normal. The left ventricular septal wall thickness is mildly increased. There is left ventricular concentric remodeling. Left ventricular diastolic filling was not assessed. Left Atrium: The left atrium is normal in size. A bubble study using agitated saline was performed. Bubble study is positive. There is evidence of an atrial septal aneurysm. Right Ventricle: The right ventricle is normal in size. There is normal right ventricular global systolic function. Right Atrium: The right atrium is normal in size. Aortic Valve: The aortic valve is trileaflet. There is minimal aortic valve cusp calcification. There is no aortic valve thickening. There is no evidence of aortic valve regurgitation. The peak instantaneous gradient of the aortic valve is 3.2 mmHg. Mitral Valve: The mitral valve is normal in structure. There is trace mitral valve regurgitation. Tricuspid Valve: The tricuspid valve is structurally normal. There is mild tricuspid regurgitation. The Doppler estimated RVSP is mildly elevated at 39.2 mmHg. Pulmonic Valve: The pulmonic valve is not well visualized. Pulmonic valve regurgitation was not assessed. Pericardium: There is no pericardial effusion noted. Aorta: The aortic root is normal. Systemic Veins: The inferior vena cava size appears small. In comparison to the previous echocardiogram(s): Compared with study from 8/29/2023, the RVSP today is mildly elevated, compared to moderately elevated in the prior echocardiogram (60 mmHg).  CONCLUSIONS:  1. Left ventricular systolic function is hyperdynamic with a 75% estimated ejection fraction.  2. The left ventricular septal wall thickness is mildly increased.  3. There is left ventricular concentric remodeling.  4. Atrial septal aneurysm present.  5. A bubble study using agitated saline was  performed. Bubble study is positive.  6. Mildly elevated RVSP.  7. Poorly visualized anatomical structures due to suboptimal image quality.  8. Compared with study from 8/29/2023, the RVSP today is mildly elevated, compared to moderately elevated in the prior echocardiogram (60 mmHg). QUANTITATIVE DATA SUMMARY: 2D MEASUREMENTS:                          Normal Ranges: Ao Root d:     2.50 cm   (2.0-3.7cm) IVSd:          1.20 cm   (0.6-1.1cm) LVPWd:         0.90 cm   (0.6-1.1cm) LVIDd:         3.20 cm   (3.9-5.9cm) LVIDs:         2.50 cm LV Mass Index: 62.2 g/m2 LV % FS        21.9 % LA VOLUME:                               Normal Ranges: LA Vol A4C:        37.5 ml    (22+/-6mL/m2) LA Vol A2C:        36.5 ml LA Vol BP:         37.9 ml LA Vol Index A4C:  24.0ml/m2 LA Vol Index A2C:  23.3 ml/m2 LA Vol Index BP:   24.2 ml/m2 LA Area A4C:       15.9 cm2 LA Area A2C:       15.3 cm2 LA Major Axis A4C: 5.8 cm LA Major Axis A2C: 5.5 cm LA Volume Index:   24.0 ml/m2 AORTA MEASUREMENTS:                      Normal Ranges: Ao Sinus, d: 3.15 cm (2.1-3.5cm) AORTIC VALVE:                         Normal Ranges: AoV Vmax:      0.89 m/s (<=1.7m/s) AoV Peak PG:   3.2 mmHg (<20mmHg) LVOT Max Dony:  0.63 m/s (<=1.1m/s) LVOT VTI:      10.50 cm LVOT Diameter: 2.00 cm  (1.8-2.4cm) AoV Area,Vmax: 2.24 cm2 (2.5-4.5cm2)  RIGHT VENTRICLE: RV s' 0.12 m/s TRICUSPID VALVE/RVSP:                             Normal Ranges: Peak TR Velocity: 3.01 m/s Est. RA Pressure: 3 mmHg RV Syst Pressure: 39.2 mmHg (< 30mmHg)  [id number] Kirit Cain MD Electronically signed on 10/17/2023 at 7:03:57 PM  ** Final **     XR chest 1 view    Result Date: 10/17/2023  Interpreted By:  Khanh Weaver and Maltbie Grace STUDY: XR CHEST 1 VIEW;  10/17/2023 3:16 pm   INDICATION: Signs/Symptoms:Acute high flow oxygen requirement, history of mucus plugging.   COMPARISON: CT chest, abdomen and pelvis 10/16/2023   ACCESSION NUMBER(S): [id number]   ORDERING CLINICIAN: THOMAS AU    FINDINGS: AP radiograph of the chest was provided.   CARDIOMEDIASTINAL SILHOUETTE: Cardiomediastinal silhouette is normal in size and configuration. Mild aortic knob calcification.   LUNGS: Background of coarse interstitial lung markings and hyperinflation are again seen. Left basilar and retrocardiac hazy opacity appears slightly less conspicuous than prior. There is no sizable pleural effusion or pneumothorax..   ABDOMEN: No remarkable upper abdominal findings.   BONES: Status post left shoulder arthroplasty.       1. Hazy left basilar opacity appears slightly less conspicuous than prior and correlate with decreasing atelectasis/infiltrate. 2. Redemonstrated background diffuse emphysema.   I personally reviewed the images/study and I agree with the findings as stated by radiology resident Mignon Malone MD. This study was interpreted at [place], [place].   Signed by: Khanh Weaver 10/17/2023 3:23 PM Dictation workstation:   [id number]    CT chest abdomen pelvis w IV contrast    Result Date: 10/16/2023  Interpreted By:  Jeet Wang and Kamau Nyokabi STUDY: CT CHEST ABDOMEN PELVIS W IV CONTRAST;  10/16/2023 12:30 pm   INDICATION: Signs/Symptoms:polycythemia looking for malignancy.   COMPARISON: None.   ACCESSION NUMBER(S): [id number]   ORDERING CLINICIAN: CALEB NGUYEN   TECHNIQUE: CT of the chest, abdomen, and pelvis was performed.  Contiguous axial images were obtained at 3 mm slice thickness through the chest, abdomen and pelvis. Coronal and sagittal reconstructions at 3 mm slice thickness were performed. 75 ml of contrast Omnipaque 350 were administered intravenously without immediate complication.   FINDINGS: CHEST:   LUNG/PLEURA/LARGE AIRWAYS: The trachea and central airways are patent. There is soft tissue density in the right lower lobe bronchi. There is  atelectasis of the right posterior lobe. Chronic emphysematous change in bilateral lung  apices. There are no pulmonary nodule or masses. There is no pleural effusion or pneumothorax.   VESSELS: Aorta and pulmonary arteries are normal caliber.  Moderate atherosclerotic changes are noted of the aorta and branching vessels. Moderate coronary artery calcifications are present.   HEART: The heart is normal in size.  There is no pericardial effusion.   MEDIASTINUM AND KRISTOPHER: Unchanged appearance of nonenlarged paratracheal lymph nodes when compared to prior CT 08/15/2022. No hilar or axillary lymphadenopathy is present.  The esophagus is normal.   CHEST WALL AND LOWER NECK: The soft tissues of the chest wall demonstrate no gross abnormality. The visualized thyroid gland appears within normal limits.   ABDOMEN:   LIVER: The liver is normal in size and demonstrates diffusely decreased attenuation suggesting steatosis. The liver is similar in appearance when compared to prior CT 08/22/2022.   BILE DUCTS: Intrahepatic and extrahepatic bile ducts are prominent which is within normal limits given the status post cholecystectomy.   GALLBLADDER: The gallbladder is nondistended without radiopaque stones.   PANCREAS: The pancreas appears unremarkable.   SPLEEN: The spleen is normal in size without focal lesions.   ADRENAL GLANDS: Bilateral adrenal glands appear normal.   KIDNEYS AND URETERS: The kidneys are normal in size and enhance symmetrically.  No hydroureteronephrosis or nephroureterolithiasis is identified. Unchanged appearance of well-circumscribed hypodensities in bilateral kidneys that are subcentimeter in size and likely represent simple renal cysts.   PELVIS:   BLADDER: The urinary bladder appears normal without abnormal wall thickening.   REPRODUCTIVE ORGANS: The prostate is not enlarged.   BOWEL: The stomach is unremarkable.  The small and large bowel are normal in caliber and demonstrate no wall thickening.  The appendix appears normal.     VESSELS: There is no aneurysmal dilatation of the abdominal  aorta. The IVC appears normal. Moderate calcifications of the abdominal aorta its branches.   PERITONEUM/RETROPERITONEUM/LYMPH NODES: There is no free or loculated fluid collection, no free intraperitoneal air. The retroperitoneum appears normal.  No abdominopelvic lymphadenopathy is present. Unchanged appearance of nonenlarged bilateral external iliac lymph nodes when compared to prior CT 08/15/2022.   BONE AND SOFT TISSUE: Status post left total  shoulder arthroplasty. No suspicious osseous lesions are identified. Degenerative discogenic disease is noted in the lower thoracic and lumbar spine.  The abdominal wall soft tissues appear normal.       1. No evidence of primary neoplasm or metastatic disease. 2. Right lower lobe mucous plugging which may reflect aspiration.   I personally reviewed the images/study and I agree with the findings as stated. This study was interpreted at [place], [place].   MACRO: None   Signed by: Jeet Wang 10/16/2023 3:39 PM Dictation workstation:   [id number]    MR thoracic spine wo IV contrast    Result Date: 10/12/2023  Interpreted By:  Tyron Dixon and Ebai Jerky STUDY: MR THORACIC SPINE WO IV CONTRAST; MR LUMBAR SPINE WO IV CONTRAST; 10/11/2023 11:37 pm   INDICATION: Signs/Symptoms:bilateral LE weakness.   Per EMR: 68-year-old male with a past medical history of PD, hypertension, alcohol and opioid abuse, hepatitis C, PE without cor pulmonale on Eliquis who presents today for an inability to walk. Patient states that about 3 days ago his legs gave out and he has not been able to walk since and has been stuck in a recliner.   COMPARISON: CT chest abdomen pelvis 08/15/2022.   ACCESSION NUMBER(S): [id number]; [id number]   ORDERING CLINICIAN: TEE BLAND   TECHNIQUE: Limited nondiagnostic MRI of the thoracic and lumbar spine. Only T2 sagittal and coronal  images of the thoracic and lumbar spine were obtained.   The patient requested termination of the examination due to pain.   FINDINGS: Limited MRI of thoracic and lumbar spine demonstrates scoliosis of the thoracic spine. No definitive evidence of substantial central canal stenosis. Mild cervical narrowing secondary spinal degenerative change. Evaluation of neural foraminal stenosis is markedly limited.    images demonstrated T2 hyperintense right renal cyst.       1. Nondiagnostic MRI of the thoracic and lumbar spine. The patient requested termination of the examination due to pain. A repeat examination can be obtained as clinically indicated after adequate pain control. 2. Within the severe limitations of this examination, there is no definite evidence of substantial central canal stenosis of the thoracic or lumbar spine.       I personally reviewed the images/study and I agree with the findings as stated. This study was interpreted at [place], [place].     Signed by: Tyron Dixon 10/12/2023 12:23 AM Dictation workstation:   [id number]    MR lumbar spine wo IV contrast    Result Date: 10/12/2023  Interpreted By:  Tyron Dixon and Ebai Jerky STUDY: MR THORACIC SPINE WO IV CONTRAST; MR LUMBAR SPINE WO IV CONTRAST; 10/11/2023 11:37 pm   INDICATION: Signs/Symptoms:bilateral LE weakness.   Per EMR: 68-year-old male with a past medical history of PD, hypertension, alcohol and opioid abuse, hepatitis C, PE without cor pulmonale on Eliquis who presents today for an inability to walk. Patient states that about 3 days ago his legs gave out and he has not been able to walk since and has been stuck in a recliner.   COMPARISON: CT chest abdomen pelvis 08/15/2022.   ACCESSION NUMBER(S): [id number]; [id number]   ORDERING CLINICIAN: TEE BLAND   TECHNIQUE: Limited nondiagnostic MRI of the thoracic and lumbar spine. Only T2 sagittal and coronal  images of the thoracic and lumbar spine were obtained.  The  patient requested termination of the examination due to pain.   FINDINGS: Limited MRI of thoracic and lumbar spine demonstrates scoliosis of the thoracic spine. No definitive evidence of substantial central canal stenosis. Mild cervical narrowing secondary spinal degenerative change. Evaluation of neural foraminal stenosis is markedly limited.    images demonstrated T2 hyperintense right renal cyst.       1. Nondiagnostic MRI of the thoracic and lumbar spine. The patient requested termination of the examination due to pain. A repeat examination can be obtained as clinically indicated after adequate pain control. 2. Within the severe limitations of this examination, there is no definite evidence of substantial central canal stenosis of the thoracic or lumbar spine.       I personally reviewed the images/study and I agree with the findings as stated. This study was interpreted at [place], [place].     Signed by: Tyron Dixon 10/12/2023 12:23 AM Dictation workstation:   [id number]    CT head wo IV contrast    Result Date: 10/11/2023  Interpreted By:  Tyron Dixon and Dervishi Mario STUDY: CT HEAD WO IV CONTRAST;  10/11/2023 9:42 pm   INDICATION: bilateral LE weakness, headache.   COMPARISON: CT brain: 08/26/2023.   ACCESSION NUMBER(S): [id number]   ORDERING CLINICIAN: TEE BLAND   TECHNIQUE: Noncontrast axial CT scan of head was performed. Angled reformats in brain and bone windows were generated. The images were reviewed in bone, brain, blood and soft tissue windows.   FINDINGS: CSF Spaces: The ventricles, sulci and basal cisterns are concordant with patient's age and degree of global parenchymal atrophy.. There is no extraaxial fluid collection.   Parenchyma: There are hypodense focal areas in the subcortical and periventricular white matter regions consistent with the sequela of chronic microvascular ischemic changes. Punctate remote left  thalamic and anterior limb internal capsule lacunar infarcts. Otherwise, the grey-white differentiation is overall preserved. There is no mass effect or midline shift.  There is no intracranial hemorrhage.   Calvarium: The calvarium is intact.   Paranasal sinuses and mastoids: Visualized paranasal sinuses and mastoids are clear.       1. No evidence of acute cortical infarct or intracranial hemorrhage. 2. Subcortical and periventricular white matter hypodensities consistent with the sequela of chronic microvascular ischemic changes. 3. Punctate remote left thalamic and anterior basal ganglia lacunar infarcts.     I personally reviewed the images/study and I agree with the findings as stated. This study was interpreted at [place], [place].   MACRO: None   Signed by: Tyron Dixon 10/11/2023 9:52 PM Dictation workstation:   [id number]    XR chest 2 views    Result Date: 10/11/2023  Interpreted By:  Tyron Dixon and Stephens Katherine STUDY: XR CHEST 2 VIEWS;  10/11/2023 9:32 pm   INDICATION: Signs/Symptoms:bilater LE weakness.   COMPARISON: Chest radiograph and CT chest 08/26/2023   ACCESSION NUMBER(S): [id number]   ORDERING CLINICIAN: TEE BLAND   FINDINGS: PA and lateral radiographs of the chest were provided. New patchy left lower lobe airspace opacities. Consider pneumonia. Pulmonary hyperinflation and coarsened interstitial markings compatible with COPD/emphysema. No pleural effusion or pneumothorax diffuse osteopenia. No acute osseous abnormality. Partially visualized left shoulder arthroplasty. Upper abdomen is unremarkable.       1. New patchy left lower lobe airspace opacities. Consider pneumonia. 2. Findings of COPD/emphysema.   I personally reviewed the images/study and I agree with the findings as stated. This study was interpreted at [place], [place].   MACRO: None   Signed by: Tyron  Zack 10/11/2023 9:42 PM Dictation workstation:   [id number]            This patient has a urinary catheter   Reason for the urinary catheter remaining today? critically ill patient who need accurate urinary output measurements    Assessment/Plan   Principal Problem:    Hospital-acquired pneumonia  Active Problems:    Essential hypertension, benign    Hepatitis C virus infection cured after antiviral drug therapy    Alcoholism (CMS/HCC)    Chronic pulmonary embolism (CMS/HCC)    COPD (chronic obstructive pulmonary disease) (CMS/HCC)    HLD (hyperlipidemia)    Molina Godinez is a 68 year old male with PMHx of COPD, HTN, AUD and opioid use disorder, HCV (treated), chronic PE who presented originally on 10/9 with inability to walk. Briefly in MICU for c/f alcohol withdrawal and high CIWA scores, transferred to floor, eventually readmitted to MICU 10/16 for increased O2 requirements, put on Airvo and intubated 10/20. Bronch done with resp cx growing stenotrophomonas for which he completed a course of Bactrim. Pt was extubated on 10/27. Since extubated, continues with LE weakness (CTH and MRI brain/spine all neg); neuro now signed off they think its “critical illness polyneuropathy with severe deconditioning.” Also with SIADH (unclear etiology, potentially just his severe pulmonary disease); nephrology consulted and was initially treated with fluid restriction, urea packets and Lasix, but now off all of these for significant increase in Na and BUN. Will attempt iHD given uremia and c/f uremic encephalopathy.         NEURO/PSYCH  #AMS- fluctuating, unclear etiology as previously suspected alcohol withdrawal but could be 2/2 chronic hypoxia and retention vs hyponatremia vs uremic encephalopathy. Previously intubated/sedated, extubated on 10/27.   - CT head without pathology from 10/11  - Initially had visual and tactile hallucinations, have since resolved. Suspect metabolic etiology of fluctuations in mental status,  i/s/o acutely increased oxygen requirement.     #Bilateral LE weakness  -Likely chronic, had weakness during 8/2023 admission; less likely GBS  -CT head from 10/11 without pathology, MRI spine stopped early due to pain (on 10/11)  -Patient able to move bilateral lower extremities  -Repeat MRI brain/total spine on 10/20 unremarkable for acute process  PLAN:  -May follow up with neurology outpatient  -ID consulted regarding weakness and positive Syphilis Ab. Pt was treated back in 1998 but rec MRI spine given concern of abscess, MRI negative for acute process  -Neurology consulted. Suspicion that weakness is likely a peripheral (LMN + muscle) inflammatory or degenerative disorder considering distribution and chronology.      - recommend EMG/NGS after patient is downgraded      - low suspicion for GBS     CV  #HTN- resolved  - home amlodipine 5mg, atenolol 50mg  - Initially started on Cardene drip, transitioned to Labetalol prior to resolution  - home atenolol restarted on 10/28, switched to metop 12.5 BID on 10/29 I/s/o renal function     #Tachycardia  - Sinus rhythm  - Etiology includes pain, infection, hypovolemia  - Poor correlatoin with fentanyl pushes, less suspicious for pain etiology  - home atenolol switched to metop 12.5     #CAD  - home atorvastatin 40mg held in the setting of elevated transaminases      #Chest pain- resolved  - New onset chest pain 10/28, had hyperkalemia on RFP  - RFP rechecked, K wnl  - Trop 14  - ECG without acute changes, stable from prior  - Given sublingual nitroglycerin, but had resolution of pain prior to administration.  - Some concern of uremic pericarditis but unlikely. No pericardial rub appreciated on exam.  - continue to monitor     PULM  #CAP- resolved  -Urine strep positive, unsure if active or chronic infection  -MRSA negative, Urine legionella negative  -CTX 5 day course completed 10/17     #L lung consolidation, steno HAP  Initially treated for CAP given positive urine  strep with CTX 10/13-10/17, one day of Zosyn10/17-10/17  Leukocytosis stable ~24  C/f HAP vs incomplete resolution of previous PNA  -S/p Bronch 10/20 with purulent fluid with bronchial washings sent for cx  -Bronchial washings with stenotrophomonas susceptible to Bactrim  -Bactrim 10/22-10/28     #Suspected PAH  - Echo with atrial septal aneurysm, positive bubble study  [] Will need outpatient workup, previously some suspicion for PAH but will need further evaluation     #Severe COPD  #Mucus plugging  -8/2023 FEV1 42%  - CT C/A/P with RLL mucus plug  - CXR 10/28 with L sided pleural effusion vs mucus plugging  PLAN:  - Continue aggressive RT: Albuterol q6h, chest PT, respiratory hygiene  - Ezpap, incentive spirometer  - 3% NS neb q6h  - Encourage sitting, mobility as able. PT following     #Subsegmental PE  -Requires anticoagulation for 3 months (per vasc med 8/2023), on home apixaban 5mg BID  PLAN:  -Heparin gtt, held pending trialysis line placement      GI  #HepC  -treated, 8/2023 HCV RNA undetected     #Nutrition  - Dobhoff in place 10/17  - Nutrition following, appreciate recs  - TF with TwoCal HN at goal rate of 35ml/hr  - 100mg Thiamine, 1mg Folic acid, 8000u Vit D2     #Elevated transaminases, hx of treated HCV- increasing, semi stable  - LFTs rising over past few days, predominantly ALP/AST/ALT with normal bili  - RUQ US with steatosis, sludge in GB  - Sulfa drug induced vs intrinsic process, though Bactrim typically takes 2 weeks for hepatotoxicity  -Trend LFTs     /RENAL  #Hyponatremia - Likely SIADH 2/2 COPD exacerbation but unclear if additional etiologies- improving  - Na as low as 120, slowly improving, 134 on 10/30/23  - check Na and urea q4hr  - D/c urea packets in setting of BUN of 172 and AMS  - place marie for accurate I&Os in setting of strict fluid monitoring  - Free water flushes 100ml BID for further fluid restriction given hyponatremia thought to be 2/2 SIADH  - Daily urine osm, lytes,  urea  - Nephro following, appreciate recs    #Uremia with c/f uremic encephalopathy   #KRYSTEN  - BUN elevating over past few days up to 170s, likely 2/2 urea packets, KRYSTEN, TF  - Fluid restricted, FeNa 0.4% indicating prerenal KRYSTEN  - due to c/f uremic encephalopathy, will plan for iHD  - 5% 12.5g albumin given this PM to improve intravascular volume prior to trialysis line placement (family consent pending)  - Monitor for uremic symptoms: loss of appetite, hypoactive changes in mental status, nausea, pericarditis    #Hyperkalemia  - K 7.9, rechecked to 4.7  - 4.4 on 10/29 -> uptrending to 5.4 -> 5.9 this AM  - 15 g lokelma given  - plan for iHD for uremic encephalopathy, will also improve K   - RFP q4h     #C/f refeeding syndrome- resolved  - Hypokalemia 2.7, Phos 1.9, Ca 5.2  - Electrolytes repleted, will monitor        ENDOCRINE  #Secondary HyperPTH  -8/28 .7  -25-hydroxy vit D 8, 1,25 Vit D 44.4  PLAN:  Vit D2 8000u liquid daily     ID  #Leukocytosis I/s/o recently treated strep pneumo CAP- increasing  - Completed CTX 10/13-10/17  - Zosyn 10/20-10/24, Bactrim 10/22-10/28, Levaquin 10/23-10/25  - Bronchial washings with stenotrophomonas susceptible to Bactrim  - Leukocytosis stable at 29, will continue to monitor  - Restart bactrim if signs of sepsis     HEME/ONC  #Leukocytosis, intermittently ranging 18-24  -Likely 2/2 to PNA  -stable at 29   -restart bactrim if developing signs of sepsis.   -Monitor WBC, vitals        MSK/RHEUM   #Chronic LE weakness  -Likely chronic from disuse and alcohol, unlikely GBS since no paralysis  -possible EMG once downgraded, neuro concern for peripheral (LMN + muscle) inflammatory or degenerative disorder considering distribution and chronology     Vent/Oxy: HFNC at 60%/30L  Pressors/Drips: Heparin gtt  Abx: none  F: Restricted  E: PRN  N: TF via Dobhoff at 35 ml/hr  A: PIV R arm x 3, Dobhoff, [place]  DVT ppx: heparin gtt  GI ppx: Pantoprazole 40mg daily     Code status: Full Code  (confirmed)  NOK: Tonie (sister) [phone]; Nahomi (God-sister) [phone]; Sarah Godinez (sister) [phone]        Yancy Freeman MD

## 2023-10-30 NOTE — PROGRESS NOTES
"Molina Godinez is a 68 y.o. male on day 18 of admission presenting with Hospital-acquired pneumonia.    Subjective   Seems slightly confused.       Objective     Physical Exam  Constitutional:       Comments: Now extubated, hoarse voice noted   HENT:      Head: Normocephalic and atraumatic.      Mouth/Throat:      Mouth: Mucous membranes are moist.      Pharynx: No oropharyngeal exudate.   Eyes:      General: No scleral icterus.     Conjunctiva/sclera: Conjunctivae normal.      Pupils: Pupils are equal, round, and reactive to light.   Cardiovascular:      Rate and Rhythm: Regular rhythm. Tachycardia present.      Pulses: Normal pulses.      Heart sounds: No murmur heard.     No friction rub.   Pulmonary:      Comments: Tachypneic, coarse breath sounds on R, decreased breath sounds on L   Abdominal:      General: Abdomen is flat. Bowel sounds are normal. There is no distension.      Palpations: Abdomen is soft.      Tenderness: There is no abdominal tenderness.   Musculoskeletal:         General: No swelling or tenderness.      Cervical back: Normal range of motion.   Skin:     General: Skin is warm and dry.      Coloration: Skin is not jaundiced.      Comments: Decreased skin turgor       Last Recorded Vitals  Blood pressure 122/65, pulse (!) 126, temperature 37.7 °C (99.9 °F), resp. rate (!) 40, height 1.702 m (5' 7.01\"), weight 49.7 kg (109 lb 9.1 oz), SpO2 92 %.  Intake/Output last 3 Shifts:  I/O last 3 completed shifts:  In: 1828.7 (36.8 mL/kg) [I.V.:663.7 (13.4 mL/kg); NG/GT:1165]  Out: 1095 (22 mL/kg) [Urine:1095 (0.6 mL/kg/hr)]  Weight: 49.7 kg     Relevant Results  Scheduled medications  albuterol, 2.5 mg, nebulization, 4x daily  [Held by provider] atorvastatin, 40 mg, oral, Daily  calcium gluconate, 2 g, intravenous, Once  ergocalciferol, 8,000 Units, oral, Daily  esomeprazole, 40 mg, nasoduodenal tube, Daily before breakfast  folic acid, 1 mg, oral, Daily  lidocaine, 1 patch, transdermal, Daily  melatonin, " 9 mg, oral, Nightly  metoprolol tartrate, 12.5 mg, oral, BID  multivitamin with minerals, 1 tablet, oral, Daily  nicotine, 1 patch, transdermal, Daily  polyethylene glycol, 17 g, oral, Daily  sodium chloride, 3 mL, nebulization, q6h SAM  thiamine, 100 mg, oral, Daily      Continuous medications  heparin, 0-4,500 Units/hr, Last Rate: Stopped (10/30/23 1300)      PRN medications  PRN medications: acetaminophen, dextrose 10 % in water (D10W), dextrose, glucagon, heparin, nitroglycerin, oxyCODONE, oxygen, sennosides, white petrolatum  Results for orders placed or performed during the hospital encounter of 10/11/23 (from the past 24 hour(s))   Renal function panel   Result Value Ref Range    Glucose 148 (H) 74 - 99 mg/dL    Sodium 133 (L) 136 - 145 mmol/L    Potassium 4.5 3.5 - 5.3 mmol/L    Chloride 95 (L) 98 - 107 mmol/L    Bicarbonate 24 21 - 32 mmol/L    Anion Gap 19 10 - 20 mmol/L    Urea Nitrogen 151 (HH) 6 - 23 mg/dL    Creatinine 1.31 (H) 0.50 - 1.30 mg/dL    eGFR 60 (L) >60 mL/min/1.73m*2    Calcium 9.7 8.6 - 10.6 mg/dL    Phosphorus 5.4 (H) 2.5 - 4.9 mg/dL    Albumin 3.3 (L) 3.4 - 5.0 g/dL   POCT GLUCOSE   Result Value Ref Range    POCT Glucose 143 (H) 74 - 99 mg/dL   Renal function panel   Result Value Ref Range    Glucose 183 (H) 74 - 99 mg/dL    Sodium 131 (L) 136 - 145 mmol/L    Potassium 5.3 3.5 - 5.3 mmol/L    Chloride 92 (L) 98 - 107 mmol/L    Bicarbonate 23 21 - 32 mmol/L    Anion Gap 21 (H) 10 - 20 mmol/L    Urea Nitrogen 166 (HH) 6 - 23 mg/dL    Creatinine 1.74 (H) 0.50 - 1.30 mg/dL    eGFR 42 (L) >60 mL/min/1.73m*2    Calcium 9.2 8.6 - 10.6 mg/dL    Phosphorus 4.9 2.5 - 4.9 mg/dL    Albumin 3.2 (L) 3.4 - 5.0 g/dL   POCT GLUCOSE   Result Value Ref Range    POCT Glucose 147 (H) 74 - 99 mg/dL   POCT GLUCOSE   Result Value Ref Range    POCT Glucose 157 (H) 74 - 99 mg/dL   Renal function panel   Result Value Ref Range    Glucose 141 (H) 74 - 99 mg/dL    Sodium 134 (L) 136 - 145 mmol/L    Potassium 5.4 (H)  3.5 - 5.3 mmol/L    Chloride 94 (L) 98 - 107 mmol/L    Bicarbonate 23 21 - 32 mmol/L    Anion Gap 22 (H) 10 - 20 mmol/L    Urea Nitrogen 170 (HH) 6 - 23 mg/dL    Creatinine 2.01 (H) 0.50 - 1.30 mg/dL    eGFR 36 (L) >60 mL/min/1.73m*2    Calcium 9.0 8.6 - 10.6 mg/dL    Phosphorus 4.3 2.5 - 4.9 mg/dL    Albumin 3.1 (L) 3.4 - 5.0 g/dL   CBC and Auto Differential   Result Value Ref Range    WBC 29.3 (H) 4.4 - 11.3 x10*3/uL    nRBC 0.0 0.0 - 0.0 /100 WBCs    RBC 4.85 4.50 - 5.90 x10*6/uL    Hemoglobin 14.1 13.5 - 17.5 g/dL    Hematocrit 41.2 41.0 - 52.0 %    MCV 85 80 - 100 fL    MCH 29.1 26.0 - 34.0 pg    MCHC 34.2 32.0 - 36.0 g/dL    RDW 13.1 11.5 - 14.5 %    Platelets 326 150 - 450 x10*3/uL    MPV 10.0 7.5 - 11.5 fL    Neutrophils % 88.1 40.0 - 80.0 %    Immature Granulocytes %, Automated 1.3 (H) 0.0 - 0.9 %    Lymphocytes % 3.3 13.0 - 44.0 %    Monocytes % 6.7 2.0 - 10.0 %    Eosinophils % 0.1 0.0 - 6.0 %    Basophils % 0.5 0.0 - 2.0 %    Neutrophils Absolute 25.80 (H) 1.20 - 7.70 x10*3/uL    Immature Granulocytes Absolute, Automated 0.37 0.00 - 0.70 x10*3/uL    Lymphocytes Absolute 0.98 (L) 1.20 - 4.80 x10*3/uL    Monocytes Absolute 1.95 (H) 0.10 - 1.00 x10*3/uL    Eosinophils Absolute 0.02 0.00 - 0.70 x10*3/uL    Basophils Absolute 0.16 (H) 0.00 - 0.10 x10*3/uL   Heparin Assay, UFH   Result Value Ref Range    Heparin Unfractionated 1.0 See Comment Below for Therapeutic Ranges IU/mL   Osmolality, urine   Result Value Ref Range    Osmolality, Urine Random 455 200 - 1,200 mOsm/kg   Urine electrolytes   Result Value Ref Range    Sodium, Urine Random 22 mmol/L    Sodium/Creatinine Ratio 21 Not established. mmol/g Creat    Potassium, Urine Random 74 mmol/L    Potassium/Creatinine Ratio 69 Not established mmol/g Creat    Chloride, Urine Random <15 mmol/L    Chloride/Creatinine Ratio      Creatinine, Urine Random 107.3 20.0 - 370.0 mg/dL   Renal function panel   Result Value Ref Range    Glucose 151 (H) 74 - 99 mg/dL     Sodium 133 (L) 136 - 145 mmol/L    Potassium 5.9 (H) 3.5 - 5.3 mmol/L    Chloride 92 (L) 98 - 107 mmol/L    Bicarbonate 20 (L) 21 - 32 mmol/L    Anion Gap 27 (H) 10 - 20 mmol/L    Urea Nitrogen 178 (HH) 6 - 23 mg/dL    Creatinine 2.32 (H) 0.50 - 1.30 mg/dL    eGFR 30 (L) >60 mL/min/1.73m*2    Calcium 9.4 8.6 - 10.6 mg/dL    Phosphorus 5.1 (H) 2.5 - 4.9 mg/dL    Albumin 3.1 (L) 3.4 - 5.0 g/dL   Heparin Assay, UFH   Result Value Ref Range    Heparin Unfractionated 0.7 See Comment Below for Therapeutic Ranges IU/mL   Renal function panel   Result Value Ref Range    Glucose 84 74 - 99 mg/dL    Sodium 140 136 - 145 mmol/L    Potassium 3.6 3.5 - 5.3 mmol/L    Chloride 114 (H) 98 - 107 mmol/L    Bicarbonate 15 (L) 21 - 32 mmol/L    Anion Gap 15 mmol/L    Urea Nitrogen 129 (HH) 6 - 23 mg/dL    Creatinine 1.51 (H) 0.50 - 1.30 mg/dL    eGFR 50 (L) >60 mL/min/1.73m*2    Calcium 5.3 (LL) 8.6 - 10.6 mg/dL    Phosphorus 2.8 2.5 - 4.9 mg/dL    Albumin 1.7 (L) 3.4 - 5.0 g/dL   POCT GLUCOSE   Result Value Ref Range    POCT Glucose 139 (H) 74 - 99 mg/dL            Assessment/Plan   Principal Problem:    Hospital-acquired pneumonia  Active Problems:    Essential hypertension, benign    Hepatitis C virus infection cured after antiviral drug therapy    Alcoholism (CMS/HCC)    Chronic pulmonary embolism (CMS/HCC)    COPD (chronic obstructive pulmonary disease) (CMS/HCC)    HLD (hyperlipidemia)    Molina Godinez is a 68 y.o. male with PMH of HTN, COPD, EtOH use disorder, PE on eliquis, HCV s/p treatment w/out viral DNA in blood, presented on 10/12 to ED w/ complaint of LE weakness. Patient w/ complicated hospital course, originally in MICU for suspected EtOH withdrawal, then transferred to floor. Patient had hypoxia, transferred back to MICU. Subsequently intubated and found to have Stenotrophomonas pneumonia, now on bactrim and Levaquin. Nephro consulted for hyponatremia.     Patient extubated 10/27. Hyponatremia resolved    2. KRYSTEN   nonoliguric, Stage I   - baseline creatinine: 0.6-0.8  - Etiology: likely from lasix for hyponatremia, unable to be certain until new lab studies performed  - Repeat Urine electrolytes, urea, serum osm, urine osm  - Possible causative meds (Zosyn and vancomycin, NSAIDs, ACE/ARBs, etc): no  - Any nephrotoxins (contrast, hyperglycemia, NSAIDs, ischemia/anemia): no  - Any hypotension or relative hypotension: no  -10/30: Tiana 22, Ucr 107, Ucl <15 Fena 0.3- likely prerenal?  -Ure-na stopped on 10/28  -lasix stopped on 10/28       RECOMMENDATIONS:  - HD today for 2 hrs for which temp catheter needs to be placed  - consent taken and placed in pt's file  - Renally dose all medications   - Continue frequent RFP/BMP  - Keep MAP >65 or SBP >90, avoid nephrotoxic medications, radiocontrast if possible, follow medication trough levels as appropriate.  - Strict I/O monitoring, daily weights  - Will continue to follow      Patient seen and discussed w/ attending Dr. Myers.      Sofia Paz MD  Nephrology Fellow   Daytime / Weekend Renal Pager [phone]  After 7 pm Emergencies [phone] Pager [phone]

## 2023-10-31 ENCOUNTER — APPOINTMENT (OUTPATIENT)
Dept: RADIOLOGY | Facility: HOSPITAL | Age: 68
End: 2023-10-31
Payer: COMMERCIAL

## 2023-10-31 ENCOUNTER — APPOINTMENT (OUTPATIENT)
Dept: CARDIOLOGY | Facility: HOSPITAL | Age: 68
End: 2023-10-31
Payer: COMMERCIAL

## 2023-10-31 PROBLEM — N19 UREMIA: Status: ACTIVE | Noted: 2023-10-31

## 2023-10-31 LAB
ABO GROUP (TYPE) IN BLOOD: NORMAL
ALBUMIN SERPL BCP-MCNC: 3.2 G/DL (ref 3.4–5)
ALBUMIN SERPL BCP-MCNC: 3.3 G/DL (ref 3.4–5)
ALP SERPL-CCNC: 259 U/L (ref 33–136)
ALT SERPL W P-5'-P-CCNC: 256 U/L (ref 10–52)
ANION GAP SERPL CALC-SCNC: 17 MMOL/L (ref 10–20)
ANION GAP SERPL CALC-SCNC: 19 MMOL/L (ref 10–20)
ANION GAP SERPL CALC-SCNC: 21 MMOL/L (ref 10–20)
ANION GAP SERPL CALC-SCNC: 23 MMOL/L (ref 10–20)
ANION GAP SERPL CALC-SCNC: 23 MMOL/L (ref 10–20)
ANTIBODY SCREEN: NORMAL
APPEARANCE UR: ABNORMAL
AST SERPL W P-5'-P-CCNC: 413 U/L (ref 9–39)
ATRIAL RATE: 117 BPM
BASOPHILS # BLD AUTO: 0.14 X10*3/UL (ref 0–0.1)
BASOPHILS NFR BLD AUTO: 0.5 %
BILIRUB DIRECT SERPL-MCNC: 0.2 MG/DL (ref 0–0.3)
BILIRUB SERPL-MCNC: 0.6 MG/DL (ref 0–1.2)
BILIRUB UR STRIP.AUTO-MCNC: NEGATIVE MG/DL
BUN SERPL-MCNC: 107 MG/DL (ref 6–23)
BUN SERPL-MCNC: 171 MG/DL (ref 6–23)
BUN SERPL-MCNC: 177 MG/DL (ref 6–23)
BUN SERPL-MCNC: 181 MG/DL (ref 6–23)
BUN SERPL-MCNC: 181 MG/DL (ref 6–23)
CALCIUM SERPL-MCNC: 9 MG/DL (ref 8.6–10.6)
CALCIUM SERPL-MCNC: 9.2 MG/DL (ref 8.6–10.6)
CALCIUM SERPL-MCNC: 9.3 MG/DL (ref 8.6–10.6)
CALCIUM SERPL-MCNC: 9.3 MG/DL (ref 8.6–10.6)
CALCIUM SERPL-MCNC: 9.4 MG/DL (ref 8.6–10.6)
CHLORIDE SERPL-SCNC: 101 MMOL/L (ref 98–107)
CHLORIDE SERPL-SCNC: 95 MMOL/L (ref 98–107)
CHLORIDE SERPL-SCNC: 97 MMOL/L (ref 98–107)
CHLORIDE SERPL-SCNC: 97 MMOL/L (ref 98–107)
CHLORIDE SERPL-SCNC: 98 MMOL/L (ref 98–107)
CO2 SERPL-SCNC: 23 MMOL/L (ref 21–32)
CO2 SERPL-SCNC: 24 MMOL/L (ref 21–32)
CO2 SERPL-SCNC: 25 MMOL/L (ref 21–32)
CO2 SERPL-SCNC: 26 MMOL/L (ref 21–32)
CO2 SERPL-SCNC: 27 MMOL/L (ref 21–32)
COLOR UR: YELLOW
CREAT SERPL-MCNC: 0.78 MG/DL (ref 0.5–1.3)
CREAT SERPL-MCNC: 1.57 MG/DL (ref 0.5–1.3)
CREAT SERPL-MCNC: 1.58 MG/DL (ref 0.5–1.3)
CREAT SERPL-MCNC: 1.59 MG/DL (ref 0.5–1.3)
CREAT SERPL-MCNC: 1.9 MG/DL (ref 0.5–1.3)
EOSINOPHIL # BLD AUTO: 0.02 X10*3/UL (ref 0–0.7)
EOSINOPHIL NFR BLD AUTO: 0.1 %
ERYTHROCYTE [DISTWIDTH] IN BLOOD BY AUTOMATED COUNT: 13.6 % (ref 11.5–14.5)
GFR SERPL CREATININE-BSD FRML MDRD: 38 ML/MIN/1.73M*2
GFR SERPL CREATININE-BSD FRML MDRD: 47 ML/MIN/1.73M*2
GFR SERPL CREATININE-BSD FRML MDRD: 48 ML/MIN/1.73M*2
GFR SERPL CREATININE-BSD FRML MDRD: 48 ML/MIN/1.73M*2
GFR SERPL CREATININE-BSD FRML MDRD: >90 ML/MIN/1.73M*2
GLUCOSE BLD MANUAL STRIP-MCNC: 120 MG/DL (ref 74–99)
GLUCOSE BLD MANUAL STRIP-MCNC: 120 MG/DL (ref 74–99)
GLUCOSE BLD MANUAL STRIP-MCNC: 121 MG/DL (ref 74–99)
GLUCOSE BLD MANUAL STRIP-MCNC: 137 MG/DL (ref 74–99)
GLUCOSE BLD MANUAL STRIP-MCNC: 150 MG/DL (ref 74–99)
GLUCOSE SERPL-MCNC: 113 MG/DL (ref 74–99)
GLUCOSE SERPL-MCNC: 120 MG/DL (ref 74–99)
GLUCOSE SERPL-MCNC: 121 MG/DL (ref 74–99)
GLUCOSE SERPL-MCNC: 123 MG/DL (ref 74–99)
GLUCOSE SERPL-MCNC: 97 MG/DL (ref 74–99)
GLUCOSE UR STRIP.AUTO-MCNC: NEGATIVE MG/DL
HBV SURFACE AB SER-ACNC: 5.9 MIU/ML
HBV SURFACE AG SERPL QL IA: NONREACTIVE
HCT VFR BLD AUTO: 38.2 % (ref 41–52)
HGB BLD-MCNC: 12.8 G/DL (ref 13.5–17.5)
HOLD SPECIMEN: NORMAL
IMM GRANULOCYTES # BLD AUTO: 0.24 X10*3/UL (ref 0–0.7)
IMM GRANULOCYTES NFR BLD AUTO: 0.9 % (ref 0–0.9)
KETONES UR STRIP.AUTO-MCNC: NEGATIVE MG/DL
LACTATE SERPL-SCNC: 1.6 MMOL/L (ref 0.4–2)
LEUKOCYTE ESTERASE UR QL STRIP.AUTO: ABNORMAL
LYMPHOCYTES # BLD AUTO: 1.04 X10*3/UL (ref 1.2–4.8)
LYMPHOCYTES NFR BLD AUTO: 3.9 %
MAGNESIUM SERPL-MCNC: 3.07 MG/DL (ref 1.6–2.4)
MAGNESIUM SERPL-MCNC: 3.18 MG/DL (ref 1.6–2.4)
MCH RBC QN AUTO: 29.8 PG (ref 26–34)
MCHC RBC AUTO-ENTMCNC: 33.5 G/DL (ref 32–36)
MCV RBC AUTO: 89 FL (ref 80–100)
MONOCYTES # BLD AUTO: 1.6 X10*3/UL (ref 0.1–1)
MONOCYTES NFR BLD AUTO: 6 %
NEUTROPHILS # BLD AUTO: 23.55 X10*3/UL (ref 1.2–7.7)
NEUTROPHILS NFR BLD AUTO: 88.6 %
NITRITE UR QL STRIP.AUTO: NEGATIVE
NRBC BLD-RTO: 0 /100 WBCS (ref 0–0)
P AXIS: 84 DEGREES
P OFFSET: 184 MS
P ONSET: 136 MS
PH UR STRIP.AUTO: 6 [PH]
PHOSPHATE SERPL-MCNC: 4.4 MG/DL (ref 2.5–4.9)
PHOSPHATE SERPL-MCNC: 5.3 MG/DL (ref 2.5–4.9)
PHOSPHATE SERPL-MCNC: 5.5 MG/DL (ref 2.5–4.9)
PHOSPHATE SERPL-MCNC: 5.6 MG/DL (ref 2.5–4.9)
PHOSPHATE SERPL-MCNC: 5.8 MG/DL (ref 2.5–4.9)
PLATELET # BLD AUTO: 285 X10*3/UL (ref 150–450)
PMV BLD AUTO: 10 FL (ref 7.5–11.5)
POTASSIUM SERPL-SCNC: 3.9 MMOL/L (ref 3.5–5.3)
POTASSIUM SERPL-SCNC: 4.3 MMOL/L (ref 3.5–5.3)
POTASSIUM SERPL-SCNC: 4.7 MMOL/L (ref 3.5–5.3)
POTASSIUM SERPL-SCNC: 4.7 MMOL/L (ref 3.5–5.3)
POTASSIUM SERPL-SCNC: 4.9 MMOL/L (ref 3.5–5.3)
PR INTERVAL: 156 MS
PROT SERPL-MCNC: 7.4 G/DL (ref 6.4–8.2)
PROT UR STRIP.AUTO-MCNC: ABNORMAL MG/DL
Q ONSET: 214 MS
QRS COUNT: 19 BEATS
QRS DURATION: 86 MS
QT INTERVAL: 326 MS
QTC CALCULATION(BAZETT): 454 MS
QTC FREDERICIA: 407 MS
R AXIS: -27 DEGREES
RBC # BLD AUTO: 4.3 X10*6/UL (ref 4.5–5.9)
RBC # UR STRIP.AUTO: ABNORMAL /UL
RBC #/AREA URNS AUTO: >20 /HPF
RH FACTOR (ANTIGEN D): NORMAL
SODIUM SERPL-SCNC: 136 MMOL/L (ref 136–145)
SODIUM SERPL-SCNC: 138 MMOL/L (ref 136–145)
SODIUM SERPL-SCNC: 139 MMOL/L (ref 136–145)
SODIUM SERPL-SCNC: 139 MMOL/L (ref 136–145)
SODIUM SERPL-SCNC: 141 MMOL/L (ref 136–145)
SP GR UR STRIP.AUTO: 1.02
SQUAMOUS #/AREA URNS AUTO: ABNORMAL /HPF
T AXIS: 67 DEGREES
T OFFSET: 377 MS
UFH PPP CHRO-ACNC: 0.3 IU/ML
UFH PPP CHRO-ACNC: 0.4 IU/ML
UFH PPP CHRO-ACNC: 0.4 IU/ML
UROBILINOGEN UR STRIP.AUTO-MCNC: <2 MG/DL
VENTRICULAR RATE: 117 BPM
WBC # BLD AUTO: 26.6 X10*3/UL (ref 4.4–11.3)
WBC #/AREA URNS AUTO: ABNORMAL /HPF

## 2023-10-31 PROCEDURE — 2500000004 HC RX 250 GENERAL PHARMACY W/ HCPCS (ALT 636 FOR OP/ED)

## 2023-10-31 PROCEDURE — 94668 MNPJ CHEST WALL SBSQ: CPT

## 2023-10-31 PROCEDURE — 31720 CLEARANCE OF AIRWAYS: CPT

## 2023-10-31 PROCEDURE — 86901 BLOOD TYPING SEROLOGIC RH(D): CPT

## 2023-10-31 PROCEDURE — 85520 HEPARIN ASSAY: CPT

## 2023-10-31 PROCEDURE — 36415 COLL VENOUS BLD VENIPUNCTURE: CPT

## 2023-10-31 PROCEDURE — 37799 UNLISTED PX VASCULAR SURGERY: CPT

## 2023-10-31 PROCEDURE — 2500000001 HC RX 250 WO HCPCS SELF ADMINISTERED DRUGS (ALT 637 FOR MEDICARE OP): Performed by: STUDENT IN AN ORGANIZED HEALTH CARE EDUCATION/TRAINING PROGRAM

## 2023-10-31 PROCEDURE — 83735 ASSAY OF MAGNESIUM: CPT

## 2023-10-31 PROCEDURE — 93005 ELECTROCARDIOGRAM TRACING: CPT

## 2023-10-31 PROCEDURE — 87077 CULTURE AEROBIC IDENTIFY: CPT

## 2023-10-31 PROCEDURE — 2500000005 HC RX 250 GENERAL PHARMACY W/O HCPCS: Performed by: PEDIATRICS

## 2023-10-31 PROCEDURE — 80069 RENAL FUNCTION PANEL: CPT | Mod: CCI

## 2023-10-31 PROCEDURE — 87040 BLOOD CULTURE FOR BACTERIA: CPT

## 2023-10-31 PROCEDURE — 76770 US EXAM ABDO BACK WALL COMP: CPT | Performed by: RADIOLOGY

## 2023-10-31 PROCEDURE — S4991 NICOTINE PATCH NONLEGEND: HCPCS | Performed by: STUDENT IN AN ORGANIZED HEALTH CARE EDUCATION/TRAINING PROGRAM

## 2023-10-31 PROCEDURE — 83605 ASSAY OF LACTIC ACID: CPT

## 2023-10-31 PROCEDURE — 2500000005 HC RX 250 GENERAL PHARMACY W/O HCPCS: Performed by: STUDENT IN AN ORGANIZED HEALTH CARE EDUCATION/TRAINING PROGRAM

## 2023-10-31 PROCEDURE — 2500000002 HC RX 250 W HCPCS SELF ADMINISTERED DRUGS (ALT 637 FOR MEDICARE OP, ALT 636 FOR OP/ED)

## 2023-10-31 PROCEDURE — 93325 DOPPLER ECHO COLOR FLOW MAPG: CPT | Performed by: INTERNAL MEDICINE

## 2023-10-31 PROCEDURE — 94660 CPAP INITIATION&MGMT: CPT

## 2023-10-31 PROCEDURE — 02HV33Z INSERTION OF INFUSION DEVICE INTO SUPERIOR VENA CAVA, PERCUTANEOUS APPROACH: ICD-10-PCS | Performed by: STUDENT IN AN ORGANIZED HEALTH CARE EDUCATION/TRAINING PROGRAM

## 2023-10-31 PROCEDURE — 99291 CRITICAL CARE FIRST HOUR: CPT

## 2023-10-31 PROCEDURE — 81001 URINALYSIS AUTO W/SCOPE: CPT

## 2023-10-31 PROCEDURE — 2020000001 HC ICU ROOM DAILY

## 2023-10-31 PROCEDURE — 93325 DOPPLER ECHO COLOR FLOW MAPG: CPT

## 2023-10-31 PROCEDURE — 86140 C-REACTIVE PROTEIN: CPT

## 2023-10-31 PROCEDURE — 86850 RBC ANTIBODY SCREEN: CPT

## 2023-10-31 PROCEDURE — 85025 COMPLETE CBC W/AUTO DIFF WBC: CPT

## 2023-10-31 PROCEDURE — 80053 COMPREHEN METABOLIC PANEL: CPT

## 2023-10-31 PROCEDURE — 2500000005 HC RX 250 GENERAL PHARMACY W/O HCPCS

## 2023-10-31 PROCEDURE — 5A1D90Z PERFORMANCE OF URINARY FILTRATION, CONTINUOUS, GREATER THAN 18 HOURS PER DAY: ICD-10-PCS | Performed by: INTERNAL MEDICINE

## 2023-10-31 PROCEDURE — 2500000002 HC RX 250 W HCPCS SELF ADMINISTERED DRUGS (ALT 637 FOR MEDICARE OP, ALT 636 FOR OP/ED): Performed by: STUDENT IN AN ORGANIZED HEALTH CARE EDUCATION/TRAINING PROGRAM

## 2023-10-31 PROCEDURE — 71045 X-RAY EXAM CHEST 1 VIEW: CPT | Performed by: RADIOLOGY

## 2023-10-31 PROCEDURE — 99232 SBSQ HOSP IP/OBS MODERATE 35: CPT

## 2023-10-31 PROCEDURE — 82248 BILIRUBIN DIRECT: CPT

## 2023-10-31 PROCEDURE — 2500000001 HC RX 250 WO HCPCS SELF ADMINISTERED DRUGS (ALT 637 FOR MEDICARE OP)

## 2023-10-31 PROCEDURE — 90937 HEMODIALYSIS REPEATED EVAL: CPT

## 2023-10-31 PROCEDURE — 71045 X-RAY EXAM CHEST 1 VIEW: CPT

## 2023-10-31 PROCEDURE — 93308 TTE F-UP OR LMTD: CPT | Performed by: INTERNAL MEDICINE

## 2023-10-31 PROCEDURE — 76770 US EXAM ABDO BACK WALL COMP: CPT

## 2023-10-31 PROCEDURE — 87086 URINE CULTURE/COLONY COUNT: CPT

## 2023-10-31 PROCEDURE — 82947 ASSAY GLUCOSE BLOOD QUANT: CPT

## 2023-10-31 PROCEDURE — 36415 COLL VENOUS BLD VENIPUNCTURE: CPT | Performed by: NURSE PRACTITIONER

## 2023-10-31 PROCEDURE — 94640 AIRWAY INHALATION TREATMENT: CPT

## 2023-10-31 RX ORDER — IPRATROPIUM BROMIDE AND ALBUTEROL SULFATE 2.5; .5 MG/3ML; MG/3ML
3 SOLUTION RESPIRATORY (INHALATION)
Status: DISCONTINUED | OUTPATIENT
Start: 2023-10-31 | End: 2023-10-31

## 2023-10-31 RX ORDER — VANCOMYCIN HYDROCHLORIDE 750 MG/150ML
15 INJECTION, SOLUTION INTRAVENOUS EVERY 12 HOURS
Status: DISCONTINUED | OUTPATIENT
Start: 2023-10-31 | End: 2023-10-31

## 2023-10-31 RX ORDER — NOREPINEPHRINE BITARTRATE/D5W 8 MG/250ML
0-3.3 PLASTIC BAG, INJECTION (ML) INTRAVENOUS CONTINUOUS
Status: DISCONTINUED | OUTPATIENT
Start: 2023-10-31 | End: 2023-11-05

## 2023-10-31 RX ORDER — NOREPINEPHRINE BITARTRATE/D5W 8 MG/250ML
PLASTIC BAG, INJECTION (ML) INTRAVENOUS
Status: COMPLETED
Start: 2023-10-31 | End: 2023-10-31

## 2023-10-31 RX ORDER — SODIUM CHLORIDE FOR INHALATION 7 %
3 VIAL, NEBULIZER (ML) INHALATION
Status: DISCONTINUED | OUTPATIENT
Start: 2023-10-31 | End: 2023-11-30 | Stop reason: HOSPADM

## 2023-10-31 RX ORDER — VANCOMYCIN HYDROCHLORIDE 1 G/200ML
1000 INJECTION, SOLUTION INTRAVENOUS ONCE
Status: COMPLETED | OUTPATIENT
Start: 2023-10-31 | End: 2023-10-31

## 2023-10-31 RX ADMIN — ALBUTEROL SULFATE 2.5 MG: 2.5 SOLUTION RESPIRATORY (INHALATION) at 12:27

## 2023-10-31 RX ADMIN — NOREPINEPHRINE BITARTRATE 8000 MCG: 8 INJECTION, SOLUTION INTRAVENOUS at 08:40

## 2023-10-31 RX ADMIN — ALBUTEROL SULFATE 2.5 MG: 2.5 SOLUTION RESPIRATORY (INHALATION) at 21:38

## 2023-10-31 RX ADMIN — THIAMINE HCL TAB 100 MG 100 MG: 100 TAB at 08:43

## 2023-10-31 RX ADMIN — SODIUM CHLORIDE 30 MG/ML INHALATION SOLUTION 3 ML: 30 SOLUTION INHALANT at 07:57

## 2023-10-31 RX ADMIN — Medication 10 MG: at 22:20

## 2023-10-31 RX ADMIN — Medication: at 15:21

## 2023-10-31 RX ADMIN — FOLIC ACID 1 MG: 1 TABLET ORAL at 08:43

## 2023-10-31 RX ADMIN — Medication 1 TABLET: at 08:43

## 2023-10-31 RX ADMIN — ESOMEPRAZOLE MAGNESIUM 40 MG: 40 FOR SUSPENSION ORAL at 08:43

## 2023-10-31 RX ADMIN — Medication 1 PATCH: at 08:44

## 2023-10-31 RX ADMIN — CALCIUM CHLORIDE, MAGNESIUM CHLORIDE, DEXTROSE MONOHYDRATE, LACTIC ACID, SODIUM CHLORIDE, SODIUM BICARBONATE AND POTASSIUM CHLORIDE 25 ML/KG/HR: 5.15; 2.03; 22; 5.4; 6.46; 3.09; .157 INJECTION INTRAVENOUS at 13:52

## 2023-10-31 RX ADMIN — ALBUTEROL SULFATE 2.5 MG: 2.5 SOLUTION RESPIRATORY (INHALATION) at 07:56

## 2023-10-31 RX ADMIN — SODIUM CHLORIDE 30 MG/ML INHALATION SOLUTION 3 ML: 30 SOLUTION INHALANT at 03:45

## 2023-10-31 RX ADMIN — Medication 8000 UNITS: at 08:43

## 2023-10-31 RX ADMIN — ALBUTEROL SULFATE 2.5 MG: 2.5 SOLUTION RESPIRATORY (INHALATION) at 15:19

## 2023-10-31 RX ADMIN — HEPARIN SODIUM 1200 UNITS/HR: 10000 INJECTION, SOLUTION INTRAVENOUS at 21:10

## 2023-10-31 RX ADMIN — VANCOMYCIN HYDROCHLORIDE 1000 MG: 1 INJECTION, SOLUTION INTRAVENOUS at 17:05

## 2023-10-31 RX ADMIN — Medication 50 L/MIN: at 07:55

## 2023-10-31 RX ADMIN — LIDOCAINE 1 PATCH: 4 PATCH TOPICAL at 08:43

## 2023-10-31 RX ADMIN — POLYETHYLENE GLYCOL 3350 17 G: 17 POWDER, FOR SOLUTION ORAL at 08:43

## 2023-10-31 ASSESSMENT — PAIN - FUNCTIONAL ASSESSMENT
PAIN_FUNCTIONAL_ASSESSMENT: 0-10
PAIN_FUNCTIONAL_ASSESSMENT: 0-10
PAIN_FUNCTIONAL_ASSESSMENT: CPOT (CRITICAL CARE PAIN OBSERVATION TOOL)

## 2023-10-31 ASSESSMENT — PAIN SCALES - GENERAL
PAINLEVEL_OUTOF10: 0 - NO PAIN
PAINLEVEL_OUTOF10: 0 - NO PAIN

## 2023-10-31 NOTE — TREATMENT PLAN
"For patient Molina Godinez documented on 10/30/2023 in Nursing Communication, \"OK to use trialysis line.\" Consent for Hemodialysis taken and filed.   1st HD session per note's keep SBP > 90// MAP > 65.    06:32 am report called per ICU RN assigned care of patient in MICU will consult with team regarding HD due to soft pressures. When asked current pressure 98/59. Extension [phone] given to contact HD unit with update.       "

## 2023-10-31 NOTE — SIGNIFICANT EVENT
Paged by MICU team re: new consult for TV endocarditis. On brief chart review pt is a 69 y/o man hx COPD, AUD and opioid use disorder, HCV, PE on apixaban initially admitted 10/11 with difficulty ambulating, attributed to ETOH withdrawal and malnutrition, admitted to ICU for withdrawal symptoms and PNA. Transferred to [facility] ?10/16 and then transferred back to ICU 10/17 for lethargy. Prev had completed CTX for PNA. Started on pip-tazo 10/17 and intubated 10/20, BAL + Stenotrophomonas. Treated w. TMP-SMX 10/22-10/28. WBC has remained high ~30k. During this time has also had worsening renal function, starting CRRT today. Also w/ worsening L lung opacification and hypotension on norepi. Blood cx collected today, sputum cx pending bronch. Started on vanc and ari. ID consulted as new TTE w/ mobile echodensity on post TV leaflet 1.7 x 0.8cm consistent w/ vegetation.     Recommendations:  -Cont IV vanc, dosed w/ pharmacy  -Cont IV meropenem 2gm q12H  -Will follow up blood cx  -Formal consult to follow in AM      Discussed w/ primary team and Dr. Shane Banks DO  Infectious Disease Fellow, PGY5  Epic Chat until 5pm/Team A pager [phone]

## 2023-10-31 NOTE — PROGRESS NOTES
"Vancomycin Dosing by Pharmacy- INITIAL    Molina Godinez is a 68 y.o. year old male who Pharmacy has been consulted for vancomycin dosing for endocarditis/endovascular infection. Based on the patient's indication and renal status this patient will be dosed based on a goal trough/random level of 15-20.     Renal function is currently declining.    Visit Vitals  /71   Pulse 105   Temp 36.1 °C (97 °F)   Resp 22        Lab Results   Component Value Date    CREATININE 1.58 (H) 10/31/2023    CREATININE 1.59 (H) 10/31/2023    CREATININE 1.57 (H) 10/31/2023    CREATININE 1.90 (H) 10/30/2023        Patient weight is No results found for: \"PTWEIGHT\"    No results found for: \"CULTURE\"     I/O last 3 completed shifts:  In: 2622.8 (52.8 mL/kg) [I.V.:337.8 (6.8 mL/kg); Blood:500; NG/GT:1535; IV Piggyback:250]  Out: 545 (11 mL/kg) [Urine:545 (0.3 mL/kg/hr)]  Weight: 49.7 kg   [unfilled]    Lab Results   Component Value Date    PATIENTTEMP 37.0 10/29/2023    PATIENTTEMP 37.0 10/20/2023    PATIENTTEMP 37.0 10/20/2023          Assessment/Plan     Patient will not be given a loading dose.  Will initiate vancomycin maintenance, a one time dose of 1000 mg.  Follow-up level will be ordered on 11/1 at 1700 unless clinically indicated sooner.  Will continue to monitor renal function daily while on vancomycin and order serum creatinine at least every 48 hours if not already ordered.  Follow for continued vancomycin needs, clinical response, and signs/symptoms of toxicity.       Ming Babcock, PharmD       "

## 2023-10-31 NOTE — PROGRESS NOTES
SOCIAL WORK NOTE   SW met with team for ID rounds. Patient is currently intubated pending bronch. Mental status is poor, but team is hopeful that HD will help to improve AMS. Currently recommended for moderate intensity. Social work to follow.  AMANDO Peralta, LISW-S ([id number])

## 2023-10-31 NOTE — PROGRESS NOTES
Occupational Therapy                 Therapy Communication Note    Patient Name: Molina Godinez  MRN: [id number]  Today's Date: 10/31/2023     Discipline: Occupational Therapy    Missed Visit Reason: Missed Visit Reason:  (Patient with MAP of 53, RN just starting Levo; will hold and attempt OT next visit as appropriate.)    Missed Time: Attempt    Comment:

## 2023-10-31 NOTE — TREATMENT PLAN
Noted for patient Herbert Auguste per ICU team contacted and consulted with Nephrology decision made to place pt on [facility] at this time instead of Hemodialysis. Off unit Technician WM notified via secure notes by morning dialysis charge RN CR.

## 2023-10-31 NOTE — PROGRESS NOTES
Physical Therapy                 Therapy Communication Note    Patient Name: Molina Goidnez  MRN: [id number]  Today's Date: 10/31/2023     Discipline: Physical Therapy    Missed Visit Reason: Missed Visit Reason:  (RN starting levo d/t low BP; patient also with tenuous repiratory status on AirVo; PT will hold and re-attempt as time and schedule allows and as medically appropriate.)    Missed Time: Attempt    Comment:

## 2023-10-31 NOTE — PROGRESS NOTES
"Molina Godinez is a 68 y.o. male on day 19 of admission presenting with Hospital-acquired pneumonia.    Subjective   Seems drowsy. On supplemental oxygen       Objective     Physical Exam  Constitutional:       Comments: Now extubated, hoarse voice noted   HENT:      Head: Normocephalic and atraumatic.      Mouth/Throat:      Mouth: Mucous membranes are moist.      Pharynx: No oropharyngeal exudate.   Eyes:      General: No scleral icterus.     Conjunctiva/sclera: Conjunctivae normal.      Pupils: Pupils are equal, round, and reactive to light.   Cardiovascular:      Rate and Rhythm: Regular rhythm. Tachycardia present.      Pulses: Normal pulses.      Heart sounds: No murmur heard.     No friction rub.   Pulmonary:      Comments: Tachypneic, coarse breath sounds on R, decreased breath sounds on L   Abdominal:      General: Abdomen is flat. Bowel sounds are normal. There is no distension.      Palpations: Abdomen is soft.      Tenderness: There is no abdominal tenderness.   Musculoskeletal:         General: No swelling or tenderness.      Cervical back: Normal range of motion.   Skin:     General: Skin is warm and dry.      Coloration: Skin is not jaundiced.      Comments: Decreased skin turgor         Last Recorded Vitals  Blood pressure 113/77, pulse 106, temperature 37.7 °C (99.9 °F), temperature source Temporal, resp. rate 24, height 1.702 m (5' 7.01\"), weight 49.7 kg (109 lb 9.1 oz), SpO2 99 %.  Intake/Output last 3 Shifts:  I/O last 3 completed shifts:  In: 2622.8 (52.8 mL/kg) [I.V.:337.8 (6.8 mL/kg); Blood:500; NG/GT:1535; IV Piggyback:250]  Out: 545 (11 mL/kg) [Urine:545 (0.3 mL/kg/hr)]  Weight: 49.7 kg     Relevant Results  Scheduled medications  albuterol, 2.5 mg, nebulization, 4x daily  calcium gluconate, 2 g, intravenous, Once  ergocalciferol, 8,000 Units, oral, Daily  esomeprazole, 40 mg, nasoduodenal tube, Daily before breakfast  folic acid, 1 mg, oral, Daily  lidocaine, 1 patch, transdermal, " Daily  melatonin, 10 mg, oral, Nightly  multivitamin with minerals, 1 tablet, oral, Daily  nicotine, 1 patch, transdermal, Daily  perflutren lipid microspheres, 0.5-10 mL of dilution, intravenous, Once in imaging  perflutren protein A microsphere, 0.5 mL, intravenous, Once in imaging  piperacillin-tazobactam, 3.375 g, intravenous, q6h  polyethylene glycol, 17 g, oral, Daily  sodium chloride, 3 mL, nebulization, q12h  sulfur hexafluoride microsphr, 2 mL, intravenous, Once in imaging  thiamine, 100 mg, oral, Daily      Continuous medications  heparin, 0-4,500 Units/hr, Last Rate: Stopped (10/31/23 0900)  norepinephrine, 0-3.3 mcg/kg/min, Last Rate: 0.04 mcg/kg/min (10/31/23 1457)  PrismaSol BGK 2/3.5, 25 mL/kg/hr, Last Rate: 25 mL/kg/hr (10/31/23 1352)      PRN medications  PRN medications: acetaminophen, dextrose 10 % in water (D10W), dextrose, glucagon, [Held by provider] heparin, oxyCODONE, oxygen, sennosides, white petrolatum  Results for orders placed or performed during the hospital encounter of 10/11/23 (from the past 24 hour(s))   POCT GLUCOSE   Result Value Ref Range    POCT Glucose 139 (H) 74 - 99 mg/dL   Renal function panel   Result Value Ref Range    Glucose 105 (H) 74 - 99 mg/dL    Sodium 133 (L) 136 - 145 mmol/L    Potassium 5.0 3.5 - 5.3 mmol/L    Chloride 94 (L) 98 - 107 mmol/L    Bicarbonate 22 21 - 32 mmol/L    Anion Gap 22 (H) 10 - 20 mmol/L    Urea Nitrogen 184 (HH) 6 - 23 mg/dL    Creatinine 2.30 (H) 0.50 - 1.30 mg/dL    eGFR 30 (L) >60 mL/min/1.73m*2    Calcium 9.0 8.6 - 10.6 mg/dL    Phosphorus 4.9 2.5 - 4.9 mg/dL    Albumin 3.2 (L) 3.4 - 5.0 g/dL   POCT GLUCOSE   Result Value Ref Range    POCT Glucose 131 (H) 74 - 99 mg/dL   POCT GLUCOSE   Result Value Ref Range    POCT Glucose 105 (H) 74 - 99 mg/dL   Renal function panel   Result Value Ref Range    Glucose 97 74 - 99 mg/dL    Sodium 136 136 - 145 mmol/L    Potassium 4.7 3.5 - 5.3 mmol/L    Chloride 95 (L) 98 - 107 mmol/L    Bicarbonate 25 21  - 32 mmol/L    Anion Gap 21 (H) 10 - 20 mmol/L    Urea Nitrogen 171 (HH) 6 - 23 mg/dL    Creatinine 1.90 (H) 0.50 - 1.30 mg/dL    eGFR 38 (L) >60 mL/min/1.73m*2    Calcium 9.3 8.6 - 10.6 mg/dL    Phosphorus 5.8 (H) 2.5 - 4.9 mg/dL    Albumin 3.3 (L) 3.4 - 5.0 g/dL   POCT GLUCOSE   Result Value Ref Range    POCT Glucose 150 (H) 74 - 99 mg/dL   Heparin Assay, UFH   Result Value Ref Range    Heparin Unfractionated 0.4 See Comment Below for Therapeutic Ranges IU/mL   CBC and Auto Differential   Result Value Ref Range    WBC 26.6 (H) 4.4 - 11.3 x10*3/uL    nRBC 0.0 0.0 - 0.0 /100 WBCs    RBC 4.30 (L) 4.50 - 5.90 x10*6/uL    Hemoglobin 12.8 (L) 13.5 - 17.5 g/dL    Hematocrit 38.2 (L) 41.0 - 52.0 %    MCV 89 80 - 100 fL    MCH 29.8 26.0 - 34.0 pg    MCHC 33.5 32.0 - 36.0 g/dL    RDW 13.6 11.5 - 14.5 %    Platelets 285 150 - 450 x10*3/uL    MPV 10.0 7.5 - 11.5 fL    Neutrophils % 88.6 40.0 - 80.0 %    Immature Granulocytes %, Automated 0.9 0.0 - 0.9 %    Lymphocytes % 3.9 13.0 - 44.0 %    Monocytes % 6.0 2.0 - 10.0 %    Eosinophils % 0.1 0.0 - 6.0 %    Basophils % 0.5 0.0 - 2.0 %    Neutrophils Absolute 23.55 (H) 1.20 - 7.70 x10*3/uL    Immature Granulocytes Absolute, Automated 0.24 0.00 - 0.70 x10*3/uL    Lymphocytes Absolute 1.04 (L) 1.20 - 4.80 x10*3/uL    Monocytes Absolute 1.60 (H) 0.10 - 1.00 x10*3/uL    Eosinophils Absolute 0.02 0.00 - 0.70 x10*3/uL    Basophils Absolute 0.14 (H) 0.00 - 0.10 x10*3/uL   Magnesium   Result Value Ref Range    Magnesium 3.07 (H) 1.60 - 2.40 mg/dL   Hepatic function panel   Result Value Ref Range    Albumin 3.3 (L) 3.4 - 5.0 g/dL    Bilirubin, Total 0.6 0.0 - 1.2 mg/dL    Bilirubin, Direct 0.2 0.0 - 0.3 mg/dL    Alkaline Phosphatase 259 (H) 33 - 136 U/L     (H) 10 - 52 U/L     (H) 9 - 39 U/L    Total Protein 7.4 6.4 - 8.2 g/dL   Type and screen   Result Value Ref Range    ABO TYPE O     Rh TYPE POS     ANTIBODY SCREEN NEG    Basic Metabolic Panel   Result Value Ref Range     Glucose 113 (H) 74 - 99 mg/dL    Sodium 139 136 - 145 mmol/L    Potassium 4.7 3.5 - 5.3 mmol/L    Chloride 97 (L) 98 - 107 mmol/L    Bicarbonate 24 21 - 32 mmol/L    Anion Gap 23 (H) 10 - 20 mmol/L    Urea Nitrogen 181 (HH) 6 - 23 mg/dL    Creatinine 1.57 (H) 0.50 - 1.30 mg/dL    eGFR 48 (L) >60 mL/min/1.73m*2    Calcium 9.0 8.6 - 10.6 mg/dL   Phosphorus   Result Value Ref Range    Phosphorus 5.6 (H) 2.5 - 4.9 mg/dL   Lactate   Result Value Ref Range    Lactate 1.6 0.4 - 2.0 mmol/L   POCT GLUCOSE   Result Value Ref Range    POCT Glucose 120 (H) 74 - 99 mg/dL   Heparin Assay, UFH   Result Value Ref Range    Heparin Unfractionated 0.4 See Comment Below for Therapeutic Ranges IU/mL   Renal function panel   Result Value Ref Range    Glucose 123 (H) 74 - 99 mg/dL    Sodium 139 136 - 145 mmol/L    Potassium 4.3 3.5 - 5.3 mmol/L    Chloride 98 98 - 107 mmol/L    Bicarbonate 26 21 - 32 mmol/L    Anion Gap 19 10 - 20 mmol/L    Urea Nitrogen 181 (HH) 6 - 23 mg/dL    Creatinine 1.59 (H) 0.50 - 1.30 mg/dL    eGFR 47 (L) >60 mL/min/1.73m*2    Calcium 9.2 8.6 - 10.6 mg/dL    Phosphorus 5.3 (H) 2.5 - 4.9 mg/dL    Albumin 3.2 (L) 3.4 - 5.0 g/dL   Renal function panel   Result Value Ref Range    Glucose 121 (H) 74 - 99 mg/dL    Sodium 138 136 - 145 mmol/L    Potassium 4.9 3.5 - 5.3 mmol/L    Chloride 97 (L) 98 - 107 mmol/L    Bicarbonate 23 21 - 32 mmol/L    Anion Gap 23 (H) 10 - 20 mmol/L    Urea Nitrogen 177 (HH) 6 - 23 mg/dL    Creatinine 1.58 (H) 0.50 - 1.30 mg/dL    eGFR 48 (L) >60 mL/min/1.73m*2    Calcium 9.3 8.6 - 10.6 mg/dL    Phosphorus 5.5 (H) 2.5 - 4.9 mg/dL    Albumin 3.3 (L) 3.4 - 5.0 g/dL   Blood Culture    Specimen: Peripheral Venipuncture; Blood culture   Result Value Ref Range    Blood Culture Loaded on Instrument - Culture in progress    POCT GLUCOSE   Result Value Ref Range    POCT Glucose 121 (H) 74 - 99 mg/dL   Blood Culture    Specimen: Peripheral Venipuncture; Blood culture   Result Value Ref Range    Blood  Culture Loaded on Instrument - Culture in progress    Urinalysis with Reflex Microscopic and Culture   Result Value Ref Range    Color, Urine Yellow Straw, Yellow    Appearance, Urine Hazy (N) Clear    Specific Gravity, Urine 1.018 1.005 - 1.035    pH, Urine 6.0 5.0, 5.5, 6.0, 6.5, 7.0, 7.5, 8.0    Protein, Urine 100 (2+) (N) NEGATIVE mg/dL    Glucose, Urine NEGATIVE NEGATIVE mg/dL    Blood, Urine LARGE (3+) (A) NEGATIVE    Ketones, Urine NEGATIVE NEGATIVE mg/dL    Bilirubin, Urine NEGATIVE NEGATIVE    Urobilinogen, Urine <2.0 <2.0 mg/dL    Nitrite, Urine NEGATIVE NEGATIVE    Leukocyte Esterase, Urine TRACE (A) NEGATIVE   Extra Urine Gray Tube   Result Value Ref Range    Extra Tube Hold for add-ons.    Microscopic Only, Urine   Result Value Ref Range    WBC, Urine 1-5 1-5, NONE /HPF    RBC, Urine >20 (A) NONE, 1-2, 3-5 /HPF    Squamous Epithelial Cells, Urine 1-9 (SPARSE) Reference range not established. /HPF   ECG 12 Lead   Result Value Ref Range    Ventricular Rate 117 BPM    Atrial Rate 117 BPM    IL Interval 156 ms    QRS Duration 86 ms    QT Interval 326 ms    QTC Calculation(Bazett) 454 ms    P Axis 84 degrees    R Axis -27 degrees    T Axis 67 degrees    QRS Count 19 beats    Q Onset 214 ms    P Onset 136 ms    P Offset 184 ms    T Offset 377 ms    QTC Fredericia 407 ms            Assessment/Plan   Principal Problem:    Hospital-acquired pneumonia  Active Problems:    Essential hypertension, benign    Hepatitis C virus infection cured after antiviral drug therapy    Alcoholism (CMS/HCC)    Chronic pulmonary embolism (CMS/HCC)    COPD (chronic obstructive pulmonary disease) (CMS/HCC)    HLD (hyperlipidemia)    Uremia    Moilna Godinez is a 68 y.o. male with PMH of HTN, COPD, EtOH use disorder, PE on eliquis, HCV s/p treatment w/out viral DNA in blood, presented on 10/12 to ED w/ complaint of LE weakness. Patient w/ complicated hospital course, originally in MICU for suspected EtOH withdrawal, then transferred  to floor. Patient had hypoxia, transferred back to MICU. Subsequently intubated and found to have Stenotrophomonas pneumonia, now on bactrim and Levaquin. Nephro consulted for hyponatremia.     Patient extubated 10/27. Hyponatremia resolved    2. KRYSTEN  nonoliguric, Stage I   - baseline creatinine: 0.6-0.8  - Etiology: likely from lasix for hyponatremia, unable to be certain until new lab studies performed  - Repeat Urine electrolytes, urea, serum osm, urine osm  -10/30: Tiana 22, Ucr 107, Ucl <15 Fena 0.3- likely prerenal?  -Ure-na stopped on 10/28  -lasix stopped on 10/28  -HD could not be started given the low pressures and persistent tachycardia       RECOMMENDATIONS:  - started CVVH 2k  - ECHO  - Renal USG  - Renally dose all medications   - Continue frequent RFP/BMP  - Keep MAP >65 or SBP >90, avoid nephrotoxic medications, radiocontrast if possible, follow medication trough levels as appropriate.  - Strict I/O monitoring, daily weights  - Will continue to follow      Patient seen and discussed w/ attending Dr. Myers.      Sofia Paz MD  Nephrology Fellow   Daytime / Weekend Renal Pager [phone]  After 7 pm Emergencies [phone] Pager [phone]

## 2023-10-31 NOTE — PROCEDURES
Central Venous Catheter Insertion Procedure Note        Indications:   Hemodialysis    Procedure Details   Informed consent was obtained for the procedure, including sedation.  Risks of lung perforation, hemorrhage, arrhythmia, and adverse drug reaction were discussed.     Maximum sterile technique was used including cap.    Under sterile conditions the skin above the right internal jugular vein was prepped with betadine and covered with a sterile drape. Local anesthesia was applied to the skin and subcutaneous tissues. Under live ultrasound guidance, 22-gauge needle was used to identify the vein. An 18-gauge needle was then inserted into the vein. A guide wire was then passed easily through the catheter. There were no arrhythmias. The catheter was then withdrawn. A triple-lumen HD line was then inserted into the vessel over the guide wire. The catheter was sutured into place.    Findings:  There were no changes to vital signs. Catheter was flushed with 10 cc NS. Patient did tolerate procedure well.    Recommendations:  CXR ordered to verify placement.

## 2023-10-31 NOTE — PROGRESS NOTES
Molina Godinez is a 68 y.o. male on day 19 of admission presenting with Hospital-acquired pneumonia.    Subjective   SUBJECTIVE:  Overnight, right IJV CVC placed and confirmed placement via CXR. Incidentally CXR showed complete opacification of left lung with air bronchograms, worse from prior CXR. Patient with worsening O2 requirement as well, from HFNC 30L/60% FiO2 to 60L/70% FiO2. This AM, Mr. Godinez was seen and examined at bedside, at which time he was undergoing breathing treatments and chest PT with RT. Denies any acute complaints. On rounds, blood pressures noted to be in 60s/40s with MAP in 50s. Patient started on Levophed through right IJV CVC.          Objective   OBJECTIVE:  Last Recorded Vitals  BP 90/61   Pulse (!) 130   Temp 36.9 °C (98.4 °F)   Resp (!) 47   Wt 49.7 kg (109 lb 9.1 oz)   SpO2 95%     I&O 24HR    Intake/Output Summary (Last 24 hours) at 10/31/2023 0630  Last data filed at 10/31/2023 0400  Gross per 24 hour   Intake 2088.8 ml   Output 410 ml   Net 1678.8 ml        Physical Exam  Constitutional:       General: He is not in acute distress.     Appearance: He is cachectic. He is ill-appearing.   HENT:      Head: Normocephalic and atraumatic.      Right Ear: External ear normal.      Left Ear: External ear normal.      Nose: Nose normal.      Mouth/Throat:      Mouth: Mucous membranes are dry.      Pharynx: Oropharynx is clear.   Eyes:      General: No scleral icterus.     Extraocular Movements: Extraocular movements intact.      Conjunctiva/sclera: Conjunctivae normal.   Cardiovascular:      Rate and Rhythm: Regular rhythm. Tachycardia present.      Pulses: Normal pulses.      Heart sounds: No murmur heard.     No friction rub. No gallop.   Pulmonary:      Breath sounds: Rhonchi present. No wheezing or rales.      Comments: Poor inspiratory effort  Abdominal:      General: Bowel sounds are normal.      Palpations: Abdomen is soft.      Tenderness: There is no abdominal tenderness.    Musculoskeletal:         General: No swelling or deformity.      Right lower leg: No edema.      Left lower leg: No edema.   Skin:     General: Skin is cool and dry.   Neurological:      Mental Status: Mental status is at baseline. He is disoriented.      Motor: Weakness (generalized) present.         Relevant Results  Labs:  Results for orders placed or performed during the hospital encounter of 10/11/23 (from the past 24 hour(s))   Osmolality, urine   Result Value Ref Range    Osmolality, Urine Random 455 200 - 1,200 mOsm/kg   Urine electrolytes   Result Value Ref Range    Sodium, Urine Random 22 mmol/L    Sodium/Creatinine Ratio 21 Not established. mmol/g Creat    Potassium, Urine Random 74 mmol/L    Potassium/Creatinine Ratio 69 Not established mmol/g Creat    Chloride, Urine Random <15 mmol/L    Chloride/Creatinine Ratio      Creatinine, Urine Random 107.3 20.0 - 370.0 mg/dL   Renal function panel   Result Value Ref Range    Glucose 151 (H) 74 - 99 mg/dL    Sodium 133 (L) 136 - 145 mmol/L    Potassium 5.9 (H) 3.5 - 5.3 mmol/L    Chloride 92 (L) 98 - 107 mmol/L    Bicarbonate 20 (L) 21 - 32 mmol/L    Anion Gap 27 (H) 10 - 20 mmol/L    Urea Nitrogen 178 (HH) 6 - 23 mg/dL    Creatinine 2.32 (H) 0.50 - 1.30 mg/dL    eGFR 30 (L) >60 mL/min/1.73m*2    Calcium 9.4 8.6 - 10.6 mg/dL    Phosphorus 5.1 (H) 2.5 - 4.9 mg/dL    Albumin 3.1 (L) 3.4 - 5.0 g/dL   Heparin Assay, UFH   Result Value Ref Range    Heparin Unfractionated 0.7 See Comment Below for Therapeutic Ranges IU/mL   Renal function panel   Result Value Ref Range    Glucose 84 74 - 99 mg/dL    Sodium 140 136 - 145 mmol/L    Potassium 3.6 3.5 - 5.3 mmol/L    Chloride 114 (H) 98 - 107 mmol/L    Bicarbonate 15 (L) 21 - 32 mmol/L    Anion Gap 15 mmol/L    Urea Nitrogen 129 (HH) 6 - 23 mg/dL    Creatinine 1.51 (H) 0.50 - 1.30 mg/dL    eGFR 50 (L) >60 mL/min/1.73m*2    Calcium 5.3 (LL) 8.6 - 10.6 mg/dL    Phosphorus 2.8 2.5 - 4.9 mg/dL    Albumin 1.7 (L) 3.4 - 5.0  g/dL   Hepatitis B surface antigen   Result Value Ref Range    Hepatitis B Surface AG Nonreactive Nonreactive   Hepatitis B surface antibody   Result Value Ref Range    Hepatitis B Surface AB 5.9 <10.0 mIU/mL   POCT GLUCOSE   Result Value Ref Range    POCT Glucose 139 (H) 74 - 99 mg/dL   Renal function panel   Result Value Ref Range    Glucose 105 (H) 74 - 99 mg/dL    Sodium 133 (L) 136 - 145 mmol/L    Potassium 5.0 3.5 - 5.3 mmol/L    Chloride 94 (L) 98 - 107 mmol/L    Bicarbonate 22 21 - 32 mmol/L    Anion Gap 22 (H) 10 - 20 mmol/L    Urea Nitrogen 184 (HH) 6 - 23 mg/dL    Creatinine 2.30 (H) 0.50 - 1.30 mg/dL    eGFR 30 (L) >60 mL/min/1.73m*2    Calcium 9.0 8.6 - 10.6 mg/dL    Phosphorus 4.9 2.5 - 4.9 mg/dL    Albumin 3.2 (L) 3.4 - 5.0 g/dL   POCT GLUCOSE   Result Value Ref Range    POCT Glucose 131 (H) 74 - 99 mg/dL   POCT GLUCOSE   Result Value Ref Range    POCT Glucose 105 (H) 74 - 99 mg/dL   Renal function panel   Result Value Ref Range    Glucose 97 74 - 99 mg/dL    Sodium 136 136 - 145 mmol/L    Potassium 4.7 3.5 - 5.3 mmol/L    Chloride 95 (L) 98 - 107 mmol/L    Bicarbonate 25 21 - 32 mmol/L    Anion Gap 21 (H) 10 - 20 mmol/L    Urea Nitrogen 171 (HH) 6 - 23 mg/dL    Creatinine 1.90 (H) 0.50 - 1.30 mg/dL    eGFR 38 (L) >60 mL/min/1.73m*2    Calcium 9.3 8.6 - 10.6 mg/dL    Phosphorus 5.8 (H) 2.5 - 4.9 mg/dL    Albumin 3.3 (L) 3.4 - 5.0 g/dL   POCT GLUCOSE   Result Value Ref Range    POCT Glucose 150 (H) 74 - 99 mg/dL   Heparin Assay, UFH   Result Value Ref Range    Heparin Unfractionated 0.4 See Comment Below for Therapeutic Ranges IU/mL   CBC and Auto Differential   Result Value Ref Range    WBC 26.6 (H) 4.4 - 11.3 x10*3/uL    nRBC 0.0 0.0 - 0.0 /100 WBCs    RBC 4.30 (L) 4.50 - 5.90 x10*6/uL    Hemoglobin 12.8 (L) 13.5 - 17.5 g/dL    Hematocrit 38.2 (L) 41.0 - 52.0 %    MCV 89 80 - 100 fL    MCH 29.8 26.0 - 34.0 pg    MCHC 33.5 32.0 - 36.0 g/dL    RDW 13.6 11.5 - 14.5 %    Platelets 285 150 - 450 x10*3/uL     MPV 10.0 7.5 - 11.5 fL    Neutrophils % 88.6 40.0 - 80.0 %    Immature Granulocytes %, Automated 0.9 0.0 - 0.9 %    Lymphocytes % 3.9 13.0 - 44.0 %    Monocytes % 6.0 2.0 - 10.0 %    Eosinophils % 0.1 0.0 - 6.0 %    Basophils % 0.5 0.0 - 2.0 %    Neutrophils Absolute 23.55 (H) 1.20 - 7.70 x10*3/uL    Immature Granulocytes Absolute, Automated 0.24 0.00 - 0.70 x10*3/uL    Lymphocytes Absolute 1.04 (L) 1.20 - 4.80 x10*3/uL    Monocytes Absolute 1.60 (H) 0.10 - 1.00 x10*3/uL    Eosinophils Absolute 0.02 0.00 - 0.70 x10*3/uL    Basophils Absolute 0.14 (H) 0.00 - 0.10 x10*3/uL   Type and screen   Result Value Ref Range    ABO TYPE O     Rh TYPE POS     ANTIBODY SCREEN NEG    Lactate   Result Value Ref Range    Lactate 1.6 0.4 - 2.0 mmol/L       Imaging:  XR chest 1 view    Result Date: 10/31/2023  STUDY: XR CHEST 1 VIEW;  10/31/2023 4:24 am   INDICATION: Signs/Symptoms:left lung collapse.   COMPARISON: Radiograph 10/30/2023   ACCESSION NUMBER(S): [id number]   ORDERING CLINICIAN: NAMRATA ALCANTARA   FINDINGS: AP radiograph of the chest was provided.   Dobhoff tube is visualized coursing beneath the diaphragm with the tip beyond the field of view. Right IJ central venous catheter with tip overlying the mid SVC.   CARDIOMEDIASTINAL SILHOUETTE: Complete obscuration of the left heart border due to near-complete opacification of the left lung field.   LUNGS: Similar appearance of complete opacification of the left lung field. Mild amount of air bronchograms are visualized in the left upper lung field. No evidence of right-sided pleural effusion or pneumothorax.   ABDOMEN: No remarkable upper abdominal findings.   BONES: No acute osseous changes.       1.  Similar appearance of complete opacification of the left lung field with mild amount of air bronchograms in the left upper lung field. Findings likely reflect a collapse of the left lung with possible pleural effusion. Findings may be secondary to mucous plugging. An  underlying pneumonia can not be excluded. 2. Medical devices/lines as noted above.   I personally reviewed the images/study and I agree with the findings as stated by Kinjal Stubbs MD. This study was interpreted at [place], [place].   MACRO: None.     Dictation workstation:   [id number]    XR chest 1 view    Result Date: 10/31/2023  STUDY: XR CHEST 1 VIEW;  10/30/2023 10:13 pm   INDICATION: Signs/Symptoms:s/p R trialysis line.   COMPARISON: None.   ACCESSION NUMBER(S): [id number]   ORDERING CLINICIAN: NAMRATA ALCANTARA   FINDINGS: AP radiograph of the chest was provided.   Enteric tube is visualized with the distal tip outside the field of view of this study. Right-sided IJ approach central venous catheter is visualized with the distal tip projecting over the expected region of the distal svc.   CARDIOMEDIASTINAL SILHOUETTE: Interval complete obscuration of the right heart border due to near complete opacification of the left lung field.   LUNGS: Interval near complete opacification of the left lung field, with some aerated lung noted in the upper lung zone. Redemonstration of left-sided volume loss. No evidence of right-sided effusion or focal consolidation.   ABDOMEN: No remarkable upper abdominal findings.   BONES: No acute osseous changes.       1. Interval near complete opacification of the left lung field, with some aerated lung noted in the upper lung zone. Associated obscuration of the left heart border. Findings may be the result of the previously described mucoid impaction in the left mainstem bronchus. 2. Right-sided IJ approach central venous catheter is visualized with the distal tip projecting over the expected region of the distal svc.   I personally reviewed the images/study and I agree with the findings as stated. This study was interpreted at [place], [place].   MACRO: None     Dictation workstation:    [id number]    XR chest 1 view    Result Date: 10/28/2023  Interpreted By:  Nathan Lu, STUDY: XR CHEST 1 VIEW;  10/28/2023 9:24 am   INDICATION: Signs/Symptoms:Atelectasis/Mucous plugging.   COMPARISON: 10/27/2023   ACCESSION NUMBER(S): [id number]   ORDERING CLINICIAN: CORY HILTON   FINDINGS: Status post extubation. Question left-sided endobronchial disease/mucoid impaction   CARDIOMEDIASTINAL SILHOUETTE: Left-sided mediastinal shift   LUNGS: Left basilar consolidation/effusions. Correlate with underlying volume loss   ABDOMEN: No remarkable upper abdominal findings.   BONES: No acute osseous changes.       1.  Status post extubation. Continued left basilar consolidation/effusion and correlate with underlying pneumonia/aspiration     Signed by: Nathan Lu 10/28/2023 11:55 AM Dictation workstation:   [id number]    XR chest 1 view    Result Date: 10/27/2023  Interpreted By:  Gilkeson, Nathan,  and Tripoli Alycia STUDY: XR CHEST 1 VIEW;  10/27/2023 9:26 am   INDICATION: Signs/Symptoms:Failed SBT, recurrent secretions, pneumonia.   COMPARISON: Chest radiographs 10/25/2023 and 10/24/2023, CT chest abdomen pelvis 10/16/2023.   ACCESSION NUMBER(S): [id number]   ORDERING CLINICIAN: THOMAS AU   FINDINGS: Single AP semi-upright portable radiograph of the chest was provided.   Endotracheal tube tip terminates 6.1 cm above the crispin. Enteric tube overlies the mid thorax with tip terminating at the level of the gastric antrum.   CARDIOMEDIASTINAL SILHOUETTE: The left border of the cardiomediastinal silhouette is obscured. Otherwise stable appearance of the cardiac silhouette.   LUNGS: Interval development of a moderate to large left-sided pleural effusion with associated overlying compressive atelectasis. The right lung is clear. No pneumothorax.   ABDOMEN: No remarkable upper abdominal findings.   BONES: No acute osseous changes.       1. Interval development of moderate to large  left-sided pleural effusion with associated atelectasis. Correlate with developing left basilar pneumonia/atelectasis/aspiration follow-up to resolution recommended 2. Medical devices as above.   I personally reviewed the image(s)/study and resident interpretation as dictated by Dr. Alycia Nam MD. I agree with the findings as stated. Data analyzed and images were interpreted at [facility], [place], [place].   MACRO: None   Signed by: Nathan Lu 10/27/2023 10:07 AM Dictation workstation:   [id number]    ECG 12 lead    Result Date: 10/27/2023  Sinus tachycardia Left axis deviation Low voltage QRS  in limb leads Cannot rule out Anterior infarct Cannot rule out Inferior infarct Abnormal ECG Confirmed by Terrell Silva ([id number]) on 10/27/2023 8:30:52 AM    US right upper quadrant    Result Date: 10/25/2023  Interpreted By:  Jason Means and Liller Gregory STUDY: US RIGHT UPPER QUADRANT;  10/25/2023 3:19 pm   INDICATION: Signs/Symptoms:New RUQ abdominal pain with newly elevated LFTs.   COMPARISON: Right upper quadrant ultrasound 08/27/2023   ACCESSION NUMBER(S): [id number]   ORDERING CLINICIAN: ANTONI ARNDT   TECHNIQUE: Multiple images of the right upper quadrant were obtained.   FINDINGS: LIVER: The liver is mildly enlarged measuring up to 18 cm in maximal craniocaudal dimension. The liver parenchyma is diffusely coarsened echotexture. Liver contour is smooth.   GALLBLADDER: The gallbladder is mildly distended. Sludge seen layering within the dependent portion of the gallbladder. There is no evidence of gallbladder wall thickening or pericholecystic edema. Gallbladder wall measures up to 2 mm. No cholelithiasis.   BILIARY TREE: No intra or extrahepatic biliary dilatation is identified. The common bile duct measures 0.36 cm.   PANCREAS: The pancreas is poorly visualized due to overlying bowel gas.   RIGHT KIDNEY: The right kidney is normal in size, measuring 10 cm in  craniocaudal dimension. The renal cortical echogenicity and thickness are within normal limits. No hydronephrosis or renal calculi are seen. Small renal cysts seen within the inferior pole measuring up to 1.0 x 0.7 x 0.8 cm.       1. Mild hepatomegaly with the diffuse coarse echotexture of the liver parenchyma. Correlate with liver function tests for liver parenchymal disease. 2. Gallbladder sludge is seen layering within the dependent portion of the gallbladder without evidence of cholecystitis.   I personally reviewed the images/study and I agree with the findings as stated above by resident physician, Dr. Timothy Hernández. The study was interpreted at [facility] in [facility].   MACRO: None   Signed by: Jason Means 10/25/2023 10:31 PM Dictation workstation:   [id number]    XR abdomen 1 view    Result Date: 10/25/2023  Interpreted By:  Nathan Lu and Sullivan Shannon STUDY: XR ABDOMEN 1 VIEW;  10/25/2023 1:00 pm   INDICATION: Signs/Symptoms:new abdominal pain.   COMPARISON: Correlation with CT chest abdomen pelvis 10/16/2023, KUB 10/17/2023   ACCESSION NUMBER(S): [id number]   ORDERING CLINICIAN: ANTONI ARNDT   FINDINGS: Single frontal radiograph of the abdomen was provided.   Enteric tube courses midline below the level of the diaphragm with the tip projecting over the gastric antrum.     Relative paucity of small bowel gas in an overall nonobstructive bowel gas pattern, decreased compared to prior exam 10/17/2023 and which may reflect decompressed or fluid-filled bowel loops. Limited evaluation of pneumoperitoneum on supine imaging, however no gross evidence of free air is noted.   Visualized lungs are clear.   Osseous structures demonstrate no acute bony changes.       1.  Decreased degree of small bowel gas compared to prior radiograph, which may reflect decompressed or fluid-filled bowel loops. Overall nonobstructive bowel gas pattern. 2. Enteric tube  as above.     I personally reviewed the images/study and I agree with the findings as stated. This study was interpreted at [facility], [place], [place].   Signed by: Nathan Lu 10/25/2023 3:50 PM Dictation workstation:   [id number]    XR chest 1 view    Result Date: 10/25/2023  Interpreted By:  Gilberto Waldrop, STUDY: XR CHEST 1 VIEW;  10/25/2023 7:41 am   INDICATION: Signs/Symptoms:worsening secretions and oxygenation level.   COMPARISON: Radiograph dated 10/24/2023   ACCESSION NUMBER(S): [id number]   ORDERING CLINICIAN: DEBORAH YEAGER   FINDINGS: ET tube is terminating 7.6 cm from the crispin. Enteric tube is in place with the tip projecting over proximal duodenum.   Cardiac silhouette size is grossly stable.   Interval improvement in the aeration of the left lung with residual left basilar consolidation and patchy airspace opacity in the left mid and upper lung. Right lung is grossly clear. No sizable pneumothorax.   No acute osseous abnormality.       1. Persistent left basilar pleural effusion and atelectasis/infiltrate with slight improvement in the aeration of the left lung. Correlate with left basilar/left lower lobe atelectasis/collapse and mucous plugging. 2. Residual patchy airspace opacity in the left mid and upper lung which may be due to edema or infiltrate. 3. Medical appliances as described above       Signed by: Gilberto Solano 10/25/2023 12:41 PM Dictation workstation:   [id number]    XR chest 1 view    Result Date: 10/24/2023  Interpreted By:  Gilberto Waldrop, STUDY: XR CHEST 1 VIEW;  10/24/2023 2:49 pm   INDICATION: Signs/Symptoms:Evaluate interval status of lungs.   COMPARISON: Radiograph dated 10/21/2023   ACCESSION NUMBER(S): [id number]   ORDERING CLINICIAN: KASIA PERRY   FINDINGS: ET tube is terminating 5.8 cm from the crispin. Enteric tube is in place with the tip outside of the liver.   The cardiac silhouette size is  grossly stable, however the left heart border is obscured.   Persistent left basilar and retrocardiac opacity with component of volume loss. Patchy airspace opacity in the left upper lung field. No sizable pneumothorax. Right lung is grossly clear.   No acute osseous abnormality.       1. Left mid and lower lung consolidative opacity with component of volume loss. Correlate with concern for infection and aspiration as well as mucous plugging and partial/complete left lower lobe collapse. 2. Small left pleural effusion.       Signed by: Gilberto Solano 10/24/2023 2:55 PM Dictation workstation:   [id number]    XR chest 1 view    Result Date: 10/21/2023  Interpreted By:  Beth Guy, STUDY: XR CHEST 1 VIEW;  10/21/2023 1:34 pm   INDICATION: Signs/Symptoms:pneumonia.   COMPARISON: 10/20/2023.   ACCESSION NUMBER(S): [id number]   ORDERING CLINICIAN: ANTONI ARNDT       1.  Improved aeration of the left lower lung consolidative opacities. Decreased left-sided pleural effusion. 2. Coarse interstitial, and ground-glass opacity involving the left upper lung, midlung, slightly improved. 3. Cardiac silhouette is moderately enlarged. Aorta is tortuous. Pulmonary vessels are congested with mild pulmonary edema. 4. Perihilar and basilar atelectasis of the right lung. 5. No right-sided pleural effusion or pneumothorax seen 6. ETT 7.2 cm above the crispin. Enteric tube with the tip below the lower margin of study. 7.       MACRO: None   Signed by: Beth Guy 10/21/2023 2:46 PM Dictation workstation:   [id number]    MR brain w and wo IV contrast    Result Date: 10/21/2023  Interpreted By:  Leslie Olson  and Rio Yañez STUDY: MR BRAIN W AND WO IV CONTRAST; MR LUMBAR SPINE W AND WO IV CONTRAST; MR THORACIC SPINE W AND WO IV CONTRAST; MR CERVICAL SPINE W AND WO IV CONTRAST;  10/20/2023 11:41 pm   INDICATION: Signs/Symptoms:Altered mental status with b/l LE weakness; Signs/Symptoms:Altered mental status, b/l LE  weakness, concern for epidural abscess; Signs/Symptoms:altered mental status, b/l LE weakness, c/f epidural abscess; Signs/Symptoms:Altered mental status, b/l LE weakness, concern for spinal/epidural abscess.   COMPARISON: CT head, 10/11/2023 Thoracic and lumbar spine, 10/11/2023 CT cervical spine, 08/26/2023   ACCESSION NUMBER(S): [id number]; [id number]; [id number]; [id number]   ORDERING CLINICIAN: THOMAS AU   TECHNIQUE: Multiplanar, multi-sequence images of the brain, cervical, thoracic, and lumbar spine were obtained before and after the intravenous administration of 9 mL Dotarem gadolinium.   FINDINGS: Evaluation is markedly limited due to patient motion.   Diffusion weighted images show no evidence of acute ischemic infarct.   There is no evidence of an acute intraparenchymal hemorrhage, mass, mass-effect, midline shift or extra-axial fluid collection. No abnormal parenchymal enhancement.   Nonspecific subcortical and periventricular T2/FLAIR hyperintensities may represent sequelae of chronic small vessel ischemic changes.   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   The ventricular size and cerebral volume are age-concordant.   The orbits and globes are unremarkable.   The paranasal sinuses show no hemorrhage or air-fluid levels.   Bilateral mastoid effusions, left more than right.     Cervical Spine:   Incidental note is made of an enteric tube.   PARASPINAL SOFT TISSUES: No significant abnormality.   SPINAL CORD: The cervical cord has a normal caliber and signal intensity.   BONE MARROW/VERTEBRAL BODIES: Overall bone marrow signal is within normal limits. Vertebral body heights are maintained. No acute fracture.   ALIGNMENT: There is reversal of the normal cervical lordosis and minimal anterolisthesis of C3-C4.   C1-C2: The atlantodental joint is intact. The predental space is not widened.   C2-C3: Disc osteophyte complex, uncovertebral joint hypertrophy and facet arthrosis. Mild  spinal canal and mild-to-moderate bilateral neural foraminal stenosis.   C3-C4: Disc osteophyte complex, uncovertebral joint hypertrophy and facet arthrosis. Moderate to severe spinal canal and neural foraminal stenosis. There is mild flattening of the cervical cord question very subtle increased cord signal on the STIR sequence.   C4-C5: Disc osteophyte complex, uncovertebral joint hypertrophy and facet arthrosis. No significant spinal canal stenosis. Moderate bilateral neural foraminal stenosis.   C5-C6: Disc osteophyte complex, uncovertebral joint hypertrophy and facet arthrosis. Mild spinal canal stenosis. Moderate neural foraminal narrowing, left greater than right.   C6-C7: Disc osteophyte complex, uncovertebral joint hypertrophy and facet arthrosis. No significant spinal canal stenosis. Moderate to severe neural foraminal stenosis, right greater than left.   C7-T1: Disc osteophyte complex and uncovertebral joint hypertrophy and facet arthrosis. Mild spinal canal stenosis. Moderate to severe bilateral neural foraminal stenosis.     Thoracic spine:   Visualized chest/abdomen: Scattered consolidative opacities in the left lung with a small left pleural effusion.   SPINAL CORD: The thoracic cord has a normal caliber and signal intensity. No cord compression.   There is prominent posterior epidural fat. No epidural fluid collection is noted.   No abnormal enhancement. There is incomplete fat suppression within the lower cervical cord on the postcontrast images. No significant spinal canal or neural foraminal stenosis.   BONE MARROW/VERTEBRAL BODIES: Overall bone marrow signal is within normal limits. There is compression of the superior endplate of T11 no associated increased T2/STIR signal. This was present on prior CT chest 10/16/2023. There is a proximally 25% loss of vertebral body height anteriorly and no osseous retropulsion into the spinal canal. There is subtle height loss of the superior endplate of the  T3 vertebral body with no associated T2/STIR signal. This is also unchanged from the prior CT chest 10/16/2023. The remaining vertebral body heights are maintained. No acute fracture. Intervertebral disc height loss and disc desiccation at T11-T12.   ALIGNMENT: No spondylolisthesis.     Lumbar spine:   VISUALIZED ABDOMEN: T1 hyperintense lesion in the left upper renal pole and T1 hypointense, T2 hyperintense lesions in the kidneys bilaterally. These may represent cysts and/or hemorrhagic cysts. Please see dedicated imaging of the abdomen for further evaluation.   SPINAL CORD/CONUS: The conus medullaris terminates at L1-L2. There is no abnormal signal or enhancement within the distal cord or nerve roots.   BONE MARROW/VERTEBRAL BODIES: There are 5 lumbar type vertebral bodies. Overall bone marrow signal is within normal limits. Vertebral body heights are maintained. No acute fracture. Multilevel intervertebral disc height loss, most significant at L4-L5 and L5-S1.   ALIGNMENT: There is minimal retrolisthesis of L5-S1.   SPINAL CANAL, INTERVERTEBRAL DISCS, AND NEURAL FORAMINA:   T11-T12: No significant disc bulge, canal stenosis, or foraminal stenosis.   T12-L1: No significant disc bulge, canal stenosis, or foraminal stenosis.   L1-L2: No significant disc bulge, canal stenosis, or foraminal stenosis.   L2-L3: Trace bilateral paracentral disc protrusions. No spinal canal or neural foraminal stenosis.   L3-L4: Broad-based central disc bulge which results in minimal narrowing of bilateral neural foramen. No spinal canal stenosis.   L4-L5: Broad-based central disc bulge which results in mild narrowing of bilateral neural foramen, right more than left. No spinal canal stenosis.   L5-S1: Posterior disc osteophyte complex with mild narrowing of bilateral neural foramen, right more than left. No spinal canal stenosis.         Brain: 1. No acute ischemic infarct or intracranial hemorrhage. No abnormal parenchymal enhancement.  2. Nonspecific subcortical and periventricular T2/FLAIR hyperintensities may represent sequelae of chronic small vessel ischemic changes. 3. Remote lacunar infarcts in the left thalamus.     Cervical spine: 1. Multilevel degenerative disc disease and facet arthrosis most pronounced at C3-C4 with moderate to severe spinal canal stenosis and moderate to severe bilateral neural foraminal stenosis. There is flattening of the cervical cord with question subtle increased cord signal on the STIR sequence. Please correlate clinically for symptoms of myelopathy. No abnormal cord enhancement. 2. Patchy consolidative opacities throughout the left lung with a small left pleural effusion. Correlate with dedicated chest imaging.   Thoracic spine: 1. No significant spinal canal or neural foraminal stenosis. No abnormal enhancement. 2. Nonacute minimal compression deformity at T3. Mild compression deformity at T11 with a proximally 25% loss of vertebral body height and no osseous retropulsion into the spinal canal. This was present on prior exam 10/16/2023.     Lumbar spine: 1. Multilevel lumbar spondylosis as detailed above. No spinal canal stenosis. 2. No abnormal signal or enhancement within the distal cord or nerve roots.     I personally reviewed the images/study and I agree with the findings as stated by resident physician Dr. Otilio Pate.   MACRO: None   Signed by: Leslie Olson 10/21/2023 1:29 AM Dictation workstation:   [id number]    MR cervical spine w and wo IV contrast    Result Date: 10/21/2023  Interpreted By:  Leslie Olson and MacBeth RaeLynne STUDY: MR BRAIN W AND WO IV CONTRAST; MR LUMBAR SPINE W AND WO IV CONTRAST; MR THORACIC SPINE W AND WO IV CONTRAST; MR CERVICAL SPINE W AND WO IV CONTRAST;  10/20/2023 11:41 pm   INDICATION: Signs/Symptoms:Altered mental status with b/l LE weakness; Signs/Symptoms:Altered mental status, b/l LE weakness, concern for epidural abscess; Signs/Symptoms:altered mental status,  b/l LE weakness, c/f epidural abscess; Signs/Symptoms:Altered mental status, b/l LE weakness, concern for spinal/epidural abscess.   COMPARISON: CT head, 10/11/2023 Thoracic and lumbar spine, 10/11/2023 CT cervical spine, 08/26/2023   ACCESSION NUMBER(S): [id number]; [id number]; [id number]; [id number]   ORDERING CLINICIAN: THOMAS AU   TECHNIQUE: Multiplanar, multi-sequence images of the brain, cervical, thoracic, and lumbar spine were obtained before and after the intravenous administration of 9 mL Dotarem gadolinium.   FINDINGS: Evaluation is markedly limited due to patient motion.   Diffusion weighted images show no evidence of acute ischemic infarct.   There is no evidence of an acute intraparenchymal hemorrhage, mass, mass-effect, midline shift or extra-axial fluid collection. No abnormal parenchymal enhancement.   Nonspecific subcortical and periventricular T2/FLAIR hyperintensities may represent sequelae of chronic small vessel ischemic changes.   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   The ventricular size and cerebral volume are age-concordant.   The orbits and globes are unremarkable.   The paranasal sinuses show no hemorrhage or air-fluid levels.   Bilateral mastoid effusions, left more than right.     Cervical Spine:   Incidental note is made of an enteric tube.   PARASPINAL SOFT TISSUES: No significant abnormality.   SPINAL CORD: The cervical cord has a normal caliber and signal intensity.   BONE MARROW/VERTEBRAL BODIES: Overall bone marrow signal is within normal limits. Vertebral body heights are maintained. No acute fracture.   ALIGNMENT: There is reversal of the normal cervical lordosis and minimal anterolisthesis of C3-C4.   C1-C2: The atlantodental joint is intact. The predental space is not widened.   C2-C3: Disc osteophyte complex, uncovertebral joint hypertrophy and facet arthrosis. Mild spinal canal and mild-to-moderate bilateral neural foraminal stenosis.    C3-C4: Disc osteophyte complex, uncovertebral joint hypertrophy and facet arthrosis. Moderate to severe spinal canal and neural foraminal stenosis. There is mild flattening of the cervical cord question very subtle increased cord signal on the STIR sequence.   C4-C5: Disc osteophyte complex, uncovertebral joint hypertrophy and facet arthrosis. No significant spinal canal stenosis. Moderate bilateral neural foraminal stenosis.   C5-C6: Disc osteophyte complex, uncovertebral joint hypertrophy and facet arthrosis. Mild spinal canal stenosis. Moderate neural foraminal narrowing, left greater than right.   C6-C7: Disc osteophyte complex, uncovertebral joint hypertrophy and facet arthrosis. No significant spinal canal stenosis. Moderate to severe neural foraminal stenosis, right greater than left.   C7-T1: Disc osteophyte complex and uncovertebral joint hypertrophy and facet arthrosis. Mild spinal canal stenosis. Moderate to severe bilateral neural foraminal stenosis.     Thoracic spine:   Visualized chest/abdomen: Scattered consolidative opacities in the left lung with a small left pleural effusion.   SPINAL CORD: The thoracic cord has a normal caliber and signal intensity. No cord compression.   There is prominent posterior epidural fat. No epidural fluid collection is noted.   No abnormal enhancement. There is incomplete fat suppression within the lower cervical cord on the postcontrast images. No significant spinal canal or neural foraminal stenosis.   BONE MARROW/VERTEBRAL BODIES: Overall bone marrow signal is within normal limits. There is compression of the superior endplate of T11 no associated increased T2/STIR signal. This was present on prior CT chest 10/16/2023. There is a proximally 25% loss of vertebral body height anteriorly and no osseous retropulsion into the spinal canal. There is subtle height loss of the superior endplate of the T3 vertebral body with no associated T2/STIR signal. This is also  unchanged from the prior CT chest 10/16/2023. The remaining vertebral body heights are maintained. No acute fracture. Intervertebral disc height loss and disc desiccation at T11-T12.   ALIGNMENT: No spondylolisthesis.     Lumbar spine:   VISUALIZED ABDOMEN: T1 hyperintense lesion in the left upper renal pole and T1 hypointense, T2 hyperintense lesions in the kidneys bilaterally. These may represent cysts and/or hemorrhagic cysts. Please see dedicated imaging of the abdomen for further evaluation.   SPINAL CORD/CONUS: The conus medullaris terminates at L1-L2. There is no abnormal signal or enhancement within the distal cord or nerve roots.   BONE MARROW/VERTEBRAL BODIES: There are 5 lumbar type vertebral bodies. Overall bone marrow signal is within normal limits. Vertebral body heights are maintained. No acute fracture. Multilevel intervertebral disc height loss, most significant at L4-L5 and L5-S1.   ALIGNMENT: There is minimal retrolisthesis of L5-S1.   SPINAL CANAL, INTERVERTEBRAL DISCS, AND NEURAL FORAMINA:   T11-T12: No significant disc bulge, canal stenosis, or foraminal stenosis.   T12-L1: No significant disc bulge, canal stenosis, or foraminal stenosis.   L1-L2: No significant disc bulge, canal stenosis, or foraminal stenosis.   L2-L3: Trace bilateral paracentral disc protrusions. No spinal canal or neural foraminal stenosis.   L3-L4: Broad-based central disc bulge which results in minimal narrowing of bilateral neural foramen. No spinal canal stenosis.   L4-L5: Broad-based central disc bulge which results in mild narrowing of bilateral neural foramen, right more than left. No spinal canal stenosis.   L5-S1: Posterior disc osteophyte complex with mild narrowing of bilateral neural foramen, right more than left. No spinal canal stenosis.         Brain: 1. No acute ischemic infarct or intracranial hemorrhage. No abnormal parenchymal enhancement. 2. Nonspecific subcortical and periventricular T2/FLAIR  hyperintensities may represent sequelae of chronic small vessel ischemic changes. 3. Remote lacunar infarcts in the left thalamus.     Cervical spine: 1. Multilevel degenerative disc disease and facet arthrosis most pronounced at C3-C4 with moderate to severe spinal canal stenosis and moderate to severe bilateral neural foraminal stenosis. There is flattening of the cervical cord with question subtle increased cord signal on the STIR sequence. Please correlate clinically for symptoms of myelopathy. No abnormal cord enhancement. 2. Patchy consolidative opacities throughout the left lung with a small left pleural effusion. Correlate with dedicated chest imaging.   Thoracic spine: 1. No significant spinal canal or neural foraminal stenosis. No abnormal enhancement. 2. Nonacute minimal compression deformity at T3. Mild compression deformity at T11 with a proximally 25% loss of vertebral body height and no osseous retropulsion into the spinal canal. This was present on prior exam 10/16/2023.     Lumbar spine: 1. Multilevel lumbar spondylosis as detailed above. No spinal canal stenosis. 2. No abnormal signal or enhancement within the distal cord or nerve roots.     I personally reviewed the images/study and I agree with the findings as stated by resident physician Dr. Otilio Pate.   MACRO: None   Signed by: Leslie Olson 10/21/2023 1:29 AM Dictation workstation:   [id number]    MR thoracic spine w and wo IV contrast    Result Date: 10/21/2023  Interpreted By:  Leslie Olson and MacBeth RaeLynne STUDY: MR BRAIN W AND WO IV CONTRAST; MR LUMBAR SPINE W AND WO IV CONTRAST; MR THORACIC SPINE W AND WO IV CONTRAST; MR CERVICAL SPINE W AND WO IV CONTRAST;  10/20/2023 11:41 pm   INDICATION: Signs/Symptoms:Altered mental status with b/l LE weakness; Signs/Symptoms:Altered mental status, b/l LE weakness, concern for epidural abscess; Signs/Symptoms:altered mental status, b/l LE weakness, c/f epidural abscess;  Signs/Symptoms:Altered mental status, b/l LE weakness, concern for spinal/epidural abscess.   COMPARISON: CT head, 10/11/2023 Thoracic and lumbar spine, 10/11/2023 CT cervical spine, 08/26/2023   ACCESSION NUMBER(S): [id number]; [id number]; [id number]; [id number]   ORDERING CLINICIAN: THOMAS AU   TECHNIQUE: Multiplanar, multi-sequence images of the brain, cervical, thoracic, and lumbar spine were obtained before and after the intravenous administration of 9 mL Dotarem gadolinium.   FINDINGS: Evaluation is markedly limited due to patient motion.   Diffusion weighted images show no evidence of acute ischemic infarct.   There is no evidence of an acute intraparenchymal hemorrhage, mass, mass-effect, midline shift or extra-axial fluid collection. No abnormal parenchymal enhancement.   Nonspecific subcortical and periventricular T2/FLAIR hyperintensities may represent sequelae of chronic small vessel ischemic changes.   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   The ventricular size and cerebral volume are age-concordant.   The orbits and globes are unremarkable.   The paranasal sinuses show no hemorrhage or air-fluid levels.   Bilateral mastoid effusions, left more than right.     Cervical Spine:   Incidental note is made of an enteric tube.   PARASPINAL SOFT TISSUES: No significant abnormality.   SPINAL CORD: The cervical cord has a normal caliber and signal intensity.   BONE MARROW/VERTEBRAL BODIES: Overall bone marrow signal is within normal limits. Vertebral body heights are maintained. No acute fracture.   ALIGNMENT: There is reversal of the normal cervical lordosis and minimal anterolisthesis of C3-C4.   C1-C2: The atlantodental joint is intact. The predental space is not widened.   C2-C3: Disc osteophyte complex, uncovertebral joint hypertrophy and facet arthrosis. Mild spinal canal and mild-to-moderate bilateral neural foraminal stenosis.   C3-C4: Disc osteophyte complex,  uncovertebral joint hypertrophy and facet arthrosis. Moderate to severe spinal canal and neural foraminal stenosis. There is mild flattening of the cervical cord question very subtle increased cord signal on the STIR sequence.   C4-C5: Disc osteophyte complex, uncovertebral joint hypertrophy and facet arthrosis. No significant spinal canal stenosis. Moderate bilateral neural foraminal stenosis.   C5-C6: Disc osteophyte complex, uncovertebral joint hypertrophy and facet arthrosis. Mild spinal canal stenosis. Moderate neural foraminal narrowing, left greater than right.   C6-C7: Disc osteophyte complex, uncovertebral joint hypertrophy and facet arthrosis. No significant spinal canal stenosis. Moderate to severe neural foraminal stenosis, right greater than left.   C7-T1: Disc osteophyte complex and uncovertebral joint hypertrophy and facet arthrosis. Mild spinal canal stenosis. Moderate to severe bilateral neural foraminal stenosis.     Thoracic spine:   Visualized chest/abdomen: Scattered consolidative opacities in the left lung with a small left pleural effusion.   SPINAL CORD: The thoracic cord has a normal caliber and signal intensity. No cord compression.   There is prominent posterior epidural fat. No epidural fluid collection is noted.   No abnormal enhancement. There is incomplete fat suppression within the lower cervical cord on the postcontrast images. No significant spinal canal or neural foraminal stenosis.   BONE MARROW/VERTEBRAL BODIES: Overall bone marrow signal is within normal limits. There is compression of the superior endplate of T11 no associated increased T2/STIR signal. This was present on prior CT chest 10/16/2023. There is a proximally 25% loss of vertebral body height anteriorly and no osseous retropulsion into the spinal canal. There is subtle height loss of the superior endplate of the T3 vertebral body with no associated T2/STIR signal. This is also unchanged from the prior CT chest  10/16/2023. The remaining vertebral body heights are maintained. No acute fracture. Intervertebral disc height loss and disc desiccation at T11-T12.   ALIGNMENT: No spondylolisthesis.     Lumbar spine:   VISUALIZED ABDOMEN: T1 hyperintense lesion in the left upper renal pole and T1 hypointense, T2 hyperintense lesions in the kidneys bilaterally. These may represent cysts and/or hemorrhagic cysts. Please see dedicated imaging of the abdomen for further evaluation.   SPINAL CORD/CONUS: The conus medullaris terminates at L1-L2. There is no abnormal signal or enhancement within the distal cord or nerve roots.   BONE MARROW/VERTEBRAL BODIES: There are 5 lumbar type vertebral bodies. Overall bone marrow signal is within normal limits. Vertebral body heights are maintained. No acute fracture. Multilevel intervertebral disc height loss, most significant at L4-L5 and L5-S1.   ALIGNMENT: There is minimal retrolisthesis of L5-S1.   SPINAL CANAL, INTERVERTEBRAL DISCS, AND NEURAL FORAMINA:   T11-T12: No significant disc bulge, canal stenosis, or foraminal stenosis.   T12-L1: No significant disc bulge, canal stenosis, or foraminal stenosis.   L1-L2: No significant disc bulge, canal stenosis, or foraminal stenosis.   L2-L3: Trace bilateral paracentral disc protrusions. No spinal canal or neural foraminal stenosis.   L3-L4: Broad-based central disc bulge which results in minimal narrowing of bilateral neural foramen. No spinal canal stenosis.   L4-L5: Broad-based central disc bulge which results in mild narrowing of bilateral neural foramen, right more than left. No spinal canal stenosis.   L5-S1: Posterior disc osteophyte complex with mild narrowing of bilateral neural foramen, right more than left. No spinal canal stenosis.         Brain: 1. No acute ischemic infarct or intracranial hemorrhage. No abnormal parenchymal enhancement. 2. Nonspecific subcortical and periventricular T2/FLAIR hyperintensities may represent sequelae of  chronic small vessel ischemic changes. 3. Remote lacunar infarcts in the left thalamus.     Cervical spine: 1. Multilevel degenerative disc disease and facet arthrosis most pronounced at C3-C4 with moderate to severe spinal canal stenosis and moderate to severe bilateral neural foraminal stenosis. There is flattening of the cervical cord with question subtle increased cord signal on the STIR sequence. Please correlate clinically for symptoms of myelopathy. No abnormal cord enhancement. 2. Patchy consolidative opacities throughout the left lung with a small left pleural effusion. Correlate with dedicated chest imaging.   Thoracic spine: 1. No significant spinal canal or neural foraminal stenosis. No abnormal enhancement. 2. Nonacute minimal compression deformity at T3. Mild compression deformity at T11 with a proximally 25% loss of vertebral body height and no osseous retropulsion into the spinal canal. This was present on prior exam 10/16/2023.     Lumbar spine: 1. Multilevel lumbar spondylosis as detailed above. No spinal canal stenosis. 2. No abnormal signal or enhancement within the distal cord or nerve roots.     I personally reviewed the images/study and I agree with the findings as stated by resident physician Dr. Otilio Pate.   MACRO: None   Signed by: Leslie Olson 10/21/2023 1:29 AM Dictation workstation:   [id number]    MR lumbar spine w and wo IV contrast    Result Date: 10/21/2023  Interpreted By:  Leslie Olson and MacBeth RaeLynne STUDY: MR BRAIN W AND WO IV CONTRAST; MR LUMBAR SPINE W AND WO IV CONTRAST; MR THORACIC SPINE W AND WO IV CONTRAST; MR CERVICAL SPINE W AND WO IV CONTRAST;  10/20/2023 11:41 pm   INDICATION: Signs/Symptoms:Altered mental status with b/l LE weakness; Signs/Symptoms:Altered mental status, b/l LE weakness, concern for epidural abscess; Signs/Symptoms:altered mental status, b/l LE weakness, c/f epidural abscess; Signs/Symptoms:Altered mental status, b/l LE weakness, concern  for spinal/epidural abscess.   COMPARISON: CT head, 10/11/2023 Thoracic and lumbar spine, 10/11/2023 CT cervical spine, 08/26/2023   ACCESSION NUMBER(S): [id number]; [id number]; [id number]; [id number]   ORDERING CLINICIAN: THOMAS AU   TECHNIQUE: Multiplanar, multi-sequence images of the brain, cervical, thoracic, and lumbar spine were obtained before and after the intravenous administration of 9 mL Dotarem gadolinium.   FINDINGS: Evaluation is markedly limited due to patient motion.   Diffusion weighted images show no evidence of acute ischemic infarct.   There is no evidence of an acute intraparenchymal hemorrhage, mass, mass-effect, midline shift or extra-axial fluid collection. No abnormal parenchymal enhancement.   Nonspecific subcortical and periventricular T2/FLAIR hyperintensities may represent sequelae of chronic small vessel ischemic changes.   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   The ventricular size and cerebral volume are age-concordant.   The orbits and globes are unremarkable.   The paranasal sinuses show no hemorrhage or air-fluid levels.   Bilateral mastoid effusions, left more than right.     Cervical Spine:   Incidental note is made of an enteric tube.   PARASPINAL SOFT TISSUES: No significant abnormality.   SPINAL CORD: The cervical cord has a normal caliber and signal intensity.   BONE MARROW/VERTEBRAL BODIES: Overall bone marrow signal is within normal limits. Vertebral body heights are maintained. No acute fracture.   ALIGNMENT: There is reversal of the normal cervical lordosis and minimal anterolisthesis of C3-C4.   C1-C2: The atlantodental joint is intact. The predental space is not widened.   C2-C3: Disc osteophyte complex, uncovertebral joint hypertrophy and facet arthrosis. Mild spinal canal and mild-to-moderate bilateral neural foraminal stenosis.   C3-C4: Disc osteophyte complex, uncovertebral joint hypertrophy and facet arthrosis. Moderate to severe  spinal canal and neural foraminal stenosis. There is mild flattening of the cervical cord question very subtle increased cord signal on the STIR sequence.   C4-C5: Disc osteophyte complex, uncovertebral joint hypertrophy and facet arthrosis. No significant spinal canal stenosis. Moderate bilateral neural foraminal stenosis.   C5-C6: Disc osteophyte complex, uncovertebral joint hypertrophy and facet arthrosis. Mild spinal canal stenosis. Moderate neural foraminal narrowing, left greater than right.   C6-C7: Disc osteophyte complex, uncovertebral joint hypertrophy and facet arthrosis. No significant spinal canal stenosis. Moderate to severe neural foraminal stenosis, right greater than left.   C7-T1: Disc osteophyte complex and uncovertebral joint hypertrophy and facet arthrosis. Mild spinal canal stenosis. Moderate to severe bilateral neural foraminal stenosis.     Thoracic spine:   Visualized chest/abdomen: Scattered consolidative opacities in the left lung with a small left pleural effusion.   SPINAL CORD: The thoracic cord has a normal caliber and signal intensity. No cord compression.   There is prominent posterior epidural fat. No epidural fluid collection is noted.   No abnormal enhancement. There is incomplete fat suppression within the lower cervical cord on the postcontrast images. No significant spinal canal or neural foraminal stenosis.   BONE MARROW/VERTEBRAL BODIES: Overall bone marrow signal is within normal limits. There is compression of the superior endplate of T11 no associated increased T2/STIR signal. This was present on prior CT chest 10/16/2023. There is a proximally 25% loss of vertebral body height anteriorly and no osseous retropulsion into the spinal canal. There is subtle height loss of the superior endplate of the T3 vertebral body with no associated T2/STIR signal. This is also unchanged from the prior CT chest 10/16/2023. The remaining vertebral body heights are maintained. No acute  fracture. Intervertebral disc height loss and disc desiccation at T11-T12.   ALIGNMENT: No spondylolisthesis.     Lumbar spine:   VISUALIZED ABDOMEN: T1 hyperintense lesion in the left upper renal pole and T1 hypointense, T2 hyperintense lesions in the kidneys bilaterally. These may represent cysts and/or hemorrhagic cysts. Please see dedicated imaging of the abdomen for further evaluation.   SPINAL CORD/CONUS: The conus medullaris terminates at L1-L2. There is no abnormal signal or enhancement within the distal cord or nerve roots.   BONE MARROW/VERTEBRAL BODIES: There are 5 lumbar type vertebral bodies. Overall bone marrow signal is within normal limits. Vertebral body heights are maintained. No acute fracture. Multilevel intervertebral disc height loss, most significant at L4-L5 and L5-S1.   ALIGNMENT: There is minimal retrolisthesis of L5-S1.   SPINAL CANAL, INTERVERTEBRAL DISCS, AND NEURAL FORAMINA:   T11-T12: No significant disc bulge, canal stenosis, or foraminal stenosis.   T12-L1: No significant disc bulge, canal stenosis, or foraminal stenosis.   L1-L2: No significant disc bulge, canal stenosis, or foraminal stenosis.   L2-L3: Trace bilateral paracentral disc protrusions. No spinal canal or neural foraminal stenosis.   L3-L4: Broad-based central disc bulge which results in minimal narrowing of bilateral neural foramen. No spinal canal stenosis.   L4-L5: Broad-based central disc bulge which results in mild narrowing of bilateral neural foramen, right more than left. No spinal canal stenosis.   L5-S1: Posterior disc osteophyte complex with mild narrowing of bilateral neural foramen, right more than left. No spinal canal stenosis.         Brain: 1. No acute ischemic infarct or intracranial hemorrhage. No abnormal parenchymal enhancement. 2. Nonspecific subcortical and periventricular T2/FLAIR hyperintensities may represent sequelae of chronic small vessel ischemic changes. 3. Remote lacunar infarcts in the  left thalamus.     Cervical spine: 1. Multilevel degenerative disc disease and facet arthrosis most pronounced at C3-C4 with moderate to severe spinal canal stenosis and moderate to severe bilateral neural foraminal stenosis. There is flattening of the cervical cord with question subtle increased cord signal on the STIR sequence. Please correlate clinically for symptoms of myelopathy. No abnormal cord enhancement. 2. Patchy consolidative opacities throughout the left lung with a small left pleural effusion. Correlate with dedicated chest imaging.   Thoracic spine: 1. No significant spinal canal or neural foraminal stenosis. No abnormal enhancement. 2. Nonacute minimal compression deformity at T3. Mild compression deformity at T11 with a proximally 25% loss of vertebral body height and no osseous retropulsion into the spinal canal. This was present on prior exam 10/16/2023.     Lumbar spine: 1. Multilevel lumbar spondylosis as detailed above. No spinal canal stenosis. 2. No abnormal signal or enhancement within the distal cord or nerve roots.     I personally reviewed the images/study and I agree with the findings as stated by resident physician Dr. Otilio Pate.   MACRO: None   Signed by: Leslie Olson 10/21/2023 1:29 AM Dictation workstation:   [id number]    XR chest 1 view    Result Date: 10/20/2023  Interpreted By:  Khanh Weaver, STUDY: XR CHEST 1 VIEW;  10/20/2023 10:40 am   INDICATION: Signs/Symptoms:intubated.   COMPARISON: Chest radiograph dated 10/19/2023and CT scan 10/16/2023   ACCESSION NUMBER(S): [id number]   ORDERING CLINICIAN: ANTONI ARNDT   FINDINGS: AP radiograph of the chest Enteric tube is again seen coursing below diaphragm and tip not included in field of view. Interval intubation with endotracheal tube tip approximately 5.5 cm superior to crispin   CARDIOMEDIASTINAL SILHOUETTE: Cardiomediastinal silhouette is again significantly obscured on the left, similar to prior.   LUNGS: There is again  demonstration extensive left mid basilar lung predominant opacification with blunting of left costophrenic angle. Background emphysema. There is no pneumothorax.   ABDOMEN: No remarkable upper abdominal findings.   BONES: No acute osseous abnormality. Partially visualized left shoulder arthroplasty prosthesis.       1. No significant change in left mid basilar lung predominant atelectasis/consolidation with left pleural effusion. Also correlate clinically with concern for central mucous plugging. 2. Medical devices as above. Endotracheal tube is in satisfactory position.   Signed by: Khanh Weaver 10/20/2023 11:05 AM Dictation workstation:   [id number]    XR chest 1 view    Result Date: 10/19/2023  Interpreted By:  Khanh Weaver,  and Lb Whitehead STUDY: XR CHEST 1 VIEW;  10/19/2023 11:44 pm   INDICATION: Signs/Symptoms:worsening hypoxia.   COMPARISON: Chest radiograph 10/17/2023 and CT chest 10/16/2023   ACCESSION NUMBER(S): [id number]   ORDERING CLINICIAN: ROBERT SUGGS   FINDINGS: AP radiograph of the chest was provided.   Enteric tube seen coursing below the level diaphragm with tip overlying the expected location of the proximal duodenum. Partially visualized postsurgical changes from left total shoulder arthroplasty.   CARDIOMEDIASTINAL SILHOUETTE: The cardiomediastinal silhouette is now significantly obscured on the left.   LUNGS: Re-demonstration of coarsened interstitial markings bilaterally. Significant interval worsening in left mid basilar lung predominant extensive consolidative opacities with blunting of the left costophrenic angle. The right lung is essentially clear. No pneumothorax.   ABDOMEN: No remarkable upper abdominal findings.   BONES: No acute osseous changes.       1.  Significant interval worsening in left mid basilar lung predominant opacification which may represent increasing atelectasis and/or pleural effusions with or without superimposed consolidation. Also correlate  clinically with concern for central mucous plugging. 2. Background chronic lung changes. 3.  Postsurgical changes and medical devices as described above.   I personally reviewed the images/study and I agree with the findings as stated. This study was interpreted at [place], [place].   MACRO: None   Signed by: Khanh Weaver 10/19/2023 11:54 PM Dictation workstation:   [id number]    XR abdomen 1 view    Result Date: 10/18/2023  Interpreted By:  Gilberto Waldrop and Fu Tianyuan STUDY: XR ABDOMEN 1 VIEW;  10/17/2023 5:59 pm   INDICATION: Signs/Symptoms:Confirm Dobhoff placement.   COMPARISON: Abdominal radiograph 08/31/2023. CT chest abdomen pelvis 10/16/2023.   ACCESSION NUMBER(S): [id number]   ORDERING CLINICIAN: THOMAS AU   FINDINGS: AP radiograph of the abdomen is provided for review.   Enteric tube courses below the diaphragm with the tip overlying the expected location of the distal stomach or proximal duodenum.   Nonobstructive bowel gas pattern.   There are hazy opacities within the left lung base.   Osseous structures demonstrate no acute bony changes.       1. Enteric tube with the tip overlying the distal stomach/proximal duodenum. 2. Nonobstructive bowel gas pattern. 3. Left lung base atelectasis versus consolidation.   MACRO: None   Signed by: Gilberto Solano 10/18/2023 9:40 AM Dictation workstation:   [id number]    XR chest 1 view    Result Date: 10/17/2023  Interpreted By:  Bang Owens and Dervishi Mario STUDY: XR CHEST 1 VIEW;  10/17/2023 8:48 pm   INDICATION: Signs/Symptoms:hypoxia.   COMPARISON: Chest radiograph 10/17/2023   ACCESSION NUMBER(S): [id number]   ORDERING CLINICIAN: JANINE ELLIS   FINDINGS: AP portable upright radiograph of the chest was provided with patient's rotation to the left.   Enteric tube crossing the diaphragm with the tip extending beyond the field of view.   CARDIOMEDIASTINAL SILHOUETTE:  Cardiomediastinal silhouette is stable in size and configuration.   LUNGS: Redemonstration of coarse interstitial lung markings with hyperinflation. Obscuration of the left hemidiaphragm, suggesting pleural effusion and/or atelectasis. Slight interval worsening of left basilar and retrocardiac hazy opacity. Somewhat increased interstitial lung markings in the right lower lung field, peripherally. Right perihilar haziness. Increased lucency in the right lung apex with pulmonary vascular markings and otherwise no obvious pneumothorax.   ABDOMEN: No remarkable upper abdominal findings.   BONES: No acute osseous changes.       1. Slight worsening of left basilar opacity compared to prior imaging, suggesting atelectasis and/or pleural effusion. 2. Probable interstitial pulmonary edema or fluid overload. Correlate with patient's fluid status.   I personally reviewed the images/study and I agree with the findings as stated. This study was interpreted at [place], [place].   MACRO: NONE.   Signed by: Bang Owens 10/17/2023 9:54 PM Dictation workstation:   [id number]    Transthoracic Echo (TTE) Complete    Result Date: 10/17/2023   [facility], [address] [place] [address]                Tel [phone] and Fax [phone] TRANSTHORACIC ECHOCARDIOGRAM REPORT  Patient Name:      PIA Franco Physician:    [id number] iKrit Cain MD Study Date:        10/17/2023           Ordering Provider:    [id number] JEAN BURNETT MRN/PID:           [id number]             Fellow: Accession#:        [id number]         Nurse: Date of Birth/Age: 1955 / 68 years Sonographer:          Nani Garcia RDCS Gender:            M                    Additional Staff: Height:            170.18 cm            Admit Date: Weight:            49.44 kg             Admission Status:     Inpatient -                                                                Routine BSA:               1.56 m2              Encounter#:           [id number]                                         [address]  [address] Blood Pressure: 136 /91 mmHg Study Type:    TRANSTHORACIC ECHO (TTE) COMPLETE Diagnosis/ICD: Encounter for screening for cardiovascular disorders-Z13.6 Indication:    Polycythemia CPT Code:      Echo Complete w Full Doppler-[id number] Patient History: Pertinent History: HTN and Hyperlipidemia. PHTN, Liver fibrosis, Hypoxemia,                    COPD, ETOH, Opiod usage,. Study Detail: The following Echo studies were performed: 2D, M-Mode, Doppler and               color flow. Technically challenging study due to poor acoustic               windows and body habitus. Definity used as a contrast agent for               endocardial border definition and agitated saline used as a               contrast agent for intraseptal flow evaluation. Total contrast               used for this procedure was 2.0 mL via IV push.  PHYSICIAN INTERPRETATION: Left Ventricle: The left ventricular systolic function is hyperdynamic, with an estimated ejection fraction of 75%. There are no regional wall motion abnormalities. The left ventricular cavity size is normal. The left ventricular septal wall thickness is mildly increased. There is left ventricular concentric remodeling. Left ventricular diastolic filling was not assessed. Left Atrium: The left atrium is normal in size. A bubble study using agitated saline was performed. Bubble study is positive. There is evidence of an atrial septal aneurysm. Right Ventricle: The right ventricle is normal in size. There is normal right ventricular global systolic function. Right Atrium: The right atrium is normal in size. Aortic Valve: The aortic valve is trileaflet. There is minimal aortic valve cusp calcification. There is no aortic valve thickening. There is no evidence of aortic valve regurgitation.  The peak instantaneous gradient of the aortic valve is 3.2 mmHg. Mitral Valve: The mitral valve is normal in structure. There is trace mitral valve regurgitation. Tricuspid Valve: The tricuspid valve is structurally normal. There is mild tricuspid regurgitation. The Doppler estimated RVSP is mildly elevated at 39.2 mmHg. Pulmonic Valve: The pulmonic valve is not well visualized. Pulmonic valve regurgitation was not assessed. Pericardium: There is no pericardial effusion noted. Aorta: The aortic root is normal. Systemic Veins: The inferior vena cava size appears small. In comparison to the previous echocardiogram(s): Compared with study from 8/29/2023, the RVSP today is mildly elevated, compared to moderately elevated in the prior echocardiogram (60 mmHg).  CONCLUSIONS:  1. Left ventricular systolic function is hyperdynamic with a 75% estimated ejection fraction.  2. The left ventricular septal wall thickness is mildly increased.  3. There is left ventricular concentric remodeling.  4. Atrial septal aneurysm present.  5. A bubble study using agitated saline was performed. Bubble study is positive.  6. Mildly elevated RVSP.  7. Poorly visualized anatomical structures due to suboptimal image quality.  8. Compared with study from 8/29/2023, the RVSP today is mildly elevated, compared to moderately elevated in the prior echocardiogram (60 mmHg). QUANTITATIVE DATA SUMMARY: 2D MEASUREMENTS:                          Normal Ranges: Ao Root d:     2.50 cm   (2.0-3.7cm) IVSd:          1.20 cm   (0.6-1.1cm) LVPWd:         0.90 cm   (0.6-1.1cm) LVIDd:         3.20 cm   (3.9-5.9cm) LVIDs:         2.50 cm LV Mass Index: 62.2 g/m2 LV % FS        21.9 % LA VOLUME:                               Normal Ranges: LA Vol A4C:        37.5 ml    (22+/-6mL/m2) LA Vol A2C:        36.5 ml LA Vol BP:         37.9 ml LA Vol Index A4C:  24.0ml/m2 LA Vol Index A2C:  23.3 ml/m2 LA Vol Index BP:   24.2 ml/m2 LA Area A4C:       15.9 cm2 LA Area A2C:        15.3 cm2 LA Major Axis A4C: 5.8 cm LA Major Axis A2C: 5.5 cm LA Volume Index:   24.0 ml/m2 AORTA MEASUREMENTS:                      Normal Ranges: Ao Sinus, d: 3.15 cm (2.1-3.5cm) AORTIC VALVE:                         Normal Ranges: AoV Vmax:      0.89 m/s (<=1.7m/s) AoV Peak PG:   3.2 mmHg (<20mmHg) LVOT Max Dony:  0.63 m/s (<=1.1m/s) LVOT VTI:      10.50 cm LVOT Diameter: 2.00 cm  (1.8-2.4cm) AoV Area,Vmax: 2.24 cm2 (2.5-4.5cm2)  RIGHT VENTRICLE: RV s' 0.12 m/s TRICUSPID VALVE/RVSP:                             Normal Ranges: Peak TR Velocity: 3.01 m/s Est. RA Pressure: 3 mmHg RV Syst Pressure: 39.2 mmHg (< 30mmHg)  [id number] Kirit Cain MD Electronically signed on 10/17/2023 at 7:03:57 PM  ** Final **     XR chest 1 view    Result Date: 10/17/2023  Interpreted By:  Khanh Weaver and Maltbie Grace STUDY: XR CHEST 1 VIEW;  10/17/2023 3:16 pm   INDICATION: Signs/Symptoms:Acute high flow oxygen requirement, history of mucus plugging.   COMPARISON: CT chest, abdomen and pelvis 10/16/2023   ACCESSION NUMBER(S): [id number]   ORDERING CLINICIAN: THOMAS AU   FINDINGS: AP radiograph of the chest was provided.   CARDIOMEDIASTINAL SILHOUETTE: Cardiomediastinal silhouette is normal in size and configuration. Mild aortic knob calcification.   LUNGS: Background of coarse interstitial lung markings and hyperinflation are again seen. Left basilar and retrocardiac hazy opacity appears slightly less conspicuous than prior. There is no sizable pleural effusion or pneumothorax..   ABDOMEN: No remarkable upper abdominal findings.   BONES: Status post left shoulder arthroplasty.       1. Hazy left basilar opacity appears slightly less conspicuous than prior and correlate with decreasing atelectasis/infiltrate. 2. Redemonstrated background diffuse emphysema.   I personally reviewed the images/study and I agree with the findings as stated by radiology resident Mignon Malone MD. This study was interpreted at [place], [place].   Signed by: Khanh Weaver 10/17/2023 3:23 PM Dictation workstation:   [id number]    CT chest abdomen pelvis w IV contrast    Result Date: 10/16/2023  Interpreted By:  Jeet Wang and Kamau Nyokabi STUDY: CT CHEST ABDOMEN PELVIS W IV CONTRAST;  10/16/2023 12:30 pm   INDICATION: Signs/Symptoms:polycythemia looking for malignancy.   COMPARISON: None.   ACCESSION NUMBER(S): [id number]   ORDERING CLINICIAN: CALEB NGUYEN   TECHNIQUE: CT of the chest, abdomen, and pelvis was performed.  Contiguous axial images were obtained at 3 mm slice thickness through the chest, abdomen and pelvis. Coronal and sagittal reconstructions at 3 mm slice thickness were performed. 75 ml of contrast Omnipaque 350 were administered intravenously without immediate complication.   FINDINGS: CHEST:   LUNG/PLEURA/LARGE AIRWAYS: The trachea and central airways are patent. There is soft tissue density in the right lower lobe bronchi. There is  atelectasis of the right posterior lobe. Chronic emphysematous change in bilateral lung apices. There are no pulmonary nodule or masses. There is no pleural effusion or pneumothorax.   VESSELS: Aorta and pulmonary arteries are normal caliber.  Moderate atherosclerotic changes are noted of the aorta and branching vessels. Moderate coronary artery calcifications are present.   HEART: The heart is normal in size.  There is no pericardial effusion.   MEDIASTINUM AND KRISTOPHER: Unchanged appearance of nonenlarged paratracheal lymph nodes when compared to prior CT 08/15/2022. No hilar or axillary lymphadenopathy is present.  The esophagus is normal.   CHEST WALL AND LOWER NECK: The soft tissues of the chest wall demonstrate no gross abnormality. The visualized thyroid gland appears within normal limits.   ABDOMEN:   LIVER: The liver is normal in size and demonstrates diffusely decreased attenuation suggesting steatosis. The liver is similar in appearance  when compared to prior CT 08/22/2022.   BILE DUCTS: Intrahepatic and extrahepatic bile ducts are prominent which is within normal limits given the status post cholecystectomy.   GALLBLADDER: The gallbladder is nondistended without radiopaque stones.   PANCREAS: The pancreas appears unremarkable.   SPLEEN: The spleen is normal in size without focal lesions.   ADRENAL GLANDS: Bilateral adrenal glands appear normal.   KIDNEYS AND URETERS: The kidneys are normal in size and enhance symmetrically.  No hydroureteronephrosis or nephroureterolithiasis is identified. Unchanged appearance of well-circumscribed hypodensities in bilateral kidneys that are subcentimeter in size and likely represent simple renal cysts.   PELVIS:   BLADDER: The urinary bladder appears normal without abnormal wall thickening.   REPRODUCTIVE ORGANS: The prostate is not enlarged.   BOWEL: The stomach is unremarkable.  The small and large bowel are normal in caliber and demonstrate no wall thickening.  The appendix appears normal.     VESSELS: There is no aneurysmal dilatation of the abdominal aorta. The IVC appears normal. Moderate calcifications of the abdominal aorta its branches.   PERITONEUM/RETROPERITONEUM/LYMPH NODES: There is no free or loculated fluid collection, no free intraperitoneal air. The retroperitoneum appears normal.  No abdominopelvic lymphadenopathy is present. Unchanged appearance of nonenlarged bilateral external iliac lymph nodes when compared to prior CT 08/15/2022.   BONE AND SOFT TISSUE: Status post left total  shoulder arthroplasty. No suspicious osseous lesions are identified. Degenerative discogenic disease is noted in the lower thoracic and lumbar spine.  The abdominal wall soft tissues appear normal.       1. No evidence of primary neoplasm or metastatic disease. 2. Right lower lobe mucous plugging which may reflect aspiration.   I personally reviewed the images/study and I agree with the findings as stated. This study  was interpreted at [place], [place].   MACRO: None   Signed by: Jeet Wang 10/16/2023 3:39 PM Dictation workstation:   [id number]    MR thoracic spine wo IV contrast    Result Date: 10/12/2023  Interpreted By:  Tyron Dixon and Ebai Jerky STUDY: MR THORACIC SPINE WO IV CONTRAST; MR LUMBAR SPINE WO IV CONTRAST; 10/11/2023 11:37 pm   INDICATION: Signs/Symptoms:bilateral LE weakness.   Per EMR: 68-year-old male with a past medical history of PD, hypertension, alcohol and opioid abuse, hepatitis C, PE without cor pulmonale on Eliquis who presents today for an inability to walk. Patient states that about 3 days ago his legs gave out and he has not been able to walk since and has been stuck in a recliner.   COMPARISON: CT chest abdomen pelvis 08/15/2022.   ACCESSION NUMBER(S): [id number]; [id number]   ORDERING CLINICIAN: TEE BLAND   TECHNIQUE: Limited nondiagnostic MRI of the thoracic and lumbar spine. Only T2 sagittal and coronal  images of the thoracic and lumbar spine were obtained.  The patient requested termination of the examination due to pain.   FINDINGS: Limited MRI of thoracic and lumbar spine demonstrates scoliosis of the thoracic spine. No definitive evidence of substantial central canal stenosis. Mild cervical narrowing secondary spinal degenerative change. Evaluation of neural foraminal stenosis is markedly limited.    images demonstrated T2 hyperintense right renal cyst.       1. Nondiagnostic MRI of the thoracic and lumbar spine. The patient requested termination of the examination due to pain. A repeat examination can be obtained as clinically indicated after adequate pain control. 2. Within the severe limitations of this examination, there is no definite evidence of substantial central canal stenosis of the thoracic or lumbar spine.       I personally reviewed the images/study and I agree with the findings as stated. This  study was interpreted at [place], [place].     Signed by: Tyron Dixon 10/12/2023 12:23 AM Dictation workstation:   [id number]    MR lumbar spine wo IV contrast    Result Date: 10/12/2023  Interpreted By:  Tyron Dixon and Ebai Jerky STUDY: MR THORACIC SPINE WO IV CONTRAST; MR LUMBAR SPINE WO IV CONTRAST; 10/11/2023 11:37 pm   INDICATION: Signs/Symptoms:bilateral LE weakness.   Per EMR: 68-year-old male with a past medical history of PD, hypertension, alcohol and opioid abuse, hepatitis C, PE without cor pulmonale on Eliquis who presents today for an inability to walk. Patient states that about 3 days ago his legs gave out and he has not been able to walk since and has been stuck in a recliner.   COMPARISON: CT chest abdomen pelvis 08/15/2022.   ACCESSION NUMBER(S): [id number]; [id number]   ORDERING CLINICIAN: TEE BLAND   TECHNIQUE: Limited nondiagnostic MRI of the thoracic and lumbar spine. Only T2 sagittal and coronal  images of the thoracic and lumbar spine were obtained.  The patient requested termination of the examination due to pain.   FINDINGS: Limited MRI of thoracic and lumbar spine demonstrates scoliosis of the thoracic spine. No definitive evidence of substantial central canal stenosis. Mild cervical narrowing secondary spinal degenerative change. Evaluation of neural foraminal stenosis is markedly limited.    images demonstrated T2 hyperintense right renal cyst.       1. Nondiagnostic MRI of the thoracic and lumbar spine. The patient requested termination of the examination due to pain. A repeat examination can be obtained as clinically indicated after adequate pain control. 2. Within the severe limitations of this examination, there is no definite evidence of substantial central canal stenosis of the thoracic or lumbar spine.       I personally reviewed the images/study and I agree with the findings as stated. This  study was interpreted at [place], [place].     Signed by: Tyron Dixon 10/12/2023 12:23 AM Dictation workstation:   [id number]    CT head wo IV contrast    Result Date: 10/11/2023  Interpreted By:  Tyron Dixon and Dervishi Mario STUDY: CT HEAD WO IV CONTRAST;  10/11/2023 9:42 pm   INDICATION: bilateral LE weakness, headache.   COMPARISON: CT brain: 08/26/2023.   ACCESSION NUMBER(S): [id number]   ORDERING CLINICIAN: TEE BLAND   TECHNIQUE: Noncontrast axial CT scan of head was performed. Angled reformats in brain and bone windows were generated. The images were reviewed in bone, brain, blood and soft tissue windows.   FINDINGS: CSF Spaces: The ventricles, sulci and basal cisterns are concordant with patient's age and degree of global parenchymal atrophy.. There is no extraaxial fluid collection.   Parenchyma: There are hypodense focal areas in the subcortical and periventricular white matter regions consistent with the sequela of chronic microvascular ischemic changes. Punctate remote left thalamic and anterior limb internal capsule lacunar infarcts. Otherwise, the grey-white differentiation is overall preserved. There is no mass effect or midline shift.  There is no intracranial hemorrhage.   Calvarium: The calvarium is intact.   Paranasal sinuses and mastoids: Visualized paranasal sinuses and mastoids are clear.       1. No evidence of acute cortical infarct or intracranial hemorrhage. 2. Subcortical and periventricular white matter hypodensities consistent with the sequela of chronic microvascular ischemic changes. 3. Punctate remote left thalamic and anterior basal ganglia lacunar infarcts.     I personally reviewed the images/study and I agree with the findings as stated. This study was interpreted at [place], [place].   MACRO: None   Signed by: Tyron Dixon 10/11/2023 9:52 PM Dictation  workstation:   [id number]    XR chest 2 views    Result Date: 10/11/2023  Interpreted By:  Tyron Dixon and Stephens Katherine STUDY: XR CHEST 2 VIEWS;  10/11/2023 9:32 pm   INDICATION: Signs/Symptoms:bilater LE weakness.   COMPARISON: Chest radiograph and CT chest 08/26/2023   ACCESSION NUMBER(S): [id number]   ORDERING CLINICIAN: TEE BLAND   FINDINGS: PA and lateral radiographs of the chest were provided. New patchy left lower lobe airspace opacities. Consider pneumonia. Pulmonary hyperinflation and coarsened interstitial markings compatible with COPD/emphysema. No pleural effusion or pneumothorax diffuse osteopenia. No acute osseous abnormality. Partially visualized left shoulder arthroplasty. Upper abdomen is unremarkable.       1. New patchy left lower lobe airspace opacities. Consider pneumonia. 2. Findings of COPD/emphysema.   I personally reviewed the images/study and I agree with the findings as stated. This study was interpreted at [place], [place].   MACRO: None   Signed by: Tyron Dixon 10/11/2023 9:42 PM Dictation workstation:   [id number]      Medications:    Current Facility-Administered Medications:     acetaminophen (Tylenol) tablet 650 mg, 650 mg, oral, q8h PRN, Ever Paez MD, 650 mg at 10/29/23 2034    albuterol 2.5 mg /3 mL (0.083 %) nebulizer solution 2.5 mg, 2.5 mg, nebulization, 4x daily, Evre Paez MD, 2.5 mg at 10/30/23 2254    [Held by provider] atorvastatin (Lipitor) tablet 40 mg, 40 mg, oral, Daily, Virgil Olivo MD, 40 mg at 10/27/23 0815    calcium gluconate in NS IV 2 g, 2 g, intravenous, Once, Ever Paez MD    dextrose 10 % in water (D10W) infusion, 0.3 g/kg/hr, intravenous, Once PRN, Virgil Olivo MD, Stopped at 10/18/23 0800    dextrose 50 % injection 25 g, 25 g, intravenous, q15 min PRN, Virgil Olivo MD    ergocalciferol (Vitamin D-2) drops 8,000 Units, 8,000 Units, oral, Daily,  Ever Paez MD, 8,000 Units at 10/30/23 0928    esomeprazole (NexIUM) suspension 40 mg, 40 mg, nasoduodenal tube, Daily before breakfast, Tia Manuel MD, 40 mg at 10/30/23 0927    folic acid (Folvite) tablet 1 mg, 1 mg, oral, Daily, Ever Paez MD, 1 mg at 10/30/23 0928    glucagon (Glucagen) injection 1 mg, 1 mg, intramuscular, q15 min PRN, Virgil Olivo MD    [Held by provider] heparin (porcine) injection 2,000-4,000 Units, 2,000-4,000 Units, intravenous, q4h PRN, Virgil Olivo MD, 2,000 Units at 10/25/23 0857    heparin 25,000 Units in dextrose 5% 250 mL (100 Units/mL) infusion (premix), 0-4,500 Units/hr, intravenous, Continuous, Rosa Gary MD, Last Rate: 12 mL/hr at 10/31/23 0027, 1,200 Units/hr at 10/31/23 0027    heparin injection  - Omnicell Override Pull, , , ,     lidocaine 4 % patch 1 patch, 1 patch, transdermal, Daily, Ever Paez MD, 1 patch at 10/29/23 1010    melatonin tablet 10 mg, 10 mg, oral, Nightly, Tia Manuel MD, 10 mg at 10/30/23 2017    metoprolol tartrate (Lopressor) tablet 12.5 mg, 12.5 mg, oral, BID, Tia Manuel MD, 12.5 mg at 10/30/23 0928    multivitamin with minerals 1 tablet, 1 tablet, oral, Daily, Virgil Olivo MD, 1 tablet at 10/30/23 0927    nicotine (Nicoderm CQ) 21 mg/24 hr patch 1 patch, 1 patch, transdermal, Daily, Virgil Olivo MD, 1 patch at 10/30/23 0928    oxyCODONE (Roxicodone) immediate release tablet 5 mg, 5 mg, oral, q8h PRN, Ever Paez MD, 5 mg at 10/28/23 0232    oxygen (O2) therapy, , inhalation, Continuous PRN - O2/gases, Will Rojo MD, 0.3 L/min at 10/30/23 1600    polyethylene glycol (Glycolax, Miralax) packet 17 g, 17 g, oral, Daily, Tia Manuel MD, 17 g at 10/30/23 0928    sennosides (Senokot) tablet 8.6 mg, 1 tablet, oral, Nightly PRN, Virgil Olivo MD    sodium chloride 0.9% flush  - Omnicell Override Pull, , , ,     sodium chloride 3 % nebulizer solution 3 mL, 3 mL, nebulization, q6h SAM, Elisa Mascorro,  MD, 3 mL at 10/31/23 0345    thiamine (Vitamin B-1) tablet 100 mg, 100 mg, oral, Daily, Ever Paez MD, 100 mg at 10/30/23 0928    white petrolatum (Aquaphor) 41 % ointment 1 Application, 1 Application, Topical, q1h PRN, Ever Paez MD          Assessment/Plan   ASSESSMENT & PLAN:  Molina Godinez is a 68 y.o. male with PMHx of COPD, HTN, AUD and opioid use disorder, HCV (treated), chronic PE who presented originally on 10/9 with inability to walk. Briefly in MICU for c/f alcohol withdrawal and high CIWA scores, transferred to floor, eventually readmitted to MICU 10/16 for increased O2 requirements, put on Airvo and intubated 10/20. Bronch done with resp cx growing stenotrophomonas for which he completed a course of Bactrim. Pt was extubated on 10/27. Also with SIADH (unclear etiology, potentially just his severe pulmonary disease); nephrology consulted and was initially treated with fluid restriction, urea packets and Lasix, but now off all of these for significant increase BUN to 180s. Now with worsening complete left lung opacification and new O2 requirement, likely 2/2 mucus plugging vs pulmonary edema vs infection (low suspicion). Also with hypotension to MAPs in 50s, on Levophed gtt. CVVH started 10/31 for uremia and fluid removal I/s/o low blood pressure and tachycardia.     Principal Problem:    Hospital-acquired pneumonia  Active Problems:    Essential hypertension, benign    Hepatitis C virus infection cured after antiviral drug therapy    Alcoholism (CMS/HCC)    Chronic pulmonary embolism (CMS/HCC)    COPD (chronic obstructive pulmonary disease) (CMS/HCC)    HLD (hyperlipidemia)    NEURO/PSYCH  #AMS- fluctuating, unclear etiology as previously suspected alcohol withdrawal but could be 2/2 chronic hypoxia and retention vs uremic encephalopathy.   - Previously intubated/sedated, extubated on 10/27.   - CT head without pathology from 10/11  - Initially had visual and tactile hallucinations, have since  resolved. Suspect metabolic etiology of fluctuations in mental status, i/s/o acutely increased oxygen requirement.     #Bilateral LE weakness  -Likely chronic, had weakness during 8/2023 admission; less likely GBS  -CT head from 10/11 without pathology, MRI spine stopped early due to pain (on 10/11)  -Patient able to move bilateral lower extremities  -Repeat MRI brain/total spine on 10/20 unremarkable for acute process  PLAN:  -May follow up with neurology outpatient  -ID consulted regarding weakness and positive Syphilis Ab. Pt was treated back in 1998 but rec MRI spine given concern of abscess, MRI negative for acute process  -Neurology consulted. Suspicion that weakness is likely a peripheral (LMN + muscle) inflammatory or degenerative disorder considering distribution and chronology.      - recommend EMG/NGS after patient is downgraded      - low suspicion for GBS     CV  #HTN- resolved  - home amlodipine 5mg, atenolol 50mg  - Initially started on Cardene drip, transitioned to Labetalol prior to resolution  - home atenolol restarted on 10/28, switched to metop 12.5 BID on 10/29 I/s/o renal function    #Hypotension, asymptomatic   - Etiology includes infection vs volume depletion vs   - MAP to 50s this AM  - Levophed gtt for MAP > 65  - Given hemodynamics, will initiate CVVH as opposed to iHD     #Tachycardia  - Sinus rhythm, HR chronically in 120-130s   - Etiology includes pain, infection, hypovolemia, uremia  - Poor correlatoin with fentanyl pushes, less suspicious for pain etiology  - home atenolol switched to metop 12.5     #CAD  - home atorvastatin 40mg held in the setting of elevated transaminases      #Chest pain- resolved  - New onset chest pain 10/28, had hyperkalemia on RFP  - RFP rechecked, K wnl  - Trop 14  - ECG without acute changes, stable from prior  - Given sublingual nitroglycerin, but had resolution of pain prior to administration.  - Some concern of uremic pericarditis but unlikely. No  pericardial rub appreciated on exam.  - continue to monitor     PULM  #CAP- resolved  -Urine strep positive, unsure if active or chronic infection  -MRSA negative, Urine legionella negative  -CTX 5 day course completed 10/17     #L lung consolidation, now complete opacification  #Steno HAP  #Increased O2 requirements (from HFNC 30L/60% FiO2 to 60L/70%)  - Initially treated for CAP given positive urine strep with CTX 10/13-10/17, one day of Zosyn10/17-10/17  - Leukocytosis stable ~24  - C/f HAP vs incomplete resolution of previous PNA  -S/p Bronch 10/20 with purulent fluid with bronchial washings sent for cx  -Bronchial washings with stenotrophomonas susceptible to Bactrim  -Bactrim 10/22-10/28  -CXR 10/31 showing complete opacification of left lung worse from prior, likely 2/2 mucus plugging vs pulmonary edema vs infection   -Low concern for infection as patient is afebrile, does have leukocytosis but this has been chronic during this hospitalization. Also tachy and hypotensive however HR has been chronically elevated  -To r/o infectious process, repeat Bcx drawn today. Will collect respiratory sputum culture during bronch today.  -Will start zosyn 3.375g IV q6h post-bronch, low threshold to discontinue as clinically indicated     #Suspected PAH  - Echo with atrial septal aneurysm, positive bubble study  - Will need outpatient workup, previously some suspicion for PAH but will need further evaluation     #Severe COPD  #Mucus plugging  -8/2023 FEV1 42%  - CT C/A/P with RLL mucus plug  - CXR 10/28 with L sided pleural effusion vs mucus plugging  - CXR 10/31 with complete opacification of left lung  - Bronch this PM, will collect sputum culture   - CXR post-bronch to monitor resolution of left sided opacification  - Continue aggressive RT: Albuterol q4h, chest PT, respiratory hygiene  - Ezpap, incentive spirometer  - 7% NS neb q6h  - Encourage sitting, mobility as able. PT following     #Subsegmental PE  -Requires  anticoagulation for 3 months (per vasc med 8/2023), on home apixaban 5mg BID  PLAN:  -Heparin gtt, held pending bronch this PM (will restart post-procedure)     GI  #HepC  -treated, 8/2023 HCV RNA undetected     #Nutrition  - Dobhoff in place 10/17  - Nutrition following, appreciate recs  - TF with TwoCal HN at goal rate of 35ml/hr, held pending bronch (will restart post-procedure)  - 100mg Thiamine, 1mg Folic acid, 8000u Vit D2     #Elevated transaminases, hx of treated HCV- increasing, semi stable  - LFTs rising over past few days, predominantly ALP/AST/ALT with normal bili  - RUQ US with steatosis, sludge in GB  - Sulfa drug induced vs intrinsic process, though Bactrim typically takes 2 weeks for hepatotoxicity  -Trend LFTs     /RENAL  #Hyponatremia - Likely SIADH 2/2 COPD exacerbation but unclear if additional etiologies- resolved  - Na as low as 120, slowly improving, 139 on 10/31/23  - check Na and urea q4hr  - D/c urea packets in setting of BUN of 180s and AMS  - place marie for accurate I&Os in setting of strict fluid monitoring  - Free water flushes 100ml BID for further fluid restriction given hyponatremia thought to be 2/2 SIADH  - CVVH started 10/31  - Renal US ordered to r/o obstruction  - Nephro following, appreciate recs     #Uremia with c/f uremic encephalopathy   #KRYSTEN  - BUN elevating over past few days up to 180s, likely 2/2 urea packets, KRYSTEN, TF  - Fluid restricted, FeNa 0.4% indicating prerenal KRYSTEN  - Monitor for uremic symptoms: loss of appetite, hypoactive changes in mental status, nausea, pericarditis  - CVVH started 10/31  - TTE ordered to r/o HF and uremic pericarditis      #Hyperkalemia - resolved  - K 7.9, rechecked to 4.7  - 4.4 on 10/29 -> uptrending to 5.4 -> 5.9 yesterday s/p 15g lokelma  - K this AM 4.7  - RFP q4h     #C/f refeeding syndrome- resolved  - Hypokalemia 2.7, Phos 1.9, Ca 5.2  - Electrolytes repleted, will monitor        ENDOCRINE  #Secondary HyperPTH  -8/28 PTH  184.7  -25-hydroxy vit D 8, 1,25 Vit D 44.4  PLAN:  Vit D2 8000u liquid daily     ID  #Leukocytosis I/s/o recently treated strep pneumo CAP- increasing  - Completed CTX 10/13-10/17  - Zosyn 10/20-10/24, Bactrim 10/22-10/28, Levaquin 10/23-10/25  - Bronchial washings with stenotrophomonas susceptible to Bactrim  - Leukocytosis stable at 26, will continue to monitor  - Restart bactrim if signs of sepsis     HEME/ONC  #Leukocytosis, intermittently ranging 18-24  -Likely 2/2 to uremia vs infection (low suspicion)   -stable at 26   -restart bactrim if developing signs of sepsis.   -Monitor WBC, vitals        MSK/RHEUM   #Chronic LE weakness  -Likely chronic from disuse and alcohol, unlikely GBS since no paralysis  -possible EMG once downgraded, neuro concern for peripheral (LMN + muscle) inflammatory or degenerative disorder considering distribution and chronology     Vent/Oxy: HFNC at 70%/60L  Pressors/Drips: Levophed   Abx: zosyn to be started after bronch  F: Restricted  E: PRN  N: TF via Dobhoff at 35 ml/hr (held pending bronch, will restart after procedure)  A: PIV R arm x 3, Dobhoff, [place]  DVT ppx: Heparin gtt (held pending bronch, will restart after procedure)  GI ppx: Pantoprazole 40mg daily    Patient was seen and discussed with Dr. Rojo who agrees with the plan as outlined above.    Yancy Freeman MD  Internal Medicine PGY1  10/31/2023 6:30 AM

## 2023-11-01 ENCOUNTER — APPOINTMENT (OUTPATIENT)
Dept: RADIOLOGY | Facility: HOSPITAL | Age: 68
End: 2023-11-01
Payer: COMMERCIAL

## 2023-11-01 LAB
ALBUMIN SERPL BCP-MCNC: 3.2 G/DL (ref 3.4–5)
ALBUMIN SERPL BCP-MCNC: 3.3 G/DL (ref 3.4–5)
ALBUMIN SERPL BCP-MCNC: 3.4 G/DL (ref 3.4–5)
ANION GAP SERPL CALC-SCNC: 16 MMOL/L (ref 10–20)
ANION GAP SERPL CALC-SCNC: 17 MMOL/L (ref 10–20)
ANION GAP SERPL CALC-SCNC: 20 MMOL/L (ref 10–20)
ANION GAP SERPL CALC-SCNC: 25 MMOL/L (ref 10–20)
APTT PPP: 66 SECONDS (ref 27–38)
BACTERIA SPEC RESP CULT: ABNORMAL
BASOPHILS # BLD AUTO: 0.13 X10*3/UL (ref 0–0.1)
BASOPHILS NFR BLD AUTO: 0.5 %
BUN SERPL-MCNC: 129 MG/DL (ref 6–23)
BUN SERPL-MCNC: 84 MG/DL (ref 6–23)
BUN SERPL-MCNC: 91 MG/DL (ref 6–23)
BUN SERPL-MCNC: 96 MG/DL (ref 6–23)
CA-I BLD-SCNC: 1.14 MMOL/L (ref 1.1–1.33)
CA-I BLD-SCNC: 1.17 MMOL/L (ref 1.1–1.33)
CALCIUM SERPL-MCNC: 9.4 MG/DL (ref 8.6–10.6)
CALCIUM SERPL-MCNC: 9.5 MG/DL (ref 8.6–10.6)
CALCIUM SERPL-MCNC: 9.7 MG/DL (ref 8.6–10.6)
CHLORIDE SERPL-SCNC: 100 MMOL/L (ref 98–107)
CHLORIDE SERPL-SCNC: 101 MMOL/L (ref 98–107)
CHLORIDE SERPL-SCNC: 97 MMOL/L (ref 98–107)
CHLORIDE SERPL-SCNC: 98 MMOL/L (ref 98–107)
CO2 SERPL-SCNC: 24 MMOL/L (ref 21–32)
CO2 SERPL-SCNC: 25 MMOL/L (ref 21–32)
CO2 SERPL-SCNC: 26 MMOL/L (ref 21–32)
CO2 SERPL-SCNC: 26 MMOL/L (ref 21–32)
CREAT SERPL-MCNC: 0.61 MG/DL (ref 0.5–1.3)
CREAT SERPL-MCNC: 0.65 MG/DL (ref 0.5–1.3)
CREAT SERPL-MCNC: 1.03 MG/DL (ref 0.5–1.3)
CREAT SERPL-MCNC: 1.09 MG/DL (ref 0.5–1.3)
CRP SERPL-MCNC: 23.6 MG/DL
EOSINOPHIL # BLD AUTO: 0.1 X10*3/UL (ref 0–0.7)
EOSINOPHIL NFR BLD AUTO: 0.4 %
ERYTHROCYTE [DISTWIDTH] IN BLOOD BY AUTOMATED COUNT: 13.4 % (ref 11.5–14.5)
GFR SERPL CREATININE-BSD FRML MDRD: 74 ML/MIN/1.73M*2
GFR SERPL CREATININE-BSD FRML MDRD: 80 ML/MIN/1.73M*2
GFR SERPL CREATININE-BSD FRML MDRD: >90 ML/MIN/1.73M*2
GFR SERPL CREATININE-BSD FRML MDRD: >90 ML/MIN/1.73M*2
GLUCOSE BLD MANUAL STRIP-MCNC: 127 MG/DL (ref 74–99)
GLUCOSE BLD MANUAL STRIP-MCNC: 81 MG/DL (ref 74–99)
GLUCOSE BLD MANUAL STRIP-MCNC: 82 MG/DL (ref 74–99)
GLUCOSE BLD MANUAL STRIP-MCNC: 89 MG/DL (ref 74–99)
GLUCOSE SERPL-MCNC: 103 MG/DL (ref 74–99)
GLUCOSE SERPL-MCNC: 112 MG/DL (ref 74–99)
GLUCOSE SERPL-MCNC: 65 MG/DL (ref 74–99)
GRAM STN SPEC: ABNORMAL
HCT VFR BLD AUTO: 41.3 % (ref 41–52)
HGB BLD-MCNC: 12.9 G/DL (ref 13.5–17.5)
IMM GRANULOCYTES # BLD AUTO: 0.24 X10*3/UL (ref 0–0.7)
IMM GRANULOCYTES NFR BLD AUTO: 0.9 % (ref 0–0.9)
INR PPP: 1.1 (ref 0.9–1.1)
LYMPHOCYTES # BLD AUTO: 1.23 X10*3/UL (ref 1.2–4.8)
LYMPHOCYTES NFR BLD AUTO: 4.6 %
MAGNESIUM SERPL-MCNC: 2.58 MG/DL (ref 1.6–2.4)
MCH RBC QN AUTO: 29.3 PG (ref 26–34)
MCHC RBC AUTO-ENTMCNC: 31.2 G/DL (ref 32–36)
MCV RBC AUTO: 94 FL (ref 80–100)
MONOCYTES # BLD AUTO: 1.28 X10*3/UL (ref 0.1–1)
MONOCYTES NFR BLD AUTO: 4.7 %
NEUTROPHILS # BLD AUTO: 23.97 X10*3/UL (ref 1.2–7.7)
NEUTROPHILS NFR BLD AUTO: 88.9 %
NRBC BLD-RTO: 0 /100 WBCS (ref 0–0)
PHOSPHATE SERPL-MCNC: 4.7 MG/DL (ref 2.5–4.9)
PHOSPHATE SERPL-MCNC: 4.8 MG/DL (ref 2.5–4.9)
PHOSPHATE SERPL-MCNC: 5.2 MG/DL (ref 2.5–4.9)
PLATELET # BLD AUTO: 301 X10*3/UL (ref 150–450)
PMV BLD AUTO: 10.2 FL (ref 7.5–11.5)
POTASSIUM SERPL-SCNC: 4 MMOL/L (ref 3.5–5.3)
POTASSIUM SERPL-SCNC: 4.3 MMOL/L (ref 3.5–5.3)
PROTHROMBIN TIME: 12.4 SECONDS (ref 9.8–12.8)
RBC # BLD AUTO: 4.41 X10*6/UL (ref 4.5–5.9)
SODIUM SERPL-SCNC: 138 MMOL/L (ref 136–145)
SODIUM SERPL-SCNC: 139 MMOL/L (ref 136–145)
SODIUM SERPL-SCNC: 139 MMOL/L (ref 136–145)
SODIUM SERPL-SCNC: 143 MMOL/L (ref 136–145)
UFH PPP CHRO-ACNC: 0.5 IU/ML
WBC # BLD AUTO: 27 X10*3/UL (ref 4.4–11.3)

## 2023-11-01 PROCEDURE — 80051 ELECTROLYTE PANEL: CPT

## 2023-11-01 PROCEDURE — 99221 1ST HOSP IP/OBS SF/LOW 40: CPT | Performed by: STUDENT IN AN ORGANIZED HEALTH CARE EDUCATION/TRAINING PROGRAM

## 2023-11-01 PROCEDURE — 2500000005 HC RX 250 GENERAL PHARMACY W/O HCPCS

## 2023-11-01 PROCEDURE — 82330 ASSAY OF CALCIUM: CPT

## 2023-11-01 PROCEDURE — 2500000001 HC RX 250 WO HCPCS SELF ADMINISTERED DRUGS (ALT 637 FOR MEDICARE OP)

## 2023-11-01 PROCEDURE — 97110 THERAPEUTIC EXERCISES: CPT | Mod: GO

## 2023-11-01 PROCEDURE — 82565 ASSAY OF CREATININE: CPT

## 2023-11-01 PROCEDURE — 36415 COLL VENOUS BLD VENIPUNCTURE: CPT

## 2023-11-01 PROCEDURE — 2500000005 HC RX 250 GENERAL PHARMACY W/O HCPCS: Performed by: PEDIATRICS

## 2023-11-01 PROCEDURE — 2500000004 HC RX 250 GENERAL PHARMACY W/ HCPCS (ALT 636 FOR OP/ED)

## 2023-11-01 PROCEDURE — 99222 1ST HOSP IP/OBS MODERATE 55: CPT | Performed by: NURSE PRACTITIONER

## 2023-11-01 PROCEDURE — 31720 CLEARANCE OF AIRWAYS: CPT

## 2023-11-01 PROCEDURE — 2020000001 HC ICU ROOM DAILY

## 2023-11-01 PROCEDURE — 85520 HEPARIN ASSAY: CPT

## 2023-11-01 PROCEDURE — 82947 ASSAY GLUCOSE BLOOD QUANT: CPT

## 2023-11-01 PROCEDURE — 2500000004 HC RX 250 GENERAL PHARMACY W/ HCPCS (ALT 636 FOR OP/ED): Performed by: INTERNAL MEDICINE

## 2023-11-01 PROCEDURE — 71045 X-RAY EXAM CHEST 1 VIEW: CPT | Performed by: RADIOLOGY

## 2023-11-01 PROCEDURE — 84520 ASSAY OF UREA NITROGEN: CPT

## 2023-11-01 PROCEDURE — 2500000002 HC RX 250 W HCPCS SELF ADMINISTERED DRUGS (ALT 637 FOR MEDICARE OP, ALT 636 FOR OP/ED): Performed by: STUDENT IN AN ORGANIZED HEALTH CARE EDUCATION/TRAINING PROGRAM

## 2023-11-01 PROCEDURE — 87205 SMEAR GRAM STAIN: CPT

## 2023-11-01 PROCEDURE — 97530 THERAPEUTIC ACTIVITIES: CPT | Mod: GP

## 2023-11-01 PROCEDURE — 94668 MNPJ CHEST WALL SBSQ: CPT

## 2023-11-01 PROCEDURE — 97530 THERAPEUTIC ACTIVITIES: CPT | Mod: GO

## 2023-11-01 PROCEDURE — 80069 RENAL FUNCTION PANEL: CPT

## 2023-11-01 PROCEDURE — 90945 DIALYSIS ONE EVALUATION: CPT | Performed by: INTERNAL MEDICINE

## 2023-11-01 PROCEDURE — 71045 X-RAY EXAM CHEST 1 VIEW: CPT | Mod: FY

## 2023-11-01 PROCEDURE — S4991 NICOTINE PATCH NONLEGEND: HCPCS | Performed by: STUDENT IN AN ORGANIZED HEALTH CARE EDUCATION/TRAINING PROGRAM

## 2023-11-01 PROCEDURE — 2500000001 HC RX 250 WO HCPCS SELF ADMINISTERED DRUGS (ALT 637 FOR MEDICARE OP): Performed by: STUDENT IN AN ORGANIZED HEALTH CARE EDUCATION/TRAINING PROGRAM

## 2023-11-01 PROCEDURE — 85610 PROTHROMBIN TIME: CPT

## 2023-11-01 PROCEDURE — 99223 1ST HOSP IP/OBS HIGH 75: CPT | Performed by: STUDENT IN AN ORGANIZED HEALTH CARE EDUCATION/TRAINING PROGRAM

## 2023-11-01 PROCEDURE — 94640 AIRWAY INHALATION TREATMENT: CPT

## 2023-11-01 PROCEDURE — 83735 ASSAY OF MAGNESIUM: CPT

## 2023-11-01 PROCEDURE — 2500000002 HC RX 250 W HCPCS SELF ADMINISTERED DRUGS (ALT 637 FOR MEDICARE OP, ALT 636 FOR OP/ED)

## 2023-11-01 PROCEDURE — 85025 COMPLETE CBC W/AUTO DIFF WBC: CPT

## 2023-11-01 PROCEDURE — 99291 CRITICAL CARE FIRST HOUR: CPT

## 2023-11-01 RX ORDER — OXYCODONE HYDROCHLORIDE 5 MG/1
5 TABLET ORAL EVERY 6 HOURS PRN
Status: DISCONTINUED | OUTPATIENT
Start: 2023-11-01 | End: 2023-11-23

## 2023-11-01 RX ORDER — VANCOMYCIN HYDROCHLORIDE 500 MG/100ML
500 INJECTION, SOLUTION INTRAVENOUS EVERY 12 HOURS
Status: DISCONTINUED | OUTPATIENT
Start: 2023-11-01 | End: 2023-11-02

## 2023-11-01 RX ADMIN — VANCOMYCIN HYDROCHLORIDE 500 MG: 500 INJECTION, SOLUTION INTRAVENOUS at 16:16

## 2023-11-01 RX ADMIN — ALBUTEROL SULFATE 2.5 MG: 2.5 SOLUTION RESPIRATORY (INHALATION) at 21:05

## 2023-11-01 RX ADMIN — SODIUM CHLORIDE, POTASSIUM CHLORIDE, SODIUM LACTATE AND CALCIUM CHLORIDE 500 ML: 600; 310; 30; 20 INJECTION, SOLUTION INTRAVENOUS at 23:16

## 2023-11-01 RX ADMIN — CALCIUM CHLORIDE, MAGNESIUM CHLORIDE, DEXTROSE MONOHYDRATE, LACTIC ACID, SODIUM CHLORIDE, SODIUM BICARBONATE AND POTASSIUM CHLORIDE 25 ML/KG/HR: 5.15; 2.03; 22; 5.4; 6.46; 3.09; .157 INJECTION INTRAVENOUS at 13:36

## 2023-11-01 RX ADMIN — CALCIUM CHLORIDE, MAGNESIUM CHLORIDE, DEXTROSE MONOHYDRATE, LACTIC ACID, SODIUM CHLORIDE, SODIUM BICARBONATE AND POTASSIUM CHLORIDE 25 ML/KG/HR: 5.15; 2.03; 22; 5.4; 6.46; 3.09; .157 INJECTION INTRAVENOUS at 13:35

## 2023-11-01 RX ADMIN — THIAMINE HCL TAB 100 MG 100 MG: 100 TAB at 08:42

## 2023-11-01 RX ADMIN — Medication 8000 UNITS: at 08:45

## 2023-11-01 RX ADMIN — CALCIUM CHLORIDE, MAGNESIUM CHLORIDE, DEXTROSE MONOHYDRATE, LACTIC ACID, SODIUM CHLORIDE, SODIUM BICARBONATE AND POTASSIUM CHLORIDE 25 ML/KG/HR: 5.15; 2.03; 22; 5.4; 6.46; 3.09; .157 INJECTION INTRAVENOUS at 00:38

## 2023-11-01 RX ADMIN — Medication 1 TABLET: at 08:42

## 2023-11-01 RX ADMIN — Medication 3 ML: at 07:52

## 2023-11-01 RX ADMIN — MEROPENEM 2 G: 1 INJECTION, POWDER, FOR SOLUTION INTRAVENOUS at 05:02

## 2023-11-01 RX ADMIN — ALBUTEROL SULFATE 2.5 MG: 2.5 SOLUTION RESPIRATORY (INHALATION) at 12:10

## 2023-11-01 RX ADMIN — CALCIUM CHLORIDE, MAGNESIUM CHLORIDE, DEXTROSE MONOHYDRATE, LACTIC ACID, SODIUM CHLORIDE, SODIUM BICARBONATE AND POTASSIUM CHLORIDE 25 ML/KG/HR: 5.15; 2.03; 22; 5.4; 6.46; 3.09; .157 INJECTION INTRAVENOUS at 13:39

## 2023-11-01 RX ADMIN — Medication 10 MG: at 20:31

## 2023-11-01 RX ADMIN — ESOMEPRAZOLE MAGNESIUM 40 MG: 40 FOR SUSPENSION ORAL at 08:42

## 2023-11-01 RX ADMIN — OXYCODONE HYDROCHLORIDE 5 MG: 5 TABLET ORAL at 23:15

## 2023-11-01 RX ADMIN — Medication 3 ML: at 21:05

## 2023-11-01 RX ADMIN — LIDOCAINE 1 PATCH: 4 PATCH TOPICAL at 08:42

## 2023-11-01 RX ADMIN — HEPARIN SODIUM 1200 UNITS/HR: 10000 INJECTION, SOLUTION INTRAVENOUS at 16:38

## 2023-11-01 RX ADMIN — FOLIC ACID 1 MG: 1 TABLET ORAL at 08:42

## 2023-11-01 RX ADMIN — ALBUTEROL SULFATE 2.5 MG: 2.5 SOLUTION RESPIRATORY (INHALATION) at 15:29

## 2023-11-01 RX ADMIN — ALBUTEROL SULFATE 2.5 MG: 2.5 SOLUTION RESPIRATORY (INHALATION) at 07:52

## 2023-11-01 RX ADMIN — Medication 40 L/MIN: at 07:54

## 2023-11-01 RX ADMIN — POLYETHYLENE GLYCOL 3350 17 G: 17 POWDER, FOR SOLUTION ORAL at 08:42

## 2023-11-01 RX ADMIN — OXYCODONE HYDROCHLORIDE 5 MG: 5 TABLET ORAL at 19:56

## 2023-11-01 RX ADMIN — Medication: at 12:11

## 2023-11-01 RX ADMIN — Medication: at 16:00

## 2023-11-01 RX ADMIN — MEROPENEM 2 G: 1 INJECTION, POWDER, FOR SOLUTION INTRAVENOUS at 17:22

## 2023-11-01 RX ADMIN — Medication 1 PATCH: at 09:15

## 2023-11-01 ASSESSMENT — PAIN - FUNCTIONAL ASSESSMENT
PAIN_FUNCTIONAL_ASSESSMENT: CPOT (CRITICAL CARE PAIN OBSERVATION TOOL)
PAIN_FUNCTIONAL_ASSESSMENT: CPOT (CRITICAL CARE PAIN OBSERVATION TOOL)
PAIN_FUNCTIONAL_ASSESSMENT: 0-10
PAIN_FUNCTIONAL_ASSESSMENT: CPOT (CRITICAL CARE PAIN OBSERVATION TOOL)
PAIN_FUNCTIONAL_ASSESSMENT: CPOT (CRITICAL CARE PAIN OBSERVATION TOOL)
PAIN_FUNCTIONAL_ASSESSMENT: 0-10
PAIN_FUNCTIONAL_ASSESSMENT: 0-10
PAIN_FUNCTIONAL_ASSESSMENT: CPOT (CRITICAL CARE PAIN OBSERVATION TOOL)

## 2023-11-01 ASSESSMENT — PAIN SCALES - PAIN ASSESSMENT IN ADVANCED DEMENTIA (PAINAD)
FACIALEXPRESSION: SMILING OR INEXPRESSIVE
BREATHING: NORMAL
BODYLANGUAGE: RELAXED
CONSOLABILITY: NO NEED TO CONSOLE
FACIALEXPRESSION: SMILING OR INEXPRESSIVE
BREATHING: NORMAL
BODYLANGUAGE: RELAXED
CONSOLABILITY: NO NEED TO CONSOLE
TOTALSCORE: 0
BODYLANGUAGE: RELAXED
TOTALSCORE: 0
CONSOLABILITY: NO NEED TO CONSOLE
FACIALEXPRESSION: SMILING OR INEXPRESSIVE
TOTALSCORE: 0
BREATHING: NORMAL

## 2023-11-01 ASSESSMENT — COGNITIVE AND FUNCTIONAL STATUS - GENERAL
HELP NEEDED FOR BATHING: TOTAL
DRESSING REGULAR LOWER BODY CLOTHING: TOTAL
CLIMB 3 TO 5 STEPS WITH RAILING: TOTAL
MOVING TO AND FROM BED TO CHAIR: TOTAL
EATING MEALS: TOTAL
TURNING FROM BACK TO SIDE WHILE IN FLAT BAD: TOTAL
STANDING UP FROM CHAIR USING ARMS: TOTAL
WALKING IN HOSPITAL ROOM: TOTAL
MOVING FROM LYING ON BACK TO SITTING ON SIDE OF FLAT BED WITH BEDRAILS: TOTAL
MOBILITY SCORE: 6
TOILETING: TOTAL
PERSONAL GROOMING: TOTAL
DRESSING REGULAR UPPER BODY CLOTHING: TOTAL
DAILY ACTIVITIY SCORE: 6

## 2023-11-01 ASSESSMENT — PAIN SCALES - GENERAL
PAINLEVEL_OUTOF10: 10 - WORST POSSIBLE PAIN
PAINLEVEL_OUTOF10: 0 - NO PAIN
PAINLEVEL_OUTOF10: 10 - WORST POSSIBLE PAIN
PAINLEVEL_OUTOF10: 3
PAINLEVEL_OUTOF10: 0 - NO PAIN
PAINLEVEL_OUTOF10: 0 - NO PAIN

## 2023-11-01 NOTE — PROGRESS NOTES
Physical Therapy    Physical Therapy Treatment    Patient Name: Molina Godinez  MRN: [id number]  Today's Date: 11/1/2023  Time Calculation  Start Time: 1228  Stop Time: 1258  Time Calculation (min): 30 min     Assessment/Plan   PT Assessment  PT Assessment Results: Decreased strength, Impaired balance, Decreased mobility  Rehab Prognosis: Fair   End of Session Communication: Bedside nurse  End of Session Patient Position: Bed, 3 rail up, Alarm off, not on at start of session     PT Plan  PT Discharge Recommendations: Moderate intensity level of continued care  General Visit Information:   Co-Treatment Reason: patient with reduced arousal; unsuccessful mobilization by RN staff; per RN report, patient globally very deconditioned. AMPAC <10  Prior to Session Communication: Bedside nurse  Patient Position Received: Bed, 3 rail up, Alarm off, not on at start of session  Preferred Learning Style: verbal, visual  General Comment: soft voice, limited verbal response; .04 levo, heparin, 30L/40% AirVo    Subjective   Precautions:  Precautions  Medical Precautions: Fall precautions, Oxygen therapy device and L/min  Vital Signs:  Vital Signs  Heart Rate:  (pre: 123 post: 124)  Resp:  (pre: 28 post: 34)  SpO2:  (pre: 91% post: 91%)  BP:  (pre: 90/58 (68) sitting EOB: 56/31 (40) patient had BM, patient abruptly assisted back to supine, bed placed in trendelenberg position, SCDs ON, passive ankle pumps by therapist, BP re-checked: 93/61 (72), 90/55 (59), 101/66 (78))  BP Location: Left arm  BP Method: Automatic    Objective   Pain:  Pain Assessment  Pain Assessment: 0-10  Pain Score:  (did not rate numerically; headache)  Pain Type:  (stiffness reported)  Pain Location: Generalized  Pain Interventions: Repositioned  Cognition:  Cognition  Overall Cognitive Status: Within Functional Limits  Orientation Level:  (AxO x3)  Postural Control:  Postural Control  Postural Control: Impaired  Head Control: forward flexed posture, unable to  extend c-spine to look upright/forward; tactile assist by PT to attempt to somewhat extend C-spine  Trunk Control: poor trunk control; DEPx1 sitting EOB, retrolean with decrease ability to use BUEs to support self in midline. EOB ~8 minutes.  Righting Reactions: unable to self correct posture  Protective Responses: limited ability to use BUEs to support self  Activity Tolerance:  Activity Tolerance  Endurance: Tolerates less than 10 min exercise with changes in vital signs  Treatments:     Bed Mobility  Bed Mobility: Yes  Bed Mobility 1  Bed Mobility 1: Rolling left, Rolling right  Level of Assistance 1: Dependent, Moderate verbal cues  Bed Mobility Comments 1: x1 assist + draw sheet; alfonso-care required d/t BM  Bed Mobility 2  Bed Mobility  2: Supine to sitting, Sitting to supine  Level of Assistance 2: Dependent  Bed Mobility Comments 2: draw sheet, HOB elevated    Outcome Measures:  [facility] Basic Mobility  Turning from your back to your side while in a flat bed without using bedrails: Total  Moving from lying on your back to sitting on the side of a flat bed without using bedrails: Total  Moving to and from bed to chair (including a wheelchair): Total  Standing up from a chair using your arms (e.g. wheelchair or bedside chair): Total  To walk in hospital room: Total  Climbing 3-5 steps with railing: Total  Basic Mobility - Total Score: 6    FSS-ICU  Ambulation: Unable to attempt due to weakness  Rolling: Total assistance (performs 25% or requires another person)  Sitting: Total assistance (performs 25% or requires another person)  Transfer Sit-to-Stand: Unable to perform  Transfer Supine-to-Sit: Total assistance (performs 25% or requires another person)  Total Score: 3    E = Exercise and Early Mobility  Current Activity: Sitting at edge of bed    Education Documentation  Mobility Training, taught by Tameka Velasquez, PT at 11/1/2023  3:28 PM.  Learner: Patient  Readiness: Acceptance  Method: Explanation  Response:  Needs Reinforcement    Education Comments  No comments found.         Encounter Problems       Encounter Problems (Active)       Balance       Patient will complete static (Cgx1) and dynamic (MINx1) using BUE support as needed in order to maintain midline without acute LOB   (Progressing)       Start:  10/19/23    Expected End:  11/09/23               Mobility       STG - Patient will ambulate >/=10 ft using LRD as needed with MODx1 without acute LOB  (Not Progressing)       Start:  10/19/23    Expected End:  11/09/23            patient will complete BLE there-ex in order to improve strength and to assist with the completion of functional mobility tasks.  (Not Progressing)       Start:  10/19/23    Expected End:  11/09/23                                Transfers       STG - Transfer from bed to chair with MODx1 without acute LOB  (Not Progressing)       Start:  10/19/23    Expected End:  11/09/23            STG - Patient will perform bed mobility with MINx1 assist  (Progressing)       Start:  10/19/23    Expected End:  11/09/23            STG - Patient will transfer sit to and from stand with MODx1 using LRD as needed without acute LOB  (Not Progressing)       Start:  10/19/23    Expected End:  11/09/23

## 2023-11-01 NOTE — PROGRESS NOTES
Renal Staff CVVH Note    Patient seen, examined on CVVH  No significant filter issues overnight  Urine output, min, marie draining clear yellow urine  Fluid removal 20 per hour  Effluent bag clear  Access R internal jugular temp    High flow  1 Pressor requirement hypotensive    Electrolytes, acid base acceptable  Medications reviewed    No CVVH script change  Fluid removal per primary team

## 2023-11-01 NOTE — CONSULTS
Reason for Consult: Tricuspid Valve Vegetation    CARDIAC SURGERY CONSULT NOTE    HISTORY OF PRESENT ILLNESS  Molina Godinez is a 68 y.o. male, with a PMH of HTN, AUD, OUD/IVDU, Hep C s/p treatment, chronic PE, and COPD, who presented to [facility] on 10/11/2023 for inability to walk. He was found to be in alcohol withdrawal and developed AHRF and septic shock secondary to stenotrophomonas PNA complicated by KRYSTEN and uremic encephalopathy now on CVVH. TTE done showeing sever TR with TV vegetation.    Cardiac surgery is consulted for surgical intervention evaluation.     Subjective   Denies pain.     Social History     Tobacco Use    Smoking status: Every Day     Packs/day: .5     Types: Cigarettes   Substance Use Topics    Alcohol use: Yes     Comment: 1.5 pints Ju daily         Allergies: Aspirin and Nsaids (non-steroidal anti-inflammatory drug)    Prior to Admission medications    Medication Sig Start Date End Date Taking? Authorizing Provider   acetaminophen (Tylenol) 325 mg tablet TAKE 2 TABLETS BY MOUTH EVERY 6 HOURS AS NEEDED FOR PAIN.  Patient not taking: Reported on 10/12/2023 8/31/23 8/30/24  Anil Cummings MD   amLODIPine (Norvasc) 5 mg tablet TAKE 1 TABLET BY MOUTH ONCE DAILY 8/31/23 8/30/24  Anil Cummings MD   atenolol (Tenormin) 50 mg tablet TAKE 1 TABLET BY MOUTH ONCE DAILY 8/31/23 8/30/24  Anil Cummings MD   atorvastatin (Lipitor) 40 mg tablet Take 1 tablet (40 mg) by mouth once daily. 6/7/23   Historical Provider, MD   folic acid (Folvite) 1 mg tablet TAKE 1 TABLET BY MOUTH ONCE DAILY  Patient not taking: Reported on 10/2/2023 8/31/23 8/30/24  Anil Cummings MD   ipratropium-albuteroL (Combivent Respimat)  mcg/actuation inhaler INHALE 1 PUFF EVERY 6 HOURS AS NEEDED FOR SHORTNESS OF BREATH 8/31/23 8/30/24  Anil Cummings MD   naloxone (Narcan) 4 mg/0.1 mL nasal spray Use 1 Spray in one nostril (alternate sides) as needed for Drug Overdose (and call 911). every 2-3  "min, until assistance arrives. 6/7/23   Historical Provider, MD   nicotine (Nicoderm CQ) 21 mg/24 hr patch APPLY 1 PATCH TRANSDERMALLY EVERY 24 HOURS.  Patient not taking: Reported on 10/2/2023 8/31/23 8/30/24  Anil Cummings MD   polyethylene glycol (Glycolax, Miralax) 17 gram/dose powder  8/31/23   Historical Provider, MD   sennosides (Senokot) 8.6 mg tablet TAKE 1 TABLET BY MOUTH TWO TIMES A DAY AS NEEDED FOR CONSTIPATION  Patient not taking: Reported on 10/2/2023 8/31/23 8/30/24  Anil Cummings MD   thiamine (Vitamin B-1) 100 mg tablet TAKE 1 TABLET BY MOUTH ONCE DAILY  Patient not taking: Reported on 10/2/2023 8/31/23 8/30/24  Anil Cummings MD       Review of Systems  Review of Systems        Objective   /66   Pulse (!) 128   Temp 36.3 °C (97.3 °F) (Temporal)   Resp 21   Ht 1.702 m (5' 7.01\")   Wt 49.7 kg (109 lb 9.1 oz)   SpO2 99%   BMI 17.16 kg/m²   Pain Score: 0 - No pain  Critical-Care Pain Observation Score:  [0]   PAINAD Score:  [0]    Vitals:          Intake/Output Summary (Last 24 hours) at 11/1/2023 1758  Last data filed at 11/1/2023 1722  Gross per 24 hour   Intake 866.53 ml   Output 1179 ml   Net -312.47 ml       Physical Exam  Physical Exam  Constitutional:       Appearance: He is ill-appearing.   HENT:      Head: Normocephalic.      Mouth/Throat:      Mouth: Mucous membranes are dry.      Pharynx: Oropharynx is clear.   Eyes:      Conjunctiva/sclera: Conjunctivae normal.   Cardiovascular:      Rate and Rhythm: Regular rhythm. Tachycardia present.      Heart sounds: Normal heart sounds.   Pulmonary:      Effort: Tachypnea present.      Breath sounds: Decreased air movement present.      Comments: Lung sounds with diminished air entry.  On high flow nasal cannula.  Unable to converse due to dyspnea; mild respiratory distress  Abdominal:      General: Bowel sounds are normal.      Palpations: Abdomen is soft.   Skin:     General: Skin is warm and dry.   Neurological:      Mental " Status: He is alert.      Comments: Alert. Appears to be oriented to self. Unable to assess due to lack of verbal responses. Will shake head at times for response.         Medications  Scheduled medications  albuterol, 2.5 mg, nebulization, 4x daily  calcium gluconate, 2 g, intravenous, Once  ergocalciferol, 8,000 Units, oral, Daily  esomeprazole, 40 mg, nasoduodenal tube, Daily before breakfast  folic acid, 1 mg, oral, Daily  lidocaine, 1 patch, transdermal, Daily  melatonin, 10 mg, oral, Nightly  meropenem, 2 g, intravenous, q12h  multivitamin with minerals, 1 tablet, oral, Daily  nicotine, 1 patch, transdermal, Daily  perflutren lipid microspheres, 0.5-10 mL of dilution, intravenous, Once in imaging  perflutren protein A microsphere, 0.5 mL, intravenous, Once in imaging  polyethylene glycol, 17 g, oral, Daily  sodium chloride, 3 mL, nebulization, q12h  sulfur hexafluoride microsphr, 2 mL, intravenous, Once in imaging  thiamine, 100 mg, oral, Daily  vancomycin, 500 mg, intravenous, q12h    Continuous medications  heparin, 0-4,500 Units/hr, Last Rate: 1,200 Units/hr (11/01/23 1638)  norepinephrine, 0-3.3 mcg/kg/min, Last Rate: 0.04 mcg/kg/min (11/01/23 1616)  PrismaSol BGK 2/3.5, 25 mL/kg/hr, Last Rate: 25 mL/kg/hr (11/01/23 1339)    PRN medications  PRN medications: acetaminophen, dextrose 10 % in water (D10W), dextrose, glucagon, heparin, oxyCODONE, oxygen, sennosides, white petrolatum    Labs  Results for orders placed or performed during the hospital encounter of 10/11/23 (from the past 24 hour(s))   POCT GLUCOSE   Result Value Ref Range    POCT Glucose 120 (H) 74 - 99 mg/dL   Renal function panel   Result Value Ref Range    Glucose 120 (H) 74 - 99 mg/dL    Sodium 141 136 - 145 mmol/L    Potassium 3.9 3.5 - 5.3 mmol/L    Chloride 101 98 - 107 mmol/L    Bicarbonate 27 21 - 32 mmol/L    Anion Gap 17 10 - 20 mmol/L    Urea Nitrogen 107 (HH) 6 - 23 mg/dL    Creatinine 0.78 0.50 - 1.30 mg/dL    eGFR >90 >60  mL/min/1.73m*2    Calcium 9.4 8.6 - 10.6 mg/dL    Phosphorus 4.4 2.5 - 4.9 mg/dL    Albumin 3.3 (L) 3.4 - 5.0 g/dL   Heparin Assay, UFH   Result Value Ref Range    Heparin Unfractionated 0.3 See Comment Below for Therapeutic Ranges IU/mL   Respiratory Culture/Smear    Specimen: SPUTUM; Fluid   Result Value Ref Range    Respiratory Culture/Smear (A)      Culture not performed. See Gram stain findings. Recollect if clinically indicated.    Gram Stain (A)      Gram stain indicates specimen contains significant salivary contamination.   Renal function panel   Result Value Ref Range    Glucose 112 (H) 74 - 99 mg/dL    Sodium 139 136 - 145 mmol/L    Potassium 4.0 3.5 - 5.3 mmol/L    Chloride 100 98 - 107 mmol/L    Bicarbonate 26 21 - 32 mmol/L    Anion Gap 17 10 - 20 mmol/L    Urea Nitrogen 96 (HH) 6 - 23 mg/dL    Creatinine 0.65 0.50 - 1.30 mg/dL    eGFR >90 >60 mL/min/1.73m*2    Calcium 9.4 8.6 - 10.6 mg/dL    Phosphorus 4.7 2.5 - 4.9 mg/dL    Albumin 3.2 (L) 3.4 - 5.0 g/dL   Renal function panel   Result Value Ref Range    Glucose 103 (H) 74 - 99 mg/dL    Sodium 139 136 - 145 mmol/L    Potassium 4.0 3.5 - 5.3 mmol/L    Chloride 101 98 - 107 mmol/L    Bicarbonate 26 21 - 32 mmol/L    Anion Gap 16 10 - 20 mmol/L    Urea Nitrogen 91 (HH) 6 - 23 mg/dL    Creatinine 0.61 0.50 - 1.30 mg/dL    eGFR >90 >60 mL/min/1.73m*2    Calcium 9.5 8.6 - 10.6 mg/dL    Phosphorus 4.8 2.5 - 4.9 mg/dL    Albumin 3.3 (L) 3.4 - 5.0 g/dL   Calcium, ionized   Result Value Ref Range    POCT Calcium, Ionized 1.17 1.1 - 1.33 mmol/L   CBC and Auto Differential   Result Value Ref Range    WBC 27.0 (H) 4.4 - 11.3 x10*3/uL    nRBC 0.0 0.0 - 0.0 /100 WBCs    RBC 4.41 (L) 4.50 - 5.90 x10*6/uL    Hemoglobin 12.9 (L) 13.5 - 17.5 g/dL    Hematocrit 41.3 41.0 - 52.0 %    MCV 94 80 - 100 fL    MCH 29.3 26.0 - 34.0 pg    MCHC 31.2 (L) 32.0 - 36.0 g/dL    RDW 13.4 11.5 - 14.5 %    Platelets 301 150 - 450 x10*3/uL    MPV 10.2 7.5 - 11.5 fL    Neutrophils % 88.9  40.0 - 80.0 %    Immature Granulocytes %, Automated 0.9 0.0 - 0.9 %    Lymphocytes % 4.6 13.0 - 44.0 %    Monocytes % 4.7 2.0 - 10.0 %    Eosinophils % 0.4 0.0 - 6.0 %    Basophils % 0.5 0.0 - 2.0 %    Neutrophils Absolute 23.97 (H) 1.20 - 7.70 x10*3/uL    Immature Granulocytes Absolute, Automated 0.24 0.00 - 0.70 x10*3/uL    Lymphocytes Absolute 1.23 1.20 - 4.80 x10*3/uL    Monocytes Absolute 1.28 (H) 0.10 - 1.00 x10*3/uL    Eosinophils Absolute 0.10 0.00 - 0.70 x10*3/uL    Basophils Absolute 0.13 (H) 0.00 - 0.10 x10*3/uL   Magnesium   Result Value Ref Range    Magnesium 2.58 (H) 1.60 - 2.40 mg/dL   Coagulation Screen   Result Value Ref Range    Protime 12.4 9.8 - 12.8 seconds    INR 1.1 0.9 - 1.1    aPTT 66 (H) 27 - 38 seconds   Heparin Assay, UFH   Result Value Ref Range    Heparin Unfractionated 0.5 See Comment Below for Therapeutic Ranges IU/mL   POCT GLUCOSE   Result Value Ref Range    POCT Glucose 89 74 - 99 mg/dL   POCT GLUCOSE   Result Value Ref Range    POCT Glucose 82 74 - 99 mg/dL   POCT GLUCOSE   Result Value Ref Range    POCT Glucose 81 74 - 99 mg/dL   Calcium, ionized   Result Value Ref Range    POCT Calcium, Ionized 1.14 1.1 - 1.33 mmol/L   BUN   Result Value Ref Range    Urea Nitrogen 84 (H) 6 - 23 mg/dL   Creatinine, Serum   Result Value Ref Range    Creatinine 1.09 0.50 - 1.30 mg/dL    eGFR 74 >60 mL/min/1.73m*2   Electrolyte panel   Result Value Ref Range    Sodium 138 136 - 145 mmol/L    Potassium 4.0 3.5 - 5.3 mmol/L    Chloride 98 98 - 107 mmol/L    Bicarbonate 24 21 - 32 mmol/L    Anion Gap 20 10 - 20 mmol/L       Echo:     1. Left ventricular systolic function is hyperdynamic with a 75-80% estimated ejection fraction.   2. Right ventricular volume overload.   3. Moderate to severe tricuspid regurgitation visualized.   4. Moderate to severely elevated right ventricular systolic pressure.   5. There is a mobile echodensity attached to the posterior tricuspid leaflet with independent motion  consistent with a vegetation measuring 1.7 x 0.8 cm.   6. Atrial septal aneurysm present.          ASSESSMENT & PLAN  Molina Godinez is a 68 y.o. male, with a PMH of HTN, AUD, OUD/IVDU, Hep C s/p treatment, chronic PE, and COPD, who presented to [facility] on 10/11/2023 for inability to walk. He was found to be in alcohol withdrawal and developed AHRF and septic shock secondary to stenotrophomonas PNA complicated by KRYSTEN and uremic encephalopathy now on CVVH. TTE done showeing sever TR with TV vegetation.    Dr. Beckwith aware of patient, currently reviewing case and available imaging.  Patient appears to be high risk for cardiac surgery intervention. Final determination to follow.    Patient and sister who was at bedside educated and all questions answered. Also discussed with RUSS Dugan at bedside.    Thank you for the consultation.   Please page the cardiac surgery team pager [phone] with any questions or changes in patient condition.      WOO Gonzales-CNP  Cardiac Surgery [facility]  [facility]  Cardiac Surgery Consult Pager [phone]     11/1/2023  5:58 PM

## 2023-11-01 NOTE — PROGRESS NOTES
"Vancomycin Dosing by Pharmacy- FOLLOW UP    Molina Godinez is a 68 y.o. year old male who Pharmacy has been consulted for vancomycin dosing for endocarditis/endovascular infection. Based on the patient's indication and renal status this patient is being dosed based on a goal trough/random level of 15-20.     Renal function is currently stable on CVVH.    Current vancomycin dose: Received 1g LD on 10/31 PM    Visit Vitals  BP 95/66   Pulse (!) 129   Temp 36.3 °C (97.3 °F) (Temporal)   Resp 25        Lab Results   Component Value Date    CREATININE 0.61 11/01/2023    CREATININE 0.65 11/01/2023    CREATININE 0.78 10/31/2023    CREATININE 1.03 10/31/2023        Patient weight is No results found for: \"PTWEIGHT\"    No results found for: \"CULTURE\"     I/O last 3 completed shifts:  In: 2137.1 (43 mL/kg) [I.V.:682.1 (13.7 mL/kg); Blood:250; NG/GT:655; IV Piggyback:550]  Out: 1789 (36 mL/kg) [Urine:1125 (0.6 mL/kg/hr); Other:664]  Weight: 49.7 kg   [unfilled]    Lab Results   Component Value Date    PATIENTTEMP 37.0 10/29/2023    PATIENTTEMP 37.0 10/20/2023    PATIENTTEMP 37.0 10/20/2023        Assessment/Plan    Will start maintenance dose of 500mg q12h    The next level will be obtained on 11/2 at 1600. May be obtained sooner if clinically indicated.   Will continue to monitor renal function daily while on vancomycin and order serum creatinine at least every 48 hours if not already ordered.  Follow for continued vancomycin needs, clinical response, and signs/symptoms of toxicity.       Ming Babcock, PharmD           "

## 2023-11-01 NOTE — CONSULTS
Inpatient consult to Infectious Diseases  Consult performed by: Gi Banks DO  Consult ordered by: Will Rojo MD          Primary MD: No primary care provider on file.    Reason For Consult: TV endocarditis     History Of Present Illness  Molina Godinez is a 68 y.o. man hx COPD, AUD and opioid use disorder, HCV, PE on apixaban initially admitted 10/11 with difficulty ambulating, attributed to ETOH withdrawal and malnutrition, admitted to ICU for withdrawal symptoms and PNA. Transferred to [facility] ?10/16 and then transferred back to ICU 10/17 for lethargy. Initially on vanc and pip-tazo on admission, then transitioned to CTX for PNA completed 10/15. Re-started on pip-tazo 10/17 and intubated 10/20, BAL + Stenotrophomonas. Treated w. TMP-SMX 10/22-10/28. WBC has remained high ~30k. During this time has also had worsening renal function, starting CRRT 10/31 with line placed. Also w/ worsening L lung opacification and hypotension on norepi. Blood cx collected, sputum cx with salivary contamination. Started on vanc and ari by primary team. ID consulted as new TTE w/ mobile echodensity on post TV leaflet 1.7 x 0.8cm consistent w/ vegetation. Today pt is on HFNC 40% FiO2, getting CRRT on exam. Interacts somewhat, has very faint voice. Denies pain today, is agreeable to abx to cover his vegetation which we explained we do not currently have an organism for. Last had TTE 10/16 w/ poor visualization of structures but TV appeared normal.         Past Medical History  He has no past medical history on file.    Surgical History  He has no past surgical history on file.     Social History     Occupational History    Not on file   Tobacco Use    Smoking status: Every Day     Packs/day: .5     Types: Cigarettes    Smokeless tobacco: Not on file   Substance and Sexual Activity    Alcohol use: Yes     Comment: 1.5 pints Ju daily    Drug use: Not on file    Sexual activity: Not on file     Travel History   Travel since  10/01/23    No documented travel since 10/01/23            Pets: unknown  Hobbies: unknown    Family History  No family history on file.  Allergies  Aspirin and Nsaids (non-steroidal anti-inflammatory drug)     Immunization History   Administered Date(s) Administered    Flu vaccine (IIV4), preservative free *Check age/dose* 10/28/2015, 10/14/2016, 10/12/2017, 10/09/2019, 10/19/2020, 12/14/2022    Flu vaccine, quadrivalent, high-dose, preservative free, age 65y+ (FLUZONE) 10/05/2021    Hepatitis A vaccine, age 19 years and greater (HAVRIX) 06/15/2020    Hepatitis A vaccine, pediatric/adolescent (HAVRIX, VAQTA) 05/01/2017    Influenza, Unspecified 10/17/2012, 10/24/2013    Influenza, seasonal, injectable 10/23/2014, 10/03/2018    Pfizer Purple Cap SARS-CoV-2 03/26/2021, 04/16/2021, 12/01/2021    Pneumococcal conjugate vaccine, 13-valent (PREVNAR 13) 07/08/2021    Pneumococcal polysaccharide vaccine, 23-valent, age 2 years and older (PNEUMOVAX 23) 03/06/2017    Tdap vaccine, age 7 year and older (BOOSTRIX) 08/27/2012, 12/14/2022    Zoster, live 04/11/2017     Medications  Home medications:  Medications Prior to Admission   Medication Sig Dispense Refill Last Dose    acetaminophen (Tylenol) 325 mg tablet TAKE 2 TABLETS BY MOUTH EVERY 6 HOURS AS NEEDED FOR PAIN. (Patient not taking: Reported on 10/12/2023) 120 tablet 0 Unknown    amLODIPine (Norvasc) 5 mg tablet TAKE 1 TABLET BY MOUTH ONCE DAILY 90 tablet 0 10/11/2023    atenolol (Tenormin) 50 mg tablet TAKE 1 TABLET BY MOUTH ONCE DAILY 90 tablet 1 10/11/2023    atorvastatin (Lipitor) 40 mg tablet Take 1 tablet (40 mg) by mouth once daily.   Past Week    folic acid (Folvite) 1 mg tablet TAKE 1 TABLET BY MOUTH ONCE DAILY (Patient not taking: Reported on 10/2/2023) 100 tablet 0 Past Week    ipratropium-albuteroL (Combivent Respimat)  mcg/actuation inhaler INHALE 1 PUFF EVERY 6 HOURS AS NEEDED FOR SHORTNESS OF BREATH 4 g 2     naloxone (Narcan) 4 mg/0.1 mL nasal spray  Use 1 Spray in one nostril (alternate sides) as needed for Drug Overdose (and call 911). every 2-3 min, until assistance arrives.       nicotine (Nicoderm CQ) 21 mg/24 hr patch APPLY 1 PATCH TRANSDERMALLY EVERY 24 HOURS. (Patient not taking: Reported on 10/2/2023) 28 patch 0     polyethylene glycol (Glycolax, Miralax) 17 gram/dose powder    Unknown    sennosides (Senokot) 8.6 mg tablet TAKE 1 TABLET BY MOUTH TWO TIMES A DAY AS NEEDED FOR CONSTIPATION (Patient not taking: Reported on 10/2/2023) 100 tablet 0 Unknown    thiamine (Vitamin B-1) 100 mg tablet TAKE 1 TABLET BY MOUTH ONCE DAILY (Patient not taking: Reported on 10/2/2023) 100 tablet 2 Past Week     Current medications:  Scheduled medications  albuterol, 2.5 mg, nebulization, 4x daily  calcium gluconate, 2 g, intravenous, Once  ergocalciferol, 8,000 Units, oral, Daily  esomeprazole, 40 mg, nasoduodenal tube, Daily before breakfast  folic acid, 1 mg, oral, Daily  lidocaine, 1 patch, transdermal, Daily  melatonin, 10 mg, oral, Nightly  meropenem, 2 g, intravenous, q12h  multivitamin with minerals, 1 tablet, oral, Daily  nicotine, 1 patch, transdermal, Daily  perflutren lipid microspheres, 0.5-10 mL of dilution, intravenous, Once in imaging  perflutren protein A microsphere, 0.5 mL, intravenous, Once in imaging  polyethylene glycol, 17 g, oral, Daily  sodium chloride, 3 mL, nebulization, q12h  sulfur hexafluoride microsphr, 2 mL, intravenous, Once in imaging  thiamine, 100 mg, oral, Daily  vancomycin, 500 mg, intravenous, q12h      Continuous medications  heparin, 0-4,500 Units/hr, Last Rate: 1,200 Units/hr (11/01/23 1638)  norepinephrine, 0-3.3 mcg/kg/min, Last Rate: 0.06 mcg/kg/min (11/01/23 1425)  PrismaSol BGK 2/3.5, 25 mL/kg/hr, Last Rate: 25 mL/kg/hr (11/01/23 1339)      PRN medications  PRN medications: acetaminophen, dextrose 10 % in water (D10W), dextrose, glucagon, heparin, oxyCODONE, oxygen, sennosides, white petrolatum    Review of Systems - per  HPI     Objective  Range of Vitals (last 24 hours)  Heart Rate:  []   Temp:  [35.5 °C (95.9 °F)-37.2 °C (99 °F)]   Resp:  [16-48]   BP: ()/(31-93)   SpO2:  [90 %-100 %]   Daily Weight  10/02/23 : 57.6 kg (127 lb)    Body mass index is 17.16 kg/m².     Physical Exam  GEN: thin, malnourished, frail appearing man, laying in bed, NAD  HEENT: HFNC  NECK: R HD catheter undergoing CRRT  RESP: CTA anteriorly, no wheezes or rhonchi, no tachypnea or cyanosis  CV: RRR, S1/S2, no appreciable murmurs  ABDOMEN: soft, nontender  MSK: no bony deformities, no joint warmth or swelling  EXTREMITIES: thin extremities, muscle wasting   SKIN: no gross skin lesions  NEURO: alert but fatigued appearing, seems to answer questions appropriately, moving b/l UE and LE to command, no gross focal deficits  PSYCH: calm and cooperative       Relevant Results  Outside Hospital Results  No  Labs  Results from last 72 hours   Lab Units 11/01/23  0332 10/31/23  0500 10/30/23  0340   WBC AUTO x10*3/uL 27.0* 26.6* 29.3*   HEMOGLOBIN g/dL 12.9* 12.8* 14.1   HEMATOCRIT % 41.3 38.2* 41.2   PLATELETS AUTO x10*3/uL 301 285 326   NEUTROS PCT AUTO % 88.9 88.6 88.1   LYMPHS PCT AUTO % 4.6 3.9 3.3   MONOS PCT AUTO % 4.7 6.0 6.7   EOS PCT AUTO % 0.4 0.1 0.1     Results from last 72 hours   Lab Units 11/01/23  0332 11/01/23  0040 10/31/23  2021   SODIUM mmol/L 139 139 141   POTASSIUM mmol/L 4.0 4.0 3.9   CHLORIDE mmol/L 101 100 101   CO2 mmol/L 26 26 27   BUN mg/dL 91* 96* 107*   CREATININE mg/dL 0.61 0.65 0.78   GLUCOSE mg/dL 103* 112* 120*   CALCIUM mg/dL 9.5 9.4 9.4   ANION GAP mmol/L 16 17 17   EGFR mL/min/1.73m*2 >90 >90 >90   PHOSPHORUS mg/dL 4.8 4.7 4.4     Results from last 72 hours   Lab Units 11/01/23  0332 11/01/23  0040 10/31/23  2021 10/31/23  0830 10/31/23  0500   ALK PHOS U/L  --   --   --   --  259*   BILIRUBIN TOTAL mg/dL  --   --   --   --  0.6   BILIRUBIN DIRECT mg/dL  --   --   --   --  0.2   PROTEIN TOTAL g/dL  --   --   --   --   7.4   ALT U/L  --   --   --   --  256*   AST U/L  --   --   --   --  413*   ALBUMIN g/dL 3.3* 3.2* 3.3*   < > 3.3*    < > = values in this interval not displayed.     Estimated Creatinine Clearance: 82.6 mL/min (by C-G formula based on SCr of 0.61 mg/dL).  C-Reactive Protein   Date Value Ref Range Status   10/31/2023 23.60 (H) <1.00 mg/dL Final     HIV 1 and 2 Screen   Date Value Ref Range Status   08/31/2023 NONREACTIVE NONREACTIVE Final     Comment:      HIV Ag/Ab screen is performed using the Siemens AgentPiggy   HIV Ag/Ab Combo assay which detects the presence of HIV    p24 antigen as well as antibodies to HIV-1   (Group M and O) and HIV-2.  .  No laboratory evidence of HIV infection. If acute HIV infection is   suspected, consider testing for HIV RNA by PCR (viral load).       Hepatitis C Ab   Date Value Ref Range Status   08/28/2023 REACTIVE (A) NONREACTIVE Final     Comment:      Results from patients taking biotin supplements or receiving   high-dose biotin therapy should be interpreted with caution   due to possible interference with this test. Providers may    contact their local laboratory for further information.   HCV antibody detected. HCV RNA PCR has been ordered   to evaluate the possibility of a current infection.       HCV PCR Quant   Date Value Ref Range Status   08/28/2023 NOT DETECTED IU/mL Final     Comment:     REF VALUE  NOT DETECTED       Microbiology  Susceptibility data from last 90 days.  Collected Specimen Info Organism Levofloxacin Minocycline Trimethoprim/Sulfamethoxazole   10/20/23 Fluid from Bronchial Washings  Stenotrophomonas maltophilia group S S S     Normal throat inocente            Imaging    TTE 10/31       CONCLUSIONS:   1. Left ventricular systolic function is hyperdynamic with a 75-80% estimated ejection fraction.   2. Right ventricular volume overload.   3. Moderate to severe tricuspid regurgitation visualized.   4. Moderate to severely elevated right ventricular systolic  pressure.   5. There is a mobile echodensity attached to the posterior tricuspid leaflet with independent motion consistent with a vegetation measuring 1.7 x 0.8 cm.   6. Atrial septal aneurysm present.        Assessment/Plan   Molina Godinez is a 67 y/o man pmhx sig for AUD, opioid use disorder (not clear if hx of injection use), HCV, PE on AC admitted 10/11 with difficulty ambulating and treated for ETOH withdrawal and PNA, transferred back to ICU with resp distress and resp cx w/ Stenotrophomonas, treated w/ TMP-SMX x 7 days. Now w/ persistent leukocytosis, new pressor requirement and worsening O2 started on vanc and ari with TTE showing new vegetation on posterior TV leaflet not seen on prior TTE 10/16. Blood cx have remained neg, although he has been on and off abx since admission. At this time it would be difficult to capture any bacteremia as a cause of his vegetation, and may end up treating for culture negative endocarditis with an extended course.     #TV vegetation  #Sepsis due to PNA      Recommendations;  -Cont IV vanc, dosed with pharmacy  -Cont IV meropenem 2gm q12H for now, may end up deescalating to general culture neg endocarditis coverage w/ either amp-sulbactam vs CTX  -Will follow up blood cx  -Currently having formed stool, if new diarrhea please send C diff screen     Discussed w/ Dr. Lynn, will follow       Gi Banks DO  Infectious Disease Fellow, PGY5  Epic Chat until 5pm/Team A pager [phone]

## 2023-11-01 NOTE — PROGRESS NOTES
Occupational Therapy    Occupational Therapy Treatment    Name: Molina Godinez  MRN: [id number]  : 1955  Date: 23  Time Calculation  Start Time: 1229  Stop Time: 1259  Time Calculation (min): 30 min    Assessment:  End of Session Communication: Bedside nurse  End of Session Patient Position: Bed, 3 rail up, Alarm off, not on at start of session  Plan:       Subjective   General:  OT Last Visit  OT Received On: 23  General  Reason for Referral: patient now extubated, on AirVo  Past Medical History Relevant to Rehab: COPD, HTN, AUD and opioid use disorder, HCV, chronic PE; recent falls?  Family/Caregiver Present: No  Caregiver Feedback: sister initially in the room, left during session  Co-Treatment: PT  Co-Treatment Reason: patient with reduced arousal; unsuccessful mobilization by RN staff; per RN report, patient globally very deconditioned. AMPAC <10  Prior to Session Communication: Bedside nurse  Patient Position Received: Bed, 3 rail up, Alarm off, not on at start of session  Preferred Learning Style: verbal, visual  General Comment: soft voice, limited verbal response; .04 levo, heparin, 30L/40% AirVo, CVVH  Vitals:  Vital Signs  Heart Rate:  (pre 123, post 124)  Resp:  (pre 28, post 34)  SpO2:  (pre 91%, post 91%)  BP:  (pre: 90/58 (68) sitting EOB: 56/31 (40) patient had BM, patient abruptly assisted back to supine, bed placed in trendelenberg position, SCDs ON, passive ankle pumps by therapist, BP re-checked: 93/61 (72), 90/55 (59), 101/66 (78))  Pain Assessment:  Pain Assessment  Pain Assessment: 0-10  Pain Score:  (patient indicated having a headache, did not rate)     Objective   Activities of Daily Living:      Grooming  Grooming Comments: dependent A to use yankauer for managing oral secretions       Toileting  Toileting Comments: dependent for bowel hygiene in supine    Functional Standing Tolerance:     Bed Mobility/Transfers: Bed Mobility  Bed Mobility: Yes  Bed Mobility 1  Bed  Mobility 1: Rolling left, Rolling right  Level of Assistance 1: Dependent, Moderate verbal cues  Bed Mobility 2  Bed Mobility  2: Supine to sitting, Sitting to supine  Level of Assistance 2: Dependent  Bed Mobility Comments 2: x2  assist, draw sheet, HOB elevated    Therapy/Activity: Therapeutic Exercise  Therapeutic Exercise Performed: Yes  Therapeutic Exercise Activity 1: Completed x10 reps of (B) digit/wrist/elbow/shoulder flexion/extension AAROM/PROM    Therapeutic Activity  Therapeutic Activity Performed: Yes  Therapeutic Activity 1: Patient sat EOB x8 minutes with dependent A for balance 2/2 posterior lean; assisted patient with placing UEs on bed and bedrail to attempt to assist with balance however patient continued to require dependent A. Dependent A to extend head/neck to neutral as patient with forward flexed posture.     Cognitive Skill Development:  Cognitive Skill Development  Cognitive Skill Development Activity 1: Forward reaching x 5 reps for each arm with guiding assist to complete. Sensory stimulation with face washing for increasing alertness at start of session. Simple cues for increased engagement.  Vision:     Strength:  Strength  Strength Comments: global BUE and BLE weakness; B shoulder flexion <2+/5, elbow flexion/ext <3-/5,  3/5 (PF/DF <3-/5, knee ext </=3-/5, hip flexion </=2+/5; LLE appears weaker than L)  Other Activity:    Outcome Measures:  [facility] Daily Activity  Putting on and taking off regular lower body clothing: Total  Bathing (including washing, rinsing, drying): Total  Putting on and taking off regular upper body clothing: Total  Toileting, which includes using toilet, bedpan or urinal: Total  Taking care of personal grooming such as brushing teeth: Total  Eating Meals: Total  Daily Activity - Total Score: 6    ,  , and E = Exercise and Early Mobility  Current Activity: Sitting at edge of bed    Education Documentation  ADL Training, taught by Stephanie Salmon OT at 11/1/2023   3:54 PM.  Learner: Patient  Readiness: Acceptance  Method: Explanation  Response: Needs Reinforcement    Education Comments  No comments found.      Goals:  Encounter Problems       Encounter Problems (Active)       ADLs       Patient will complete grooming tasks with MOD A with min v/c.  (Progressing)       Start:  10/19/23    Expected End:  11/02/23               BALANCE       Pt. will sit EOB for at least 8 min with MOD A in prep for EOB ADLs.  (Progressing)       Start:  10/19/23    Expected End:  11/02/23               COGNITION/SAFETY       Patient will follow 1 step commands with 75% accuracy and min v/c.  (Progressing)       Start:  10/19/23    Expected End:  11/02/23               EXERCISE/STRENGTHENING       Patient will demo BUE strength of 3/5 for B elbow, wrist, hand.  (Progressing)       Start:  10/19/23    Expected End:  11/02/23

## 2023-11-01 NOTE — CONSULTS
Inpatient consult to Cardiology  Consult performed by: Marcos Gómez MD  Consult ordered by: Will Rojo MD        Inpatient Cardiology Consult Note    Reason for consult: Tricuspid valve vegetation    HPI:   Molina Godinez is a 68M PMHx HTN, AUD, OUD/IVDU, HCV s/p rx, chronic PE, COPD, p/w weakness found to be in EtOH withdrawal developed AHRF and septic shock 2/2 stenotrophomonas PNA, course c/b KRYSTEN and uremic encephalopathy now on HD. TTE done showing severe TR with TV vegetation for which Cardiology is consulted.     On exam patient is lethargic, but able to follow commands. On HFNC.     All system reviewed and negative unless mentioned above.     Cardiac studies:   EKG - Sinus tach    Echo 10/30/2023:   1. Left ventricular systolic function is hyperdynamic with a 75-80% estimated ejection fraction.   2. Right ventricular volume overload.   3. Moderate to severe tricuspid regurgitation visualized.   4. Moderate to severely elevated right ventricular systolic pressure.   5. There is a mobile echodensity attached to the posterior tricuspid leaflet with independent motion consistent with a vegetation measuring 1.7 x 0.8 cm.   6. Atrial septal aneurysm present.    Echo 10/17/2023:   1. Left ventricular systolic function is hyperdynamic with a 75% estimated ejection fraction.   2. The left ventricular septal wall thickness is mildly increased.   3. There is left ventricular concentric remodeling.   4. Atrial septal aneurysm present.   5. A bubble study using agitated saline was performed. Bubble study is positive.   6. Mildly elevated RVSP.   7. Poorly visualized anatomical structures due to suboptimal image quality.   8. Compared with study from 8/29/2023, the RVSP today is mildly elevated, compared to moderately elevated in the prior echocardiogram (60 mmHg).      Current Facility-Administered Medications:     acetaminophen (Tylenol) tablet 650 mg, 650 mg, oral, q8h PRN, Ever Paez MD, 650 mg at  10/29/23 2034    albuterol 2.5 mg /3 mL (0.083 %) nebulizer solution 2.5 mg, 2.5 mg, nebulization, 4x daily, Ever Paez MD, 2.5 mg at 11/01/23 0752    calcium gluconate in NS IV 2 g, 2 g, intravenous, Once, Ever Paez MD    dextrose 10 % in water (D10W) infusion, 0.3 g/kg/hr, intravenous, Once PRN, Virgil Olivo MD, Stopped at 10/18/23 0800    dextrose 50 % injection 25 g, 25 g, intravenous, q15 min PRN, Virgil Olivo MD    ergocalciferol (Vitamin D-2) drops 8,000 Units, 8,000 Units, oral, Daily, Ever Paez MD, 8,000 Units at 10/31/23 0843    esomeprazole (NexIUM) suspension 40 mg, 40 mg, nasoduodenal tube, Daily before breakfast, Tia Manuel MD, 40 mg at 10/31/23 0843    folic acid (Folvite) tablet 1 mg, 1 mg, oral, Daily, Ever Paez MD, 1 mg at 10/31/23 0843    glucagon (Glucagen) injection 1 mg, 1 mg, intramuscular, q15 min PRN, Virgil Olivo MD    heparin (porcine) injection 2,000-4,000 Units, 2,000-4,000 Units, intravenous, q4h PRN, Virgil Olivo MD, 2,000 Units at 10/25/23 0857    heparin 25,000 Units in dextrose 5% 250 mL (100 Units/mL) infusion (premix), 0-4,500 Units/hr, intravenous, Continuous, Rosa Gary MD, Last Rate: 12 mL/hr at 11/01/23 0700, 1,200 Units/hr at 11/01/23 0700    lidocaine 4 % patch 1 patch, 1 patch, transdermal, Daily, Ever Paez MD, 1 patch at 10/31/23 0843    melatonin tablet 10 mg, 10 mg, oral, Nightly, Tia Manuel MD, 10 mg at 10/31/23 2220    meropenem (Merrem) 2 g in sodium chloride 0.9 % 100 mL IV, 2 g, intravenous, q12h, Porfirio Riojas MD, Stopped at 11/01/23 0532    multivitamin with minerals 1 tablet, 1 tablet, oral, Daily, Virgil Olivo MD, 1 tablet at 10/31/23 0843    nicotine (Nicoderm CQ) 21 mg/24 hr patch 1 patch, 1 patch, transdermal, Daily, Virgil Olivo MD, 1 patch at 10/31/23 0844    norepinephrine (Levophed) 8 mg in dextrose 5% 250 mL (0.032 mg/mL) infusion (premix), 0-3.3 mcg/kg/min, intravenous,  Continuous, Porfirio Riojas MD, Last Rate: 3.73 mL/hr at 11/01/23 0700, 0.04 mcg/kg/min at 11/01/23 0700    oxyCODONE (Roxicodone) immediate release tablet 5 mg, 5 mg, oral, q8h PRN, Evre Paez MD, 5 mg at 10/28/23 0232    oxygen (O2) therapy, , inhalation, Continuous PRN - O2/gases, Will Rojo MD, 40 L/min at 11/01/23 0754    perflutren lipid microspheres (Definity) injection 0.5-10 mL of dilution, 0.5-10 mL of dilution, intravenous, Once in imaging, Yancy Freeman MD    perflutren protein A microsphere (Optison) injection 0.5 mL, 0.5 mL, intravenous, Once in imaging, Yancy Freeman MD    polyethylene glycol (Glycolax, Miralax) packet 17 g, 17 g, oral, Daily, Tia Manuel MD, 17 g at 10/31/23 0843    PrismaSol BGK 2/3.5 CRRT solution, 25 mL/kg/hr, CRRT, Continuous, Sofia Paz MD, Last Rate: 1,242.5 mL/hr at 11/01/23 0038, 25 mL/kg/hr at 11/01/23 0038    sennosides (Senokot) tablet 8.6 mg, 1 tablet, oral, Nightly PRN, Virgil Olivo MD    sodium chloride 7 % nebulizer solution 3 mL, 3 mL, nebulization, q12h, Yancy Freeman MD, 3 mL at 11/01/23 0752    sulfur hexafluoride microsphr (Lumason) injection 24.28 mg, 2 mL, intravenous, Once in imaging, Yancy Freeman MD    thiamine (Vitamin B-1) tablet 100 mg, 100 mg, oral, Daily, Ever Paez MD, 100 mg at 10/31/23 0843    white petrolatum (Aquaphor) 41 % ointment 1 Application, 1 Application, Topical, q1h PRN, Ever Paez MD    Objective:    Vitals:    11/01/23 0800   BP: 90/55   Pulse: (!) 114   Resp: (!) 35   Temp:    SpO2:      Exam:  General - ill appearing, breathing comfortable on HFNC  Neck - No JVD   Chest - CTAB   Cardiac - S1, S2, no murmur heard  Lower ext - No LE edema     Labs/Imaging:     Results from last 72 hours   Lab Units 11/01/23  0332 10/29/23  1808 10/29/23  1400   TROPHS ng/L  --   --  15   SODIUM mmol/L 139   < >  --    POTASSIUM mmol/L 4.0   < >  --    CO2 mmol/L 26   < >  --    BUN mg/dL 91*   <  >  --    CREATININE mg/dL 0.61   < >  --    MAGNESIUM mg/dL 2.58*   < >  --    PHOSPHORUS mg/dL 4.8   < >  --     < > = values in this interval not displayed.      Results from last 72 hours   Lab Units 11/01/23  0332   WBC AUTO x10*3/uL 27.0*   HEMOGLOBIN g/dL 12.9*   PLATELETS AUTO x10*3/uL 301      Assessment and Plan:   68M PMHx HTN, AUD, OUD, HCV s/p rx, chronic PE, COPD, p/w EtOH withdrawal developed AHRF and septic shock 2/2 stenotrophomonas PNA, course c/b KRYSTEN and uremic encephalopathy now on HD. TTE done showing severe TR with TV vegetation for which Cardiology is consulted.     #Native Valve Endocarditis of TV  Pt with mobile echodensity measuring 1.7x0.8mm on post leaflet of TV. Indications for surgery would be large veg (>2cm), recurrent septic pulmonary PE, highly resistant organisms or persistent bacteremia. He has RV volume overload but preserved RV function on echo.   - Recommend CT surgery eval for candidacy for surgical repair  - If there would be possibility of surgery, would recommend INDIGO to evaluate the severity of TR and assess other valves. Would likely need to be intubated for a INDIGO given tenuous respiratory status  - Agree with ID recommendations for repeat blood clx and IV abx     Recommendations are preliminary until note is co-signed by an attending.     Cardiology will follow.     Krista Gómez  Cardiology Fellow   PGY4

## 2023-11-01 NOTE — NURSING NOTE
Jerrell Ring MD notified of multiple periods of SVT overnight. Pt converted to ST without intervention.

## 2023-11-01 NOTE — PROGRESS NOTES
Nutrition Assessment    Nutrition Follow Up Assessment:        Patient is a 68 y.o. male who remains in MICU for care.  He is on love for pressure support as well as CVVH for non-oliguric KRYSTEN.  He is on Airvo, 40L/50%, for respiratory support.  Prolonged hospital course thus far including initial admit for inability to walk and EtOH withdrawal.  Treated for hospital-acquired PNA and SIADH.  Required intubation from 10/20-10/27.  He is having increased oxygen requirement and complete opacification of L lung-- mucus plugging vs pulm edema vs infection.     Pt with dobhoff in place for enteral access.  No tube feed hanging or running at visit.  RN with patient this AM was uncertain why he was not getting fed-- says that he was on his feeds all day yesterday either.  Appears that he was last on tube feed on 10/30-- Two Reynaldo HN at 35mls/hr.  Pt is difficult to understand at visit-- does not speak and hand motions are unclear.       Anthropometrics:     Weight:  (scale broken)     IBW/kg (Dietitian Calculated): 67.3 kg  Percent of IBW: 74 %       Objective/Subjective Weight History:     Weight Change %:      Weight history:   10/25/23: 49.7kg  10/19/23: 49.7kg  10/18/23: 49.7kg  10/2/23: 57.6kg        Nutrition Focused Physical Exam Findings:    Subcutaneous Fat Loss:   Orbital Fat Pads: Severe (dark circles, hollowing and loose skin)   Buccal Fat Pads: Severe (hollow, sunken and narrow face)   Triceps: Severe (negligible fat tissue)       Muscle Wasting:  Temporalis: Severe (hollowed scooping depression)  Pectoralis (Clavicular Region): Severe (protruding prominent clavicle)  Deltoid/Trapezius: Severe (squared shoulders, acromion process prominent)        Quadriceps: Severe (depressions on inner and outer thigh)  Gastrocnemius: Severe (minimal muscle definition)  Edema:  Edema: none       Physical Findings:           Objective Data:    Last BM Date: 11/01/23    Nutrition Significant Labs:  Na+ 139  K+ 4.0  Chloride  101  Bicarb 26  BUN 91  Creatinine 0.61  Calcium 9.5  Phos 4.8  Mag 2.58  Sugars 103, 112, 120  Vitamin D= 8    Nutrition Specific Mediations:    Current Facility-Administered Medications:     acetaminophen (Tylenol) tablet 650 mg, 650 mg, oral, q8h PRN, Ever Paez MD, 650 mg at 10/29/23 2034    albuterol 2.5 mg /3 mL (0.083 %) nebulizer solution 2.5 mg, 2.5 mg, nebulization, 4x daily, Ever Paez MD, 2.5 mg at 11/01/23 0752    calcium gluconate in NS IV 2 g, 2 g, intravenous, Once, Ever Paez MD    dextrose 10 % in water (D10W) infusion, 0.3 g/kg/hr, intravenous, Once PRN, Virgil Olivo MD, Stopped at 10/18/23 0800    dextrose 50 % injection 25 g, 25 g, intravenous, q15 min PRN, Virgil Olivo MD    ergocalciferol (Vitamin D-2) drops 8,000 Units, 8,000 Units, oral, Daily, Ever Paez MD, 8,000 Units at 11/01/23 0845    esomeprazole (NexIUM) suspension 40 mg, 40 mg, nasoduodenal tube, Daily before breakfast, Tia Manuel MD, 40 mg at 11/01/23 0842    folic acid (Folvite) tablet 1 mg, 1 mg, oral, Daily, Ever Paez MD, 1 mg at 11/01/23 0842    glucagon (Glucagen) injection 1 mg, 1 mg, intramuscular, q15 min PRN, Virgil Olivo MD    heparin (porcine) injection 2,000-4,000 Units, 2,000-4,000 Units, intravenous, q4h PRN, Virgil Olivo MD, 2,000 Units at 10/25/23 0857    heparin 25,000 Units in dextrose 5% 250 mL (100 Units/mL) infusion (premix), 0-4,500 Units/hr, intravenous, Continuous, Rosa Gary MD, Last Rate: 12 mL/hr at 11/01/23 0700, 1,200 Units/hr at 11/01/23 0700    lidocaine 4 % patch 1 patch, 1 patch, transdermal, Daily, Ever Paez MD, 1 patch at 11/01/23 0842    melatonin tablet 10 mg, 10 mg, oral, Nightly, Tia Manuel MD, 10 mg at 10/31/23 2220    meropenem (Merrem) 2 g in sodium chloride 0.9 % 100 mL IV, 2 g, intravenous, q12h, Porfirio Riojsa MD, Stopped at 11/01/23 0532    multivitamin with minerals 1 tablet, 1 tablet, oral, Daily, Virgil Olivo,  MD, 1 tablet at 11/01/23 0842    nicotine (Nicoderm CQ) 21 mg/24 hr patch 1 patch, 1 patch, transdermal, Daily, Virgil Olivo MD, 1 patch at 11/01/23 0915    norepinephrine (Levophed) 8 mg in dextrose 5% 250 mL (0.032 mg/mL) infusion (premix), 0-3.3 mcg/kg/min, intravenous, Continuous, Porfirio Riojas MD, Last Rate: 3.73 mL/hr at 11/01/23 0700, 0.04 mcg/kg/min at 11/01/23 0700    oxyCODONE (Roxicodone) immediate release tablet 5 mg, 5 mg, oral, q8h PRN, Ever Paez MD, 5 mg at 10/28/23 0232    oxygen (O2) therapy, , inhalation, Continuous PRN - O2/gases, Will Rojo MD, 30 L/min at 11/01/23 0950    perflutren lipid microspheres (Definity) injection 0.5-10 mL of dilution, 0.5-10 mL of dilution, intravenous, Once in imaging, Yancy Freeman MD    perflutren protein A microsphere (Optison) injection 0.5 mL, 0.5 mL, intravenous, Once in imaging, Yancy Freeman MD    polyethylene glycol (Glycolax, Miralax) packet 17 g, 17 g, oral, Daily, Tia Manuel MD, 17 g at 11/01/23 0842    PrismaSol BGK 2/3.5 CRRT solution, 25 mL/kg/hr, CRRT, Continuous, Herman Oliveros DO, Last Rate: 1,242.5 mL/hr at 11/01/23 0038, 25 mL/kg/hr at 11/01/23 0038    sennosides (Senokot) tablet 8.6 mg, 1 tablet, oral, Nightly PRN, Virgil Olivo MD    sodium chloride 7 % nebulizer solution 3 mL, 3 mL, nebulization, q12h, Yancy Freeman MD, 3 mL at 11/01/23 0752    sulfur hexafluoride microsphr (Lumason) injection 24.28 mg, 2 mL, intravenous, Once in imaging, Yancy Freeman MD    thiamine (Vitamin B-1) tablet 100 mg, 100 mg, oral, Daily, Ever Paez MD, 100 mg at 11/01/23 0842    white petrolatum (Aquaphor) 41 % ointment 1 Application, 1 Application, Topical, q1h PRN, Ever Paez MD        Dietary Orders (From admission, onward)       Start     Ordered    10/29/23 1329  Enteral feeding with NPO TwoCal HN; 35; 100; Water; Tap water; Other (specify); BID  Diet effective now        Comments: Continue at  currently rate, increase by 10mL q8hr until goal rate of 35mL/hr reached    Can have sponges to wet mouth   Question Answer Comment   Tube feeding formula: TwoCal HN    Tube feeding continuous rate (mL/hr): 35    Tube feeding flush (mL): 100    Flush type: Water    Water type: Tap water    Flush frequency: Other (specify)    Additional Details BID        10/29/23 1329    10/18/23 1541  Oral nutritional supplements  Until discontinued        Question Answer Comment   Deliver with  Once daily   Select supplement: Pro-Stat AW        10/18/23 1540                     Estimated Needs:   Total Energy Estimated Needs (kCal):  (8460-0275)   Method for Estimating Needs: MSJ= 1236    Total Protein Estimated Needs (g):  (70)   Method for Estimating Needs: 49.7kg x 1.4+          Nutrition Diagnosis   Nutrition Diagnosis:  Malnutrition Diagnosis  Patient has Malnutrition Diagnosis: Yes  Diagnosis Status: Ongoing  Malnutrition Diagnosis: Severe malnutrition related to chronic disease or condition  As Evidenced by: suspect poor PO intake PTA in light of EtOH abuse as well as noted severe muscle and fat loss on physical exam; he is underweight for height with a BMI of 17.2 and DBW of 74%        TF at goal= 1680kcals, 70grams protein    Nutrition Interventions/Recommendations   Nutrition Interventions and Recommendations:        Nutrition Prescription:  Continue with Two Reynaldo HN@ 35mls/hr. Would encourage avoid holding feeds as much as possible in this patient.            Time Spent/Follow-up Reminder:   Follow Up  Time Spent (min): 60 minutes  Last Date of Nutrition Visit: 11/01/23  Nutrition Follow-Up Needed?: Dietitian to reassess per policy  Follow up Comment: tube feed/dobhoff/Airvo

## 2023-11-01 NOTE — PROGRESS NOTES
Speech Language Pathology        Therapy Communication Note     Patient Name: Molina Godinez  MRN:  [id number]  Today's Date:  11/01/23   Missed Time: 0845  Discipline: Speech Language Pathology     Missed Visit Reason: SLP to continue to hold for swallow eval as pt now extubated but still on HFNC. Plan to re-evaluate when pt O2 requirements/clinical status improve.

## 2023-11-01 NOTE — PROGRESS NOTES
Molina Godinez is a 68 y.o. male on day 20 of admission presenting with Hospital-acquired pneumonia.    Subjective   SUBJECTIVE:  No acute overnight events. Mr. Godinez was seen and examined at bedside this morning, at which time he was resting comfortably in bed on CVVH, norepi drip, and HFNC at 40L/50%. Denies any acute issues, appears more alert than prior days but still extremely weak. Voice is weak and cannot form complete words but able to answer yes/no questions. Denies chest pain, difficulty breathing, abdominal pain.         Objective   OBJECTIVE:  Last Recorded Vitals  BP 95/61   Pulse 108   Temp 37.2 °C (99 °F) (Temporal)   Resp (!) 33   Wt 49.7 kg (109 lb 9.1 oz)   SpO2 93%     I&O 24HR    Intake/Output Summary (Last 24 hours) at 11/1/2023 0709  Last data filed at 11/1/2023 0600  Gross per 24 hour   Intake 1080.46 ml   Output 1399 ml   Net -318.54 ml        Physical Exam  Constitutional:       Appearance: He is cachectic. He is ill-appearing.      Interventions: Nasal cannula in place.   HENT:      Head: Normocephalic and atraumatic.      Right Ear: External ear normal.      Left Ear: External ear normal.      Nose: Nose normal.      Mouth/Throat:      Mouth: Mucous membranes are dry.   Eyes:      General: No scleral icterus.     Extraocular Movements: Extraocular movements intact.      Conjunctiva/sclera: Conjunctivae normal.   Cardiovascular:      Rate and Rhythm: Regular rhythm. Tachycardia present.      Pulses: Normal pulses.      Heart sounds: Normal heart sounds. No murmur heard.     No friction rub. No gallop.   Pulmonary:      Effort: Pulmonary effort is normal. No respiratory distress.      Breath sounds: Normal breath sounds. No wheezing, rhonchi or rales.   Abdominal:      General: Abdomen is flat. Bowel sounds are normal. There is no distension.      Palpations: Abdomen is soft.      Tenderness: There is no abdominal tenderness. There is no guarding or rebound.   Musculoskeletal:          General: No swelling. Normal range of motion.      Right lower leg: No edema.      Left lower leg: No edema.   Skin:     General: Skin is warm and dry.      Coloration: Skin is not jaundiced.      Findings: No lesion or rash.   Neurological:      General: No focal deficit present.      Mental Status: He is alert. Mental status is at baseline.         Relevant Results  Labs:  Results for orders placed or performed during the hospital encounter of 10/11/23 (from the past 24 hour(s))   POCT GLUCOSE   Result Value Ref Range    POCT Glucose 120 (H) 74 - 99 mg/dL   Heparin Assay, UFH   Result Value Ref Range    Heparin Unfractionated 0.4 See Comment Below for Therapeutic Ranges IU/mL   Renal function panel   Result Value Ref Range    Glucose 123 (H) 74 - 99 mg/dL    Sodium 139 136 - 145 mmol/L    Potassium 4.3 3.5 - 5.3 mmol/L    Chloride 98 98 - 107 mmol/L    Bicarbonate 26 21 - 32 mmol/L    Anion Gap 19 10 - 20 mmol/L    Urea Nitrogen 181 (HH) 6 - 23 mg/dL    Creatinine 1.59 (H) 0.50 - 1.30 mg/dL    eGFR 47 (L) >60 mL/min/1.73m*2    Calcium 9.2 8.6 - 10.6 mg/dL    Phosphorus 5.3 (H) 2.5 - 4.9 mg/dL    Albumin 3.2 (L) 3.4 - 5.0 g/dL   Renal function panel   Result Value Ref Range    Glucose 121 (H) 74 - 99 mg/dL    Sodium 138 136 - 145 mmol/L    Potassium 4.9 3.5 - 5.3 mmol/L    Chloride 97 (L) 98 - 107 mmol/L    Bicarbonate 23 21 - 32 mmol/L    Anion Gap 23 (H) 10 - 20 mmol/L    Urea Nitrogen 177 (HH) 6 - 23 mg/dL    Creatinine 1.58 (H) 0.50 - 1.30 mg/dL    eGFR 48 (L) >60 mL/min/1.73m*2    Calcium 9.3 8.6 - 10.6 mg/dL    Phosphorus 5.5 (H) 2.5 - 4.9 mg/dL    Albumin 3.3 (L) 3.4 - 5.0 g/dL   Blood Culture    Specimen: Peripheral Venipuncture; Blood culture   Result Value Ref Range    Blood Culture Loaded on Instrument - Culture in progress    Magnesium   Result Value Ref Range    Magnesium 3.18 (H) 1.60 - 2.40 mg/dL   POCT GLUCOSE   Result Value Ref Range    POCT Glucose 121 (H) 74 - 99 mg/dL   Blood Culture     Specimen: Peripheral Venipuncture; Blood culture   Result Value Ref Range    Blood Culture Loaded on Instrument - Culture in progress    Urinalysis with Reflex Microscopic and Culture   Result Value Ref Range    Color, Urine Yellow Straw, Yellow    Appearance, Urine Hazy (N) Clear    Specific Gravity, Urine 1.018 1.005 - 1.035    pH, Urine 6.0 5.0, 5.5, 6.0, 6.5, 7.0, 7.5, 8.0    Protein, Urine 100 (2+) (N) NEGATIVE mg/dL    Glucose, Urine NEGATIVE NEGATIVE mg/dL    Blood, Urine LARGE (3+) (A) NEGATIVE    Ketones, Urine NEGATIVE NEGATIVE mg/dL    Bilirubin, Urine NEGATIVE NEGATIVE    Urobilinogen, Urine <2.0 <2.0 mg/dL    Nitrite, Urine NEGATIVE NEGATIVE    Leukocyte Esterase, Urine TRACE (A) NEGATIVE   Extra Urine Gray Tube   Result Value Ref Range    Extra Tube Hold for add-ons.    Microscopic Only, Urine   Result Value Ref Range    WBC, Urine 1-5 1-5, NONE /HPF    RBC, Urine >20 (A) NONE, 1-2, 3-5 /HPF    Squamous Epithelial Cells, Urine 1-9 (SPARSE) Reference range not established. /HPF   ECG 12 Lead   Result Value Ref Range    Ventricular Rate 117 BPM    Atrial Rate 117 BPM    MN Interval 156 ms    QRS Duration 86 ms    QT Interval 326 ms    QTC Calculation(Bazett) 454 ms    P Axis 84 degrees    R Axis -27 degrees    T Axis 67 degrees    QRS Count 19 beats    Q Onset 214 ms    P Onset 136 ms    P Offset 184 ms    T Offset 377 ms    QTC Fredericia 407 ms   POCT GLUCOSE   Result Value Ref Range    POCT Glucose 137 (H) 74 - 99 mg/dL   Renal function panel   Result Value Ref Range    Glucose 65 (L) 74 - 99 mg/dL    Sodium 143 136 - 145 mmol/L    Potassium 4.3 3.5 - 5.3 mmol/L    Chloride 97 (L) 98 - 107 mmol/L    Bicarbonate 25 21 - 32 mmol/L    Anion Gap 25 (H) 10 - 20 mmol/L    Urea Nitrogen 129 (HH) 6 - 23 mg/dL    Creatinine 1.03 0.50 - 1.30 mg/dL    eGFR 80 >60 mL/min/1.73m*2    Calcium 9.7 8.6 - 10.6 mg/dL    Phosphorus 5.2 (H) 2.5 - 4.9 mg/dL    Albumin 3.4 3.4 - 5.0 g/dL   POCT GLUCOSE   Result Value Ref  Range    POCT Glucose 120 (H) 74 - 99 mg/dL   Renal function panel   Result Value Ref Range    Glucose 120 (H) 74 - 99 mg/dL    Sodium 141 136 - 145 mmol/L    Potassium 3.9 3.5 - 5.3 mmol/L    Chloride 101 98 - 107 mmol/L    Bicarbonate 27 21 - 32 mmol/L    Anion Gap 17 10 - 20 mmol/L    Urea Nitrogen 107 (HH) 6 - 23 mg/dL    Creatinine 0.78 0.50 - 1.30 mg/dL    eGFR >90 >60 mL/min/1.73m*2    Calcium 9.4 8.6 - 10.6 mg/dL    Phosphorus 4.4 2.5 - 4.9 mg/dL    Albumin 3.3 (L) 3.4 - 5.0 g/dL   Heparin Assay, UFH   Result Value Ref Range    Heparin Unfractionated 0.3 See Comment Below for Therapeutic Ranges IU/mL   Renal function panel   Result Value Ref Range    Glucose 112 (H) 74 - 99 mg/dL    Sodium 139 136 - 145 mmol/L    Potassium 4.0 3.5 - 5.3 mmol/L    Chloride 100 98 - 107 mmol/L    Bicarbonate 26 21 - 32 mmol/L    Anion Gap 17 10 - 20 mmol/L    Urea Nitrogen 96 (HH) 6 - 23 mg/dL    Creatinine 0.65 0.50 - 1.30 mg/dL    eGFR >90 >60 mL/min/1.73m*2    Calcium 9.4 8.6 - 10.6 mg/dL    Phosphorus 4.7 2.5 - 4.9 mg/dL    Albumin 3.2 (L) 3.4 - 5.0 g/dL   Renal function panel   Result Value Ref Range    Glucose 103 (H) 74 - 99 mg/dL    Sodium 139 136 - 145 mmol/L    Potassium 4.0 3.5 - 5.3 mmol/L    Chloride 101 98 - 107 mmol/L    Bicarbonate 26 21 - 32 mmol/L    Anion Gap 16 10 - 20 mmol/L    Urea Nitrogen 91 (HH) 6 - 23 mg/dL    Creatinine 0.61 0.50 - 1.30 mg/dL    eGFR >90 >60 mL/min/1.73m*2    Calcium 9.5 8.6 - 10.6 mg/dL    Phosphorus 4.8 2.5 - 4.9 mg/dL    Albumin 3.3 (L) 3.4 - 5.0 g/dL   Calcium, ionized   Result Value Ref Range    POCT Calcium, Ionized 1.17 1.1 - 1.33 mmol/L   CBC and Auto Differential   Result Value Ref Range    WBC 27.0 (H) 4.4 - 11.3 x10*3/uL    nRBC 0.0 0.0 - 0.0 /100 WBCs    RBC 4.41 (L) 4.50 - 5.90 x10*6/uL    Hemoglobin 12.9 (L) 13.5 - 17.5 g/dL    Hematocrit 41.3 41.0 - 52.0 %    MCV 94 80 - 100 fL    MCH 29.3 26.0 - 34.0 pg    MCHC 31.2 (L) 32.0 - 36.0 g/dL    RDW 13.4 11.5 - 14.5 %     Platelets 301 150 - 450 x10*3/uL    MPV 10.2 7.5 - 11.5 fL    Neutrophils % 88.9 40.0 - 80.0 %    Immature Granulocytes %, Automated 0.9 0.0 - 0.9 %    Lymphocytes % 4.6 13.0 - 44.0 %    Monocytes % 4.7 2.0 - 10.0 %    Eosinophils % 0.4 0.0 - 6.0 %    Basophils % 0.5 0.0 - 2.0 %    Neutrophils Absolute 23.97 (H) 1.20 - 7.70 x10*3/uL    Immature Granulocytes Absolute, Automated 0.24 0.00 - 0.70 x10*3/uL    Lymphocytes Absolute 1.23 1.20 - 4.80 x10*3/uL    Monocytes Absolute 1.28 (H) 0.10 - 1.00 x10*3/uL    Eosinophils Absolute 0.10 0.00 - 0.70 x10*3/uL    Basophils Absolute 0.13 (H) 0.00 - 0.10 x10*3/uL   Magnesium   Result Value Ref Range    Magnesium 2.58 (H) 1.60 - 2.40 mg/dL   Coagulation Screen   Result Value Ref Range    Protime 12.4 9.8 - 12.8 seconds    INR 1.1 0.9 - 1.1    aPTT 66 (H) 27 - 38 seconds   Heparin Assay, UFH   Result Value Ref Range    Heparin Unfractionated 0.5 See Comment Below for Therapeutic Ranges IU/mL       Imaging:  XR chest 1 view    Result Date: 10/31/2023  Interpreted By:  Gilberto Waldrop,  and Viv Tong STUDY: XR CHEST 1 VIEW;  10/31/2023 4:50 pm   INDICATION: Signs/Symptoms:Evaluate interval changes in pulmonary infiltrates.   COMPARISON: Same day chest radiograph time stamped 4:21 a.m.; chest radiographs 10/30/2023 and 10/28/2023   ACCESSION NUMBER(S): [id number]   ORDERING CLINICIAN: KASIA PERRY   FINDINGS: Single AP upright portable radiograph of the chest was provided.   Enteric tube courses over the mid thorax and below the diaphragm terminating beyond the field of view inferiorly. Right internal jugular approach central venous catheter tip terminates at the level of the superior cavoatrial junction.   CARDIOMEDIASTINAL SILHOUETTE: There is near total obscuration of the left cardiac border.   LUNGS: Slightly improved aeration of the left upper lung. Similar total dense opacification of the left lower lung zone with possible overlying effusion. There is  suggestion of abrupt termination of the left mainstem bronchus, slightly more proximally than previously visualized on prior radiographs. No sizable pneumothorax. The right lung is relatively clear without focal consolidation or effusion.   ABDOMEN: No remarkable upper abdominal findings.   BONES: No acute osseous changes.       1. Redemonstration of extensive opacification of the left hemithorax with interval slight improvement in the aeration of left upper and mid lung field. Residual extensive consolidation throughout the left lung more dense in the left lower lung field likely due to combination of effusion, atelectasis/collapse and consolidation. Suggestion of abrupt termination of the left mainstem bronchus, slightly more proximally than was previously visualized on prior chest radiographs. Correlate with mucous plugging. 2. Right lung is grossly clear. 3. Medical devices as above.   I personally reviewed the image(s)/study and resident interpretation as dictated by Dr. Alycia Nam MD. I agree with the findings as stated. Data analyzed and images were interpreted at [facility], [place], [place].   MACRO: None   Signed by: Gilberto Solano 10/31/2023 5:23 PM Dictation workstation:   [id number]    Transthoracic Echo (TTE) Limited    Result Date: 10/31/2023   [facility], [address] [place] [address]                Tel [phone] and Fax [phone] TRANSTHORACIC ECHOCARDIOGRAM REPORT  Patient Name:      PIA RALPH        Reading Physician:    [id number] Mustapha Mazariegos MD Study Date:        10/31/2023           Ordering Provider:    [id number] JOSE FRIEDMAN MRN/PID:           [id number]             Fellow:               [id number] Ever Schuster MD PhD Accession#:         [id number]         Nurse: Date of Birth/Age: 1955 / 68 years Sonographer:          Nani Garcia RDCS Gender:            M                    Additional Staff: Height:            170.18 cm            Admit Date: Weight:            49.44 kg             Admission Status:     Inpatient -                                                               Critical/Stat                                                               (within 1-3 hours) BSA:               1.56 m2              Encounter#:           [id number]                                         Department Location:  [place] [address] MICU Blood Pressure: 128 /84 mmHg Study Type:    TRANSTHORACIC ECHO (TTE) LIMITED Diagnosis/ICD: Bacteremia-R78.81 Indication:    Uremia CPT Code:      Echo Limited-35008; Color Doppler-75657 Patient History: Pertinent History: HTN, Hyperlipidemia and PE. PHTN, opiod dependence. Study Detail: The following Echo studies were performed: 2D and color flow.               Technically challenging study due to patient lying in supine               position and body habitus.  PHYSICIAN INTERPRETATION: Left Ventricle: The left ventricular systolic function is hyperdynamic, with an estimated ejection fraction of 75-80%. There are no regional wall motion abnormalities. The left ventricular cavity size is normal. The left ventricular septal wall thickness is mildly increased. There is mildly increased left ventricular posterior wall thickness. There is left ventricular concentric remodeling. The interventricular septum is flattened in diastole ('D' shaped left ventricle), consistent with right ventricular volume overload. Left ventricular diastolic filling was not assessed. Left Atrium: The left atrium is normal in size. There is evidence of an atrial septal aneurysm. Right Ventricle: The right ventricle is mildly enlarged. There is normal right ventricular global systolic function. Right Atrium: The right atrium is mildly dilated. Aortic Valve:  The aortic valve is trileaflet. There is trivial aortic valve regurgitation. Mitral Valve: The mitral valve is normal in structure. There is trace mitral valve regurgitation. Tricuspid Valve: The tricuspid valve is structurally normal. There is moderate to severe tricuspid regurgitation. The tricuspid valve regurgitant jet flows toward the atrial septum. The Doppler estimated RVSP is moderate to severely elevated at 57.1 mmHg. There is a mobile echodensity attached to the posterior tricuspid leaflet with independent motion consistent with a vegetation measuring 1.7 x 0.8 cm. Pulmonic Valve: The pulmonic valve is structurally normal. There is trace to mild pulmonic valve regurgitation. Pericardium: There is no pericardial effusion noted. Pleural: There is a small left pleural effusion. Aorta: The aortic root is normal. Systemic Veins: The inferior vena cava was not well visualized. There is IVC inspiratory collapse greater than 50%. In comparison to the previous echocardiogram(s): Compared with study from 10/17/2023, the RV is more clearly seen and appears mildly enlarged. There is septal flattening during diastole suggestive of RV volume overload. RVSP is severely elevated (57mmHg). TR degree appears more moderate than mild on current study. This may be due to acute endocarditis of the tricuspid valve.  CONCLUSIONS:  1. Left ventricular systolic function is hyperdynamic with a 75-80% estimated ejection fraction.  2. Right ventricular volume overload.  3. Moderate to severe tricuspid regurgitation visualized.  4. Moderate to severely elevated right ventricular systolic pressure.  5. There is a mobile echodensity attached to the posterior tricuspid leaflet with independent motion consistent with a vegetation measuring 1.7 x 0.8 cm.  6. Atrial septal aneurysm present. QUANTITATIVE DATA SUMMARY: 2D MEASUREMENTS:                          Normal Ranges: IVSd:          1.26 cm   (0.6-1.1cm) LVPWd:         1.28 cm    (0.6-1.1cm) LVIDd:         2.65 cm   (3.9-5.9cm) LVIDs:         1.74 cm LV Mass Index: 65.1 g/m2 LV % FS        34.3 % RA VOLUME BY A/L METHOD:                       Normal Ranges: RA Area A4C: 20.0 cm2 AORTA MEASUREMENTS:                      Normal Ranges: Ao Sinus, d: 2.60 cm (2.1-3.5cm)  RIGHT VENTRICLE: RV Basal 4.02 cm TRICUSPID VALVE/RVSP:                             Normal Ranges: Peak TR Velocity: 3.68 m/s RV Syst Pressure: 57.1 mmHg (< 30mmHg)  [id number] Mustapha Mazariegos MD Electronically signed on 10/31/2023 at 4:34:25 PM  ** Final **     US renal complete    Result Date: 10/31/2023  Interpreted By:  Jeet Wang and Liller Gregory STUDY: US RENAL COMPLETE;  10/31/2023 2:20 pm   INDICATION: Abnormal renal function.   COMPARISON: Right upper quadrant ultrasound 10/25/2023 CT chest abdomen pelvis 10/16/2023   ACCESSION NUMBER(S): [id number]   ORDERING CLINICIAN: KASIA PERRY   TECHNIQUE: Multiple images of the kidneys were obtained.   FINDINGS: RIGHT KIDNEY: The right kidney measures 10.4 cm in length. The renal cortical echogenicity and thickness are within normal limits. No hydronephrosis is present; no evidence of nephrolithiasis. Cysts are seen within the inferior and superior pole measuring up to 1 x 0.6 x 0.6 and 0.9 x 0.7 x 0.8 cm respectively.   LEFT KIDNEY: The left kidney measures 9.1 cm in length. The renal cortical echogenicity and thickness are within normal limits. No hydronephrosis is present; no evidence of nephrolithiasis.   BLADDER: Bladder is decompressed with Cortez catheter.   Incidental note is made of increased echogenicity of the liver parenchyma, similar to prior examination.       Unremarkable sonographic evaluation of the kidneys.   I personally reviewed the images/study and I agree with the findings as stated above by resident physician, Dr. Timothy Hernández. The study was interpreted at [facility] in [facility].   MACRO: None   Signed  by: Jeet Wang 10/31/2023 2:31 PM Dictation workstation:   [id number]    ECG 12 Lead    Result Date: 10/31/2023  Sinus tachycardia Possible Left atrial enlargement Low voltage QRS Cannot rule out Anterior infarct (cited on or before 31-AUG-2023) Abnormal ECG Confirmed by Terrell Silva ([id number]) on 10/31/2023 1:54:03 PM    XR chest 1 view    Result Date: 10/31/2023  Interpreted By:  Gilberto Waldrop  and Enoc Layton STUDY: XR CHEST 1 VIEW;  10/31/2023 4:24 am   INDICATION: Signs/Symptoms:left lung collapse.   COMPARISON: Radiograph 10/30/2023   ACCESSION NUMBER(S): [id number]   ORDERING CLINICIAN: NAMRATA ALCANTARA   FINDINGS: AP radiograph of the chest was provided.   Dobhoff tube is visualized coursing beneath the diaphragm with the tip beyond the field of view. Right IJ central venous catheter with tip overlying the mid SVC.   CARDIOMEDIASTINAL SILHOUETTE: Complete obscuration of the left heart border due to near-complete opacification of the left lung field.   LUNGS: Similar appearance of complete opacification of the left lung field. Mild amount of air bronchograms are visualized in the left upper lung field. No evidence of right-sided pleural effusion or pneumothorax.   ABDOMEN: No remarkable upper abdominal findings.   BONES: No acute osseous changes.       1.  Similar appearance of complete opacification of the left lung field with small aerated component and air bronchograms in the left upper lung field. Correlate with collapse and consolidation of the left lung and associated pleural effusion. Correlate with mucous plugging. 2. Medical devices/lines as noted above.   I personally reviewed the images/study and I agree with the findings as stated by Kinjal Stubbs MD. This study was interpreted at [place], [place].   MACRO: None.   Signed by: Gilberto Solano 10/31/2023 10:23 AM Dictation workstation:   [id number]    XR chest 1  view    Result Date: 10/31/2023  Interpreted By:  Elia Shaffer and Barbat Antonio STUDY: XR CHEST 1 VIEW;  10/30/2023 10:13 pm   INDICATION: Signs/Symptoms:s/p R trialysis line.   COMPARISON: None.   ACCESSION NUMBER(S): [id number]   ORDERING CLINICIAN: NAMRATA ALCANTARA   FINDINGS: AP radiograph of the chest was provided.   Enteric tube is visualized with the distal tip outside the field of view of this study. Right-sided IJ approach central venous catheter is visualized with the distal tip projecting over the expected region of the distal svc.   CARDIOMEDIASTINAL SILHOUETTE: Interval complete obscuration of the right heart border due to near complete opacification of the left lung field.   LUNGS: Interval near complete opacification of the left lung field, with some aerated lung noted in the upper lung zone. Redemonstration of left-sided volume loss. No evidence of right-sided effusion or focal consolidation.   ABDOMEN: No remarkable upper abdominal findings.   BONES: No acute osseous changes.       1. Interval near complete opacification of the left lung field, with some aerated lung noted in the upper lung zone. Associated obscuration of the left heart border. Findings may be the result of the previously described mucoid impaction in the left mainstem bronchus. 2. Right-sided IJ approach central venous catheter is visualized with the distal tip projecting over the expected region of the distal svc.   I personally reviewed the images/study and I agree with the findings as stated. This study was interpreted at [place], [place].   MACRO: None   Signed by: Elia Shaffer 10/31/2023 9:39 AM Dictation workstation:   [id number]    XR chest 1 view    Result Date: 10/28/2023  Interpreted By:  Nathan Lu, STUDY: XR CHEST 1 VIEW;  10/28/2023 9:24 am   INDICATION: Signs/Symptoms:Atelectasis/Mucous plugging.   COMPARISON: 10/27/2023   ACCESSION NUMBER(S): [id number]    ORDERING CLINICIAN: CORY HILTON   FINDINGS: Status post extubation. Question left-sided endobronchial disease/mucoid impaction   CARDIOMEDIASTINAL SILHOUETTE: Left-sided mediastinal shift   LUNGS: Left basilar consolidation/effusions. Correlate with underlying volume loss   ABDOMEN: No remarkable upper abdominal findings.   BONES: No acute osseous changes.       1.  Status post extubation. Continued left basilar consolidation/effusion and correlate with underlying pneumonia/aspiration     Signed by: Nathan Lu 10/28/2023 11:55 AM Dictation workstation:   [id number]    XR chest 1 view    Result Date: 10/27/2023  Interpreted By:  Nathan Lu  and Viv Tong STUDY: XR CHEST 1 VIEW;  10/27/2023 9:26 am   INDICATION: Signs/Symptoms:Failed SBT, recurrent secretions, pneumonia.   COMPARISON: Chest radiographs 10/25/2023 and 10/24/2023, CT chest abdomen pelvis 10/16/2023.   ACCESSION NUMBER(S): [id number]   ORDERING CLINICIAN: THOMAS AU   FINDINGS: Single AP semi-upright portable radiograph of the chest was provided.   Endotracheal tube tip terminates 6.1 cm above the crispin. Enteric tube overlies the mid thorax with tip terminating at the level of the gastric antrum.   CARDIOMEDIASTINAL SILHOUETTE: The left border of the cardiomediastinal silhouette is obscured. Otherwise stable appearance of the cardiac silhouette.   LUNGS: Interval development of a moderate to large left-sided pleural effusion with associated overlying compressive atelectasis. The right lung is clear. No pneumothorax.   ABDOMEN: No remarkable upper abdominal findings.   BONES: No acute osseous changes.       1. Interval development of moderate to large left-sided pleural effusion with associated atelectasis. Correlate with developing left basilar pneumonia/atelectasis/aspiration follow-up to resolution recommended 2. Medical devices as above.   I personally reviewed the image(s)/study and resident interpretation as  dictated by Dr. Alycia Nam MD. I agree with the findings as stated. Data analyzed and images were interpreted at [facility], [place], [place].   MACRO: None   Signed by: Nathan Lu 10/27/2023 10:07 AM Dictation workstation:   [id number]    ECG 12 lead    Result Date: 10/27/2023  Sinus tachycardia Left axis deviation Low voltage QRS  in limb leads Cannot rule out Anterior infarct Cannot rule out Inferior infarct Abnormal ECG Confirmed by Terrell Silva ([id number]) on 10/27/2023 8:30:52 AM    US right upper quadrant    Result Date: 10/25/2023  Interpreted By:  Jason Means and Liller Gregory STUDY: US RIGHT UPPER QUADRANT;  10/25/2023 3:19 pm   INDICATION: Signs/Symptoms:New RUQ abdominal pain with newly elevated LFTs.   COMPARISON: Right upper quadrant ultrasound 08/27/2023   ACCESSION NUMBER(S): [id number]   ORDERING CLINICIAN: ANTONI ARNDT   TECHNIQUE: Multiple images of the right upper quadrant were obtained.   FINDINGS: LIVER: The liver is mildly enlarged measuring up to 18 cm in maximal craniocaudal dimension. The liver parenchyma is diffusely coarsened echotexture. Liver contour is smooth.   GALLBLADDER: The gallbladder is mildly distended. Sludge seen layering within the dependent portion of the gallbladder. There is no evidence of gallbladder wall thickening or pericholecystic edema. Gallbladder wall measures up to 2 mm. No cholelithiasis.   BILIARY TREE: No intra or extrahepatic biliary dilatation is identified. The common bile duct measures 0.36 cm.   PANCREAS: The pancreas is poorly visualized due to overlying bowel gas.   RIGHT KIDNEY: The right kidney is normal in size, measuring 10 cm in craniocaudal dimension. The renal cortical echogenicity and thickness are within normal limits. No hydronephrosis or renal calculi are seen. Small renal cysts seen within the inferior pole measuring up to 1.0 x 0.7 x 0.8 cm.       1. Mild hepatomegaly with the  diffuse coarse echotexture of the liver parenchyma. Correlate with liver function tests for liver parenchymal disease. 2. Gallbladder sludge is seen layering within the dependent portion of the gallbladder without evidence of cholecystitis.   I personally reviewed the images/study and I agree with the findings as stated above by resident physician, Dr. Timothy Hernández. The study was interpreted at [facility] in [facility].   MACRO: None   Signed by: Jason Means 10/25/2023 10:31 PM Dictation workstation:   [id number]    XR abdomen 1 view    Result Date: 10/25/2023  Interpreted By:  Nathan Lu and Sullivan Shannon STUDY: XR ABDOMEN 1 VIEW;  10/25/2023 1:00 pm   INDICATION: Signs/Symptoms:new abdominal pain.   COMPARISON: Correlation with CT chest abdomen pelvis 10/16/2023, KUB 10/17/2023   ACCESSION NUMBER(S): [id number]   ORDERING CLINICIAN: ANTONI ARNDT   FINDINGS: Single frontal radiograph of the abdomen was provided.   Enteric tube courses midline below the level of the diaphragm with the tip projecting over the gastric antrum.     Relative paucity of small bowel gas in an overall nonobstructive bowel gas pattern, decreased compared to prior exam 10/17/2023 and which may reflect decompressed or fluid-filled bowel loops. Limited evaluation of pneumoperitoneum on supine imaging, however no gross evidence of free air is noted.   Visualized lungs are clear.   Osseous structures demonstrate no acute bony changes.       1.  Decreased degree of small bowel gas compared to prior radiograph, which may reflect decompressed or fluid-filled bowel loops. Overall nonobstructive bowel gas pattern. 2. Enteric tube as above.     I personally reviewed the images/study and I agree with the findings as stated. This study was interpreted at [facility], [place], [place].   Signed by: Nathan Lu 10/25/2023 3:50 PM Dictation workstation:    [id number]    XR chest 1 view    Result Date: 10/25/2023  Interpreted By:  Gilberto Waldrop, STUDY: XR CHEST 1 VIEW;  10/25/2023 7:41 am   INDICATION: Signs/Symptoms:worsening secretions and oxygenation level.   COMPARISON: Radiograph dated 10/24/2023   ACCESSION NUMBER(S): [id number]   ORDERING CLINICIAN: DEBORAH YEAGER   FINDINGS: ET tube is terminating 7.6 cm from the crispin. Enteric tube is in place with the tip projecting over proximal duodenum.   Cardiac silhouette size is grossly stable.   Interval improvement in the aeration of the left lung with residual left basilar consolidation and patchy airspace opacity in the left mid and upper lung. Right lung is grossly clear. No sizable pneumothorax.   No acute osseous abnormality.       1. Persistent left basilar pleural effusion and atelectasis/infiltrate with slight improvement in the aeration of the left lung. Correlate with left basilar/left lower lobe atelectasis/collapse and mucous plugging. 2. Residual patchy airspace opacity in the left mid and upper lung which may be due to edema or infiltrate. 3. Medical appliances as described above       Signed by: Gilberto Solano 10/25/2023 12:41 PM Dictation workstation:   [id number]    XR chest 1 view    Result Date: 10/24/2023  Interpreted By:  Gilberto Waldrop, STUDY: XR CHEST 1 VIEW;  10/24/2023 2:49 pm   INDICATION: Signs/Symptoms:Evaluate interval status of lungs.   COMPARISON: Radiograph dated 10/21/2023   ACCESSION NUMBER(S): [id number]   ORDERING CLINICIAN: KASIA PERRY   FINDINGS: ET tube is terminating 5.8 cm from the crispin. Enteric tube is in place with the tip outside of the liver.   The cardiac silhouette size is grossly stable, however the left heart border is obscured.   Persistent left basilar and retrocardiac opacity with component of volume loss. Patchy airspace opacity in the left upper lung field. No sizable pneumothorax. Right lung is grossly clear.   No acute  osseous abnormality.       1. Left mid and lower lung consolidative opacity with component of volume loss. Correlate with concern for infection and aspiration as well as mucous plugging and partial/complete left lower lobe collapse. 2. Small left pleural effusion.       Signed by: Gilberto Solano 10/24/2023 2:55 PM Dictation workstation:   [id number]    XR chest 1 view    Result Date: 10/21/2023  Interpreted By:  Beth Guy, STUDY: XR CHEST 1 VIEW;  10/21/2023 1:34 pm   INDICATION: Signs/Symptoms:pneumonia.   COMPARISON: 10/20/2023.   ACCESSION NUMBER(S): [id number]   ORDERING CLINICIAN: ANTONI ARNDT       1.  Improved aeration of the left lower lung consolidative opacities. Decreased left-sided pleural effusion. 2. Coarse interstitial, and ground-glass opacity involving the left upper lung, midlung, slightly improved. 3. Cardiac silhouette is moderately enlarged. Aorta is tortuous. Pulmonary vessels are congested with mild pulmonary edema. 4. Perihilar and basilar atelectasis of the right lung. 5. No right-sided pleural effusion or pneumothorax seen 6. ETT 7.2 cm above the crispin. Enteric tube with the tip below the lower margin of study. 7.       MACRO: None   Signed by: Beth Guy 10/21/2023 2:46 PM Dictation workstation:   [id number]    MR brain w and wo IV contrast    Result Date: 10/21/2023  Interpreted By:  Leslie Olson and MacBeth RaeLynne STUDY: MR BRAIN W AND WO IV CONTRAST; MR LUMBAR SPINE W AND WO IV CONTRAST; MR THORACIC SPINE W AND WO IV CONTRAST; MR CERVICAL SPINE W AND WO IV CONTRAST;  10/20/2023 11:41 pm   INDICATION: Signs/Symptoms:Altered mental status with b/l LE weakness; Signs/Symptoms:Altered mental status, b/l LE weakness, concern for epidural abscess; Signs/Symptoms:altered mental status, b/l LE weakness, c/f epidural abscess; Signs/Symptoms:Altered mental status, b/l LE weakness, concern for spinal/epidural abscess.   COMPARISON: CT head, 10/11/2023 Thoracic and lumbar  spine, 10/11/2023 CT cervical spine, 08/26/2023   ACCESSION NUMBER(S): [id number]; [id number]; [id number]; [id number]   ORDERING CLINICIAN: THOMAS AU   TECHNIQUE: Multiplanar, multi-sequence images of the brain, cervical, thoracic, and lumbar spine were obtained before and after the intravenous administration of 9 mL Dotarem gadolinium.   FINDINGS: Evaluation is markedly limited due to patient motion.   Diffusion weighted images show no evidence of acute ischemic infarct.   There is no evidence of an acute intraparenchymal hemorrhage, mass, mass-effect, midline shift or extra-axial fluid collection. No abnormal parenchymal enhancement.   Nonspecific subcortical and periventricular T2/FLAIR hyperintensities may represent sequelae of chronic small vessel ischemic changes.   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   The ventricular size and cerebral volume are age-concordant.   The orbits and globes are unremarkable.   The paranasal sinuses show no hemorrhage or air-fluid levels.   Bilateral mastoid effusions, left more than right.     Cervical Spine:   Incidental note is made of an enteric tube.   PARASPINAL SOFT TISSUES: No significant abnormality.   SPINAL CORD: The cervical cord has a normal caliber and signal intensity.   BONE MARROW/VERTEBRAL BODIES: Overall bone marrow signal is within normal limits. Vertebral body heights are maintained. No acute fracture.   ALIGNMENT: There is reversal of the normal cervical lordosis and minimal anterolisthesis of C3-C4.   C1-C2: The atlantodental joint is intact. The predental space is not widened.   C2-C3: Disc osteophyte complex, uncovertebral joint hypertrophy and facet arthrosis. Mild spinal canal and mild-to-moderate bilateral neural foraminal stenosis.   C3-C4: Disc osteophyte complex, uncovertebral joint hypertrophy and facet arthrosis. Moderate to severe spinal canal and neural foraminal stenosis. There is mild flattening of the cervical  cord question very subtle increased cord signal on the STIR sequence.   C4-C5: Disc osteophyte complex, uncovertebral joint hypertrophy and facet arthrosis. No significant spinal canal stenosis. Moderate bilateral neural foraminal stenosis.   C5-C6: Disc osteophyte complex, uncovertebral joint hypertrophy and facet arthrosis. Mild spinal canal stenosis. Moderate neural foraminal narrowing, left greater than right.   C6-C7: Disc osteophyte complex, uncovertebral joint hypertrophy and facet arthrosis. No significant spinal canal stenosis. Moderate to severe neural foraminal stenosis, right greater than left.   C7-T1: Disc osteophyte complex and uncovertebral joint hypertrophy and facet arthrosis. Mild spinal canal stenosis. Moderate to severe bilateral neural foraminal stenosis.     Thoracic spine:   Visualized chest/abdomen: Scattered consolidative opacities in the left lung with a small left pleural effusion.   SPINAL CORD: The thoracic cord has a normal caliber and signal intensity. No cord compression.   There is prominent posterior epidural fat. No epidural fluid collection is noted.   No abnormal enhancement. There is incomplete fat suppression within the lower cervical cord on the postcontrast images. No significant spinal canal or neural foraminal stenosis.   BONE MARROW/VERTEBRAL BODIES: Overall bone marrow signal is within normal limits. There is compression of the superior endplate of T11 no associated increased T2/STIR signal. This was present on prior CT chest 10/16/2023. There is a proximally 25% loss of vertebral body height anteriorly and no osseous retropulsion into the spinal canal. There is subtle height loss of the superior endplate of the T3 vertebral body with no associated T2/STIR signal. This is also unchanged from the prior CT chest 10/16/2023. The remaining vertebral body heights are maintained. No acute fracture. Intervertebral disc height loss and disc desiccation at T11-T12.   ALIGNMENT:  No spondylolisthesis.     Lumbar spine:   VISUALIZED ABDOMEN: T1 hyperintense lesion in the left upper renal pole and T1 hypointense, T2 hyperintense lesions in the kidneys bilaterally. These may represent cysts and/or hemorrhagic cysts. Please see dedicated imaging of the abdomen for further evaluation.   SPINAL CORD/CONUS: The conus medullaris terminates at L1-L2. There is no abnormal signal or enhancement within the distal cord or nerve roots.   BONE MARROW/VERTEBRAL BODIES: There are 5 lumbar type vertebral bodies. Overall bone marrow signal is within normal limits. Vertebral body heights are maintained. No acute fracture. Multilevel intervertebral disc height loss, most significant at L4-L5 and L5-S1.   ALIGNMENT: There is minimal retrolisthesis of L5-S1.   SPINAL CANAL, INTERVERTEBRAL DISCS, AND NEURAL FORAMINA:   T11-T12: No significant disc bulge, canal stenosis, or foraminal stenosis.   T12-L1: No significant disc bulge, canal stenosis, or foraminal stenosis.   L1-L2: No significant disc bulge, canal stenosis, or foraminal stenosis.   L2-L3: Trace bilateral paracentral disc protrusions. No spinal canal or neural foraminal stenosis.   L3-L4: Broad-based central disc bulge which results in minimal narrowing of bilateral neural foramen. No spinal canal stenosis.   L4-L5: Broad-based central disc bulge which results in mild narrowing of bilateral neural foramen, right more than left. No spinal canal stenosis.   L5-S1: Posterior disc osteophyte complex with mild narrowing of bilateral neural foramen, right more than left. No spinal canal stenosis.         Brain: 1. No acute ischemic infarct or intracranial hemorrhage. No abnormal parenchymal enhancement. 2. Nonspecific subcortical and periventricular T2/FLAIR hyperintensities may represent sequelae of chronic small vessel ischemic changes. 3. Remote lacunar infarcts in the left thalamus.     Cervical spine: 1. Multilevel degenerative disc disease and facet  arthrosis most pronounced at C3-C4 with moderate to severe spinal canal stenosis and moderate to severe bilateral neural foraminal stenosis. There is flattening of the cervical cord with question subtle increased cord signal on the STIR sequence. Please correlate clinically for symptoms of myelopathy. No abnormal cord enhancement. 2. Patchy consolidative opacities throughout the left lung with a small left pleural effusion. Correlate with dedicated chest imaging.   Thoracic spine: 1. No significant spinal canal or neural foraminal stenosis. No abnormal enhancement. 2. Nonacute minimal compression deformity at T3. Mild compression deformity at T11 with a proximally 25% loss of vertebral body height and no osseous retropulsion into the spinal canal. This was present on prior exam 10/16/2023.     Lumbar spine: 1. Multilevel lumbar spondylosis as detailed above. No spinal canal stenosis. 2. No abnormal signal or enhancement within the distal cord or nerve roots.     I personally reviewed the images/study and I agree with the findings as stated by resident physician Dr. Otilio Pate.   MACRO: None   Signed by: Leslie Olson 10/21/2023 1:29 AM Dictation workstation:   [id number]    MR cervical spine w and wo IV contrast    Result Date: 10/21/2023  Interpreted By:  Leslie Olson,  Vineet Yañez STUDY: MR BRAIN W AND WO IV CONTRAST; MR LUMBAR SPINE W AND WO IV CONTRAST; MR THORACIC SPINE W AND WO IV CONTRAST; MR CERVICAL SPINE W AND WO IV CONTRAST;  10/20/2023 11:41 pm   INDICATION: Signs/Symptoms:Altered mental status with b/l LE weakness; Signs/Symptoms:Altered mental status, b/l LE weakness, concern for epidural abscess; Signs/Symptoms:altered mental status, b/l LE weakness, c/f epidural abscess; Signs/Symptoms:Altered mental status, b/l LE weakness, concern for spinal/epidural abscess.   COMPARISON: CT head, 10/11/2023 Thoracic and lumbar spine, 10/11/2023 CT cervical spine, 08/26/2023   ACCESSION NUMBER(S):  [id number]; [id number]; [id number]; [id number]   ORDERING CLINICIAN: THOMAS AU   TECHNIQUE: Multiplanar, multi-sequence images of the brain, cervical, thoracic, and lumbar spine were obtained before and after the intravenous administration of 9 mL Dotarem gadolinium.   FINDINGS: Evaluation is markedly limited due to patient motion.   Diffusion weighted images show no evidence of acute ischemic infarct.   There is no evidence of an acute intraparenchymal hemorrhage, mass, mass-effect, midline shift or extra-axial fluid collection. No abnormal parenchymal enhancement.   Nonspecific subcortical and periventricular T2/FLAIR hyperintensities may represent sequelae of chronic small vessel ischemic changes.   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   The ventricular size and cerebral volume are age-concordant.   The orbits and globes are unremarkable.   The paranasal sinuses show no hemorrhage or air-fluid levels.   Bilateral mastoid effusions, left more than right.     Cervical Spine:   Incidental note is made of an enteric tube.   PARASPINAL SOFT TISSUES: No significant abnormality.   SPINAL CORD: The cervical cord has a normal caliber and signal intensity.   BONE MARROW/VERTEBRAL BODIES: Overall bone marrow signal is within normal limits. Vertebral body heights are maintained. No acute fracture.   ALIGNMENT: There is reversal of the normal cervical lordosis and minimal anterolisthesis of C3-C4.   C1-C2: The atlantodental joint is intact. The predental space is not widened.   C2-C3: Disc osteophyte complex, uncovertebral joint hypertrophy and facet arthrosis. Mild spinal canal and mild-to-moderate bilateral neural foraminal stenosis.   C3-C4: Disc osteophyte complex, uncovertebral joint hypertrophy and facet arthrosis. Moderate to severe spinal canal and neural foraminal stenosis. There is mild flattening of the cervical cord question very subtle increased cord signal on the STIR sequence.    C4-C5: Disc osteophyte complex, uncovertebral joint hypertrophy and facet arthrosis. No significant spinal canal stenosis. Moderate bilateral neural foraminal stenosis.   C5-C6: Disc osteophyte complex, uncovertebral joint hypertrophy and facet arthrosis. Mild spinal canal stenosis. Moderate neural foraminal narrowing, left greater than right.   C6-C7: Disc osteophyte complex, uncovertebral joint hypertrophy and facet arthrosis. No significant spinal canal stenosis. Moderate to severe neural foraminal stenosis, right greater than left.   C7-T1: Disc osteophyte complex and uncovertebral joint hypertrophy and facet arthrosis. Mild spinal canal stenosis. Moderate to severe bilateral neural foraminal stenosis.     Thoracic spine:   Visualized chest/abdomen: Scattered consolidative opacities in the left lung with a small left pleural effusion.   SPINAL CORD: The thoracic cord has a normal caliber and signal intensity. No cord compression.   There is prominent posterior epidural fat. No epidural fluid collection is noted.   No abnormal enhancement. There is incomplete fat suppression within the lower cervical cord on the postcontrast images. No significant spinal canal or neural foraminal stenosis.   BONE MARROW/VERTEBRAL BODIES: Overall bone marrow signal is within normal limits. There is compression of the superior endplate of T11 no associated increased T2/STIR signal. This was present on prior CT chest 10/16/2023. There is a proximally 25% loss of vertebral body height anteriorly and no osseous retropulsion into the spinal canal. There is subtle height loss of the superior endplate of the T3 vertebral body with no associated T2/STIR signal. This is also unchanged from the prior CT chest 10/16/2023. The remaining vertebral body heights are maintained. No acute fracture. Intervertebral disc height loss and disc desiccation at T11-T12.   ALIGNMENT: No spondylolisthesis.     Lumbar spine:   VISUALIZED ABDOMEN: T1  hyperintense lesion in the left upper renal pole and T1 hypointense, T2 hyperintense lesions in the kidneys bilaterally. These may represent cysts and/or hemorrhagic cysts. Please see dedicated imaging of the abdomen for further evaluation.   SPINAL CORD/CONUS: The conus medullaris terminates at L1-L2. There is no abnormal signal or enhancement within the distal cord or nerve roots.   BONE MARROW/VERTEBRAL BODIES: There are 5 lumbar type vertebral bodies. Overall bone marrow signal is within normal limits. Vertebral body heights are maintained. No acute fracture. Multilevel intervertebral disc height loss, most significant at L4-L5 and L5-S1.   ALIGNMENT: There is minimal retrolisthesis of L5-S1.   SPINAL CANAL, INTERVERTEBRAL DISCS, AND NEURAL FORAMINA:   T11-T12: No significant disc bulge, canal stenosis, or foraminal stenosis.   T12-L1: No significant disc bulge, canal stenosis, or foraminal stenosis.   L1-L2: No significant disc bulge, canal stenosis, or foraminal stenosis.   L2-L3: Trace bilateral paracentral disc protrusions. No spinal canal or neural foraminal stenosis.   L3-L4: Broad-based central disc bulge which results in minimal narrowing of bilateral neural foramen. No spinal canal stenosis.   L4-L5: Broad-based central disc bulge which results in mild narrowing of bilateral neural foramen, right more than left. No spinal canal stenosis.   L5-S1: Posterior disc osteophyte complex with mild narrowing of bilateral neural foramen, right more than left. No spinal canal stenosis.         Brain: 1. No acute ischemic infarct or intracranial hemorrhage. No abnormal parenchymal enhancement. 2. Nonspecific subcortical and periventricular T2/FLAIR hyperintensities may represent sequelae of chronic small vessel ischemic changes. 3. Remote lacunar infarcts in the left thalamus.     Cervical spine: 1. Multilevel degenerative disc disease and facet arthrosis most pronounced at C3-C4 with moderate to severe spinal  canal stenosis and moderate to severe bilateral neural foraminal stenosis. There is flattening of the cervical cord with question subtle increased cord signal on the STIR sequence. Please correlate clinically for symptoms of myelopathy. No abnormal cord enhancement. 2. Patchy consolidative opacities throughout the left lung with a small left pleural effusion. Correlate with dedicated chest imaging.   Thoracic spine: 1. No significant spinal canal or neural foraminal stenosis. No abnormal enhancement. 2. Nonacute minimal compression deformity at T3. Mild compression deformity at T11 with a proximally 25% loss of vertebral body height and no osseous retropulsion into the spinal canal. This was present on prior exam 10/16/2023.     Lumbar spine: 1. Multilevel lumbar spondylosis as detailed above. No spinal canal stenosis. 2. No abnormal signal or enhancement within the distal cord or nerve roots.     I personally reviewed the images/study and I agree with the findings as stated by resident physician Dr. Otilio Pate.   MACRO: None   Signed by: Leslie Olson 10/21/2023 1:29 AM Dictation workstation:   [id number]    MR thoracic spine w and wo IV contrast    Result Date: 10/21/2023  Interpreted By:  Leslie Olson,  Vineet Yañez STUDY: MR BRAIN W AND WO IV CONTRAST; MR LUMBAR SPINE W AND WO IV CONTRAST; MR THORACIC SPINE W AND WO IV CONTRAST; MR CERVICAL SPINE W AND WO IV CONTRAST;  10/20/2023 11:41 pm   INDICATION: Signs/Symptoms:Altered mental status with b/l LE weakness; Signs/Symptoms:Altered mental status, b/l LE weakness, concern for epidural abscess; Signs/Symptoms:altered mental status, b/l LE weakness, c/f epidural abscess; Signs/Symptoms:Altered mental status, b/l LE weakness, concern for spinal/epidural abscess.   COMPARISON: CT head, 10/11/2023 Thoracic and lumbar spine, 10/11/2023 CT cervical spine, 08/26/2023   ACCESSION NUMBER(S): [id number]; [id number]; [id number]; [id number]   ORDERING  CLINICIAN: THOMAS AU   TECHNIQUE: Multiplanar, multi-sequence images of the brain, cervical, thoracic, and lumbar spine were obtained before and after the intravenous administration of 9 mL Dotarem gadolinium.   FINDINGS: Evaluation is markedly limited due to patient motion.   Diffusion weighted images show no evidence of acute ischemic infarct.   There is no evidence of an acute intraparenchymal hemorrhage, mass, mass-effect, midline shift or extra-axial fluid collection. No abnormal parenchymal enhancement.   Nonspecific subcortical and periventricular T2/FLAIR hyperintensities may represent sequelae of chronic small vessel ischemic changes.   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   The ventricular size and cerebral volume are age-concordant.   The orbits and globes are unremarkable.   The paranasal sinuses show no hemorrhage or air-fluid levels.   Bilateral mastoid effusions, left more than right.     Cervical Spine:   Incidental note is made of an enteric tube.   PARASPINAL SOFT TISSUES: No significant abnormality.   SPINAL CORD: The cervical cord has a normal caliber and signal intensity.   BONE MARROW/VERTEBRAL BODIES: Overall bone marrow signal is within normal limits. Vertebral body heights are maintained. No acute fracture.   ALIGNMENT: There is reversal of the normal cervical lordosis and minimal anterolisthesis of C3-C4.   C1-C2: The atlantodental joint is intact. The predental space is not widened.   C2-C3: Disc osteophyte complex, uncovertebral joint hypertrophy and facet arthrosis. Mild spinal canal and mild-to-moderate bilateral neural foraminal stenosis.   C3-C4: Disc osteophyte complex, uncovertebral joint hypertrophy and facet arthrosis. Moderate to severe spinal canal and neural foraminal stenosis. There is mild flattening of the cervical cord question very subtle increased cord signal on the STIR sequence.   C4-C5: Disc osteophyte complex, uncovertebral joint hypertrophy  and facet arthrosis. No significant spinal canal stenosis. Moderate bilateral neural foraminal stenosis.   C5-C6: Disc osteophyte complex, uncovertebral joint hypertrophy and facet arthrosis. Mild spinal canal stenosis. Moderate neural foraminal narrowing, left greater than right.   C6-C7: Disc osteophyte complex, uncovertebral joint hypertrophy and facet arthrosis. No significant spinal canal stenosis. Moderate to severe neural foraminal stenosis, right greater than left.   C7-T1: Disc osteophyte complex and uncovertebral joint hypertrophy and facet arthrosis. Mild spinal canal stenosis. Moderate to severe bilateral neural foraminal stenosis.     Thoracic spine:   Visualized chest/abdomen: Scattered consolidative opacities in the left lung with a small left pleural effusion.   SPINAL CORD: The thoracic cord has a normal caliber and signal intensity. No cord compression.   There is prominent posterior epidural fat. No epidural fluid collection is noted.   No abnormal enhancement. There is incomplete fat suppression within the lower cervical cord on the postcontrast images. No significant spinal canal or neural foraminal stenosis.   BONE MARROW/VERTEBRAL BODIES: Overall bone marrow signal is within normal limits. There is compression of the superior endplate of T11 no associated increased T2/STIR signal. This was present on prior CT chest 10/16/2023. There is a proximally 25% loss of vertebral body height anteriorly and no osseous retropulsion into the spinal canal. There is subtle height loss of the superior endplate of the T3 vertebral body with no associated T2/STIR signal. This is also unchanged from the prior CT chest 10/16/2023. The remaining vertebral body heights are maintained. No acute fracture. Intervertebral disc height loss and disc desiccation at T11-T12.   ALIGNMENT: No spondylolisthesis.     Lumbar spine:   VISUALIZED ABDOMEN: T1 hyperintense lesion in the left upper renal pole and T1 hypointense, T2  hyperintense lesions in the kidneys bilaterally. These may represent cysts and/or hemorrhagic cysts. Please see dedicated imaging of the abdomen for further evaluation.   SPINAL CORD/CONUS: The conus medullaris terminates at L1-L2. There is no abnormal signal or enhancement within the distal cord or nerve roots.   BONE MARROW/VERTEBRAL BODIES: There are 5 lumbar type vertebral bodies. Overall bone marrow signal is within normal limits. Vertebral body heights are maintained. No acute fracture. Multilevel intervertebral disc height loss, most significant at L4-L5 and L5-S1.   ALIGNMENT: There is minimal retrolisthesis of L5-S1.   SPINAL CANAL, INTERVERTEBRAL DISCS, AND NEURAL FORAMINA:   T11-T12: No significant disc bulge, canal stenosis, or foraminal stenosis.   T12-L1: No significant disc bulge, canal stenosis, or foraminal stenosis.   L1-L2: No significant disc bulge, canal stenosis, or foraminal stenosis.   L2-L3: Trace bilateral paracentral disc protrusions. No spinal canal or neural foraminal stenosis.   L3-L4: Broad-based central disc bulge which results in minimal narrowing of bilateral neural foramen. No spinal canal stenosis.   L4-L5: Broad-based central disc bulge which results in mild narrowing of bilateral neural foramen, right more than left. No spinal canal stenosis.   L5-S1: Posterior disc osteophyte complex with mild narrowing of bilateral neural foramen, right more than left. No spinal canal stenosis.         Brain: 1. No acute ischemic infarct or intracranial hemorrhage. No abnormal parenchymal enhancement. 2. Nonspecific subcortical and periventricular T2/FLAIR hyperintensities may represent sequelae of chronic small vessel ischemic changes. 3. Remote lacunar infarcts in the left thalamus.     Cervical spine: 1. Multilevel degenerative disc disease and facet arthrosis most pronounced at C3-C4 with moderate to severe spinal canal stenosis and moderate to severe bilateral neural foraminal stenosis.  There is flattening of the cervical cord with question subtle increased cord signal on the STIR sequence. Please correlate clinically for symptoms of myelopathy. No abnormal cord enhancement. 2. Patchy consolidative opacities throughout the left lung with a small left pleural effusion. Correlate with dedicated chest imaging.   Thoracic spine: 1. No significant spinal canal or neural foraminal stenosis. No abnormal enhancement. 2. Nonacute minimal compression deformity at T3. Mild compression deformity at T11 with a proximally 25% loss of vertebral body height and no osseous retropulsion into the spinal canal. This was present on prior exam 10/16/2023.     Lumbar spine: 1. Multilevel lumbar spondylosis as detailed above. No spinal canal stenosis. 2. No abnormal signal or enhancement within the distal cord or nerve roots.     I personally reviewed the images/study and I agree with the findings as stated by resident physician Dr. Otilio Pate.   MACRO: None   Signed by: Leslie Olson 10/21/2023 1:29 AM Dictation workstation:   [id number]    MR lumbar spine w and wo IV contrast    Result Date: 10/21/2023  Interpreted By:  Leslie Olson,  and [place] STUDY: MR BRAIN W AND WO IV CONTRAST; MR LUMBAR SPINE W AND WO IV CONTRAST; MR THORACIC SPINE W AND WO IV CONTRAST; MR CERVICAL SPINE W AND WO IV CONTRAST;  10/20/2023 11:41 pm   INDICATION: Signs/Symptoms:Altered mental status with b/l LE weakness; Signs/Symptoms:Altered mental status, b/l LE weakness, concern for epidural abscess; Signs/Symptoms:altered mental status, b/l LE weakness, c/f epidural abscess; Signs/Symptoms:Altered mental status, b/l LE weakness, concern for spinal/epidural abscess.   COMPARISON: CT head, 10/11/2023 Thoracic and lumbar spine, 10/11/2023 CT cervical spine, 08/26/2023   ACCESSION NUMBER(S): [id number]; [id number]; [id number]; [id number]   ORDERING CLINICIAN: THOMAS AU   TECHNIQUE: Multiplanar, multi-sequence images of  the brain, cervical, thoracic, and lumbar spine were obtained before and after the intravenous administration of 9 mL Dotarem gadolinium.   FINDINGS: Evaluation is markedly limited due to patient motion.   Diffusion weighted images show no evidence of acute ischemic infarct.   There is no evidence of an acute intraparenchymal hemorrhage, mass, mass-effect, midline shift or extra-axial fluid collection. No abnormal parenchymal enhancement.   Nonspecific subcortical and periventricular T2/FLAIR hyperintensities may represent sequelae of chronic small vessel ischemic changes.   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   The ventricular size and cerebral volume are age-concordant.   The orbits and globes are unremarkable.   The paranasal sinuses show no hemorrhage or air-fluid levels.   Bilateral mastoid effusions, left more than right.     Cervical Spine:   Incidental note is made of an enteric tube.   PARASPINAL SOFT TISSUES: No significant abnormality.   SPINAL CORD: The cervical cord has a normal caliber and signal intensity.   BONE MARROW/VERTEBRAL BODIES: Overall bone marrow signal is within normal limits. Vertebral body heights are maintained. No acute fracture.   ALIGNMENT: There is reversal of the normal cervical lordosis and minimal anterolisthesis of C3-C4.   C1-C2: The atlantodental joint is intact. The predental space is not widened.   C2-C3: Disc osteophyte complex, uncovertebral joint hypertrophy and facet arthrosis. Mild spinal canal and mild-to-moderate bilateral neural foraminal stenosis.   C3-C4: Disc osteophyte complex, uncovertebral joint hypertrophy and facet arthrosis. Moderate to severe spinal canal and neural foraminal stenosis. There is mild flattening of the cervical cord question very subtle increased cord signal on the STIR sequence.   C4-C5: Disc osteophyte complex, uncovertebral joint hypertrophy and facet arthrosis. No significant spinal canal stenosis. Moderate bilateral  neural foraminal stenosis.   C5-C6: Disc osteophyte complex, uncovertebral joint hypertrophy and facet arthrosis. Mild spinal canal stenosis. Moderate neural foraminal narrowing, left greater than right.   C6-C7: Disc osteophyte complex, uncovertebral joint hypertrophy and facet arthrosis. No significant spinal canal stenosis. Moderate to severe neural foraminal stenosis, right greater than left.   C7-T1: Disc osteophyte complex and uncovertebral joint hypertrophy and facet arthrosis. Mild spinal canal stenosis. Moderate to severe bilateral neural foraminal stenosis.     Thoracic spine:   Visualized chest/abdomen: Scattered consolidative opacities in the left lung with a small left pleural effusion.   SPINAL CORD: The thoracic cord has a normal caliber and signal intensity. No cord compression.   There is prominent posterior epidural fat. No epidural fluid collection is noted.   No abnormal enhancement. There is incomplete fat suppression within the lower cervical cord on the postcontrast images. No significant spinal canal or neural foraminal stenosis.   BONE MARROW/VERTEBRAL BODIES: Overall bone marrow signal is within normal limits. There is compression of the superior endplate of T11 no associated increased T2/STIR signal. This was present on prior CT chest 10/16/2023. There is a proximally 25% loss of vertebral body height anteriorly and no osseous retropulsion into the spinal canal. There is subtle height loss of the superior endplate of the T3 vertebral body with no associated T2/STIR signal. This is also unchanged from the prior CT chest 10/16/2023. The remaining vertebral body heights are maintained. No acute fracture. Intervertebral disc height loss and disc desiccation at T11-T12.   ALIGNMENT: No spondylolisthesis.     Lumbar spine:   VISUALIZED ABDOMEN: T1 hyperintense lesion in the left upper renal pole and T1 hypointense, T2 hyperintense lesions in the kidneys bilaterally. These may represent cysts  and/or hemorrhagic cysts. Please see dedicated imaging of the abdomen for further evaluation.   SPINAL CORD/CONUS: The conus medullaris terminates at L1-L2. There is no abnormal signal or enhancement within the distal cord or nerve roots.   BONE MARROW/VERTEBRAL BODIES: There are 5 lumbar type vertebral bodies. Overall bone marrow signal is within normal limits. Vertebral body heights are maintained. No acute fracture. Multilevel intervertebral disc height loss, most significant at L4-L5 and L5-S1.   ALIGNMENT: There is minimal retrolisthesis of L5-S1.   SPINAL CANAL, INTERVERTEBRAL DISCS, AND NEURAL FORAMINA:   T11-T12: No significant disc bulge, canal stenosis, or foraminal stenosis.   T12-L1: No significant disc bulge, canal stenosis, or foraminal stenosis.   L1-L2: No significant disc bulge, canal stenosis, or foraminal stenosis.   L2-L3: Trace bilateral paracentral disc protrusions. No spinal canal or neural foraminal stenosis.   L3-L4: Broad-based central disc bulge which results in minimal narrowing of bilateral neural foramen. No spinal canal stenosis.   L4-L5: Broad-based central disc bulge which results in mild narrowing of bilateral neural foramen, right more than left. No spinal canal stenosis.   L5-S1: Posterior disc osteophyte complex with mild narrowing of bilateral neural foramen, right more than left. No spinal canal stenosis.         Brain: 1. No acute ischemic infarct or intracranial hemorrhage. No abnormal parenchymal enhancement. 2. Nonspecific subcortical and periventricular T2/FLAIR hyperintensities may represent sequelae of chronic small vessel ischemic changes. 3. Remote lacunar infarcts in the left thalamus.     Cervical spine: 1. Multilevel degenerative disc disease and facet arthrosis most pronounced at C3-C4 with moderate to severe spinal canal stenosis and moderate to severe bilateral neural foraminal stenosis. There is flattening of the cervical cord with question subtle increased cord  signal on the STIR sequence. Please correlate clinically for symptoms of myelopathy. No abnormal cord enhancement. 2. Patchy consolidative opacities throughout the left lung with a small left pleural effusion. Correlate with dedicated chest imaging.   Thoracic spine: 1. No significant spinal canal or neural foraminal stenosis. No abnormal enhancement. 2. Nonacute minimal compression deformity at T3. Mild compression deformity at T11 with a proximally 25% loss of vertebral body height and no osseous retropulsion into the spinal canal. This was present on prior exam 10/16/2023.     Lumbar spine: 1. Multilevel lumbar spondylosis as detailed above. No spinal canal stenosis. 2. No abnormal signal or enhancement within the distal cord or nerve roots.     I personally reviewed the images/study and I agree with the findings as stated by resident physician Dr. Otilio Pate.   MACRO: None   Signed by: Leslie Olson 10/21/2023 1:29 AM Dictation workstation:   [id number]    XR chest 1 view    Result Date: 10/20/2023  Interpreted By:  Khanh Weaver, STUDY: XR CHEST 1 VIEW;  10/20/2023 10:40 am   INDICATION: Signs/Symptoms:intubated.   COMPARISON: Chest radiograph dated 10/19/2023and CT scan 10/16/2023   ACCESSION NUMBER(S): [id number]   ORDERING CLINICIAN: ANTONI ARNDT   FINDINGS: AP radiograph of the chest Enteric tube is again seen coursing below diaphragm and tip not included in field of view. Interval intubation with endotracheal tube tip approximately 5.5 cm superior to crispin   CARDIOMEDIASTINAL SILHOUETTE: Cardiomediastinal silhouette is again significantly obscured on the left, similar to prior.   LUNGS: There is again demonstration extensive left mid basilar lung predominant opacification with blunting of left costophrenic angle. Background emphysema. There is no pneumothorax.   ABDOMEN: No remarkable upper abdominal findings.   BONES: No acute osseous abnormality. Partially visualized left shoulder arthroplasty  prosthesis.       1. No significant change in left mid basilar lung predominant atelectasis/consolidation with left pleural effusion. Also correlate clinically with concern for central mucous plugging. 2. Medical devices as above. Endotracheal tube is in satisfactory position.   Signed by: Khanh Weaver 10/20/2023 11:05 AM Dictation workstation:   [id number]    XR chest 1 view    Result Date: 10/19/2023  Interpreted By:  Khanh Weaver,  and Lb Whitehead STUDY: XR CHEST 1 VIEW;  10/19/2023 11:44 pm   INDICATION: Signs/Symptoms:worsening hypoxia.   COMPARISON: Chest radiograph 10/17/2023 and CT chest 10/16/2023   ACCESSION NUMBER(S): [id number]   ORDERING CLINICIAN: ROBERT SUGGS   FINDINGS: AP radiograph of the chest was provided.   Enteric tube seen coursing below the level diaphragm with tip overlying the expected location of the proximal duodenum. Partially visualized postsurgical changes from left total shoulder arthroplasty.   CARDIOMEDIASTINAL SILHOUETTE: The cardiomediastinal silhouette is now significantly obscured on the left.   LUNGS: Re-demonstration of coarsened interstitial markings bilaterally. Significant interval worsening in left mid basilar lung predominant extensive consolidative opacities with blunting of the left costophrenic angle. The right lung is essentially clear. No pneumothorax.   ABDOMEN: No remarkable upper abdominal findings.   BONES: No acute osseous changes.       1.  Significant interval worsening in left mid basilar lung predominant opacification which may represent increasing atelectasis and/or pleural effusions with or without superimposed consolidation. Also correlate clinically with concern for central mucous plugging. 2. Background chronic lung changes. 3.  Postsurgical changes and medical devices as described above.   I personally reviewed the images/study and I agree with the findings as stated. This study was interpreted at [facility]  [place], [place].   MACRO: None   Signed by: Khanh Weaver 10/19/2023 11:54 PM Dictation workstation:   [id number]    XR abdomen 1 view    Result Date: 10/18/2023  Interpreted By:  Gilberto Waldrop and Fu Tianyuan STUDY: XR ABDOMEN 1 VIEW;  10/17/2023 5:59 pm   INDICATION: Signs/Symptoms:Confirm Dobhoff placement.   COMPARISON: Abdominal radiograph 08/31/2023. CT chest abdomen pelvis 10/16/2023.   ACCESSION NUMBER(S): [id number]   ORDERING CLINICIAN: THOMAS AU   FINDINGS: AP radiograph of the abdomen is provided for review.   Enteric tube courses below the diaphragm with the tip overlying the expected location of the distal stomach or proximal duodenum.   Nonobstructive bowel gas pattern.   There are hazy opacities within the left lung base.   Osseous structures demonstrate no acute bony changes.       1. Enteric tube with the tip overlying the distal stomach/proximal duodenum. 2. Nonobstructive bowel gas pattern. 3. Left lung base atelectasis versus consolidation.   MACRO: None   Signed by: Gilberto Solano 10/18/2023 9:40 AM Dictation workstation:   [id number]    XR chest 1 view    Result Date: 10/17/2023  Interpreted By:  Bang Owens and Dervishi Mario STUDY: XR CHEST 1 VIEW;  10/17/2023 8:48 pm   INDICATION: Signs/Symptoms:hypoxia.   COMPARISON: Chest radiograph 10/17/2023   ACCESSION NUMBER(S): [id number]   ORDERING CLINICIAN: JANINE ELLIS   FINDINGS: AP portable upright radiograph of the chest was provided with patient's rotation to the left.   Enteric tube crossing the diaphragm with the tip extending beyond the field of view.   CARDIOMEDIASTINAL SILHOUETTE: Cardiomediastinal silhouette is stable in size and configuration.   LUNGS: Redemonstration of coarse interstitial lung markings with hyperinflation. Obscuration of the left hemidiaphragm, suggesting pleural effusion and/or atelectasis. Slight interval worsening of left basilar and retrocardiac hazy opacity.  Somewhat increased interstitial lung markings in the right lower lung field, peripherally. Right perihilar haziness. Increased lucency in the right lung apex with pulmonary vascular markings and otherwise no obvious pneumothorax.   ABDOMEN: No remarkable upper abdominal findings.   BONES: No acute osseous changes.       1. Slight worsening of left basilar opacity compared to prior imaging, suggesting atelectasis and/or pleural effusion. 2. Probable interstitial pulmonary edema or fluid overload. Correlate with patient's fluid status.   I personally reviewed the images/study and I agree with the findings as stated. This study was interpreted at [place], [place].   MACRO: NONE.   Signed by: Bang Owens 10/17/2023 9:54 PM Dictation workstation:   [id number]    Transthoracic Echo (TTE) Complete    Result Date: 10/17/2023   [facility], [address] [place] [address]                Tel [phone] and Fax [phone] TRANSTHORACIC ECHOCARDIOGRAM REPORT  Patient Name:      PIA Franco Physician:    [id number] Kirit Cain MD Study Date:        10/17/2023           Ordering Provider:    [id number] JEAN BURNETT MRN/PID:           [id number]             Fellow: Accession#:        [id number]         Nurse: Date of Birth/Age: 1955 / 68 years Sonographer:          Nani Garcia RDCS Gender:            M                    Additional Staff: Height:            170.18 cm            Admit Date: Weight:            49.44 kg             Admission Status:     Inpatient -                                                               Routine BSA:               1.56 m2              Encounter#:           [id number]                                         Department Location:  [place] [address] MICU Blood Pressure: 136 /91 mmHg Study Type:    TRANSTHORACIC ECHO (TTE) COMPLETE Diagnosis/ICD: Encounter  for screening for cardiovascular disorders-Z13.6 Indication:    Polycythemia CPT Code:      Echo Complete w Full Doppler-[id number] Patient History: Pertinent History: HTN and Hyperlipidemia. PHTN, Liver fibrosis, Hypoxemia,                    COPD, ETOH, Opiod usage,. Study Detail: The following Echo studies were performed: 2D, M-Mode, Doppler and               color flow. Technically challenging study due to poor acoustic               windows and body habitus. Definity used as a contrast agent for               endocardial border definition and agitated saline used as a               contrast agent for intraseptal flow evaluation. Total contrast               used for this procedure was 2.0 mL via IV push.  PHYSICIAN INTERPRETATION: Left Ventricle: The left ventricular systolic function is hyperdynamic, with an estimated ejection fraction of 75%. There are no regional wall motion abnormalities. The left ventricular cavity size is normal. The left ventricular septal wall thickness is mildly increased. There is left ventricular concentric remodeling. Left ventricular diastolic filling was not assessed. Left Atrium: The left atrium is normal in size. A bubble study using agitated saline was performed. Bubble study is positive. There is evidence of an atrial septal aneurysm. Right Ventricle: The right ventricle is normal in size. There is normal right ventricular global systolic function. Right Atrium: The right atrium is normal in size. Aortic Valve: The aortic valve is trileaflet. There is minimal aortic valve cusp calcification. There is no aortic valve thickening. There is no evidence of aortic valve regurgitation. The peak instantaneous gradient of the aortic valve is 3.2 mmHg. Mitral Valve: The mitral valve is normal in structure. There is trace mitral valve regurgitation. Tricuspid Valve: The tricuspid valve is structurally normal. There is mild tricuspid regurgitation. The Doppler estimated RVSP is mildly elevated  at 39.2 mmHg. Pulmonic Valve: The pulmonic valve is not well visualized. Pulmonic valve regurgitation was not assessed. Pericardium: There is no pericardial effusion noted. Aorta: The aortic root is normal. Systemic Veins: The inferior vena cava size appears small. In comparison to the previous echocardiogram(s): Compared with study from 8/29/2023, the RVSP today is mildly elevated, compared to moderately elevated in the prior echocardiogram (60 mmHg).  CONCLUSIONS:  1. Left ventricular systolic function is hyperdynamic with a 75% estimated ejection fraction.  2. The left ventricular septal wall thickness is mildly increased.  3. There is left ventricular concentric remodeling.  4. Atrial septal aneurysm present.  5. A bubble study using agitated saline was performed. Bubble study is positive.  6. Mildly elevated RVSP.  7. Poorly visualized anatomical structures due to suboptimal image quality.  8. Compared with study from 8/29/2023, the RVSP today is mildly elevated, compared to moderately elevated in the prior echocardiogram (60 mmHg). QUANTITATIVE DATA SUMMARY: 2D MEASUREMENTS:                          Normal Ranges: Ao Root d:     2.50 cm   (2.0-3.7cm) IVSd:          1.20 cm   (0.6-1.1cm) LVPWd:         0.90 cm   (0.6-1.1cm) LVIDd:         3.20 cm   (3.9-5.9cm) LVIDs:         2.50 cm LV Mass Index: 62.2 g/m2 LV % FS        21.9 % LA VOLUME:                               Normal Ranges: LA Vol A4C:        37.5 ml    (22+/-6mL/m2) LA Vol A2C:        36.5 ml LA Vol BP:         37.9 ml LA Vol Index A4C:  24.0ml/m2 LA Vol Index A2C:  23.3 ml/m2 LA Vol Index BP:   24.2 ml/m2 LA Area A4C:       15.9 cm2 LA Area A2C:       15.3 cm2 LA Major Axis A4C: 5.8 cm LA Major Axis A2C: 5.5 cm LA Volume Index:   24.0 ml/m2 AORTA MEASUREMENTS:                      Normal Ranges: Ao Sinus, d: 3.15 cm (2.1-3.5cm) AORTIC VALVE:                         Normal Ranges: AoV Vmax:      0.89 m/s (<=1.7m/s) AoV Peak PG:   3.2 mmHg (<20mmHg)  LVOT Max Dony:  0.63 m/s (<=1.1m/s) LVOT VTI:      10.50 cm LVOT Diameter: 2.00 cm  (1.8-2.4cm) AoV Area,Vmax: 2.24 cm2 (2.5-4.5cm2)  RIGHT VENTRICLE: RV s' 0.12 m/s TRICUSPID VALVE/RVSP:                             Normal Ranges: Peak TR Velocity: 3.01 m/s Est. RA Pressure: 3 mmHg RV Syst Pressure: 39.2 mmHg (< 30mmHg)  [id number] Kirit Cain MD Electronically signed on 10/17/2023 at 7:03:57 PM  ** Final **     XR chest 1 view    Result Date: 10/17/2023  Interpreted By:  Khanh Weaver,  Ross Crow STUDY: XR CHEST 1 VIEW;  10/17/2023 3:16 pm   INDICATION: Signs/Symptoms:Acute high flow oxygen requirement, history of mucus plugging.   COMPARISON: CT chest, abdomen and pelvis 10/16/2023   ACCESSION NUMBER(S): [id number]   ORDERING CLINICIAN: THOMAS AU   FINDINGS: AP radiograph of the chest was provided.   CARDIOMEDIASTINAL SILHOUETTE: Cardiomediastinal silhouette is normal in size and configuration. Mild aortic knob calcification.   LUNGS: Background of coarse interstitial lung markings and hyperinflation are again seen. Left basilar and retrocardiac hazy opacity appears slightly less conspicuous than prior. There is no sizable pleural effusion or pneumothorax..   ABDOMEN: No remarkable upper abdominal findings.   BONES: Status post left shoulder arthroplasty.       1. Hazy left basilar opacity appears slightly less conspicuous than prior and correlate with decreasing atelectasis/infiltrate. 2. Redemonstrated background diffuse emphysema.   I personally reviewed the images/study and I agree with the findings as stated by radiology resident Mignon Malone MD. This study was interpreted at [facility], [place], [place].   Signed by: Khanh Weaver 10/17/2023 3:23 PM Dictation workstation:   [id number]    CT chest abdomen pelvis w IV contrast    Result Date: 10/16/2023  Interpreted By:  Jeet Wang and Kamau Nyokabi STUDY: CT CHEST ABDOMEN PELVIS W IV CONTRAST;  10/16/2023  12:30 pm   INDICATION: Signs/Symptoms:polycythemia looking for malignancy.   COMPARISON: None.   ACCESSION NUMBER(S): [id number]   ORDERING CLINICIAN: CALEB NGUYEN   TECHNIQUE: CT of the chest, abdomen, and pelvis was performed.  Contiguous axial images were obtained at 3 mm slice thickness through the chest, abdomen and pelvis. Coronal and sagittal reconstructions at 3 mm slice thickness were performed. 75 ml of contrast Omnipaque 350 were administered intravenously without immediate complication.   FINDINGS: CHEST:   LUNG/PLEURA/LARGE AIRWAYS: The trachea and central airways are patent. There is soft tissue density in the right lower lobe bronchi. There is  atelectasis of the right posterior lobe. Chronic emphysematous change in bilateral lung apices. There are no pulmonary nodule or masses. There is no pleural effusion or pneumothorax.   VESSELS: Aorta and pulmonary arteries are normal caliber.  Moderate atherosclerotic changes are noted of the aorta and branching vessels. Moderate coronary artery calcifications are present.   HEART: The heart is normal in size.  There is no pericardial effusion.   MEDIASTINUM AND KRISTOPHER: Unchanged appearance of nonenlarged paratracheal lymph nodes when compared to prior CT 08/15/2022. No hilar or axillary lymphadenopathy is present.  The esophagus is normal.   CHEST WALL AND LOWER NECK: The soft tissues of the chest wall demonstrate no gross abnormality. The visualized thyroid gland appears within normal limits.   ABDOMEN:   LIVER: The liver is normal in size and demonstrates diffusely decreased attenuation suggesting steatosis. The liver is similar in appearance when compared to prior CT 08/22/2022.   BILE DUCTS: Intrahepatic and extrahepatic bile ducts are prominent which is within normal limits given the status post cholecystectomy.   GALLBLADDER: The gallbladder is nondistended without radiopaque stones.   PANCREAS: The pancreas appears unremarkable.   SPLEEN: The  spleen is normal in size without focal lesions.   ADRENAL GLANDS: Bilateral adrenal glands appear normal.   KIDNEYS AND URETERS: The kidneys are normal in size and enhance symmetrically.  No hydroureteronephrosis or nephroureterolithiasis is identified. Unchanged appearance of well-circumscribed hypodensities in bilateral kidneys that are subcentimeter in size and likely represent simple renal cysts.   PELVIS:   BLADDER: The urinary bladder appears normal without abnormal wall thickening.   REPRODUCTIVE ORGANS: The prostate is not enlarged.   BOWEL: The stomach is unremarkable.  The small and large bowel are normal in caliber and demonstrate no wall thickening.  The appendix appears normal.     VESSELS: There is no aneurysmal dilatation of the abdominal aorta. The IVC appears normal. Moderate calcifications of the abdominal aorta its branches.   PERITONEUM/RETROPERITONEUM/LYMPH NODES: There is no free or loculated fluid collection, no free intraperitoneal air. The retroperitoneum appears normal.  No abdominopelvic lymphadenopathy is present. Unchanged appearance of nonenlarged bilateral external iliac lymph nodes when compared to prior CT 08/15/2022.   BONE AND SOFT TISSUE: Status post left total  shoulder arthroplasty. No suspicious osseous lesions are identified. Degenerative discogenic disease is noted in the lower thoracic and lumbar spine.  The abdominal wall soft tissues appear normal.       1. No evidence of primary neoplasm or metastatic disease. 2. Right lower lobe mucous plugging which may reflect aspiration.   I personally reviewed the images/study and I agree with the findings as stated. This study was interpreted at [place], [place].   MACRO: None   Signed by: Jeet Wang 10/16/2023 3:39 PM Dictation workstation:   [id number]    MR thoracic spine wo IV contrast    Result Date: 10/12/2023  Interpreted By:  Tyron Dixon and Ebai Jerky STUDY:  MR THORACIC SPINE WO IV CONTRAST; MR LUMBAR SPINE WO IV CONTRAST; 10/11/2023 11:37 pm   INDICATION: Signs/Symptoms:bilateral LE weakness.   Per EMR: 68-year-old male with a past medical history of PD, hypertension, alcohol and opioid abuse, hepatitis C, PE without cor pulmonale on Eliquis who presents today for an inability to walk. Patient states that about 3 days ago his legs gave out and he has not been able to walk since and has been stuck in a recliner.   COMPARISON: CT chest abdomen pelvis 08/15/2022.   ACCESSION NUMBER(S): [id number]; [id number]   ORDERING CLINICIAN: TEE BLAND   TECHNIQUE: Limited nondiagnostic MRI of the thoracic and lumbar spine. Only T2 sagittal and coronal  images of the thoracic and lumbar spine were obtained.  The patient requested termination of the examination due to pain.   FINDINGS: Limited MRI of thoracic and lumbar spine demonstrates scoliosis of the thoracic spine. No definitive evidence of substantial central canal stenosis. Mild cervical narrowing secondary spinal degenerative change. Evaluation of neural foraminal stenosis is markedly limited.    images demonstrated T2 hyperintense right renal cyst.       1. Nondiagnostic MRI of the thoracic and lumbar spine. The patient requested termination of the examination due to pain. A repeat examination can be obtained as clinically indicated after adequate pain control. 2. Within the severe limitations of this examination, there is no definite evidence of substantial central canal stenosis of the thoracic or lumbar spine.       I personally reviewed the images/study and I agree with the findings as stated. This study was interpreted at [place], [place].     Signed by: Tyron Dixon 10/12/2023 12:23 AM Dictation workstation:   [id number]    MR lumbar spine wo IV contrast    Result Date: 10/12/2023  Interpreted By:  Tyron Dixon,  Joseph Javed STUDY: MR  THORACIC SPINE WO IV CONTRAST; MR LUMBAR SPINE WO IV CONTRAST; 10/11/2023 11:37 pm   INDICATION: Signs/Symptoms:bilateral LE weakness.   Per EMR: 68-year-old male with a past medical history of PD, hypertension, alcohol and opioid abuse, hepatitis C, PE without cor pulmonale on Eliquis who presents today for an inability to walk. Patient states that about 3 days ago his legs gave out and he has not been able to walk since and has been stuck in a recliner.   COMPARISON: CT chest abdomen pelvis 08/15/2022.   ACCESSION NUMBER(S): [id number]; [id number]   ORDERING CLINICIAN: TEE BLAND   TECHNIQUE: Limited nondiagnostic MRI of the thoracic and lumbar spine. Only T2 sagittal and coronal  images of the thoracic and lumbar spine were obtained.  The patient requested termination of the examination due to pain.   FINDINGS: Limited MRI of thoracic and lumbar spine demonstrates scoliosis of the thoracic spine. No definitive evidence of substantial central canal stenosis. Mild cervical narrowing secondary spinal degenerative change. Evaluation of neural foraminal stenosis is markedly limited.    images demonstrated T2 hyperintense right renal cyst.       1. Nondiagnostic MRI of the thoracic and lumbar spine. The patient requested termination of the examination due to pain. A repeat examination can be obtained as clinically indicated after adequate pain control. 2. Within the severe limitations of this examination, there is no definite evidence of substantial central canal stenosis of the thoracic or lumbar spine.       I personally reviewed the images/study and I agree with the findings as stated. This study was interpreted at [place], [place].     Signed by: Tyron Dixon 10/12/2023 12:23 AM Dictation workstation:   [id number]    CT head wo IV contrast    Result Date: 10/11/2023  Interpreted By:  Tyron Dixon and Dervishi Mario STUDY: CT HEAD WO  IV CONTRAST;  10/11/2023 9:42 pm   INDICATION: bilateral LE weakness, headache.   COMPARISON: CT brain: 08/26/2023.   ACCESSION NUMBER(S): [id number]   ORDERING CLINICIAN: TEE BLAND   TECHNIQUE: Noncontrast axial CT scan of head was performed. Angled reformats in brain and bone windows were generated. The images were reviewed in bone, brain, blood and soft tissue windows.   FINDINGS: CSF Spaces: The ventricles, sulci and basal cisterns are concordant with patient's age and degree of global parenchymal atrophy.. There is no extraaxial fluid collection.   Parenchyma: There are hypodense focal areas in the subcortical and periventricular white matter regions consistent with the sequela of chronic microvascular ischemic changes. Punctate remote left thalamic and anterior limb internal capsule lacunar infarcts. Otherwise, the grey-white differentiation is overall preserved. There is no mass effect or midline shift.  There is no intracranial hemorrhage.   Calvarium: The calvarium is intact.   Paranasal sinuses and mastoids: Visualized paranasal sinuses and mastoids are clear.       1. No evidence of acute cortical infarct or intracranial hemorrhage. 2. Subcortical and periventricular white matter hypodensities consistent with the sequela of chronic microvascular ischemic changes. 3. Punctate remote left thalamic and anterior basal ganglia lacunar infarcts.     I personally reviewed the images/study and I agree with the findings as stated. This study was interpreted at [place], [place].   MACRO: None   Signed by: Tyron Dixon 10/11/2023 9:52 PM Dictation workstation:   [id number]    XR chest 2 views    Result Date: 10/11/2023  Interpreted By:  Tyron Dixon,  Jada Whitehead STUDY: XR CHEST 2 VIEWS;  10/11/2023 9:32 pm   INDICATION: Signs/Symptoms:bilater LE weakness.   COMPARISON: Chest radiograph and CT chest 08/26/2023   ACCESSION NUMBER(S):  [id number]   ORDERING CLINICIAN: TEE BLAND   FINDINGS: PA and lateral radiographs of the chest were provided. New patchy left lower lobe airspace opacities. Consider pneumonia. Pulmonary hyperinflation and coarsened interstitial markings compatible with COPD/emphysema. No pleural effusion or pneumothorax diffuse osteopenia. No acute osseous abnormality. Partially visualized left shoulder arthroplasty. Upper abdomen is unremarkable.       1. New patchy left lower lobe airspace opacities. Consider pneumonia. 2. Findings of COPD/emphysema.   I personally reviewed the images/study and I agree with the findings as stated. This study was interpreted at [place], [place].   MACRO: None   Signed by: Tyron Dixon 10/11/2023 9:42 PM Dictation workstation:   [id number]      Medications:    Current Facility-Administered Medications:     acetaminophen (Tylenol) tablet 650 mg, 650 mg, oral, q8h PRN, Ever Paez MD, 650 mg at 10/29/23 2034    albuterol 2.5 mg /3 mL (0.083 %) nebulizer solution 2.5 mg, 2.5 mg, nebulization, 4x daily, Ever Paez MD, 2.5 mg at 10/31/23 2138    calcium gluconate in NS IV 2 g, 2 g, intravenous, Once, Ever Paez MD    dextrose 10 % in water (D10W) infusion, 0.3 g/kg/hr, intravenous, Once PRN, Virgil Olivo MD, Stopped at 10/18/23 0800    dextrose 50 % injection 25 g, 25 g, intravenous, q15 min PRN, Virgil Olivo MD    ergocalciferol (Vitamin D-2) drops 8,000 Units, 8,000 Units, oral, Daily, Ever Paez MD, 8,000 Units at 10/31/23 0843    esomeprazole (NexIUM) suspension 40 mg, 40 mg, nasoduodenal tube, Daily before breakfast, Tia Manuel MD, 40 mg at 10/31/23 0843    folic acid (Folvite) tablet 1 mg, 1 mg, oral, Daily, Ever Paez MD, 1 mg at 10/31/23 0843    glucagon (Glucagen) injection 1 mg, 1 mg, intramuscular, q15 min PRN, Virgil Olivo MD    heparin (porcine) injection 2,000-4,000 Units,  2,000-4,000 Units, intravenous, q4h PRN, Virgil Olivo MD, 2,000 Units at 10/25/23 0857    heparin 25,000 Units in dextrose 5% 250 mL (100 Units/mL) infusion (premix), 0-4,500 Units/hr, intravenous, Continuous, Rosa Gary MD, Last Rate: 12 mL/hr at 10/31/23 2110, 1,200 Units/hr at 10/31/23 2110    lidocaine 4 % patch 1 patch, 1 patch, transdermal, Daily, Ever Paez MD, 1 patch at 10/31/23 0843    melatonin tablet 10 mg, 10 mg, oral, Nightly, Tia Manuel MD, 10 mg at 10/31/23 2220    meropenem (Merrem) 2 g in sodium chloride 0.9 % 100 mL IV, 2 g, intravenous, q12h, Porfirio Riojas MD, Stopped at 11/01/23 0532    multivitamin with minerals 1 tablet, 1 tablet, oral, Daily, Virgil Olivo MD, 1 tablet at 10/31/23 0843    nicotine (Nicoderm CQ) 21 mg/24 hr patch 1 patch, 1 patch, transdermal, Daily, Virgil Olivo MD, 1 patch at 10/31/23 0844    norepinephrine (Levophed) 8 mg in dextrose 5% 250 mL (0.032 mg/mL) infusion (premix), 0-3.3 mcg/kg/min, intravenous, Continuous, Porfirio Riojas MD, Last Rate: 3.73 mL/hr at 11/01/23 0615, 0.04 mcg/kg/min at 11/01/23 0615    oxyCODONE (Roxicodone) immediate release tablet 5 mg, 5 mg, oral, q8h PRN, Ever Paez MD, 5 mg at 10/28/23 0232    oxygen (O2) therapy, , inhalation, Continuous PRN - O2/gases, Will Rojo MD, Start at 10/31/23 1521    perflutren lipid microspheres (Definity) injection 0.5-10 mL of dilution, 0.5-10 mL of dilution, intravenous, Once in imaging, Yancy Freeman MD    perflutren protein A microsphere (Optison) injection 0.5 mL, 0.5 mL, intravenous, Once in imaging, Yancy Freeman MD    polyethylene glycol (Glycolax, Miralax) packet 17 g, 17 g, oral, Daily, Tia Manuel MD, 17 g at 10/31/23 0843    PrismaSol BGK 2/3.5 CRRT solution, 25 mL/kg/hr, CRRT, Continuous, Sofia Paz MD, Last Rate: 1,242.5 mL/hr at 11/01/23 0038, 25 mL/kg/hr at 11/01/23 0038    sennosides (Senokot) tablet 8.6 mg, 1 tablet, oral, Nightly  PRN, Virgil Olivo MD    sodium chloride 7 % nebulizer solution 3 mL, 3 mL, nebulization, q12h, Yancy Freeman MD    sulfur hexafluoride microsphr (Lumason) injection 24.28 mg, 2 mL, intravenous, Once in imaging, Yancy Freeman MD    thiamine (Vitamin B-1) tablet 100 mg, 100 mg, oral, Daily, Ever Paez MD, 100 mg at 10/31/23 0843    white petrolatum (Aquaphor) 41 % ointment 1 Application, 1 Application, Topical, q1h PRN, Ever Paez MD            Assessment/Plan   ASSESSMENT & PLAN:  Molina Godinez is a 68 y.o. male with PMHx of COPD, HTN, AUD and opioid use disorder, HCV (treated), chronic PE who presented originally on 10/9 with inability to walk. Briefly in MICU for c/f alcohol withdrawal and high CIWA scores, transferred to floor, eventually readmitted to MICU 10/16 for increased O2 requirements, put on Airvo and intubated 10/20. Bronch done with resp cx growing stenotrophomonas for which he completed a course of Bactrim. Pt was extubated on 10/27. Also with SIADH (now resolved, unclear etiology, potentially just his severe pulmonary disease); nephrology consulted and was initially treated with fluid restriction, urea packets and Lasix, but now off all of these for significant increase BUN to 180s. CVVH started 10/31 for uremia and fluid removal. Now with worsening complete left lung opacification and new O2 requirement, likely 2/2 mucus plugging vs pulmonary edema vs pneumonia (low suspicion). Also with hypotension to MAPs in 50s, on Levophed gtt. 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      Principal Problem:    Hospital-acquired pneumonia  Active Problems:    Essential hypertension, benign    Hepatitis C virus infection cured after antiviral drug therapy    Alcoholism (CMS/HCC)    Chronic pulmonary embolism (CMS/HCC)    COPD (chronic obstructive pulmonary disease) (CMS/HCC)    HLD (hyperlipidemia)     NEURO/PSYCH  #AMS likely 2/2 uremic encephalopathy vs chronic  hypoxia   - Previously intubated/sedated, extubated on 10/27.   - CT head without pathology from 10/11  - Initially had visual and tactile hallucinations, have since resolved. Suspect metabolic etiology of fluctuations in mental status, i/s/o acutely increased oxygen requirement.  - Improving mentation on CVVH with concurrent decrease in serum BUN     #Bilateral LE weakness  -Likely chronic, had weakness during 8/2023 admission; less likely GBS  -CT head from 10/11 without pathology, MRI spine stopped early due to pain (on 10/11)  -Patient able to move bilateral lower extremities  -Repeat MRI brain/total spine on 10/20 unremarkable for acute process  PLAN:  -May follow up with neurology outpatient  -ID consulted regarding weakness and positive Syphilis Ab. Pt was treated back in 1998 but rec MRI spine given concern of abscess, MRI negative for acute process  -Neurology consulted. Suspicion that weakness is likely a peripheral (LMN + muscle) inflammatory or degenerative disorder considering distribution and chronology.      - recommend EMG/NGS after patient is downgraded      - low suspicion for GBS     CV  #TV endocarditis  - TTE 10/17/23: LVEF 75%, LV concentric modeling, mildly increased LV septal wall thickness, atrial septal aneurysm, mildly elevated RSVP 39  - TTE 10/31/23: same as above with the following exceptions: 1.7 x 0.8 cm mobile echodensity of posterior tricuspid leaflet, mod-severe TR, RSVP increased to 57  - antibiotics as in ID section  - cardiology and ID consulted, appreciate their recommendations    #Hypotension, asymptomatic   - Etiology includes infection vs volume depletion    - 10/31 MAP to 50s   - Levophed gtt for MAP > 60-70    #HTN- resolved  - home amlodipine 5mg, atenolol 50mg  - Initially started on Cardene drip, transitioned to Labetalol prior to resolution  - home atenolol restarted on 10/28, switched to metop 12.5 BID on 10/29 I/s/o renal function     #Tachycardia  - Sinus rhythm, HR  chronically in 120-130s   - Etiology includes pain, infection, hypovolemia, uremia  - Poor correlation with fentanyl pushes, less suspicious for pain etiology  - previously on metoprolol 12.5mg BID     #CAD  - home atorvastatin 40mg held in the setting of elevated transaminases      #Chest pain- resolved  - New onset chest pain 10/28, had hyperkalemia on RFP  - RFP rechecked, K wnl  - Trop 14  - ECG without acute changes, stable from prior  - Given sublingual nitroglycerin, but had resolution of pain prior to administration.  - Some concern of uremic pericarditis but unlikely. No pericardial rub appreciated on exam.  - continue to monitor     PULM  #CAP- resolved  -Urine strep positive, unsure if active or chronic infection  -MRSA negative, Urine legionella negative  -CTX 5 day course completed 10/17     #L lung consolidation, now complete opacification  #Steno HAP  #Increased O2 requirements - improving  - Initially treated for CAP given positive urine strep with CTX 10/13-10/17, one day of Zosyn10/17-10/17  - Leukocytosis stable ~24  - C/f HAP vs incomplete resolution of previous PNA  -S/p Bronch 10/20 with purulent fluid with bronchial washings sent for cx  -Bronchial washings with stenotrophomonas susceptible to Bactrim  -Bactrim 10/22-10/28  -CXR 10/31 showing complete opacification of left lung worse from prior, likely 2/2 mucus plugging vs pulmonary edema vs pneumonia  -CXR 11/1 showing slight improvement in JOSE ANTONIO aeration  -10/31 respiratory sputum cx with salivary contamination   -HFNC 40L/50% FiO2, slowly coming down on O2 requirements  -Low concern for pneumonia as patient is afebrile, does have leukocytosis but this has been chronic during this hospitalization. Negative lactate. Also tachy and hypotensive however HR has been chronically elevated  -Repeat CXR 11/2 AM, pending results may consider bronchoscopy 11/2  -Will send repeat respiratory cultures during bronch     #Severe COPD  #Mucus plugging  -  8/2023 FEV1 42%  - CT C/A/P with RLL mucus plug  - CXR 10/28 with L sided pleural effusion vs mucus plugging  - CXR 10/31 with complete opacification of left lung  - CXR 11/1 showing slight improvement in JOSE ANTONIO aeration  - Continue aggressive RT: Albuterol 2.5 mg q4h, chest PT, respiratory hygiene  - Ezpap, incentive spirometer  - 7% NS neb q6h  - Encourage sitting, mobility as able. PT following    #Suspected PAH  - 10/17/23 Echo with atrial septal aneurysm, positive bubble study  - Will need outpatient workup, previously some suspicion for PAH but will need further evaluation     #Subsegmental PE  -Requires anticoagulation for 3 months (per vasc med 8/2023), on home apixaban 5mg BID  PLAN:  -Heparin gtt (will hold 0630 11/2 pending potential bronch)     GI  #HepC  -treated, 8/2023 HCV RNA undetected     #Nutrition  - Dobhoff in place 10/17  - Nutrition following, appreciate recs  - TF with TwoCal HN at goal rate of 35ml/hr, held pending bronch (will restart post-procedure)  - 100mg Thiamine, 1mg Folic acid, 8000u Vit D2     #Elevated transaminases, hx of treated HCV- increasing, semi stable  - LFTs rising over past few days, predominantly ALP/AST/ALT with normal bili  - RUQ US with steatosis, sludge in GB  - Sulfa drug induced vs intrinsic process, though Bactrim typically takes 2 weeks for hepatotoxicity  -Trend LFTs     /RENAL  #Hyponatremia - Likely SIADH 2/2 COPD exacerbation but unclear if additional etiologies- resolved  - Na as low as 120, slowly improving, 139 on 11/1/23  - check Na and urea BID  - D/c urea packets in setting of BUN of 180s and AMS  - place marie for accurate I&Os in setting of strict fluid monitoring  - Free water flushes 100ml BID for further fluid restriction given hyponatremia thought to be 2/2 SIADH  - 10/31 renal US: negative for obstruction, echogenicities   - CVVH started 10/31: fluid removal net -ve 25 ml/hr   - plan for at least 72h CVVH (10/31-11/3)  - Nephro following,  appreciate recs     #Uremia with c/f uremic encephalopathy - improving   #KRYSTEN likely prerenal   - BUN elevating over past few days up to 180s, likely 2/2 urea packets, KRYSTEN, TF  - Fluid restricted, FeNa 0.4% indicating prerenal KRYSTEN  - Monitor for uremic symptoms: loss of appetite, hypoactive changes in mental status, nausea, pericarditis  - CVVH started 10/31: fluid removal net -ve 25 ml/hr   - plan for at least 72h CVVH (10/31-11/3)     #Hyperkalemia - resolved  - K 7.9, rechecked to 4.7  - 4.4 on 10/29 -> uptrending to 5.4 -> 5.9 10/30 -> s/p 15g lokelma  - K this AM 4.0  - RFP BID     #C/f refeeding syndrome- resolved  - Hypokalemia 2.7, Phos 1.9, Ca 5.2  - Electrolytes repleted, will monitor        ENDOCRINE  #Secondary HyperPTH  -8/28 .7  -25-hydroxy vit D 8, 1,25 Vit D 44.4  PLAN:  Vit D2 8000u liquid daily     ID  #TV endocarditis   #Leukocytosis I/s/o recently treated strep pneumo CAP and new TV endocarditis   - Bronchial washings with stenotrophomonas susceptible to Bactrim   - Completed CTX 10/13-10/17  - Zosyn 10/20-10/24, Bactrim 10/22-10/28, Levaquin 10/23-10/25  - TTE 10/17/23: LVEF 75%, LV concentric modeling, mildly increased LV septal wall thickness, atrial septal aneurysm, mildly elevated RSVP 39  - TTE 10/31/23: same as above with the following exceptions: 1.7 x 0.8 cm mobile echodensity of posterior tricuspid leaflet, mod-severe TR, RSVP increased to 57  - leukocytosis stable at 27, afebrile, HR chronically 120-130s   - CRP 23.6 (11/1) -> continue to trend  - meropenem 2g q12h (11/1-*) and vancomycin dosed per pharmacy (10/31-*)  - cardiology and ID consulted, appreciate their recommendations     HEME/ONC  #Leukocytosis, intermittently ranging 18-24  -Likely 2/2 to uremia vs TV endocarditis   -stable at 27  -Monitor WBC, vitals     MSK/RHEUM   #Chronic LE weakness  -Likely chronic from disuse and alcohol, unlikely GBS since no paralysis  -possible EMG once downgraded, neuro concern for  peripheral (LMN + muscle) inflammatory or degenerative disorder considering distribution and chronology     Vent/Oxy: HFNC at 40L/50%  Pressors/Drips: Levophed at 0.4 mcg/kg  Abx: meropenem & vanc  F: Restricted  E: PRN  N: TF via Dobhoff at 35 ml/hr (held pending possible bronch 11/2, will restart after procedure)  A: PIV R arm x 3, Dobhoff, [place]  DVT ppx: Heparin gtt (held pending bronch, will restart after procedure)  GI ppx: Pantoprazole 40mg daily    Patient was seen and discussed with Dr. Rojo who agrees with the plan as outlined above.    Yancy Freeman MD  Internal Medicine PGY1  11/1/2023 7:09 AM

## 2023-11-02 ENCOUNTER — APPOINTMENT (OUTPATIENT)
Dept: RADIOLOGY | Facility: HOSPITAL | Age: 68
End: 2023-11-02
Payer: COMMERCIAL

## 2023-11-02 ENCOUNTER — APPOINTMENT (OUTPATIENT)
Dept: CARDIOLOGY | Facility: HOSPITAL | Age: 68
End: 2023-11-02
Payer: COMMERCIAL

## 2023-11-02 LAB
ALBUMIN SERPL BCP-MCNC: 2.9 G/DL (ref 3.4–5)
ALBUMIN SERPL BCP-MCNC: 2.9 G/DL (ref 3.4–5)
ANION GAP SERPL CALC-SCNC: 17 MMOL/L (ref 10–20)
ANION GAP SERPL CALC-SCNC: 17 MMOL/L (ref 10–20)
ATRIAL RATE: 141 BPM
BACTERIA UR CULT: NO GROWTH
BASOPHILS # BLD AUTO: 0.09 X10*3/UL (ref 0–0.1)
BASOPHILS NFR BLD AUTO: 0.4 %
BUN SERPL-MCNC: 47 MG/DL (ref 6–23)
BUN SERPL-MCNC: 63 MG/DL (ref 6–23)
C DIF TOX TCDA+TCDB STL QL NAA+PROBE: NOT DETECTED
CA-I BLD-SCNC: 1.25 MMOL/L (ref 1.1–1.33)
CALCIUM SERPL-MCNC: 8.8 MG/DL (ref 8.6–10.6)
CALCIUM SERPL-MCNC: 9.3 MG/DL (ref 8.6–10.6)
CHLORIDE SERPL-SCNC: 100 MMOL/L (ref 98–107)
CHLORIDE SERPL-SCNC: 99 MMOL/L (ref 98–107)
CO2 SERPL-SCNC: 24 MMOL/L (ref 21–32)
CO2 SERPL-SCNC: 26 MMOL/L (ref 21–32)
CREAT SERPL-MCNC: 0.65 MG/DL (ref 0.5–1.3)
CREAT SERPL-MCNC: 0.9 MG/DL (ref 0.5–1.3)
CRP SERPL-MCNC: 21.73 MG/DL
EOSINOPHIL # BLD AUTO: 0.19 X10*3/UL (ref 0–0.7)
EOSINOPHIL NFR BLD AUTO: 0.8 %
ERYTHROCYTE [DISTWIDTH] IN BLOOD BY AUTOMATED COUNT: 13.3 % (ref 11.5–14.5)
GFR SERPL CREATININE-BSD FRML MDRD: >90 ML/MIN/1.73M*2
GFR SERPL CREATININE-BSD FRML MDRD: >90 ML/MIN/1.73M*2
GLUCOSE BLD MANUAL STRIP-MCNC: 104 MG/DL (ref 74–99)
GLUCOSE BLD MANUAL STRIP-MCNC: 110 MG/DL (ref 74–99)
GLUCOSE BLD MANUAL STRIP-MCNC: 114 MG/DL (ref 74–99)
GLUCOSE BLD MANUAL STRIP-MCNC: 117 MG/DL (ref 74–99)
GLUCOSE BLD MANUAL STRIP-MCNC: 95 MG/DL (ref 74–99)
GLUCOSE BLD MANUAL STRIP-MCNC: 96 MG/DL (ref 74–99)
GLUCOSE SERPL-MCNC: 133 MG/DL (ref 74–99)
GLUCOSE SERPL-MCNC: 93 MG/DL (ref 74–99)
HCT VFR BLD AUTO: 37.2 % (ref 41–52)
HGB BLD-MCNC: 11.9 G/DL (ref 13.5–17.5)
IMM GRANULOCYTES # BLD AUTO: 0.26 X10*3/UL (ref 0–0.7)
IMM GRANULOCYTES NFR BLD AUTO: 1.1 % (ref 0–0.9)
LYMPHOCYTES # BLD AUTO: 1.02 X10*3/UL (ref 1.2–4.8)
LYMPHOCYTES NFR BLD AUTO: 4.2 %
MAGNESIUM SERPL-MCNC: 2.15 MG/DL (ref 1.6–2.4)
MAGNESIUM SERPL-MCNC: 2.24 MG/DL (ref 1.6–2.4)
MCH RBC QN AUTO: 29.2 PG (ref 26–34)
MCHC RBC AUTO-ENTMCNC: 32 G/DL (ref 32–36)
MCV RBC AUTO: 91 FL (ref 80–100)
MONOCYTES # BLD AUTO: 1.43 X10*3/UL (ref 0.1–1)
MONOCYTES NFR BLD AUTO: 5.9 %
NEUTROPHILS # BLD AUTO: 21.06 X10*3/UL (ref 1.2–7.7)
NEUTROPHILS NFR BLD AUTO: 87.6 %
NRBC BLD-RTO: 0 /100 WBCS (ref 0–0)
P AXIS: 88 DEGREES
P OFFSET: 188 MS
P ONSET: 148 MS
PHOSPHATE SERPL-MCNC: 2.4 MG/DL (ref 2.5–4.9)
PHOSPHATE SERPL-MCNC: 4 MG/DL (ref 2.5–4.9)
PLATELET # BLD AUTO: 322 X10*3/UL (ref 150–450)
POTASSIUM SERPL-SCNC: 3.6 MMOL/L (ref 3.5–5.3)
POTASSIUM SERPL-SCNC: 4.1 MMOL/L (ref 3.5–5.3)
PR INTERVAL: 130 MS
Q ONSET: 213 MS
QRS COUNT: 23 BEATS
QRS DURATION: 86 MS
QT INTERVAL: 276 MS
QTC CALCULATION(BAZETT): 422 MS
QTC FREDERICIA: 367 MS
R AXIS: -46 DEGREES
RBC # BLD AUTO: 4.08 X10*6/UL (ref 4.5–5.9)
SODIUM SERPL-SCNC: 137 MMOL/L (ref 136–145)
SODIUM SERPL-SCNC: 138 MMOL/L (ref 136–145)
T AXIS: 96 DEGREES
T OFFSET: 351 MS
UFH PPP CHRO-ACNC: 0.2 IU/ML
UFH PPP CHRO-ACNC: 0.3 IU/ML
VANCOMYCIN SERPL-MCNC: 10.9 UG/ML (ref 5–20)
VENTRICULAR RATE: 141 BPM
WBC # BLD AUTO: 24.1 X10*3/UL (ref 4.4–11.3)

## 2023-11-02 PROCEDURE — 2500000002 HC RX 250 W HCPCS SELF ADMINISTERED DRUGS (ALT 637 FOR MEDICARE OP, ALT 636 FOR OP/ED): Performed by: STUDENT IN AN ORGANIZED HEALTH CARE EDUCATION/TRAINING PROGRAM

## 2023-11-02 PROCEDURE — 82947 ASSAY GLUCOSE BLOOD QUANT: CPT

## 2023-11-02 PROCEDURE — 71045 X-RAY EXAM CHEST 1 VIEW: CPT

## 2023-11-02 PROCEDURE — 99233 SBSQ HOSP IP/OBS HIGH 50: CPT | Performed by: STUDENT IN AN ORGANIZED HEALTH CARE EDUCATION/TRAINING PROGRAM

## 2023-11-02 PROCEDURE — 94640 AIRWAY INHALATION TREATMENT: CPT

## 2023-11-02 PROCEDURE — 83735 ASSAY OF MAGNESIUM: CPT

## 2023-11-02 PROCEDURE — 80069 RENAL FUNCTION PANEL: CPT

## 2023-11-02 PROCEDURE — 82330 ASSAY OF CALCIUM: CPT

## 2023-11-02 PROCEDURE — 2500000004 HC RX 250 GENERAL PHARMACY W/ HCPCS (ALT 636 FOR OP/ED)

## 2023-11-02 PROCEDURE — 2500000001 HC RX 250 WO HCPCS SELF ADMINISTERED DRUGS (ALT 637 FOR MEDICARE OP)

## 2023-11-02 PROCEDURE — 2500000001 HC RX 250 WO HCPCS SELF ADMINISTERED DRUGS (ALT 637 FOR MEDICARE OP): Performed by: STUDENT IN AN ORGANIZED HEALTH CARE EDUCATION/TRAINING PROGRAM

## 2023-11-02 PROCEDURE — 2020000001 HC ICU ROOM DAILY

## 2023-11-02 PROCEDURE — 80202 ASSAY OF VANCOMYCIN: CPT

## 2023-11-02 PROCEDURE — 2500000004 HC RX 250 GENERAL PHARMACY W/ HCPCS (ALT 636 FOR OP/ED): Performed by: INTERNAL MEDICINE

## 2023-11-02 PROCEDURE — 90937 HEMODIALYSIS REPEATED EVAL: CPT

## 2023-11-02 PROCEDURE — 86140 C-REACTIVE PROTEIN: CPT

## 2023-11-02 PROCEDURE — 36415 COLL VENOUS BLD VENIPUNCTURE: CPT

## 2023-11-02 PROCEDURE — S4991 NICOTINE PATCH NONLEGEND: HCPCS | Performed by: STUDENT IN AN ORGANIZED HEALTH CARE EDUCATION/TRAINING PROGRAM

## 2023-11-02 PROCEDURE — 93005 ELECTROCARDIOGRAM TRACING: CPT

## 2023-11-02 PROCEDURE — 31720 CLEARANCE OF AIRWAYS: CPT

## 2023-11-02 PROCEDURE — 85025 COMPLETE CBC W/AUTO DIFF WBC: CPT

## 2023-11-02 PROCEDURE — 2500000005 HC RX 250 GENERAL PHARMACY W/O HCPCS: Performed by: PEDIATRICS

## 2023-11-02 PROCEDURE — 87493 C DIFF AMPLIFIED PROBE: CPT | Performed by: STUDENT IN AN ORGANIZED HEALTH CARE EDUCATION/TRAINING PROGRAM

## 2023-11-02 PROCEDURE — 85520 HEPARIN ASSAY: CPT

## 2023-11-02 PROCEDURE — 71045 X-RAY EXAM CHEST 1 VIEW: CPT | Performed by: RADIOLOGY

## 2023-11-02 PROCEDURE — 2500000005 HC RX 250 GENERAL PHARMACY W/O HCPCS

## 2023-11-02 PROCEDURE — 94668 MNPJ CHEST WALL SBSQ: CPT

## 2023-11-02 PROCEDURE — 93321 DOPPLER ECHO F-UP/LMTD STD: CPT

## 2023-11-02 PROCEDURE — 93321 DOPPLER ECHO F-UP/LMTD STD: CPT | Performed by: INTERNAL MEDICINE

## 2023-11-02 PROCEDURE — 2500000002 HC RX 250 W HCPCS SELF ADMINISTERED DRUGS (ALT 637 FOR MEDICARE OP, ALT 636 FOR OP/ED)

## 2023-11-02 PROCEDURE — 99291 CRITICAL CARE FIRST HOUR: CPT

## 2023-11-02 PROCEDURE — 90945 DIALYSIS ONE EVALUATION: CPT | Performed by: INTERNAL MEDICINE

## 2023-11-02 PROCEDURE — 93325 DOPPLER ECHO COLOR FLOW MAPG: CPT | Performed by: INTERNAL MEDICINE

## 2023-11-02 PROCEDURE — 93308 TTE F-UP OR LMTD: CPT | Performed by: INTERNAL MEDICINE

## 2023-11-02 RX ORDER — POTASSIUM CHLORIDE 1.5 G/1.58G
40 POWDER, FOR SOLUTION ORAL EVERY 4 HOURS
Status: DISCONTINUED | OUTPATIENT
Start: 2023-11-02 | End: 2023-11-02

## 2023-11-02 RX ORDER — POTASSIUM CHLORIDE 20 MEQ/1
40 TABLET, EXTENDED RELEASE ORAL EVERY 4 HOURS
Status: DISCONTINUED | OUTPATIENT
Start: 2023-11-02 | End: 2023-11-02

## 2023-11-02 RX ORDER — METOPROLOL TARTRATE 1 MG/ML
5 INJECTION, SOLUTION INTRAVENOUS ONCE
Status: DISCONTINUED | OUTPATIENT
Start: 2023-11-02 | End: 2023-11-02

## 2023-11-02 RX ORDER — ALBUTEROL SULFATE 0.83 MG/ML
2.5 SOLUTION RESPIRATORY (INHALATION)
Status: DISCONTINUED | OUTPATIENT
Start: 2023-11-02 | End: 2023-11-30 | Stop reason: HOSPADM

## 2023-11-02 RX ORDER — OXYCODONE HYDROCHLORIDE 5 MG/1
5 TABLET ORAL ONCE
Status: COMPLETED | OUTPATIENT
Start: 2023-11-02 | End: 2023-11-02

## 2023-11-02 RX ADMIN — SODIUM CHLORIDE, POTASSIUM CHLORIDE, SODIUM LACTATE AND CALCIUM CHLORIDE 500 ML: 600; 310; 30; 20 INJECTION, SOLUTION INTRAVENOUS at 17:53

## 2023-11-02 RX ADMIN — ESOMEPRAZOLE MAGNESIUM 40 MG: 40 FOR SUSPENSION ORAL at 08:12

## 2023-11-02 RX ADMIN — Medication 3 ML: at 07:58

## 2023-11-02 RX ADMIN — Medication 8000 UNITS: at 08:12

## 2023-11-02 RX ADMIN — VANCOMYCIN HYDROCHLORIDE 500 MG: 500 INJECTION, SOLUTION INTRAVENOUS at 15:17

## 2023-11-02 RX ADMIN — FOLIC ACID 1 MG: 1 TABLET ORAL at 08:11

## 2023-11-02 RX ADMIN — OXYCODONE HYDROCHLORIDE 5 MG: 5 TABLET ORAL at 19:46

## 2023-11-02 RX ADMIN — CALCIUM CHLORIDE, MAGNESIUM CHLORIDE, DEXTROSE MONOHYDRATE, LACTIC ACID, SODIUM CHLORIDE, SODIUM BICARBONATE AND POTASSIUM CHLORIDE 25 ML/KG/HR: 3.68; 3.05; 22; 5.4; 6.46; 3.09; .314 INJECTION INTRAVENOUS at 09:01

## 2023-11-02 RX ADMIN — THIAMINE HCL TAB 100 MG 100 MG: 100 TAB at 08:11

## 2023-11-02 RX ADMIN — Medication 1 TABLET: at 09:00

## 2023-11-02 RX ADMIN — MEROPENEM 2 G: 1 INJECTION, POWDER, FOR SOLUTION INTRAVENOUS at 17:02

## 2023-11-02 RX ADMIN — Medication 6 L/MIN: at 08:00

## 2023-11-02 RX ADMIN — VANCOMYCIN HYDROCHLORIDE 500 MG: 500 INJECTION, SOLUTION INTRAVENOUS at 03:43

## 2023-11-02 RX ADMIN — POTASSIUM CHLORIDE 40 MEQ: 1.5 POWDER, FOR SOLUTION ORAL at 08:28

## 2023-11-02 RX ADMIN — CALCIUM CHLORIDE, MAGNESIUM CHLORIDE, DEXTROSE MONOHYDRATE, LACTIC ACID, SODIUM CHLORIDE, SODIUM BICARBONATE AND POTASSIUM CHLORIDE 25 ML/KG/HR: 3.68; 3.05; 22; 5.4; 6.46; 3.09; .314 INJECTION INTRAVENOUS at 09:00

## 2023-11-02 RX ADMIN — Medication 1 PATCH: at 08:13

## 2023-11-02 RX ADMIN — HEPARIN SODIUM 1300 UNITS/HR: 10000 INJECTION, SOLUTION INTRAVENOUS at 22:29

## 2023-11-02 RX ADMIN — CALCIUM CHLORIDE, MAGNESIUM CHLORIDE, DEXTROSE MONOHYDRATE, LACTIC ACID, SODIUM CHLORIDE, SODIUM BICARBONATE AND POTASSIUM CHLORIDE 25 ML/KG/HR: 5.15; 2.03; 22; 5.4; 6.46; 3.09; .157 INJECTION INTRAVENOUS at 02:31

## 2023-11-02 RX ADMIN — NOREPINEPHRINE BITARTRATE 0.02 MCG/KG/MIN: 8 INJECTION, SOLUTION INTRAVENOUS at 15:17

## 2023-11-02 RX ADMIN — ALBUTEROL SULFATE 2.5 MG: 2.5 SOLUTION RESPIRATORY (INHALATION) at 07:57

## 2023-11-02 RX ADMIN — Medication 10 MG: at 20:56

## 2023-11-02 RX ADMIN — CALCIUM CHLORIDE, MAGNESIUM CHLORIDE, DEXTROSE MONOHYDRATE, LACTIC ACID, SODIUM CHLORIDE, SODIUM BICARBONATE AND POTASSIUM CHLORIDE 25 ML/KG/HR: 3.68; 3.05; 22; 5.4; 6.46; 3.09; .314 INJECTION INTRAVENOUS at 09:02

## 2023-11-02 RX ADMIN — MEROPENEM 2 G: 1 INJECTION, POWDER, FOR SOLUTION INTRAVENOUS at 04:54

## 2023-11-02 RX ADMIN — CALCIUM CHLORIDE, MAGNESIUM CHLORIDE, DEXTROSE MONOHYDRATE, LACTIC ACID, SODIUM CHLORIDE, SODIUM BICARBONATE AND POTASSIUM CHLORIDE 25 ML/KG/HR: 3.68; 3.05; 22; 5.4; 6.46; 3.09; .314 INJECTION INTRAVENOUS at 22:36

## 2023-11-02 RX ADMIN — Medication: at 20:30

## 2023-11-02 RX ADMIN — OXYCODONE HYDROCHLORIDE 5 MG: 5 TABLET ORAL at 12:34

## 2023-11-02 RX ADMIN — OXYCODONE HYDROCHLORIDE 5 MG: 5 TABLET ORAL at 03:43

## 2023-11-02 RX ADMIN — ALBUTEROL SULFATE 2.5 MG: 2.5 SOLUTION RESPIRATORY (INHALATION) at 20:15

## 2023-11-02 RX ADMIN — SODIUM CHLORIDE, SODIUM LACTATE, POTASSIUM CHLORIDE, AND CALCIUM CHLORIDE 500 ML: 600; 310; 30; 20 INJECTION, SOLUTION INTRAVENOUS at 00:45

## 2023-11-02 ASSESSMENT — PAIN SCALES - GENERAL
PAINLEVEL_OUTOF10: 9

## 2023-11-02 ASSESSMENT — PAIN SCALES - PAIN ASSESSMENT IN ADVANCED DEMENTIA (PAINAD)
BODYLANGUAGE: RELAXED
TOTALSCORE: 0
FACIALEXPRESSION: SMILING OR INEXPRESSIVE
TOTALSCORE: 0
BREATHING: NORMAL
BREATHING: NORMAL
FACIALEXPRESSION: SMILING OR INEXPRESSIVE
BODYLANGUAGE: RELAXED
CONSOLABILITY: NO NEED TO CONSOLE
CONSOLABILITY: NO NEED TO CONSOLE

## 2023-11-02 ASSESSMENT — PAIN - FUNCTIONAL ASSESSMENT
PAIN_FUNCTIONAL_ASSESSMENT: 0-10
PAIN_FUNCTIONAL_ASSESSMENT: PAINAD (PAIN ASSESSMENT IN ADVANCED DEMENTIA SCALE)
PAIN_FUNCTIONAL_ASSESSMENT: PAINAD (PAIN ASSESSMENT IN ADVANCED DEMENTIA SCALE)

## 2023-11-02 NOTE — PROGRESS NOTES
Subjective Data:  Today, he moves his head no to pain, but endorsed that his abdomen is slightly tender.    Overnight Events:    Seen and assessed this AM, telemetry reviewed, showing very brief runs of SVT lasting for 15 seconds.  Reportedly, he is asymptomatic during the episodes, he received a total of 1 L bolus overnight, and his CVVH ran net even,  EKG done, shows sinus tachycardia.     Objective Data:  Last Recorded Vitals:  Vitals:    11/02/23 0800 11/02/23 0840 11/02/23 0900 11/02/23 1000   BP: 90/58 (!) 60/44 96/63 113/75   Pulse: (!) 118 (!) 122 (!) 123 (!) 120   Resp: 23 25 24 24   Temp:       TempSrc:       SpO2: 100%  100% 100%   Weight:       Height:           Last Labs:  CBC - 11/2/2023:  2:19 AM  24.1 11.9 322    37.2      CMP - 11/2/2023:  2:19 AM  9.3 7.4 413 --- 0.6   4.0 2.9 256 259      PTT - 11/1/2023:  3:32 AM  1.1   12.4 66     TROPHS   Date/Time Value Ref Range Status   10/29/2023 02:00 PM 15 0 - 53 ng/L Final   10/28/2023 09:46 AM 14 0 - 53 ng/L Final   10/11/2023 09:20 PM 22 0 - 53 ng/L Final     BNP   Date/Time Value Ref Range Status   08/27/2023 09:07  0 - 99 pg/mL Final     Comment:     .  <100 pg/mL - Heart failure unlikely  100-299 pg/mL - Intermediate probability of acute heart  .               failure exacerbation. Correlate with clinical  .               context and patient history.    >=300 pg/mL - Heart Failure likely. Correlate with clinical  .               context and patient history.   Biotin interference may cause falsely decreased results.   Patients taking a Biotin dose of up to 5 mg/day should   refrain from taking Biotin for 24 hours before sample   collection. Providers may contact their local laboratory   for further information.     08/26/2023 09:31  0 - 99 pg/mL Final     Comment:     .  <100 pg/mL - Heart failure unlikely  100-299 pg/mL - Intermediate probability of acute heart  .               failure exacerbation. Correlate with clinical  .                context and patient history.    >=300 pg/mL - Heart Failure likely. Correlate with clinical  .               context and patient history.   Biotin interference may cause falsely decreased results.   Patients taking a Biotin dose of up to 5 mg/day should   refrain from taking Biotin for 24 hours before sample   collection. Providers may contact their local laboratory   for further information.       HGBA1C   Date/Time Value Ref Range Status   12/14/2022 11:46 AM 5.9 4.0 - 5.6 % Final   03/30/2021 03:34 PM 5.7 4.0 - 5.6 % Final      Last I/O:  I/O last 3 completed shifts:  In: 2728.9 (54.9 mL/kg) [I.V.:598.9 (12.1 mL/kg); NG/GT:630; IV Piggyback:1500]  Out: 1508 (30.3 mL/kg) [Urine:295 (0.2 mL/kg/hr); Other:1213]  Weight: 49.7 kg     Past Cardiology Tests (Last 3 Years):  Cardiac studies:   EKG - Sinus tach     Echo 10/30/2023:   1. Left ventricular systolic function is hyperdynamic with a 75-80% estimated ejection fraction.   2. Right ventricular volume overload.   3. Moderate to severe tricuspid regurgitation visualized.   4. Moderate to severely elevated right ventricular systolic pressure.   5. There is a mobile echodensity attached to the posterior tricuspid leaflet with independent motion consistent with a vegetation measuring 1.7 x 0.8 cm.   6. Atrial septal aneurysm present.     Echo 10/17/2023:   1. Left ventricular systolic function is hyperdynamic with a 75% estimated ejection fraction.   2. The left ventricular septal wall thickness is mildly increased.   3. There is left ventricular concentric remodeling.   4. Atrial septal aneurysm present.   5. A bubble study using agitated saline was performed. Bubble study is positive.   6. Mildly elevated RVSP.   7. Poorly visualized anatomical structures due to suboptimal image quality.   8. Compared with study from 8/29/2023, the RVSP today is mildly elevated, compared to moderately elevated in the prior echocardiogram (60 mmHg).      Inpatient  Medications:  Scheduled medications   Medication Dose Route Frequency    albuterol  2.5 mg nebulization 4x daily    calcium gluconate  2 g intravenous Once    ergocalciferol  8,000 Units oral Daily    esomeprazole  40 mg nasoduodenal tube Daily before breakfast    folic acid  1 mg oral Daily    lidocaine  1 patch transdermal Daily    melatonin  10 mg oral Nightly    meropenem  2 g intravenous q12h    metoprolol  5 mg intravenous Once    multivitamin with minerals  1 tablet oral Daily    nicotine  1 patch transdermal Daily    polyethylene glycol  17 g oral Daily    potassium chloride  40 mEq oral q4h    sodium chloride  3 mL nebulization q12h    thiamine  100 mg oral Daily    vancomycin  500 mg intravenous q12h     PRN medications   Medication    acetaminophen    dextrose 10 % in water (D10W)    dextrose    glucagon    heparin    oxyCODONE    oxygen    sennosides    white petrolatum     Continuous Medications   Medication Dose Last Rate    [Held by provider] heparin  0-4,500 Units/hr 0 Units/hr (11/02/23 0700)    norepinephrine  0-3.3 mcg/kg/min 0.04 mcg/kg/min (11/02/23 1000)    PrismaSol 4/2.5  25 mL/kg/hr 25 mL/kg/hr (11/02/23 0902)       Physical Exam:  Gen: NAD  CVS: RRR, faint holosystolic murmur at the LLSB, no other murmurs appreciated, trialysis line in RIJ, CVVH suspended  Lungs; BL coarse rhonchi   GI: slightly TTP, but ND   Ext: WWP, no edema   HEENT: Dry MM     Assessment/Plan   68M PMHx HTN, AUD, OUD, HCV s/p rx, chronic PE, COPD, p/w EtOH withdrawal developed AHRF and septic shock 2/2 stenotrophomonas PNA, course c/b KRYSTEN and uremic encephalopathy now on HD. TTE done showing severe TR with TV vegetation for which Cardiology is consulted.      #Native Valve Endocarditis of TV  Pt with mobile echodensity measuring 1.7x0.8mm on post leaflet of TV. Indications for surgery would be large veg (>2cm), recurrent septic pulmonary PE, highly resistant organisms or persistent bacteremia. He has RV volume overload  but preserved RV function on echo.   - CT surgery following to assess surgical candidacy, if considered appropriate candidate then INDIGO would be warranted to assess extent of vegetation, characterization and any other valve involvement. Due to fluctuating O2 requirement, may need intubation prior to INDIGO.   - Agree with ID recommendations for repeat blood clx and IV abx     #SVT   -Very brief short 10 second periods of SVT, max 's, reportedly asymptomatic  -Volume optimization per primary, JVD and CVP through RIJ will be inaccurate in light of mod-severe TR; however, he does appear to be clinically dry (skin turgor, and dry MM).   -After appropriate volume resuscitation, can consider low dose metop 5 IVP x1 to terminate SVT episode if it occurs for longer than 30 seconds or if he becomes symptomatic.  -Can consider switching norepinephrine to phenylephrine      Recommendations are preliminary until note is co-signed by an attending.      Cardiology will follow.     Shaila Amado  Cardiology Fellow   PGY4

## 2023-11-02 NOTE — CONSULTS
Vancomycin Dosing by Pharmacy- Cessation of Therapy    Consult to pharmacy for vancomycin dosing has been discontinued by the prescriber, pharmacy will sign off at this time.    Please call pharmacy if there are further questions or re-enter a consult if vancomycin is resumed.     Tracy Parry, [facility]

## 2023-11-02 NOTE — SIGNIFICANT EVENT
Chart reviewed, per Cardiology notes on review of repeat TTE pt now does not have evidence of TV vegetation and blood cx have remained neg.     In light of this will sign off at this time, please re-engage ID if further assistance is needed.     Discussed w/ Dr. Shane Banks, DO  Infectious Disease Fellow, PGY5  Epic Chat until 5pm/Team A pager [phone]

## 2023-11-02 NOTE — PROGRESS NOTES
Molina Godinez is a 68 y.o. male on day 21 of admission presenting with Hospital-acquired pneumonia.    Subjective   SUBJECTIVE:  Overnight patient had more frequent episodes of unsustained SVT up to HR 180s, at times every 2 minutes. Other vitals WNL. Night MICU fellow and resident performed bedside US, determined he was volume down so patient received total 1L LR and CVVH was switched to net -ve 10cc/hr removal. Per night RN, patient's HR were < 120 after these interventions. This AM, patient with continued episodes of unsustained SVT to HR 170s. K 3.6 so repleted with 40 mEq PO. At bedside, patient is Aox3 and the most alert/communicative he has been in the last few days. Able to ask questions about his medical treatment care. Denies chest pain, SOB, palpitations, abdominal pain.          Objective   OBJECTIVE:  Last Recorded Vitals  BP 95/68   Pulse (!) 113   Temp 36.4 °C (97.5 °F)   Resp 21   Wt 49.7 kg (109 lb 9.1 oz)   SpO2 99%     I&O 24HR    Intake/Output Summary (Last 24 hours) at 11/2/2023 0709  Last data filed at 11/2/2023 0700  Gross per 24 hour   Intake 2327.21 ml   Output 678 ml   Net 1649.21 ml        Physical Exam  Constitutional:       General: He is not in acute distress.     Appearance: He is cachectic. He is ill-appearing. He is not diaphoretic.      Interventions: Nasal cannula in place.   HENT:      Head: Normocephalic and atraumatic.      Right Ear: External ear normal.      Left Ear: External ear normal.      Nose: Nose normal. No congestion or rhinorrhea.      Mouth/Throat:      Mouth: Mucous membranes are dry.      Pharynx: Oropharynx is clear.   Eyes:      General: No scleral icterus.     Extraocular Movements: Extraocular movements intact.      Conjunctiva/sclera: Conjunctivae normal.   Cardiovascular:      Rate and Rhythm: Regular rhythm. Tachycardia present.      Pulses: Normal pulses.      Heart sounds: Normal heart sounds. No murmur heard.     No friction rub. No gallop.    Pulmonary:      Breath sounds: Examination of the left-upper field reveals decreased breath sounds. Examination of the left-middle field reveals decreased breath sounds. Examination of the left-lower field reveals decreased breath sounds. Decreased breath sounds present. No wheezing, rhonchi or rales.      Comments: Poor inspiratory effort  Abdominal:      General: Abdomen is flat. Bowel sounds are normal. There is no distension.      Palpations: Abdomen is soft.      Tenderness: There is no abdominal tenderness.   Musculoskeletal:         General: No swelling or deformity.      Right lower leg: No edema.      Left lower leg: No edema.   Skin:     General: Skin is warm and dry.      Findings: No bruising, lesion or rash.   Neurological:      Mental Status: He is alert and oriented to person, place, and time. Mental status is at baseline.      Motor: Weakness (generalized) present.       Relevant Results    Labs:  Results for orders placed or performed during the hospital encounter of 10/11/23 (from the past 24 hour(s))   POCT GLUCOSE   Result Value Ref Range    POCT Glucose 89 74 - 99 mg/dL   POCT GLUCOSE   Result Value Ref Range    POCT Glucose 82 74 - 99 mg/dL   POCT GLUCOSE   Result Value Ref Range    POCT Glucose 81 74 - 99 mg/dL   Calcium, ionized   Result Value Ref Range    POCT Calcium, Ionized 1.14 1.1 - 1.33 mmol/L   BUN   Result Value Ref Range    Urea Nitrogen 84 (H) 6 - 23 mg/dL   Creatinine, Serum   Result Value Ref Range    Creatinine 1.09 0.50 - 1.30 mg/dL    eGFR 74 >60 mL/min/1.73m*2   Electrolyte panel   Result Value Ref Range    Sodium 138 136 - 145 mmol/L    Potassium 4.0 3.5 - 5.3 mmol/L    Chloride 98 98 - 107 mmol/L    Bicarbonate 24 21 - 32 mmol/L    Anion Gap 20 10 - 20 mmol/L   POCT GLUCOSE   Result Value Ref Range    POCT Glucose 127 (H) 74 - 99 mg/dL   Calcium, ionized   Result Value Ref Range    POCT Calcium, Ionized 1.25 1.1 - 1.33 mmol/L   C-reactive protein   Result Value Ref Range     C-Reactive Protein 21.73 (H) <1.00 mg/dL   CBC and Auto Differential   Result Value Ref Range    WBC 24.1 (H) 4.4 - 11.3 x10*3/uL    nRBC 0.0 0.0 - 0.0 /100 WBCs    RBC 4.08 (L) 4.50 - 5.90 x10*6/uL    Hemoglobin 11.9 (L) 13.5 - 17.5 g/dL    Hematocrit 37.2 (L) 41.0 - 52.0 %    MCV 91 80 - 100 fL    MCH 29.2 26.0 - 34.0 pg    MCHC 32.0 32.0 - 36.0 g/dL    RDW 13.3 11.5 - 14.5 %    Platelets 322 150 - 450 x10*3/uL    Neutrophils % 87.6 40.0 - 80.0 %    Immature Granulocytes %, Automated 1.1 (H) 0.0 - 0.9 %    Lymphocytes % 4.2 13.0 - 44.0 %    Monocytes % 5.9 2.0 - 10.0 %    Eosinophils % 0.8 0.0 - 6.0 %    Basophils % 0.4 0.0 - 2.0 %    Neutrophils Absolute 21.06 (H) 1.20 - 7.70 x10*3/uL    Immature Granulocytes Absolute, Automated 0.26 0.00 - 0.70 x10*3/uL    Lymphocytes Absolute 1.02 (L) 1.20 - 4.80 x10*3/uL    Monocytes Absolute 1.43 (H) 0.10 - 1.00 x10*3/uL    Eosinophils Absolute 0.19 0.00 - 0.70 x10*3/uL    Basophils Absolute 0.09 0.00 - 0.10 x10*3/uL   Renal function panel   Result Value Ref Range    Glucose 93 74 - 99 mg/dL    Sodium 138 136 - 145 mmol/L    Potassium 3.6 3.5 - 5.3 mmol/L    Chloride 99 98 - 107 mmol/L    Bicarbonate 26 21 - 32 mmol/L    Anion Gap 17 10 - 20 mmol/L    Urea Nitrogen 63 (H) 6 - 23 mg/dL    Creatinine 0.90 0.50 - 1.30 mg/dL    eGFR >90 >60 mL/min/1.73m*2    Calcium 9.3 8.6 - 10.6 mg/dL    Phosphorus 4.0 2.5 - 4.9 mg/dL    Albumin 2.9 (L) 3.4 - 5.0 g/dL   Magnesium   Result Value Ref Range    Magnesium 2.15 1.60 - 2.40 mg/dL   POCT GLUCOSE   Result Value Ref Range    POCT Glucose 104 (H) 74 - 99 mg/dL       Imaging:  XR chest 1 view    Result Date: 11/1/2023  Interpreted By:  Gilberto Waldrop, STUDY: XR CHEST 1 VIEW;  11/1/2023 11:54 am   INDICATION: Signs/Symptoms:intubated.   COMPARISON: Radiograph dated 10/31/2023   ACCESSION NUMBER(S): [id number]   ORDERING CLINICIAN: JAMES GARNER   FINDINGS: Enteric tube is in place with the tip outside the field of view. Right  IJ central venous catheter is in place with the tip projecting over lower SVC/cavoatrial junction.   The cardiac silhouette size cannot be assessed as the left heart border is obscured.   Redemonstration of extensive opacification of the left hemithorax with some aerated component in the left upper lung. Right lung remains grossly clear with mild basilar atelectasis. No sizable pneumothorax.   No acute osseous change.       1. Persistent extensive opacification of the left hemithorax more confluent in the left mid and lower lung and with some aerated component in the left upper lung field. Component of volume loss is again seen. Correlate with combination of effusion and consolidation and also correlate with concern for lobar collapse and mucous plugging. 2. Medical appliances as described above       Signed by: Gilberto Solano 11/1/2023 11:59 AM Dictation workstation:   [id number]    XR chest 1 view    Result Date: 10/31/2023  Interpreted By:  Gilberto Waldrop,  and Viv Tong STUDY: XR CHEST 1 VIEW;  10/31/2023 4:50 pm   INDICATION: Signs/Symptoms:Evaluate interval changes in pulmonary infiltrates.   COMPARISON: Same day chest radiograph time stamped 4:21 a.m.; chest radiographs 10/30/2023 and 10/28/2023   ACCESSION NUMBER(S): [id number]   ORDERING CLINICIAN: KASIA PERRY   FINDINGS: Single AP upright portable radiograph of the chest was provided.   Enteric tube courses over the mid thorax and below the diaphragm terminating beyond the field of view inferiorly. Right internal jugular approach central venous catheter tip terminates at the level of the superior cavoatrial junction.   CARDIOMEDIASTINAL SILHOUETTE: There is near total obscuration of the left cardiac border.   LUNGS: Slightly improved aeration of the left upper lung. Similar total dense opacification of the left lower lung zone with possible overlying effusion. There is suggestion of abrupt termination of the left mainstem  bronchus, slightly more proximally than previously visualized on prior radiographs. No sizable pneumothorax. The right lung is relatively clear without focal consolidation or effusion.   ABDOMEN: No remarkable upper abdominal findings.   BONES: No acute osseous changes.       1. Redemonstration of extensive opacification of the left hemithorax with interval slight improvement in the aeration of left upper and mid lung field. Residual extensive consolidation throughout the left lung more dense in the left lower lung field likely due to combination of effusion, atelectasis/collapse and consolidation. Suggestion of abrupt termination of the left mainstem bronchus, slightly more proximally than was previously visualized on prior chest radiographs. Correlate with mucous plugging. 2. Right lung is grossly clear. 3. Medical devices as above.   I personally reviewed the image(s)/study and resident interpretation as dictated by Dr. Alycia Nam MD. I agree with the findings as stated. Data analyzed and images were interpreted at [facility], [place], [place].   MACRO: None   Signed by: Gilberto Solano 10/31/2023 5:23 PM Dictation workstation:   [id number]    Transthoracic Echo (TTE) Limited    Result Date: 10/31/2023   [facility], [address] [place] [address]                Tel [phone] and Fax [phone] TRANSTHORACIC ECHOCARDIOGRAM REPORT  Patient Name:      PIA Franco Physician:    [id number] Mustapha Mazariegos MD Study Date:        10/31/2023           Ordering Provider:    [id number] JOSE FRIEDMAN MRN/PID:           [id number]             Fellow:               [id number] Ever Schuster MD PhD Accession#:        [id number]         Nurse: Date of Birth/Age:  1955 / 68 years Sonographer:          Nani Garcia RDCS Gender:            M                    Additional Staff: Height:            170.18 cm            Admit Date: Weight:            49.44 kg             Admission Status:     Inpatient -                                                               Critical/Stat                                                               (within 1-3 hours) BSA:               1.56 m2              Encounter#:           [id number]                                         Department Location:  [place] [address] MICU Blood Pressure: 128 /84 mmHg Study Type:    TRANSTHORACIC ECHO (TTE) LIMITED Diagnosis/ICD: Bacteremia-R78.81 Indication:    Uremia CPT Code:      Echo Limited-39631; Color Doppler-17776 Patient History: Pertinent History: HTN, Hyperlipidemia and PE. PHTN, opiod dependence. Study Detail: The following Echo studies were performed: 2D and color flow.               Technically challenging study due to patient lying in supine               position and body habitus.  PHYSICIAN INTERPRETATION: Left Ventricle: The left ventricular systolic function is hyperdynamic, with an estimated ejection fraction of 75-80%. There are no regional wall motion abnormalities. The left ventricular cavity size is normal. The left ventricular septal wall thickness is mildly increased. There is mildly increased left ventricular posterior wall thickness. There is left ventricular concentric remodeling. The interventricular septum is flattened in diastole ('D' shaped left ventricle), consistent with right ventricular volume overload. Left ventricular diastolic filling was not assessed. Left Atrium: The left atrium is normal in size. There is evidence of an atrial septal aneurysm. Right Ventricle: The right ventricle is mildly enlarged. There is normal right ventricular global systolic function. Right Atrium: The right atrium is mildly dilated. Aortic Valve: The aortic valve is trileaflet. There is  trivial aortic valve regurgitation. Mitral Valve: The mitral valve is normal in structure. There is trace mitral valve regurgitation. Tricuspid Valve: The tricuspid valve is structurally normal. There is moderate to severe tricuspid regurgitation. The tricuspid valve regurgitant jet flows toward the atrial septum. The Doppler estimated RVSP is moderate to severely elevated at 57.1 mmHg. There is a mobile echodensity attached to the posterior tricuspid leaflet with independent motion consistent with a vegetation measuring 1.7 x 0.8 cm. Pulmonic Valve: The pulmonic valve is structurally normal. There is trace to mild pulmonic valve regurgitation. Pericardium: There is no pericardial effusion noted. Pleural: There is a small left pleural effusion. Aorta: The aortic root is normal. Systemic Veins: The inferior vena cava was not well visualized. There is IVC inspiratory collapse greater than 50%. In comparison to the previous echocardiogram(s): Compared with study from 10/17/2023, the RV is more clearly seen and appears mildly enlarged. There is septal flattening during diastole suggestive of RV volume overload. RVSP is severely elevated (57mmHg). TR degree appears more moderate than mild on current study. This may be due to acute endocarditis of the tricuspid valve.  CONCLUSIONS:  1. Left ventricular systolic function is hyperdynamic with a 75-80% estimated ejection fraction.  2. Right ventricular volume overload.  3. Moderate to severe tricuspid regurgitation visualized.  4. Moderate to severely elevated right ventricular systolic pressure.  5. There is a mobile echodensity attached to the posterior tricuspid leaflet with independent motion consistent with a vegetation measuring 1.7 x 0.8 cm.  6. Atrial septal aneurysm present. QUANTITATIVE DATA SUMMARY: 2D MEASUREMENTS:                          Normal Ranges: IVSd:          1.26 cm   (0.6-1.1cm) LVPWd:         1.28 cm   (0.6-1.1cm) LVIDd:         2.65 cm   (3.9-5.9cm)  LVIDs:         1.74 cm LV Mass Index: 65.1 g/m2 LV % FS        34.3 % RA VOLUME BY A/L METHOD:                       Normal Ranges: RA Area A4C: 20.0 cm2 AORTA MEASUREMENTS:                      Normal Ranges: Ao Sinus, d: 2.60 cm (2.1-3.5cm)  RIGHT VENTRICLE: RV Basal 4.02 cm TRICUSPID VALVE/RVSP:                             Normal Ranges: Peak TR Velocity: 3.68 m/s RV Syst Pressure: 57.1 mmHg (< 30mmHg)  [id number] Mustapha Mazariegos MD Electronically signed on 10/31/2023 at 4:34:25 PM  ** Final **     US renal complete    Result Date: 10/31/2023  Interpreted By:  Jeet Wang and Liller Gregory STUDY: US RENAL COMPLETE;  10/31/2023 2:20 pm   INDICATION: Abnormal renal function.   COMPARISON: Right upper quadrant ultrasound 10/25/2023 CT chest abdomen pelvis 10/16/2023   ACCESSION NUMBER(S): [id number]   ORDERING CLINICIAN: KASIA PERRY   TECHNIQUE: Multiple images of the kidneys were obtained.   FINDINGS: RIGHT KIDNEY: The right kidney measures 10.4 cm in length. The renal cortical echogenicity and thickness are within normal limits. No hydronephrosis is present; no evidence of nephrolithiasis. Cysts are seen within the inferior and superior pole measuring up to 1 x 0.6 x 0.6 and 0.9 x 0.7 x 0.8 cm respectively.   LEFT KIDNEY: The left kidney measures 9.1 cm in length. The renal cortical echogenicity and thickness are within normal limits. No hydronephrosis is present; no evidence of nephrolithiasis.   BLADDER: Bladder is decompressed with Cortez catheter.   Incidental note is made of increased echogenicity of the liver parenchyma, similar to prior examination.       Unremarkable sonographic evaluation of the kidneys.   I personally reviewed the images/study and I agree with the findings as stated above by resident physician, Dr. Timothy Hernández. The study was interpreted at [facility] in [facility].   MACRO: None   Signed by: Jeet Wang 10/31/2023 2:31 PM Dictation  workstation:   [id number]    ECG 12 Lead    Result Date: 10/31/2023  Sinus tachycardia Possible Left atrial enlargement Low voltage QRS Cannot rule out Anterior infarct (cited on or before 31-AUG-2023) Abnormal ECG Confirmed by Terrell Silva ([id number]) on 10/31/2023 1:54:03 PM    XR chest 1 view    Result Date: 10/31/2023  Interpreted By:  Gilberto Waldrop  and Enoc Layton STUDY: XR CHEST 1 VIEW;  10/31/2023 4:24 am   INDICATION: Signs/Symptoms:left lung collapse.   COMPARISON: Radiograph 10/30/2023   ACCESSION NUMBER(S): [id number]   ORDERING CLINICIAN: NAMRATA ALCANTARA   FINDINGS: AP radiograph of the chest was provided.   Dobhoff tube is visualized coursing beneath the diaphragm with the tip beyond the field of view. Right IJ central venous catheter with tip overlying the mid SVC.   CARDIOMEDIASTINAL SILHOUETTE: Complete obscuration of the left heart border due to near-complete opacification of the left lung field.   LUNGS: Similar appearance of complete opacification of the left lung field. Mild amount of air bronchograms are visualized in the left upper lung field. No evidence of right-sided pleural effusion or pneumothorax.   ABDOMEN: No remarkable upper abdominal findings.   BONES: No acute osseous changes.       1.  Similar appearance of complete opacification of the left lung field with small aerated component and air bronchograms in the left upper lung field. Correlate with collapse and consolidation of the left lung and associated pleural effusion. Correlate with mucous plugging. 2. Medical devices/lines as noted above.   I personally reviewed the images/study and I agree with the findings as stated by Kinjal Stubbs MD. This study was interpreted at [place], [place].   MACRO: None.   Signed by: Gilberto Solano 10/31/2023 10:23 AM Dictation workstation:   [id number]    XR chest 1 view    Result Date: 10/31/2023  Interpreted By:   Elia Shaffer and Barbat Antonio STUDY: XR CHEST 1 VIEW;  10/30/2023 10:13 pm   INDICATION: Signs/Symptoms:s/p R trialysis line.   COMPARISON: None.   ACCESSION NUMBER(S): [id number]   ORDERING CLINICIAN: NAMRATA ALCANTARA   FINDINGS: AP radiograph of the chest was provided.   Enteric tube is visualized with the distal tip outside the field of view of this study. Right-sided IJ approach central venous catheter is visualized with the distal tip projecting over the expected region of the distal svc.   CARDIOMEDIASTINAL SILHOUETTE: Interval complete obscuration of the right heart border due to near complete opacification of the left lung field.   LUNGS: Interval near complete opacification of the left lung field, with some aerated lung noted in the upper lung zone. Redemonstration of left-sided volume loss. No evidence of right-sided effusion or focal consolidation.   ABDOMEN: No remarkable upper abdominal findings.   BONES: No acute osseous changes.       1. Interval near complete opacification of the left lung field, with some aerated lung noted in the upper lung zone. Associated obscuration of the left heart border. Findings may be the result of the previously described mucoid impaction in the left mainstem bronchus. 2. Right-sided IJ approach central venous catheter is visualized with the distal tip projecting over the expected region of the distal svc.   I personally reviewed the images/study and I agree with the findings as stated. This study was interpreted at [place], [place].   MACRO: None   Signed by: Elia Shaffer 10/31/2023 9:39 AM Dictation workstation:   [id number]    XR chest 1 view    Result Date: 10/28/2023  Interpreted By:  Nathan Lu, STUDY: XR CHEST 1 VIEW;  10/28/2023 9:24 am   INDICATION: Signs/Symptoms:Atelectasis/Mucous plugging.   COMPARISON: 10/27/2023   ACCESSION NUMBER(S): [id number]   ORDERING CLINICIAN: CORY HILTON    FINDINGS: Status post extubation. Question left-sided endobronchial disease/mucoid impaction   CARDIOMEDIASTINAL SILHOUETTE: Left-sided mediastinal shift   LUNGS: Left basilar consolidation/effusions. Correlate with underlying volume loss   ABDOMEN: No remarkable upper abdominal findings.   BONES: No acute osseous changes.       1.  Status post extubation. Continued left basilar consolidation/effusion and correlate with underlying pneumonia/aspiration     Signed by: Nathan Lu 10/28/2023 11:55 AM Dictation workstation:   [id number]    XR chest 1 view    Result Date: 10/27/2023  Interpreted By:  Nathan Lu  and Viv Tong STUDY: XR CHEST 1 VIEW;  10/27/2023 9:26 am   INDICATION: Signs/Symptoms:Failed SBT, recurrent secretions, pneumonia.   COMPARISON: Chest radiographs 10/25/2023 and 10/24/2023, CT chest abdomen pelvis 10/16/2023.   ACCESSION NUMBER(S): [id number]   ORDERING CLINICIAN: THOMAS AU   FINDINGS: Single AP semi-upright portable radiograph of the chest was provided.   Endotracheal tube tip terminates 6.1 cm above the crispin. Enteric tube overlies the mid thorax with tip terminating at the level of the gastric antrum.   CARDIOMEDIASTINAL SILHOUETTE: The left border of the cardiomediastinal silhouette is obscured. Otherwise stable appearance of the cardiac silhouette.   LUNGS: Interval development of a moderate to large left-sided pleural effusion with associated overlying compressive atelectasis. The right lung is clear. No pneumothorax.   ABDOMEN: No remarkable upper abdominal findings.   BONES: No acute osseous changes.       1. Interval development of moderate to large left-sided pleural effusion with associated atelectasis. Correlate with developing left basilar pneumonia/atelectasis/aspiration follow-up to resolution recommended 2. Medical devices as above.   I personally reviewed the image(s)/study and resident interpretation as dictated by Dr. Alycia Nam MD. I  agree with the findings as stated. Data analyzed and images were interpreted at [facility], [place], [place].   MACRO: None   Signed by: Nathan Lu 10/27/2023 10:07 AM Dictation workstation:   [id number]    ECG 12 lead    Result Date: 10/27/2023  Sinus tachycardia Left axis deviation Low voltage QRS  in limb leads Cannot rule out Anterior infarct Cannot rule out Inferior infarct Abnormal ECG Confirmed by Terrell Silva ([id number]) on 10/27/2023 8:30:52 AM    US right upper quadrant    Result Date: 10/25/2023  Interpreted By:  Jason Means and Liller Gregory STUDY: US RIGHT UPPER QUADRANT;  10/25/2023 3:19 pm   INDICATION: Signs/Symptoms:New RUQ abdominal pain with newly elevated LFTs.   COMPARISON: Right upper quadrant ultrasound 08/27/2023   ACCESSION NUMBER(S): [id number]   ORDERING CLINICIAN: ANTONI ARNDT   TECHNIQUE: Multiple images of the right upper quadrant were obtained.   FINDINGS: LIVER: The liver is mildly enlarged measuring up to 18 cm in maximal craniocaudal dimension. The liver parenchyma is diffusely coarsened echotexture. Liver contour is smooth.   GALLBLADDER: The gallbladder is mildly distended. Sludge seen layering within the dependent portion of the gallbladder. There is no evidence of gallbladder wall thickening or pericholecystic edema. Gallbladder wall measures up to 2 mm. No cholelithiasis.   BILIARY TREE: No intra or extrahepatic biliary dilatation is identified. The common bile duct measures 0.36 cm.   PANCREAS: The pancreas is poorly visualized due to overlying bowel gas.   RIGHT KIDNEY: The right kidney is normal in size, measuring 10 cm in craniocaudal dimension. The renal cortical echogenicity and thickness are within normal limits. No hydronephrosis or renal calculi are seen. Small renal cysts seen within the inferior pole measuring up to 1.0 x 0.7 x 0.8 cm.       1. Mild hepatomegaly with the diffuse coarse echotexture of the liver  parenchyma. Correlate with liver function tests for liver parenchymal disease. 2. Gallbladder sludge is seen layering within the dependent portion of the gallbladder without evidence of cholecystitis.   I personally reviewed the images/study and I agree with the findings as stated above by resident physician, Dr. Timothy Hernández. The study was interpreted at [facility] in [facility].   MACRO: None   Signed by: Jason Means 10/25/2023 10:31 PM Dictation workstation:   [id number]    XR abdomen 1 view    Result Date: 10/25/2023  Interpreted By:  Nathan Lu and Sullivan Shannon STUDY: XR ABDOMEN 1 VIEW;  10/25/2023 1:00 pm   INDICATION: Signs/Symptoms:new abdominal pain.   COMPARISON: Correlation with CT chest abdomen pelvis 10/16/2023, KUB 10/17/2023   ACCESSION NUMBER(S): [id number]   ORDERING CLINICIAN: ANTONI ARNDT   FINDINGS: Single frontal radiograph of the abdomen was provided.   Enteric tube courses midline below the level of the diaphragm with the tip projecting over the gastric antrum.     Relative paucity of small bowel gas in an overall nonobstructive bowel gas pattern, decreased compared to prior exam 10/17/2023 and which may reflect decompressed or fluid-filled bowel loops. Limited evaluation of pneumoperitoneum on supine imaging, however no gross evidence of free air is noted.   Visualized lungs are clear.   Osseous structures demonstrate no acute bony changes.       1.  Decreased degree of small bowel gas compared to prior radiograph, which may reflect decompressed or fluid-filled bowel loops. Overall nonobstructive bowel gas pattern. 2. Enteric tube as above.     I personally reviewed the images/study and I agree with the findings as stated. This study was interpreted at [facility], [place], [place].   Signed by: Nathan Lu 10/25/2023 3:50 PM Dictation workstation:   [id number]    XR chest 1  view    Result Date: 10/25/2023  Interpreted By:  Gilberto Waldrop, STUDY: XR CHEST 1 VIEW;  10/25/2023 7:41 am   INDICATION: Signs/Symptoms:worsening secretions and oxygenation level.   COMPARISON: Radiograph dated 10/24/2023   ACCESSION NUMBER(S): [id number]   ORDERING CLINICIAN: DEBORAH YEAGER   FINDINGS: ET tube is terminating 7.6 cm from the crispin. Enteric tube is in place with the tip projecting over proximal duodenum.   Cardiac silhouette size is grossly stable.   Interval improvement in the aeration of the left lung with residual left basilar consolidation and patchy airspace opacity in the left mid and upper lung. Right lung is grossly clear. No sizable pneumothorax.   No acute osseous abnormality.       1. Persistent left basilar pleural effusion and atelectasis/infiltrate with slight improvement in the aeration of the left lung. Correlate with left basilar/left lower lobe atelectasis/collapse and mucous plugging. 2. Residual patchy airspace opacity in the left mid and upper lung which may be due to edema or infiltrate. 3. Medical appliances as described above       Signed by: Gilberto Solano 10/25/2023 12:41 PM Dictation workstation:   [id number]    XR chest 1 view    Result Date: 10/24/2023  Interpreted By:  Gilberto Waldrop, STUDY: XR CHEST 1 VIEW;  10/24/2023 2:49 pm   INDICATION: Signs/Symptoms:Evaluate interval status of lungs.   COMPARISON: Radiograph dated 10/21/2023   ACCESSION NUMBER(S): [id number]   ORDERING CLINICIAN: KASIA PERRY   FINDINGS: ET tube is terminating 5.8 cm from the crispin. Enteric tube is in place with the tip outside of the liver.   The cardiac silhouette size is grossly stable, however the left heart border is obscured.   Persistent left basilar and retrocardiac opacity with component of volume loss. Patchy airspace opacity in the left upper lung field. No sizable pneumothorax. Right lung is grossly clear.   No acute osseous abnormality.       1.  Left mid and lower lung consolidative opacity with component of volume loss. Correlate with concern for infection and aspiration as well as mucous plugging and partial/complete left lower lobe collapse. 2. Small left pleural effusion.       Signed by: Gilberto Solano 10/24/2023 2:55 PM Dictation workstation:   [id number]    XR chest 1 view    Result Date: 10/21/2023  Interpreted By:  Beth Guy, STUDY: XR CHEST 1 VIEW;  10/21/2023 1:34 pm   INDICATION: Signs/Symptoms:pneumonia.   COMPARISON: 10/20/2023.   ACCESSION NUMBER(S): [id number]   ORDERING CLINICIAN: ANTONI ARNDT       1.  Improved aeration of the left lower lung consolidative opacities. Decreased left-sided pleural effusion. 2. Coarse interstitial, and ground-glass opacity involving the left upper lung, midlung, slightly improved. 3. Cardiac silhouette is moderately enlarged. Aorta is tortuous. Pulmonary vessels are congested with mild pulmonary edema. 4. Perihilar and basilar atelectasis of the right lung. 5. No right-sided pleural effusion or pneumothorax seen 6. ETT 7.2 cm above the crispin. Enteric tube with the tip below the lower margin of study. 7.       MACRO: None   Signed by: Beth Guy 10/21/2023 2:46 PM Dictation workstation:   [id number]    MR brain w and wo IV contrast    Result Date: 10/21/2023  Interpreted By:  Leslie Olson,  and Rio Yañez STUDY: MR BRAIN W AND WO IV CONTRAST; MR LUMBAR SPINE W AND WO IV CONTRAST; MR THORACIC SPINE W AND WO IV CONTRAST; MR CERVICAL SPINE W AND WO IV CONTRAST;  10/20/2023 11:41 pm   INDICATION: Signs/Symptoms:Altered mental status with b/l LE weakness; Signs/Symptoms:Altered mental status, b/l LE weakness, concern for epidural abscess; Signs/Symptoms:altered mental status, b/l LE weakness, c/f epidural abscess; Signs/Symptoms:Altered mental status, b/l LE weakness, concern for spinal/epidural abscess.   COMPARISON: CT head, 10/11/2023 Thoracic and lumbar spine, 10/11/2023 CT cervical  spine, 08/26/2023   ACCESSION NUMBER(S): [id number]; [id number]; [id number]; [id number]   ORDERING CLINICIAN: THOMAS AU   TECHNIQUE: Multiplanar, multi-sequence images of the brain, cervical, thoracic, and lumbar spine were obtained before and after the intravenous administration of 9 mL Dotarem gadolinium.   FINDINGS: Evaluation is markedly limited due to patient motion.   Diffusion weighted images show no evidence of acute ischemic infarct.   There is no evidence of an acute intraparenchymal hemorrhage, mass, mass-effect, midline shift or extra-axial fluid collection. No abnormal parenchymal enhancement.   Nonspecific subcortical and periventricular T2/FLAIR hyperintensities may represent sequelae of chronic small vessel ischemic changes.   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   The ventricular size and cerebral volume are age-concordant.   The orbits and globes are unremarkable.   The paranasal sinuses show no hemorrhage or air-fluid levels.   Bilateral mastoid effusions, left more than right.     Cervical Spine:   Incidental note is made of an enteric tube.   PARASPINAL SOFT TISSUES: No significant abnormality.   SPINAL CORD: The cervical cord has a normal caliber and signal intensity.   BONE MARROW/VERTEBRAL BODIES: Overall bone marrow signal is within normal limits. Vertebral body heights are maintained. No acute fracture.   ALIGNMENT: There is reversal of the normal cervical lordosis and minimal anterolisthesis of C3-C4.   C1-C2: The atlantodental joint is intact. The predental space is not widened.   C2-C3: Disc osteophyte complex, uncovertebral joint hypertrophy and facet arthrosis. Mild spinal canal and mild-to-moderate bilateral neural foraminal stenosis.   C3-C4: Disc osteophyte complex, uncovertebral joint hypertrophy and facet arthrosis. Moderate to severe spinal canal and neural foraminal stenosis. There is mild flattening of the cervical cord question very subtle  increased cord signal on the STIR sequence.   C4-C5: Disc osteophyte complex, uncovertebral joint hypertrophy and facet arthrosis. No significant spinal canal stenosis. Moderate bilateral neural foraminal stenosis.   C5-C6: Disc osteophyte complex, uncovertebral joint hypertrophy and facet arthrosis. Mild spinal canal stenosis. Moderate neural foraminal narrowing, left greater than right.   C6-C7: Disc osteophyte complex, uncovertebral joint hypertrophy and facet arthrosis. No significant spinal canal stenosis. Moderate to severe neural foraminal stenosis, right greater than left.   C7-T1: Disc osteophyte complex and uncovertebral joint hypertrophy and facet arthrosis. Mild spinal canal stenosis. Moderate to severe bilateral neural foraminal stenosis.     Thoracic spine:   Visualized chest/abdomen: Scattered consolidative opacities in the left lung with a small left pleural effusion.   SPINAL CORD: The thoracic cord has a normal caliber and signal intensity. No cord compression.   There is prominent posterior epidural fat. No epidural fluid collection is noted.   No abnormal enhancement. There is incomplete fat suppression within the lower cervical cord on the postcontrast images. No significant spinal canal or neural foraminal stenosis.   BONE MARROW/VERTEBRAL BODIES: Overall bone marrow signal is within normal limits. There is compression of the superior endplate of T11 no associated increased T2/STIR signal. This was present on prior CT chest 10/16/2023. There is a proximally 25% loss of vertebral body height anteriorly and no osseous retropulsion into the spinal canal. There is subtle height loss of the superior endplate of the T3 vertebral body with no associated T2/STIR signal. This is also unchanged from the prior CT chest 10/16/2023. The remaining vertebral body heights are maintained. No acute fracture. Intervertebral disc height loss and disc desiccation at T11-T12.   ALIGNMENT: No spondylolisthesis.      Lumbar spine:   VISUALIZED ABDOMEN: T1 hyperintense lesion in the left upper renal pole and T1 hypointense, T2 hyperintense lesions in the kidneys bilaterally. These may represent cysts and/or hemorrhagic cysts. Please see dedicated imaging of the abdomen for further evaluation.   SPINAL CORD/CONUS: The conus medullaris terminates at L1-L2. There is no abnormal signal or enhancement within the distal cord or nerve roots.   BONE MARROW/VERTEBRAL BODIES: There are 5 lumbar type vertebral bodies. Overall bone marrow signal is within normal limits. Vertebral body heights are maintained. No acute fracture. Multilevel intervertebral disc height loss, most significant at L4-L5 and L5-S1.   ALIGNMENT: There is minimal retrolisthesis of L5-S1.   SPINAL CANAL, INTERVERTEBRAL DISCS, AND NEURAL FORAMINA:   T11-T12: No significant disc bulge, canal stenosis, or foraminal stenosis.   T12-L1: No significant disc bulge, canal stenosis, or foraminal stenosis.   L1-L2: No significant disc bulge, canal stenosis, or foraminal stenosis.   L2-L3: Trace bilateral paracentral disc protrusions. No spinal canal or neural foraminal stenosis.   L3-L4: Broad-based central disc bulge which results in minimal narrowing of bilateral neural foramen. No spinal canal stenosis.   L4-L5: Broad-based central disc bulge which results in mild narrowing of bilateral neural foramen, right more than left. No spinal canal stenosis.   L5-S1: Posterior disc osteophyte complex with mild narrowing of bilateral neural foramen, right more than left. No spinal canal stenosis.         Brain: 1. No acute ischemic infarct or intracranial hemorrhage. No abnormal parenchymal enhancement. 2. Nonspecific subcortical and periventricular T2/FLAIR hyperintensities may represent sequelae of chronic small vessel ischemic changes. 3. Remote lacunar infarcts in the left thalamus.     Cervical spine: 1. Multilevel degenerative disc disease and facet arthrosis most pronounced at  C3-C4 with moderate to severe spinal canal stenosis and moderate to severe bilateral neural foraminal stenosis. There is flattening of the cervical cord with question subtle increased cord signal on the STIR sequence. Please correlate clinically for symptoms of myelopathy. No abnormal cord enhancement. 2. Patchy consolidative opacities throughout the left lung with a small left pleural effusion. Correlate with dedicated chest imaging.   Thoracic spine: 1. No significant spinal canal or neural foraminal stenosis. No abnormal enhancement. 2. Nonacute minimal compression deformity at T3. Mild compression deformity at T11 with a proximally 25% loss of vertebral body height and no osseous retropulsion into the spinal canal. This was present on prior exam 10/16/2023.     Lumbar spine: 1. Multilevel lumbar spondylosis as detailed above. No spinal canal stenosis. 2. No abnormal signal or enhancement within the distal cord or nerve roots.     I personally reviewed the images/study and I agree with the findings as stated by resident physician Dr. Otilio Pate.   MACRO: None   Signed by: Leslie Olson 10/21/2023 1:29 AM Dictation workstation:   [id number]    MR cervical spine w and wo IV contrast    Result Date: 10/21/2023  Interpreted By:  Leslie Olson,  Vineet Yañez STUDY: MR BRAIN W AND WO IV CONTRAST; MR LUMBAR SPINE W AND WO IV CONTRAST; MR THORACIC SPINE W AND WO IV CONTRAST; MR CERVICAL SPINE W AND WO IV CONTRAST;  10/20/2023 11:41 pm   INDICATION: Signs/Symptoms:Altered mental status with b/l LE weakness; Signs/Symptoms:Altered mental status, b/l LE weakness, concern for epidural abscess; Signs/Symptoms:altered mental status, b/l LE weakness, c/f epidural abscess; Signs/Symptoms:Altered mental status, b/l LE weakness, concern for spinal/epidural abscess.   COMPARISON: CT head, 10/11/2023 Thoracic and lumbar spine, 10/11/2023 CT cervical spine, 08/26/2023   ACCESSION NUMBER(S): [id number]; [id number];  [id number]; [id number]   ORDERING CLINICIAN: THOMAS AU   TECHNIQUE: Multiplanar, multi-sequence images of the brain, cervical, thoracic, and lumbar spine were obtained before and after the intravenous administration of 9 mL Dotarem gadolinium.   FINDINGS: Evaluation is markedly limited due to patient motion.   Diffusion weighted images show no evidence of acute ischemic infarct.   There is no evidence of an acute intraparenchymal hemorrhage, mass, mass-effect, midline shift or extra-axial fluid collection. No abnormal parenchymal enhancement.   Nonspecific subcortical and periventricular T2/FLAIR hyperintensities may represent sequelae of chronic small vessel ischemic changes.   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   The ventricular size and cerebral volume are age-concordant.   The orbits and globes are unremarkable.   The paranasal sinuses show no hemorrhage or air-fluid levels.   Bilateral mastoid effusions, left more than right.     Cervical Spine:   Incidental note is made of an enteric tube.   PARASPINAL SOFT TISSUES: No significant abnormality.   SPINAL CORD: The cervical cord has a normal caliber and signal intensity.   BONE MARROW/VERTEBRAL BODIES: Overall bone marrow signal is within normal limits. Vertebral body heights are maintained. No acute fracture.   ALIGNMENT: There is reversal of the normal cervical lordosis and minimal anterolisthesis of C3-C4.   C1-C2: The atlantodental joint is intact. The predental space is not widened.   C2-C3: Disc osteophyte complex, uncovertebral joint hypertrophy and facet arthrosis. Mild spinal canal and mild-to-moderate bilateral neural foraminal stenosis.   C3-C4: Disc osteophyte complex, uncovertebral joint hypertrophy and facet arthrosis. Moderate to severe spinal canal and neural foraminal stenosis. There is mild flattening of the cervical cord question very subtle increased cord signal on the STIR sequence.   C4-C5: Disc osteophyte  complex, uncovertebral joint hypertrophy and facet arthrosis. No significant spinal canal stenosis. Moderate bilateral neural foraminal stenosis.   C5-C6: Disc osteophyte complex, uncovertebral joint hypertrophy and facet arthrosis. Mild spinal canal stenosis. Moderate neural foraminal narrowing, left greater than right.   C6-C7: Disc osteophyte complex, uncovertebral joint hypertrophy and facet arthrosis. No significant spinal canal stenosis. Moderate to severe neural foraminal stenosis, right greater than left.   C7-T1: Disc osteophyte complex and uncovertebral joint hypertrophy and facet arthrosis. Mild spinal canal stenosis. Moderate to severe bilateral neural foraminal stenosis.     Thoracic spine:   Visualized chest/abdomen: Scattered consolidative opacities in the left lung with a small left pleural effusion.   SPINAL CORD: The thoracic cord has a normal caliber and signal intensity. No cord compression.   There is prominent posterior epidural fat. No epidural fluid collection is noted.   No abnormal enhancement. There is incomplete fat suppression within the lower cervical cord on the postcontrast images. No significant spinal canal or neural foraminal stenosis.   BONE MARROW/VERTEBRAL BODIES: Overall bone marrow signal is within normal limits. There is compression of the superior endplate of T11 no associated increased T2/STIR signal. This was present on prior CT chest 10/16/2023. There is a proximally 25% loss of vertebral body height anteriorly and no osseous retropulsion into the spinal canal. There is subtle height loss of the superior endplate of the T3 vertebral body with no associated T2/STIR signal. This is also unchanged from the prior CT chest 10/16/2023. The remaining vertebral body heights are maintained. No acute fracture. Intervertebral disc height loss and disc desiccation at T11-T12.   ALIGNMENT: No spondylolisthesis.     Lumbar spine:   VISUALIZED ABDOMEN: T1 hyperintense lesion in the left  upper renal pole and T1 hypointense, T2 hyperintense lesions in the kidneys bilaterally. These may represent cysts and/or hemorrhagic cysts. Please see dedicated imaging of the abdomen for further evaluation.   SPINAL CORD/CONUS: The conus medullaris terminates at L1-L2. There is no abnormal signal or enhancement within the distal cord or nerve roots.   BONE MARROW/VERTEBRAL BODIES: There are 5 lumbar type vertebral bodies. Overall bone marrow signal is within normal limits. Vertebral body heights are maintained. No acute fracture. Multilevel intervertebral disc height loss, most significant at L4-L5 and L5-S1.   ALIGNMENT: There is minimal retrolisthesis of L5-S1.   SPINAL CANAL, INTERVERTEBRAL DISCS, AND NEURAL FORAMINA:   T11-T12: No significant disc bulge, canal stenosis, or foraminal stenosis.   T12-L1: No significant disc bulge, canal stenosis, or foraminal stenosis.   L1-L2: No significant disc bulge, canal stenosis, or foraminal stenosis.   L2-L3: Trace bilateral paracentral disc protrusions. No spinal canal or neural foraminal stenosis.   L3-L4: Broad-based central disc bulge which results in minimal narrowing of bilateral neural foramen. No spinal canal stenosis.   L4-L5: Broad-based central disc bulge which results in mild narrowing of bilateral neural foramen, right more than left. No spinal canal stenosis.   L5-S1: Posterior disc osteophyte complex with mild narrowing of bilateral neural foramen, right more than left. No spinal canal stenosis.         Brain: 1. No acute ischemic infarct or intracranial hemorrhage. No abnormal parenchymal enhancement. 2. Nonspecific subcortical and periventricular T2/FLAIR hyperintensities may represent sequelae of chronic small vessel ischemic changes. 3. Remote lacunar infarcts in the left thalamus.     Cervical spine: 1. Multilevel degenerative disc disease and facet arthrosis most pronounced at C3-C4 with moderate to severe spinal canal stenosis and moderate to  severe bilateral neural foraminal stenosis. There is flattening of the cervical cord with question subtle increased cord signal on the STIR sequence. Please correlate clinically for symptoms of myelopathy. No abnormal cord enhancement. 2. Patchy consolidative opacities throughout the left lung with a small left pleural effusion. Correlate with dedicated chest imaging.   Thoracic spine: 1. No significant spinal canal or neural foraminal stenosis. No abnormal enhancement. 2. Nonacute minimal compression deformity at T3. Mild compression deformity at T11 with a proximally 25% loss of vertebral body height and no osseous retropulsion into the spinal canal. This was present on prior exam 10/16/2023.     Lumbar spine: 1. Multilevel lumbar spondylosis as detailed above. No spinal canal stenosis. 2. No abnormal signal or enhancement within the distal cord or nerve roots.     I personally reviewed the images/study and I agree with the findings as stated by resident physician Dr. Otilio Pate.   MACRO: None   Signed by: Leslie Olson 10/21/2023 1:29 AM Dictation workstation:   [id number]    MR thoracic spine w and wo IV contrast    Result Date: 10/21/2023  Interpreted By:  Leslie Olson,  Vineet Yañez STUDY: MR BRAIN W AND WO IV CONTRAST; MR LUMBAR SPINE W AND WO IV CONTRAST; MR THORACIC SPINE W AND WO IV CONTRAST; MR CERVICAL SPINE W AND WO IV CONTRAST;  10/20/2023 11:41 pm   INDICATION: Signs/Symptoms:Altered mental status with b/l LE weakness; Signs/Symptoms:Altered mental status, b/l LE weakness, concern for epidural abscess; Signs/Symptoms:altered mental status, b/l LE weakness, c/f epidural abscess; Signs/Symptoms:Altered mental status, b/l LE weakness, concern for spinal/epidural abscess.   COMPARISON: CT head, 10/11/2023 Thoracic and lumbar spine, 10/11/2023 CT cervical spine, 08/26/2023   ACCESSION NUMBER(S): [id number]; [id number]; [id number]; [id number]   ORDERING CLINICIAN: THOMAS AU    TECHNIQUE: Multiplanar, multi-sequence images of the brain, cervical, thoracic, and lumbar spine were obtained before and after the intravenous administration of 9 mL Dotarem gadolinium.   FINDINGS: Evaluation is markedly limited due to patient motion.   Diffusion weighted images show no evidence of acute ischemic infarct.   There is no evidence of an acute intraparenchymal hemorrhage, mass, mass-effect, midline shift or extra-axial fluid collection. No abnormal parenchymal enhancement.   Nonspecific subcortical and periventricular T2/FLAIR hyperintensities may represent sequelae of chronic small vessel ischemic changes.   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   The ventricular size and cerebral volume are age-concordant.   The orbits and globes are unremarkable.   The paranasal sinuses show no hemorrhage or air-fluid levels.   Bilateral mastoid effusions, left more than right.     Cervical Spine:   Incidental note is made of an enteric tube.   PARASPINAL SOFT TISSUES: No significant abnormality.   SPINAL CORD: The cervical cord has a normal caliber and signal intensity.   BONE MARROW/VERTEBRAL BODIES: Overall bone marrow signal is within normal limits. Vertebral body heights are maintained. No acute fracture.   ALIGNMENT: There is reversal of the normal cervical lordosis and minimal anterolisthesis of C3-C4.   C1-C2: The atlantodental joint is intact. The predental space is not widened.   C2-C3: Disc osteophyte complex, uncovertebral joint hypertrophy and facet arthrosis. Mild spinal canal and mild-to-moderate bilateral neural foraminal stenosis.   C3-C4: Disc osteophyte complex, uncovertebral joint hypertrophy and facet arthrosis. Moderate to severe spinal canal and neural foraminal stenosis. There is mild flattening of the cervical cord question very subtle increased cord signal on the STIR sequence.   C4-C5: Disc osteophyte complex, uncovertebral joint hypertrophy and facet arthrosis. No  significant spinal canal stenosis. Moderate bilateral neural foraminal stenosis.   C5-C6: Disc osteophyte complex, uncovertebral joint hypertrophy and facet arthrosis. Mild spinal canal stenosis. Moderate neural foraminal narrowing, left greater than right.   C6-C7: Disc osteophyte complex, uncovertebral joint hypertrophy and facet arthrosis. No significant spinal canal stenosis. Moderate to severe neural foraminal stenosis, right greater than left.   C7-T1: Disc osteophyte complex and uncovertebral joint hypertrophy and facet arthrosis. Mild spinal canal stenosis. Moderate to severe bilateral neural foraminal stenosis.     Thoracic spine:   Visualized chest/abdomen: Scattered consolidative opacities in the left lung with a small left pleural effusion.   SPINAL CORD: The thoracic cord has a normal caliber and signal intensity. No cord compression.   There is prominent posterior epidural fat. No epidural fluid collection is noted.   No abnormal enhancement. There is incomplete fat suppression within the lower cervical cord on the postcontrast images. No significant spinal canal or neural foraminal stenosis.   BONE MARROW/VERTEBRAL BODIES: Overall bone marrow signal is within normal limits. There is compression of the superior endplate of T11 no associated increased T2/STIR signal. This was present on prior CT chest 10/16/2023. There is a proximally 25% loss of vertebral body height anteriorly and no osseous retropulsion into the spinal canal. There is subtle height loss of the superior endplate of the T3 vertebral body with no associated T2/STIR signal. This is also unchanged from the prior CT chest 10/16/2023. The remaining vertebral body heights are maintained. No acute fracture. Intervertebral disc height loss and disc desiccation at T11-T12.   ALIGNMENT: No spondylolisthesis.     Lumbar spine:   VISUALIZED ABDOMEN: T1 hyperintense lesion in the left upper renal pole and T1 hypointense, T2 hyperintense lesions in  the kidneys bilaterally. These may represent cysts and/or hemorrhagic cysts. Please see dedicated imaging of the abdomen for further evaluation.   SPINAL CORD/CONUS: The conus medullaris terminates at L1-L2. There is no abnormal signal or enhancement within the distal cord or nerve roots.   BONE MARROW/VERTEBRAL BODIES: There are 5 lumbar type vertebral bodies. Overall bone marrow signal is within normal limits. Vertebral body heights are maintained. No acute fracture. Multilevel intervertebral disc height loss, most significant at L4-L5 and L5-S1.   ALIGNMENT: There is minimal retrolisthesis of L5-S1.   SPINAL CANAL, INTERVERTEBRAL DISCS, AND NEURAL FORAMINA:   T11-T12: No significant disc bulge, canal stenosis, or foraminal stenosis.   T12-L1: No significant disc bulge, canal stenosis, or foraminal stenosis.   L1-L2: No significant disc bulge, canal stenosis, or foraminal stenosis.   L2-L3: Trace bilateral paracentral disc protrusions. No spinal canal or neural foraminal stenosis.   L3-L4: Broad-based central disc bulge which results in minimal narrowing of bilateral neural foramen. No spinal canal stenosis.   L4-L5: Broad-based central disc bulge which results in mild narrowing of bilateral neural foramen, right more than left. No spinal canal stenosis.   L5-S1: Posterior disc osteophyte complex with mild narrowing of bilateral neural foramen, right more than left. No spinal canal stenosis.         Brain: 1. No acute ischemic infarct or intracranial hemorrhage. No abnormal parenchymal enhancement. 2. Nonspecific subcortical and periventricular T2/FLAIR hyperintensities may represent sequelae of chronic small vessel ischemic changes. 3. Remote lacunar infarcts in the left thalamus.     Cervical spine: 1. Multilevel degenerative disc disease and facet arthrosis most pronounced at C3-C4 with moderate to severe spinal canal stenosis and moderate to severe bilateral neural foraminal stenosis. There is flattening of  the cervical cord with question subtle increased cord signal on the STIR sequence. Please correlate clinically for symptoms of myelopathy. No abnormal cord enhancement. 2. Patchy consolidative opacities throughout the left lung with a small left pleural effusion. Correlate with dedicated chest imaging.   Thoracic spine: 1. No significant spinal canal or neural foraminal stenosis. No abnormal enhancement. 2. Nonacute minimal compression deformity at T3. Mild compression deformity at T11 with a proximally 25% loss of vertebral body height and no osseous retropulsion into the spinal canal. This was present on prior exam 10/16/2023.     Lumbar spine: 1. Multilevel lumbar spondylosis as detailed above. No spinal canal stenosis. 2. No abnormal signal or enhancement within the distal cord or nerve roots.     I personally reviewed the images/study and I agree with the findings as stated by resident physician Dr. Otilio Pate.   MACRO: None   Signed by: Leslie Olson 10/21/2023 1:29 AM Dictation workstation:   [id number]    MR lumbar spine w and wo IV contrast    Result Date: 10/21/2023  Interpreted By:  Leslie Olson,  and [place] Ya[place]ez STUDY: MR BRAIN W AND WO IV CONTRAST; MR LUMBAR SPINE W AND WO IV CONTRAST; MR THORACIC SPINE W AND WO IV CONTRAST; MR CERVICAL SPINE W AND WO IV CONTRAST;  10/20/2023 11:41 pm   INDICATION: Signs/Symptoms:Altered mental status with b/l LE weakness; Signs/Symptoms:Altered mental status, b/l LE weakness, concern for epidural abscess; Signs/Symptoms:altered mental status, b/l LE weakness, c/f epidural abscess; Signs/Symptoms:Altered mental status, b/l LE weakness, concern for spinal/epidural abscess.   COMPARISON: CT head, 10/11/2023 Thoracic and lumbar spine, 10/11/2023 CT cervical spine, 08/26/2023   ACCESSION NUMBER(S): [id number]; [id number]; [id number]; [id number]   ORDERING CLINICIAN: THOMAS AU   TECHNIQUE: Multiplanar, multi-sequence images of the brain, cervical,  thoracic, and lumbar spine were obtained before and after the intravenous administration of 9 mL Dotarem gadolinium.   FINDINGS: Evaluation is markedly limited due to patient motion.   Diffusion weighted images show no evidence of acute ischemic infarct.   There is no evidence of an acute intraparenchymal hemorrhage, mass, mass-effect, midline shift or extra-axial fluid collection. No abnormal parenchymal enhancement.   Nonspecific subcortical and periventricular T2/FLAIR hyperintensities may represent sequelae of chronic small vessel ischemic changes.   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   The ventricular size and cerebral volume are age-concordant.   The orbits and globes are unremarkable.   The paranasal sinuses show no hemorrhage or air-fluid levels.   Bilateral mastoid effusions, left more than right.     Cervical Spine:   Incidental note is made of an enteric tube.   PARASPINAL SOFT TISSUES: No significant abnormality.   SPINAL CORD: The cervical cord has a normal caliber and signal intensity.   BONE MARROW/VERTEBRAL BODIES: Overall bone marrow signal is within normal limits. Vertebral body heights are maintained. No acute fracture.   ALIGNMENT: There is reversal of the normal cervical lordosis and minimal anterolisthesis of C3-C4.   C1-C2: The atlantodental joint is intact. The predental space is not widened.   C2-C3: Disc osteophyte complex, uncovertebral joint hypertrophy and facet arthrosis. Mild spinal canal and mild-to-moderate bilateral neural foraminal stenosis.   C3-C4: Disc osteophyte complex, uncovertebral joint hypertrophy and facet arthrosis. Moderate to severe spinal canal and neural foraminal stenosis. There is mild flattening of the cervical cord question very subtle increased cord signal on the STIR sequence.   C4-C5: Disc osteophyte complex, uncovertebral joint hypertrophy and facet arthrosis. No significant spinal canal stenosis. Moderate bilateral neural foraminal  stenosis.   C5-C6: Disc osteophyte complex, uncovertebral joint hypertrophy and facet arthrosis. Mild spinal canal stenosis. Moderate neural foraminal narrowing, left greater than right.   C6-C7: Disc osteophyte complex, uncovertebral joint hypertrophy and facet arthrosis. No significant spinal canal stenosis. Moderate to severe neural foraminal stenosis, right greater than left.   C7-T1: Disc osteophyte complex and uncovertebral joint hypertrophy and facet arthrosis. Mild spinal canal stenosis. Moderate to severe bilateral neural foraminal stenosis.     Thoracic spine:   Visualized chest/abdomen: Scattered consolidative opacities in the left lung with a small left pleural effusion.   SPINAL CORD: The thoracic cord has a normal caliber and signal intensity. No cord compression.   There is prominent posterior epidural fat. No epidural fluid collection is noted.   No abnormal enhancement. There is incomplete fat suppression within the lower cervical cord on the postcontrast images. No significant spinal canal or neural foraminal stenosis.   BONE MARROW/VERTEBRAL BODIES: Overall bone marrow signal is within normal limits. There is compression of the superior endplate of T11 no associated increased T2/STIR signal. This was present on prior CT chest 10/16/2023. There is a proximally 25% loss of vertebral body height anteriorly and no osseous retropulsion into the spinal canal. There is subtle height loss of the superior endplate of the T3 vertebral body with no associated T2/STIR signal. This is also unchanged from the prior CT chest 10/16/2023. The remaining vertebral body heights are maintained. No acute fracture. Intervertebral disc height loss and disc desiccation at T11-T12.   ALIGNMENT: No spondylolisthesis.     Lumbar spine:   VISUALIZED ABDOMEN: T1 hyperintense lesion in the left upper renal pole and T1 hypointense, T2 hyperintense lesions in the kidneys bilaterally. These may represent cysts and/or hemorrhagic  cysts. Please see dedicated imaging of the abdomen for further evaluation.   SPINAL CORD/CONUS: The conus medullaris terminates at L1-L2. There is no abnormal signal or enhancement within the distal cord or nerve roots.   BONE MARROW/VERTEBRAL BODIES: There are 5 lumbar type vertebral bodies. Overall bone marrow signal is within normal limits. Vertebral body heights are maintained. No acute fracture. Multilevel intervertebral disc height loss, most significant at L4-L5 and L5-S1.   ALIGNMENT: There is minimal retrolisthesis of L5-S1.   SPINAL CANAL, INTERVERTEBRAL DISCS, AND NEURAL FORAMINA:   T11-T12: No significant disc bulge, canal stenosis, or foraminal stenosis.   T12-L1: No significant disc bulge, canal stenosis, or foraminal stenosis.   L1-L2: No significant disc bulge, canal stenosis, or foraminal stenosis.   L2-L3: Trace bilateral paracentral disc protrusions. No spinal canal or neural foraminal stenosis.   L3-L4: Broad-based central disc bulge which results in minimal narrowing of bilateral neural foramen. No spinal canal stenosis.   L4-L5: Broad-based central disc bulge which results in mild narrowing of bilateral neural foramen, right more than left. No spinal canal stenosis.   L5-S1: Posterior disc osteophyte complex with mild narrowing of bilateral neural foramen, right more than left. No spinal canal stenosis.         Brain: 1. No acute ischemic infarct or intracranial hemorrhage. No abnormal parenchymal enhancement. 2. Nonspecific subcortical and periventricular T2/FLAIR hyperintensities may represent sequelae of chronic small vessel ischemic changes. 3. Remote lacunar infarcts in the left thalamus.     Cervical spine: 1. Multilevel degenerative disc disease and facet arthrosis most pronounced at C3-C4 with moderate to severe spinal canal stenosis and moderate to severe bilateral neural foraminal stenosis. There is flattening of the cervical cord with question subtle increased cord signal on the STIR  sequence. Please correlate clinically for symptoms of myelopathy. No abnormal cord enhancement. 2. Patchy consolidative opacities throughout the left lung with a small left pleural effusion. Correlate with dedicated chest imaging.   Thoracic spine: 1. No significant spinal canal or neural foraminal stenosis. No abnormal enhancement. 2. Nonacute minimal compression deformity at T3. Mild compression deformity at T11 with a proximally 25% loss of vertebral body height and no osseous retropulsion into the spinal canal. This was present on prior exam 10/16/2023.     Lumbar spine: 1. Multilevel lumbar spondylosis as detailed above. No spinal canal stenosis. 2. No abnormal signal or enhancement within the distal cord or nerve roots.     I personally reviewed the images/study and I agree with the findings as stated by resident physician Dr. Otilio Pate.   MACRO: None   Signed by: Leslie Olson 10/21/2023 1:29 AM Dictation workstation:   [id number]    XR chest 1 view    Result Date: 10/20/2023  Interpreted By:  Khanh Weaver, STUDY: XR CHEST 1 VIEW;  10/20/2023 10:40 am   INDICATION: Signs/Symptoms:intubated.   COMPARISON: Chest radiograph dated 10/19/2023and CT scan 10/16/2023   ACCESSION NUMBER(S): [id number]   ORDERING CLINICIAN: ANTONI ARNDT   FINDINGS: AP radiograph of the chest Enteric tube is again seen coursing below diaphragm and tip not included in field of view. Interval intubation with endotracheal tube tip approximately 5.5 cm superior to crispin   CARDIOMEDIASTINAL SILHOUETTE: Cardiomediastinal silhouette is again significantly obscured on the left, similar to prior.   LUNGS: There is again demonstration extensive left mid basilar lung predominant opacification with blunting of left costophrenic angle. Background emphysema. There is no pneumothorax.   ABDOMEN: No remarkable upper abdominal findings.   BONES: No acute osseous abnormality. Partially visualized left shoulder arthroplasty prosthesis.       1. No  significant change in left mid basilar lung predominant atelectasis/consolidation with left pleural effusion. Also correlate clinically with concern for central mucous plugging. 2. Medical devices as above. Endotracheal tube is in satisfactory position.   Signed by: Khanh Weaver 10/20/2023 11:05 AM Dictation workstation:   [id number]    XR chest 1 view    Result Date: 10/19/2023  Interpreted By:  Khanh Weaver  and Lb Whitehead STUDY: XR CHEST 1 VIEW;  10/19/2023 11:44 pm   INDICATION: Signs/Symptoms:worsening hypoxia.   COMPARISON: Chest radiograph 10/17/2023 and CT chest 10/16/2023   ACCESSION NUMBER(S): [id number]   ORDERING CLINICIAN: ROBERT SUGGS   FINDINGS: AP radiograph of the chest was provided.   Enteric tube seen coursing below the level diaphragm with tip overlying the expected location of the proximal duodenum. Partially visualized postsurgical changes from left total shoulder arthroplasty.   CARDIOMEDIASTINAL SILHOUETTE: The cardiomediastinal silhouette is now significantly obscured on the left.   LUNGS: Re-demonstration of coarsened interstitial markings bilaterally. Significant interval worsening in left mid basilar lung predominant extensive consolidative opacities with blunting of the left costophrenic angle. The right lung is essentially clear. No pneumothorax.   ABDOMEN: No remarkable upper abdominal findings.   BONES: No acute osseous changes.       1.  Significant interval worsening in left mid basilar lung predominant opacification which may represent increasing atelectasis and/or pleural effusions with or without superimposed consolidation. Also correlate clinically with concern for central mucous plugging. 2. Background chronic lung changes. 3.  Postsurgical changes and medical devices as described above.   I personally reviewed the images/study and I agree with the findings as stated. This study was interpreted at [place], [place].    MACRO: None   Signed by: Khanh Weaver 10/19/2023 11:54 PM Dictation workstation:   [id number]    XR abdomen 1 view    Result Date: 10/18/2023  Interpreted By:  Gilberto Waldrop and Fu Tianyuan STUDY: XR ABDOMEN 1 VIEW;  10/17/2023 5:59 pm   INDICATION: Signs/Symptoms:Confirm Dobhoff placement.   COMPARISON: Abdominal radiograph 08/31/2023. CT chest abdomen pelvis 10/16/2023.   ACCESSION NUMBER(S): [id number]   ORDERING CLINICIAN: THOMAS AU   FINDINGS: AP radiograph of the abdomen is provided for review.   Enteric tube courses below the diaphragm with the tip overlying the expected location of the distal stomach or proximal duodenum.   Nonobstructive bowel gas pattern.   There are hazy opacities within the left lung base.   Osseous structures demonstrate no acute bony changes.       1. Enteric tube with the tip overlying the distal stomach/proximal duodenum. 2. Nonobstructive bowel gas pattern. 3. Left lung base atelectasis versus consolidation.   MACRO: None   Signed by: Gilberto Solano 10/18/2023 9:40 AM Dictation workstation:   [id number]    XR chest 1 view    Result Date: 10/17/2023  Interpreted By:  Bang Owens and Dervishi Mario STUDY: XR CHEST 1 VIEW;  10/17/2023 8:48 pm   INDICATION: Signs/Symptoms:hypoxia.   COMPARISON: Chest radiograph 10/17/2023   ACCESSION NUMBER(S): [id number]   ORDERING CLINICIAN: JANINE ELLIS   FINDINGS: AP portable upright radiograph of the chest was provided with patient's rotation to the left.   Enteric tube crossing the diaphragm with the tip extending beyond the field of view.   CARDIOMEDIASTINAL SILHOUETTE: Cardiomediastinal silhouette is stable in size and configuration.   LUNGS: Redemonstration of coarse interstitial lung markings with hyperinflation. Obscuration of the left hemidiaphragm, suggesting pleural effusion and/or atelectasis. Slight interval worsening of left basilar and retrocardiac hazy opacity. Somewhat increased  interstitial lung markings in the right lower lung field, peripherally. Right perihilar haziness. Increased lucency in the right lung apex with pulmonary vascular markings and otherwise no obvious pneumothorax.   ABDOMEN: No remarkable upper abdominal findings.   BONES: No acute osseous changes.       1. Slight worsening of left basilar opacity compared to prior imaging, suggesting atelectasis and/or pleural effusion. 2. Probable interstitial pulmonary edema or fluid overload. Correlate with patient's fluid status.   I personally reviewed the images/study and I agree with the findings as stated. This study was interpreted at [place], [place].   MACRO: NONE.   Signed by: Bang Owens 10/17/2023 9:54 PM Dictation workstation:   [id number]    Transthoracic Echo (TTE) Complete    Result Date: 10/17/2023   [facility], [address] [place] [address]                Tel [phone] and Fax [phone] TRANSTHORACIC ECHOCARDIOGRAM REPORT  Patient Name:      PIA Franco Physician:    [id number] Kirit Cain MD Study Date:        10/17/2023           Ordering Provider:    [id number] JEAN BURNETT MRN/PID:           [id number]             Fellow: Accession#:        [id number]         Nurse: Date of Birth/Age: 1955 / 68 years Sonographer:          Nani Garcia RDCS Gender:            M                    Additional Staff: Height:            170.18 cm            Admit Date: Weight:            49.44 kg             Admission Status:     Inpatient -                                                               Routine BSA:               1.56 m2              Encounter#:           [id number]                                         Department Location:  [place] [address] MICU Blood Pressure: 136 /91 mmHg Study Type:    TRANSTHORACIC ECHO (TTE) COMPLETE Diagnosis/ICD: Encounter for screening for  cardiovascular disorders-Z13.6 Indication:    Polycythemia CPT Code:      Echo Complete w Full Doppler-19417 Patient History: Pertinent History: HTN and Hyperlipidemia. PHTN, Liver fibrosis, Hypoxemia,                    COPD, ETOH, Opiod usage,. Study Detail: The following Echo studies were performed: 2D, M-Mode, Doppler and               color flow. Technically challenging study due to poor acoustic               windows and body habitus. Definity used as a contrast agent for               endocardial border definition and agitated saline used as a               contrast agent for intraseptal flow evaluation. Total contrast               used for this procedure was 2.0 mL via IV push.  PHYSICIAN INTERPRETATION: Left Ventricle: The left ventricular systolic function is hyperdynamic, with an estimated ejection fraction of 75%. There are no regional wall motion abnormalities. The left ventricular cavity size is normal. The left ventricular septal wall thickness is mildly increased. There is left ventricular concentric remodeling. Left ventricular diastolic filling was not assessed. Left Atrium: The left atrium is normal in size. A bubble study using agitated saline was performed. Bubble study is positive. There is evidence of an atrial septal aneurysm. Right Ventricle: The right ventricle is normal in size. There is normal right ventricular global systolic function. Right Atrium: The right atrium is normal in size. Aortic Valve: The aortic valve is trileaflet. There is minimal aortic valve cusp calcification. There is no aortic valve thickening. There is no evidence of aortic valve regurgitation. The peak instantaneous gradient of the aortic valve is 3.2 mmHg. Mitral Valve: The mitral valve is normal in structure. There is trace mitral valve regurgitation. Tricuspid Valve: The tricuspid valve is structurally normal. There is mild tricuspid regurgitation. The Doppler estimated RVSP is mildly elevated at 39.2 mmHg.  Pulmonic Valve: The pulmonic valve is not well visualized. Pulmonic valve regurgitation was not assessed. Pericardium: There is no pericardial effusion noted. Aorta: The aortic root is normal. Systemic Veins: The inferior vena cava size appears small. In comparison to the previous echocardiogram(s): Compared with study from 8/29/2023, the RVSP today is mildly elevated, compared to moderately elevated in the prior echocardiogram (60 mmHg).  CONCLUSIONS:  1. Left ventricular systolic function is hyperdynamic with a 75% estimated ejection fraction.  2. The left ventricular septal wall thickness is mildly increased.  3. There is left ventricular concentric remodeling.  4. Atrial septal aneurysm present.  5. A bubble study using agitated saline was performed. Bubble study is positive.  6. Mildly elevated RVSP.  7. Poorly visualized anatomical structures due to suboptimal image quality.  8. Compared with study from 8/29/2023, the RVSP today is mildly elevated, compared to moderately elevated in the prior echocardiogram (60 mmHg). QUANTITATIVE DATA SUMMARY: 2D MEASUREMENTS:                          Normal Ranges: Ao Root d:     2.50 cm   (2.0-3.7cm) IVSd:          1.20 cm   (0.6-1.1cm) LVPWd:         0.90 cm   (0.6-1.1cm) LVIDd:         3.20 cm   (3.9-5.9cm) LVIDs:         2.50 cm LV Mass Index: 62.2 g/m2 LV % FS        21.9 % LA VOLUME:                               Normal Ranges: LA Vol A4C:        37.5 ml    (22+/-6mL/m2) LA Vol A2C:        36.5 ml LA Vol BP:         37.9 ml LA Vol Index A4C:  24.0ml/m2 LA Vol Index A2C:  23.3 ml/m2 LA Vol Index BP:   24.2 ml/m2 LA Area A4C:       15.9 cm2 LA Area A2C:       15.3 cm2 LA Major Axis A4C: 5.8 cm LA Major Axis A2C: 5.5 cm LA Volume Index:   24.0 ml/m2 AORTA MEASUREMENTS:                      Normal Ranges: Ao Sinus, d: 3.15 cm (2.1-3.5cm) AORTIC VALVE:                         Normal Ranges: AoV Vmax:      0.89 m/s (<=1.7m/s) AoV Peak PG:   3.2 mmHg (<20mmHg) LVOT Max Dony:   0.63 m/s (<=1.1m/s) LVOT VTI:      10.50 cm LVOT Diameter: 2.00 cm  (1.8-2.4cm) AoV Area,Vmax: 2.24 cm2 (2.5-4.5cm2)  RIGHT VENTRICLE: RV s' 0.12 m/s TRICUSPID VALVE/RVSP:                             Normal Ranges: Peak TR Velocity: 3.01 m/s Est. RA Pressure: 3 mmHg RV Syst Pressure: 39.2 mmHg (< 30mmHg)  [id number] Kirit Cain MD Electronically signed on 10/17/2023 at 7:03:57 PM  ** Final **     XR chest 1 view    Result Date: 10/17/2023  Interpreted By:  Khanh Weavre,  Ross Crow STUDY: XR CHEST 1 VIEW;  10/17/2023 3:16 pm   INDICATION: Signs/Symptoms:Acute high flow oxygen requirement, history of mucus plugging.   COMPARISON: CT chest, abdomen and pelvis 10/16/2023   ACCESSION NUMBER(S): [id number]   ORDERING CLINICIAN: THOMAS AU   FINDINGS: AP radiograph of the chest was provided.   CARDIOMEDIASTINAL SILHOUETTE: Cardiomediastinal silhouette is normal in size and configuration. Mild aortic knob calcification.   LUNGS: Background of coarse interstitial lung markings and hyperinflation are again seen. Left basilar and retrocardiac hazy opacity appears slightly less conspicuous than prior. There is no sizable pleural effusion or pneumothorax..   ABDOMEN: No remarkable upper abdominal findings.   BONES: Status post left shoulder arthroplasty.       1. Hazy left basilar opacity appears slightly less conspicuous than prior and correlate with decreasing atelectasis/infiltrate. 2. Redemonstrated background diffuse emphysema.   I personally reviewed the images/study and I agree with the findings as stated by radiology resident Mignon Malone MD. This study was interpreted at [facility], [place], [place].   Signed by: Khanh Weaver 10/17/2023 3:23 PM Dictation workstation:   [id number]    CT chest abdomen pelvis w IV contrast    Result Date: 10/16/2023  Interpreted By:  Jeet Wang and Kamau Nyokabi STUDY: CT CHEST ABDOMEN PELVIS W IV CONTRAST;  10/16/2023 12:30 pm    INDICATION: Signs/Symptoms:polycythemia looking for malignancy.   COMPARISON: None.   ACCESSION NUMBER(S): [id number]   ORDERING CLINICIAN: CALEB NGUYEN   TECHNIQUE: CT of the chest, abdomen, and pelvis was performed.  Contiguous axial images were obtained at 3 mm slice thickness through the chest, abdomen and pelvis. Coronal and sagittal reconstructions at 3 mm slice thickness were performed. 75 ml of contrast Omnipaque 350 were administered intravenously without immediate complication.   FINDINGS: CHEST:   LUNG/PLEURA/LARGE AIRWAYS: The trachea and central airways are patent. There is soft tissue density in the right lower lobe bronchi. There is  atelectasis of the right posterior lobe. Chronic emphysematous change in bilateral lung apices. There are no pulmonary nodule or masses. There is no pleural effusion or pneumothorax.   VESSELS: Aorta and pulmonary arteries are normal caliber.  Moderate atherosclerotic changes are noted of the aorta and branching vessels. Moderate coronary artery calcifications are present.   HEART: The heart is normal in size.  There is no pericardial effusion.   MEDIASTINUM AND KRISTOPHER: Unchanged appearance of nonenlarged paratracheal lymph nodes when compared to prior CT 08/15/2022. No hilar or axillary lymphadenopathy is present.  The esophagus is normal.   CHEST WALL AND LOWER NECK: The soft tissues of the chest wall demonstrate no gross abnormality. The visualized thyroid gland appears within normal limits.   ABDOMEN:   LIVER: The liver is normal in size and demonstrates diffusely decreased attenuation suggesting steatosis. The liver is similar in appearance when compared to prior CT 08/22/2022.   BILE DUCTS: Intrahepatic and extrahepatic bile ducts are prominent which is within normal limits given the status post cholecystectomy.   GALLBLADDER: The gallbladder is nondistended without radiopaque stones.   PANCREAS: The pancreas appears unremarkable.   SPLEEN: The spleen is  normal in size without focal lesions.   ADRENAL GLANDS: Bilateral adrenal glands appear normal.   KIDNEYS AND URETERS: The kidneys are normal in size and enhance symmetrically.  No hydroureteronephrosis or nephroureterolithiasis is identified. Unchanged appearance of well-circumscribed hypodensities in bilateral kidneys that are subcentimeter in size and likely represent simple renal cysts.   PELVIS:   BLADDER: The urinary bladder appears normal without abnormal wall thickening.   REPRODUCTIVE ORGANS: The prostate is not enlarged.   BOWEL: The stomach is unremarkable.  The small and large bowel are normal in caliber and demonstrate no wall thickening.  The appendix appears normal.     VESSELS: There is no aneurysmal dilatation of the abdominal aorta. The IVC appears normal. Moderate calcifications of the abdominal aorta its branches.   PERITONEUM/RETROPERITONEUM/LYMPH NODES: There is no free or loculated fluid collection, no free intraperitoneal air. The retroperitoneum appears normal.  No abdominopelvic lymphadenopathy is present. Unchanged appearance of nonenlarged bilateral external iliac lymph nodes when compared to prior CT 08/15/2022.   BONE AND SOFT TISSUE: Status post left total  shoulder arthroplasty. No suspicious osseous lesions are identified. Degenerative discogenic disease is noted in the lower thoracic and lumbar spine.  The abdominal wall soft tissues appear normal.       1. No evidence of primary neoplasm or metastatic disease. 2. Right lower lobe mucous plugging which may reflect aspiration.   I personally reviewed the images/study and I agree with the findings as stated. This study was interpreted at [place], [place].   MACRO: None   Signed by: Jeet Wang 10/16/2023 3:39 PM Dictation workstation:   [id number]    MR thoracic spine wo IV contrast    Result Date: 10/12/2023  Interpreted By:  Tyron Dixon and Ebai Jerky STUDY: MR THORACIC  SPINE WO IV CONTRAST; MR LUMBAR SPINE WO IV CONTRAST; 10/11/2023 11:37 pm   INDICATION: Signs/Symptoms:bilateral LE weakness.   Per EMR: 68-year-old male with a past medical history of PD, hypertension, alcohol and opioid abuse, hepatitis C, PE without cor pulmonale on Eliquis who presents today for an inability to walk. Patient states that about 3 days ago his legs gave out and he has not been able to walk since and has been stuck in a recliner.   COMPARISON: CT chest abdomen pelvis 08/15/2022.   ACCESSION NUMBER(S): [id number]; [id number]   ORDERING CLINICIAN: TEE BLAND   TECHNIQUE: Limited nondiagnostic MRI of the thoracic and lumbar spine. Only T2 sagittal and coronal  images of the thoracic and lumbar spine were obtained.  The patient requested termination of the examination due to pain.   FINDINGS: Limited MRI of thoracic and lumbar spine demonstrates scoliosis of the thoracic spine. No definitive evidence of substantial central canal stenosis. Mild cervical narrowing secondary spinal degenerative change. Evaluation of neural foraminal stenosis is markedly limited.    images demonstrated T2 hyperintense right renal cyst.       1. Nondiagnostic MRI of the thoracic and lumbar spine. The patient requested termination of the examination due to pain. A repeat examination can be obtained as clinically indicated after adequate pain control. 2. Within the severe limitations of this examination, there is no definite evidence of substantial central canal stenosis of the thoracic or lumbar spine.       I personally reviewed the images/study and I agree with the findings as stated. This study was interpreted at [place], [place].     Signed by: Tyron Dixon 10/12/2023 12:23 AM Dictation workstation:   [id number]    MR lumbar spine wo IV contrast    Result Date: 10/12/2023  Interpreted By:  Tyron Dixon,  Joseph Javed STUDY: MR THORACIC  SPINE WO IV CONTRAST; MR LUMBAR SPINE WO IV CONTRAST; 10/11/2023 11:37 pm   INDICATION: Signs/Symptoms:bilateral LE weakness.   Per EMR: 68-year-old male with a past medical history of PD, hypertension, alcohol and opioid abuse, hepatitis C, PE without cor pulmonale on Eliquis who presents today for an inability to walk. Patient states that about 3 days ago his legs gave out and he has not been able to walk since and has been stuck in a recliner.   COMPARISON: CT chest abdomen pelvis 08/15/2022.   ACCESSION NUMBER(S): [id number]; [id number]   ORDERING CLINICIAN: TEE BLAND   TECHNIQUE: Limited nondiagnostic MRI of the thoracic and lumbar spine. Only T2 sagittal and coronal  images of the thoracic and lumbar spine were obtained.  The patient requested termination of the examination due to pain.   FINDINGS: Limited MRI of thoracic and lumbar spine demonstrates scoliosis of the thoracic spine. No definitive evidence of substantial central canal stenosis. Mild cervical narrowing secondary spinal degenerative change. Evaluation of neural foraminal stenosis is markedly limited.    images demonstrated T2 hyperintense right renal cyst.       1. Nondiagnostic MRI of the thoracic and lumbar spine. The patient requested termination of the examination due to pain. A repeat examination can be obtained as clinically indicated after adequate pain control. 2. Within the severe limitations of this examination, there is no definite evidence of substantial central canal stenosis of the thoracic or lumbar spine.       I personally reviewed the images/study and I agree with the findings as stated. This study was interpreted at [place], [place].     Signed by: Tyron Dixon 10/12/2023 12:23 AM Dictation workstation:   [id number]    CT head wo IV contrast    Result Date: 10/11/2023  Interpreted By:  Tyron Dixon and Dervishi Mario STUDY: CT HEAD WO IV  CONTRAST;  10/11/2023 9:42 pm   INDICATION: bilateral LE weakness, headache.   COMPARISON: CT brain: 08/26/2023.   ACCESSION NUMBER(S): [id number]   ORDERING CLINICIAN: TEE BLAND   TECHNIQUE: Noncontrast axial CT scan of head was performed. Angled reformats in brain and bone windows were generated. The images were reviewed in bone, brain, blood and soft tissue windows.   FINDINGS: CSF Spaces: The ventricles, sulci and basal cisterns are concordant with patient's age and degree of global parenchymal atrophy.. There is no extraaxial fluid collection.   Parenchyma: There are hypodense focal areas in the subcortical and periventricular white matter regions consistent with the sequela of chronic microvascular ischemic changes. Punctate remote left thalamic and anterior limb internal capsule lacunar infarcts. Otherwise, the grey-white differentiation is overall preserved. There is no mass effect or midline shift.  There is no intracranial hemorrhage.   Calvarium: The calvarium is intact.   Paranasal sinuses and mastoids: Visualized paranasal sinuses and mastoids are clear.       1. No evidence of acute cortical infarct or intracranial hemorrhage. 2. Subcortical and periventricular white matter hypodensities consistent with the sequela of chronic microvascular ischemic changes. 3. Punctate remote left thalamic and anterior basal ganglia lacunar infarcts.     I personally reviewed the images/study and I agree with the findings as stated. This study was interpreted at [place], [place].   MACRO: None   Signed by: Tyron Dixon 10/11/2023 9:52 PM Dictation workstation:   [id number]    XR chest 2 views    Result Date: 10/11/2023  Interpreted By:  Tyron Dixon and Stephens Katherine STUDY: XR CHEST 2 VIEWS;  10/11/2023 9:32 pm   INDICATION: Signs/Symptoms:bilater LE weakness.   COMPARISON: Chest radiograph and CT chest 08/26/2023   ACCESSION NUMBER(S):  [id number]   ORDERING CLINICIAN: TEE BLAND   FINDINGS: PA and lateral radiographs of the chest were provided. New patchy left lower lobe airspace opacities. Consider pneumonia. Pulmonary hyperinflation and coarsened interstitial markings compatible with COPD/emphysema. No pleural effusion or pneumothorax diffuse osteopenia. No acute osseous abnormality. Partially visualized left shoulder arthroplasty. Upper abdomen is unremarkable.       1. New patchy left lower lobe airspace opacities. Consider pneumonia. 2. Findings of COPD/emphysema.   I personally reviewed the images/study and I agree with the findings as stated. This study was interpreted at [place], [place].   MACRO: None   Signed by: Tyron Dixon 10/11/2023 9:42 PM Dictation workstation:   [id number]      Medications:    Current Facility-Administered Medications:     acetaminophen (Tylenol) tablet 650 mg, 650 mg, oral, q8h PRN, Ever Paez MD, 650 mg at 10/29/23 2034    albuterol 2.5 mg /3 mL (0.083 %) nebulizer solution 2.5 mg, 2.5 mg, nebulization, 4x daily, Ever Paez MD, 2.5 mg at 11/01/23 2105    calcium gluconate in NS IV 2 g, 2 g, intravenous, Once, Ever Paez MD    dextrose 10 % in water (D10W) infusion, 0.3 g/kg/hr, intravenous, Once PRN, Virgil Olivo MD, Stopped at 10/18/23 0800    dextrose 50 % injection 25 g, 25 g, intravenous, q15 min PRN, Virgil Olivo MD    ergocalciferol (Vitamin D-2) drops 8,000 Units, 8,000 Units, oral, Daily, Ever Paez MD, 8,000 Units at 11/01/23 0845    esomeprazole (NexIUM) suspension 40 mg, 40 mg, nasoduodenal tube, Daily before breakfast, Tia Manuel MD, 40 mg at 11/01/23 0842    folic acid (Folvite) tablet 1 mg, 1 mg, oral, Daily, Ever Paez MD, 1 mg at 11/01/23 0842    glucagon (Glucagen) injection 1 mg, 1 mg, intramuscular, q15 min PRN, Virgil Olivo MD    heparin (porcine) injection 2,000-4,000 Units,  2,000-4,000 Units, intravenous, q4h PRN, Virgil Olivo MD, 2,000 Units at 10/25/23 0857    heparin 25,000 Units in dextrose 5% 250 mL (100 Units/mL) infusion (premix), 0-4,500 Units/hr, intravenous, Continuous, Rosa Gary MD, Last Rate: 0 mL/hr at 11/02/23 0700, 0 Units/hr at 11/02/23 0700    lidocaine 4 % patch 1 patch, 1 patch, transdermal, Daily, Ever Paez MD, 1 patch at 11/01/23 0842    melatonin tablet 10 mg, 10 mg, oral, Nightly, Tia Manuel MD, 10 mg at 11/01/23 2031    meropenem (Merrem) 2 g in sodium chloride 0.9 % 100 mL IV, 2 g, intravenous, q12h, Porfirio Riojas MD, Stopped at 11/02/23 0524    multivitamin with minerals 1 tablet, 1 tablet, oral, Daily, Virgil Olivo MD, 1 tablet at 11/01/23 0842    nicotine (Nicoderm CQ) 21 mg/24 hr patch 1 patch, 1 patch, transdermal, Daily, Virgil Olivo MD, 1 patch at 11/01/23 0915    norepinephrine (Levophed) 8 mg in dextrose 5% 250 mL (0.032 mg/mL) infusion (premix), 0-3.3 mcg/kg/min, intravenous, Continuous, Porfirio Riojas MD, Last Rate: 3.73 mL/hr at 11/02/23 0700, 0.04 mcg/kg/min at 11/02/23 0700    oxyCODONE (Roxicodone) immediate release tablet 5 mg, 5 mg, oral, q6h PRN, Jah Ospina MD, 5 mg at 11/01/23 2315    oxygen (O2) therapy, , inhalation, Continuous PRN - O2/gases, Will Rojo MD, 8 L/min at 11/02/23 0400    perflutren lipid microspheres (Definity) injection 0.5-10 mL of dilution, 0.5-10 mL of dilution, intravenous, Once in imaging, Yancy Freeman MD    perflutren protein A microsphere (Optison) injection 0.5 mL, 0.5 mL, intravenous, Once in imaging, Yancy Freeman MD    polyethylene glycol (Glycolax, Miralax) packet 17 g, 17 g, oral, Daily, Tia Manuel MD, 17 g at 11/01/23 0842    potassium chloride CR (Klor-Con M20) ER tablet 40 mEq, 40 mEq, oral, q4h, Yancy Freeman MD    PrismaSol BGK 2/3.5 CRRT solution, 25 mL/kg/hr, CRRT, Continuous, Herman Oliveros DO, Last Rate: 1,242.5 mL/hr at 11/02/23  0231, 25 mL/kg/hr at 11/02/23 0231    sennosides (Senokot) tablet 8.6 mg, 1 tablet, oral, Nightly PRN, Virgil Olivo MD    sodium chloride 7 % nebulizer solution 3 mL, 3 mL, nebulization, q12h, Yancy Freeman MD, 3 mL at 11/01/23 2105    sulfur hexafluoride microsphr (Lumason) injection 24.28 mg, 2 mL, intravenous, Once in imaging, Yancy Freeman MD    thiamine (Vitamin B-1) tablet 100 mg, 100 mg, oral, Daily, Ever Paez MD, 100 mg at 11/01/23 0842    vancomycin in dextrose 5 % (Vancocin) IVPB 500 mg, 500 mg, intravenous, q12h, Porfirio Riojas MD, Stopped at 11/02/23 0413    white petrolatum (Aquaphor) 41 % ointment 1 Application, 1 Application, Topical, q1h PRN, Ever Paez MD            Assessment/Plan   ASSESSMENT & PLAN:  Molina Godinez is a 68 y.o. male with PMHx of COPD, HTN, AUD and opioid use disorder, HCV (treated), chronic PE who presented originally on 10/9 with inability to walk. Briefly in MICU for c/f alcohol withdrawal and high CIWA scores, transferred to floor, eventually readmitted to MICU 10/16 for increased O2 requirements, put on Airvo and intubated 10/20. Bronch done with resp cx growing stenotrophomonas for which he completed a course of Bactrim. Pt was extubated on 10/27. Also with SIADH (now resolved, unclear etiology, potentially just his severe pulmonary disease); nephrology consulted and was initially treated with fluid restriction, urea packets and Lasix, but now off all of these for significant increase BUN to 180s. CVVH started 10/31 for uremia and fluid removal. Now with worsening complete left lung opacification and new O2 requirement, likely 2/2 mucus plugging vs pulmonary edema vs pneumonia (low suspicion). Also with hypotension to MAPs in 50s, on Levophed gtt. 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      Active Problems:  TV endocarditis  Hypotension likely 2/2 TV endocarditis, on norepi gtt  Tachycardia with intermittent SVT   Left lung  complete opacification likely 2/2 mucus plugging vs pulmonary edema   KRYSTEN likely prerenal       NEURO/PSYCH  #AMS likely 2/2 uremic encephalopathy vs chronic hypoxia - IMPROVING  - Previously intubated/sedated, extubated on 10/27.   - CT head without pathology from 10/11  - Initially had visual and tactile hallucinations, have since resolved. Suspect metabolic etiology of fluctuations in mental status, i/s/o acutely increased oxygen requirement.  - Improving mentation on CVVH with concurrent decrease in serum BUN     #Bilateral LE weakness  -Likely chronic, had weakness during 8/2023 admission; less likely GBS  -CT head from 10/11 without pathology, MRI spine stopped early due to pain (on 10/11)  -Patient able to move bilateral lower extremities  -Repeat MRI brain/total spine on 10/20 unremarkable for acute process  PLAN:  -May follow up with neurology outpatient  -ID consulted regarding weakness and positive Syphilis Ab. Pt was treated back in 1998 but rec MRI spine given concern of abscess, MRI negative for acute process  -Neurology consulted. Suspicion that weakness is likely a peripheral (LMN + muscle) inflammatory or degenerative disorder considering distribution and chronology.      - recommend EMG/NGS after patient is downgraded      - low suspicion for GBS     CV  #TV endocarditis  - TTE 10/17/23: LVEF 75%, LV concentric modeling, mildly increased LV septal wall thickness, atrial septal aneurysm, mildly elevated RSVP 39  - TTE 10/31/23: same as above with the following exceptions: 1.7 x 0.8 cm mobile echodensity of posterior tricuspid leaflet, mod-severe TR, RSVP increased to 57  - antibiotics as in ID section  - cardiology consulted, appreciate recs: repeat TTE to further characterize TV vegetation  - CT surgery consulted to determine candidacy for surgery, appreciate input     #Hypotension, asymptomatic   - Etiology includes infection vs volume depletion    - 10/31 MAP to 50s   - Levophed gtt for MAP 60-70  (currently at 0.04 mcg/kg)     #HTN- resolved  - home amlodipine 5mg, atenolol 50mg  - Initially started on Cardene drip, transitioned to Labetalol prior to resolution  - home atenolol restarted on 10/28, switched to metop 12.5 BID on 10/29 I/s/o renal function     #Tachycardia  #Asymptomatic SVT to max HR 170s  - Sinus rhythm, HR chronically in 120-130s   - Etiology includes pain, infection, hypovolemia, uremia  - Poor correlation with fentanyl pushes, less suspicious for pain etiology  - previously on metoprolol 12.5mg BID, dc'd as patient now on norepi gtt  - decreased albuterol from q4h to q12h to decrease beta agonist activity   - pending bedside US this PM to characterize volume status: will consider 5mg metoprolol IVP vs IVF to improve HR      #CAD  - home atorvastatin 40mg held in the setting of elevated transaminases      #Chest pain- resolved  - New onset chest pain 10/28, had hyperkalemia on RFP  - RFP rechecked, K wnl  - Trop 14  - ECG without acute changes, stable from prior  - Given sublingual nitroglycerin, but had resolution of pain prior to administration.  - Some concern of uremic pericarditis but unlikely. No pericardial rub appreciated on exam.  - continue to monitor     PULM  #CAP- resolved  -Urine strep positive, unsure if active or chronic infection  -MRSA negative, Urine legionella negative  -CTX 5 day course completed 10/17     #L lung consolidation, now complete opacification  #Steno HAP  #Increased O2 requirements - improving  - Initially treated for CAP given positive urine strep with CTX 10/13-10/17, one day of Zosyn10/17-10/17  - Leukocytosis stable ~24  - C/f HAP vs incomplete resolution of previous PNA  -S/p Bronch 10/20 with purulent fluid with bronchial washings sent for cx  -Bronchial washings with stenotrophomonas susceptible to Bactrim  -Bactrim 10/22-10/28  -10/31 respiratory sputum cx with salivary contamination   -CXR 10/31 showing complete opacification of left lung worse  from prior, likely 2/2 mucus plugging vs pulmonary edema vs pneumonia  -CXR 11/1 showing slight improvement in JOSE ANTONIO aeration  -CXR 11/2 showing worsening JOSE ANTONIO opacification   -improving O2 requirement: now on HFNC 6L   -Low concern for pneumonia as patient is afebrile, does have leukocytosis but this has been chronic during this hospitalization. Negative lactate. Also tachy and hypotensive however HR has been chronically elevated  -Given clinical improvement and decreasing O2 requirements, no need for bronch today. Will c/w BPH as below and obtain daily AM CXRs      #Severe COPD  #Mucus plugging  - 8/2023 FEV1 42%  - CT C/A/P with RLL mucus plug  - CXR 10/28 with L sided pleural effusion vs mucus plugging  - CXR 10/31 with complete opacification of left lung  - CXR 11/1 showing slight improvement in JOSE ANTONIO aeration  - Continue aggressive RT: Albuterol 2.5 mg q12h, chest PT, respiratory hygiene  - Ezpap, incentive spirometer  - 7% NS neb q12h  - Encourage sitting, mobility as able. PT following     #Suspected PAH  - 10/17/23 Echo with atrial septal aneurysm, positive bubble study  - Will need outpatient workup, previously some suspicion for PAH but will need further evaluation      #Subsegmental PE  -Requires anticoagulation for 3 months (per vasc med 8/2023), on home apixaban 5mg BID  PLAN:  -Heparin gtt (will hold at [facility] pending possible CT surgery procedure)     GI  #HepC  -treated, 8/2023 HCV RNA undetected     #Nutrition  - Dobhoff in place 10/17  - Nutrition following, appreciate recs  - TF with TwoCal HN at goal rate of 35ml/hr (will hold at [facility] for possible CT surgery procedure)  - 100mg Thiamine, 1mg Folic acid, 8000u Vit D2     #Elevated transaminases, hx of treated HCV- increasing, semi stable  - LFTs rising over past few days, predominantly ALP/AST/ALT with normal bili  - RUQ US with steatosis, sludge in GB  - Sulfa drug induced vs intrinsic process, though Bactrim typically takes 2 weeks for  hepatotoxicity  -Trend LFTs     /RENAL  #Hyponatremia - Likely SIADH 2/2 COPD exacerbation but unclear if additional etiologies- resolved  - Na as low as 120, slowly improving, 139 on 11/1/23  - check Na and urea BID  - D/c urea packets in setting of BUN of 180s and AMS  - place marie for accurate I&Os in setting of strict fluid monitoring  - Free water flushes 100ml BID for further fluid restriction given hyponatremia thought to be 2/2 SIADH  - 10/31 renal US: negative for obstruction, echogenicities   - CVVH started 10/31: currently running net even   - plan for at least 72h CVVH (10/31-11/3)  - Nephro following, appreciate recs     #Uremia with c/f uremic encephalopathy - improving   #KRYSTEN likely prerenal   - BUN elevating over past few days up to 180s, likely 2/2 urea packets, KRYSTEN, TF  - Fluid restricted, FeNa 0.4% indicating prerenal KRYSTEN  - Monitor for uremic symptoms: loss of appetite, hypoactive changes in mental status, nausea, pericarditis  - CVVH started 10/31: currently running net even  - plan for at least 72h CVVH (10/31-11/3)    #Hyperkalemia - resolved  - K 7.9, rechecked to 4.7  - 4.4 on 10/29 -> uptrending to 5.4 -> 5.9 10/30 -> s/p 15g lokelma  - K this AM 3.6    #Hypokalemia  - K this AM 3.6   - s/p 40 mEq Kcl  - switched CVVH bath from 2K to 4K  - repeat RFP this PM      #C/f refeeding syndrome- resolved  - Hypokalemia 2.7, Phos 1.9, Ca 5.2  - Electrolytes repleted, will monitor        ENDOCRINE  #Secondary HyperPTH  -8/28 .7  -25-hydroxy vit D 8, 1,25 Vit D 44.4  PLAN:  Vit D2 8000u liquid daily     ID  #TV endocarditis   #Leukocytosis I/s/o recently treated strep pneumo CAP and new TV endocarditis   - Bronchial washings with stenotrophomonas susceptible to Bactrim   - Completed CTX 10/13-10/17  - Zosyn 10/20-10/24, Bactrim 10/22-10/28, Levaquin 10/23-10/25  - TTE 10/17/23: LVEF 75%, LV concentric modeling, mildly increased LV septal wall thickness, atrial septal aneurysm, mildly  elevated RSVP 39  - TTE 10/31/23: same as above with the following exceptions: 1.7 x 0.8 cm mobile echodensity of posterior tricuspid leaflet, mod-severe TR, RSVP increased to 57  - Bcx 10/31/23: NGTD    - WBC 27 (11/1) -> 24 (11/2)   - CRP 23.6 (11/1) -> 21.73 (11/2) -> continue to trend  - meropenem 2g q12h (11/1-*) and vancomycin dosed per pharmacy (10/31-*)  - cardiology consulted, appreciate recs: repeat TTE to further characterize vegetations  - ID consulted, appreciate recs     HEME/ONC  #Leukocytosis, intermittently ranging 18-24 - IMPROVING  -Likely 2/2 to uremia vs TV endocarditis   - WBC 27 (11/1) -> 24 (11/2)   -Monitor WBC, vitals     MSK/RHEUM   #Chronic LE weakness  -Likely chronic from disuse and alcohol, unlikely GBS since no paralysis  -possible EMG once downgraded, neuro concern for peripheral (LMN + muscle) inflammatory or degenerative disorder considering distribution and chronology     Vent/Oxy: HFNC at 6L  Pressors/Drips: Levophed at 0.04 mcg/kg  Abx: meropenem & vanc  F: Restricted  E: PRN  N: TF via Dobhoff at 35 ml/hr (will hold at [facility] for possible CT surgery procedure)  A: PIV R arm x 3, Right IJV CVC, Dobhoff, [place]  DVT ppx: Heparin gtt (will hold at [facility] for possible CT surgery procedure)  GI ppx: Pantoprazole 40mg daily     Patient was seen and discussed with Dr. Rojo who agrees with the plan as outlined above.    Yancy Freeman MD  Internal Medicine PGY1  11/2/2023 7:09 AM

## 2023-11-02 NOTE — PROGRESS NOTES
Renal Staff CVVH Note    Patient seen, examined on CVVH  No significant filter issues overnight  Urine output, min, marie draining dark urine  Fluid removal 50 per hour  Effluent bag clear  Access R internal jugular temp    O2 per NC  1 Pressor requirement hypotensive    Electrolytes, acid base acceptable  Medications reviewed    No CVVH script change  Fluid removal per primary team

## 2023-11-02 NOTE — SIGNIFICANT EVENT
Spoke to Sarah Godinez this PM at number listed in chart. Gave daily update on her brother's status and treatment plan. Answered all questions.     Yancy Freeman MD  Internal Medicine PGY1

## 2023-11-02 NOTE — NURSING NOTE
Pt with short runs of SVT throughout this shift. Runs becoming more frequent. Between 2230 and 2300, pt had exactly 19 runs of SVT, average rate of 170-180's, lasting between 10-20 seconds. Pt with slow increase in levophed requirement throughout this shift also.  Pt denies chest pressure or chest pain. EKG obtained during an SVT run. MDs at bedside. Fellow performed quick bedside US, order to give 500ml LR bolus. CVVH fluid removal to 10ml/hr. Pt also with C/O 10/10 bilateral leg pain, more in the rt lower leg.  MD also assessed. Additional and more frequent oxycodone ordered.  Pt had had leg pain and these SVT runs on Dayshift per shift change nursing report. Continue to monitor.

## 2023-11-03 ENCOUNTER — APPOINTMENT (OUTPATIENT)
Dept: RADIOLOGY | Facility: HOSPITAL | Age: 68
End: 2023-11-03
Payer: COMMERCIAL

## 2023-11-03 LAB
ABO GROUP (TYPE) IN BLOOD: NORMAL
ALBUMIN SERPL BCP-MCNC: 2.8 G/DL (ref 3.4–5)
ANION GAP SERPL CALC-SCNC: 15 MMOL/L (ref 10–20)
ANTIBODY SCREEN: NORMAL
BASOPHILS # BLD AUTO: 0.12 X10*3/UL (ref 0–0.1)
BASOPHILS NFR BLD AUTO: 0.6 %
BUN SERPL-MCNC: 36 MG/DL (ref 6–23)
CALCIUM SERPL-MCNC: 8.7 MG/DL (ref 8.6–10.6)
CHLORIDE SERPL-SCNC: 100 MMOL/L (ref 98–107)
CO2 SERPL-SCNC: 26 MMOL/L (ref 21–32)
CREAT SERPL-MCNC: 0.46 MG/DL (ref 0.5–1.3)
CRP SERPL-MCNC: 18.8 MG/DL
EOSINOPHIL # BLD AUTO: 0.42 X10*3/UL (ref 0–0.7)
EOSINOPHIL NFR BLD AUTO: 2.1 %
ERYTHROCYTE [DISTWIDTH] IN BLOOD BY AUTOMATED COUNT: 13.6 % (ref 11.5–14.5)
GFR SERPL CREATININE-BSD FRML MDRD: >90 ML/MIN/1.73M*2
GLUCOSE BLD MANUAL STRIP-MCNC: 100 MG/DL (ref 74–99)
GLUCOSE BLD MANUAL STRIP-MCNC: 102 MG/DL (ref 74–99)
GLUCOSE BLD MANUAL STRIP-MCNC: 103 MG/DL (ref 74–99)
GLUCOSE BLD MANUAL STRIP-MCNC: 125 MG/DL (ref 74–99)
GLUCOSE BLD MANUAL STRIP-MCNC: 127 MG/DL (ref 74–99)
GLUCOSE SERPL-MCNC: 116 MG/DL (ref 74–99)
HCT VFR BLD AUTO: 36.5 % (ref 41–52)
HGB BLD-MCNC: 11.6 G/DL (ref 13.5–17.5)
IMM GRANULOCYTES # BLD AUTO: 0.3 X10*3/UL (ref 0–0.7)
IMM GRANULOCYTES NFR BLD AUTO: 1.5 % (ref 0–0.9)
LYMPHOCYTES # BLD AUTO: 0.97 X10*3/UL (ref 1.2–4.8)
LYMPHOCYTES NFR BLD AUTO: 4.8 %
MAGNESIUM SERPL-MCNC: 2.36 MG/DL (ref 1.6–2.4)
MCH RBC QN AUTO: 29.4 PG (ref 26–34)
MCHC RBC AUTO-ENTMCNC: 31.8 G/DL (ref 32–36)
MCV RBC AUTO: 92 FL (ref 80–100)
MONOCYTES # BLD AUTO: 1.45 X10*3/UL (ref 0.1–1)
MONOCYTES NFR BLD AUTO: 7.2 %
NEUTROPHILS # BLD AUTO: 16.94 X10*3/UL (ref 1.2–7.7)
NEUTROPHILS NFR BLD AUTO: 83.8 %
NRBC BLD-RTO: 0 /100 WBCS (ref 0–0)
PHOSPHATE SERPL-MCNC: 2.3 MG/DL (ref 2.5–4.9)
PLATELET # BLD AUTO: 309 X10*3/UL (ref 150–450)
POTASSIUM SERPL-SCNC: 4.2 MMOL/L (ref 3.5–5.3)
RBC # BLD AUTO: 3.95 X10*6/UL (ref 4.5–5.9)
RH FACTOR (ANTIGEN D): NORMAL
SODIUM SERPL-SCNC: 137 MMOL/L (ref 136–145)
UFH PPP CHRO-ACNC: 0.4 IU/ML
WBC # BLD AUTO: 20.2 X10*3/UL (ref 4.4–11.3)

## 2023-11-03 PROCEDURE — 2500000002 HC RX 250 W HCPCS SELF ADMINISTERED DRUGS (ALT 637 FOR MEDICARE OP, ALT 636 FOR OP/ED)

## 2023-11-03 PROCEDURE — 85025 COMPLETE CBC W/AUTO DIFF WBC: CPT

## 2023-11-03 PROCEDURE — 71045 X-RAY EXAM CHEST 1 VIEW: CPT

## 2023-11-03 PROCEDURE — 86140 C-REACTIVE PROTEIN: CPT

## 2023-11-03 PROCEDURE — 86850 RBC ANTIBODY SCREEN: CPT

## 2023-11-03 PROCEDURE — 80069 RENAL FUNCTION PANEL: CPT

## 2023-11-03 PROCEDURE — 94762 N-INVAS EAR/PLS OXIMTRY CONT: CPT

## 2023-11-03 PROCEDURE — 2500000005 HC RX 250 GENERAL PHARMACY W/O HCPCS

## 2023-11-03 PROCEDURE — S4991 NICOTINE PATCH NONLEGEND: HCPCS | Performed by: STUDENT IN AN ORGANIZED HEALTH CARE EDUCATION/TRAINING PROGRAM

## 2023-11-03 PROCEDURE — 87040 BLOOD CULTURE FOR BACTERIA: CPT

## 2023-11-03 PROCEDURE — 86901 BLOOD TYPING SEROLOGIC RH(D): CPT

## 2023-11-03 PROCEDURE — 2500000002 HC RX 250 W HCPCS SELF ADMINISTERED DRUGS (ALT 637 FOR MEDICARE OP, ALT 636 FOR OP/ED): Performed by: STUDENT IN AN ORGANIZED HEALTH CARE EDUCATION/TRAINING PROGRAM

## 2023-11-03 PROCEDURE — 2500000004 HC RX 250 GENERAL PHARMACY W/ HCPCS (ALT 636 FOR OP/ED)

## 2023-11-03 PROCEDURE — 71045 X-RAY EXAM CHEST 1 VIEW: CPT | Performed by: RADIOLOGY

## 2023-11-03 PROCEDURE — 90945 DIALYSIS ONE EVALUATION: CPT | Performed by: INTERNAL MEDICINE

## 2023-11-03 PROCEDURE — 83735 ASSAY OF MAGNESIUM: CPT

## 2023-11-03 PROCEDURE — 2500000001 HC RX 250 WO HCPCS SELF ADMINISTERED DRUGS (ALT 637 FOR MEDICARE OP)

## 2023-11-03 PROCEDURE — 99291 CRITICAL CARE FIRST HOUR: CPT

## 2023-11-03 PROCEDURE — 85520 HEPARIN ASSAY: CPT

## 2023-11-03 PROCEDURE — 94640 AIRWAY INHALATION TREATMENT: CPT

## 2023-11-03 PROCEDURE — 94668 MNPJ CHEST WALL SBSQ: CPT

## 2023-11-03 PROCEDURE — 36415 COLL VENOUS BLD VENIPUNCTURE: CPT

## 2023-11-03 PROCEDURE — 2020000001 HC ICU ROOM DAILY

## 2023-11-03 PROCEDURE — 2500000001 HC RX 250 WO HCPCS SELF ADMINISTERED DRUGS (ALT 637 FOR MEDICARE OP): Performed by: STUDENT IN AN ORGANIZED HEALTH CARE EDUCATION/TRAINING PROGRAM

## 2023-11-03 PROCEDURE — 82947 ASSAY GLUCOSE BLOOD QUANT: CPT

## 2023-11-03 PROCEDURE — 31720 CLEARANCE OF AIRWAYS: CPT

## 2023-11-03 RX ORDER — MIDODRINE HYDROCHLORIDE 5 MG/1
5 TABLET ORAL
Status: DISCONTINUED | OUTPATIENT
Start: 2023-11-04 | End: 2023-11-04

## 2023-11-03 RX ORDER — HEPARIN SODIUM 1000 [USP'U]/ML
INJECTION, SOLUTION INTRAVENOUS; SUBCUTANEOUS
Status: COMPLETED
Start: 2023-11-03 | End: 2023-11-03

## 2023-11-03 RX ADMIN — HEPARIN SODIUM: 1000 INJECTION INTRAVENOUS; SUBCUTANEOUS at 14:19

## 2023-11-03 RX ADMIN — THIAMINE HCL TAB 100 MG 100 MG: 100 TAB at 08:28

## 2023-11-03 RX ADMIN — ALBUTEROL SULFATE 2.5 MG: 2.5 SOLUTION RESPIRATORY (INHALATION) at 20:38

## 2023-11-03 RX ADMIN — FOLIC ACID 1 MG: 1 TABLET ORAL at 08:28

## 2023-11-03 RX ADMIN — MICAFUNGIN SODIUM 100 MG: 50 INJECTION, POWDER, LYOPHILIZED, FOR SOLUTION INTRAVENOUS at 14:21

## 2023-11-03 RX ADMIN — ESOMEPRAZOLE MAGNESIUM 40 MG: 40 FOR SUSPENSION ORAL at 08:29

## 2023-11-03 RX ADMIN — Medication 10 MG: at 21:44

## 2023-11-03 RX ADMIN — Medication 3 ML: at 20:38

## 2023-11-03 RX ADMIN — LIDOCAINE 1 PATCH: 4 PATCH TOPICAL at 08:29

## 2023-11-03 RX ADMIN — Medication 1 TABLET: at 09:00

## 2023-11-03 RX ADMIN — Medication 8000 UNITS: at 08:28

## 2023-11-03 RX ADMIN — Medication 3 ML: at 08:47

## 2023-11-03 RX ADMIN — OXYCODONE HYDROCHLORIDE 5 MG: 5 TABLET ORAL at 01:32

## 2023-11-03 RX ADMIN — Medication 1 PATCH: at 08:29

## 2023-11-03 RX ADMIN — ALBUTEROL SULFATE 2.5 MG: 2.5 SOLUTION RESPIRATORY (INHALATION) at 08:45

## 2023-11-03 RX ADMIN — OXYCODONE HYDROCHLORIDE 5 MG: 5 TABLET ORAL at 17:21

## 2023-11-03 RX ADMIN — SODIUM CHLORIDE, POTASSIUM CHLORIDE, SODIUM LACTATE AND CALCIUM CHLORIDE 500 ML: 600; 310; 30; 20 INJECTION, SOLUTION INTRAVENOUS at 15:00

## 2023-11-03 RX ADMIN — OXYCODONE HYDROCHLORIDE 5 MG: 5 TABLET ORAL at 08:27

## 2023-11-03 ASSESSMENT — PAIN SCALES - GENERAL
PAINLEVEL_OUTOF10: 8
PAINLEVEL_OUTOF10: 9
PAINLEVEL_OUTOF10: 0 - NO PAIN
PAINLEVEL_OUTOF10: 3

## 2023-11-03 ASSESSMENT — PAIN - FUNCTIONAL ASSESSMENT: PAIN_FUNCTIONAL_ASSESSMENT: 0-10

## 2023-11-03 NOTE — PROGRESS NOTES
Renal Staff CVVH Note    Patient seen, examined on CVVH  No significant filter issues overnight  Urine output, 375, marie draining dark urine  Fluid removal 50 per hour  Effluent bag clear  Access R internal jugular temp    O2 per NC  1 Pressor requirement hypotensive    Electrolytes, acid base acceptable  Medications reviewed    No CVVH script change  Fluid removal per primary team   Please obtain ical twice daily while on CVVH    Addendum  Given fungemia, will stop cvvh today  Reassess daily for RRT needs

## 2023-11-03 NOTE — PROGRESS NOTES
SOCIAL WORK NOTE (late entry)  SW met with team for rounds. Patient receiving renal replacement. Long term HD needs are still TBD. CT surgery to evaluate endocarditis  in terms of surgical options. Currently recommended for moderate intensity therapy. Social work to follow.  AMANDO Peralta, LISW-S ([id number])

## 2023-11-03 NOTE — PROGRESS NOTES
Molina Godinez is a 68 y.o. male on day 22 of admission presenting with Hospital-acquired pneumonia.    Subjective   SUBJECTIVE:  Patient continuing to have diarrhea, had 4 loose bowel movements overnight. He has also been vasovagalling when he stools, with his MAPs dropping to the 50s. At bedside, patient is very somnolent, not able to answer questions. This morning.     Objective   OBJECTIVE:  Last Recorded Vitals  BP 97/64   Pulse (!) 129   Temp 37.7 °C (99.9 °F) (Temporal)   Resp (!) 32   Wt 49.7 kg (109 lb 9.1 oz)   SpO2 95%     I&O 24HR    Intake/Output Summary (Last 24 hours) at 11/3/2023 1610  Last data filed at 11/3/2023 1100  Gross per 24 hour   Intake 1490.59 ml   Output 1243 ml   Net 247.59 ml          Physical Exam  Constitutional:       General: He is not in acute distress.     Appearance: He is cachectic. He is ill-appearing. He is not diaphoretic.      Interventions: Nasal cannula in place.   HENT:      Head: Normocephalic and atraumatic.      Right Ear: External ear normal.      Left Ear: External ear normal.      Nose: Nose normal. No congestion or rhinorrhea.      Mouth/Throat:      Mouth: Mucous membranes are dry.      Pharynx: Oropharynx is clear.   Eyes:      General: No scleral icterus.     Extraocular Movements: Extraocular movements intact.      Conjunctiva/sclera: Conjunctivae normal.   Neck:      Comments: Notable kyphosis of the cervical spine  Cardiovascular:      Rate and Rhythm: Regular rhythm. Tachycardia present.      Pulses: Normal pulses.      Heart sounds: Normal heart sounds. No murmur heard.     No friction rub. No gallop.   Pulmonary:      Breath sounds: Examination of the left-upper field reveals decreased breath sounds. Examination of the left-middle field reveals decreased breath sounds. Examination of the left-lower field reveals decreased breath sounds. Decreased breath sounds present. No wheezing, rhonchi or rales.      Comments: Poor inspiratory effort  Abdominal:       General: Abdomen is flat. Bowel sounds are normal. There is no distension.      Palpations: Abdomen is soft.      Tenderness: There is no abdominal tenderness.   Musculoskeletal:         General: No swelling or deformity.      Right lower leg: No edema.      Left lower leg: No edema.   Skin:     General: Skin is warm and dry.      Findings: No bruising, lesion or rash.   Neurological:      Mental Status: He is alert.      Motor: Weakness (generalized) present.      Comments: Somnolent, unable to answer questions       Relevant Results    Labs:  Results for orders placed or performed during the hospital encounter of 10/11/23 (from the past 24 hour(s))   Renal function panel   Result Value Ref Range    Glucose 133 (H) 74 - 99 mg/dL    Sodium 137 136 - 145 mmol/L    Potassium 4.1 3.5 - 5.3 mmol/L    Chloride 100 98 - 107 mmol/L    Bicarbonate 24 21 - 32 mmol/L    Anion Gap 17 10 - 20 mmol/L    Urea Nitrogen 47 (H) 6 - 23 mg/dL    Creatinine 0.65 0.50 - 1.30 mg/dL    eGFR >90 >60 mL/min/1.73m*2    Calcium 8.8 8.6 - 10.6 mg/dL    Phosphorus 2.4 (L) 2.5 - 4.9 mg/dL    Albumin 2.9 (L) 3.4 - 5.0 g/dL   Magnesium   Result Value Ref Range    Magnesium 2.24 1.60 - 2.40 mg/dL   Heparin Assay, UFH   Result Value Ref Range    Heparin Unfractionated 0.2 See Comment Below for Therapeutic Ranges IU/mL   POCT GLUCOSE   Result Value Ref Range    POCT Glucose 110 (H) 74 - 99 mg/dL   Heparin Assay, UFH   Result Value Ref Range    Heparin Unfractionated 0.3 See Comment Below for Therapeutic Ranges IU/mL   POCT GLUCOSE   Result Value Ref Range    POCT Glucose 117 (H) 74 - 99 mg/dL   Heparin Assay, UFH   Result Value Ref Range    Heparin Unfractionated 0.4 See Comment Below for Therapeutic Ranges IU/mL   C-reactive protein   Result Value Ref Range    C-Reactive Protein 18.80 (H) <1.00 mg/dL   CBC and Auto Differential   Result Value Ref Range    WBC 20.2 (H) 4.4 - 11.3 x10*3/uL    nRBC 0.0 0.0 - 0.0 /100 WBCs    RBC 3.95 (L) 4.50 - 5.90  x10*6/uL    Hemoglobin 11.6 (L) 13.5 - 17.5 g/dL    Hematocrit 36.5 (L) 41.0 - 52.0 %    MCV 92 80 - 100 fL    MCH 29.4 26.0 - 34.0 pg    MCHC 31.8 (L) 32.0 - 36.0 g/dL    RDW 13.6 11.5 - 14.5 %    Platelets 309 150 - 450 x10*3/uL    Neutrophils % 83.8 40.0 - 80.0 %    Immature Granulocytes %, Automated 1.5 (H) 0.0 - 0.9 %    Lymphocytes % 4.8 13.0 - 44.0 %    Monocytes % 7.2 2.0 - 10.0 %    Eosinophils % 2.1 0.0 - 6.0 %    Basophils % 0.6 0.0 - 2.0 %    Neutrophils Absolute 16.94 (H) 1.20 - 7.70 x10*3/uL    Immature Granulocytes Absolute, Automated 0.30 0.00 - 0.70 x10*3/uL    Lymphocytes Absolute 0.97 (L) 1.20 - 4.80 x10*3/uL    Monocytes Absolute 1.45 (H) 0.10 - 1.00 x10*3/uL    Eosinophils Absolute 0.42 0.00 - 0.70 x10*3/uL    Basophils Absolute 0.12 (H) 0.00 - 0.10 x10*3/uL   Renal function panel   Result Value Ref Range    Glucose 116 (H) 74 - 99 mg/dL    Sodium 137 136 - 145 mmol/L    Potassium 4.2 3.5 - 5.3 mmol/L    Chloride 100 98 - 107 mmol/L    Bicarbonate 26 21 - 32 mmol/L    Anion Gap 15 10 - 20 mmol/L    Urea Nitrogen 36 (H) 6 - 23 mg/dL    Creatinine 0.46 (L) 0.50 - 1.30 mg/dL    eGFR >90 >60 mL/min/1.73m*2    Calcium 8.7 8.6 - 10.6 mg/dL    Phosphorus 2.3 (L) 2.5 - 4.9 mg/dL    Albumin 2.8 (L) 3.4 - 5.0 g/dL   Magnesium   Result Value Ref Range    Magnesium 2.36 1.60 - 2.40 mg/dL   Type and screen   Result Value Ref Range    ABO TYPE O     Rh TYPE POS     ANTIBODY SCREEN NEG    POCT GLUCOSE   Result Value Ref Range    POCT Glucose 125 (H) 74 - 99 mg/dL   POCT GLUCOSE   Result Value Ref Range    POCT Glucose 127 (H) 74 - 99 mg/dL   POCT GLUCOSE   Result Value Ref Range    POCT Glucose 102 (H) 74 - 99 mg/dL   Blood Culture    Specimen: Peripheral Venipuncture; Blood culture   Result Value Ref Range    Blood Culture Loaded on Instrument - Culture in progress    Blood Culture    Specimen: Peripheral Venipuncture; Blood culture   Result Value Ref Range    Blood Culture Loaded on Instrument - Culture in  progress    POCT GLUCOSE   Result Value Ref Range    POCT Glucose 100 (H) 74 - 99 mg/dL     C. Diff negative on 11/3    Imaging:  === 11/3/23 ===    XR CHEST 1 VIEW    - Impression -  Persistent subtotal opacification of the left hemithorax with volume  loss and mediastinal deviation.    MACRO:  None.    Signed by: Tari Lomas 11/3/2023 8:12 AM  Dictation workstation:   [id number]    Limited TTE from 11/2/23  PHYSICIAN INTERPRETATION:  Left Ventricle: The left ventricular systolic function is hyperdynamic, with an estimated ejection fraction of 70-75%. There are no regional wall motion abnormalities. The left ventricular cavity size is decreased. There is left ventricular concentric remodeling. The interventricular septum is flattened in systole, consistent with right ventricular pressure overload. Spectral Doppler shows an impaired relaxation pattern of left ventricular diastolic filling.  Left Atrium: The left atrium is normal in size.  Right Ventricle: The right ventricle is mildly enlarged. There is mildly reduced right ventricular systolic function. Radial RV function appears preserved; longitudinal RV function is decreased.  Right Atrium: The right atrium is moderately dilated.  Aortic Valve: The aortic valve is trileaflet. There is minimal aortic valve cusp calcification. There is no evidence of aortic valve regurgitation.  Mitral Valve: The mitral valve is normal in structure. There is trace mitral valve regurgitation.  Tricuspid Valve: The tricuspid valve is structurally normal. There is moderate tricuspid regurgitation. The Doppler estimated RVSP is mildly elevated at 45.6 mmHg. The PA systolic pressure may be underestimated. The previously noted posterior tricuspid leaflet vegetation is not clearly seen on this study,.  Pulmonic Valve: The pulmonic valve is structurally normal. There is trace pulmonic valve regurgitation.  Pericardium: There is no pericardial effusion noted.  Aorta: The aortic root is  normal.  In comparison to the previous echocardiogram(s): Compared with study from 12/1/2022.     CONCLUSIONS:   1. Left ventricular systolic function is hyperdynamic with a 70-75% estimated ejection fraction.   2. No definite valvular vegetations were visualized.   3. Right ventricular pressure overload.   4. Spectral Doppler shows an impaired relaxation pattern of left ventricular diastolic filling.   5. There is mildly reduced right ventricular systolic function.   6. The right atrium is moderately dilated.   7. Mildly elevated RVSP.   8. Moderate tricuspid regurgitation visualized.   9. The PA systolic pressure may be underestimated. The previously noted posterior tricuspid leaflet vegetation is not clearly seen on this study.  10. Left ventricular cavity size is decreased.  11. The reasons for repeating the echo from 10/31 is not clear.    Medications:    Current Facility-Administered Medications:     acetaminophen (Tylenol) tablet 650 mg, 650 mg, oral, q8h PRN, Ever Paez MD, 650 mg at 10/29/23 2034    albuterol 2.5 mg /3 mL (0.083 %) nebulizer solution 2.5 mg, 2.5 mg, nebulization, q12h, Yancy Freeman MD, 2.5 mg at 11/03/23 0845    calcium gluconate in NS IV 2 g, 2 g, intravenous, Once, Ever Paez MD    dextrose 10 % in water (D10W) infusion, 0.3 g/kg/hr, intravenous, Once PRN, Virgil Olivo MD, Stopped at 10/18/23 0800    dextrose 50 % injection 25 g, 25 g, intravenous, q15 min PRN, Virgil Olivo MD    ergocalciferol (Vitamin D-2) drops 8,000 Units, 8,000 Units, oral, Daily, Ever Paez MD, 8,000 Units at 11/03/23 0828    folic acid (Folvite) tablet 1 mg, 1 mg, oral, Daily, Ever Paez MD, 1 mg at 11/03/23 0828    glucagon (Glucagen) injection 1 mg, 1 mg, intramuscular, q15 min PRN, Virgil Olivo MD    heparin (porcine) injection 2,000-4,000 Units, 2,000-4,000 Units, intravenous, q4h PRN, Virgil Olivo MD, 2,000 Units at 10/25/23 9926    [Held by provider] heparin  25,000 Units in dextrose 5% 250 mL (100 Units/mL) infusion (premix), 0-4,500 Units/hr, intravenous, Continuous, Rosa Gary MD, Last Rate: 13 mL/hr at 11/02/23 2229, 1,300 Units/hr at 11/02/23 2229    lactated Ringer's bolus 500 mL, 500 mL, intravenous, Once, Arianna Bills DO    lidocaine 4 % patch 1 patch, 1 patch, transdermal, Daily, Ever Paez MD, 1 patch at 11/03/23 0829    melatonin tablet 10 mg, 10 mg, oral, Nightly, Tia Manuel MD, 10 mg at 11/02/23 2056    micafungin (Mycamine) 100 mg in dextrose 5 % in water (D5W) 100 mL IV, 100 mg, intravenous, q24h, Arianna Bills DO, Last Rate: 105 mL/hr at 11/03/23 1421, 100 mg at 11/03/23 1421    multivitamin with minerals 1 tablet, 1 tablet, oral, Daily, Virgil Olivo MD, 1 tablet at 11/03/23 0900    nicotine (Nicoderm CQ) 21 mg/24 hr patch 1 patch, 1 patch, transdermal, Daily, Virgil Olivo MD, 1 patch at 11/03/23 0829    norepinephrine (Levophed) 8 mg in dextrose 5% 250 mL (0.032 mg/mL) infusion (premix), 0-3.3 mcg/kg/min, intravenous, Continuous, Porfirio Riojas MD, Last Rate: 2.8 mL/hr at 11/03/23 0700, 0.03 mcg/kg/min at 11/03/23 0700    oxyCODONE (Roxicodone) immediate release tablet 5 mg, 5 mg, oral, q6h PRN, Jah Ospina MD, 5 mg at 11/03/23 0827    oxygen (O2) therapy, , inhalation, Continuous PRN - O2/gases, Will Rojo MD, 6 L/min at 11/02/23 2100    sennosides (Senokot) tablet 8.6 mg, 1 tablet, oral, Nightly PRN, Virgil Olivo MD    sodium chloride 7 % nebulizer solution 3 mL, 3 mL, nebulization, q12h, Yancy Freeman MD, 3 mL at 11/03/23 0847    thiamine (Vitamin B-1) tablet 100 mg, 100 mg, oral, Daily, Ever Paez MD, 100 mg at 11/03/23 0828    white petrolatum (Aquaphor) 41 % ointment 1 Application, 1 Application, Topical, q1h PRN, Ever Paez MD    Assessment/Plan   ASSESSMENT & PLAN:  Molina Godinez is a 68 y.o. male with PMHx of COPD, HTN, AUD and opioid use disorder, HCV (treated), chronic PE who presented  originally on 10/9 with inability to walk. Briefly in MICU for c/f alcohol withdrawal and high CIWA scores, transferred to floor, eventually readmitted to MICU 10/16 for increased O2 requirements, put on Airvo and intubated 10/20. Bronch done with resp cx growing stenotrophomonas for which he completed a course of Bactrim. Pt was extubated on 10/27. Also with SIADH (now resolved, unclear etiology, potentially just his severe pulmonary disease); nephrology consulted and was initially treated with fluid restriction, urea packets and Lasix, but now off all of these for significant increase BUN to 180s. CVVH started 10/31 for uremia and fluid removal. Now with worsening complete left lung opacification and new O2 requirement, likely 2/2 mucus plugging vs pulmonary edema vs pneumonia (low suspicion). Plan to discontinue CRRT today. Repeat TTE on 11/3 without evidence of vegetation, abx discontinued.      Active Problems:  Hypotension, unknown etiology. Planning on bronching today  Tachycardia with intermittent SVT   Left lung complete opacification likely 2/2 mucus plugging vs pulmonary edema   KRYSTEN likely prerenal    NEURO/PSYCH  #AMS likely 2/2 uremic encephalopathy vs chronic hypoxia - IMPROVING  - Previously intubated/sedated, extubated on 10/27.   - CT head without pathology from 10/11  - Initially had visual and tactile hallucinations, have since resolved. Suspect metabolic etiology of fluctuations in mental status, i/s/o acutely increased oxygen requirement.  - Improving mentation on CVVH with concurrent decrease in serum BUN     #Bilateral LE weakness  -Likely chronic, had weakness during 8/2023 admission  -Patient able to move bilateral lower extremities  -Repeat MRI brain/total spine on 10/20 unremarkable for acute process  -Neurology consulted. Suspicion that weakness is likely a peripheral (LMN + muscle) inflammatory or degenerative disorder considering distribution and chronology.      - recommend EMG/NGS  after patient is downgraded      - low suspicion for GBS  -May follow up with neurology outpatient  -ID consulted regarding weakness and positive Syphilis Ab. Pt was treated back in 1998 but rec MRI spine given concern of abscess, MRI negative for acute process     CV  #TV endocarditis - repeat TTE without evidence of vegetation  - TTE 10/17/23: LVEF 75%, LV concentric modeling, mildly increased LV septal wall thickness, atrial septal aneurysm, mildly elevated RSVP 39  - TTE 10/31/23: same as above with the following exceptions: 1.7 x 0.8 cm mobile echodensity of posterior tricuspid leaflet, mod-severe TR, RSVP increased to 57  - TTE 11/2/23: No definite valvular vegetations were visualized  - abx discontinued     #Hypotension, asymptomatic   - Etiology includes infection vs volume depletion    - 10/31 MAP to 50s   - plan on weaning norepi and starting midodrine     #HTN- resolved  - home amlodipine 5mg, atenolol 50mg  - Initially started on Cardene drip, transitioned to Labetalol prior to resolution  - home atenolol restarted on 10/28, switched to metop 12.5 BID on 10/29 I/s/o renal function     #Tachycardia  #Asymptomatic SVT to max HR 170s  - Sinus rhythm, HR chronically in 120-130s   - Etiology includes pain, infection, hypovolemia, uremia  - Poor correlation with fentanyl pushes, less suspicious for pain etiology  - previously on metoprolol 12.5mg BID, dc'd as patient now on norepi gtt  - decreased albuterol from q4h to q12h to decrease beta agonist activity on 11/2     #CAD  - home atorvastatin 40mg held in the setting of elevated transaminases      #Chest pain- resolved  - New onset chest pain 10/28, had hyperkalemia on RFP  - RFP rechecked, K wnl  - Trop 14  - ECG without acute changes, stable from prior  - Given sublingual nitroglycerin, but had resolution of pain prior to administration.  - Some concern of uremic pericarditis but unlikely. No pericardial rub appreciated on exam.  - continue to monitor      PULM  #CAP- resolved  -Urine strep positive, unsure if active or chronic infection  -MRSA negative, Urine legionella negative  -CTX 5 day course completed 10/17     #L lung consolidation, now complete opacification  #Steno HAP  #Increased O2 requirements - improving  - Initially treated for CAP given positive urine strep with CTX 10/13-10/17, one day of Zosyn10/17-10/17  - Leukocytosis stable ~24  - C/f HAP vs incomplete resolution of previous PNA  -S/p Bronch 10/20 with purulent fluid with bronchial washings sent for cx  -Bronchial washings with stenotrophomonas susceptible to Bactrim  -Bactrim 10/22-10/28  -10/31 respiratory sputum cx with salivary contamination   -CXR 10/31 showing complete opacification of left lung worse from prior, likely 2/2 mucus plugging vs pulmonary edema vs pneumonia  -CXR 11/1 showing slight improvement in JOSE ANTONIO aeration  -CXR 11/2 showing worsening JOSE ANTONIO opacification. CXR from 11/3 unchanged  -increasing O2 requirement: now on HFNC 10NC  -Low concern for pneumonia as patient is afebrile, does have leukocytosis but this has been chronic during this hospitalization. Negative lactate. Also tachy and hypotensive however HR has been chronically elevated  -Given clinical worsening and increasing O2 requirements, will plan for bronch today. Will c/w BPH as below and obtain daily AM CXRs      #Severe COPD  #Mucus plugging  - 8/2023 FEV1 42%  - CT C/A/P with RLL mucus plug  - CXR 10/28 with L sided pleural effusion vs mucus plugging  - CXR 10/31 with complete opacification of left lung  - CXR 11/1 showing slight improvement in JOSE ANTONIO aeration  - Continue aggressive RT: Albuterol 2.5 mg q12h, chest PT, respiratory hygiene  - Ezpap, incentive spirometer  - 7% NS neb q12h  - Encourage sitting, mobility as able. PT following  - Plan on bronching later today     #Suspected PAH  - 10/17/23 Echo with atrial septal aneurysm, positive bubble study  - Will need outpatient workup, previously some suspicion for  PAH but will need further evaluation      #Subsegmental PE  -Requires anticoagulation for 3 months (per vasc med 8/2023), on home apixaban 5mg BID  PLAN:  -Heparin gtt      GI  #HepC  -treated, 8/2023 HCV RNA undetected     #Nutrition  - Dobhoff in place 10/17  - Nutrition following, appreciate recs  - TF with TwoCal HN at goal rate of 35ml/hr (will hold at MN for possible CT surgery procedure)  - 100mg Thiamine, 1mg Folic acid, 8000u Vit D2     #diarrhea  - 4 loose BM overnight  - C diff negative  - vasovagal reaction during bowel movements  - last polyethylene glycol given 11/1; order d/c'd today  - consider changing TF formula     #Elevated transaminases, hx of treated HCV- increasing, semi stable  - LFTs rising over past few days, predominantly ALP/AST/ALT with normal bili  - RUQ US with steatosis, sludge in GB  - Sulfa drug induced vs intrinsic process, though Bactrim typically takes 2 weeks for hepatotoxicity  -Trend LFTs     /RENAL  #Hyponatremia - Likely SIADH 2/2 COPD exacerbation but unclear if additional etiologies- resolved  - Na as low as 120, slowly improving, 139 on 11/1/23  - check Na and urea BID  - D/c urea packets in setting of BUN of 180s and AMS  - place marie for accurate I&Os in setting of strict fluid monitoring  - Free water flushes 100ml BID for further fluid restriction given hyponatremia thought to be 2/2 SIADH  - 10/31 renal US: negative for obstruction, echogenicities   - CVVH started 10/31: discontinue today  - Nephro following, appreciate recs     #Uremia with c/f uremic encephalopathy - improving   #KRYSTEN likely prerenal   - BUN elevating over past few days up to 180s, likely 2/2 urea packets, KRYSTEN, TF  - Fluid restricted, FeNa 0.4% indicating prerenal KRYSTEN  - Monitor for uremic symptoms: loss of appetite, hypoactive changes in mental status, nausea, pericarditis  - CVVH started 10/31: discontinued today  - plan for at least 72h CVVH (10/31-11/3)    #Hyperkalemia - resolved  - K 7.9,  rechecked to 4.7  - 4.4 on 10/29 -> uptrending to 5.4 -> 5.9 10/30 -> s/p 15g lokelma  - K this AM 4.2    #Hypokalemia -resolved  - K this AM 4.2  - s/p 40 mEq Kcl  - continue daily RFP for to trend K levels     #C/f refeeding syndrome- resolved  - Hypokalemia 2.7, Phos 1.9, Ca 5.2  - Electrolytes repleted, will monitor     ENDOCRINE  #Secondary HyperPTH  -8/28 .7  -25-hydroxy vit D 8, 1,25 Vit D 44.4  PLAN:  Vit D2 8000u liquid daily     ID  #TV endocarditis   #Leukocytosis I/s/o recently treated strep pneumo CAP and new TV endocarditis   - Bronchial washings with stenotrophomonas susceptible to Bactrim   - Completed CTX 10/13-10/17  - Zosyn 10/20-10/24, Bactrim 10/22-10/28, Levaquin 10/23-10/25  - TTE 10/17/23: LVEF 75%, LV concentric modeling, mildly increased LV septal wall thickness, atrial septal aneurysm, mildly elevated RSVP 39  - TTE 10/31/23: same as above with the following exceptions: 1.7 x 0.8 cm mobile echodensity of posterior tricuspid leaflet, mod-severe TR, RSVP increased to 57  - Bcx 10/31/23: NGTD    - WBC 27 (11/1) -> 24 (11/2) -> 20.2 (11/3)  - CRP 23.6 (11/1) -> 21.73 (11/2) -> 18.8 (11/3) continue to trend  - meropenem and vanc discontinued  - cardiology consulted, appreciate recs: repeat TTE shows no evidence of vegetations  - ID signed off yesterday, will re-engage if necessary     HEME/ONC  #Leukocytosis, intermittently ranging 18-24 - IMPROVING  -Likely 2/2 to uremia vs TV endocarditis   - WBC 27 (11/1) -> 24 (11/2)  - > 20. 2 (11/3)  -Monitor WBC, vitals     MSK/RHEUM   #Chronic LE weakness  -Likely chronic from disuse and alcohol, unlikely GBS since no paralysis  -possible EMG once downgraded, neuro concern for peripheral (LMN + muscle) inflammatory or degenerative disorder considering distribution and chronology     Vent/Oxy: HFNC at 10L  Pressors/Drips: Levophed at 0.03 mcg/kg  Abx: none  F: Restricted  E: PRN  N: TF via Dobhoff at 35 ml/hr- holding for procedure.  A: PIV R arm  x 3, Right IJV CVC, Dana Kraft  DVT ppx: Heparin gtt (will hold for planned bronch later this PM)  GI ppx: Pantoprazole 40mg daily     Patient was seen and discussed with Dr. Rojo who agrees with the plan as outlined above.    Arianna iBlls DO  Family Medicine, PGY-1

## 2023-11-04 ENCOUNTER — APPOINTMENT (OUTPATIENT)
Dept: RADIOLOGY | Facility: HOSPITAL | Age: 68
End: 2023-11-04
Payer: COMMERCIAL

## 2023-11-04 ENCOUNTER — DOCUMENTATION (OUTPATIENT)
Dept: INTENSIVE CARE | Facility: HOSPITAL | Age: 68
End: 2023-11-04

## 2023-11-04 LAB
ALBUMIN SERPL BCP-MCNC: 2.6 G/DL (ref 3.4–5)
ANION GAP SERPL CALC-SCNC: 15 MMOL/L (ref 10–20)
BACTERIA BLD CULT: NORMAL
BASOPHILS # BLD AUTO: 0.21 X10*3/UL (ref 0–0.1)
BASOPHILS NFR BLD AUTO: 1 %
BUN SERPL-MCNC: 32 MG/DL (ref 6–23)
CALCIUM SERPL-MCNC: 8.7 MG/DL (ref 8.6–10.6)
CHLORIDE SERPL-SCNC: 101 MMOL/L (ref 98–107)
CO2 SERPL-SCNC: 26 MMOL/L (ref 21–32)
CREAT SERPL-MCNC: 0.42 MG/DL (ref 0.5–1.3)
CRP SERPL-MCNC: 14.95 MG/DL
EOSINOPHIL # BLD AUTO: 0.45 X10*3/UL (ref 0–0.7)
EOSINOPHIL NFR BLD AUTO: 2 %
ERYTHROCYTE [DISTWIDTH] IN BLOOD BY AUTOMATED COUNT: 13.1 % (ref 11.5–14.5)
GFR SERPL CREATININE-BSD FRML MDRD: >90 ML/MIN/1.73M*2
GLUCOSE BLD MANUAL STRIP-MCNC: 100 MG/DL (ref 74–99)
GLUCOSE BLD MANUAL STRIP-MCNC: 94 MG/DL (ref 74–99)
GLUCOSE BLD MANUAL STRIP-MCNC: 99 MG/DL (ref 74–99)
GLUCOSE SERPL-MCNC: 91 MG/DL (ref 74–99)
HCT VFR BLD AUTO: 32.2 % (ref 41–52)
HGB BLD-MCNC: 10.6 G/DL (ref 13.5–17.5)
IMM GRANULOCYTES # BLD AUTO: 0.51 X10*3/UL (ref 0–0.7)
IMM GRANULOCYTES NFR BLD AUTO: 2.3 % (ref 0–0.9)
LYMPHOCYTES # BLD AUTO: 1.43 X10*3/UL (ref 1.2–4.8)
LYMPHOCYTES NFR BLD AUTO: 6.5 %
MAGNESIUM SERPL-MCNC: 2.11 MG/DL (ref 1.6–2.4)
MCH RBC QN AUTO: 28.6 PG (ref 26–34)
MCHC RBC AUTO-ENTMCNC: 32.9 G/DL (ref 32–36)
MCV RBC AUTO: 87 FL (ref 80–100)
MONOCYTES # BLD AUTO: 1.69 X10*3/UL (ref 0.1–1)
MONOCYTES NFR BLD AUTO: 7.7 %
NEUTROPHILS # BLD AUTO: 17.69 X10*3/UL (ref 1.2–7.7)
NEUTROPHILS NFR BLD AUTO: 80.5 %
NRBC BLD-RTO: 0 /100 WBCS (ref 0–0)
PHOSPHATE SERPL-MCNC: 4.1 MG/DL (ref 2.5–4.9)
PLATELET # BLD AUTO: 321 X10*3/UL (ref 150–450)
POTASSIUM SERPL-SCNC: 4.7 MMOL/L (ref 3.5–5.3)
RBC # BLD AUTO: 3.7 X10*6/UL (ref 4.5–5.9)
SODIUM SERPL-SCNC: 137 MMOL/L (ref 136–145)
UFH PPP CHRO-ACNC: 0.3 IU/ML
UFH PPP CHRO-ACNC: 0.4 IU/ML
WBC # BLD AUTO: 22 X10*3/UL (ref 4.4–11.3)

## 2023-11-04 PROCEDURE — 2020000001 HC ICU ROOM DAILY

## 2023-11-04 PROCEDURE — 85025 COMPLETE CBC W/AUTO DIFF WBC: CPT

## 2023-11-04 PROCEDURE — 2500000005 HC RX 250 GENERAL PHARMACY W/O HCPCS

## 2023-11-04 PROCEDURE — 82947 ASSAY GLUCOSE BLOOD QUANT: CPT

## 2023-11-04 PROCEDURE — 71045 X-RAY EXAM CHEST 1 VIEW: CPT | Mod: FY

## 2023-11-04 PROCEDURE — 2500000004 HC RX 250 GENERAL PHARMACY W/ HCPCS (ALT 636 FOR OP/ED)

## 2023-11-04 PROCEDURE — 2500000001 HC RX 250 WO HCPCS SELF ADMINISTERED DRUGS (ALT 637 FOR MEDICARE OP)

## 2023-11-04 PROCEDURE — 83735 ASSAY OF MAGNESIUM: CPT

## 2023-11-04 PROCEDURE — 94640 AIRWAY INHALATION TREATMENT: CPT

## 2023-11-04 PROCEDURE — 2500000001 HC RX 250 WO HCPCS SELF ADMINISTERED DRUGS (ALT 637 FOR MEDICARE OP): Performed by: STUDENT IN AN ORGANIZED HEALTH CARE EDUCATION/TRAINING PROGRAM

## 2023-11-04 PROCEDURE — 99291 CRITICAL CARE FIRST HOUR: CPT | Performed by: INTERNAL MEDICINE

## 2023-11-04 PROCEDURE — 80069 RENAL FUNCTION PANEL: CPT

## 2023-11-04 PROCEDURE — 2500000005 HC RX 250 GENERAL PHARMACY W/O HCPCS: Performed by: PEDIATRICS

## 2023-11-04 PROCEDURE — 94668 MNPJ CHEST WALL SBSQ: CPT

## 2023-11-04 PROCEDURE — 99232 SBSQ HOSP IP/OBS MODERATE 35: CPT | Performed by: INTERNAL MEDICINE

## 2023-11-04 PROCEDURE — 36415 COLL VENOUS BLD VENIPUNCTURE: CPT

## 2023-11-04 PROCEDURE — S4991 NICOTINE PATCH NONLEGEND: HCPCS | Performed by: STUDENT IN AN ORGANIZED HEALTH CARE EDUCATION/TRAINING PROGRAM

## 2023-11-04 PROCEDURE — 2500000002 HC RX 250 W HCPCS SELF ADMINISTERED DRUGS (ALT 637 FOR MEDICARE OP, ALT 636 FOR OP/ED): Performed by: STUDENT IN AN ORGANIZED HEALTH CARE EDUCATION/TRAINING PROGRAM

## 2023-11-04 PROCEDURE — 85520 HEPARIN ASSAY: CPT

## 2023-11-04 PROCEDURE — 71045 X-RAY EXAM CHEST 1 VIEW: CPT | Performed by: RADIOLOGY

## 2023-11-04 PROCEDURE — 94660 CPAP INITIATION&MGMT: CPT

## 2023-11-04 PROCEDURE — 31720 CLEARANCE OF AIRWAYS: CPT

## 2023-11-04 PROCEDURE — 2500000002 HC RX 250 W HCPCS SELF ADMINISTERED DRUGS (ALT 637 FOR MEDICARE OP, ALT 636 FOR OP/ED)

## 2023-11-04 PROCEDURE — 86140 C-REACTIVE PROTEIN: CPT

## 2023-11-04 RX ORDER — MIDODRINE HYDROCHLORIDE 10 MG/1
10 TABLET ORAL
Status: DISCONTINUED | OUTPATIENT
Start: 2023-11-04 | End: 2023-11-18

## 2023-11-04 RX ORDER — LIDOCAINE HYDROCHLORIDE 20 MG/ML
1 JELLY TOPICAL ONCE
Status: COMPLETED | OUTPATIENT
Start: 2023-11-04 | End: 2023-11-04

## 2023-11-04 RX ORDER — ERYTHROMYCIN 5 MG/G
1 OINTMENT OPHTHALMIC 4 TIMES DAILY
Status: DISCONTINUED | OUTPATIENT
Start: 2023-11-04 | End: 2023-11-17

## 2023-11-04 RX ORDER — LIDOCAINE 560 MG/1
2 PATCH PERCUTANEOUS; TOPICAL; TRANSDERMAL DAILY
Status: DISCONTINUED | OUTPATIENT
Start: 2023-11-04 | End: 2023-11-30 | Stop reason: HOSPADM

## 2023-11-04 RX ORDER — DICLOFENAC SODIUM 10 MG/G
4 GEL TOPICAL 4 TIMES DAILY PRN
Status: DISCONTINUED | OUTPATIENT
Start: 2023-11-04 | End: 2023-11-20

## 2023-11-04 RX ORDER — LOPERAMIDE HYDROCHLORIDE 2 MG/1
2 CAPSULE ORAL 4 TIMES DAILY PRN
Status: DISCONTINUED | OUTPATIENT
Start: 2023-11-04 | End: 2023-11-08

## 2023-11-04 RX ORDER — PHENYLEPHRINE HYDROCHLORIDE 25 MG/ML
1 SOLUTION/ DROPS OPHTHALMIC ONCE
Status: COMPLETED | OUTPATIENT
Start: 2023-11-04 | End: 2023-11-04

## 2023-11-04 RX ORDER — TROPICAMIDE 5 MG/ML
1 SOLUTION/ DROPS OPHTHALMIC ONCE
Status: COMPLETED | OUTPATIENT
Start: 2023-11-04 | End: 2023-11-04

## 2023-11-04 RX ADMIN — ALBUTEROL SULFATE 2.5 MG: 2.5 SOLUTION RESPIRATORY (INHALATION) at 19:00

## 2023-11-04 RX ADMIN — MIDODRINE HYDROCHLORIDE 5 MG: 5 TABLET ORAL at 08:03

## 2023-11-04 RX ADMIN — Medication 1 TABLET: at 09:00

## 2023-11-04 RX ADMIN — Medication 3 ML: at 07:00

## 2023-11-04 RX ADMIN — DICLOFENAC 1 APPLICATION: 10 GEL TOPICAL at 16:52

## 2023-11-04 RX ADMIN — MIDODRINE HYDROCHLORIDE 10 MG: 10 TABLET ORAL at 16:52

## 2023-11-04 RX ADMIN — MIDODRINE HYDROCHLORIDE 10 MG: 10 TABLET ORAL at 11:53

## 2023-11-04 RX ADMIN — CARBOXYMETHYLCELLULOSE SODIUM 1 DROP: 5 SOLUTION/ DROPS OPHTHALMIC at 18:16

## 2023-11-04 RX ADMIN — Medication 10 MG: at 21:30

## 2023-11-04 RX ADMIN — FOLIC ACID 1 MG: 1 TABLET ORAL at 08:03

## 2023-11-04 RX ADMIN — LIDOCAINE 2 PATCH: 4 PATCH TOPICAL at 14:39

## 2023-11-04 RX ADMIN — OXYCODONE HYDROCHLORIDE 5 MG: 5 TABLET ORAL at 08:01

## 2023-11-04 RX ADMIN — LOPERAMIDE HYDROCHLORIDE 2 MG: 2 CAPSULE ORAL at 16:52

## 2023-11-04 RX ADMIN — Medication 8000 UNITS: at 08:03

## 2023-11-04 RX ADMIN — MICAFUNGIN SODIUM 100 MG: 50 INJECTION, POWDER, LYOPHILIZED, FOR SOLUTION INTRAVENOUS at 11:54

## 2023-11-04 RX ADMIN — DICLOFENAC 1 APPLICATION: 10 GEL TOPICAL at 21:29

## 2023-11-04 RX ADMIN — HEPARIN SODIUM 1300 UNITS/HR: 10000 INJECTION, SOLUTION INTRAVENOUS at 19:59

## 2023-11-04 RX ADMIN — TROPICAMIDE 1 DROP: 5 SOLUTION/ DROPS OPHTHALMIC at 15:15

## 2023-11-04 RX ADMIN — Medication 1 PATCH: at 08:04

## 2023-11-04 RX ADMIN — Medication 3 ML: at 19:00

## 2023-11-04 RX ADMIN — THIAMINE HCL TAB 100 MG 100 MG: 100 TAB at 09:00

## 2023-11-04 RX ADMIN — Medication 15 ML: at 16:52

## 2023-11-04 RX ADMIN — OXYCODONE HYDROCHLORIDE 5 MG: 5 TABLET ORAL at 21:30

## 2023-11-04 RX ADMIN — HEPARIN SODIUM 1300 UNITS/HR: 10000 INJECTION, SOLUTION INTRAVENOUS at 00:31

## 2023-11-04 RX ADMIN — PHENYLEPHRINE HYDROCHLORIDE 1 DROP: 2.5 SOLUTION/ DROPS OPHTHALMIC at 15:15

## 2023-11-04 RX ADMIN — OXYCODONE HYDROCHLORIDE 5 MG: 5 TABLET ORAL at 14:38

## 2023-11-04 RX ADMIN — LIDOCAINE HYDROCHLORIDE 1 APPLICATION: 20 JELLY TOPICAL at 14:38

## 2023-11-04 RX ADMIN — ALBUTEROL SULFATE 2.5 MG: 2.5 SOLUTION RESPIRATORY (INHALATION) at 08:25

## 2023-11-04 RX ADMIN — Medication 10 L/MIN: at 08:25

## 2023-11-04 RX ADMIN — LIDOCAINE 1 PATCH: 4 PATCH TOPICAL at 08:03

## 2023-11-04 RX ADMIN — CARBOXYMETHYLCELLULOSE SODIUM 1 DROP: 5 SOLUTION/ DROPS OPHTHALMIC at 21:30

## 2023-11-04 ASSESSMENT — PAIN - FUNCTIONAL ASSESSMENT
PAIN_FUNCTIONAL_ASSESSMENT: 0-10

## 2023-11-04 ASSESSMENT — PAIN SCALES - GENERAL
PAINLEVEL_OUTOF10: 0 - NO PAIN
PAINLEVEL_OUTOF10: 0 - NO PAIN
PAINLEVEL_OUTOF10: 8
PAINLEVEL_OUTOF10: 9
PAINLEVEL_OUTOF10: 9
PAINLEVEL_OUTOF10: 0 - NO PAIN
PAINLEVEL_OUTOF10: 9

## 2023-11-04 NOTE — PROGRESS NOTES
Molina Godinez   68 [email or web address]    @WT@  MRN/Room: [id number]/02/02-A    Subjective:   Pt remains off pressors this morning. Continues to have some increased work of breathing with continued bronchial hygiene by respiratory.     Objective:     Meds:   albuterol, 2.5 mg, q12h  calcium gluconate, 2 g, Once  ergocalciferol, 8,000 Units, Daily  folic acid, 1 mg, Daily  lidocaine, 1 patch, Daily  melatonin, 10 mg, Nightly  micafungin, 100 mg, q24h  midodrine, 5 mg, TID with meals  multivitamin with minerals, 1 tablet, Daily  nicotine, 1 patch, Daily  sodium chloride, 3 mL, q12h  thiamine, 100 mg, Daily      heparin, Last Rate: 1,300 Units/hr (11/04/23 0031)  norepinephrine, Last Rate: 0.02 mcg/kg/min (11/04/23 0515)      acetaminophen, 650 mg, q8h PRN  dextrose 10 % in water (D10W), 0.3 g/kg/hr, Once PRN  dextrose, 25 g, q15 min PRN  glucagon, 1 mg, q15 min PRN  heparin, 2,000-4,000 Units, q4h PRN  oxyCODONE, 5 mg, q6h PRN  oxygen, , Continuous PRN - O2/gases  sennosides, 1 tablet, Nightly PRN  white petrolatum, 1 Application, q1h PRN        Vitals:    11/04/23 0600   BP: 79/55   Pulse: 104   Resp: (!) 27   Temp:    SpO2: 93%          Intake/Output Summary (Last 24 hours) at 11/4/2023 0702  Last data filed at 11/4/2023 0600  Gross per 24 hour   Intake 820.36 ml   Output 820 ml   Net 0.36 ml       General appearance: flat affect, appears tired, A&OX3  Heart: RRR  Lungs: course breath sounds b/l  Abdomen: soft, nt/nd  Extremities: No edema bilat  [place]  Neuro: No FND,asterixis  Access: trialysis catheter     Blood Labs:  Results for orders placed or performed during the hospital encounter of 10/11/23 (from the past 24 hour(s))   POCT GLUCOSE   Result Value Ref Range    POCT Glucose 127 (H) 74 - 99 mg/dL   POCT GLUCOSE   Result Value Ref Range    POCT Glucose 102 (H) 74 - 99 mg/dL   Blood Culture    Specimen: Peripheral Venipuncture; Blood culture   Result Value Ref Range    Blood Culture Loaded on Instrument - Culture in progress     Blood Culture    Specimen: Peripheral Venipuncture; Blood culture   Result Value Ref Range    Blood Culture Loaded on Instrument - Culture in progress    POCT GLUCOSE   Result Value Ref Range    POCT Glucose 100 (H) 74 - 99 mg/dL   POCT GLUCOSE   Result Value Ref Range    POCT Glucose 103 (H) 74 - 99 mg/dL   POCT GLUCOSE   Result Value Ref Range    POCT Glucose 99 74 - 99 mg/dL   C-reactive protein   Result Value Ref Range    C-Reactive Protein 14.95 (H) <1.00 mg/dL   CBC and Auto Differential   Result Value Ref Range    WBC 22.0 (H) 4.4 - 11.3 x10*3/uL    nRBC 0.0 0.0 - 0.0 /100 WBCs    RBC 3.70 (L) 4.50 - 5.90 x10*6/uL    Hemoglobin 10.6 (L) 13.5 - 17.5 g/dL    Hematocrit 32.2 (L) 41.0 - 52.0 %    MCV 87 80 - 100 fL    MCH 28.6 26.0 - 34.0 pg    MCHC 32.9 32.0 - 36.0 g/dL    RDW 13.1 11.5 - 14.5 %    Platelets 321 150 - 450 x10*3/uL    Neutrophils % 80.5 40.0 - 80.0 %    Immature Granulocytes %, Automated 2.3 (H) 0.0 - 0.9 %    Lymphocytes % 6.5 13.0 - 44.0 %    Monocytes % 7.7 2.0 - 10.0 %    Eosinophils % 2.0 0.0 - 6.0 %    Basophils % 1.0 0.0 - 2.0 %    Neutrophils Absolute 17.69 (H) 1.20 - 7.70 x10*3/uL    Immature Granulocytes Absolute, Automated 0.51 0.00 - 0.70 x10*3/uL    Lymphocytes Absolute 1.43 1.20 - 4.80 x10*3/uL    Monocytes Absolute 1.69 (H) 0.10 - 1.00 x10*3/uL    Eosinophils Absolute 0.45 0.00 - 0.70 x10*3/uL    Basophils Absolute 0.21 (H) 0.00 - 0.10 x10*3/uL   Renal function panel   Result Value Ref Range    Glucose 91 74 - 99 mg/dL    Sodium 137 136 - 145 mmol/L    Potassium 4.7 3.5 - 5.3 mmol/L    Chloride 101 98 - 107 mmol/L    Bicarbonate 26 21 - 32 mmol/L    Anion Gap 15 10 - 20 mmol/L    Urea Nitrogen 32 (H) 6 - 23 mg/dL    Creatinine 0.42 (L) 0.50 - 1.30 mg/dL    eGFR >90 >60 mL/min/1.73m*2    Calcium 8.7 8.6 - 10.6 mg/dL    Phosphorus 4.1 2.5 - 4.9 mg/dL    Albumin 2.6 (L) 3.4 - 5.0 g/dL   Magnesium   Result Value Ref Range    Magnesium 2.11 1.60 - 2.40 mg/dL   Heparin Assay, UFH    Result Value Ref Range    Heparin Unfractionated 0.3 See Comment Below for Therapeutic Ranges IU/mL   POCT GLUCOSE   Result Value Ref Range    POCT Glucose 94 74 - 99 mg/dL              ASSESSMENT:  Molina Godinez is a 68 y.o. male with PMHx of COPD, HTN, AUD and opioid use disorder, HCV (treated), chronic PE who presented originally on 10/9 with inability to walk. Briefly in MICU for c/f alcohol withdrawal and high CIWA scores, transferred to floor, eventually readmitted to MICU 10/16 for increased O2 requirements, was intubated with bronch respiratory cx positive for stenotrophomonas.   Pt with worsening hyponatremia and rising BUN so nephrology consulted for which CVVH was started on 10/31. Nephrology continues to follow for oliguric KRYSTEN.    #KRYSTEN, oligiruc  -likely due to hemodynamic injury given shock, sepsis   -pt started on CVVH 10/31 for hypervolemia, oliguria and worsening BUN  -CVVH stopped 11/3 due to improving urine output and blood cx positive for yeast 10/31 so plan for line holiday  -pt off pressors    #AHRF secondary to pneumonia  #Fungemia  -Micafungin, Bcx 10/31 positive for yeast. Bcx 11/3 collected     RECOMMENDATIONS:  -urine output continues to improve and metabolic parameters stable so no indication for dialysis today   -maintain MAP closer to 70 to ensure adequate renal perfusion   -ok to remove trialysis line from renal standpoint given fungemia   -strict I/O, renally dose medications now that he is off CRRT    Will follow labs peripherally.     Herman Oliveros DO  Nephrology Fellow   Daytime / Weekend Renal Pager [phone]  After 7 pm Emergencies [phone] Pager [phone]

## 2023-11-04 NOTE — PROGRESS NOTES
Molina Godinez is a 68 y.o. male on day 23 of admission presenting with Hospital-acquired pneumonia.    Subjective   SUBJECTIVE:  NAEON. Patient seen & examined at bedside this AM, he is Aox3. On 10L NC. Denies any acute complaints.     Objective   OBJECTIVE:  Last Recorded Vitals  BP 79/55   Pulse 104   Temp 35.7 °C (96.3 °F)   Resp (!) 27   Wt 49.7 kg (109 lb 9.1 oz)   SpO2 93%     I&O 24HR    Intake/Output Summary (Last 24 hours) at 11/4/2023 0822  Last data filed at 11/4/2023 0600  Gross per 24 hour   Intake 807.36 ml   Output 769 ml   Net 38.36 ml          Physical Exam  Constitutional:       General: He is not in acute distress.     Appearance: He is cachectic. He is ill-appearing. He is not diaphoretic.      Interventions: Nasal cannula in place.   HENT:      Head: Normocephalic and atraumatic.      Right Ear: External ear normal.      Left Ear: External ear normal.      Nose: Nose normal. No congestion or rhinorrhea.      Mouth/Throat:      Mouth: Mucous membranes are dry.      Pharynx: Oropharynx is clear.   Eyes:      General: No scleral icterus.     Extraocular Movements: Extraocular movements intact.      Conjunctiva/sclera: Conjunctivae normal.   Neck:      Comments: Notable kyphosis of the cervical spine  Cardiovascular:      Rate and Rhythm: Regular rhythm. Tachycardia present.      Pulses: Normal pulses.      Heart sounds: Normal heart sounds. No murmur heard.     No friction rub. No gallop.   Pulmonary:      Breath sounds: Examination of the left-upper field reveals decreased breath sounds. Examination of the left-middle field reveals decreased breath sounds. Examination of the left-lower field reveals decreased breath sounds. Decreased breath sounds present. No wheezing, rhonchi or rales.      Comments: Poor inspiratory effort  Abdominal:      General: Abdomen is flat. Bowel sounds are normal. There is no distension.      Palpations: Abdomen is soft.      Tenderness: There is no abdominal  tenderness.   Musculoskeletal:         General: No swelling or deformity.      Right lower leg: No edema.      Left lower leg: No edema.   Skin:     General: Skin is warm and dry.      Findings: No bruising, lesion or rash.   Neurological:      Mental Status: He is alert and oriented to person, place, and time.      Motor: Weakness (generalized) present.       Relevant Results    Labs:  Results for orders placed or performed during the hospital encounter of 10/11/23 (from the past 24 hour(s))   POCT GLUCOSE   Result Value Ref Range    POCT Glucose 102 (H) 74 - 99 mg/dL   Blood Culture    Specimen: Peripheral Venipuncture; Blood culture   Result Value Ref Range    Blood Culture Loaded on Instrument - Culture in progress    Blood Culture    Specimen: Peripheral Venipuncture; Blood culture   Result Value Ref Range    Blood Culture Loaded on Instrument - Culture in progress    POCT GLUCOSE   Result Value Ref Range    POCT Glucose 100 (H) 74 - 99 mg/dL   POCT GLUCOSE   Result Value Ref Range    POCT Glucose 103 (H) 74 - 99 mg/dL   POCT GLUCOSE   Result Value Ref Range    POCT Glucose 99 74 - 99 mg/dL   C-reactive protein   Result Value Ref Range    C-Reactive Protein 14.95 (H) <1.00 mg/dL   CBC and Auto Differential   Result Value Ref Range    WBC 22.0 (H) 4.4 - 11.3 x10*3/uL    nRBC 0.0 0.0 - 0.0 /100 WBCs    RBC 3.70 (L) 4.50 - 5.90 x10*6/uL    Hemoglobin 10.6 (L) 13.5 - 17.5 g/dL    Hematocrit 32.2 (L) 41.0 - 52.0 %    MCV 87 80 - 100 fL    MCH 28.6 26.0 - 34.0 pg    MCHC 32.9 32.0 - 36.0 g/dL    RDW 13.1 11.5 - 14.5 %    Platelets 321 150 - 450 x10*3/uL    Neutrophils % 80.5 40.0 - 80.0 %    Immature Granulocytes %, Automated 2.3 (H) 0.0 - 0.9 %    Lymphocytes % 6.5 13.0 - 44.0 %    Monocytes % 7.7 2.0 - 10.0 %    Eosinophils % 2.0 0.0 - 6.0 %    Basophils % 1.0 0.0 - 2.0 %    Neutrophils Absolute 17.69 (H) 1.20 - 7.70 x10*3/uL    Immature Granulocytes Absolute, Automated 0.51 0.00 - 0.70 x10*3/uL    Lymphocytes  Absolute 1.43 1.20 - 4.80 x10*3/uL    Monocytes Absolute 1.69 (H) 0.10 - 1.00 x10*3/uL    Eosinophils Absolute 0.45 0.00 - 0.70 x10*3/uL    Basophils Absolute 0.21 (H) 0.00 - 0.10 x10*3/uL   Renal function panel   Result Value Ref Range    Glucose 91 74 - 99 mg/dL    Sodium 137 136 - 145 mmol/L    Potassium 4.7 3.5 - 5.3 mmol/L    Chloride 101 98 - 107 mmol/L    Bicarbonate 26 21 - 32 mmol/L    Anion Gap 15 10 - 20 mmol/L    Urea Nitrogen 32 (H) 6 - 23 mg/dL    Creatinine 0.42 (L) 0.50 - 1.30 mg/dL    eGFR >90 >60 mL/min/1.73m*2    Calcium 8.7 8.6 - 10.6 mg/dL    Phosphorus 4.1 2.5 - 4.9 mg/dL    Albumin 2.6 (L) 3.4 - 5.0 g/dL   Magnesium   Result Value Ref Range    Magnesium 2.11 1.60 - 2.40 mg/dL   Heparin Assay, UFH   Result Value Ref Range    Heparin Unfractionated 0.3 See Comment Below for Therapeutic Ranges IU/mL   POCT GLUCOSE   Result Value Ref Range    POCT Glucose 94 74 - 99 mg/dL   Heparin Assay, UFH   Result Value Ref Range    Heparin Unfractionated 0.4 See Comment Below for Therapeutic Ranges IU/mL     C. Diff negative on 11/3    Imaging:  === 11/3/23 ===    XR CHEST 1 VIEW    - Impression -  Persistent subtotal opacification of the left hemithorax with volume  loss and mediastinal deviation.    MACRO:  None.    Signed by: Tari Lomas 11/3/2023 8:12 AM  Dictation workstation:   [id number]    Limited TTE from 11/2/23  PHYSICIAN INTERPRETATION:  Left Ventricle: The left ventricular systolic function is hyperdynamic, with an estimated ejection fraction of 70-75%. There are no regional wall motion abnormalities. The left ventricular cavity size is decreased. There is left ventricular concentric remodeling. The interventricular septum is flattened in systole, consistent with right ventricular pressure overload. Spectral Doppler shows an impaired relaxation pattern of left ventricular diastolic filling.  Left Atrium: The left atrium is normal in size.  Right Ventricle: The right ventricle is mildly  enlarged. There is mildly reduced right ventricular systolic function. Radial RV function appears preserved; longitudinal RV function is decreased.  Right Atrium: The right atrium is moderately dilated.  Aortic Valve: The aortic valve is trileaflet. There is minimal aortic valve cusp calcification. There is no evidence of aortic valve regurgitation.  Mitral Valve: The mitral valve is normal in structure. There is trace mitral valve regurgitation.  Tricuspid Valve: The tricuspid valve is structurally normal. There is moderate tricuspid regurgitation. The Doppler estimated RVSP is mildly elevated at 45.6 mmHg. The PA systolic pressure may be underestimated. The previously noted posterior tricuspid leaflet vegetation is not clearly seen on this study,.  Pulmonic Valve: The pulmonic valve is structurally normal. There is trace pulmonic valve regurgitation.  Pericardium: There is no pericardial effusion noted.  Aorta: The aortic root is normal.  In comparison to the previous echocardiogram(s): Compared with study from 12/1/2022.     CONCLUSIONS:   1. Left ventricular systolic function is hyperdynamic with a 70-75% estimated ejection fraction.   2. No definite valvular vegetations were visualized.   3. Right ventricular pressure overload.   4. Spectral Doppler shows an impaired relaxation pattern of left ventricular diastolic filling.   5. There is mildly reduced right ventricular systolic function.   6. The right atrium is moderately dilated.   7. Mildly elevated RVSP.   8. Moderate tricuspid regurgitation visualized.   9. The PA systolic pressure may be underestimated. The previously noted posterior tricuspid leaflet vegetation is not clearly seen on this study.  10. Left ventricular cavity size is decreased.  11. The reasons for repeating the echo from 10/31 is not clear.    Medications:    Current Facility-Administered Medications:     acetaminophen (Tylenol) tablet 650 mg, 650 mg, oral, q8h PRN, Ever Paez MD,  650 mg at 10/29/23 2034    albuterol 2.5 mg /3 mL (0.083 %) nebulizer solution 2.5 mg, 2.5 mg, nebulization, q12h, Yancy Freeman MD, 2.5 mg at 11/03/23 2038    calcium gluconate in NS IV 2 g, 2 g, intravenous, Once, Ever Peaz MD    dextrose 10 % in water (D10W) infusion, 0.3 g/kg/hr, intravenous, Once PRN, Virgil Olivo MD, Stopped at 10/18/23 0800    dextrose 50 % injection 25 g, 25 g, intravenous, q15 min PRN, Virgil Olivo MD    ergocalciferol (Vitamin D-2) drops 8,000 Units, 8,000 Units, oral, Daily, Ever Paez MD, 8,000 Units at 11/04/23 0803    folic acid (Folvite) tablet 1 mg, 1 mg, oral, Daily, Ever Paez MD, 1 mg at 11/04/23 0803    glucagon (Glucagen) injection 1 mg, 1 mg, intramuscular, q15 min PRN, Virgil Olivo MD    heparin (porcine) injection 2,000-4,000 Units, 2,000-4,000 Units, intravenous, q4h PRN, Virgil Olivo MD, 2,000 Units at 10/25/23 0857    heparin 25,000 Units in dextrose 5% 250 mL (100 Units/mL) infusion (premix), 0-4,500 Units/hr, intravenous, Continuous, Rosa Gary MD, Last Rate: 13 mL/hr at 11/04/23 0031, 1,300 Units/hr at 11/04/23 0031    lidocaine 4 % patch 1 patch, 1 patch, transdermal, Daily, Ever Paez MD, 1 patch at 11/04/23 0803    melatonin tablet 10 mg, 10 mg, oral, Nightly, Tia Manuel MD, 10 mg at 11/03/23 2144    micafungin (Mycamine) 100 mg in dextrose 5 % in water (D5W) 100 mL IV, 100 mg, intravenous, q24h, Arainna Bills DO, Stopped at 11/03/23 1521    midodrine (Proamatine) tablet 5 mg, 5 mg, oral, TID with meals, Ever Vazquez MD, 5 mg at 11/04/23 0803    moisturizing mouth (Biotene Dry Mouth) solution 15 mL, 15 mL, Swish & Spit, TID PRN, Yancy Freeman MD    multivitamin with minerals 1 tablet, 1 tablet, oral, Daily, Virgil Olivo MD, 1 tablet at 11/04/23 0900    nicotine (Nicoderm CQ) 21 mg/24 hr patch 1 patch, 1 patch, transdermal, Daily, Virgil Olivo MD, 1 patch at 11/04/23 0804    norepinephrine  (Levophed) 8 mg in dextrose 5% 250 mL (0.032 mg/mL) infusion (premix), 0-3.3 mcg/kg/min, intravenous, Continuous, Porfirio Riojas MD, Last Rate: 1.86 mL/hr at 11/04/23 0515, 0.02 mcg/kg/min at 11/04/23 0515    oxyCODONE (Roxicodone) immediate release tablet 5 mg, 5 mg, oral, q6h PRN, Jah Ospina MD, 5 mg at 11/04/23 0801    oxygen (O2) therapy, , inhalation, Continuous PRN - O2/gases, Will Rojo MD, 6 L/min at 11/02/23 2100    sennosides (Senokot) tablet 8.6 mg, 1 tablet, oral, Nightly PRN, Virgil Olivo MD    sodium chloride 7 % nebulizer solution 3 mL, 3 mL, nebulization, q12h, Yancy Freeman MD, 3 mL at 11/03/23 2038    thiamine (Vitamin B-1) tablet 100 mg, 100 mg, oral, Daily, Ever Paez MD, 100 mg at 11/04/23 0900    white petrolatum (Aquaphor) 41 % ointment 1 Application, 1 Application, Topical, q1h PRN, Ever Paez MD    Assessment/Plan   ASSESSMENT & PLAN:  Molina Godinez is a 68 y.o. male with PMHx of COPD, HTN, AUD and opioid use disorder, HCV (treated), chronic PE who presented originally on 10/9 with inability to walk. Briefly in MICU for c/f alcohol withdrawal and high CIWA scores, transferred to floor, eventually readmitted to MICU 10/16 for increased O2 requirements, put on Airvo and intubated 10/20. Bronch done with resp cx growing stenotrophomonas for which he completed a course of Bactrim. Pt was extubated on 10/27. Also with SIADH (now resolved, unclear etiology, potentially just his severe pulmonary disease); nephrology consulted and was initially treated with fluid restriction, urea packets and Lasix, but now off all of these for significant increase BUN to 180s. CVVH started 10/31-11/3 for uremia and fluid removal. Now with worsening complete left lung opacification and new O2 requirement, likely 2/2 mucus plugging. Repeat TTE on 11/3 without evidence of vegetation, abx discontinued. Bcx 10/31 growing Candida albicans,      Active Problems:  Hypotension, unknown  etiology. Planning on bronching today  Tachycardia with intermittent SVT   Left lung complete opacification likely 2/2 mucus plugging vs pulmonary edema   KRYSTEN likely prerenal    NEURO/PSYCH  #AMS likely 2/2 uremic encephalopathy vs chronic hypoxia - IMPROVING  - Previously intubated/sedated, extubated on 10/27.   - CT head without pathology from 10/11  - Initially had visual and tactile hallucinations, have since resolved. Suspect metabolic etiology of fluctuations in mental status, i/s/o acutely increased oxygen requirement.  - Mentation improved on CVVH with concurrent decrease in serum BUN     #Bilateral LE weakness  -Likely chronic, had weakness during 8/2023 admission  -Patient able to move bilateral lower extremities  -Repeat MRI brain/total spine on 10/20 unremarkable for acute process  -Neurology consulted. Suspicion that weakness is likely a peripheral (LMN + muscle) inflammatory or degenerative disorder considering distribution and chronology.      - recommend EMG/NGS after patient is downgraded      - low suspicion for GBS  -May follow up with neurology outpatient  -ID consulted regarding weakness and positive Syphilis Ab. Pt was treated back in 1998 but rec MRI spine given concern of abscess, MRI negative for acute process     CV  #c/f TV endocarditis - resolved  - TTE 10/17/23: LVEF 75%, LV concentric modeling, mildly increased LV septal wall thickness, atrial septal aneurysm, mildly elevated RSVP 39  - TTE 10/31/23: same as above with the following exceptions: 1.7 x 0.8 cm mobile echodensity of posterior tricuspid leaflet, mod-severe TR, RSVP increased to 57  - TTE 11/2/23: No definite valvular vegetations were visualized  - vanc/ari discontinued     #Hypotension, asymptomatic   - Etiology includes infection vs volume depletion    - 10/31 MAP to 50s   - off norepi 11/4 AM  - increased midodrine to 10 mg TID      #HTN- resolved  - home amlodipine 5mg, atenolol 50mg  - Initially started on Cardene drip,  transitioned to Labetalol prior to resolution  - home atenolol restarted on 10/28, switched to metop 12.5 BID on 10/29 I/s/o renal function - held while on norepi gtt     #Tachycardia  #Asymptomatic SVT to max HR 170s  - Sinus rhythm, HR chronically in 120-130s   - Etiology includes pain, infection, hypovolemia, uremia  - Poor correlation with fentanyl pushes, less suspicious for pain etiology  - previously on metoprolol 12.5mg BID, dc'd as patient now on norepi gtt - can consider restarting metoprolol prn for tachycardia  - decreased albuterol from q4h to q12h to decrease beta agonist activity on 11/2     #CAD  - home atorvastatin 40mg held in the setting of elevated transaminases      #Chest pain- resolved  - New onset chest pain 10/28, had hyperkalemia on RFP  - RFP rechecked, K wnl  - Trop 14  - ECG without acute changes, stable from prior  - Given sublingual nitroglycerin, but had resolution of pain prior to administration.  - Some concern of uremic pericarditis but unlikely. No pericardial rub appreciated on exam.  - continue to monitor     PULM  #CAP- resolved  -Urine strep positive, unsure if active or chronic infection  -MRSA negative, Urine legionella negative  -CTX 5 day course completed 10/17     #L lung consolidation, now complete opacification  #Steno HAP  #Increased O2 requirements - improving  - Initially treated for CAP given positive urine strep with CTX 10/13-10/17, one day of Zosyn10/17-10/17  - Leukocytosis stable ~24  - C/f HAP vs incomplete resolution of previous PNA  -S/p Bronch 10/20 with purulent fluid with bronchial washings sent for cx  -Bronchial washings with stenotrophomonas susceptible to Bactrim  -Bactrim 10/22-10/28  -10/31 respiratory sputum cx with salivary contamination   -CXR 10/31 showing complete opacification of left lung worse from prior, likely 2/2 mucus plugging vs pulmonary edema vs pneumonia  -CXR 11/1 showing slight improvement in JOSE ANTONIO aeration  -CXR 11/2 showing  worsening JOSE ANTONIO opacification. CXR from 11/3 and 11/4 unchanged  -increasing O2 requirement: now on NC 10L   -Low concern for pneumonia as patient is afebrile, does have leukocytosis but this has been chronic during this hospitalization. Negative lactate. Also tachy and hypotensive however HR has been chronically elevated.   -Given clinical worsening and increasing O2 requirements, will plan for bronch today. Will c/w BPH as below and obtain daily AM CXRs      #Severe COPD  #Mucus plugging  - 8/2023 FEV1 42%  - CT C/A/P with RLL mucus plug  - CXR 10/28 with L sided pleural effusion vs mucus plugging  - CXR 10/31 with complete opacification of left lung  - CXR 11/1 showing slight improvement in JOSE ANTONIO aeration  - Continue aggressive RT: Albuterol 2.5 mg q12h, chest PT, respiratory hygiene  - Ezpap, incentive spirometer, cough asisst  - 7% NS neb q12h  - Encourage sitting, mobility as able. PT following     #Suspected PAH  - 10/17/23 Echo with atrial septal aneurysm, positive bubble study  - Will need outpatient workup, previously some suspicion for PAH but will need further evaluation      #Subsegmental PE  -Requires anticoagulation for 3 months (per vasc med 8/2023), on home apixaban 5mg BID  PLAN:  -Heparin gtt      GI  #HepC  -treated, 8/2023 HCV RNA undetected     #Nutrition  - Dobhoff in place 10/17  - Nutrition following, appreciate recs  - TF with TwoCal HN at goal rate of 35ml/hr (will hold at MN for possible CT surgery procedure)  - 100mg Thiamine, 1mg Folic acid, 8000u Vit D2     #diarrhea  - 4 loose BM overnight  - C diff negative  - vasovagal reaction during bowel movements  - last polyethylene glycol given 11/1; order d/c'd today  - consider changing TF formula     #Elevated transaminases, hx of treated HCV- increasing, semi stable  - LFTs rising over past few days, predominantly ALP/AST/ALT with normal bili  - RUQ US with steatosis, sludge in GB  - Sulfa drug induced vs intrinsic process, though Bactrim  typically takes 2 weeks for hepatotoxicity  -Trend LFTs     /RENAL  #Hyponatremia - Likely SIADH 2/2 COPD exacerbation but unclear if additional etiologies- resolved  - Na as low as 120, slowly improving, 139 on 11/1/23  - check Na and urea BID  - D/c urea packets in setting of BUN of 180s and AMS  - place marie for accurate I&Os in setting of strict fluid monitoring  - Free water flushes 100ml BID for further fluid restriction given hyponatremia thought to be 2/2 SIADH  - 10/31 renal US: negative for obstruction, echogenicities   - CVVH started 10/31 - 11/3  - Nephro following, appreciate recs     #Uremia with c/f uremic encephalopathy - improving   #KRYSETN likely prerenal   - BUN elevating over past few days up to 180s, likely 2/2 urea packets, KRYSTEN, TF  - Fluid restricted, FeNa 0.4% indicating prerenal KRYSTEN  - Monitor for uremic symptoms: loss of appetite, hypoactive changes in mental status, nausea, pericarditis  - CVVH 10/31 - 11/3  - continue to monitor daily RFPs     #Hyperkalemia - resolved  - K 7.9, rechecked to 4.7  - 4.4 on 10/29 -> uptrending to 5.4 -> 5.9 10/30 -> s/p 15g lokelma  - K this AM 4.2    #Hypokalemia -resolved  - K this AM 4.2  - s/p 40 mEq Kcl  - continue daily RFP for to trend K levels     #C/f refeeding syndrome- resolved  - Hypokalemia 2.7, Phos 1.9, Ca 5.2  - Electrolytes repleted, will monitor     ENDOCRINE  #Secondary HyperPTH  -8/28 .7  -25-hydroxy vit D 8, 1,25 Vit D 44.4  PLAN:  Vit D2 8000u liquid daily     ID  #Candida fungemia  #Leukocytosis I/s/o recently treated strep pneumo CAP, Steno BAL, and Candida fungemia  #TV endocarditis - RESOLVED (please refer to cardiology section above)  - Bronchial washings with stenotrophomonas susceptible to Bactrim   - Completed CTX 10/13-10/17  - Zosyn 10/20-10/24, Bactrim 10/22-10/28, Levaquin 10/23-10/25, vanc 11/1-11/2, ari 11/1-11/2  - Bcx 10/31/23: 1/4 Candida albicans  - Bcx 11/3/23: NGTD  - WBC 27 (11/1) -> 24 (11/2) -> 20.2  (11/3)  - CRP 23.6 (11/1) -> 21.73 (11/2) -> 18.8 (11/3) -> 15 (11/4)  - micafungin (11/3-*)  - ophtho consulted for c/f candida endophthalmitis, appreciate recs  - ID reengaged, appreciate recs       HEME/ONC  #Leukocytosis, intermittently ranging 18-24 - IMPROVING  -Likely 2/2 to uremia vs TV endocarditis   - WBC 27 (11/1) -> 24 (11/2)  - > 20. 2 (11/3) -> 22 (11/4)  -Monitor WBC, vitals     MSK/RHEUM   #Chronic LE weakness  -Likely chronic from disuse and alcohol, unlikely GBS since no paralysis  -possible EMG once downgraded, neuro concern for peripheral (LMN + muscle) inflammatory or degenerative disorder considering distribution and chronology     Vent/Oxy: NC at 10L  Pressors/Drips: none  Abx: micafungin 100 mg daily  F: Restricted  E: PRN  N: TF via Dobhoff at 45 ml/hr  A: PIV R arm x 3, Right IJV CVC, Dobhoff  DVT ppx: Heparin gtt  GI ppx: Pantoprazole 40mg daily     Patient was seen and discussed with Dr. Frazier who agrees with the plan as outlined above.    Yancy Freeman MD  Internal Medicine, PGY-1

## 2023-11-04 NOTE — CONSULTS
Reason for consult: left eye pain and blurred vision, fungemia    HPI: 69yo M with PMHx of HTN, alcohol use disorder, opiate use disorder, OUD, HCV s/p treatment, chronic PE, COPD, who presented with EtOH withdrawal developed AHRF and septic shock 2/2 stenotrophomonas PNA.  course c/b KRYSTEN and uremic encephalopathy now on HD. Also with concerns for tricuspid valve endocarditis. Developed fungemia on 11/4    Patient states that over the last 2 weeks, he has noticed blurred vision in both eyes and left eye pain, described as a gritty sensation. He denies flashes of light, floating spots, double vision, or sudden vision loss.     Past Medical History: as above  Family History: reviewed and not pertinent to chief complaint  Medications: please refer to medication reconciliation  Allergies: please refer to patient allergy list    OCULAR EXAMINATION:  Near visual acuity with +2.50 readers correction: right eye (OD) 20/40 phni, left eye (OS) 20/25 phni  Pupils: 5->3 both eyes, sluggish, no RAPD  IOP:  8 right eye (OD), 8 left eye (OS)   Motility: EOM full both eyes  Confrontation visual fields: Full to count fingers both eyes (OU)  Color vision: 1/11 (control plate only) both eyes. Patient unsure if he has hx of colorblindness     ANTERIOR SEGMENT: (portable slit lamp exam)  OD:  Lids/Lashes: normal anatomy and position, very infrequent blink  Conjunctiva: white and quiet  Cornea: clear. 1+ spk, rapid tbut  AC: deep and quiet  Iris: round and reactive  Lens: 1-2+ NS, 2+ CS in visual axis    OS:  Lids/Lashes: normal anatomy and position, very infrequent blink  Conjunctiva: white and quiet  Cornea: clear 2-3+ confluent superficial punctate keratitis (SPK) centrally and inferiorly, rapid tbut  AC: deep and quiet, no hypopyon  Iris: round and reactive  Lens: 1+ NS, 2+ CS in visual axis    Phenylephrine 2.5% and Tropicamide 1% drops administered for dilated exam at 4:10 pm   Please note dilation lasts 4-6 hours but can last as  long as 24 hours   Ophthalmology will return shortly to complete exam    DILATED FUNDUS EXAM:  OD:  Cup-to-disc ratio: 0.6  Optic nerve: pink with sharp margins   Vitreous: clear, no vitritis  Macula: flat and attached without lesions  Vessels: normal course and caliber  Periphery: no holes, tears, or detachments, no retinal whitening    OS:  Cup-to-disc ratio: 0.6  Optic nerve: pink with sharp margins   Vitreous: clear, no vitritis   Macula: flat and attached without lesions  Vessels: normal course and caliber  Periphery: no holes, tears, or detachments, no retinal whitening    Assessment:  Patient with blurry vision, gritty foreign body sensation, and fungemia. No signs of fungal endophthalmitis. Management of fungemia per primary bauer.     Recommendations:    #dry eye, both eyes  -artificial tears QID both eyes (OU) and erythromycin QID both eyes (OU) - note ointment will blur vision    #cataracts both eyes  #enlarged cup to disc ratio, both eyes   -outpatient follow up    Note final upon attending signature.    Annita Cuenca MD  Ophthalmology, PGY-3    Ophthalmology Adult Pager - [phone]  Ophthalmology Pediatrics Pager (M-F 8:00am-5:00pm) - [phone]    For adult follow-up appointments, call: [phone]  For pediatric follow-up appointments, call: [phone]

## 2023-11-04 NOTE — PROGRESS NOTES
Patient: Molina Godinez  Room: -A    [id number]  : 1955 -- AGE 68 y.o.    Provider: Jacob Frazier MD PhD   [unfilled]   Service Date: 2023  LOS: 23   ICU LOS:18d 3h          Brief History and Teaching Attending Attestation   I have personally examined the pt today. I have directly discussed and supervised the strategy of care and evaluation with the housestaff/fellow/NP/PA. I confirm the details of the history [HPI, Past Hist, Fam hist , Social Hist, ROS], exam and plans of care as documented by the referenced progress note linked to this date's clinical care. Medications, imaging and laboratory data have been personally reviewed and are documented in the electronic medical record. Additional comments regarding the events and plan of care are outlined below:    Molnia Godinez is a 68 y.o. male with with PMHx of COPD, HTN, AUD and opioid use disorder, HCV (treated), chronic PE  admitted 10/11/2023 for LE weakness, SIADH,  and CAP with AHRF eventually requiring intubation, but eventuall y extubated. Had CVVHD sessions for uremia.  Cx from 10/31 with yeast and micafungin started. Had had persistent left lung opacification in setting of likely mucous plug. No TV vegetation on repeat TTE.    Principal Problem:    Hospital-acquired pneumonia  Active Problems:    Essential hypertension, benign    Hepatitis C virus infection cured after antiviral drug therapy    Alcoholism (CMS/HCC)    Chronic pulmonary embolism (CMS/HCC)    COPD (chronic obstructive pulmonary disease) (CMS/HCC)    HLD (hyperlipidemia)    Uremia      Continues to remain off vent, but respiratory status tenuous. Left lung with concerns for mucous plug but on too much O2 to bronch and slowly improving.  Current plan is to be aggressive pulmonary clearance techniques and try CoughAssist device today with perhaps some abdominal assistance.  Given the presence of yeast micafungin has been started and will have to be on for 2 weeks.   We will consult ophthalmology and will for with ID.  For his relative hypotension he continues on midodrine and his pressures have been pending.  He is not requiring CVVHD today.    RESULTS      Chemistry   Liver Function texts   Heme and Coag   Lab Results   Component Value Date     11/04/2023    K 4.7 11/04/2023    CO2 26 11/04/2023    BUN 32 (H) 11/04/2023    CREATININE 0.42 (L) 11/04/2023    CALCIUM 8.7 11/04/2023    PHOS 4.1 11/04/2023    MG 2.11 11/04/2023          Lab Results   Component Value Date     (H) 10/31/2023     (H) 10/31/2023    BILITOT 0.6 10/31/2023    ALKPHOS 259 (H) 10/31/2023    ALBUMIN 2.6 (L) 11/04/2023       Lab Results   Component Value Date    WBC 22.0 (H) 11/04/2023    HGB 10.6 (L) 11/04/2023     11/04/2023    INR 1.1 11/01/2023          Lab Results   Component Value Date    CKTOTAL 311 10/11/2023         ---    Additional plans as outlined in the note from the referenced note. In the event of discrepancies between the housestaff's exam/plan of care and my personal exam/plan of care outlined above, the attending documentation should be interpreted as the definitive plan of care at the time of rounds.      This patient is critically ill and required critical care services to prevent/ treat further life threatening deterioration in the patient's condition and vital organ failure. My critical care services included direct involvement in the following activities at the bedside, on the floor or unit: altered mental status/encephalopathy/coma respiratory failure respiratory therapy and suctioning    Critical Care Time: My direct critical care time was 35 minutes.  The time documented above was spent providing direct care to this critically ill patient does not include any time for procedures.  Furthermore, in calculating this total time I have been careful to only include the minimum time during which I personally was providing the critical care services listed to  this patient while I was on the unit or floor.    Patient's care discussed with:    [] Family   [x] Nursing   [] ED Attending      [] Consultant    [] PCP    [] Attending     [x] Housestaff/Fellow/NP/PA    Jacob Frazier MD PhD 11/4/2023 4:52 PM

## 2023-11-04 NOTE — SIGNIFICANT EVENT
Pt seen and examined, re-consulted for new fungemia.     Started on micafungin, planning for line removal and holiday. Pt is minimally interactive on exam, very fatigued appearing. Denies any abd discomfort at this time or other pain on exam.     Will follow up repeat cx after line removal.     Pt seen w/ Dr. Maria Fernanda Banks DO  Infectious Disease Fellow, PGY5  Epic Chat until 5pm/Team A pager [phone]

## 2023-11-05 ENCOUNTER — APPOINTMENT (OUTPATIENT)
Dept: RADIOLOGY | Facility: HOSPITAL | Age: 68
End: 2023-11-05
Payer: COMMERCIAL

## 2023-11-05 LAB
ALBUMIN SERPL BCP-MCNC: 2.4 G/DL (ref 3.4–5)
ALBUMIN SERPL BCP-MCNC: 2.6 G/DL (ref 3.4–5)
ANION GAP SERPL CALC-SCNC: 12 MMOL/L (ref 10–20)
ANION GAP SERPL CALC-SCNC: 14 MMOL/L (ref 10–20)
BASOPHILS # BLD AUTO: 0.15 X10*3/UL (ref 0–0.1)
BASOPHILS NFR BLD AUTO: 0.8 %
BUN SERPL-MCNC: 25 MG/DL (ref 6–23)
BUN SERPL-MCNC: 36 MG/DL (ref 6–23)
CALCIUM SERPL-MCNC: 8.1 MG/DL (ref 8.6–10.6)
CALCIUM SERPL-MCNC: 8.5 MG/DL (ref 8.6–10.6)
CHLORIDE SERPL-SCNC: 101 MMOL/L (ref 98–107)
CHLORIDE SERPL-SCNC: 99 MMOL/L (ref 98–107)
CO2 SERPL-SCNC: 26 MMOL/L (ref 21–32)
CO2 SERPL-SCNC: 28 MMOL/L (ref 21–32)
CREAT SERPL-MCNC: 0.28 MG/DL (ref 0.5–1.3)
CREAT SERPL-MCNC: 0.42 MG/DL (ref 0.5–1.3)
CRP SERPL-MCNC: 12.34 MG/DL
EOSINOPHIL # BLD AUTO: 0.49 X10*3/UL (ref 0–0.7)
EOSINOPHIL NFR BLD AUTO: 2.6 %
ERYTHROCYTE [DISTWIDTH] IN BLOOD BY AUTOMATED COUNT: 13.2 % (ref 11.5–14.5)
GFR SERPL CREATININE-BSD FRML MDRD: >90 ML/MIN/1.73M*2
GFR SERPL CREATININE-BSD FRML MDRD: >90 ML/MIN/1.73M*2
GLUCOSE BLD MANUAL STRIP-MCNC: 110 MG/DL (ref 74–99)
GLUCOSE SERPL-MCNC: 108 MG/DL (ref 74–99)
GLUCOSE SERPL-MCNC: 91 MG/DL (ref 74–99)
HCT VFR BLD AUTO: 31.9 % (ref 41–52)
HGB BLD-MCNC: 10.5 G/DL (ref 13.5–17.5)
IMM GRANULOCYTES # BLD AUTO: 0.44 X10*3/UL (ref 0–0.7)
IMM GRANULOCYTES NFR BLD AUTO: 2.3 % (ref 0–0.9)
LYMPHOCYTES # BLD AUTO: 1.25 X10*3/UL (ref 1.2–4.8)
LYMPHOCYTES NFR BLD AUTO: 6.5 %
MAGNESIUM SERPL-MCNC: 1.79 MG/DL (ref 1.6–2.4)
MAGNESIUM SERPL-MCNC: 1.81 MG/DL (ref 1.6–2.4)
MCH RBC QN AUTO: 29.3 PG (ref 26–34)
MCHC RBC AUTO-ENTMCNC: 32.9 G/DL (ref 32–36)
MCV RBC AUTO: 89 FL (ref 80–100)
MONOCYTES # BLD AUTO: 1.57 X10*3/UL (ref 0.1–1)
MONOCYTES NFR BLD AUTO: 8.2 %
NEUTROPHILS # BLD AUTO: 15.21 X10*3/UL (ref 1.2–7.7)
NEUTROPHILS NFR BLD AUTO: 79.6 %
NRBC BLD-RTO: 0 /100 WBCS (ref 0–0)
PHOSPHATE SERPL-MCNC: 4.5 MG/DL (ref 2.5–4.9)
PHOSPHATE SERPL-MCNC: 4.9 MG/DL (ref 2.5–4.9)
PLATELET # BLD AUTO: 308 X10*3/UL (ref 150–450)
POTASSIUM SERPL-SCNC: 4.4 MMOL/L (ref 3.5–5.3)
POTASSIUM SERPL-SCNC: 4.6 MMOL/L (ref 3.5–5.3)
RBC # BLD AUTO: 3.58 X10*6/UL (ref 4.5–5.9)
SODIUM SERPL-SCNC: 134 MMOL/L (ref 136–145)
SODIUM SERPL-SCNC: 137 MMOL/L (ref 136–145)
UFH PPP CHRO-ACNC: 0.4 IU/ML
WBC # BLD AUTO: 19.1 X10*3/UL (ref 4.4–11.3)

## 2023-11-05 PROCEDURE — 2020000001 HC ICU ROOM DAILY

## 2023-11-05 PROCEDURE — 94668 MNPJ CHEST WALL SBSQ: CPT

## 2023-11-05 PROCEDURE — 36415 COLL VENOUS BLD VENIPUNCTURE: CPT

## 2023-11-05 PROCEDURE — 2500000005 HC RX 250 GENERAL PHARMACY W/O HCPCS

## 2023-11-05 PROCEDURE — 99231 SBSQ HOSP IP/OBS SF/LOW 25: CPT | Performed by: INTERNAL MEDICINE

## 2023-11-05 PROCEDURE — 71045 X-RAY EXAM CHEST 1 VIEW: CPT | Performed by: RADIOLOGY

## 2023-11-05 PROCEDURE — 31624 DX BRONCHOSCOPE/LAVAGE: CPT | Performed by: STUDENT IN AN ORGANIZED HEALTH CARE EDUCATION/TRAINING PROGRAM

## 2023-11-05 PROCEDURE — 2500000001 HC RX 250 WO HCPCS SELF ADMINISTERED DRUGS (ALT 637 FOR MEDICARE OP)

## 2023-11-05 PROCEDURE — 94640 AIRWAY INHALATION TREATMENT: CPT

## 2023-11-05 PROCEDURE — 71045 X-RAY EXAM CHEST 1 VIEW: CPT

## 2023-11-05 PROCEDURE — 87205 SMEAR GRAM STAIN: CPT

## 2023-11-05 PROCEDURE — 2500000002 HC RX 250 W HCPCS SELF ADMINISTERED DRUGS (ALT 637 FOR MEDICARE OP, ALT 636 FOR OP/ED): Performed by: STUDENT IN AN ORGANIZED HEALTH CARE EDUCATION/TRAINING PROGRAM

## 2023-11-05 PROCEDURE — 2500000004 HC RX 250 GENERAL PHARMACY W/ HCPCS (ALT 636 FOR OP/ED)

## 2023-11-05 PROCEDURE — 80069 RENAL FUNCTION PANEL: CPT

## 2023-11-05 PROCEDURE — S4991 NICOTINE PATCH NONLEGEND: HCPCS | Performed by: STUDENT IN AN ORGANIZED HEALTH CARE EDUCATION/TRAINING PROGRAM

## 2023-11-05 PROCEDURE — 86140 C-REACTIVE PROTEIN: CPT

## 2023-11-05 PROCEDURE — 99291 CRITICAL CARE FIRST HOUR: CPT

## 2023-11-05 PROCEDURE — 83735 ASSAY OF MAGNESIUM: CPT

## 2023-11-05 PROCEDURE — 2500000001 HC RX 250 WO HCPCS SELF ADMINISTERED DRUGS (ALT 637 FOR MEDICARE OP): Performed by: STUDENT IN AN ORGANIZED HEALTH CARE EDUCATION/TRAINING PROGRAM

## 2023-11-05 PROCEDURE — 85025 COMPLETE CBC W/AUTO DIFF WBC: CPT

## 2023-11-05 PROCEDURE — 85520 HEPARIN ASSAY: CPT

## 2023-11-05 PROCEDURE — 82947 ASSAY GLUCOSE BLOOD QUANT: CPT

## 2023-11-05 PROCEDURE — 2500000004 HC RX 250 GENERAL PHARMACY W/ HCPCS (ALT 636 FOR OP/ED): Performed by: INTERNAL MEDICINE

## 2023-11-05 PROCEDURE — 2500000002 HC RX 250 W HCPCS SELF ADMINISTERED DRUGS (ALT 637 FOR MEDICARE OP, ALT 636 FOR OP/ED)

## 2023-11-05 RX ORDER — MIDAZOLAM HYDROCHLORIDE 1 MG/ML
INJECTION INTRAMUSCULAR; INTRAVENOUS CODE/TRAUMA/SEDATION MEDICATION
Status: COMPLETED | OUTPATIENT
Start: 2023-11-05 | End: 2023-11-05

## 2023-11-05 RX ORDER — PROPOFOL 10 MG/ML
INJECTION, EMULSION INTRAVENOUS
Status: DISPENSED
Start: 2023-11-05 | End: 2023-11-06

## 2023-11-05 RX ORDER — HEPARIN SODIUM 10000 [USP'U]/100ML
INJECTION, SOLUTION INTRAVENOUS
Status: COMPLETED
Start: 2023-11-05 | End: 2023-11-05

## 2023-11-05 RX ORDER — MIDAZOLAM HYDROCHLORIDE 1 MG/ML
6 INJECTION INTRAMUSCULAR; INTRAVENOUS AS NEEDED
Status: DISCONTINUED | OUTPATIENT
Start: 2023-11-05 | End: 2023-11-07

## 2023-11-05 RX ORDER — MIDAZOLAM HYDROCHLORIDE 1 MG/ML
INJECTION INTRAMUSCULAR; INTRAVENOUS
Status: COMPLETED
Start: 2023-11-05 | End: 2023-11-05

## 2023-11-05 RX ORDER — PROPOFOL 10 MG/ML
25 INJECTION, EMULSION INTRAVENOUS AS NEEDED
Status: DISCONTINUED | OUTPATIENT
Start: 2023-11-05 | End: 2023-11-08

## 2023-11-05 RX ORDER — LIDOCAINE HYDROCHLORIDE 20 MG/ML
INJECTION, SOLUTION INFILTRATION; PERINEURAL
Status: COMPLETED
Start: 2023-11-05 | End: 2023-11-05

## 2023-11-05 RX ORDER — MAGNESIUM SULFATE HEPTAHYDRATE 40 MG/ML
2 INJECTION, SOLUTION INTRAVENOUS ONCE
Status: COMPLETED | OUTPATIENT
Start: 2023-11-05 | End: 2023-11-05

## 2023-11-05 RX ADMIN — CARBOXYMETHYLCELLULOSE SODIUM 1 DROP: 5 SOLUTION/ DROPS OPHTHALMIC at 12:32

## 2023-11-05 RX ADMIN — LIDOCAINE 2 PATCH: 4 PATCH TOPICAL at 08:47

## 2023-11-05 RX ADMIN — MICAFUNGIN SODIUM 100 MG: 50 INJECTION, POWDER, LYOPHILIZED, FOR SOLUTION INTRAVENOUS at 11:27

## 2023-11-05 RX ADMIN — MIDAZOLAM HYDROCHLORIDE 1 MG: 1 INJECTION, SOLUTION INTRAMUSCULAR; INTRAVENOUS at 15:29

## 2023-11-05 RX ADMIN — MIDODRINE HYDROCHLORIDE 10 MG: 10 TABLET ORAL at 08:44

## 2023-11-05 RX ADMIN — MIDAZOLAM HYDROCHLORIDE 1 MG: 1 INJECTION, SOLUTION INTRAMUSCULAR; INTRAVENOUS at 15:31

## 2023-11-05 RX ADMIN — MIDODRINE HYDROCHLORIDE 10 MG: 10 TABLET ORAL at 16:44

## 2023-11-05 RX ADMIN — Medication 3 ML: at 19:00

## 2023-11-05 RX ADMIN — DICLOFENAC 1 APPLICATION: 10 GEL TOPICAL at 08:50

## 2023-11-05 RX ADMIN — MIDAZOLAM HYDROCHLORIDE 1 MG: 1 INJECTION, SOLUTION INTRAMUSCULAR; INTRAVENOUS at 15:38

## 2023-11-05 RX ADMIN — LIDOCAINE 1 PATCH: 4 PATCH TOPICAL at 08:40

## 2023-11-05 RX ADMIN — THIAMINE HCL TAB 100 MG 100 MG: 100 TAB at 08:44

## 2023-11-05 RX ADMIN — SODIUM CHLORIDE, SODIUM LACTATE, POTASSIUM CHLORIDE, AND CALCIUM CHLORIDE 500 ML: 600; 310; 30; 20 INJECTION, SOLUTION INTRAVENOUS at 12:00

## 2023-11-05 RX ADMIN — LOPERAMIDE HYDROCHLORIDE 2 MG: 2 CAPSULE ORAL at 12:32

## 2023-11-05 RX ADMIN — Medication 15 ML: at 03:38

## 2023-11-05 RX ADMIN — HEPARIN SODIUM 1300 UNITS/HR: 10000 INJECTION, SOLUTION INTRAVENOUS at 21:46

## 2023-11-05 RX ADMIN — FOLIC ACID 1 MG: 1 TABLET ORAL at 08:44

## 2023-11-05 RX ADMIN — ALBUTEROL SULFATE 2.5 MG: 2.5 SOLUTION RESPIRATORY (INHALATION) at 07:37

## 2023-11-05 RX ADMIN — Medication 3 ML: at 07:39

## 2023-11-05 RX ADMIN — MIDAZOLAM HYDROCHLORIDE 4 MG: 1 INJECTION INTRAMUSCULAR; INTRAVENOUS at 15:15

## 2023-11-05 RX ADMIN — Medication 10 MG: at 20:53

## 2023-11-05 RX ADMIN — ERYTHROMYCIN 1 CM: 5 OINTMENT OPHTHALMIC at 20:53

## 2023-11-05 RX ADMIN — MIDAZOLAM HYDROCHLORIDE 0.5 MG: 1 INJECTION, SOLUTION INTRAMUSCULAR; INTRAVENOUS at 15:34

## 2023-11-05 RX ADMIN — CARBOXYMETHYLCELLULOSE SODIUM 1 DROP: 5 SOLUTION/ DROPS OPHTHALMIC at 16:44

## 2023-11-05 RX ADMIN — MIDODRINE HYDROCHLORIDE 10 MG: 10 TABLET ORAL at 11:27

## 2023-11-05 RX ADMIN — Medication 1 PATCH: at 08:44

## 2023-11-05 RX ADMIN — LIDOCAINE HYDROCHLORIDE: 20 INJECTION, SOLUTION INFILTRATION; PERINEURAL at 15:15

## 2023-11-05 RX ADMIN — CARBOXYMETHYLCELLULOSE SODIUM 1 DROP: 5 SOLUTION/ DROPS OPHTHALMIC at 08:44

## 2023-11-05 RX ADMIN — MIDAZOLAM HYDROCHLORIDE 4 MG: 1 INJECTION, SOLUTION INTRAMUSCULAR; INTRAVENOUS at 15:15

## 2023-11-05 RX ADMIN — ERYTHROMYCIN 1 CM: 5 OINTMENT OPHTHALMIC at 16:44

## 2023-11-05 RX ADMIN — ERYTHROMYCIN 1 CM: 5 OINTMENT OPHTHALMIC at 12:32

## 2023-11-05 RX ADMIN — OXYCODONE HYDROCHLORIDE 5 MG: 5 TABLET ORAL at 22:10

## 2023-11-05 RX ADMIN — LOPERAMIDE HYDROCHLORIDE 2 MG: 2 CAPSULE ORAL at 22:09

## 2023-11-05 RX ADMIN — Medication 1 TABLET: at 09:00

## 2023-11-05 RX ADMIN — ALBUTEROL SULFATE 2.5 MG: 2.5 SOLUTION RESPIRATORY (INHALATION) at 19:00

## 2023-11-05 RX ADMIN — CARBOXYMETHYLCELLULOSE SODIUM 1 DROP: 5 SOLUTION/ DROPS OPHTHALMIC at 20:53

## 2023-11-05 RX ADMIN — MAGNESIUM SULFATE HEPTAHYDRATE 2 G: 40 INJECTION, SOLUTION INTRAVENOUS at 08:43

## 2023-11-05 RX ADMIN — MIDAZOLAM HYDROCHLORIDE 0.5 MG: 1 INJECTION, SOLUTION INTRAMUSCULAR; INTRAVENOUS at 15:43

## 2023-11-05 RX ADMIN — ERYTHROMYCIN 1 CM: 5 OINTMENT OPHTHALMIC at 08:44

## 2023-11-05 RX ADMIN — Medication 8000 UNITS: at 08:43

## 2023-11-05 ASSESSMENT — PAIN SCALES - WONG BAKER: WONGBAKER_NUMERICALRESPONSE: NO HURT

## 2023-11-05 ASSESSMENT — PAIN SCALES - GENERAL
PAINLEVEL_OUTOF10: 7
PAINLEVEL_OUTOF10: 0 - NO PAIN
PAINLEVEL_OUTOF10: 0 - NO PAIN
PAINLEVEL_OUTOF10: 8
PAINLEVEL_OUTOF10: 0 - NO PAIN
PAINLEVEL_OUTOF10: 8
PAINLEVEL_OUTOF10: 4
PAINLEVEL_OUTOF10: 0 - NO PAIN

## 2023-11-05 NOTE — PROGRESS NOTES
Molina Godinez is a 68 y.o. male on day 24 of admission presenting with Hospital-acquired pneumonia.    Subjective   SUBJECTIVE:  NAEON. Patient seen & examined at bedside this AM, he is Aox3. On 6L NC. Denies any acute complaints.     Objective   OBJECTIVE:  Last Recorded Vitals  BP 96/61   Pulse 98   Temp 36.8 °C (98.2 °F)   Resp 24   Wt 49.7 kg (109 lb 9.1 oz)   SpO2 96%     I&O 24HR    Intake/Output Summary (Last 24 hours) at 11/5/2023 0834  Last data filed at 11/5/2023 0600  Gross per 24 hour   Intake 1598 ml   Output 665 ml   Net 933 ml          Physical Exam  Constitutional:       General: He is not in acute distress.     Appearance: He is cachectic. He is ill-appearing. He is not diaphoretic.      Interventions: Nasal cannula in place.   HENT:      Head: Normocephalic and atraumatic.      Right Ear: External ear normal.      Left Ear: External ear normal.      Nose: Nose normal. No congestion or rhinorrhea.      Mouth/Throat:      Mouth: Mucous membranes are dry.      Pharynx: Oropharynx is clear.   Eyes:      General: No scleral icterus.     Extraocular Movements: Extraocular movements intact.      Conjunctiva/sclera: Conjunctivae normal.   Neck:      Comments: Notable kyphosis of the cervical spine  Cardiovascular:      Rate and Rhythm: Regular rhythm. Tachycardia present.      Pulses: Normal pulses.      Heart sounds: Normal heart sounds. No murmur heard.     No friction rub. No gallop.   Pulmonary:      Breath sounds: Examination of the left-upper field reveals decreased breath sounds. Examination of the left-middle field reveals decreased breath sounds. Examination of the left-lower field reveals decreased breath sounds. Decreased breath sounds present. No wheezing, rhonchi or rales.      Comments: Poor inspiratory effort  Abdominal:      General: Abdomen is flat. Bowel sounds are normal. There is no distension.      Palpations: Abdomen is soft.      Tenderness: There is no abdominal tenderness.    Musculoskeletal:         General: No swelling or deformity.      Right lower leg: No edema.      Left lower leg: No edema.   Skin:     General: Skin is warm and dry.      Findings: No bruising, lesion or rash.   Neurological:      Mental Status: He is alert and oriented to person, place, and time.      Motor: Weakness (generalized) present.     Relevant Results    Labs:  Results for orders placed or performed during the hospital encounter of 10/11/23 (from the past 24 hour(s))   POCT GLUCOSE   Result Value Ref Range    POCT Glucose 100 (H) 74 - 99 mg/dL   C-reactive protein   Result Value Ref Range    C-Reactive Protein 12.34 (H) <1.00 mg/dL   CBC and Auto Differential   Result Value Ref Range    WBC 19.1 (H) 4.4 - 11.3 x10*3/uL    nRBC 0.0 0.0 - 0.0 /100 WBCs    RBC 3.58 (L) 4.50 - 5.90 x10*6/uL    Hemoglobin 10.5 (L) 13.5 - 17.5 g/dL    Hematocrit 31.9 (L) 41.0 - 52.0 %    MCV 89 80 - 100 fL    MCH 29.3 26.0 - 34.0 pg    MCHC 32.9 32.0 - 36.0 g/dL    RDW 13.2 11.5 - 14.5 %    Platelets 308 150 - 450 x10*3/uL    Neutrophils % 79.6 40.0 - 80.0 %    Immature Granulocytes %, Automated 2.3 (H) 0.0 - 0.9 %    Lymphocytes % 6.5 13.0 - 44.0 %    Monocytes % 8.2 2.0 - 10.0 %    Eosinophils % 2.6 0.0 - 6.0 %    Basophils % 0.8 0.0 - 2.0 %    Neutrophils Absolute 15.21 (H) 1.20 - 7.70 x10*3/uL    Immature Granulocytes Absolute, Automated 0.44 0.00 - 0.70 x10*3/uL    Lymphocytes Absolute 1.25 1.20 - 4.80 x10*3/uL    Monocytes Absolute 1.57 (H) 0.10 - 1.00 x10*3/uL    Eosinophils Absolute 0.49 0.00 - 0.70 x10*3/uL    Basophils Absolute 0.15 (H) 0.00 - 0.10 x10*3/uL   Renal Function Panel   Result Value Ref Range    Glucose 108 (H) 74 - 99 mg/dL    Sodium 137 136 - 145 mmol/L    Potassium 4.4 3.5 - 5.3 mmol/L    Chloride 99 98 - 107 mmol/L    Bicarbonate 28 21 - 32 mmol/L    Anion Gap 14 10 - 20 mmol/L    Urea Nitrogen 36 (H) 6 - 23 mg/dL    Creatinine 0.42 (L) 0.50 - 1.30 mg/dL    eGFR >90 >60 mL/min/1.73m*2    Calcium 8.5  (L) 8.6 - 10.6 mg/dL    Phosphorus 4.9 2.5 - 4.9 mg/dL    Albumin 2.6 (L) 3.4 - 5.0 g/dL   Magnesium   Result Value Ref Range    Magnesium 1.79 1.60 - 2.40 mg/dL   Heparin Assay, UFH   Result Value Ref Range    Heparin Unfractionated 0.4 See Comment Below for Therapeutic Ranges IU/mL   POCT GLUCOSE   Result Value Ref Range    POCT Glucose 110 (H) 74 - 99 mg/dL     C. Diff negative on 11/3    Imaging:  === 11/3/23 ===    XR CHEST 1 VIEW    - Impression -  Persistent subtotal opacification of the left hemithorax with volume  loss and mediastinal deviation.    MACRO:  None.    Signed by: Tari Lomas 11/3/2023 8:12 AM  Dictation workstation:   [id number]    Limited TTE from 11/2/23  PHYSICIAN INTERPRETATION:  Left Ventricle: The left ventricular systolic function is hyperdynamic, with an estimated ejection fraction of 70-75%. There are no regional wall motion abnormalities. The left ventricular cavity size is decreased. There is left ventricular concentric remodeling. The interventricular septum is flattened in systole, consistent with right ventricular pressure overload. Spectral Doppler shows an impaired relaxation pattern of left ventricular diastolic filling.  Left Atrium: The left atrium is normal in size.  Right Ventricle: The right ventricle is mildly enlarged. There is mildly reduced right ventricular systolic function. Radial RV function appears preserved; longitudinal RV function is decreased.  Right Atrium: The right atrium is moderately dilated.  Aortic Valve: The aortic valve is trileaflet. There is minimal aortic valve cusp calcification. There is no evidence of aortic valve regurgitation.  Mitral Valve: The mitral valve is normal in structure. There is trace mitral valve regurgitation.  Tricuspid Valve: The tricuspid valve is structurally normal. There is moderate tricuspid regurgitation. The Doppler estimated RVSP is mildly elevated at 45.6 mmHg. The PA systolic pressure may be underestimated. The  previously noted posterior tricuspid leaflet vegetation is not clearly seen on this study,.  Pulmonic Valve: The pulmonic valve is structurally normal. There is trace pulmonic valve regurgitation.  Pericardium: There is no pericardial effusion noted.  Aorta: The aortic root is normal.  In comparison to the previous echocardiogram(s): Compared with study from 12/1/2022.     CONCLUSIONS:   1. Left ventricular systolic function is hyperdynamic with a 70-75% estimated ejection fraction.   2. No definite valvular vegetations were visualized.   3. Right ventricular pressure overload.   4. Spectral Doppler shows an impaired relaxation pattern of left ventricular diastolic filling.   5. There is mildly reduced right ventricular systolic function.   6. The right atrium is moderately dilated.   7. Mildly elevated RVSP.   8. Moderate tricuspid regurgitation visualized.   9. The PA systolic pressure may be underestimated. The previously noted posterior tricuspid leaflet vegetation is not clearly seen on this study.  10. Left ventricular cavity size is decreased.  11. The reasons for repeating the echo from 10/31 is not clear.    Medications:    Current Facility-Administered Medications:     acetaminophen (Tylenol) tablet 650 mg, 650 mg, oral, q8h PRN, Ever Paez MD, 650 mg at 10/29/23 2034    albuterol 2.5 mg /3 mL (0.083 %) nebulizer solution 2.5 mg, 2.5 mg, nebulization, q12h, Yancy Freeman MD, 2.5 mg at 11/05/23 0737    calcium gluconate in NS IV 2 g, 2 g, intravenous, Once, Ever Paez MD    dextrose 10 % in water (D10W) infusion, 0.3 g/kg/hr, intravenous, Once PRN, Virgil Olivo MD, Stopped at 10/18/23 0800    dextrose 50 % injection 25 g, 25 g, intravenous, q15 min PRN, Virgil Olivo MD    diclofenac sodium (Voltaren) 1 % gel 1 Application, 4 g, Topical, 4x daily PRN, Leslie Allen MD, 1 Application at 11/04/23 2129    ergocalciferol (Vitamin D-2) drops 8,000 Units, 8,000 Units, oral, Daily,  Ever Paez MD, 8,000 Units at 11/04/23 0803    erythromycin (Romycin) 5 mg/gram (0.5 %) ophthalmic ointment 1 cm, 1 cm, Both Eyes, 4x daily, Mazin Alberto MD    folic acid (Folvite) tablet 1 mg, 1 mg, oral, Daily, Ever Paez MD, 1 mg at 11/04/23 0803    glucagon (Glucagen) injection 1 mg, 1 mg, intramuscular, q15 min PRN, Virgil Olivo MD    heparin (porcine) injection 2,000-4,000 Units, 2,000-4,000 Units, intravenous, q4h PRN, Virgil Olivo MD, 2,000 Units at 10/25/23 0857    heparin 25,000 Units in dextrose 5% 250 mL (100 Units/mL) infusion (premix), 0-4,500 Units/hr, intravenous, Continuous, Rosa Gary MD, Last Rate: 13 mL/hr at 11/04/23 1959, 1,300 Units/hr at 11/04/23 1959    lidocaine 4 % patch 1 patch, 1 patch, transdermal, Daily, Ever Paez MD, 1 patch at 11/04/23 0803    lidocaine 4 % patch 2 patch, 2 patch, transdermal, Daily, Leslie Allen MD, 2 patch at 11/04/23 1439    loperamide (Imodium) capsule 2 mg, 2 mg, oral, 4x daily PRN, Leslie Allen MD, 2 mg at 11/04/23 1652    lubricating eye drops ophthalmic solution 1 drop, 1 drop, Both Eyes, 4x daily, Mazin Alberto MD, 1 drop at 11/04/23 2130    magnesium sulfate IV 2 g, 2 g, intravenous, Once, Yancy Freeman MD    melatonin tablet 10 mg, 10 mg, oral, Nightly, Tia Manuel MD, 10 mg at 11/04/23 2130    micafungin (Mycamine) 100 mg in dextrose 5 % in water (D5W) 100 mL IV, 100 mg, intravenous, q24h, Arianna Bills DO, Stopped at 11/04/23 1254    midodrine (Proamatine) tablet 10 mg, 10 mg, oral, TID with meals, Mazin Alberto MD, 10 mg at 11/04/23 1652    moisturizing mouth (Biotene Dry Mouth) solution 15 mL, 15 mL, Swish & Spit, TID PRN, Yancy Freeman MD, 15 mL at 11/05/23 0338    multivitamin with minerals 1 tablet, 1 tablet, oral, Daily, Virgil Olivo MD, 1 tablet at 11/04/23 0900    nicotine (Nicoderm CQ) 21 mg/24 hr patch 1 patch, 1 patch, transdermal, Daily, Virgil Olivo MD, 1 patch at  11/04/23 0804    norepinephrine (Levophed) 8 mg in dextrose 5% 250 mL (0.032 mg/mL) infusion (premix), 0-3.3 mcg/kg/min, intravenous, Continuous, Porfirio Riojas MD, Last Rate: 0 mL/hr at 11/04/23 0700, 0 mcg/kg/min at 11/04/23 0700    oxyCODONE (Roxicodone) immediate release tablet 5 mg, 5 mg, oral, q6h PRN, Jah Ospina MD, 5 mg at 11/04/23 2130    oxygen (O2) therapy, , inhalation, Continuous PRN - O2/gases, Nash Delgadillo MD    sennosides (Senokot) tablet 8.6 mg, 1 tablet, oral, Nightly PRN, Virgil Olivo MD    sodium chloride 7 % nebulizer solution 3 mL, 3 mL, nebulization, q12h, Yancy Freeman MD, 3 mL at 11/05/23 0739    thiamine (Vitamin B-1) tablet 100 mg, 100 mg, oral, Daily, Ever Paez MD, 100 mg at 11/04/23 0900    white petrolatum (Aquaphor) 41 % ointment 1 Application, 1 Application, Topical, q1h PRN, Ever Paez MD    Assessment/Plan   ASSESSMENT & PLAN:  Molina Godinez is a 68 y.o. male with PMHx of COPD, HTN, AUD and opioid use disorder, HCV (treated), chronic PE who presented originally on 10/9 with inability to walk. Briefly in MICU for c/f alcohol withdrawal and high CIWA scores, transferred to floor, eventually readmitted to MICU 10/16 for increased O2 requirements, put on Airvo and intubated 10/20. Bronch done with resp cx growing stenotrophomonas for which he completed a course of Bactrim. Pt was extubated on 10/27. Also with SIADH (now resolved, unclear etiology, potentially just his severe pulmonary disease); nephrology consulted and was initially treated with fluid restriction, urea packets and Lasix, but now off all of these for significant increase BUN to 180s. CVVH started 10/31-11/3 for uremia and fluid removal. Now with worsening complete left lung opacification likely 2/2 mucus plugging. Bcx 10/31 growing Candida albicans, sensitivities pending. On micafungin.      Active Problems:  Candida fungemia   Hypotension, unknown etiology  Tachycardia with intermittent  SVT (resolving)   Left lung complete opacification likely 2/2 mucus plugging   KRYSTEN likely prerenal (resolving)     Updates 11/5:  Right internal jugular trialysis line removed today  Clinically dry on exam - 500 ml LR bolus given today   CXR showing persistent left lung opacification   Plan for bronchoscopy this PM     NEURO/PSYCH  #AMS likely 2/2 uremic encephalopathy vs chronic hypoxia - IMPROVING  - Previously intubated/sedated, extubated on 10/27.   - CT head without pathology from 10/11  - Initially had visual and tactile hallucinations, have since resolved. Suspect metabolic etiology of fluctuations in mental status, i/s/o acutely increased oxygen requirement.  - Mentation improved on CVVH with concurrent decrease in serum BUN     #Bilateral LE weakness  -Likely chronic, had weakness during 8/2023 admission  -Patient able to move bilateral lower extremities  -Repeat MRI brain/total spine on 10/20 unremarkable for acute process  -Neurology consulted. Suspicion that weakness is likely a peripheral (LMN + muscle) inflammatory or degenerative disorder considering distribution and chronology.      - recommend EMG/NGS after patient is downgraded      - low suspicion for GBS  -May follow up with neurology outpatient  -ID consulted regarding weakness and positive Syphilis Ab. Pt was treated back in 1998 but rec MRI spine given concern of abscess, MRI negative for acute process     CV  #c/f TV endocarditis - resolved  - TTE 10/17/23: LVEF 75%, LV concentric modeling, mildly increased LV septal wall thickness, atrial septal aneurysm, mildly elevated RSVP 39  - TTE 10/31/23: same as above with the following exceptions: 1.7 x 0.8 cm mobile echodensity of posterior tricuspid leaflet, mod-severe TR, RSVP increased to 57  - TTE 11/2/23: No definite valvular vegetations were visualized  - vanc/ari discontinued     #Hypotension, asymptomatic   - Etiology includes infection vs volume depletion    - 10/31 MAP to 50s   - off  norepi 11/4 AM  - c/w midodrine 10 mg TID     #HTN- resolved  - home amlodipine 5mg, atenolol 50mg  - Initially started on Cardene drip, transitioned to Labetalol prior to resolution  - home atenolol restarted on 10/28, switched to metop 12.5 BID on 10/29 I/s/o renal function - held while on norepi gtt     #Tachycardia - improving   #Asymptomatic SVT to max HR 170s  - Sinus rhythm, HR chronically in 120-130s -> past few days HR in 80-90s  - Etiology includes pain, infection, hypovolemia, uremia  - Poor correlation with fentanyl pushes, less suspicious for pain etiology  - previously on metoprolol 12.5mg BID, dc'd as patient now on norepi gtt - can consider restarting metoprolol prn for tachycardia  - decreased albuterol from q4h to q12h to decrease beta agonist activity on 11/2     #CAD  - home atorvastatin 40mg held in the setting of elevated transaminases      #Chest pain- resolved  - New onset chest pain 10/28, had hyperkalemia on RFP  - RFP rechecked, K wnl  - Trop 14  - ECG without acute changes, stable from prior  - Given sublingual nitroglycerin, but had resolution of pain prior to administration.  - Some concern of uremic pericarditis but unlikely. No pericardial rub appreciated on exam.  - continue to monitor     PULM  #CAP- resolved  -Urine strep positive, unsure if active or chronic infection  -MRSA negative, Urine legionella negative  -CTX 5 day course completed 10/17     #L lung consolidation, now complete opacification  #Steno HAP  #Increased O2 requirements - improving  - Initially treated for CAP given positive urine strep with CTX 10/13-10/17, one day of Zosyn10/17-10/17  - Leukocytosis stable ~24  - C/f HAP vs incomplete resolution of previous PNA  -S/p Bronch 10/20 with purulent fluid with bronchial washings sent for cx  -Bronchial washings with stenotrophomonas susceptible to Bactrim  -Bactrim 10/22-10/28  -10/31 respiratory sputum cx with salivary contamination   -CXR 10/31 showing complete  opacification of left lung worse from prior, likely 2/2 mucus plugging   -Low concern for pneumonia as patient is afebrile, does have leukocytosis but this has been chronic during this hospitalization. Negative lactate. Also tachy and hypotensive however HR has been chronically elevated.   -CXR 11/1 showing slight improvement in JOSE ANTONIO aeration  -CXR 11/2 showing worsening JOSE ANTONIO opacification. CXR 11/3-11/5 unchanged  -O2 requirement improving: on 6L NC today  -plan for bronch today  -c/w BPH as below and obtain daily AM CXRs     #Severe COPD  #Mucus plugging  - 8/2023 FEV1 42%  - CT C/A/P with RLL mucus plug  - CXR 10/28 with L sided pleural effusion vs mucus plugging  - CXR 10/31 with complete opacification of left lung  - CXR 11/1 showing slight improvement in JOSE ANTONIO aeration  - Continue aggressive RT: Albuterol 2.5 mg q12h, chest PT, respiratory hygiene  - Ezpap, incentive spirometer, cough assist  - 7% NS neb q12h  - Encourage sitting, mobility as able. PT following     #Suspected PAH  - 10/17/23 Echo with atrial septal aneurysm, positive bubble study  - Will need outpatient workup, previously some suspicion for PAH but will need further evaluation      #Subsegmental PE  -Requires anticoagulation for 3 months (per vasc med 8/2023), on home apixaban 5mg BID  PLAN:  -Heparin gtt      GI  #HepC  -treated, 8/2023 HCV RNA undetected     #Nutrition  - Dobhoff in place 10/17  - Nutrition following, appreciate recs  - TF with TwoCal HN at goal rate of 45ml/hr  - 100mg Thiamine, 1mg Folic acid, 8000u Vit D2     #diarrhea - resolved  - 4 loose BM overnight  - C diff negative  - vasovagal reaction during bowel movements  - last polyethylene glycol given 11/1; order d/c'd today  - consider changing TF formula     #Elevated transaminases, hx of treated HCV- increasing, semi stable  - LFTs rising over past few days, predominantly ALP/AST/ALT with normal bili  - RUQ US with steatosis, sludge in GB  - Sulfa drug induced vs intrinsic  process, though Bactrim typically takes 2 weeks for hepatotoxicity  -Trend LFTs     /RENAL  #Hyponatremia - Likely SIADH 2/2 COPD exacerbation but unclear if additional etiologies- resolved  - Na as low as 120, slowly improving, 139 on 11/1/23  - check Na and urea BID  - D/c urea packets in setting of BUN of 180s and AMS  - place marie for accurate I&Os in setting of strict fluid monitoring  - Free water flushes 100ml BID for further fluid restriction given hyponatremia thought to be 2/2 SIADH  - 10/31 renal US: negative for obstruction, echogenicities   - CVVH 10/31 - 11/3  - trialysis line removed 11/5  - Nephro following, appreciate recs     #Uremia with c/f uremic encephalopathy - improving   #KRYSTEN likely prerenal   - BUN elevating over past few days up to 180s, likely 2/2 urea packets, KRYSTEN, TF  - Fluid restricted, FeNa 0.4% indicating prerenal KRYSTEN  - Monitor for uremic symptoms: loss of appetite, hypoactive changes in mental status, nausea, pericarditis  - CVVH 10/31 - 11/3  - trialysis line removed 11/5  - continue to monitor daily RFPs     #Hyperkalemia - resolved  - K 7.9, rechecked to 4.7  - 4.4 on 10/29 -> uptrending to 5.4 -> 5.9 10/30 -> s/p 15g lokelma  - K this AM 4.2    #Hypokalemia -resolved  - K this AM 4.2  - s/p 40 mEq Kcl  - continue daily RFP for to trend K levels     #C/f refeeding syndrome- resolved  - Hypokalemia 2.7, Phos 1.9, Ca 5.2  - Electrolytes repleted, will monitor     ENDOCRINE  #Secondary HyperPTH  -8/28 .7  -25-hydroxy vit D 8, 1,25 Vit D 44.4  PLAN:  Vit D2 8000u liquid daily     ID  #Candida fungemia  #Leukocytosis I/s/o recently treated strep pneumo CAP, Steno BAL, and Candida fungemia  #TV endocarditis - RESOLVED (please refer to cardiology section above)  - Bronchial washings with stenotrophomonas susceptible to Bactrim   - Completed CTX 10/13-10/17  - Zosyn 10/20-10/24, Bactrim 10/22-10/28, Levaquin 10/23-10/25, vanc 11/1-11/2, ari 11/1-11/2  - Bcx 10/31/23: 1/4  Candida albicans, pending sensitivities  - Bcx 11/3/23: NGTD  - CRP 23.6 (11/1) -> 21.73 (11/2) -> 18.8 (11/3) -> 15 (11/4)   - WBC 27 (11/1) -> 24 (11/2) -> 20.2 (11/3) -> 19.1 (11/5)  - c/w micafungin 100 mg daily (11/3-*)  - pending sensitivities will switch from dieter to fluconazole  - ID reengaged, appreciate recs  - ophtho consulted: no evidence of candida endophthalmitis on exam (will need OP f/u for cataracts)       HEME/ONC  #Leukocytosis, intermittently ranging 18-24 - IMPROVING  -Likely 2/2 to uremia vs TV endocarditis   - WBC 27 (11/1) -> 24 (11/2)  - > 20. 2 (11/3) -> 22 (11/4) -> 19.1 (11/5)  -Monitor WBC, vitals     MSK/RHEUM   #Chronic LE weakness  -Likely chronic from disuse and alcohol, unlikely GBS since no paralysis  -possible EMG once downgraded, neuro concern for peripheral (LMN + muscle) inflammatory or degenerative disorder considering distribution and chronology     Vent/Oxy: NC at 6L  Pressors/Drips: none  Abx: micafungin 100 mg daily  F: Restricted  E: PRN  N: TF via Dobhoff at 45 ml/hr  A: PIV R arm x 3, Dobhoff, external urinary catheter  DVT ppx: Heparin gtt  GI ppx: Pantoprazole 40mg daily     Patient was seen and discussed with Dr. Frazier who agrees with the plan as outlined above.    Yancy Freeman MD  Internal Medicine, PGY-1

## 2023-11-05 NOTE — PROGRESS NOTES
"Music Therapy Note    Molina Godinez     Therapy Session  Referral Type: New referral this admission  Visit Type: New visit  Session Start Time: 1509  Session End Time: 1527  Intervention Delivery: In-person  Conflict of Service: None  Family Present for Session: None     Pre-assessment  Unable to Assess Reason: Physical limitation, Outcomes not assessed  Mood/Affect: Calm, Appropriate, Flat/blunted    Treatment/Interventions  Areas of Focus: Normalization, Isolation reduction  Music Therapy Interventions: Live music listening  Interruption: No  Patient Fell Asleep at End of Session: No    Post-assessment  Unable to Assess Reason: Outcomes not evaluated, Physical limitation  Mood/Affect: Calm, Appropriate (bright)  Continue Visiting: Yes  Total Session Time (min): 18 minutes    Narrative  Assessment Detail: Nurse verbally referred pt for isolation reduction.Pt found sitting up in bed, calm and appropriate affect. Pt was accepting of MTx services.  Plan: To engage in live music listening to promote normalization and isolation reduction.  Intervention: MTI provided live music listening via guitar and voice. Pt passively listening to music evidenced by watching MTI and closing eyes.  Evaluation: Began session by playing pt preferred music. Pt's affect brightened evidenced by smiling and relaxed body language. Occassionally tapped hand to music, mainly watched MTI play music. Unable to verbalize due to trach, mouthed \"thank you\" to MTI post session, agreed to future MTx sesion.  Follow-up: MTI will continue to follow up with pt and provide MTx services as needed.    Education Documentation  No documentation found.          "

## 2023-11-05 NOTE — PROGRESS NOTES
Molina Godinez   68 [email or web address]    @@  N/Room: 68819199/02/02-A    Subjective:   Pt remains off pressors this morning. No acute complaints.     Objective:     Meds:   albuterol, 2.5 mg, q12h  calcium gluconate, 2 g, Once  ergocalciferol, 8,000 Units, Daily  erythromycin, 1 cm, 4x daily  folic acid, 1 mg, Daily  lactated Ringer's, 500 mL, Once  lidocaine, 1 patch, Daily  lidocaine, 2 patch, Daily  lubricating eye drops, 1 drop, 4x daily  melatonin, 10 mg, Nightly  micafungin, 100 mg, q24h  midodrine, 10 mg, TID with meals  multivitamin with minerals, 1 tablet, Daily  nicotine, 1 patch, Daily  sodium chloride, 3 mL, q12h  thiamine, 100 mg, Daily      heparin, Last Rate: 1,300 Units/hr (11/05/23 0700)  norepinephrine, Last Rate: 0 mcg/kg/min (11/04/23 0700)      acetaminophen, 650 mg, q8h PRN  dextrose 10 % in water (D10W), 0.3 g/kg/hr, Once PRN  dextrose, 25 g, q15 min PRN  diclofenac sodium, 4 g, 4x daily PRN  glucagon, 1 mg, q15 min PRN  heparin, 2,000-4,000 Units, q4h PRN  loperamide, 2 mg, 4x daily PRN  moisturizing mouth, 15 mL, TID PRN  oxyCODONE, 5 mg, q6h PRN  oxygen, , Continuous PRN - O2/gases  sennosides, 1 tablet, Nightly PRN  white petrolatum, 1 Application, q1h PRN        Vitals:    11/05/23 1300   BP: 109/64   Pulse:    Resp:    Temp:    SpO2:           Intake/Output Summary (Last 24 hours) at 11/5/2023 1320  Last data filed at 11/5/2023 1300  Gross per 24 hour   Intake 2662 ml   Output 605 ml   Net 2057 ml         General appearance: flat affect, appears tired, A&OX3  Heart: RRR  Lungs: course breath sounds b/l  Abdomen: soft, nt/nd  Extremities: No edema bilat  [place]  Neuro: No FND,asterixis  Access: trialysis catheter     Blood Labs:  Results for orders placed or performed during the hospital encounter of 10/11/23 (from the past 24 hour(s))   POCT GLUCOSE   Result Value Ref Range    POCT Glucose 100 (H) 74 - 99 mg/dL   C-reactive protein   Result Value Ref Range    C-Reactive Protein 12.34 (H) <1.00  mg/dL   CBC and Auto Differential   Result Value Ref Range    WBC 19.1 (H) 4.4 - 11.3 x10*3/uL    nRBC 0.0 0.0 - 0.0 /100 WBCs    RBC 3.58 (L) 4.50 - 5.90 x10*6/uL    Hemoglobin 10.5 (L) 13.5 - 17.5 g/dL    Hematocrit 31.9 (L) 41.0 - 52.0 %    MCV 89 80 - 100 fL    MCH 29.3 26.0 - 34.0 pg    MCHC 32.9 32.0 - 36.0 g/dL    RDW 13.2 11.5 - 14.5 %    Platelets 308 150 - 450 x10*3/uL    Neutrophils % 79.6 40.0 - 80.0 %    Immature Granulocytes %, Automated 2.3 (H) 0.0 - 0.9 %    Lymphocytes % 6.5 13.0 - 44.0 %    Monocytes % 8.2 2.0 - 10.0 %    Eosinophils % 2.6 0.0 - 6.0 %    Basophils % 0.8 0.0 - 2.0 %    Neutrophils Absolute 15.21 (H) 1.20 - 7.70 x10*3/uL    Immature Granulocytes Absolute, Automated 0.44 0.00 - 0.70 x10*3/uL    Lymphocytes Absolute 1.25 1.20 - 4.80 x10*3/uL    Monocytes Absolute 1.57 (H) 0.10 - 1.00 x10*3/uL    Eosinophils Absolute 0.49 0.00 - 0.70 x10*3/uL    Basophils Absolute 0.15 (H) 0.00 - 0.10 x10*3/uL   Renal Function Panel   Result Value Ref Range    Glucose 108 (H) 74 - 99 mg/dL    Sodium 137 136 - 145 mmol/L    Potassium 4.4 3.5 - 5.3 mmol/L    Chloride 99 98 - 107 mmol/L    Bicarbonate 28 21 - 32 mmol/L    Anion Gap 14 10 - 20 mmol/L    Urea Nitrogen 36 (H) 6 - 23 mg/dL    Creatinine 0.42 (L) 0.50 - 1.30 mg/dL    eGFR >90 >60 mL/min/1.73m*2    Calcium 8.5 (L) 8.6 - 10.6 mg/dL    Phosphorus 4.9 2.5 - 4.9 mg/dL    Albumin 2.6 (L) 3.4 - 5.0 g/dL   Magnesium   Result Value Ref Range    Magnesium 1.79 1.60 - 2.40 mg/dL   Heparin Assay, UFH   Result Value Ref Range    Heparin Unfractionated 0.4 See Comment Below for Therapeutic Ranges IU/mL   POCT GLUCOSE   Result Value Ref Range    POCT Glucose 110 (H) 74 - 99 mg/dL              ASSESSMENT:  Molina Godinez is a 68 y.o. male with PMHx of COPD, HTN, AUD and opioid use disorder, HCV (treated), chronic PE who presented originally on 10/9 with inability to walk. Briefly in MICU for c/f alcohol withdrawal and high CIWA scores, transferred to floor,  eventually readmitted to MICU 10/16 for increased O2 requirements, was intubated with bronch respiratory cx positive for stenotrophomonas.   Pt with worsening hyponatremia and rising BUN so nephrology consulted for which CVVH was started on 10/31. Nephrology continues to follow for oliguric KRYSTEN.    #KRYSTEN, oligiruc  -likely due to hemodynamic injury given shock, sepsis   -pt started on CVVH 10/31 for hypervolemia, oliguria and worsening BUN  -CVVH stopped 11/3 due to improving urine output and blood cx positive for yeast 10/31 so plan for line holiday  -pt off pressors    #AHRF secondary to pneumonia  #Fungemia  -Micafungin, Bcx 10/31 positive for yeast. Bcx 11/3 collected     RECOMMENDATIONS:  -urine output continues to improve and metabolic parameters stable so no indication for dialysis   -maintain MAP closer to 70 to ensure adequate renal perfusion   -ok to remove trialysis line from renal standpoint given fungemia and improving renal function  -strict I/O, renally dose medications now that he is off CRRT    Will sign off at this time. Thank you for the consult.     Herman Oliveros DO  Nephrology Fellow   Daytime / Weekend Renal Pager [phone]  After 7 pm Emergencies [phone] Pager [phone]

## 2023-11-05 NOTE — SIGNIFICANT EVENT
Spoke with sister Sarah this PM at number listed in chart to give update about Mr. Godinez's clinical status and treatment plan. Informed her of plan to bronch this PM. Questions were answered to family's satisfaction.    Spoke with sister Donna after completion of bronch, informed her of the results of the procedure and treatment plan moving forward. Donna stated she would communicate the update to the other siblings. Questions were answered to family's satisfaction.    Yancy Freeman MD  Internal Medicine PGY1

## 2023-11-06 ENCOUNTER — APPOINTMENT (OUTPATIENT)
Dept: GASTROENTEROLOGY | Facility: HOSPITAL | Age: 68
End: 2023-11-06
Payer: COMMERCIAL

## 2023-11-06 ENCOUNTER — APPOINTMENT (OUTPATIENT)
Dept: RADIOLOGY | Facility: HOSPITAL | Age: 68
End: 2023-11-06
Payer: COMMERCIAL

## 2023-11-06 ENCOUNTER — ANESTHESIA EVENT (OUTPATIENT)
Dept: GASTROENTEROLOGY | Facility: HOSPITAL | Age: 68
End: 2023-11-06
Payer: COMMERCIAL

## 2023-11-06 ENCOUNTER — ANESTHESIA (OUTPATIENT)
Dept: GASTROENTEROLOGY | Facility: HOSPITAL | Age: 68
End: 2023-11-06
Payer: COMMERCIAL

## 2023-11-06 LAB
BASOPHILS # BLD AUTO: 0.1 X10*3/UL (ref 0–0.1)
BASOPHILS NFR BLD AUTO: 0.7 %
EOSINOPHIL # BLD AUTO: 0.43 X10*3/UL (ref 0–0.7)
EOSINOPHIL NFR BLD AUTO: 3.2 %
ERYTHROCYTE [DISTWIDTH] IN BLOOD BY AUTOMATED COUNT: 13.8 % (ref 11.5–14.5)
GLUCOSE BLD MANUAL STRIP-MCNC: 106 MG/DL (ref 74–99)
GLUCOSE BLD MANUAL STRIP-MCNC: 94 MG/DL (ref 74–99)
HCT VFR BLD AUTO: 34.6 % (ref 41–52)
HGB BLD-MCNC: 11.4 G/DL (ref 13.5–17.5)
IMM GRANULOCYTES # BLD AUTO: 0.31 X10*3/UL (ref 0–0.7)
IMM GRANULOCYTES NFR BLD AUTO: 2.3 % (ref 0–0.9)
LYMPHOCYTES # BLD AUTO: 0.86 X10*3/UL (ref 1.2–4.8)
LYMPHOCYTES NFR BLD AUTO: 6.4 %
MCH RBC QN AUTO: 29.3 PG (ref 26–34)
MCHC RBC AUTO-ENTMCNC: 32.9 G/DL (ref 32–36)
MCV RBC AUTO: 89 FL (ref 80–100)
MONOCYTES # BLD AUTO: 1.15 X10*3/UL (ref 0.1–1)
MONOCYTES NFR BLD AUTO: 8.5 %
NEUTROPHILS # BLD AUTO: 10.62 X10*3/UL (ref 1.2–7.7)
NEUTROPHILS NFR BLD AUTO: 78.9 %
NRBC BLD-RTO: 0 /100 WBCS (ref 0–0)
PLATELET # BLD AUTO: 309 X10*3/UL (ref 150–450)
RBC # BLD AUTO: 3.89 X10*6/UL (ref 4.5–5.9)
UFH PPP CHRO-ACNC: 0.3 IU/ML
UFH PPP CHRO-ACNC: 0.5 IU/ML
WBC # BLD AUTO: 13.5 X10*3/UL (ref 4.4–11.3)

## 2023-11-06 PROCEDURE — 87116 MYCOBACTERIA CULTURE: CPT | Performed by: INTERNAL MEDICINE

## 2023-11-06 PROCEDURE — 97530 THERAPEUTIC ACTIVITIES: CPT | Mod: GP

## 2023-11-06 PROCEDURE — 85025 COMPLETE CBC W/AUTO DIFF WBC: CPT

## 2023-11-06 PROCEDURE — 87102 FUNGUS ISOLATION CULTURE: CPT | Performed by: INTERNAL MEDICINE

## 2023-11-06 PROCEDURE — 2500000004 HC RX 250 GENERAL PHARMACY W/ HCPCS (ALT 636 FOR OP/ED)

## 2023-11-06 PROCEDURE — 94668 MNPJ CHEST WALL SBSQ: CPT

## 2023-11-06 PROCEDURE — 71045 X-RAY EXAM CHEST 1 VIEW: CPT | Mod: FY

## 2023-11-06 PROCEDURE — 87186 SC STD MICRODIL/AGAR DIL: CPT | Performed by: INTERNAL MEDICINE

## 2023-11-06 PROCEDURE — 2500000002 HC RX 250 W HCPCS SELF ADMINISTERED DRUGS (ALT 637 FOR MEDICARE OP, ALT 636 FOR OP/ED)

## 2023-11-06 PROCEDURE — 2500000001 HC RX 250 WO HCPCS SELF ADMINISTERED DRUGS (ALT 637 FOR MEDICARE OP)

## 2023-11-06 PROCEDURE — 82947 ASSAY GLUCOSE BLOOD QUANT: CPT

## 2023-11-06 PROCEDURE — 2500000004 HC RX 250 GENERAL PHARMACY W/ HCPCS (ALT 636 FOR OP/ED): Performed by: ANESTHESIOLOGIST ASSISTANT

## 2023-11-06 PROCEDURE — 2500000001 HC RX 250 WO HCPCS SELF ADMINISTERED DRUGS (ALT 637 FOR MEDICARE OP): Performed by: STUDENT IN AN ORGANIZED HEALTH CARE EDUCATION/TRAINING PROGRAM

## 2023-11-06 PROCEDURE — 2500000005 HC RX 250 GENERAL PHARMACY W/O HCPCS: Performed by: ANESTHESIOLOGIST ASSISTANT

## 2023-11-06 PROCEDURE — 0BC78ZZ EXTIRPATION OF MATTER FROM LEFT MAIN BRONCHUS, VIA NATURAL OR ARTIFICIAL OPENING ENDOSCOPIC: ICD-10-PCS | Performed by: INTERNAL MEDICINE

## 2023-11-06 PROCEDURE — 87075 CULTR BACTERIA EXCEPT BLOOD: CPT | Performed by: INTERNAL MEDICINE

## 2023-11-06 PROCEDURE — 0B918ZZ DRAINAGE OF TRACHEA, VIA NATURAL OR ARTIFICIAL OPENING ENDOSCOPIC: ICD-10-PCS | Performed by: INTERNAL MEDICINE

## 2023-11-06 PROCEDURE — 85520 HEPARIN ASSAY: CPT

## 2023-11-06 PROCEDURE — 94660 CPAP INITIATION&MGMT: CPT

## 2023-11-06 PROCEDURE — 71045 X-RAY EXAM CHEST 1 VIEW: CPT | Performed by: RADIOLOGY

## 2023-11-06 PROCEDURE — 2020000001 HC ICU ROOM DAILY

## 2023-11-06 PROCEDURE — 2500000002 HC RX 250 W HCPCS SELF ADMINISTERED DRUGS (ALT 637 FOR MEDICARE OP, ALT 636 FOR OP/ED): Performed by: STUDENT IN AN ORGANIZED HEALTH CARE EDUCATION/TRAINING PROGRAM

## 2023-11-06 PROCEDURE — 3700000002 HC GENERAL ANESTHESIA TIME - EACH INCREMENTAL 1 MINUTE

## 2023-11-06 PROCEDURE — 36415 COLL VENOUS BLD VENIPUNCTURE: CPT

## 2023-11-06 PROCEDURE — 31645 BRNCHSC W/THER ASPIR 1ST: CPT | Performed by: INTERNAL MEDICINE

## 2023-11-06 PROCEDURE — 2500000005 HC RX 250 GENERAL PHARMACY W/O HCPCS

## 2023-11-06 PROCEDURE — 94640 AIRWAY INHALATION TREATMENT: CPT

## 2023-11-06 PROCEDURE — 97530 THERAPEUTIC ACTIVITIES: CPT | Mod: GO

## 2023-11-06 PROCEDURE — 3700000001 HC GENERAL ANESTHESIA TIME - INITIAL BASE CHARGE

## 2023-11-06 PROCEDURE — 2500000004 HC RX 250 GENERAL PHARMACY W/ HCPCS (ALT 636 FOR OP/ED): Performed by: ANESTHESIOLOGY

## 2023-11-06 PROCEDURE — A31645 PR BRONCHOSCOPY,RX ASPIR PULM TREE: Performed by: ANESTHESIOLOGY

## 2023-11-06 PROCEDURE — 99291 CRITICAL CARE FIRST HOUR: CPT

## 2023-11-06 PROCEDURE — 0B9M8ZZ DRAINAGE OF BILATERAL LUNGS, VIA NATURAL OR ARTIFICIAL OPENING ENDOSCOPIC: ICD-10-PCS | Performed by: INTERNAL MEDICINE

## 2023-11-06 PROCEDURE — 87015 SPECIMEN INFECT AGNT CONCNTJ: CPT | Performed by: INTERNAL MEDICINE

## 2023-11-06 PROCEDURE — S4991 NICOTINE PATCH NONLEGEND: HCPCS | Performed by: STUDENT IN AN ORGANIZED HEALTH CARE EDUCATION/TRAINING PROGRAM

## 2023-11-06 PROCEDURE — A31645 PR BRONCHOSCOPY,RX ASPIR PULM TREE: Performed by: ANESTHESIOLOGIST ASSISTANT

## 2023-11-06 RX ORDER — SODIUM CHLORIDE, SODIUM LACTATE, POTASSIUM CHLORIDE, CALCIUM CHLORIDE 600; 310; 30; 20 MG/100ML; MG/100ML; MG/100ML; MG/100ML
100 INJECTION, SOLUTION INTRAVENOUS CONTINUOUS
Status: DISCONTINUED | OUTPATIENT
Start: 2023-11-06 | End: 2023-11-07

## 2023-11-06 RX ORDER — PROPOFOL 10 MG/ML
INJECTION, EMULSION INTRAVENOUS CONTINUOUS PRN
Status: DISCONTINUED | OUTPATIENT
Start: 2023-11-06 | End: 2023-11-06

## 2023-11-06 RX ORDER — PHENYLEPHRINE HCL IN 0.9% NACL 0.4MG/10ML
SYRINGE (ML) INTRAVENOUS AS NEEDED
Status: DISCONTINUED | OUTPATIENT
Start: 2023-11-06 | End: 2023-11-06

## 2023-11-06 RX ORDER — ACETAMINOPHEN 500 MG
5 TABLET ORAL ONCE
Status: DISCONTINUED | OUTPATIENT
Start: 2023-11-06 | End: 2023-11-07

## 2023-11-06 RX ORDER — PROPOFOL 10 MG/ML
30-200 INJECTION, EMULSION INTRAVENOUS CONTINUOUS
Status: ACTIVE | OUTPATIENT
Start: 2023-11-06 | End: 2023-11-06

## 2023-11-06 RX ORDER — TALC
3 POWDER (GRAM) TOPICAL ONCE
Status: CANCELLED | OUTPATIENT
Start: 2023-11-06

## 2023-11-06 RX ORDER — MAGNESIUM SULFATE HEPTAHYDRATE 40 MG/ML
2 INJECTION, SOLUTION INTRAVENOUS ONCE
Status: COMPLETED | OUTPATIENT
Start: 2023-11-06 | End: 2023-11-06

## 2023-11-06 RX ORDER — ONDANSETRON HYDROCHLORIDE 2 MG/ML
4 INJECTION, SOLUTION INTRAVENOUS ONCE AS NEEDED
Status: DISCONTINUED | OUTPATIENT
Start: 2023-11-06 | End: 2023-11-07

## 2023-11-06 RX ORDER — LIDOCAINE HYDROCHLORIDE 10 MG/ML
0.1 INJECTION, SOLUTION EPIDURAL; INFILTRATION; INTRACAUDAL; PERINEURAL ONCE
Status: DISCONTINUED | OUTPATIENT
Start: 2023-11-06 | End: 2023-11-07

## 2023-11-06 RX ORDER — HYDROMORPHONE HYDROCHLORIDE 1 MG/ML
0.2 INJECTION, SOLUTION INTRAMUSCULAR; INTRAVENOUS; SUBCUTANEOUS EVERY 5 MIN PRN
Status: DISCONTINUED | OUTPATIENT
Start: 2023-11-06 | End: 2023-11-07

## 2023-11-06 RX ORDER — ROCURONIUM BROMIDE 10 MG/ML
INJECTION, SOLUTION INTRAVENOUS AS NEEDED
Status: DISCONTINUED | OUTPATIENT
Start: 2023-11-06 | End: 2023-11-06

## 2023-11-06 RX ORDER — LIDOCAINE HYDROCHLORIDE 20 MG/ML
INJECTION, SOLUTION INFILTRATION; PERINEURAL AS NEEDED
Status: DISCONTINUED | OUTPATIENT
Start: 2023-11-06 | End: 2023-11-06

## 2023-11-06 RX ORDER — ACETAMINOPHEN 500 MG
5 TABLET ORAL ONCE
Status: COMPLETED | OUTPATIENT
Start: 2023-11-06 | End: 2023-11-06

## 2023-11-06 RX ORDER — FENTANYL CITRATE 50 UG/ML
INJECTION, SOLUTION INTRAMUSCULAR; INTRAVENOUS AS NEEDED
Status: DISCONTINUED | OUTPATIENT
Start: 2023-11-06 | End: 2023-11-06

## 2023-11-06 RX ADMIN — Medication 3 ML: at 19:59

## 2023-11-06 RX ADMIN — ROCURONIUM BROMIDE 50 MG: 10 INJECTION, SOLUTION INTRAVENOUS at 14:23

## 2023-11-06 RX ADMIN — Medication 1 TABLET: at 09:00

## 2023-11-06 RX ADMIN — FENTANYL CITRATE 25 MCG: 50 INJECTION, SOLUTION INTRAMUSCULAR; INTRAVENOUS at 14:23

## 2023-11-06 RX ADMIN — ALBUTEROL SULFATE 2.5 MG: 2.5 SOLUTION RESPIRATORY (INHALATION) at 19:59

## 2023-11-06 RX ADMIN — THIAMINE HCL TAB 100 MG 100 MG: 100 TAB at 08:29

## 2023-11-06 RX ADMIN — ERYTHROMYCIN 1 CM: 5 OINTMENT OPHTHALMIC at 10:40

## 2023-11-06 RX ADMIN — FOLIC ACID 1 MG: 1 TABLET ORAL at 08:29

## 2023-11-06 RX ADMIN — PROPOFOL 100 MG: 10 INJECTION, EMULSION INTRAVENOUS at 14:23

## 2023-11-06 RX ADMIN — CARBOXYMETHYLCELLULOSE SODIUM 1 DROP: 5 SOLUTION/ DROPS OPHTHALMIC at 19:39

## 2023-11-06 RX ADMIN — LIDOCAINE HYDROCHLORIDE 40 MG: 20 INJECTION, SOLUTION INFILTRATION; PERINEURAL at 14:23

## 2023-11-06 RX ADMIN — HYDROMORPHONE HYDROCHLORIDE 0.2 MG: 1 INJECTION, SOLUTION INTRAMUSCULAR; INTRAVENOUS; SUBCUTANEOUS at 22:35

## 2023-11-06 RX ADMIN — ERYTHROMYCIN 1 CM: 5 OINTMENT OPHTHALMIC at 19:38

## 2023-11-06 RX ADMIN — Medication 3 ML: at 07:55

## 2023-11-06 RX ADMIN — MIDODRINE HYDROCHLORIDE 10 MG: 10 TABLET ORAL at 17:53

## 2023-11-06 RX ADMIN — OXYCODONE HYDROCHLORIDE 5 MG: 5 TABLET ORAL at 08:29

## 2023-11-06 RX ADMIN — ERYTHROMYCIN 1 CM: 5 OINTMENT OPHTHALMIC at 17:53

## 2023-11-06 RX ADMIN — HEPARIN SODIUM 1300 UNITS/HR: 10000 INJECTION, SOLUTION INTRAVENOUS at 17:54

## 2023-11-06 RX ADMIN — CARBOXYMETHYLCELLULOSE SODIUM 1 DROP: 5 SOLUTION/ DROPS OPHTHALMIC at 08:27

## 2023-11-06 RX ADMIN — Medication 80 MCG: at 14:32

## 2023-11-06 RX ADMIN — MAGNESIUM SULFATE HEPTAHYDRATE 2 G: 40 INJECTION, SOLUTION INTRAVENOUS at 08:29

## 2023-11-06 RX ADMIN — Medication 5 MG: at 19:20

## 2023-11-06 RX ADMIN — CARBOXYMETHYLCELLULOSE SODIUM 1 DROP: 5 SOLUTION/ DROPS OPHTHALMIC at 15:55

## 2023-11-06 RX ADMIN — CARBOXYMETHYLCELLULOSE SODIUM 1 DROP: 5 SOLUTION/ DROPS OPHTHALMIC at 17:54

## 2023-11-06 RX ADMIN — ERYTHROMYCIN 1 CM: 5 OINTMENT OPHTHALMIC at 15:55

## 2023-11-06 RX ADMIN — PROPOFOL 200 MCG/KG/MIN: 10 INJECTION, EMULSION INTRAVENOUS at 14:23

## 2023-11-06 RX ADMIN — MAGNESIUM SULFATE HEPTAHYDRATE 2 G: 40 INJECTION, SOLUTION INTRAVENOUS at 02:45

## 2023-11-06 RX ADMIN — SODIUM CHLORIDE, SODIUM LACTATE, POTASSIUM CHLORIDE, AND CALCIUM CHLORIDE: 600; 310; 30; 20 INJECTION, SOLUTION INTRAVENOUS at 14:14

## 2023-11-06 RX ADMIN — Medication 8000 UNITS: at 08:28

## 2023-11-06 RX ADMIN — ALBUTEROL SULFATE 2.5 MG: 2.5 SOLUTION RESPIRATORY (INHALATION) at 07:55

## 2023-11-06 RX ADMIN — Medication 1 PATCH: at 08:28

## 2023-11-06 RX ADMIN — MICAFUNGIN SODIUM 100 MG: 50 INJECTION, POWDER, LYOPHILIZED, FOR SOLUTION INTRAVENOUS at 11:38

## 2023-11-06 RX ADMIN — MIDODRINE HYDROCHLORIDE 10 MG: 10 TABLET ORAL at 08:29

## 2023-11-06 RX ADMIN — SUGAMMADEX 200 MG: 100 INJECTION, SOLUTION INTRAVENOUS at 14:47

## 2023-11-06 RX ADMIN — LIDOCAINE 1 PATCH: 4 PATCH TOPICAL at 08:43

## 2023-11-06 RX ADMIN — MIDODRINE HYDROCHLORIDE 10 MG: 10 TABLET ORAL at 11:38

## 2023-11-06 RX ADMIN — OXYCODONE HYDROCHLORIDE 5 MG: 5 TABLET ORAL at 15:54

## 2023-11-06 RX ADMIN — OXYCODONE HYDROCHLORIDE 5 MG: 5 TABLET ORAL at 22:02

## 2023-11-06 RX ADMIN — LOPERAMIDE HYDROCHLORIDE 2 MG: 2 CAPSULE ORAL at 08:29

## 2023-11-06 ASSESSMENT — COGNITIVE AND FUNCTIONAL STATUS - GENERAL
PERSONAL GROOMING: TOTAL
HELP NEEDED FOR BATHING: TOTAL
EATING MEALS: TOTAL
MOVING FROM LYING ON BACK TO SITTING ON SIDE OF FLAT BED WITH BEDRAILS: TOTAL
TOILETING: TOTAL
MOVING TO AND FROM BED TO CHAIR: TOTAL
TURNING FROM BACK TO SIDE WHILE IN FLAT BAD: TOTAL
STANDING UP FROM CHAIR USING ARMS: TOTAL
DAILY ACTIVITIY SCORE: 6
DRESSING REGULAR UPPER BODY CLOTHING: TOTAL
WALKING IN HOSPITAL ROOM: TOTAL
MOBILITY SCORE: 6
DRESSING REGULAR LOWER BODY CLOTHING: TOTAL
CLIMB 3 TO 5 STEPS WITH RAILING: TOTAL

## 2023-11-06 ASSESSMENT — PAIN SCALES - GENERAL
PAINLEVEL_OUTOF10: 0 - NO PAIN
PAINLEVEL_OUTOF10: 10 - WORST POSSIBLE PAIN
PAINLEVEL_OUTOF10: 10 - WORST POSSIBLE PAIN
PAINLEVEL_OUTOF10: 8
PAINLEVEL_OUTOF10: 8
PAINLEVEL_OUTOF10: 0 - NO PAIN
PAINLEVEL_OUTOF10: 0 - NO PAIN

## 2023-11-06 ASSESSMENT — PAIN - FUNCTIONAL ASSESSMENT
PAIN_FUNCTIONAL_ASSESSMENT: 0-10

## 2023-11-06 NOTE — PROGRESS NOTES
Molina Godinez is a 68 y.o. male on day 25 of admission presenting with Hospital-acquired pneumonia.    Subjective   SUBJECTIVE:  NAEON. Patient seen & examined at bedside this AM, he is Aox3. Was on 10L NC this AM, currently undergoing albuterol and 7% NS treatment. Denies any acute complaints.     Objective   OBJECTIVE:  Last Recorded Vitals  BP 94/52   Pulse 92   Temp 35.7 °C (96.3 °F)   Resp 23   Wt 49.7 kg (109 lb 9.1 oz)   SpO2 91%     I&O 24HR    Intake/Output Summary (Last 24 hours) at 11/6/2023 1745  Last data filed at 11/6/2023 1700  Gross per 24 hour   Intake 2113.24 ml   Output 600 ml   Net 1513.24 ml          Physical Exam  Constitutional:       General: He is not in acute distress.     Appearance: He is cachectic. He is ill-appearing. He is not diaphoretic.      Interventions: Nasal cannula in place.   HENT:      Head: Normocephalic and atraumatic.      Right Ear: External ear normal.      Left Ear: External ear normal.      Nose: Nose normal. No congestion or rhinorrhea.      Mouth/Throat:      Mouth: Mucous membranes are dry.      Pharynx: Oropharynx is clear.   Eyes:      General: No scleral icterus.     Extraocular Movements: Extraocular movements intact.      Conjunctiva/sclera: Conjunctivae normal.   Neck:      Comments: Notable kyphosis of the cervical spine  Cardiovascular:      Rate and Rhythm: Regular rhythm. Tachycardia present.      Pulses: Normal pulses.      Heart sounds: Normal heart sounds. No murmur heard.     No friction rub. No gallop.   Pulmonary:      Breath sounds: Examination of the left-upper field reveals decreased breath sounds. Examination of the left-middle field reveals decreased breath sounds. Examination of the left-lower field reveals decreased breath sounds. Decreased breath sounds present. No wheezing, rhonchi or rales.      Comments: Poor inspiratory effort  Abdominal:      General: Abdomen is flat. Bowel sounds are normal. There is no distension.      Palpations:  Abdomen is soft.      Tenderness: There is no abdominal tenderness.   Musculoskeletal:         General: No swelling or deformity.      Right lower leg: No edema.      Left lower leg: No edema.   Skin:     General: Skin is warm and dry.      Findings: No bruising, lesion or rash.   Neurological:      Mental Status: He is alert and oriented to person, place, and time.      Motor: Weakness (generalized) present.       Relevant Results    Labs:  Results for orders placed or performed during the hospital encounter of 10/11/23 (from the past 24 hour(s))   Renal function panel   Result Value Ref Range    Glucose 91 74 - 99 mg/dL    Sodium 134 (L) 136 - 145 mmol/L    Potassium 4.6 3.5 - 5.3 mmol/L    Chloride 101 98 - 107 mmol/L    Bicarbonate 26 21 - 32 mmol/L    Anion Gap 12 10 - 20 mmol/L    Urea Nitrogen 25 (H) 6 - 23 mg/dL    Creatinine 0.28 (L) 0.50 - 1.30 mg/dL    eGFR >90 >60 mL/min/1.73m*2    Calcium 8.1 (L) 8.6 - 10.6 mg/dL    Phosphorus 4.5 2.5 - 4.9 mg/dL    Albumin 2.4 (L) 3.4 - 5.0 g/dL   Magnesium   Result Value Ref Range    Magnesium 1.81 1.60 - 2.40 mg/dL   CBC and Auto Differential   Result Value Ref Range    WBC 13.5 (H) 4.4 - 11.3 x10*3/uL    nRBC 0.0 0.0 - 0.0 /100 WBCs    RBC 3.89 (L) 4.50 - 5.90 x10*6/uL    Hemoglobin 11.4 (L) 13.5 - 17.5 g/dL    Hematocrit 34.6 (L) 41.0 - 52.0 %    MCV 89 80 - 100 fL    MCH 29.3 26.0 - 34.0 pg    MCHC 32.9 32.0 - 36.0 g/dL    RDW 13.8 11.5 - 14.5 %    Platelets 309 150 - 450 x10*3/uL    Neutrophils % 78.9 40.0 - 80.0 %    Immature Granulocytes %, Automated 2.3 (H) 0.0 - 0.9 %    Lymphocytes % 6.4 13.0 - 44.0 %    Monocytes % 8.5 2.0 - 10.0 %    Eosinophils % 3.2 0.0 - 6.0 %    Basophils % 0.7 0.0 - 2.0 %    Neutrophils Absolute 10.62 (H) 1.20 - 7.70 x10*3/uL    Immature Granulocytes Absolute, Automated 0.31 0.00 - 0.70 x10*3/uL    Lymphocytes Absolute 0.86 (L) 1.20 - 4.80 x10*3/uL    Monocytes Absolute 1.15 (H) 0.10 - 1.00 x10*3/uL    Eosinophils Absolute 0.43 0.00 -  0.70 x10*3/uL    Basophils Absolute 0.10 0.00 - 0.10 x10*3/uL   Heparin Assay, UFH   Result Value Ref Range    Heparin Unfractionated 0.5 See Comment Below for Therapeutic Ranges IU/mL   POCT GLUCOSE   Result Value Ref Range    POCT Glucose 106 (H) 74 - 99 mg/dL     C. Diff negative on 11/3    Imaging:  === 11/3/23 ===    XR CHEST 1 VIEW    - Impression -  Persistent subtotal opacification of the left hemithorax with volume  loss and mediastinal deviation.    MACRO:  None.    Signed by: Tari Lomas 11/3/2023 8:12 AM  Dictation workstation:   [id number]    Limited TTE from 11/2/23  PHYSICIAN INTERPRETATION:  Left Ventricle: The left ventricular systolic function is hyperdynamic, with an estimated ejection fraction of 70-75%. There are no regional wall motion abnormalities. The left ventricular cavity size is decreased. There is left ventricular concentric remodeling. The interventricular septum is flattened in systole, consistent with right ventricular pressure overload. Spectral Doppler shows an impaired relaxation pattern of left ventricular diastolic filling.  Left Atrium: The left atrium is normal in size.  Right Ventricle: The right ventricle is mildly enlarged. There is mildly reduced right ventricular systolic function. Radial RV function appears preserved; longitudinal RV function is decreased.  Right Atrium: The right atrium is moderately dilated.  Aortic Valve: The aortic valve is trileaflet. There is minimal aortic valve cusp calcification. There is no evidence of aortic valve regurgitation.  Mitral Valve: The mitral valve is normal in structure. There is trace mitral valve regurgitation.  Tricuspid Valve: The tricuspid valve is structurally normal. There is moderate tricuspid regurgitation. The Doppler estimated RVSP is mildly elevated at 45.6 mmHg. The PA systolic pressure may be underestimated. The previously noted posterior tricuspid leaflet vegetation is not clearly seen on this study,.  Pulmonic  Valve: The pulmonic valve is structurally normal. There is trace pulmonic valve regurgitation.  Pericardium: There is no pericardial effusion noted.  Aorta: The aortic root is normal.  In comparison to the previous echocardiogram(s): Compared with study from 12/1/2022.     CONCLUSIONS:   1. Left ventricular systolic function is hyperdynamic with a 70-75% estimated ejection fraction.   2. No definite valvular vegetations were visualized.   3. Right ventricular pressure overload.   4. Spectral Doppler shows an impaired relaxation pattern of left ventricular diastolic filling.   5. There is mildly reduced right ventricular systolic function.   6. The right atrium is moderately dilated.   7. Mildly elevated RVSP.   8. Moderate tricuspid regurgitation visualized.   9. The PA systolic pressure may be underestimated. The previously noted posterior tricuspid leaflet vegetation is not clearly seen on this study.  10. Left ventricular cavity size is decreased.  11. The reasons for repeating the echo from 10/31 is not clear.    Medications:    Current Facility-Administered Medications:     acetaminophen (Tylenol) tablet 650 mg, 650 mg, oral, q8h PRN, Ever Paez MD, 650 mg at 10/29/23 2034    albuterol 2.5 mg /3 mL (0.083 %) nebulizer solution 2.5 mg, 2.5 mg, nebulization, q12h, Yancy Freeman MD, 2.5 mg at 11/06/23 0755    calcium gluconate in NS IV 2 g, 2 g, intravenous, Once, Ever Paez MD    dextrose 10 % in water (D10W) infusion, 0.3 g/kg/hr, intravenous, Once PRN, Virgil Olivo MD, Stopped at 10/18/23 0800    dextrose 50 % injection 25 g, 25 g, intravenous, q15 min PRN, Virgil Olivo MD    diclofenac sodium (Voltaren) 1 % gel 1 Application, 4 g, Topical, 4x daily PRN, Leslie Allen MD, 1 Application at 11/05/23 0850    ergocalciferol (Vitamin D-2) drops 8,000 Units, 8,000 Units, oral, Daily, Ever Paez MD, 8,000 Units at 11/06/23 0828    erythromycin (Romycin) 5 mg/gram (0.5 %) ophthalmic  ointment 1 cm, 1 cm, Both Eyes, 4x daily, Mazin Alberto MD, 1 cm at 11/06/23 1555    folic acid (Folvite) tablet 1 mg, 1 mg, oral, Daily, Ever Paez MD, 1 mg at 11/06/23 0829    glucagon (Glucagen) injection 1 mg, 1 mg, intramuscular, q15 min PRN, Virgil Olivo MD    heparin (porcine) injection 2,000-4,000 Units, 2,000-4,000 Units, intravenous, q4h PRN, Virgil Olivo MD, 2,000 Units at 10/25/23 0857    heparin 25,000 Units in dextrose 5% 250 mL (100 Units/mL) infusion (premix), 0-4,500 Units/hr, intravenous, Continuous, Rosa Gary MD, Last Rate: 13 mL/hr at 11/06/23 1700, 1,300 Units/hr at 11/06/23 1700    HYDROmorphone (Dilaudid) injection 0.2 mg, 0.2 mg, intravenous, q5 min PRN, Ronna Carvajal MD    lactated Ringer's infusion, 100 mL/hr, intravenous, Continuous, Ronna Carvajal MD    lidocaine 4 % patch 1 patch, 1 patch, transdermal, Daily, Ever Paez MD, 1 patch at 11/06/23 0843    lidocaine 4 % patch 2 patch, 2 patch, transdermal, Daily, Leslie Allen MD, 2 patch at 11/05/23 0847    lidocaine PF (Xylocaine) 10 mg/mL (1 %) injection 1 mg, 0.1 mL, subcutaneous, Once, Ronna Carvajal MD    loperamide (Imodium) capsule 2 mg, 2 mg, oral, 4x daily PRN, Leslie Allen MD, 2 mg at 11/06/23 0829    lubricating eye drops ophthalmic solution 1 drop, 1 drop, Both Eyes, 4x daily, Mazin Alberto MD, 1 drop at 11/06/23 1555    magnesium sulfate IV 2 g, 2 g, intravenous, Once, Yancy Freeman MD    melatonin tablet 10 mg, 10 mg, oral, Nightly, Tia Manuel MD, 10 mg at 11/05/23 2053    micafungin (Mycamine) 100 mg in dextrose 5 % in water (D5W) 100 mL IV, 100 mg, intravenous, q24h, Arianna Bills DO, Stopped at 11/06/23 1238    midazolam (Versed) injection 6 mg, 6 mg, intravenous, PRN, Mazin Alberto MD, 4 mg at 11/05/23 1515    midodrine (Proamatine) tablet 10 mg, 10 mg, oral, TID with meals, Mazin Alberto MD, 10 mg at 11/06/23 1138    moisturizing mouth (Biotene Dry Mouth) solution 15 mL,  15 mL, Swish & Spit, TID PRN, Yancy Freeman MD, 15 mL at 11/05/23 0338    multivitamin with minerals 1 tablet, 1 tablet, oral, Daily, Virgil Olivo MD, 1 tablet at 11/06/23 0900    nicotine (Nicoderm CQ) 21 mg/24 hr patch 1 patch, 1 patch, transdermal, Daily, Virgil Olivo MD, 1 patch at 11/06/23 0828    ondansetron (Zofran) injection 4 mg, 4 mg, intravenous, Once PRN, Ronna Carvajal MD    oxyCODONE (Roxicodone) immediate release tablet 5 mg, 5 mg, oral, q6h PRN, Jah Ospina MD, 5 mg at 11/06/23 1554    oxygen (O2) therapy, , inhalation, Continuous PRN - O2/gases, Nash Delgadillo MD, 4 L/min at 11/06/23 0900    propofol (Diprivan) injection 25 mg, 25 mg, intravenous, PRN, Mazin Alberto MD, 100 mg at 11/06/23 1423    sennosides (Senokot) tablet 8.6 mg, 1 tablet, oral, Nightly PRN, Virgil Olivo MD    sodium chloride 7 % nebulizer solution 3 mL, 3 mL, nebulization, q12h, Yancy Freeman MD, 3 mL at 11/06/23 0755    thiamine (Vitamin B-1) tablet 100 mg, 100 mg, oral, Daily, Ever Paez MD, 100 mg at 11/06/23 0829    white petrolatum (Aquaphor) 41 % ointment 1 Application, 1 Application, Topical, q1h PRN, Ever Paez MD    Assessment/Plan   ASSESSMENT & PLAN:  Molina Godinez is a 68 y.o. male with PMHx of COPD, HTN, AUD and opioid use disorder, HCV (treated), chronic PE who presented originally on 10/9 with inability to walk. Briefly in MICU for c/f alcohol withdrawal and high CIWA scores, transferred to floor, eventually readmitted to MICU 10/16 for increased O2 requirements, put on Airvo and intubated 10/20. Bronch done with resp cx growing stenotrophomonas for which he completed a course of Bactrim. Pt was extubated on 10/27. Also with SIADH (now resolved, unclear etiology, potentially just his severe pulmonary disease); nephrology consulted and was initially treated with fluid restriction, urea packets and Lasix, but now off all of these for significant increase BUN to 180s.  CVVH started 10/31-11/3 for uremia and fluid removal. Now with worsening complete left lung opacification likely 2/2 mucus plugging. Bcx 10/31 growing Candida albicans, sensitivities pending. On micafungin.      Active Problems:  Candida fungemia   Hypotension, unknown etiology  Tachycardia with intermittent SVT (resolving)   Left lung complete opacification likely 2/2 mucus plugging   KRYSTEN likely prerenal (resolving)     Updates 11/6:  Bedside bronch 11/5: no active mucus plug, mucus secretions seen and evacuated  CXR showing persistent left lung opacification  Plan for bronchoscopy in bronch suite this PM  Holding TF and heparin gtt in anticipation, will restart post-procedure  Clinically dry on exam with positive orthostats - 500 ml LR bolus given today     NEURO/PSYCH  #AMS likely 2/2 uremic encephalopathy vs chronic hypoxia - IMPROVING  - Previously intubated/sedated, extubated on 10/27.   - CT head without pathology from 10/11  - Initially had visual and tactile hallucinations, have since resolved. Suspect metabolic etiology of fluctuations in mental status, i/s/o acutely increased oxygen requirement.  - Mentation improved on CVVH with concurrent decrease in serum BUN     #Bilateral LE weakness  -Likely chronic, had weakness during 8/2023 admission  -Patient able to move bilateral lower extremities  -Repeat MRI brain/total spine on 10/20 unremarkable for acute process  -Neurology consulted. Suspicion that weakness is likely a peripheral (LMN + muscle) inflammatory or degenerative disorder considering distribution and chronology.      - recommend EMG/NGS after patient is downgraded      - low suspicion for GBS  -May follow up with neurology outpatient  -ID consulted regarding weakness and positive Syphilis Ab. Pt was treated back in 1998 but rec MRI spine given concern of abscess, MRI negative for acute process     CV  #c/f TV endocarditis - resolved  - TTE 10/17/23: LVEF 75%, LV concentric modeling, mildly  increased LV septal wall thickness, atrial septal aneurysm, mildly elevated RSVP 39  - TTE 10/31/23: same as above with the following exceptions: 1.7 x 0.8 cm mobile echodensity of posterior tricuspid leaflet, mod-severe TR, RSVP increased to 57  - TTE 11/2/23: No definite valvular vegetations were visualized  - vanc/ari discontinued     #Hypotension, asymptomatic   - Etiology includes infection vs volume depletion    - 10/31 MAP to 50s   - off norepi 11/4 AM  - c/w midodrine 10 mg TID     #HTN- resolved  - home amlodipine 5mg, atenolol 50mg  - Initially started on Cardene drip, transitioned to Labetalol prior to resolution  - home atenolol restarted on 10/28, switched to metop 12.5 BID on 10/29 I/s/o renal function - held while on norepi gtt     #Tachycardia - improving   #Asymptomatic SVT to max HR 170s  - Sinus rhythm, HR chronically in 120-130s -> past few days HR in 80-90s  - Etiology includes pain, infection, hypovolemia, uremia  - Poor correlation with fentanyl pushes, less suspicious for pain etiology  - previously on metoprolol 12.5mg BID, dc'd as patient now on norepi gtt - can consider restarting metoprolol prn for tachycardia  - decreased albuterol from q4h to q12h to decrease beta agonist activity on 11/2     #CAD  - home atorvastatin 40mg held in the setting of elevated transaminases      #Chest pain- resolved  - New onset chest pain 10/28, had hyperkalemia on RFP  - RFP rechecked, K wnl  - Trop 14  - ECG without acute changes, stable from prior  - Given sublingual nitroglycerin, but had resolution of pain prior to administration.  - Some concern of uremic pericarditis but unlikely. No pericardial rub appreciated on exam.  - continue to monitor     PULM  #CAP- resolved  -Urine strep positive, unsure if active or chronic infection  -MRSA negative, Urine legionella negative  -CTX 5 day course completed 10/17     #L lung consolidation, now complete opacification  #Steno HAP  #Increased O2 requirements -  improving  - Initially treated for CAP given positive urine strep with CTX 10/13-10/17, one day of Zosyn10/17-10/17  - Leukocytosis stable ~24  - C/f HAP vs incomplete resolution of previous PNA  -S/p Bronch 10/20 with purulent fluid with bronchial washings sent for cx  -Bronchial washings with stenotrophomonas susceptible to Bactrim  -Bactrim 10/22-10/28  -10/31 respiratory sputum cx with salivary contamination   -CXR 10/31 showing complete opacification of left lung worse from prior, likely 2/2 mucus plugging   -Low concern for pneumonia as patient is afebrile, does have leukocytosis but this has been chronic during this hospitalization. Negative lactate. Also tachy and hypotensive however HR has been chronically elevated.   -CXR 11/1 showing slight improvement in JOSE ANTONIO aeration  -CXR 11/2 showing worsening JOSE ANTONIO opacification. CXR 11/3-11/6 unchanged  -bedside bronch 11/5: no obvious mucus plug, evacuated mucus secretions -> repeat CXR unchanged  -On 10L NC this AM -> weaned to 4L NC by PM  -plan for bronch in bronch suite today  -c/w BPH as below and obtain daily AM CXRs     #Severe COPD  #Mucus plugging  - 8/2023 FEV1 42%  - CT C/A/P with RLL mucus plug  - CXR 10/28 with L sided pleural effusion vs mucus plugging  - CXR 10/31 with complete opacification of left lung  - CXR 11/1 showing slight improvement in JOSE ANTONIO aeration  - Continue aggressive RT: Albuterol 2.5 mg q12h, chest PT, respiratory hygiene  - Ezpap, incentive spirometer, cough assist  - 7% NS neb q12h  - Encourage sitting, mobility as able. PT following     #Suspected PAH  - 10/17/23 Echo with atrial septal aneurysm, positive bubble study  - Will need outpatient workup, previously some suspicion for PAH but will need further evaluation      #Subsegmental PE  -Requires anticoagulation for 3 months (per vasc med 8/2023), on home apixaban 5mg BID  PLAN:  -Heparin gtt      GI  #HepC  -treated, 8/2023 HCV RNA undetected     #Nutrition  - Dobhoff in place  10/17  - Nutrition following, appreciate recs  - TF with TwoCal HN at goal rate of 45ml/hr  - 100mg Thiamine, 1mg Folic acid, 8000u Vit D2     #diarrhea - resolved  - 4 loose BM overnight  - C diff negative  - vasovagal reaction during bowel movements  - last polyethylene glycol given 11/1; order d/c'd today  - consider changing TF formula     #Elevated transaminases, hx of treated HCV- increasing, semi stable  - LFTs rising over past few days, predominantly ALP/AST/ALT with normal bili  - RUQ US with steatosis, sludge in GB  - Sulfa drug induced vs intrinsic process, though Bactrim typically takes 2 weeks for hepatotoxicity  -Trend LFTs     /RENAL  #Hyponatremia - Likely SIADH 2/2 COPD exacerbation but unclear if additional etiologies- resolved  - Na as low as 120, slowly improving, 139 on 11/1/23  - check Na and urea BID  - D/c urea packets in setting of BUN of 180s and AMS  - place marie for accurate I&Os in setting of strict fluid monitoring  - Free water flushes 100ml BID for further fluid restriction given hyponatremia thought to be 2/2 SIADH  - 10/31 renal US: negative for obstruction, echogenicities   - CVVH 10/31 - 11/3  - trialysis line removed 11/5  - Nephro following, appreciate recs     #Uremia with c/f uremic encephalopathy - improving   #KRYSTEN likely prerenal   - BUN elevating over past few days up to 180s, likely 2/2 urea packets, KRYSTEN, TF  - Fluid restricted, FeNa 0.4% indicating prerenal KRYSTEN  - Monitor for uremic symptoms: loss of appetite, hypoactive changes in mental status, nausea, pericarditis  - CVVH 10/31 - 11/3  - trialysis line removed 11/5  - continue to monitor daily RFPs     #Hyperkalemia - resolved  - K 7.9, rechecked to 4.7  - 4.4 on 10/29 -> uptrending to 5.4 -> 5.9 10/30 -> s/p 15g lokelma  - K this AM 4.2    #Hypokalemia -resolved  - K this AM 4.2  - s/p 40 mEq Kcl  - continue daily RFP for to trend K levels     #C/f refeeding syndrome- resolved  - Hypokalemia 2.7, Phos 1.9, Ca  5.2  - Electrolytes repleted, will monitor     ENDOCRINE  #Secondary HyperPTH  -8/28 .7  -25-hydroxy vit D 8, 1,25 Vit D 44.4  PLAN:  Vit D2 8000u liquid daily     ID  #Candida fungemia  #Leukocytosis I/s/o recently treated strep pneumo CAP, Steno BAL, and Candida fungemia  #TV endocarditis - RESOLVED (please refer to cardiology section above)  - Bronchial washings with stenotrophomonas susceptible to Bactrim   - Completed CTX 10/13-10/17  - Zosyn 10/20-10/24, Bactrim 10/22-10/28, Levaquin 10/23-10/25, vanc 11/1-11/2, ari 11/1-11/2  - Bcx 10/31/23: 1/4 Candida albicans, pending sensitivities  - Bcx 11/3/23: NGTD  - CRP 23.6 (11/1) -> 21.73 (11/2) -> 18.8 (11/3) -> 15 (11/4)  - WBC 27 (11/1) -> 24 (11/2) -> 20.2 (11/3) -> 19.1 (11/5) -> 13.5 (11/6)   - c/w micafungin 100 mg daily (11/3-*)  - pending sensitivities will switch from dieter to fluconazole  - ID reengaged, appreciate recs  - ophtho consulted: no evidence of candida endophthalmitis on exam (will need OP f/u for cataracts)       HEME/ONC  #Leukocytosis, intermittently ranging 18-24 - IMPROVING  -Likely 2/2 to uremia vs TV endocarditis   - WBC 27 (11/1) -> 24 (11/2)  - > 20. 2 (11/3) -> 22 (11/4) -> 19.1 (11/5) -> 13.5 (11/6)  -Monitor WBC, vitals     MSK/RHEUM   #Chronic LE weakness  -Likely chronic from disuse and alcohol, unlikely GBS since no paralysis  -possible EMG once downgraded, neuro concern for peripheral (LMN + muscle) inflammatory or degenerative disorder considering distribution and chronology     Vent/Oxy: NC at 10L  Pressors/Drips: none  Abx: micafungin 100 mg daily  F: Restricted  E: PRN  N: TF via Dobhoff at 45 ml/hr  A: PIV R arm x 3, Dobhoff, external urinary catheter  DVT ppx: Heparin gtt  GI ppx: Pantoprazole 40mg daily     Patient was seen and discussed with Dr. Frazier who agrees with the plan as outlined above.    Yancy Freeman MD  Internal Medicine, PGY-1

## 2023-11-06 NOTE — ANESTHESIA PREPROCEDURE EVALUATION
Patient: Molina Godinez    Procedure Information       Date/Time: 11/06/23 1200    Scheduled providers: Meliton Cuenca MD; Kenyetta Abdi MD; Oleg Blum RN    Procedure: BRONCHOSCOPY    Location: [facility]        ALLERGIES:  Allergies   Allergen Reactions    Aspirin Unknown     States he gets excited    Nsaids (Non-Steroidal Anti-Inflammatory Drug) Unknown     Elevates BP        MEDICAL HISTORY:  No past medical history on file.     Relevant Problems   Cardiovascular   (+) Chronic pulmonary embolism (CMS/HCC)   (+) Essential hypertension, benign   (+) HLD (hyperlipidemia)   (+) Pulmonary hypertension (CMS/HCC)      /Renal   (+) Hepatitis C   (+) Hepatomegaly   (+) Liver fibrosis      Pulmonary   (+) COPD (chronic obstructive pulmonary disease) (CMS/HCC)   (+) Chronic pulmonary embolism (CMS/HCC)   (+) Hospital-acquired pneumonia   (+) Pulmonary hypertension (CMS/HCC)      GI/Hepatic   (+) Hepatitis C      Musculoskeletal   (+) DDD (degenerative disc disease), lumbosacral   (+) Osteoarthritis of left hip   (+) Spondylosis of lumbar region without myelopathy or radiculopathy      Infectious Disease   (+) Hepatitis C   (+) Hospital-acquired pneumonia      Pulmonary and Pneumonias   (+) Hypoxemia      Cardiac and Vasculature   (+) Abnormal echocardiogram      Mental Health   (+) Alcoholism (CMS/HCC)   (+) Opioid dependence on agonist therapy (CMS/HCC)       SURGICAL HISTORY:  No past surgical history on file.     VITALS:      11/6/2023    12:25 PM 11/6/2023     9:00 AM 11/6/2023     8:00 AM   Vitals   Systolic  109 106   Diastolic  64 61   Heart Rate  87 93   Temp 36.2 °C (97.2 °F)  35.4 °C (95.7 °F)   Resp  20 24       LABS:   BMP Extended  Results from last 7 days   Lab Units 11/05/23  2150 11/05/23  0102 11/04/23  0032   SODIUM mmol/L 134* 137 137   POTASSIUM mmol/L 4.6 4.4 4.7   CHLORIDE mmol/L 101 99 101   CO2 mmol/L 26 28 26   BUN mg/dL 25* 36* 32*   CREATININE mg/dL 0.28* 0.42* 0.42*    EGFR mL/min/1.73m*2 >90 >90 >90   GLUCOSE mg/dL 91 108* 91   CALCIUM mg/dL 8.1* 8.5* 8.7   PHOSPHORUS mg/dL 4.5 4.9 4.1    , CBC  Lab Results   Component Value Date    WBC 13.5 (H) 11/06/2023    HGB 11.4 (L) 11/06/2023    HCT 34.6 (L) 11/06/2023    MCV 89 11/06/2023     11/06/2023   , Coags   Lab Results   Component Value Date/Time    PROTIME 12.4 11/01/2023 0332    INR 1.1 11/01/2023 0332    APTT 66 (H) 11/01/2023 0332        IMAGES:  EKG          Encounter Date: 10/11/23   ECG 12 Lead   Result Value    Ventricular Rate 141    Atrial Rate 141    SC Interval 130    QRS Duration 86    QT Interval 276    QTC Calculation(Bazett) 422    P Axis 88    R Axis -46    T Axis 96    QRS Count 23    Q Onset 213    P Onset 148    P Offset 188    T Offset 351    QTC Fredericia 367    Narrative    Sinus tachycardia  Left atrial enlargement  Left axis deviation  Left ventricular hypertrophy  Nonspecific ST and T wave abnormality  Abnormal ECG  Confirmed by Terrell Silva ([id number]) on 11/2/2023 2:23:34 PM      , ECHO         Transthoracic Echo (TTE) Limited With Doppler And Color 11/02/2023    [facility], [address] [place] [address]  Tel [phone] and Fax [phone]    TRANSTHORACIC ECHOCARDIOGRAM REPORT      Patient Name:      PIA Franco Physician:   [id number] Delvin Lisa MD  Study Date:        11/2/2023           Ordering Provider:   [id number] JOSE FRIEDMAN  MRN/PID:           [id number]            Fellow:  Accession#:        [id number]        Nurse:  Date of Birth/Age: 1955 / 68      Sonographer:         Leo Foreman RDCS  years  Gender:            M                   Additional Staff:  Height:            170.18 cm           Admit Date:          10/11/2023  Weight:            49.44 kg            Admission Status:    Inpatient - Routine  BSA:               1.56 m2             Encounter#:          [id number]  [address]  Blood Pressure: 85  /56 mmHg    Study Type:    TRANSTHORACIC ECHO (TTE) LIMITED  Diagnosis/ICD: Endocarditis, valve unspecified-I38  Indication:    R/O Endocarditis  CPT Code:      Echo Limited-05105; Color Doppler-40459; Doppler Limited-63756    Patient History:  Pertinent History: HTN and Hyperlipidemia. COPD, PHTN, opiod dependence, chronic  pulmonary embolisms.    Study Detail: The following Echo studies were performed: 2D, Doppler and color  flow.      PHYSICIAN INTERPRETATION:  Left Ventricle: The left ventricular systolic function is hyperdynamic, with an estimated ejection fraction of 70-75%. There are no regional wall motion abnormalities. The left ventricular cavity size is decreased. There is left ventricular concentric remodeling. The interventricular septum is flattened in systole, consistent with right ventricular pressure overload. Spectral Doppler shows an impaired relaxation pattern of left ventricular diastolic filling.  Left Atrium: The left atrium is normal in size.  Right Ventricle: The right ventricle is mildly enlarged. There is mildly reduced right ventricular systolic function. Radial RV function appears preserved; longitudinal RV function is decreased.  Right Atrium: The right atrium is moderately dilated.  Aortic Valve: The aortic valve is trileaflet. There is minimal aortic valve cusp calcification. There is no evidence of aortic valve regurgitation.  Mitral Valve: The mitral valve is normal in structure. There is trace mitral valve regurgitation.  Tricuspid Valve: The tricuspid valve is structurally normal. There is moderate tricuspid regurgitation. The Doppler estimated RVSP is mildly elevated at 45.6 mmHg. The PA systolic pressure may be underestimated. The previously noted posterior tricuspid leaflet vegetation is not clearly seen on this study,.  Pulmonic Valve: The pulmonic valve is structurally normal. There is trace pulmonic valve regurgitation.  Pericardium: There is no pericardial effusion  noted.  Aorta: The aortic root is normal.  In comparison to the previous echocardiogram(s): Compared with study from 12/1/2023,.      CONCLUSIONS:  1. Left ventricular systolic function is hyperdynamic with a 70-75% estimated ejection fraction.  2. No definite valvular vegetations were visualized.  3. Right ventricular pressure overload.  4. Spectral Doppler shows an impaired relaxation pattern of left ventricular diastolic filling.  5. There is mildly reduced right ventricular systolic function.  6. The right atrium is moderately dilated.  7. Mildly elevated RVSP.  8. Moderate tricuspid regurgitation visualized.  9. The PA systolic pressure may be underestimated. The previously noted posterior tricuspid leaflet vegetation is not clearly seen on this study,.  10. Left ventricular cavity size is decreased.  11. The reasons for repeating the echo from 10/31 is not clear.    QUANTITATIVE DATA SUMMARY:  TRICUSPID VALVE/RVSP:  Normal Ranges:  Peak TR Velocity: 2.98 m/s  RV Syst Pressure: 45.6 mmHg (< 30mmHg)       , CXR       XR chest 1 view 11/05/2023    Narrative  Interpreted By:  Beth Guy and Stephens Katherine  STUDY:  XR CHEST 1 VIEW;  11/5/2023 4:52 pm    INDICATION:  Signs/Symptoms:s/p bronch, known left lung opacification.    COMPARISON:  Same day chest radiograph from 07:33 a.m.    ACCESSION NUMBER(S):  [id number]    ORDERING CLINICIAN:  DALE SMITH    FINDINGS:  AP radiograph of the chest was provided.    Enteric tube seen coursing below the level diaphragm with tip out of  the field of view. Interval removal of right central line.    CARDIOMEDIASTINAL SILHOUETTE:  The cardiomediastinal silhouette is obscured with persistent  right-to-left mediastinal shift.    LUNGS:  Persistent complete opacification of the left hemithorax. The right  lung is essentially clear.    ABDOMEN:  No remarkable upper abdominal findings.    BONES:  No acute osseous changes.    Impression  1.  Persistent complete  opacification of the left hemithorax with  right-to-left mediastinal shift likely secondary to volume loss.  2. Medical devices as described above.    I personally reviewed the images/study and I agree with the findings  as stated. This study was interpreted at [facility], [place], [place].    MACRO:  None    Signed by: Beth Guy 11/5/2023 5:26 PM  Dictation workstation:   [id number]    , CT Head/Neck       CT head wo IV contrast 10/11/2023    Narrative  Interpreted By:  Tyron Dixon,  Gillian Ramos  STUDY:  CT HEAD WO IV CONTRAST;  10/11/2023 9:42 pm    INDICATION:  bilateral LE weakness, headache.    COMPARISON:  CT brain: 08/26/2023.    ACCESSION NUMBER(S):  [id number]    ORDERING CLINICIAN:  TEE BLAND    TECHNIQUE:  Noncontrast axial CT scan of head was performed. Angled reformats in  brain and bone windows were generated. The images were reviewed in  bone, brain, blood and soft tissue windows.    FINDINGS:  CSF Spaces: The ventricles, sulci and basal cisterns are concordant  with patient's age and degree of global parenchymal atrophy.. There  is no extraaxial fluid collection.    Parenchyma: There are hypodense focal areas in the subcortical and  periventricular white matter regions consistent with the sequela of  chronic microvascular ischemic changes. Punctate remote left thalamic  and anterior limb internal capsule lacunar infarcts. Otherwise, the  grey-white differentiation is overall preserved. There is no mass  effect or midline shift.  There is no intracranial hemorrhage.    Calvarium: The calvarium is intact.    Paranasal sinuses and mastoids: Visualized paranasal sinuses and  mastoids are clear.    Impression  1. No evidence of acute cortical infarct or intracranial hemorrhage.  2. Subcortical and periventricular white matter hypodensities  consistent with the sequela of chronic microvascular ischemic changes.  3. Punctate remote left thalamic  and anterior basal ganglia lacunar  infarcts.      I personally reviewed the images/study and I agree with the findings  as stated. This study was interpreted at [facility], [place], [place].    MACRO:  None    Signed by: Tyron Dixon 10/11/2023 9:52 PM  Dictation workstation:   [id number]    , CT Chest        CT chest abdomen pelvis w IV contrast 10/16/2023    Narrative  Interpreted By:  Jeet Wang and Kamau Nyokabi  STUDY:  CT CHEST ABDOMEN PELVIS W IV CONTRAST;  10/16/2023 12:30 pm    INDICATION:  Signs/Symptoms:polycythemia looking for malignancy.    COMPARISON:  None.    ACCESSION NUMBER(S):  [id number]    ORDERING CLINICIAN:  CALEB NGUYEN    TECHNIQUE:  CT of the chest, abdomen, and pelvis was performed.  Contiguous axial  images were obtained at 3 mm slice thickness through the chest,  abdomen and pelvis. Coronal and sagittal reconstructions at 3 mm  slice thickness were performed.  75 ml of contrast Omnipaque 350 were administered intravenously  without immediate complication.    FINDINGS:  CHEST:    LUNG/PLEURA/LARGE AIRWAYS:  The trachea and central airways are patent. There is soft tissue  density in the right lower lobe bronchi. There is  atelectasis of the  right posterior lobe. Chronic emphysematous change in bilateral lung  apices. There are no pulmonary nodule or masses. There is no pleural  effusion or pneumothorax.    VESSELS:  Aorta and pulmonary arteries are normal caliber.  Moderate  atherosclerotic changes are noted of the aorta and branching vessels.  Moderate coronary artery calcifications are present.    HEART:  The heart is normal in size.  There is no pericardial effusion.    MEDIASTINUM AND KRISTOPHER:  Unchanged appearance of nonenlarged paratracheal lymph nodes when  compared to prior CT 08/15/2022. No hilar or axillary lymphadenopathy  is present.  The esophagus is normal.    CHEST WALL AND LOWER NECK:  The soft tissues of the  chest wall demonstrate no gross abnormality.  The visualized thyroid gland appears within normal limits.    ABDOMEN:    LIVER:  The liver is normal in size and demonstrates diffusely decreased  attenuation suggesting steatosis. The liver is similar in appearance  when compared to prior CT 08/22/2022.    BILE DUCTS:  Intrahepatic and extrahepatic bile ducts are prominent which is  within normal limits given the status post cholecystectomy.    GALLBLADDER:  The gallbladder is nondistended without radiopaque stones.    PANCREAS:  The pancreas appears unremarkable.    SPLEEN:  The spleen is normal in size without focal lesions.    ADRENAL GLANDS:  Bilateral adrenal glands appear normal.    KIDNEYS AND URETERS:  The kidneys are normal in size and enhance symmetrically.  No  hydroureteronephrosis or nephroureterolithiasis is identified.  Unchanged appearance of well-circumscribed hypodensities in bilateral  kidneys that are subcentimeter in size and likely represent simple  renal cysts.    PELVIS:    BLADDER:  The urinary bladder appears normal without abnormal wall thickening.    REPRODUCTIVE ORGANS:  The prostate is not enlarged.    BOWEL:  The stomach is unremarkable.  The small and large bowel are normal in  caliber and demonstrate no wall thickening.  The appendix appears  normal.      VESSELS:  There is no aneurysmal dilatation of the abdominal aorta. The IVC  appears normal. Moderate calcifications of the abdominal aorta its  branches.    PERITONEUM/RETROPERITONEUM/LYMPH NODES:  There is no free or loculated fluid collection, no free  intraperitoneal air. The retroperitoneum appears normal.  No  abdominopelvic lymphadenopathy is present. Unchanged appearance of  nonenlarged bilateral external iliac lymph nodes when compared to  prior CT 08/15/2022.    BONE AND SOFT TISSUE:  Status post left total  shoulder arthroplasty. No suspicious osseous  lesions are identified. Degenerative discogenic disease is noted in  the  lower thoracic and lumbar spine.  The abdominal wall soft tissues  appear normal.    Impression  1. No evidence of primary neoplasm or metastatic disease.  2. Right lower lobe mucous plugging which may reflect aspiration.    I personally reviewed the images/study and I agree with the findings  as stated. This study was interpreted at [facility], [place], [place].    SOCIAL:  Social History     Tobacco Use   Smoking Status Every Day    Packs/day: .5    Types: Cigarettes   Smokeless Tobacco Not on file      Social History     Substance and Sexual Activity   Alcohol Use Yes    Comment: 1.5 pints Ju daily      Social History     Substance and Sexual Activity   Drug Use Not on file        NPO STATUS:  No data recorded    Clinical Areas Reviewed:                 Physical Exam    Airway  Mallampati: unable to assess  TM distance: <3 FB     Cardiovascular - normal exam     Dental    Pulmonary - normal exam     Abdominal - normal exam             Anesthesia Plan    ASA 3     general     intravenous induction   Postoperative administration of opioids is intended.  Anesthetic plan and risks discussed with patient.  Use of blood products discussed with patient who.    Plan discussed with CAA and attending.

## 2023-11-06 NOTE — PROGRESS NOTES
Bronchoscopy Procedure Note    Procedure:  Bronchoscopy, Therapeutic    Pre-Operative Diagnosis:    Post-Operative Diagnosis: Same    Indication:    Anesthesia: Moderate Sedation    Procedure Details: Patient was consented for the procedure with all risk and benefit of the procedure explained in detail.  Patient was given the opportunity to ask questions and all concerns were answered.  The bronchocope was inserted into the main airway via the oropharynx. An anatomical survey was done of the main airways and the subsegmental bronchus.  The findings are reported above.  A bronchialalveolar lavage was performed using aliquots of normal saline instilled into the airways then aspirated back.    Estimated Blood Loss:  Minimal           Specimens:  Sent bronchial washing left upper lobe                 Complications:  None; patient tolerated the procedure well.           Disposition: ICU - extubated and stable.      Patient tolerated the procedure well.    Dee Friend MD   Pulmonary and Critical Care   Fellow

## 2023-11-06 NOTE — PRE-SEDATION DOCUMENTATION
Patient: Molina Godinez  MRN: [id number]    Pre-sedation Evaluation:  Sedation necessary for: Other: Bronchoscopy  Requesting service: MICU    History of Present Illness: Pt with persistent left lung atelectasis - see MICU notes for full details.  There has been not change in  History / exam over past 24 hours.    History reviewed. No pertinent past medical history.    Principle problems:  Patient Active Problem List    Diagnosis Date Noted    Uremia 10/31/2023    Hospital-acquired pneumonia 10/12/2023    Abnormal echocardiogram 10/02/2023    Abnormal CT of the chest 10/02/2023    Alcoholism (CMS/HCC) 09/26/2023    Chronic pulmonary embolism (CMS/HCC) 09/26/2023    COPD (chronic obstructive pulmonary disease) (CMS/HCC) 09/26/2023    Hepatitis C 09/26/2023    Hepatomegaly 09/26/2023    HLD (hyperlipidemia) 09/26/2023    Hypoxemia 09/26/2023    Oxygen dependent 09/26/2023    Pulmonary hypertension (CMS/HCC) 09/26/2023    Severe malnutrition (CMS/HCC) 09/26/2023    Liver fibrosis 12/22/2022    Prediabetes 12/15/2022    Opioid dependence on agonist therapy (CMS/HCC) 05/18/2020    Hepatitis C virus infection cured after antiviral drug therapy 03/24/2020    S/P reverse total shoulder arthroplasty, left 03/12/2019    Spondylosis of lumbar region without myelopathy or radiculopathy 11/06/2018    Osteoarthritis of left hip 11/06/2018    DDD (degenerative disc disease), lumbosacral 06/12/2018    Lumbar facet arthropathy 06/12/2018    Sacroiliac joint dysfunction of right side 06/12/2018    Essential hypertension, benign 10/17/2012    Other psoriasis 10/17/2012    Tobacco use disorder 10/17/2012     Allergies:  Allergies   Allergen Reactions    Aspirin Unknown     States he gets excited    Nsaids (Non-Steroidal Anti-Inflammatory Drug) Unknown     Elevates BP     PTA/Current Medications:  Medications Prior to Admission   Medication Sig Dispense Refill Last Dose    acetaminophen (Tylenol) 325 mg tablet TAKE 2 TABLETS BY MOUTH EVERY  6 HOURS AS NEEDED FOR PAIN. (Patient not taking: Reported on 10/12/2023) 120 tablet 0 Unknown    amLODIPine (Norvasc) 5 mg tablet TAKE 1 TABLET BY MOUTH ONCE DAILY 90 tablet 0 10/11/2023    atenolol (Tenormin) 50 mg tablet TAKE 1 TABLET BY MOUTH ONCE DAILY 90 tablet 1 10/11/2023    atorvastatin (Lipitor) 40 mg tablet Take 1 tablet (40 mg) by mouth once daily.   Past Week    folic acid (Folvite) 1 mg tablet TAKE 1 TABLET BY MOUTH ONCE DAILY (Patient not taking: Reported on 10/2/2023) 100 tablet 0 Past Week    ipratropium-albuteroL (Combivent Respimat)  mcg/actuation inhaler INHALE 1 PUFF EVERY 6 HOURS AS NEEDED FOR SHORTNESS OF BREATH 4 g 2     naloxone (Narcan) 4 mg/0.1 mL nasal spray Use 1 Spray in one nostril (alternate sides) as needed for Drug Overdose (and call 911). every 2-3 min, until assistance arrives.       nicotine (Nicoderm CQ) 21 mg/24 hr patch APPLY 1 PATCH TRANSDERMALLY EVERY 24 HOURS. (Patient not taking: Reported on 10/2/2023) 28 patch 0     polyethylene glycol (Glycolax, Miralax) 17 gram/dose powder    Unknown    sennosides (Senokot) 8.6 mg tablet TAKE 1 TABLET BY MOUTH TWO TIMES A DAY AS NEEDED FOR CONSTIPATION (Patient not taking: Reported on 10/2/2023) 100 tablet 0 Unknown    thiamine (Vitamin B-1) 100 mg tablet TAKE 1 TABLET BY MOUTH ONCE DAILY (Patient not taking: Reported on 10/2/2023) 100 tablet 2 Past Week     Current Facility-Administered Medications   Medication Dose Route Frequency Provider Last Rate Last Admin    acetaminophen (Tylenol) tablet 650 mg  650 mg oral q8h PRN Ever Paez MD   650 mg at 10/29/23 2034    albuterol 2.5 mg /3 mL (0.083 %) nebulizer solution 2.5 mg  2.5 mg nebulization q12h Yancy Freeman MD   2.5 mg at 11/06/23 0755    calcium gluconate in NS IV 2 g  2 g intravenous Once Ever Paez MD        dextrose 10 % in water (D10W) infusion  0.3 g/kg/hr intravenous Once PRN Virgil Olivo MD   Stopped at 10/18/23 0800    dextrose 50 % injection 25  g  25 g intravenous q15 min PRN Virgil Olivo MD        diclofenac sodium (Voltaren) 1 % gel 1 Application  4 g Topical 4x daily PRN Leslie Allen MD   1 Application at 11/05/23 0850    ergocalciferol (Vitamin D-2) drops 8,000 Units  8,000 Units oral Daily Eevr Paez MD   8,000 Units at 11/06/23 0828    erythromycin (Romycin) 5 mg/gram (0.5 %) ophthalmic ointment 1 cm  1 cm Both Eyes 4x daily Mazin Alberto MD   1 cm at 11/06/23 1040    folic acid (Folvite) tablet 1 mg  1 mg oral Daily Ever Paez MD   1 mg at 11/06/23 0829    glucagon (Glucagen) injection 1 mg  1 mg intramuscular q15 min PRN Virgil Olivo MD        [Held by provider] heparin (porcine) injection 2,000-4,000 Units  2,000-4,000 Units intravenous q4h PRN Virgil Olivo MD   2,000 Units at 10/25/23 0857    [Held by provider] heparin 25,000 Units in dextrose 5% 250 mL (100 Units/mL) infusion (premix)  0-4,500 Units/hr intravenous Continuous Rosa Gary MD 13 mL/hr at 11/06/23 0900 1,300 Units/hr at 11/06/23 0900    lactated Ringer's bolus 500 mL  500 mL intravenous Once Yancy Freeman MD        lidocaine 4 % patch 1 patch  1 patch transdermal Daily Ever Paez MD   1 patch at 11/06/23 0843    lidocaine 4 % patch 2 patch  2 patch transdermal Daily Leslie Allen MD   2 patch at 11/05/23 0847    loperamide (Imodium) capsule 2 mg  2 mg oral 4x daily PRN Leslie Allen MD   2 mg at 11/06/23 0829    lubricating eye drops ophthalmic solution 1 drop  1 drop Both Eyes 4x daily Mazin Alberto MD   1 drop at 11/06/23 0827    magnesium sulfate IV 2 g  2 g intravenous Once Yancy Freeman MD        melatonin tablet 10 mg  10 mg oral Nightly Tia Manuel MD   10 mg at 11/05/23 2053    micafungin (Mycamine) 100 mg in dextrose 5 % in water (D5W) 100 mL IV  100 mg intravenous q24h Arianna Bills DO   Stopped at 11/06/23 1238    midazolam (Versed) injection 6 mg  6 mg intravenous PRN Mazin Alberto MD   4 mg at 11/05/23 1637     midodrine (Proamatine) tablet 10 mg  10 mg oral TID with meals Mazin Alberto MD   10 mg at 11/06/23 1138    moisturizing mouth (Biotene Dry Mouth) solution 15 mL  15 mL Swish & Spit TID PRN Yancy Freeman MD   15 mL at 11/05/23 0338    multivitamin with minerals 1 tablet  1 tablet oral Daily Virgil Olivo MD   1 tablet at 11/06/23 0900    nicotine (Nicoderm CQ) 21 mg/24 hr patch 1 patch  1 patch transdermal Daily Virgil Olivo MD   1 patch at 11/06/23 0828    oxyCODONE (Roxicodone) immediate release tablet 5 mg  5 mg oral q6h PRN Jah Ospina MD   5 mg at 11/06/23 0829    oxygen (O2) therapy   inhalation Continuous PRN - O2/gases Nash Delgadillo MD   4 L/min at 11/06/23 0900    propofol (Diprivan) injection 25 mg  25 mg intravenous PRN Mazin Alberto MD        sennosides (Senokot) tablet 8.6 mg  1 tablet oral Nightly PRN Virgil Olivo MD        sodium chloride 7 % nebulizer solution 3 mL  3 mL nebulization q12h Yancy Freeman MD   3 mL at 11/06/23 0755    thiamine (Vitamin B-1) tablet 100 mg  100 mg oral Daily Ever Paez MD   100 mg at 11/06/23 0829    white petrolatum (Aquaphor) 41 % ointment 1 Application  1 Application Topical q1h PRN Ever Paez MD         Past Surgical History:   has no past surgical history on file.    Recent sedation/surgery (24 hours) Yes      NPO guidelines met: Yes    Exam:  Vitals:    11/06/23 1336   BP: 104/63   Pulse: 88   Resp:    Temp: 36.8 °C (98.2 °F)   SpO2: 97%     Physical exam unchanged from resident note.    A/P:  Left lung atelectasis  -Thereapeutic bronchoscopy under GA  Hypoxemia    Meliton Cuenca MD

## 2023-11-06 NOTE — PROGRESS NOTES
Music Therapy Note    Molina Godinez     Therapy Session  Referral Type: New referral this admission  Visit Type: Follow-up visit  Session Start Time: 1328  Session End Time: 1357  Intervention Delivery: In-person  Conflict of Service: None  Number of family members present: 0  Family Present for Session: None  Number of staff members present: 1     Pre-assessment  Unable to Assess Reason: Outcomes not assessed, Physical limitation  Mood/Affect: Calm, Appropriate, Flat/blunted  Verbalized Emotional State: Acceptance    Treatment/Interventions  Areas of Focus: Self-expression, Isolation reduction, Normalization, Arousal stimulation orientation  Music Therapy Interventions: Live music listening, Active music engagement  Interruption: No  Patient Fell Asleep at End of Session: No    Post-assessment  Unable to Assess Reason: Outcomes not evaluated, Physical limitation  Mood/Affect: Calm, Appropriate, Flat/blunted, Tired/lethargic  Verbalized Emotional State: Enjoyment, Acceptance  Continue Visiting: Yes  Total Session Time (min): 29 minutes    Narrative  Assessment Detail: Pt found awake and oriented, lying down in bed. Nurse was at bedside, caring for pt. Displayed calm, flat, and appropriate affect. Agreed to MTx session  Plan: To engage pt in live preferred music listening and active music engagement to promote normalization, self-expression, arousal/stimulation, and isolation reduction.  Intervention: MTI provided live preferred music via guitar, voice, and egg shaker. Pt actively listened to music and participated via shaking egg shaker to music.  Evaluation: Began session by offering pt egg shaker, pt accepted shaker from MTI. Began playing pt preferred music via guitar and voice. Provided opportunities for pt to shake along with pt. Pt mainly listened to MTI play guitar, watched MTI play and sing. Affect brightened throughout session evidenced by smiling and relaxed body language. Shook egg shaker during  upbeat/stimulating songs. Asked MTI to get nurse at the end of session. Expressed gratitude toward MTI post session.  Follow-up: MTI will continue to follow up with pt and provide MTx services as needed.    Education Documentation  No documentation found.

## 2023-11-06 NOTE — ANESTHESIA POSTPROCEDURE EVALUATION
Patient: Molina Godinez    Procedure Summary       Date: 11/06/23 Room / Location: [facility]    Anesthesia Start: 1414 Anesthesia Stop: 1509    Procedure: BRONCHOSCOPY Diagnosis:       Abnormal CT of the chest      Mucus plugging of bronchi      Atelectasis of left lung    Scheduled Providers: Meliton Cuenca MD; Kenyetta Abdi MD; Oleg Blum RN Responsible Provider: Ronna Carvajal MD    Anesthesia Type: general ASA Status: 3            Anesthesia Type: general    Vitals Value Taken Time   /64 11/06/23 1501   Temp 36 11/06/23 1509   Pulse 85 11/06/23 1509   Resp 22 11/06/23 1509   SpO2 97 % 11/06/23 1509   Vitals shown include unvalidated device data.    Emergence Note:  Pt met criteria for extubation.  Pt was suctioned prior to extubation and patent airway was confirmed post extubation.  Pt was transported to ICU.  Pt arrived to ICU Awake on Mask Supplemental Oxygen with No Airway Adjunct.  Report was given to the receiving nurse.  Pt was VSS upon transfer of care.      Anesthesia Post Evaluation    Patient location during evaluation: ICU  Pain management: adequate  Cardiovascular status: acceptable  Respiratory status: acceptable  Comments: No nausea        No notable events documented.

## 2023-11-06 NOTE — PROGRESS NOTES
Occupational Therapy    Occupational Therapy Treatment    Name: Molina Godinez  MRN: [id number]  : 1955  Date: 23  Time Calculation  Start Time: 1056  Stop Time: 1120  Time Calculation (min): 24 min    Assessment:  End of Session Communication: Bedside nurse  End of Session Patient Position: Bed, 3 rail up, Alarm off, not on at start of session  Plan:     Subjective   General:  OT Last Visit  OT Received On: 23  General  Reason for Referral: patient now extubated, on AirVo  Past Medical History Relevant to Rehab: COPD, HTN, AUD and opioid use disorder, HCV, chronic PE; recent falls?  Family/Caregiver Present: No  Caregiver Feedback: sister initially in the room, left during session  Co-Treatment: PT  Co-Treatment Reason: d/t AMPAC <10, patient has required DEPx2 for all mobility during subsequent therapy sessions  Prior to Session Communication: Bedside nurse  Patient Position Received: Bed, 3 rail up, Alarm off, not on at start of session  Preferred Learning Style: verbal, visual  General Comment: soft voice, pleasant; drop in BP sitting EOB, despite SCDs still ON; PT made RN and medical team aware. Patient did report dizziness sitting EOB.  Vitals:  Vital Signs  Heart Rate:  (pre: 77 post: 91)  Resp:  (pre: 20 post: 20)  SpO2:  (pre: 100% post: 92% on 4L NC)  BP:  (pre: 137/94 sitting EOB: 98/69 (79), 98/61 (72), 90/60 (70), post-supine in bed: 119/80 (93), 128/84 (96))  Pain Assessment:  Pain Assessment  Pain Assessment: 0-10  Pain Score:  (reports (L) shoulder pain with movement, reports having surgery for (L) shoulder in the past; did not rate)     Objective      Bed Mobility/Transfers: Bed Mobility  Bed Mobility: Yes  Bed Mobility 1  Bed Mobility 1: Supine to sitting, Sitting to supine  Level of Assistance 1: Dependent  Bed Mobility Comments 1: x2 assist; draw sheet  Bed Mobility 2  Bed Mobility  2: Supine to sitting, Sitting to supine  Level of Assistance 2: Dependent  Bed Mobility Comments  2: x2  assist, draw sheet, HOB elevated    Transfers  Transfer: No       Therapy/Activity: Therapeutic Exercise  Therapeutic Exercise Performed: Yes  Therapeutic Exercise Activity 1: Completed x10 reps of (B) digit/wrist/elbow/shoulder flexion/extension AAROM/PROM    Therapeutic Activity  Therapeutic Activity Performed: Yes  Therapeutic Activity 1: patient sat EOB x12 mins; mostly max A, dependent A initially, progressed to brief min A once positioned; patient with poor trunk control, increase FF posture, limited ability to active trunk musculature to remain in midline position. patient participated in functional reaching minimally anteriorly with max A. patient engaged in activity of looking at objects outside window, able to ID 3 objects with x1 cue.     Cognitive Skill Development:  Cognitive Skill Development  Cognitive Skill Development Activity 1: Forward reaching x 5 reps for each arm with guiding assist to complete. Sensory stimulation with face washing for increasing alertness at start of session. Simple cues for increased engagement.  Vision:     Strength:  Strength  Strength Comments: global BUE and BLE weakness; B shoulder flexion <2+/5, elbow flexion/ext <3-/5,  3/5 (PF/DF <3-/5, knee ext </=3-/5, hip flexion </=2+/5; LLE appears weaker than L)    Outcome Measures:  [facility] Daily Activity  Putting on and taking off regular lower body clothing: Total  Bathing (including washing, rinsing, drying): Total  Putting on and taking off regular upper body clothing: Total  Toileting, which includes using toilet, bedpan or urinal: Total  Taking care of personal grooming such as brushing teeth: Total  Eating Meals: Total  Daily Activity - Total Score: 6    ,  , and E = Exercise and Early Mobility  Current Activity: Sitting at edge of bed    Education Documentation  ADL Training, taught by Stephanie Salmon OT at 11/6/2023  3:26 PM.  Learner: Patient  Readiness: Acceptance  Method: Explanation  Response: Needs  Reinforcement    Education Comments  No comments found.      Goals:  Encounter Problems       Encounter Problems (Active)       ADLs       Patient will complete grooming tasks with MOD A with min v/c.  (Progressing)       Start:  10/19/23    Expected End:  11/20/23               BALANCE       Pt. will sit EOB for at least 8 min with MOD A in prep for EOB ADLs.  (Progressing)       Start:  10/19/23    Expected End:  11/20/23               COGNITION/SAFETY       Patient will follow 1 step commands with 75% accuracy and min v/c.  (Progressing)       Start:  10/19/23    Expected End:  11/20/23               EXERCISE/STRENGTHENING       Patient will demo BUE strength of 3/5 for B elbow, wrist, hand.  (Progressing)       Start:  10/19/23    Expected End:  11/20/23

## 2023-11-06 NOTE — ANESTHESIA PROCEDURE NOTES
Airway  Date/Time: 11/6/2023 2:26 PM  Urgency: elective    Airway not difficult    Staffing  Performed: CAA and attending   Authorized by: Ronna Carvajal MD    Performed by: DURGA Erickson  Patient location during procedure: OR    Indications and Patient Condition  Indications for airway management: anesthesia and airway protection  Spontaneous ventilation: present  Sedation level: deep  Preoxygenated: yes  Patient position: sniffing  MILS maintained throughout  Mask difficulty assessment: 1 - vent by mask    Final Airway Details  Final airway type: endotracheal airway      Successful airway: ETT  Cuffed: yes   Successful intubation technique: video laryngoscopy  Facilitating devices/methods: intubating stylet  Blade size: #4  ETT size (mm): 8.5  Cormack-Lehane Classification: grade IIa - partial view of glottis  Placement verified by: chest auscultation, bronchoscopy and palpation of cuff   Measured from: teeth  ETT to teeth (cm): 19  Number of attempts at approach: 1    Additional Comments  Intubation performed by MSA student under supervision of Attending and AA

## 2023-11-06 NOTE — PROGRESS NOTES
Physical Therapy    Physical Therapy Treatment    Patient Name: Molina Godinez  MRN: [id number]  Today's Date: 11/6/2023  Time Calculation  Start Time: 1055  Stop Time: 1118  Time Calculation (min): 23 min     Assessment/Plan   PT Assessment  End of Session Communication: Bedside nurse  End of Session Patient Position: Bed, 3 rail up, Alarm off, not on at start of session     PT Plan  Treatment/Interventions: Bed mobility, Transfer training, Gait training, Balance training, Neuromuscular re-education, Strengthening, Therapeutic exercise, Therapeutic activity, Positioning, Postural re-education  PT Plan: Skilled PT  PT Frequency: 4 times per week  PT Discharge Recommendations: Moderate intensity level of continued care  PT - OK to Discharge: Yes    General Visit Information:   PT  Visit  PT Received On: 11/06/23  General  Family/Caregiver Present: No  Co-Treatment: OT  Co-Treatment Reason: d/t AMPAC <10, patient has required DEPx2 for all mobility during subsequent therapy sessions  Prior to Session Communication: Bedside nurse  Patient Position Received: Bed, 3 rail up, Alarm off, not on at start of session  Preferred Learning Style: verbal, visual  General Comment: soft voice, pleasant; drop in BP sitting EOB, despite SCDs still ON; PT made RN and medical team aware. Patient did report dizziness sitting EOB.    Subjective   Precautions:  Precautions  Medical Precautions: Fall precautions, Oxygen therapy device and L/min  Vital Signs:  Vital Signs  Heart Rate:  (pre: 77 post: 91)  Resp:  (pre: 20 post: 20)  SpO2:  (pre: 100% post: 92% on 4L NC)  BP:  (pre: 137/94 sitting EOB: 98/69 (79), 98/61 (72), 90/60 (70), post-supine in bed: 119/80 (93), 128/84 (96))  BP Location: Left arm  BP Method: Automatic    Objective   Pain:  Pain Assessment  Pain Assessment: 0-10  Pain Score:  (denied pain at rest, mentioned L shoulder pain with movement, patient reports having surgery on L shoulder in the  past.)    Cognition:  Cognition  Overall Cognitive Status: Within Functional Limits  Orientation Level:  (AxO x3)  Postural Control:  Postural Control  Postural Control: Impaired  Head Control: forward flexed posture, unable to extend c-spine to look upright/forward; tactile assist by PT to attempt to somewhat extend C-spine.  Trunk Control: poor trunk control; DEPx1 sitting EOB, retrolean with decrease ability to use BUEs to support self in midline. EOB ~12 minutes. Brief MINx1 once positionined with BUE support on bedside.  Righting Reactions: unable to self correct posture  Protective Responses: limited ability to use BUEs to support self    Activity Tolerance:  Activity Tolerance  Endurance: Tolerates less than 10 min exercise with changes in vital signs  Early Mobility/Exercise Safety Screen: Proceed with mobilization - No exclusion criteria met  Treatments:  Therapeutic Activity  Therapeutic Activity 1: patient sat EOB x12 mins; mostly MAXx1, DEP x1 initially, progressed to brief MINx1 once positioned; patient with poor trunk control, increase FF posture, limited ability to active trunk musculature to remain in midline position.    Bed Mobility  Bed Mobility: Yes  Bed Mobility 1  Bed Mobility 1: Rolling left, Rolling right  Level of Assistance 1: Dependent, Moderate verbal cues  Bed Mobility Comments 1: x1 assist + draw sheet; alfonso-care required d/t BM  Bed Mobility 2  Bed Mobility  2: Supine to sitting, Sitting to supine  Level of Assistance 2: Dependent  Bed Mobility Comments 2: x2 assist, draw sheet, HOB elevated    Outcome Measures:  [facility] Basic Mobility  Turning from your back to your side while in a flat bed without using bedrails: Total  Moving from lying on your back to sitting on the side of a flat bed without using bedrails: Total  Moving to and from bed to chair (including a wheelchair): Total  Standing up from a chair using your arms (e.g. wheelchair or bedside chair): Total  To walk in hospital  room: Total  Climbing 3-5 steps with railing: Total  Basic Mobility - Total Score: 6    FSS-ICU  Ambulation: Unable to attempt due to weakness  Rolling: Total assistance (performs 25% or requires another person)  Sitting: Maximal assistance (performs 25% - 49% of task)  Transfer Sit-to-Stand: Unable to perform  Transfer Supine-to-Sit: Unable to perform  Total Score: 3    E = Exercise and Early Mobility  Early Mobility/Exercise Safety Screen: Proceed with mobilization - No exclusion criteria met  Current Activity: Sitting at edge of bed    Education Documentation  Mobility Training, taught by Tameka Velasquez PT at 11/6/2023  1:07 PM.  Learner: Patient  Readiness: Acceptance  Method: Explanation  Response: Needs Reinforcement    Education Comments  No comments found.         Encounter Problems       Encounter Problems (Active)       Balance       Patient will complete static (Cgx1) and dynamic (MINx1) using BUE support as needed in order to maintain midline without acute LOB   (Progressing)       Start:  10/19/23    Expected End:  11/09/23               Mobility       STG - Patient will ambulate >/=10 ft using LRD as needed with MODx1 without acute LOB  (Not Progressing)       Start:  10/19/23    Expected End:  11/09/23            patient will complete BLE there-ex in order to improve strength and to assist with the completion of functional mobility tasks.  (Progressing)       Start:  10/19/23    Expected End:  11/09/23                          Transfers       STG - Transfer from bed to chair with MODx1 without acute LOB  (Not Progressing)       Start:  10/19/23    Expected End:  11/09/23            STG - Patient will perform bed mobility with MINx1 assist  (Progressing)       Start:  10/19/23    Expected End:  11/09/23            STG - Patient will transfer sit to and from stand with MODx1 using LRD as needed without acute LOB  (Not Progressing)       Start:  10/19/23    Expected End:  11/09/23

## 2023-11-06 NOTE — ANESTHESIA PROCEDURE NOTES
Peripheral IV  Date/Time: 11/6/2023 2:28 PM      Placement  Needle size: 20 G  Laterality: left  Location: hand  Site prep: alcohol  Technique: anatomical landmarks  Attempts: 1

## 2023-11-06 NOTE — CARE PLAN
The patient's goals for the shift include PATITO    The clinical goals for the shift include will maintain oxygen saturation over 92%    Over the shift, the patient did not make progress toward the following goals. Barriers to progression include Recommendations to address these barriers include .

## 2023-11-06 NOTE — PROGRESS NOTES
Speech-Language Pathology             Therapy Communication Note     Patient Name: Molina Godinez  MRN:  [id number]  Today's Date:  11/06/23   Missed Time: 1355       Missed Visit Reason: SLP to continue to hold on swallow re-evaluation as pt is NPO for bronch today. Will plan to assess in coming days pending continual improvement in respiratory status.

## 2023-11-07 ENCOUNTER — APPOINTMENT (OUTPATIENT)
Dept: RADIOLOGY | Facility: HOSPITAL | Age: 68
End: 2023-11-07
Payer: COMMERCIAL

## 2023-11-07 LAB
ALBUMIN SERPL BCP-MCNC: 2.3 G/DL (ref 3.4–5)
ALBUMIN SERPL BCP-MCNC: 2.9 G/DL (ref 3.4–5)
ALP SERPL-CCNC: 169 U/L (ref 33–136)
ALT SERPL W P-5'-P-CCNC: 37 U/L (ref 10–52)
ANION GAP SERPL CALC-SCNC: 16 MMOL/L (ref 10–20)
AST SERPL W P-5'-P-CCNC: 35 U/L (ref 9–39)
BACTERIA BLD AEROBE CULT: ABNORMAL
BACTERIA BLD CULT: ABNORMAL
BACTERIA BLD CULT: NORMAL
BACTERIA BLD CULT: NORMAL
BACTERIA SPEC RESP CULT: ABNORMAL
BACTERIA SPEC RESP CULT: ABNORMAL
BASOPHILS # BLD AUTO: 0.1 X10*3/UL (ref 0–0.1)
BASOPHILS NFR BLD AUTO: 0.8 %
BILIRUB DIRECT SERPL-MCNC: 0.1 MG/DL (ref 0–0.3)
BILIRUB SERPL-MCNC: 0.3 MG/DL (ref 0–1.2)
BUN SERPL-MCNC: 17 MG/DL (ref 6–23)
CALCIUM SERPL-MCNC: 8.5 MG/DL (ref 8.6–10.6)
CHLORIDE SERPL-SCNC: 96 MMOL/L (ref 98–107)
CO2 SERPL-SCNC: 26 MMOL/L (ref 21–32)
CREAT SERPL-MCNC: 0.37 MG/DL (ref 0.5–1.3)
EOSINOPHIL # BLD AUTO: 0.35 X10*3/UL (ref 0–0.7)
EOSINOPHIL NFR BLD AUTO: 2.8 %
ERYTHROCYTE [DISTWIDTH] IN BLOOD BY AUTOMATED COUNT: 13.6 % (ref 11.5–14.5)
GFR SERPL CREATININE-BSD FRML MDRD: >90 ML/MIN/1.73M*2
GLUCOSE BLD MANUAL STRIP-MCNC: 118 MG/DL (ref 74–99)
GLUCOSE BLD MANUAL STRIP-MCNC: 90 MG/DL (ref 74–99)
GLUCOSE SERPL-MCNC: 103 MG/DL (ref 74–99)
GRAM STN SPEC: ABNORMAL
HCT VFR BLD AUTO: 33.3 % (ref 41–52)
HGB BLD-MCNC: 11.1 G/DL (ref 13.5–17.5)
IMM GRANULOCYTES # BLD AUTO: 0.24 X10*3/UL (ref 0–0.7)
IMM GRANULOCYTES NFR BLD AUTO: 1.9 % (ref 0–0.9)
LYMPHOCYTES # BLD AUTO: 1.11 X10*3/UL (ref 1.2–4.8)
LYMPHOCYTES NFR BLD AUTO: 9 %
MAGNESIUM SERPL-MCNC: 1.89 MG/DL (ref 1.6–2.4)
MCH RBC QN AUTO: 29.2 PG (ref 26–34)
MCHC RBC AUTO-ENTMCNC: 33.3 G/DL (ref 32–36)
MCV RBC AUTO: 88 FL (ref 80–100)
MONOCYTES # BLD AUTO: 1.02 X10*3/UL (ref 0.1–1)
MONOCYTES NFR BLD AUTO: 8.2 %
NEUTROPHILS # BLD AUTO: 9.55 X10*3/UL (ref 1.2–7.7)
NEUTROPHILS NFR BLD AUTO: 77.3 %
NRBC BLD-RTO: 0 /100 WBCS (ref 0–0)
PHOSPHATE SERPL-MCNC: 4.7 MG/DL (ref 2.5–4.9)
PLATELET # BLD AUTO: 338 X10*3/UL (ref 150–450)
POTASSIUM SERPL-SCNC: 4.9 MMOL/L (ref 3.5–5.3)
PROT SERPL-MCNC: 6 G/DL (ref 6.4–8.2)
RBC # BLD AUTO: 3.8 X10*6/UL (ref 4.5–5.9)
SODIUM SERPL-SCNC: 133 MMOL/L (ref 136–145)
UFH PPP CHRO-ACNC: 0.3 IU/ML
WBC # BLD AUTO: 12.4 X10*3/UL (ref 4.4–11.3)

## 2023-11-07 PROCEDURE — 71045 X-RAY EXAM CHEST 1 VIEW: CPT | Performed by: STUDENT IN AN ORGANIZED HEALTH CARE EDUCATION/TRAINING PROGRAM

## 2023-11-07 PROCEDURE — 2500000002 HC RX 250 W HCPCS SELF ADMINISTERED DRUGS (ALT 637 FOR MEDICARE OP, ALT 636 FOR OP/ED): Performed by: NURSE PRACTITIONER

## 2023-11-07 PROCEDURE — 2500000005 HC RX 250 GENERAL PHARMACY W/O HCPCS: Performed by: NURSE PRACTITIONER

## 2023-11-07 PROCEDURE — 2500000004 HC RX 250 GENERAL PHARMACY W/ HCPCS (ALT 636 FOR OP/ED)

## 2023-11-07 PROCEDURE — 2500000001 HC RX 250 WO HCPCS SELF ADMINISTERED DRUGS (ALT 637 FOR MEDICARE OP): Performed by: NURSE PRACTITIONER

## 2023-11-07 PROCEDURE — 99233 SBSQ HOSP IP/OBS HIGH 50: CPT | Performed by: INTERNAL MEDICINE

## 2023-11-07 PROCEDURE — 36415 COLL VENOUS BLD VENIPUNCTURE: CPT

## 2023-11-07 PROCEDURE — 82040 ASSAY OF SERUM ALBUMIN: CPT

## 2023-11-07 PROCEDURE — 2500000004 HC RX 250 GENERAL PHARMACY W/ HCPCS (ALT 636 FOR OP/ED): Performed by: NURSE PRACTITIONER

## 2023-11-07 PROCEDURE — 83735 ASSAY OF MAGNESIUM: CPT

## 2023-11-07 PROCEDURE — 94668 MNPJ CHEST WALL SBSQ: CPT

## 2023-11-07 PROCEDURE — 71045 X-RAY EXAM CHEST 1 VIEW: CPT

## 2023-11-07 PROCEDURE — 84100 ASSAY OF PHOSPHORUS: CPT

## 2023-11-07 PROCEDURE — 99232 SBSQ HOSP IP/OBS MODERATE 35: CPT | Performed by: STUDENT IN AN ORGANIZED HEALTH CARE EDUCATION/TRAINING PROGRAM

## 2023-11-07 PROCEDURE — 85025 COMPLETE CBC W/AUTO DIFF WBC: CPT

## 2023-11-07 PROCEDURE — 2060000001 HC INTERMEDIATE ICU ROOM DAILY

## 2023-11-07 PROCEDURE — 94640 AIRWAY INHALATION TREATMENT: CPT

## 2023-11-07 PROCEDURE — 80053 COMPREHEN METABOLIC PANEL: CPT

## 2023-11-07 PROCEDURE — 85520 HEPARIN ASSAY: CPT

## 2023-11-07 PROCEDURE — 82248 BILIRUBIN DIRECT: CPT

## 2023-11-07 PROCEDURE — 82947 ASSAY GLUCOSE BLOOD QUANT: CPT

## 2023-11-07 PROCEDURE — S4991 NICOTINE PATCH NONLEGEND: HCPCS | Performed by: NURSE PRACTITIONER

## 2023-11-07 RX ORDER — ACETAMINOPHEN 500 MG
5 TABLET ORAL NIGHTLY
Status: DISCONTINUED | OUTPATIENT
Start: 2023-11-07 | End: 2023-11-10

## 2023-11-07 RX ORDER — HYDROMORPHONE HYDROCHLORIDE 1 MG/ML
0.2 INJECTION, SOLUTION INTRAMUSCULAR; INTRAVENOUS; SUBCUTANEOUS EVERY 6 HOURS PRN
Status: DISCONTINUED | OUTPATIENT
Start: 2023-11-07 | End: 2023-11-17

## 2023-11-07 RX ADMIN — Medication 3 ML: at 08:26

## 2023-11-07 RX ADMIN — ERYTHROMYCIN 1 CM: 5 OINTMENT OPHTHALMIC at 06:37

## 2023-11-07 RX ADMIN — ERYTHROMYCIN 1 CM: 5 OINTMENT OPHTHALMIC at 17:43

## 2023-11-07 RX ADMIN — ALBUTEROL SULFATE 2.5 MG: 2.5 SOLUTION RESPIRATORY (INHALATION) at 08:26

## 2023-11-07 RX ADMIN — CARBOXYMETHYLCELLULOSE SODIUM 1 DROP: 5 SOLUTION/ DROPS OPHTHALMIC at 17:43

## 2023-11-07 RX ADMIN — LOPERAMIDE HYDROCHLORIDE 2 MG: 2 CAPSULE ORAL at 21:42

## 2023-11-07 RX ADMIN — HEPARIN SODIUM 1300 UNITS/HR: 10000 INJECTION, SOLUTION INTRAVENOUS at 11:00

## 2023-11-07 RX ADMIN — OXYCODONE HYDROCHLORIDE 5 MG: 5 TABLET ORAL at 20:31

## 2023-11-07 RX ADMIN — LIDOCAINE 2 PATCH: 4 PATCH TOPICAL at 10:10

## 2023-11-07 RX ADMIN — MIDODRINE HYDROCHLORIDE 10 MG: 10 TABLET ORAL at 20:31

## 2023-11-07 RX ADMIN — ERYTHROMYCIN 1 CM: 5 OINTMENT OPHTHALMIC at 12:45

## 2023-11-07 RX ADMIN — ERYTHROMYCIN 1 CM: 5 OINTMENT OPHTHALMIC at 20:32

## 2023-11-07 RX ADMIN — Medication 4 ML: at 20:41

## 2023-11-07 RX ADMIN — MICAFUNGIN SODIUM 100 MG: 50 INJECTION, POWDER, LYOPHILIZED, FOR SOLUTION INTRAVENOUS at 12:45

## 2023-11-07 RX ADMIN — Medication 1 TABLET: at 10:10

## 2023-11-07 RX ADMIN — OXYCODONE HYDROCHLORIDE 5 MG: 5 TABLET ORAL at 06:37

## 2023-11-07 RX ADMIN — HEPARIN SODIUM 1300 UNITS/HR: 10000 INJECTION, SOLUTION INTRAVENOUS at 06:37

## 2023-11-07 RX ADMIN — FOLIC ACID 1 MG: 1 TABLET ORAL at 10:10

## 2023-11-07 RX ADMIN — Medication 1 PATCH: at 10:10

## 2023-11-07 RX ADMIN — THIAMINE HCL TAB 100 MG 100 MG: 100 TAB at 10:10

## 2023-11-07 RX ADMIN — LIDOCAINE 1 PATCH: 4 PATCH TOPICAL at 10:10

## 2023-11-07 RX ADMIN — Medication 5 MG: at 21:42

## 2023-11-07 RX ADMIN — Medication 4 L/MIN: at 08:55

## 2023-11-07 RX ADMIN — CARBOXYMETHYLCELLULOSE SODIUM 1 DROP: 5 SOLUTION/ DROPS OPHTHALMIC at 20:31

## 2023-11-07 RX ADMIN — CARBOXYMETHYLCELLULOSE SODIUM 1 DROP: 5 SOLUTION/ DROPS OPHTHALMIC at 12:45

## 2023-11-07 RX ADMIN — HYDROMORPHONE HYDROCHLORIDE 0.2 MG: 1 INJECTION, SOLUTION INTRAMUSCULAR; INTRAVENOUS; SUBCUTANEOUS at 11:05

## 2023-11-07 RX ADMIN — MIDODRINE HYDROCHLORIDE 10 MG: 10 TABLET ORAL at 10:10

## 2023-11-07 RX ADMIN — MIDODRINE HYDROCHLORIDE 10 MG: 10 TABLET ORAL at 15:32

## 2023-11-07 RX ADMIN — ALBUTEROL SULFATE 2.5 MG: 2.5 SOLUTION RESPIRATORY (INHALATION) at 20:41

## 2023-11-07 ASSESSMENT — PAIN SCALES - GENERAL
PAINLEVEL_OUTOF10: 0 - NO PAIN

## 2023-11-07 ASSESSMENT — PAIN - FUNCTIONAL ASSESSMENT
PAIN_FUNCTIONAL_ASSESSMENT: 0-10

## 2023-11-07 NOTE — PROGRESS NOTES
"Molina Godinez is a 68 y.o. male on day 26 of admission presenting with Hospital-acquired pneumonia.    Subjective   No acute events o/n.  RT was at the bedside this am; pt receiving 7% nebulizer treatment and bronch/pulm hygiene.      ROS: Pt denies any CP/SOB, fevers/chills, NVD or abd pain  Chief Complaint: Generalized pain     Objective   Physical Exam:  Constitutional: Cachectic male, ill-appearing, in no acute distress   Eyes: Anicteric  ENMT: mucous membranes moist,   Head/Neck: Atraumatic/normocephalic.  Kyphotic  Respiratory/Thorax: Lungs CTA B.  Decreased breath sounds in the left upper and lower lobes.  No adventitious sounds noted.    Cardiovascular: Regular, rate and rhythm, no murmurs, normal S1 and S2  Gastrointestinal: Nondistended, soft, non-tender, BS present x 4  Extremities: No edema  Neurological: alert and oriented x 3, speech clear but soft, follows commands appropriately, no focal deficits   skin: Warm and dry    Vital Signs  Blood pressure 119/69, pulse 92, temperature 36.7 °C (98.1 °F), temperature source Temporal, resp. rate 17, height 1.702 m (5' 7.01\"), weight 49.7 kg (109 lb 9.1 oz), SpO2 100 %.  Oxygen Therapy  SpO2: 100 %  Medical Gas Therapy: Supplemental oxygen  O2 Delivery Method: Nasal cannula       Intake/Output last 3 Shifts:  I/O last 3 completed shifts:  In: 2402.2 (48.3 mL/kg) [I.V.:472.2 (9.5 mL/kg); NG/GT:1575; IV Piggyback:355]  Out: 600 (12.1 mL/kg) [Urine:600 (0.3 mL/kg/hr)]  Weight: 49.7 kg     Lines and Tubes:  Peripheral IV 10/15/23 20 G Distal;Right;Upper;Ventral Arm (Active)   Placement Date/Time: 10/15/23 0900   Size (Gauge): 20 G  Orientation: Distal;Right;Upper;Ventral  Location: Arm  Site Prep: Chlorhexidine   Insertion attempts: 1  Patient Tolerance: Tolerated well   Number of days: 23       Peripheral IV 10/20/23 22 G Right;Anterior Wrist (Active)   Placement Date/Time: 10/20/23 1019   Size (Gauge): 22 G  Orientation: Right;Anterior  Location: Wrist  Site Prep: " Alcohol;Chlorhexidine   Placed by: Cj Fox  Insertion attempts: 1  Patient Tolerance: Tolerated well   Number of days: 18       Peripheral IV 11/06/23 20 G Left Hand (Active)   Placement Date/Time: 11/06/23 (c) 1428   Size (Gauge): 20 G  Orientation: Left  Location: Hand  Site Prep: Alcohol  Technique: Anatomical landmarks  Insertion attempts: 1   Number of days: 0       NG/OG/Feeding Tube Left nostril (Active)   Placement Date/Time: 10/17/23 1755   Placed by: JALEESA  Tube Length: 80 cm  Tube Location: Left nostril   Number of days: 20       External Urinary Catheter (Active)   Placement Date/Time: 11/04/23 1805     Number of days: 2         Relevant Results           Scheduled medications  albuterol, 2.5 mg, nebulization, q12h  ergocalciferol, 8,000 Units, oral, Daily  erythromycin, 1 cm, Both Eyes, 4x daily  folic acid, 1 mg, oral, Daily  lidocaine, 1 patch, transdermal, Daily  lidocaine, 2 patch, transdermal, Daily  lubricating eye drops, 1 drop, Both Eyes, 4x daily  melatonin, 5 mg, oral, Once  micafungin, 100 mg, intravenous, q24h  midodrine, 10 mg, oral, TID with meals  multivitamin with minerals, 1 tablet, oral, Daily  nicotine, 1 patch, transdermal, Daily  sodium chloride, 3 mL, nebulization, q12h  thiamine, 100 mg, oral, Daily      Continuous medications  heparin, 0-4,500 Units/hr, Last Rate: 1,300 Units/hr (11/07/23 0637)  lactated Ringer's, 100 mL/hr      PRN medications  PRN medications: acetaminophen, dextrose 10 % in water (D10W), dextrose, diclofenac sodium, glucagon, heparin, HYDROmorphone, loperamide, moisturizing mouth, oxyCODONE, oxygen, propofol, sennosides, white petrolatum  Results for orders placed or performed during the hospital encounter of 10/11/23 (from the past 24 hour(s))   Fungal Culture/Smear    Specimen: TRACHEAL WASH; Fluid   Result Value Ref Range    Fungal Culture/Smear       Culture in progress, a report will be issued when positive or after 2 weeks of incubation.    Fungal Smear  No fungal elements seen    Respiratory Culture/Smear    Specimen: TRACHEAL WASH; Fluid   Result Value Ref Range    Gram Stain (4+) Abundant Polymorphonuclear leukocytes     Gram Stain No organisms seen    Heparin Assay   Result Value Ref Range    Heparin Unfractionated 0.3 See Comment Below for Therapeutic Ranges IU/mL   POCT GLUCOSE   Result Value Ref Range    POCT Glucose 94 74 - 99 mg/dL   CBC and Auto Differential   Result Value Ref Range    WBC 12.4 (H) 4.4 - 11.3 x10*3/uL    nRBC 0.0 0.0 - 0.0 /100 WBCs    RBC 3.80 (L) 4.50 - 5.90 x10*6/uL    Hemoglobin 11.1 (L) 13.5 - 17.5 g/dL    Hematocrit 33.3 (L) 41.0 - 52.0 %    MCV 88 80 - 100 fL    MCH 29.2 26.0 - 34.0 pg    MCHC 33.3 32.0 - 36.0 g/dL    RDW 13.6 11.5 - 14.5 %    Platelets 338 150 - 450 x10*3/uL    Neutrophils % 77.3 40.0 - 80.0 %    Immature Granulocytes %, Automated 1.9 (H) 0.0 - 0.9 %    Lymphocytes % 9.0 13.0 - 44.0 %    Monocytes % 8.2 2.0 - 10.0 %    Eosinophils % 2.8 0.0 - 6.0 %    Basophils % 0.8 0.0 - 2.0 %    Neutrophils Absolute 9.55 (H) 1.20 - 7.70 x10*3/uL    Immature Granulocytes Absolute, Automated 0.24 0.00 - 0.70 x10*3/uL    Lymphocytes Absolute 1.11 (L) 1.20 - 4.80 x10*3/uL    Monocytes Absolute 1.02 (H) 0.10 - 1.00 x10*3/uL    Eosinophils Absolute 0.35 0.00 - 0.70 x10*3/uL    Basophils Absolute 0.10 0.00 - 0.10 x10*3/uL   Renal function panel   Result Value Ref Range    Glucose 103 (H) 74 - 99 mg/dL    Sodium 133 (L) 136 - 145 mmol/L    Potassium 4.9 3.5 - 5.3 mmol/L    Chloride 96 (L) 98 - 107 mmol/L    Bicarbonate 26 21 - 32 mmol/L    Anion Gap 16 10 - 20 mmol/L    Urea Nitrogen 17 6 - 23 mg/dL    Creatinine 0.37 (L) 0.50 - 1.30 mg/dL    eGFR >90 >60 mL/min/1.73m*2    Calcium 8.5 (L) 8.6 - 10.6 mg/dL    Phosphorus 4.7 2.5 - 4.9 mg/dL    Albumin 2.9 (L) 3.4 - 5.0 g/dL   Magnesium   Result Value Ref Range    Magnesium 1.89 1.60 - 2.40 mg/dL   Heparin Assay, UFH   Result Value Ref Range    Heparin Unfractionated 0.3 See Comment Below  for Therapeutic Ranges IU/mL       Bronchoscopy Diagnostic, Therapeutic    Result Date: 11/6/2023  Table formatting from the original result was not included. Findings Excessive and thick secretions present in the trachea, left lung and right lung; secretions were easily removed by therapeutic aspiration and the airway was cleared The right lung appeared normal. Purulent mucus plugs with complete obstruction removed with suction from the left main stem; performed therapeutic aspiration. Mucomyst 10% x 4 mL was applied topically to the JOSE ANTONIO and LLL. Erythematous and friable mucosa in the JOSE ANTONIO, lingula and LLL Recommendation  Return to medical ICU for continued care. Await culture results.  Indication Abnormal CT of the chest, Atelectasis of left lung, Mucus plugging of bronchi Post Procedure Diagnosis Left lung atelectasis Mucus plug - left mainstem bronchus Staff Staff Role Meliton Cuenca MD Proceduralist Medications See Anesthesia Record. Preprocedure A history and physical has been performed, and patient medication allergies have been reviewed. The patient's tolerance of previous anesthesia has been reviewed. The risks and benefits of the procedure and the sedation options and risks were discussed with the patient and family . All questions were answered and informed consent obtained. Details of the Procedure The patient underwent general anesthesia, which was administered by an anesthesia professional. The patient's blood pressure, ETCO2, ECG, heart rate, level of consciousness, respirations and oxygen were monitored throughout the procedure. The patient's estimated blood loss was minimal (<5 mL). The scope was introduced through the endotracheal tube. The procedure was not difficult. The patient tolerated the procedure well. There were no apparent adverse events. Events Procedure Events Event Event Time ENDO SCOPE IN TIME 11/6/2023  2:33 PM ENDO SCOPE OUT TIME 11/6/2023  2:45 PM Specimens ID Type Source Tests  Collected by Time A : whole lung wash Fluid TRACHEAL WASH AFB CULTURE/SMEAR, FUNGAL CULTURE/SMEAR, RESPIRATORY CULTURE/SMEAR Meliton Cuenca MD 11/6/2023 1431 Procedure Location [facility] [address] [facility] [id number] Referring Provider No referring provider defined for this encounter. Procedure Provider No name on file     Limited TTE from 11/2/23  PHYSICIAN INTERPRETATION:  Left Ventricle: The left ventricular systolic function is hyperdynamic, with an estimated ejection fraction of 70-75%. There are no regional wall motion abnormalities. The left ventricular cavity size is decreased. There is left ventricular concentric remodeling. The interventricular septum is flattened in systole, consistent with right ventricular pressure overload. Spectral Doppler shows an impaired relaxation pattern of left ventricular diastolic filling.  Left Atrium: The left atrium is normal in size.  Right Ventricle: The right ventricle is mildly enlarged. There is mildly reduced right ventricular systolic function. Radial RV function appears preserved; longitudinal RV function is decreased.  Right Atrium: The right atrium is moderately dilated.  Aortic Valve: The aortic valve is trileaflet. There is minimal aortic valve cusp calcification. There is no evidence of aortic valve regurgitation.  Mitral Valve: The mitral valve is normal in structure. There is trace mitral valve regurgitation.  Tricuspid Valve: The tricuspid valve is structurally normal. There is moderate tricuspid regurgitation. The Doppler estimated RVSP is mildly elevated at 45.6 mmHg. The PA systolic pressure may be underestimated. The previously noted posterior tricuspid leaflet vegetation is not clearly seen on this study,.  Pulmonic Valve: The pulmonic valve is structurally normal. There is trace pulmonic valve regurgitation.  Pericardium: There is no pericardial effusion noted.  Aorta:  The aortic root is normal.  In comparison to the previous echocardiogram(s): Compared with study from 12/1/2022.     CONCLUSIONS:   1. Left ventricular systolic function is hyperdynamic with a 70-75% estimated ejection fraction.   2. No definite valvular vegetations were visualized.   3. Right ventricular pressure overload.   4. Spectral Doppler shows an impaired relaxation pattern of left ventricular diastolic filling.   5. There is mildly reduced right ventricular systolic function.   6. The right atrium is moderately dilated.   7. Mildly elevated RVSP.   8. Moderate tricuspid regurgitation visualized.   9. The PA systolic pressure may be underestimated. The previously noted posterior tricuspid leaflet vegetation is not clearly seen on this study.  10. Left ventricular cavity size is decreased.  11. The reasons for repeating the echo from 10/31 is not clear.      Assessment/Plan   Principal Problem:    Hospital-acquired pneumonia  Active Problems:    Essential hypertension, benign    Hepatitis C virus infection cured after antiviral drug therapy    Alcoholism (CMS/HCC)    Chronic pulmonary embolism (CMS/HCC)    COPD (chronic obstructive pulmonary disease) (CMS/HCC)    HLD (hyperlipidemia)    Uremia      Molina Calderon is a 68-year-old male with previous medical history of hypertension, COPD, HCV (treated), alcohol use disorder, and opioid use disorder was admitted to the medical intensive care unit due to concern for alcohol withdrawals with CIWA of score and was transferred to the floor and eventually readmitted to the MICU on 10/16 for acute hypoxic respiratory failure 2/2 mucous plugging versus severe COPD.  Patient was intubated on 10/20 and found to be groin stenotrophomonas and his respiratory culture and placed on Bactrim.  Patient was extubated on 10/27.  Course complicated by SIADH which is now resolved however has an unclear etiology.  Nephrology was consulted and managed with fluid restriction, urea  packets and Lasix.  Blood cultures on 7/31 growing Candida albicans.(On micafungin).  Patient also started on CVVH (10/31 - 11/3 ) due to increased BUN into the 180s.  S/P Bronch on 11/6    Updates:  Patient transferred to [facility] this a.m.    Neuro:  #Metabolic encephalopathy 2/2 uremia versus hypoxia (improving)  -CT of head obtained on 10/11/2020 shows no acute intracranial process  -Continue with delirium precautions  -S/p CVVH with improvement in serum BUN    #Deconditioning/bilateral lower extremity weakness  -Repeat MRI brain/total spine on 10/20 unremarkable for acute process  -MRI spine negative for  -Neurology consulted appreciate recs--> suspect++ weakness is likely peripheral (L arm and proximal muscle) inflammatory degenerative disorder.  We will follow-up with EMG/NCS once patient was stable.  Low suspicion for GBS  -Follow-up with neurology as an outpatient  -Encephalopathy work-up negative  -PT/OT  -LSW for placement    Opthal:  #Dry Eyes  #Cataracts   -Cont with artificial tears and erythromycin QID both eyes  -no s/s of fungal endophthalmitis per Opthal     Pulm:  #Acute hypoxic respiratory failure 2/2 HAP  #Steno HAP  #Severe COPD  - Continue aggressive RT: Albuterol 2.5 mg q12h, chest PT, respiratory hygiene  - Ezpap, incentive spirometer, cough assist  -Bronchial washings with stenotrophomonas susceptible to Bactrim  -Blood culture 10/31 + for Candida albicans   -Bactrim 10/22-10/28  -8L NC this AM  -Cont with dieter IVPB     #Left lung consolidation.  With complete opacification  #Mucous plugging s/p bronch on 11/5 (beside by Dr. Friend) and 11/6 (bronch suit by Dr. Cuenca) and 10/20 (Dr. Mascorro) follow up w/cultures)  -Aggressive bronch/pulm hygiene   -PT/OT--> OOB or in sitting position  -BIPAP for lung recruitment TID x 30 min  -Serial CXR  -Serial Bronchoscopy  - 7% NS neb q12h    #Suspected PAH  -10/17/23 Echo with atrial septal aneurysm, positive bubble study  - Will need outpatient workup,  previously some suspicion for PAH but will need further evaluation     #Pulm Embolism RUL (partially occlusive)   #Multiple non occlusive filling defects of the RUL segmental pulm arteries   -Cont with heparin gtt   -INR 2-3 goal   -Per vasc med pt neds 3 months of of AC; on home apixaban 5mg BID     CV:  #PMHx CAD   -Continue holding statin I/s/o elevated transaminates     #Hypotension (improving)   #HTN (resolved)   #Tachycardia (resolved)    #c/f TV endocarditis (Resolved)   - TTE 10/17/23: LVEF 75%, LV concentric modeling, mildly increased LV septal wall thickness, atrial septal aneurysm, mildly elevated RSVP 39  - TTE 10/31/23: same as above with the following exceptions: 1.7 x 0.8 cm mobile echodensity of posterior tricuspid leaflet, mod-severe TR, RSVP increased to 57  - TTE 11/2/23: No definite valvular vegetations were visualized  - vanc/ari discontinued     Renal:  HypoNa 2/2 SIADH   -Renal US 10/31:  Unremarkable sonographic evaluation of the kidneys  -Fluid restriction   -NA improving 133 today   -RFP daily   -Nephro signed off on 11/5  - ml q/6    #Uremia (resolved)  #KRYSTEN (resolved)   S/p CVVH 10/31-11/3    FEN/GI:  #HepC  #Elevated Transaminases  #PMHx of HCV   =RUQ US + for steatosis and biliary sludge   -Cortrak placed 10/17  -Diet:  Attention Point Peptide 1.5; 45; 200 goal of 45 ml/hr   -100 mg thiamine, folic acid 1mg, 8000u Vit D2  -HCV RNA 8/23 undetected  -LFT daily   -Avoid hepatic toxic meds     Heme/ONC:  #Leukocytosis (improving) 2/2 uremia   -WBC 12.4 today down from 13.5  -CBC daily    ID:  #Candida fungemia   #Leukocytosis I/s/o Steno Bal, Candida fungemia, and strep PNA   -c/w She 100 mg daily 11/3-present)  -Ari de-escated d/t nagative TV endo   Micro: 11/15/2020 AFB culture/smear no growth today, 11/6/2023 fungal culture/smear no growth to date, 11/6/2023 respiratory culture/smear plus for abundant poly morphoneuclear leukocytes, 11/5/2023 respiratory culture/smear positive for  stenotrophomonas maltophilia, 11/3/2023 blood culture x2 no growth to date, 11/2/2023 C. difficile PCR not detected, 11/1/3 respiratory culture/smear contaminated, 10/31/2023 urine culture no growth to date, 10/31/2023 blood culture positive for Candida albicans in the aerobic bottle, 10/28/2023 respiratory culture/smear positive for abundant stenotrophomonas maltophilia, 10/20/2023 MRSA negative, 10/20 sputum culture contaminated, 10/20/2023 MRSA negative, 10/20/2023 sputum culture contaminated, 10/17/23 Staph/MRSA negative    ATBCs: - Zosyn 10/20-10/24, Bactrim 10/22-10/28, Levaquin 10/23-10/25, vanc 11/1-11/2, ari 11/1-11/2, CTX 10/13-10/17    MSK/Rhem   #Chronic B/L LE weakness  --possible EMG once downgraded, neuro concern for peripheral (LMN + muscle) inflammatory or degenerative disorder considering distribution and chronology   -Unlikely GBS give no ascending paralysis     NOK: Sister   Code Status:  FC     Pt discussed with Dr. Frazier seen and examined. All labs, VS and previous plan of care reviewed.    I spent 40 minutes in the professional and overall care of this patient.      Dakota Teresa, APRN-CNP

## 2023-11-07 NOTE — PROGRESS NOTES
Nutrition Assessment    Nutrition Follow Up Assessment:        Patient is a 68 y.o. male who is now in SDU for care.  Being treated for hypoxic respiratory failure from HAP and metabolic encephalopathy.  History of EtOH abuse.  Intubated from 10/20-10/27 and was on CVVH from 10/31-11/3.  Bronch done on 11/6.  On 4L NC for support.    Pt remains with dobhoff in place for enteral access.  TF was changed per RDN verbal recs on 11/3 as team was concerned tube feed was contributing to stool output.  He is now on [employer] Peptide 1.5@ 45mls/hr (previously on Two Reynaldo HN for low volume formula).  Difficult to determine if change in tube has helped stool output.  High stool output may have been multi-factorial beyond tube feed formula.  He is on PRN sennosides and PRN Imodium.  He is now hyponatremic, unclear if volume from TF has contributed to lower sodium level.        Anthropometrics:        IBW/kg (Dietitian Calculated): 67.3 kg  Percent of IBW: 74 %     No new weight  10/25/23: 49.7kg  10/19/23: 49.7kg  10/18/23: 49.7kg  10/2/23: 57.6kg    Nutrition Focused Physical Exam Findings:  Defer-- remains with severe wasting    Edema:          Physical Findings:              Skin: Positive (PI to butt-- stage 2)    Objective Data:    Last BM Date:   3BMs today (loose) and 2 on 11/6 (watery)    Nutrition Significant Labs:  Na+ 133  K+ 4.9  Chloride 96  Bicarb 26  BUN 17  Creatinine 0.37  Calcium 8.5  Phos 4.7  Mag 1.89  Vitamin D level 8    Nutrition Specific Mediations:    Current Facility-Administered Medications:     acetaminophen (Tylenol) tablet 650 mg, 650 mg, oral, q8h PRN, WOO Paz-CNP, 650 mg at 10/29/23 2034    albuterol 2.5 mg /3 mL (0.083 %) nebulizer solution 2.5 mg, 2.5 mg, nebulization, q12h, WOO Paz-CNP, 2.5 mg at 11/07/23 0826    dextrose 10 % in water (D10W) infusion, 0.3 g/kg/hr, intravenous, Once PRN, Paris Mireles APRN-CNP, Stopped at 10/18/23 0800    dextrose 50  % injection 25 g, 25 g, intravenous, q15 min PRN, DOLLY Paz    diclofenac sodium (Voltaren) 1 % gel 1 Application, 4 g, Topical, 4x daily PRN, DOLLY Paz, 1 Application at 11/05/23 0850    ergocalciferol (Vitamin D-2) drops 8,000 Units, 8,000 Units, oral, Daily, DOLLY Paz, 8,000 Units at 11/06/23 0828    erythromycin (Romycin) 5 mg/gram (0.5 %) ophthalmic ointment 1 cm, 1 cm, Both Eyes, 4x daily, DOLLY Paz, 1 cm at 11/07/23 1245    folic acid (Folvite) tablet 1 mg, 1 mg, oral, Daily, DOLLY Paz, 1 mg at 11/07/23 1010    glucagon (Glucagen) injection 1 mg, 1 mg, intramuscular, q15 min PRN, DOLLY Paz    heparin (porcine) injection 2,000-4,000 Units, 2,000-4,000 Units, intravenous, q4h PRN, DOLLY Paz, 2,000 Units at 10/25/23 0857    heparin 25,000 Units in dextrose 5% 250 mL (100 Units/mL) infusion (premix), 0-4,500 Units/hr, intravenous, Continuous, DOLLY Paz, Last Rate: 13 mL/hr at 11/07/23 1200, 1,300 Units/hr at 11/07/23 1200    HYDROmorphone (Dilaudid) injection 0.2 mg, 0.2 mg, intravenous, q6h PRN, Dakota Teresa, APRN-CNP, 0.2 mg at 11/07/23 1105    lidocaine 4 % patch 1 patch, 1 patch, transdermal, Daily, DOLLY Paz, 1 patch at 11/07/23 1010    lidocaine 4 % patch 2 patch, 2 patch, transdermal, Daily, DOLLY Paz, 2 patch at 11/07/23 1010    loperamide (Imodium) capsule 2 mg, 2 mg, oral, 4x daily PRN, DOLLY Paz, 2 mg at 11/06/23 0829    lubricating eye drops ophthalmic solution 1 drop, 1 drop, Both Eyes, 4x daily, DOLLY Paz, 1 drop at 11/07/23 1245    melatonin tablet 5 mg, 5 mg, oral, Once, DOLLY Paz    micafungin (Mycamine) 100 mg in dextrose 5 % in water (D5W) 100 mL IV, 100 mg, intravenous, q24h, DOLLY Paz, Stopped at 11/07/23  1345    midodrine (Proamatine) tablet 10 mg, 10 mg, oral, TID with meals, DOLLY Paz, 10 mg at 11/07/23 1532    moisturizing mouth (Biotene Dry Mouth) solution 15 mL, 15 mL, Swish & Spit, TID PRN, DOLLY Paz, 15 mL at 11/05/23 0338    multivitamin with minerals 1 tablet, 1 tablet, oral, Daily, DOLLY Paz, 1 tablet at 11/07/23 1010    nicotine (Nicoderm CQ) 21 mg/24 hr patch 1 patch, 1 patch, transdermal, Daily, DOLLY Paz, 1 patch at 11/07/23 1010    oxyCODONE (Roxicodone) immediate release tablet 5 mg, 5 mg, oral, q6h PRN, DOLLY Paz, 5 mg at 11/07/23 0637    oxygen (O2) therapy, , inhalation, Continuous PRN - O2/gases, DOLLY Paz, Rate Verify at 11/07/23 1200    propofol (Diprivan) injection 25 mg, 25 mg, intravenous, PRN, DOLLY Paz, 100 mg at 11/06/23 1423    sennosides (Senokot) tablet 8.6 mg, 1 tablet, oral, Nightly PRN, DOLLY Paz    sodium chloride 7 % nebulizer solution 3 mL, 3 mL, nebulization, q12h, DOLLY Paz, 3 mL at 11/07/23 0826    thiamine (Vitamin B-1) tablet 100 mg, 100 mg, oral, Daily, DOLLY Paz, 100 mg at 11/07/23 1010    white petrolatum (Aquaphor) 41 % ointment 1 Application, 1 Application, Topical, q1h PRN, DOLLY Paz          Dietary Orders (From admission, onward)       Start     Ordered    11/05/23 0345  Enteral feeding with NPO [employer] Peptide 1.5; 45; 200; Water; Tap water; Every 6 hours  Diet effective now        Comments: Continue at currently rate, increase by 10mL q8hr until goal rate of 45mL/hr reached    Can have sponges to wet mouth   Question Answer Comment   Tube feeding formula: [employer] Peptide 1.5    Tube feeding continuous rate (mL/hr): 45    Tube feeding flush (mL): 200    Flush type: Water    Water type: Tap water    Flush frequency: Every 6 hours        11/05/23  0345                     Estimated Needs:   Total Energy Estimated Needs (kCal):  (3021-5825)   Method for Estimating Needs: MSJ= 1236    Total Protein Estimated Needs (g):  (70)   Method for Estimating Needs: 49.7kg x 1.4+          Nutrition Diagnosis   Nutrition Diagnosis:  Malnutrition Diagnosis  Patient has Malnutrition Diagnosis: Yes  Diagnosis Status: Ongoing  Malnutrition Diagnosis: Severe malnutrition related to chronic disease or condition  As Evidenced by: suspect poor PO intake PTA in light of EtOH abuse as well as noted severe muscle and fat loss on physical exam; he is underweight for height with a BMI of 17.2 and DBW of 74%        TF at goal= 1660kcals, 83grams protein daily.    Nutrition Interventions/Recommendations   Nutrition Interventions and Recommendations:        Nutrition Prescription:   May need to consider PEG for long-term enteral access.  For now, can continue with [employer] Peptide 1.5 at 45mls/hr.  As desired, can trial bolus feeds: 275mls of [employer] Peptide 1.5 given 4 times daily.  Flush with 60mls of water before and after each bolus.  Other water flushes per team's discretion.    Can discontinue folic acid and thiamin supplements at this point.  May also want to discontinue order for PRN sennosides.    If stool output remains high, can consider scheduling Imodium instead of PRN order.            Time Spent/Follow-up Reminder:   Follow Up  Time Spent (min): 60 minutes  Last Date of Nutrition Visit: 11/07/23  Nutrition Follow-Up Needed?: Dietitian to reassess per policy  Follow up Comment: TF via dobhoff

## 2023-11-08 ENCOUNTER — APPOINTMENT (OUTPATIENT)
Dept: RADIOLOGY | Facility: HOSPITAL | Age: 68
End: 2023-11-08
Payer: COMMERCIAL

## 2023-11-08 LAB
ALBUMIN SERPL BCP-MCNC: 2.6 G/DL (ref 3.4–5)
ANION GAP SERPL CALC-SCNC: 14 MMOL/L (ref 10–20)
BASOPHILS # BLD AUTO: 0.11 X10*3/UL (ref 0–0.1)
BASOPHILS NFR BLD AUTO: 0.9 %
BUN SERPL-MCNC: 14 MG/DL (ref 6–23)
CALCIUM SERPL-MCNC: 8.3 MG/DL (ref 8.6–10.6)
CHLORIDE SERPL-SCNC: 95 MMOL/L (ref 98–107)
CO2 SERPL-SCNC: 28 MMOL/L (ref 21–32)
CREAT SERPL-MCNC: 0.36 MG/DL (ref 0.5–1.3)
EOSINOPHIL # BLD AUTO: 0.39 X10*3/UL (ref 0–0.7)
EOSINOPHIL NFR BLD AUTO: 3.1 %
ERYTHROCYTE [DISTWIDTH] IN BLOOD BY AUTOMATED COUNT: 13.5 % (ref 11.5–14.5)
FUNGUS SPEC CULT: ABNORMAL
FUNGUS SPEC FUNGUS STN: ABNORMAL
GFR SERPL CREATININE-BSD FRML MDRD: >90 ML/MIN/1.73M*2
GLUCOSE SERPL-MCNC: 103 MG/DL (ref 74–99)
HCT VFR BLD AUTO: 32.3 % (ref 41–52)
HGB BLD-MCNC: 10.4 G/DL (ref 13.5–17.5)
IMM GRANULOCYTES # BLD AUTO: 0.25 X10*3/UL (ref 0–0.7)
IMM GRANULOCYTES NFR BLD AUTO: 2 % (ref 0–0.9)
LYMPHOCYTES # BLD AUTO: 1.17 X10*3/UL (ref 1.2–4.8)
LYMPHOCYTES NFR BLD AUTO: 9.2 %
MAGNESIUM SERPL-MCNC: 1.59 MG/DL (ref 1.6–2.4)
MCH RBC QN AUTO: 29.2 PG (ref 26–34)
MCHC RBC AUTO-ENTMCNC: 32.2 G/DL (ref 32–36)
MCV RBC AUTO: 91 FL (ref 80–100)
MONOCYTES # BLD AUTO: 1.17 X10*3/UL (ref 0.1–1)
MONOCYTES NFR BLD AUTO: 9.2 %
NEUTROPHILS # BLD AUTO: 9.56 X10*3/UL (ref 1.2–7.7)
NEUTROPHILS NFR BLD AUTO: 75.6 %
NRBC BLD-RTO: 0 /100 WBCS (ref 0–0)
PHOSPHATE SERPL-MCNC: 5 MG/DL (ref 2.5–4.9)
PLATELET # BLD AUTO: 348 X10*3/UL (ref 150–450)
POTASSIUM SERPL-SCNC: 4.9 MMOL/L (ref 3.5–5.3)
RBC # BLD AUTO: 3.56 X10*6/UL (ref 4.5–5.9)
SODIUM SERPL-SCNC: 132 MMOL/L (ref 136–145)
UFH PPP CHRO-ACNC: 0.5 IU/ML
WBC # BLD AUTO: 12.7 X10*3/UL (ref 4.4–11.3)

## 2023-11-08 PROCEDURE — 2500000005 HC RX 250 GENERAL PHARMACY W/O HCPCS: Performed by: NURSE PRACTITIONER

## 2023-11-08 PROCEDURE — 71260 CT THORAX DX C+: CPT

## 2023-11-08 PROCEDURE — 36415 COLL VENOUS BLD VENIPUNCTURE: CPT | Performed by: NURSE PRACTITIONER

## 2023-11-08 PROCEDURE — 97110 THERAPEUTIC EXERCISES: CPT | Mod: GO

## 2023-11-08 PROCEDURE — 99233 SBSQ HOSP IP/OBS HIGH 50: CPT | Performed by: INTERNAL MEDICINE

## 2023-11-08 PROCEDURE — 84100 ASSAY OF PHOSPHORUS: CPT | Performed by: NURSE PRACTITIONER

## 2023-11-08 PROCEDURE — 2500000002 HC RX 250 W HCPCS SELF ADMINISTERED DRUGS (ALT 637 FOR MEDICARE OP, ALT 636 FOR OP/ED): Performed by: NURSE PRACTITIONER

## 2023-11-08 PROCEDURE — 2500000004 HC RX 250 GENERAL PHARMACY W/ HCPCS (ALT 636 FOR OP/ED)

## 2023-11-08 PROCEDURE — 2500000001 HC RX 250 WO HCPCS SELF ADMINISTERED DRUGS (ALT 637 FOR MEDICARE OP)

## 2023-11-08 PROCEDURE — 2500000001 HC RX 250 WO HCPCS SELF ADMINISTERED DRUGS (ALT 637 FOR MEDICARE OP): Performed by: NURSE PRACTITIONER

## 2023-11-08 PROCEDURE — 83735 ASSAY OF MAGNESIUM: CPT | Performed by: NURSE PRACTITIONER

## 2023-11-08 PROCEDURE — 97530 THERAPEUTIC ACTIVITIES: CPT | Mod: GO

## 2023-11-08 PROCEDURE — 71260 CT THORAX DX C+: CPT | Performed by: RADIOLOGY

## 2023-11-08 PROCEDURE — 2500000004 HC RX 250 GENERAL PHARMACY W/ HCPCS (ALT 636 FOR OP/ED): Performed by: NURSE PRACTITIONER

## 2023-11-08 PROCEDURE — 85520 HEPARIN ASSAY: CPT | Performed by: NURSE PRACTITIONER

## 2023-11-08 PROCEDURE — 2550000001 HC RX 255 CONTRASTS: Performed by: INTERNAL MEDICINE

## 2023-11-08 PROCEDURE — 97530 THERAPEUTIC ACTIVITIES: CPT | Mod: GP

## 2023-11-08 PROCEDURE — 71045 X-RAY EXAM CHEST 1 VIEW: CPT | Mod: FY

## 2023-11-08 PROCEDURE — 71045 X-RAY EXAM CHEST 1 VIEW: CPT | Performed by: RADIOLOGY

## 2023-11-08 PROCEDURE — 94640 AIRWAY INHALATION TREATMENT: CPT

## 2023-11-08 PROCEDURE — 85025 COMPLETE CBC W/AUTO DIFF WBC: CPT | Performed by: NURSE PRACTITIONER

## 2023-11-08 PROCEDURE — 94668 MNPJ CHEST WALL SBSQ: CPT

## 2023-11-08 PROCEDURE — S4991 NICOTINE PATCH NONLEGEND: HCPCS | Performed by: NURSE PRACTITIONER

## 2023-11-08 PROCEDURE — 2060000001 HC INTERMEDIATE ICU ROOM DAILY

## 2023-11-08 PROCEDURE — 97110 THERAPEUTIC EXERCISES: CPT | Mod: GP

## 2023-11-08 PROCEDURE — 80069 RENAL FUNCTION PANEL: CPT | Performed by: NURSE PRACTITIONER

## 2023-11-08 RX ORDER — LOPERAMIDE HYDROCHLORIDE 2 MG/1
2 CAPSULE ORAL 4 TIMES DAILY
Status: DISCONTINUED | OUTPATIENT
Start: 2023-11-08 | End: 2023-11-09

## 2023-11-08 RX ORDER — MAGNESIUM SULFATE HEPTAHYDRATE 40 MG/ML
2 INJECTION, SOLUTION INTRAVENOUS ONCE
Status: COMPLETED | OUTPATIENT
Start: 2023-11-08 | End: 2023-11-08

## 2023-11-08 RX ADMIN — MIDODRINE HYDROCHLORIDE 10 MG: 10 TABLET ORAL at 08:37

## 2023-11-08 RX ADMIN — LOPERAMIDE HYDROCHLORIDE 2 MG: 2 CAPSULE ORAL at 06:27

## 2023-11-08 RX ADMIN — MIDODRINE HYDROCHLORIDE 10 MG: 10 TABLET ORAL at 14:18

## 2023-11-08 RX ADMIN — HYDROMORPHONE HYDROCHLORIDE 0.2 MG: 1 INJECTION, SOLUTION INTRAMUSCULAR; INTRAVENOUS; SUBCUTANEOUS at 17:05

## 2023-11-08 RX ADMIN — HEPARIN SODIUM 1300 UNITS/HR: 10000 INJECTION, SOLUTION INTRAVENOUS at 23:04

## 2023-11-08 RX ADMIN — MICAFUNGIN SODIUM 100 MG: 50 INJECTION, POWDER, LYOPHILIZED, FOR SOLUTION INTRAVENOUS at 13:39

## 2023-11-08 RX ADMIN — Medication 1 TABLET: at 09:00

## 2023-11-08 RX ADMIN — CARBOXYMETHYLCELLULOSE SODIUM 1 DROP: 5 SOLUTION/ DROPS OPHTHALMIC at 06:27

## 2023-11-08 RX ADMIN — Medication 5 MG: at 22:42

## 2023-11-08 RX ADMIN — ERYTHROMYCIN 1 CM: 5 OINTMENT OPHTHALMIC at 14:17

## 2023-11-08 RX ADMIN — LIDOCAINE 2 PATCH: 4 PATCH TOPICAL at 08:40

## 2023-11-08 RX ADMIN — ERYTHROMYCIN 1 CM: 5 OINTMENT OPHTHALMIC at 22:44

## 2023-11-08 RX ADMIN — OXYCODONE HYDROCHLORIDE 5 MG: 5 TABLET ORAL at 14:18

## 2023-11-08 RX ADMIN — CARBOXYMETHYLCELLULOSE SODIUM 1 DROP: 5 SOLUTION/ DROPS OPHTHALMIC at 19:57

## 2023-11-08 RX ADMIN — LIDOCAINE 1 PATCH: 4 PATCH TOPICAL at 08:39

## 2023-11-08 RX ADMIN — Medication 1 PATCH: at 08:37

## 2023-11-08 RX ADMIN — Medication 8000 UNITS: at 08:37

## 2023-11-08 RX ADMIN — CARBOXYMETHYLCELLULOSE SODIUM 1 DROP: 5 SOLUTION/ DROPS OPHTHALMIC at 14:17

## 2023-11-08 RX ADMIN — MAGNESIUM SULFATE HEPTAHYDRATE 2 G: 40 INJECTION, SOLUTION INTRAVENOUS at 08:44

## 2023-11-08 RX ADMIN — IOHEXOL 63 ML: 350 INJECTION, SOLUTION INTRAVENOUS at 12:36

## 2023-11-08 RX ADMIN — MIDODRINE HYDROCHLORIDE 10 MG: 10 TABLET ORAL at 19:56

## 2023-11-08 RX ADMIN — ALBUTEROL SULFATE 2.5 MG: 2.5 SOLUTION RESPIRATORY (INHALATION) at 09:25

## 2023-11-08 RX ADMIN — Medication 3 ML: at 09:25

## 2023-11-08 RX ADMIN — ALBUTEROL SULFATE 2.5 MG: 2.5 SOLUTION RESPIRATORY (INHALATION) at 21:43

## 2023-11-08 RX ADMIN — LOPERAMIDE HYDROCHLORIDE 2 MG: 2 CAPSULE ORAL at 14:18

## 2023-11-08 RX ADMIN — CARBOXYMETHYLCELLULOSE SODIUM 1 DROP: 5 SOLUTION/ DROPS OPHTHALMIC at 22:44

## 2023-11-08 RX ADMIN — LOPERAMIDE HYDROCHLORIDE 2 MG: 2 CAPSULE ORAL at 19:56

## 2023-11-08 RX ADMIN — HEPARIN SODIUM 1300 UNITS/HR: 10000 INJECTION, SOLUTION INTRAVENOUS at 04:42

## 2023-11-08 RX ADMIN — LOPERAMIDE HYDROCHLORIDE 2 MG: 2 CAPSULE ORAL at 22:44

## 2023-11-08 RX ADMIN — ERYTHROMYCIN 1 CM: 5 OINTMENT OPHTHALMIC at 08:41

## 2023-11-08 ASSESSMENT — PAIN SCALES - GENERAL
PAINLEVEL_OUTOF10: 0 - NO PAIN
PAINLEVEL_OUTOF10: 0 - NO PAIN
PAINLEVEL_OUTOF10: 7
PAINLEVEL_OUTOF10: 0 - NO PAIN
PAINLEVEL_OUTOF10: 10 - WORST POSSIBLE PAIN
PAINLEVEL_OUTOF10: 0 - NO PAIN

## 2023-11-08 ASSESSMENT — PAIN DESCRIPTION - LOCATION
LOCATION: WRIST
LOCATION: GENERALIZED

## 2023-11-08 ASSESSMENT — COGNITIVE AND FUNCTIONAL STATUS - GENERAL
CLIMB 3 TO 5 STEPS WITH RAILING: TOTAL
DAILY ACTIVITIY SCORE: 7
MOBILITY SCORE: 6
TOILETING: TOTAL
DRESSING REGULAR UPPER BODY CLOTHING: TOTAL
TURNING FROM BACK TO SIDE WHILE IN FLAT BAD: TOTAL
HELP NEEDED FOR BATHING: TOTAL
WALKING IN HOSPITAL ROOM: TOTAL
STANDING UP FROM CHAIR USING ARMS: TOTAL
PERSONAL GROOMING: A LOT
MOVING FROM LYING ON BACK TO SITTING ON SIDE OF FLAT BED WITH BEDRAILS: TOTAL
DRESSING REGULAR LOWER BODY CLOTHING: TOTAL
MOVING TO AND FROM BED TO CHAIR: TOTAL
EATING MEALS: TOTAL

## 2023-11-08 ASSESSMENT — PAIN - FUNCTIONAL ASSESSMENT
PAIN_FUNCTIONAL_ASSESSMENT: 0-10

## 2023-11-08 ASSESSMENT — PAIN DESCRIPTION - ORIENTATION: ORIENTATION: RIGHT;LEFT

## 2023-11-08 NOTE — PROGRESS NOTES
Occupational Therapy    Occupational Therapy Treatment    Name: Molina Godinez  MRN: [id number]  : 1955  Date: 23  Time Calculation  Start Time: 1006  Stop Time: 1036  Time Calculation (min): 30 min    Assessment:  End of Session Communication: Bedside nurse  End of Session Patient Position: Bed, 3 rail up, Alarm off, not on at start of session  Plan:  Subjective   General:  OT Last Visit  OT Received On: 23  General  Reason for Referral: patient now extubated, on AirVo  Past Medical History Relevant to Rehab: COPD, HTN, AUD and opioid use disorder, HCV, chronic PE; recent falls?  Family/Caregiver Present: No  Caregiver Feedback: sister initially in the room, left during session  Co-Treatment: PT  Co-Treatment Reason: d/t AMPAC <10, patient has required DEPx2 for all mobility during previous therapy sessions  Prior to Session Communication: Bedside nurse  Patient Position Received: Bed, 3 rail up, Alarm off, not on at start of session  Preferred Learning Style: verbal, visual  General Comment: patient supine in bed on arrival, awake and alert; reports he has had diarrhea today, agreeable to OT; on 2L nasal cannula, dobhoff, external catheter  Vitals:  Vital Signs  Heart Rate:  (pre 96, post 96)  Resp:  (pre 27, post 26)  SpO2:  (pre 97%, post 96%)  BP:  (pre 110/72, post 123/76)  MAP (mmHg):  (during session MAP maintained >65, post 91)  Pain Assessment:  Pain Assessment  Pain Assessment: 0-10  Pain Score:  (patient c/o pain in (B) ankles/feet, did not rate)     Objective      Bed Mobility/Transfers: Bed Mobility  Bed Mobility: Yes  Bed Mobility 1  Bed Mobility 1: Supine to sitting, Sitting to supine  Level of Assistance 1: Dependent, Moderate verbal cues  Bed Mobility Comments 1: x2 assist, HOB elevated for supine to sit, draw sheet  Bed Mobility 2  Bed Mobility  2: Supine to sitting, Sitting to supine  Level of Assistance 2: Dependent  Bed Mobility Comments 2: x2  assist, draw sheet, HOB  elevated    Transfers  Transfer: No       Therapy/Activity: Therapeutic Exercise  Therapeutic Exercise Performed: Yes  Therapeutic Exercise Activity 1: Completed x12 reps of (B) shoulder flexion/extension AAROM, limited flexion (L) shoulder 2/2 pain, (B) elbow flexion/extension AAROM, (B) wrist flexion/extension, (B) grasp/release; patient demonstrates improved active movement in UEs this session.    Therapeutic Activity  Therapeutic Activity Performed: Yes  Therapeutic Activity 1: Patient sat EOB x18 minutes with max-dependent A for balance. Patient with posterior lean at times and minimal ability to correct, did attempt to use (B)UEs by holding OT's hands to attempt to pull self anteriorly; able to sustain sitting balance for brief moments with max A and increased cues.     Cognitive Skill Development:  Cognitive Skill Development  Cognitive Skill Development Activity 1: Forward reaching x 5 reps for each arm with guiding assist to complete. Sensory stimulation with face washing for increasing alertness at start of session. Simple cues for increased engagement.  Vision:     Strength:  Strength  Strength Comments: global BUE and BLE weakness; B shoulder flexion <2+/5, elbow flexion/ext <3-/5,  3/5 (PF/DF <3-/5, knee ext </=3-/5, hip flexion </=2+/5; LLE appears weaker than L)  Other Activity:    Outcome Measures:  [facility] Daily Activity  Putting on and taking off regular lower body clothing: Total  Bathing (including washing, rinsing, drying): Total  Putting on and taking off regular upper body clothing: Total  Toileting, which includes using toilet, bedpan or urinal: Total  Taking care of personal grooming such as brushing teeth: A lot  Eating Meals: Total  Daily Activity - Total Score: 7    ,  , and E = Exercise and Early Mobility  Current Activity: Sitting at edge of bed    Education Documentation  ADL Training, taught by Stephanie Salmon OT at 11/8/2023 12:08 PM.  Learner: Patient  Readiness:  Acceptance  Method: Explanation  Response: Needs Reinforcement    Education Comments  No comments found.      Goals:  Encounter Problems       Encounter Problems (Active)       ADLs       Patient will complete grooming tasks with MOD A with min v/c.  (Progressing)       Start:  10/19/23    Expected End:  11/20/23               BALANCE       Pt. will sit EOB for at least 8 min with MOD A in prep for EOB ADLs.  (Progressing)       Start:  10/19/23    Expected End:  11/20/23               COGNITION/SAFETY       Patient will follow 1 step commands with 75% accuracy and min v/c.  (Progressing)       Start:  10/19/23    Expected End:  11/20/23               EXERCISE/STRENGTHENING       Patient will demo BUE strength of 3/5 for B elbow, wrist, hand.  (Progressing)       Start:  10/19/23    Expected End:  11/20/23

## 2023-11-08 NOTE — PROGRESS NOTES
Physical Therapy    Physical Therapy Treatment    Patient Name: Molina Godinez  MRN: [id number]  Today's Date: 11/8/2023  Time Calculation  Start Time: 1004  Stop Time: 1034  Time Calculation (min): 30 min       Assessment/Plan   PT Assessment  End of Session Communication: Bedside nurse (CNP)  End of Session Patient Position: Bed, 3 rail up, Alarm off, not on at start of session     PT Plan  PT Discharge Recommendations: Moderate intensity level of continued care    General Visit Information:   PT  Visit  PT Received On: 11/08/23  General  Co-Treatment: OT  Co-Treatment Reason: d/t AMPAC <10, patient has required DEPx2 for all mobility during subsequent therapy sessions; RN unavailable to provide assist for entirety of the session  Prior to Session Communication: Bedside nurse (CNP)  Patient Position Received: Bed, 3 rail up, Alarm off, not on at start of session  General Comment: patient reporting having frequent diarrheaa, with encouragement, willing to participate in therapy. With increase assist, patient able to complete bed mobility, sit EOB x15 mins with DEP-MAXx1 assist. Patient completed BLE ther-ex at bedside. Patient continues to demo profound weakness, espeically proximally.    Subjective   Precautions:  Precautions  Medical Precautions: Fall precautions, Oxygen therapy device and L/min  Vital Signs:  Vital Signs  Heart Rate:  (pre: 95 post: 96)  Resp:  (pre: 27 post: 26)  SpO2:  (pre: 97% post: 97% on 2L)  BP:  (pre: 110/72 (MAP 83) sitting EOB: MAP >65 with SCDs still ON, patient reported dizziness, post in supine: 123/76 (MAP 91))  BP Location: Left arm  BP Method: Automatic    Objective   Pain:  Pain Assessment  Pain Assessment: 0-10  Pain Score:  (did not rate numerically)  Pain Type:  (stiffness reported)  Pain Location:  (patient pointed to BLEs (distally))  Pain Interventions: Repositioned  Cognition:  Cognition  Overall Cognitive Status: Within Functional Limits  Orientation Level:  (AxO  x3)  Postural Control:  Postural Control  Postural Control: Impaired  Head Control: forward flexed posture, increase difficulty fully extending cervical spine, able to initiate c-spine ext however, limited available cervical ext AROM/AAROM/PROM  Trunk Control: poor trunk control; DEPx1 sitting EOB with brief MAXx1 after cueing and assist to shift COM more anterior over seated ARTEMIO, retrolean with decrease ability to use BUEs to support self in midline. EOB ~15 minutes. Patient does appear to attempt to initiate trunk activation to assist with maintaining sitting balance.  Righting Reactions: impaired  Protective Responses: limited ability to use BUEs to support self    Activity Tolerance:  Activity Tolerance  Endurance: Tolerates 10 - 20 min exercise with multiple rests  Early Mobility/Exercise Safety Screen: Proceed with mobilization - No exclusion criteria met  Treatments:  Therapeutic Exercise  Therapeutic Exercise Performed: Yes  Therapeutic Exercise Activity 1: sitting EOB: LAQ 1x10 B with increase assist, R>LLE      Bed Mobility  Bed Mobility: Yes  Bed Mobility 1  Bed Mobility 1: Supine to sitting, Sitting to supine  Level of Assistance 1: Dependent  Bed Mobility Comments 1: x2 assist, HOB elevated for supine to sit, draw sheet  Bed Mobility 2  Bed Mobility  2: Supine to sitting, Sitting to supine  Level of Assistance 2: Dependent  Bed Mobility Comments 2: x2 assist, draw sheet, HOB elevated    Outcome Measures:  [facility] Basic Mobility  Turning from your back to your side while in a flat bed without using bedrails: Total  Moving from lying on your back to sitting on the side of a flat bed without using bedrails: Total  Moving to and from bed to chair (including a wheelchair): Total  Standing up from a chair using your arms (e.g. wheelchair or bedside chair): Total  To walk in hospital room: Total  Climbing 3-5 steps with railing: Total  Basic Mobility - Total Score: 6    Education Documentation  Mobility  Training, taught by Tameka Velasquez, PT at 11/8/2023  1:14 PM.  Learner: Patient  Readiness: Acceptance  Method: Explanation  Response: Needs Reinforcement    Education Comments  No comments found.           Encounter Problems       Encounter Problems (Active)       Balance       Patient will complete static (Cgx1) and dynamic (MINx1) using BUE support as needed in order to maintain midline without acute LOB   (Progressing)       Start:  10/19/23    Expected End:  11/09/23               Mobility       STG - Patient will ambulate >/=10 ft using LRD as needed with MODx1 without acute LOB  (Not Progressing)       Start:  10/19/23    Expected End:  11/09/23            patient will complete BLE there-ex in order to improve strength and to assist with the completion of functional mobility tasks.  (Progressing)       Start:  10/19/23    Expected End:  11/09/23                              Transfers       STG - Transfer from bed to chair with MODx1 without acute LOB  (Not Progressing)       Start:  10/19/23    Expected End:  11/09/23            STG - Patient will perform bed mobility with MINx1 assist  (Progressing)       Start:  10/19/23    Expected End:  11/09/23            STG - Patient will transfer sit to and from stand with MODx1 using LRD as needed without acute LOB  (Not Progressing)       Start:  10/19/23    Expected End:  11/09/23

## 2023-11-08 NOTE — CONSULTS
Reason for consult: left eye pain and blurred vision, fungemia     HPI: 67yo M with PMHx of HTN, alcohol use disorder, opiate use disorder, OUD, HCV s/p treatment, chronic PE, COPD, who presented with EtOH withdrawal developed AHRF and septic shock 2/2 stenotrophomonas PNA.  course c/b KRYSTEN and uremic encephalopathy now on HD. Also with concerns for tricuspid valve endocarditis. Developed fungemia on 11/4     Patient states that over the last 2 weeks, he has noticed blurred vision in both eyes and left eye pain, described as a gritty sensation. He denies flashes of light, floating spots, double vision, or sudden vision loss.      Past Medical History: as above  Family History: reviewed and not pertinent to chief complaint  Medications: please refer to medication reconciliation  Allergies: please refer to patient allergy list     OCULAR EXAMINATION:  Near visual acuity with +2.50 readers correction: right eye (OD) 20/40 phni, left eye (OS) 20/25 phni  Pupils: 5->3 both eyes, sluggish, no RAPD  IOP:  8 right eye (OD), 8 left eye (OS)   Motility: EOM full both eyes  Confrontation visual fields: Full to count fingers both eyes (OU)  Color vision: 1/11 (control plate only) both eyes. Patient unsure if he has hx of colorblindness      ANTERIOR SEGMENT: (portable slit lamp exam)  OD:  Lids/Lashes: normal anatomy and position, very infrequent blink  Conjunctiva: white and quiet  Cornea: clear. 1+ spk, rapid tbut  AC: deep and quiet  Iris: round and reactive  Lens: 1-2+ NS, 2+ CS in visual axis     OS:  Lids/Lashes: normal anatomy and position, very infrequent blink  Conjunctiva: white and quiet  Cornea: clear 2-3+ confluent superficial punctate keratitis (SPK) centrally and inferiorly, rapid tbut  AC: deep and quiet, no hypopyon  Iris: round and reactive  Lens: 1+ NS, 2+ CS in visual axis     Phenylephrine 2.5% and Tropicamide 1% drops administered for dilated exam at 4:10 pm   Please note dilation lasts 4-6 hours but can  last as long as 24 hours   Ophthalmology will return shortly to complete exam     DILATED FUNDUS EXAM:  OD:  Cup-to-disc ratio: 0.6  Optic nerve: pink with sharp margins   Vitreous: clear, no vitritis  Macula: flat and attached without lesions  Vessels: normal course and caliber  Periphery: no holes, tears, or detachments, no retinal whitening     OS:  Cup-to-disc ratio: 0.6  Optic nerve: pink with sharp margins   Vitreous: clear, no vitritis   Macula: flat and attached without lesions  Vessels: normal course and caliber  Periphery: no holes, tears, or detachments, no retinal whitening    11/8/23 update: Pt reports vision is stable, blurry both eyes (OU) w large amount of ointment noted both eyes (OU). Right eye (OD) visual acuity (VA) 20/40 PNHI, left eye (OS) 20/30-2 pinhole no improvement (PHNI). Pupils 4>2 both eyes (OU) no relative afferent pupillary defect (RAPD) both eyes (OU). EOMs full, confrontation visual field (VF) full both eyes (OU). SLE with 1+ inf superficial punctate keratitis (SPK), left eye (OS) with 2-3+ inf confluent superficial punctate keratitis (SPK). Full closure on blink noted both eyes (OU).      Assessment:  Patient with blurry vision, gritty foreign body sensation, and fungemia. No signs of fungal endophthalmitis. Management of fungemia per primary bauer.      Recommendations:     #dry eye, both eyes  -Switch to preservative free artificial tears 6x daily both eyes (OU)   -Continue erythromycin QID both eyes (OU) - note ointment will blur vision  -If incomplete eyelid closure is noted while sleeping, recommend taping eye shut overnight    #cataracts both eyes  #enlarged cup to disc ratio, both eyes   -outpatient follow up     Note final upon attending signature.     Annita Cuenca MD  Ophthalmology, PGY-3     Ophthalmology Adult Pager - [phone]  Ophthalmology Pediatrics Pager (M-F 8:00am-5:00pm) - [phone]     For adult follow-up appointments, call: [phone]  For pediatric follow-up  appointments, call: [phone]

## 2023-11-08 NOTE — PROGRESS NOTES
"Molina Godinez is a 68 y.o. male on day 26 of admission presenting with Hospital-acquired pneumonia.    Subjective   Interval History: Seen and examined on step down unit; feels \"ok\"; biggest concern for him is diarrhea; feels like his breathing is doing ok     Review of Systems    Objective   Range of Vitals (last 24 hours)  Heart Rate:  []   Temp:  [36.1 °C (97 °F)-37 °C (98.6 °F)]   Resp:  [15-31]   BP: (101-129)/(26-78)   SpO2:  [96 %-100 %]   Daily Weight  10/02/23 : 57.6 kg (127 lb)    Body mass index is 17.16 kg/m².    Physical Exam  GEN: frail, chronically ill appearing middle aged man, laying in bed, NAD  HEENT: mucous membranes dry   NECK: thin, fat pad loss   RESP: clear and diminished anteriorly; no wheezes or rhonchi, no tachypnea or cyanosis  CV: RRR, S1/S2, blowing systolic murmur; 2+ L radial pulse  ABDOMEN: soft, nontender, BS quiet, nondistended  MSK: no bony deformities, no joint warmth or swelling  EXTREMITIES: warm and well-perfused, thin extremities, no peripheral edema  SKIN: no gross skin lesions  NEURO: alert, answers questions appropriately, moving b/l UE and LE to command, no gross focal deficits  PSYCH: calm and cooperative      Relevant Results  Labs  Results from last 72 hours   Lab Units 11/07/23  0351 11/06/23  0255 11/05/23  0102   WBC AUTO x10*3/uL 12.4* 13.5* 19.1*   HEMOGLOBIN g/dL 11.1* 11.4* 10.5*   HEMATOCRIT % 33.3* 34.6* 31.9*   PLATELETS AUTO x10*3/uL 338 309 308   NEUTROS PCT AUTO % 77.3 78.9 79.6   LYMPHS PCT AUTO % 9.0 6.4 6.5   MONOS PCT AUTO % 8.2 8.5 8.2   EOS PCT AUTO % 2.8 3.2 2.6     Results from last 72 hours   Lab Units 11/07/23  0351 11/05/23  2150 11/05/23  0102   SODIUM mmol/L 133* 134* 137   POTASSIUM mmol/L 4.9 4.6 4.4   CHLORIDE mmol/L 96* 101 99   CO2 mmol/L 26 26 28   BUN mg/dL 17 25* 36*   CREATININE mg/dL 0.37* 0.28* 0.42*   GLUCOSE mg/dL 103* 91 108*   CALCIUM mg/dL 8.5* 8.1* 8.5*   ANION GAP mmol/L 16 12 14   EGFR mL/min/1.73m*2 >90 >90 >90 "   PHOSPHORUS mg/dL 4.7 4.5 4.9     Results from last 72 hours   Lab Units 11/07/23  1256 11/07/23  0351 11/05/23  2150   ALK PHOS U/L 169*  --   --    BILIRUBIN TOTAL mg/dL 0.3  --   --    BILIRUBIN DIRECT mg/dL 0.1  --   --    PROTEIN TOTAL g/dL 6.0*  --   --    ALT U/L 37  --   --    AST U/L 35  --   --    ALBUMIN g/dL 2.3* 2.9* 2.4*     Estimated Creatinine Clearance: 125 mL/min (A) (by C-G formula based on SCr of 0.37 mg/dL (L)).  C-Reactive Protein   Date Value Ref Range Status   11/05/2023 12.34 (H) <1.00 mg/dL Final   11/04/2023 14.95 (H) <1.00 mg/dL Final   11/03/2023 18.80 (H) <1.00 mg/dL Final     Microbiology  Susceptibility data from last 14 days.  Collected Specimen Info Organism Fluconazole Levofloxacin Micafungin Minocycline Trimethoprim/Sulfamethoxazole   11/05/23 Fluid from Bronchial Washings  Stenotrophomonas maltophilia group  R  S S     Normal throat inocente        10/31/23 Blood culture from Peripheral Venipuncture Candida albicans S  S         Imaging    CXR 11/6    IMPRESSION:  1. Redemonstrated significant opacification and volume loss involving  left hemithorax with interval slight improvement in left upper to mid  lung aeration, status post bronchoscopy. No pneumothorax.  2. Medical devices as above.       Assessment/Plan   Molina Godinez is a 69 y/o man pmhx sig for AUD, opioid use disorder (not clear if hx of injection use), HCV, PE on AC admitted 10/11 with difficulty ambulating and treated for ETOH withdrawal and PNA, transferred back to ICU with resp distress and resp cx w/ Stenotrophomonas, treated w/ TMP-SMX x 7 days. Developed persistent leukocytosis, new pressor requirement and worsening O2 started on vanc and ari. Blood cx positive 10/31 for Candida albicans. Has cleared his fungemia since 11/3. During his MICU admission he has had opacification of L lung, suspected due to mucous plugging. Had repeat bronch on 11/5 growing Stenotrophomonas again, possible a colonizing organism.  Currently is on 4L, lower suspicion for infectious PNA per primary team after MICU work up. Pt's main concern currently is diarrhea, potentially from his tube feeds; C. Diff neg. For now would continue treatment for his fungemia and monitor for any worsening in resp status which might necessitate re-treatment of his Steno vs eval of new infection.        #Fungemia w/ Candida albicans   #L lung opacification         Recommendations;  -Cont IV micafungin 100mg q24H through 11/17   -Continue to monitor for signs of worsening PNA        Pt seen w/ Dr Irving, will sign off but please page w/ any questions         Gi Banks DO  Infectious Disease Fellow, PGY5  Epic Chat until 5pm/Team A pager [phone]

## 2023-11-08 NOTE — PROGRESS NOTES
Speech-Language Pathology             Therapy Communication Note     Patient Name: Molina Godinez  MRN:  [id number]  Today's Date:  11/08/23  Missed Time: 1122     Missed Visit Reason:  Per CNP pt continues to be significantly weak and not appropriate for swallow re-evaluation. Plan to hold at this time. May consider re-eval of swallow early next week per CNP. RN notified.

## 2023-11-08 NOTE — SIGNIFICANT EVENT
Rapid Response RN Note    RADAR score 6 due to the following VS: T 36.5 °Celsius; HR 99 ; RR 29; /66; SPO2 96%.     Reviewed above VS with bedside RN who indicated that Vital signs are within the patient's current trends.  No acute change in condition.  No interventions by rapid response team indicated.

## 2023-11-08 NOTE — PROGRESS NOTES
"Molina Godinez is a 68 y.o. male on day 27 of admission presenting with Hospital-acquired pneumonia.    Subjective   Pt tolerated his respiratory interventions this AM.  In addition worked with PT/OT and was able to sit at the side of the bed with x2 assist.  Pt indicated that he had a nightmare o/n and would like a sleeping aid this PM.    ROS:  Pt denies any cp/sob, fevers/chills, NVD, or abd pain  Chief Complaint:  \"tired\"       Objective   Physical Exam:  Constitutional: Cachectic male, ill-appearing, in no acute distress   Eyes: Anicteric  ENMT: mucous membranes moist,   Head/Neck: Atraumatic/normocephalic.  Kyphotic  Respiratory/Thorax: Lungs CTAB.  Decreased breath sounds in the left upper and lower lobes.  No adventitious sounds noted.    Cardiovascular: Regular, rate and rhythm, no murmurs, normal S1 and S2  Gastrointestinal: Nondistended, soft, non-tender, BS present x 4  Extremities: No edema  Neurological: alert and oriented x 3, speech clear but soft, follows commands appropriately, no focal deficits   skin: Warm and dry    Vital Signs  Blood pressure 103/65, pulse 98, temperature 36.4 °C (97.5 °F), temperature source Temporal, resp. rate 22, height 1.702 m (5' 7.01\"), weight 51.3 kg (113 lb), SpO2 100 %.  Oxygen Therapy  SpO2: 100 %  Medical Gas Therapy: Supplemental oxygen  O2 Delivery Method: Nasal cannula (4L)       Intake/Output last 3 Shifts:  I/O last 3 completed shifts:  In: 1976 (38.6 mL/kg) [I.V.:481 (9.4 mL/kg); NG/GT:1390; IV Piggyback:105]  Out: 1350 (26.3 mL/kg) [Urine:1350 (0.7 mL/kg/hr)]  Weight: 51.3 kg     Lines and Tubes:  Peripheral IV 10/15/23 20 G Distal;Right;Upper;Ventral Arm (Active)   Placement Date/Time: 10/15/23 0900   Size (Gauge): 20 G  Orientation: Distal;Right;Upper;Ventral  Location: Arm  Site Prep: Chlorhexidine   Insertion attempts: 1  Patient Tolerance: Tolerated well   Number of days: 24       Peripheral IV 10/20/23 22 G Right;Anterior Wrist (Active)   Placement " Date/Time: 10/20/23 1019   Size (Gauge): 22 G  Orientation: Right;Anterior  Location: Wrist  Site Prep: Alcohol;Chlorhexidine   Placed by: Cj Fox  Insertion attempts: 1  Patient Tolerance: Tolerated well   Number of days: 18       Peripheral IV 11/06/23 20 G Left Hand (Active)   Placement Date/Time: 11/06/23 (c) 1428   Size (Gauge): 20 G  Orientation: Left  Location: Hand  Site Prep: Alcohol  Technique: Anatomical landmarks  Insertion attempts: 1   Number of days: 1       NG/OG/Feeding Tube Left nostril (Active)   Placement Date/Time: 10/17/23 1755   Placed by: JALEESA  Tube Length: 80 cm  Tube Location: Left nostril   Number of days: 21       External Urinary Catheter (Active)   Placement Date/Time: 11/04/23 1805     Number of days: 3         Relevant Results    Scheduled medications  albuterol, 2.5 mg, nebulization, q12h  ergocalciferol, 8,000 Units, oral, Daily  erythromycin, 1 cm, Both Eyes, 4x daily  lidocaine, 1 patch, transdermal, Daily  lidocaine, 2 patch, transdermal, Daily  loperamide, 2 mg, oral, 4x daily  lubricating eye drops, 1 drop, Both Eyes, 4x daily  melatonin, 5 mg, oral, Nightly  micafungin, 100 mg, intravenous, q24h  midodrine, 10 mg, oral, TID with meals  multivitamin with minerals, 1 tablet, oral, Daily  nicotine, 1 patch, transdermal, Daily  sodium chloride, 3 mL, nebulization, q12h      Continuous medications  heparin, 0-4,500 Units/hr, Last Rate: 1,300 Units/hr (11/08/23 0442)      PRN medications  PRN medications: acetaminophen, dextrose 10 % in water (D10W), dextrose, diclofenac sodium, glucagon, heparin, HYDROmorphone, moisturizing mouth, oxyCODONE, oxygen, white petrolatum  Results for orders placed or performed during the hospital encounter of 10/11/23 (from the past 24 hour(s))   POCT GLUCOSE   Result Value Ref Range    POCT Glucose 90 74 - 99 mg/dL   CBC and Auto Differential   Result Value Ref Range    WBC 12.7 (H) 4.4 - 11.3 x10*3/uL    nRBC 0.0 0.0 - 0.0 /100 WBCs    RBC 3.56 (L)  4.50 - 5.90 x10*6/uL    Hemoglobin 10.4 (L) 13.5 - 17.5 g/dL    Hematocrit 32.3 (L) 41.0 - 52.0 %    MCV 91 80 - 100 fL    MCH 29.2 26.0 - 34.0 pg    MCHC 32.2 32.0 - 36.0 g/dL    RDW 13.5 11.5 - 14.5 %    Platelets 348 150 - 450 x10*3/uL    Neutrophils % 75.6 40.0 - 80.0 %    Immature Granulocytes %, Automated 2.0 (H) 0.0 - 0.9 %    Lymphocytes % 9.2 13.0 - 44.0 %    Monocytes % 9.2 2.0 - 10.0 %    Eosinophils % 3.1 0.0 - 6.0 %    Basophils % 0.9 0.0 - 2.0 %    Neutrophils Absolute 9.56 (H) 1.20 - 7.70 x10*3/uL    Immature Granulocytes Absolute, Automated 0.25 0.00 - 0.70 x10*3/uL    Lymphocytes Absolute 1.17 (L) 1.20 - 4.80 x10*3/uL    Monocytes Absolute 1.17 (H) 0.10 - 1.00 x10*3/uL    Eosinophils Absolute 0.39 0.00 - 0.70 x10*3/uL    Basophils Absolute 0.11 (H) 0.00 - 0.10 x10*3/uL   Renal function panel   Result Value Ref Range    Glucose 103 (H) 74 - 99 mg/dL    Sodium 132 (L) 136 - 145 mmol/L    Potassium 4.9 3.5 - 5.3 mmol/L    Chloride 95 (L) 98 - 107 mmol/L    Bicarbonate 28 21 - 32 mmol/L    Anion Gap 14 10 - 20 mmol/L    Urea Nitrogen 14 6 - 23 mg/dL    Creatinine 0.36 (L) 0.50 - 1.30 mg/dL    eGFR >90 >60 mL/min/1.73m*2    Calcium 8.3 (L) 8.6 - 10.6 mg/dL    Phosphorus 5.0 (H) 2.5 - 4.9 mg/dL    Albumin 2.6 (L) 3.4 - 5.0 g/dL   Magnesium   Result Value Ref Range    Magnesium 1.59 (L) 1.60 - 2.40 mg/dL   Heparin Assay, UFH   Result Value Ref Range    Heparin Unfractionated 0.5 See Comment Below for Therapeutic Ranges IU/mL                  XR chest 1 view 11/07/2023    Narrative  Interpreted By:  Khanh Weaver,  STUDY:  XR CHEST 1 VIEW;  11/7/2023 9:11 am    INDICATION:  Signs/Symptoms:left lung opacification s/p bronchx2.    COMPARISON:  Chest radiograph dated 11/6/2023and CT scan chest, abdomen pelvis  10/16/2023    ACCESSION NUMBER(S):  [id number]    ORDERING CLINICIAN:  GRANT DE LOS SANTOS    FINDINGS:  AP radiograph of the chest  Enteric tube is again seen coursing below diaphragm and tip  not  included in field of view.    CARDIOMEDIASTINAL SILHOUETTE:  Cardiomediastinal silhouette is significantly obscured on the left,  although slightly better delineated as compared to prior radiograph  from yesterday. There is leftward mediastinal shift again noted.    LUNGS:  Redemonstrated significant opacification left hemithorax with  interval slight improvement in left upper to mid lung aeration. Mild  bandlike right basilar presumed atelectasis. Is no pneumothorax.    ABDOMEN:  No remarkable upper abdominal findings.    BONES:  No acute osseous abnormality. The patient is status post left  shoulder arthroplasty.    Impression  1. Redemonstrated significant opacification and volume loss involving  left hemithorax with interval slight improvement in left upper to mid  lung aeration, status post bronchoscopy. No pneumothorax.  2. Medical devices as above.    Signed by: Khanh Weaver 11/7/2023 12:13 PM  Dictation workstation:   [id number]    XR chest 1 view    Result Date: 11/8/2023  Interpreted By:  Field, Magdy,  and Viv Alycia STUDY: XR CHEST 1 VIEW;  11/8/2023 8:11 am   INDICATION: Signs/Symptoms:Eval of LLL.   COMPARISON: Chest radiograph 11/07/2023   ACCESSION NUMBER(S): [id number]   ORDERING CLINICIAN: RAKESH ELLIS   FINDINGS: Single AP semi-upright portable radiograph of the chest was provided. The feeding tube traverses the esophagus. There is virtually complete opacification of the left thorax. The heart mediastinum are shifted to the left indicating volume loss. Compensatory hyperaeration of the right lung is similar to previous study.   The left shoulder arthroplasty is noted. Bones are diffusely osteopenic.       1. Virtually complete opacification of the left thorax and shift of the heart mediastinum to the left indicating volume loss. Truncation of the mainstem bronchus can be seen with endobronchial obstruction     I personally reviewed the image(s)/study and resident  interpretation as stated by Dr. Alycia Nam MD. I agree with the findings as stated. This study was interpreted at [facility], [place], [place].   MACRO: None   Signed by: Magdy Zamudio 11/8/2023 10:19 AM Dictation workstation:   [id number]    XR chest 1 view    Result Date: 11/7/2023  Interpreted By:  Khanh Weaver, STUDY: XR CHEST 1 VIEW;  11/7/2023 9:11 am   INDICATION: Signs/Symptoms:left lung opacification s/p bronchx2.   COMPARISON: Chest radiograph dated 11/6/2023and CT scan chest, abdomen pelvis 10/16/2023   ACCESSION NUMBER(S): [id number]   ORDERING CLINICIAN: GRANT DE LOS SANTOS   FINDINGS: AP radiograph of the chest Enteric tube is again seen coursing below diaphragm and tip not included in field of view.   CARDIOMEDIASTINAL SILHOUETTE: Cardiomediastinal silhouette is significantly obscured on the left, although slightly better delineated as compared to prior radiograph from yesterday. There is leftward mediastinal shift again noted.   LUNGS: Redemonstrated significant opacification left hemithorax with interval slight improvement in left upper to mid lung aeration. Mild bandlike right basilar presumed atelectasis. Is no pneumothorax.   ABDOMEN: No remarkable upper abdominal findings.   BONES: No acute osseous abnormality. The patient is status post left shoulder arthroplasty.       1. Redemonstrated significant opacification and volume loss involving left hemithorax with interval slight improvement in left upper to mid lung aeration, status post bronchoscopy. No pneumothorax. 2. Medical devices as above.   Signed by: Khanh Weaver 11/7/2023 12:13 PM Dictation workstation:   [id number]    XR chest 1 view    Result Date: 11/7/2023  Interpreted By:  Magdy Zamudio  and Abhijeet Gonzalez STUDY: XR CHEST 1 VIEW; ;  11/6/2023 4:09 pm   INDICATION: Signs/Symptoms:s/p bronchoscopy and suctioning - re-eval if left lung expanded.   COMPARISON: Chest radiograph on 11/06/2023.    ACCESSION NUMBER(S): [id number]   ORDERING CLINICIAN: KAE CARD   FINDINGS: Single AP view of the chest.   Enteric tube coursing below the diaphragm with tip outside the field of view.   The cardiomediastinal silhouette is obscured with persistent right-to-left mediastinal shift.   Persistent virtually complete opacification of the left hemithorax. No right-sided pleural effusion, consolidation, or pneumothorax.   No acute osseous abnormality. Left shoulder arthroplasty with long intramedullary stem is noted. Bones are diffusely osteoporotic.       1. Persistent virtually complete opacification of the left hemithorax with similar right-to-left mediastinal shift indicating volume loss 2. Medical devices as above.   I personally reviewed the images/study and I agree with the findings as stated. This study was interpreted at [place], [place].   MACRO: None   Signed by: Magdy Zamudio 11/7/2023 9:10 AM Dictation workstation:   [id number]    Bronchoscopy Diagnostic, Therapeutic    Result Date: 11/6/2023  Table formatting from the original result was not included. Findings Excessive and thick secretions present in the trachea, left lung and right lung; secretions were easily removed by therapeutic aspiration and the airway was cleared The right lung appeared normal. Purulent mucus plugs with complete obstruction removed with suction from the left main stem; performed therapeutic aspiration. Mucomyst 10% x 4 mL was applied topically to the JOSE ANTONIO and LLL. Erythematous and friable mucosa in the JOSE ANTONIO, lingula and LLL Recommendation  Return to medical ICU for continued care. Await culture results.  Indication Abnormal CT of the chest, Atelectasis of left lung, Mucus plugging of bronchi Post Procedure Diagnosis Left lung atelectasis Mucus plug - left mainstem bronchus Staff Staff Role Meliton Cuenca MD Proceduralist Medications See Anesthesia Record. Preprocedure A history and  physical has been performed, and patient medication allergies have been reviewed. The patient's tolerance of previous anesthesia has been reviewed. The risks and benefits of the procedure and the sedation options and risks were discussed with the patient and family . All questions were answered and informed consent obtained. Details of the Procedure The patient underwent general anesthesia, which was administered by an anesthesia professional. The patient's blood pressure, ETCO2, ECG, heart rate, level of consciousness, respirations and oxygen were monitored throughout the procedure. The patient's estimated blood loss was minimal (<5 mL). The scope was introduced through the endotracheal tube. The procedure was not difficult. The patient tolerated the procedure well. There were no apparent adverse events. Events Procedure Events Event Event Time ENDO SCOPE IN TIME 11/6/2023  2:33 PM ENDO SCOPE OUT TIME 11/6/2023  2:45 PM Specimens ID Type Source Tests Collected by Time A : whole lung wash Fluid TRACHE[employer] CULTURE/SMEAR, FUNGAL CULTURE/SMEAR, RESPIRATORY CULTURE/SMEAR Meliton Cuenca MD 11/6/2023 1435 Procedure Location [facility] Intensive [facility] [address] [place] [id number] Referring Provider No referring provider defined for this encounter. Procedure Provider No name on file     XR chest 1 view    Result Date: 11/6/2023  Interpreted By:  Gilberto Waldrop and Baker Zachary STUDY: XR CHEST 1 VIEW; ;  11/6/2023 8:11 am   INDICATION: Signs/Symptoms:left lung opacification.   COMPARISON: Chest radiograph on 11/05/2023.   ACCESSION NUMBER(S): [id number]   ORDERING CLINICIAN: DALE SMITH   FINDINGS: Single AP view of the chest.   Enteric tube courses below the diaphragm with tip outside the field of view.   The cardiomediastinal silhouette is obscured with persistent right-to-left mediastinal shift.   Persistent complete opacification  of the left hemithorax. No right-sided consolidation, pleural effusion, or pneumothorax.   No acute osseous abnormality.       1. Persistent complete opacification of the left hemithorax with similar right-to-left mediastinal shift, likely secondary to volume loss. 2. Medical devices as above.   I personally reviewed the images/study and I agree with the findings as stated. This study was interpreted at [place], [place].   MACRO: None   Signed by: Gilberto Solano 11/6/2023 1:08 PM Dictation workstation:   [id number]    XR chest 1 view    Result Date: 11/5/2023  Interpreted By:  Beth Guy and Stephens Katherine STUDY: XR CHEST 1 VIEW;  11/5/2023 4:52 pm   INDICATION: Signs/Symptoms:s/p bronch, known left lung opacification.   COMPARISON: Same day chest radiograph from 07:33 a.m.   ACCESSION NUMBER(S): [id number]   ORDERING CLINICIAN: DALE SMITH   FINDINGS: AP radiograph of the chest was provided.   Enteric tube seen coursing below the level diaphragm with tip out of the field of view. Interval removal of right central line.   CARDIOMEDIASTINAL SILHOUETTE: The cardiomediastinal silhouette is obscured with persistent right-to-left mediastinal shift.   LUNGS: Persistent complete opacification of the left hemithorax. The right lung is essentially clear.   ABDOMEN: No remarkable upper abdominal findings.   BONES: No acute osseous changes.       1.  Persistent complete opacification of the left hemithorax with right-to-left mediastinal shift likely secondary to volume loss. 2. Medical devices as described above.   I personally reviewed the images/study and I agree with the findings as stated. This study was interpreted at [place], [place].   MACRO: None   Signed by: Beth Guy 11/5/2023 5:26 PM Dictation workstation:   [id number]    XR chest 1 view    Result Date: 11/5/2023  Interpreted By:  Beth Guy, STUDY:  XR CHEST 1 VIEW;  11/5/2023 8:19 am   INDICATION: Signs/Symptoms:left lung whiteout.   COMPARISON: 11/04/2023.   ACCESSION NUMBER(S): [id number]   ORDERING CLINICIAN: NANI BENNETT       1.  Persistent, complete opacification of the left hemithorax with leftward mediastinal shift. 2. Cardiac silhouette is completely obscured. 3. Enteric tube with the tip below the lower margin of study. 4. Right IJ catheter with the tip in the distal SVC. 5. Right lung is clear. 6. No right-sided pleural effusion or pneumothorax.       MACRO: None   Signed by: Beth Guy 11/5/2023 10:42 AM Dictation workstation:   [id number]    XR chest 1 view    Result Date: 11/4/2023  Interpreted By:  Beth Guy, STUDY: XR CHEST 1 VIEW;  11/4/2023 8:20 am   INDICATION: Signs/Symptoms:left lung opacification.   COMPARISON: 11/03/2023.   ACCESSION NUMBER(S): [id number]   ORDERING CLINICIAN: DALE SMITH       1.   Persistent, complete opacification of the left hemithorax with leftward mediastinal shift. 2. The cardiac silhouette is completely obscured. 3. The right lung is hyperinflated with mild perihilar and basilar atelectasis. 4. No right-sided pleural effusion or pneumothorax seen. 5. Right IJ catheter with the tip in the distal SVC 6. Enteric tube with the tip below the lower margin of study.       MACRO: None   Signed by: Beth Guy 11/4/2023 8:08 PM Dictation workstation:   [id number]    XR chest 1 view    Result Date: 11/3/2023  Interpreted By:  Tari Lomas, STUDY: XR CHEST 1 VIEW;  11/3/2023 8:02 am   INDICATION: Signs/Symptoms:left lung opacification.   COMPARISON: 11/02/2023 at 6:11 p.m.   ACCESSION NUMBER(S): [id number]   ORDERING CLINICIAN: DALE SMITH   FINDINGS: Patient is slightly rotated. Enteric tube extending into the upper abdomen, tip not included on the image and right jugular central line remain in place. Continued subtotal opacification of the left hemithorax with leftward mediastinal deviation. No  right-sided infiltrate or pleural effusion. Cardiac silhouette difficult to assess given left chest opacity and silhouetting of the left heart border.. Partially included left shoulder arthroplasty again seen.       Persistent subtotal opacification of the left hemithorax with volume loss and mediastinal deviation.   MACRO: None.   Signed by: Tari Lomas 11/3/2023 8:12 AM Dictation workstation:   [id number]    XR chest 1 view    Result Date: 11/3/2023  Interpreted By:  Tari Lomas and Barbat Antonio STUDY: XR CHEST 1 VIEW;  11/2/2023 6:23 pm   INDICATION: Signs/Symptoms:confirm placement of CVC.   COMPARISON: Chest radiograph dated 11/02/2023, time stamped 7:32 a.m..   ACCESSION NUMBER(S): [id number]   ORDERING CLINICIAN: DALE SMITH   FINDINGS: Enteric tube is visualized with the distal tip outside the field of view of this study. Right-sided IJ approach central venous catheter is visualized with the distal tip projecting over the expected region of the distal svc.   CARDIOMEDIASTINAL SILHOUETTE: Cardiomediastinal silhouette is stable in size and configuration, with similar component of leftward mediastinal shift and complete obscuration of the left heart border.   LUNGS: Redemonstration of near complete opacification of the left hemithorax. Slight increased aeration of the left upper lung field. The right lung is clear. No evidence of pneumothorax.   ABDOMEN: No remarkable upper abdominal findings.   BONES: No acute osseous changes.       1. Redemonstration of near complete opacification of the left hemithorax. Slightly improved aeration of the left upper lung field since previous day's exam. Correlate with endobronchial pathology or concern for aspiration. 2.  Cardiomediastinal silhouette is stable in size and configuration, with similar component of leftward mediastinal shift and complete obscuration of the left heart border. 3.  Medical devices as above.       I, Emanuel Mireles, personally  reviewed the image(s)/study and resident interpretation. I agree with the findings as stated. Data analyzed and images interpreted at [facility], [place], [place].   MACRO: None   Signed by: Tari Lomas 11/3/2023 8:10 AM Dictation workstation:   [id number]    Transthoracic Echo (TTE) Limited    Result Date: 11/2/2023   [facility], [address] [place] [address]                Tel [phone] and Fax [phone] TRANSTHORACIC ECHOCARDIOGRAM REPORT  Patient Name:      PIA RALPH       Reading Physician:   [id number] Delvin Lisa MD Study Date:        11/2/2023           Ordering Provider:   [id number] JOSE FRIEDMAN MRN/PID:           [id number]            Fellow: Accession#:        [id number]        Nurse: Date of Birth/Age: 1955 / 68      Sonographer:         Leo Foreman RDCS                    years Gender:            M                   Additional Staff: Height:            170.18 cm           Admit Date:          10/11/2023 Weight:            49.44 kg            Admission Status:    Inpatient - Routine BSA:               1.56 m2             Encounter#:          [id number]                                        Department Location: [place] [address] MICU Blood Pressure: 85 /56 mmHg Study Type:    TRANSTHORACIC ECHO (TTE) LIMITED Diagnosis/ICD: Endocarditis, valve unspecified-I38 Indication:    R/O Endocarditis CPT Code:      Echo Limited-04890; Color Doppler-20640; Doppler Limited-78019 Patient History: Pertinent History: HTN and Hyperlipidemia. COPD, PHTN, opiod dependence, chronic                    pulmonary embolisms. Study Detail: The following Echo studies were performed: 2D, Doppler and color               flow.  PHYSICIAN INTERPRETATION: Left Ventricle: The left ventricular systolic function is hyperdynamic, with an estimated ejection fraction of 70-75%. There are no regional wall motion abnormalities. The left ventricular cavity size is decreased. There  is left ventricular concentric remodeling. The interventricular septum is flattened in systole, consistent with right ventricular pressure overload. Spectral Doppler shows an impaired relaxation pattern of left ventricular diastolic filling. Left Atrium: The left atrium is normal in size. Right Ventricle: The right ventricle is mildly enlarged. There is mildly reduced right ventricular systolic function. Radial RV function appears preserved; longitudinal RV function is decreased. Right Atrium: The right atrium is moderately dilated. Aortic Valve: The aortic valve is trileaflet. There is minimal aortic valve cusp calcification. There is no evidence of aortic valve regurgitation. Mitral Valve: The mitral valve is normal in structure. There is trace mitral valve regurgitation. Tricuspid Valve: The tricuspid valve is structurally normal. There is moderate tricuspid regurgitation. The Doppler estimated RVSP is mildly elevated at 45.6 mmHg. The PA systolic pressure may be underestimated. The previously noted posterior tricuspid leaflet vegetation is not clearly seen on this study,. Pulmonic Valve: The pulmonic valve is structurally normal. There is trace pulmonic valve regurgitation. Pericardium: There is no pericardial effusion noted. Aorta: The aortic root is normal. In comparison to the previous echocardiogram(s): Compared with study from 12/1/2023,.  CONCLUSIONS:  1. Left ventricular systolic function is hyperdynamic with a 70-75% estimated ejection fraction.  2. No definite valvular vegetations were visualized.  3. Right ventricular pressure overload.  4. Spectral Doppler shows an impaired relaxation pattern of left ventricular diastolic filling.  5. There is mildly reduced right ventricular systolic function.  6. The right atrium is moderately dilated.  7. Mildly elevated RVSP.  8. Moderate tricuspid regurgitation visualized.  9. The PA systolic pressure may be underestimated. The previously noted posterior  tricuspid leaflet vegetation is not clearly seen on this study,. 10. Left ventricular cavity size is decreased. 11. The reasons for repeating the echo from 10/31 is not clear. QUANTITATIVE DATA SUMMARY: TRICUSPID VALVE/RVSP:                             Normal Ranges: Peak TR Velocity: 2.98 m/s RV Syst Pressure: 45.6 mmHg (< 30mmHg)  [id number] Delvin Lisa MD Electronically signed on 11/2/2023 at 4:13:17 PM  ** Final **     ECG 12 Lead    Result Date: 11/2/2023  Sinus tachycardia Left atrial enlargement Left axis deviation Left ventricular hypertrophy Nonspecific ST and T wave abnormality Abnormal ECG Confirmed by Terrell Silva ([id number]) on 11/2/2023 2:23:34 PM    XR chest 1 view    Result Date: 11/2/2023  Interpreted By:  Nathan Lu, STUDY: XR CHEST 1 VIEW;  11/2/2023 7:37 am   INDICATION: Signs/Symptoms:lung opacification.   COMPARISON: 11/01/2023   ACCESSION NUMBER(S): [id number]   ORDERING CLINICIAN: NANI BENNETT   FINDINGS:     CARDIOMEDIASTINAL SILHOUETTE: Worsening left-sided mediastinal shift secondary to left-sided volume loss   LUNGS: Worsening opacification of the left hemithorax.   ABDOMEN: Dobbhoff tube overlies the region of the body of stomach.   BONES: Patient is status post left TSA       1.  Worsening opacification of the left hemithorax and correlate with worsening volume loss with left-sided pleural effusion Correlate with endobronchial pathology or aspiration     Signed by: Nathan Lu 11/2/2023 9:33 AM Dictation workstation:   [id number]    XR chest 1 view    Result Date: 11/1/2023  Interpreted By:  Gilberto Waldrop, STUDY: XR CHEST 1 VIEW;  11/1/2023 11:54 am   INDICATION: Signs/Symptoms:intubated.   COMPARISON: Radiograph dated 10/31/2023   ACCESSION NUMBER(S): [id number]   ORDERING CLINICIAN: JAMES GARNER   FINDINGS: Enteric tube is in place with the tip outside the field of view. Right IJ central venous catheter is in place with the tip projecting over lower SVC/cavoatrial  junction.   The cardiac silhouette size cannot be assessed as the left heart border is obscured.   Redemonstration of extensive opacification of the left hemithorax with some aerated component in the left upper lung. Right lung remains grossly clear with mild basilar atelectasis. No sizable pneumothorax.   No acute osseous change.       1. Persistent extensive opacification of the left hemithorax more confluent in the left mid and lower lung and with some aerated component in the left upper lung field. Component of volume loss is again seen. Correlate with combination of effusion and consolidation and also correlate with concern for lobar collapse and mucous plugging. 2. Medical appliances as described above       Signed by: Gilberto Solano 11/1/2023 11:59 AM Dictation workstation:   [id number]    XR chest 1 view    Result Date: 10/31/2023  Interpreted By:  Gilberto Waldrop,  and Viv Tong STUDY: XR CHEST 1 VIEW;  10/31/2023 4:50 pm   INDICATION: Signs/Symptoms:Evaluate interval changes in pulmonary infiltrates.   COMPARISON: Same day chest radiograph time stamped 4:21 a.m.; chest radiographs 10/30/2023 and 10/28/2023   ACCESSION NUMBER(S): [id number]   ORDERING CLINICIAN: KASIA PERRY   FINDINGS: Single AP upright portable radiograph of the chest was provided.   Enteric tube courses over the mid thorax and below the diaphragm terminating beyond the field of view inferiorly. Right internal jugular approach central venous catheter tip terminates at the level of the superior cavoatrial junction.   CARDIOMEDIASTINAL SILHOUETTE: There is near total obscuration of the left cardiac border.   LUNGS: Slightly improved aeration of the left upper lung. Similar total dense opacification of the left lower lung zone with possible overlying effusion. There is suggestion of abrupt termination of the left mainstem bronchus, slightly more proximally than previously visualized on prior radiographs. No  sizable pneumothorax. The right lung is relatively clear without focal consolidation or effusion.   ABDOMEN: No remarkable upper abdominal findings.   BONES: No acute osseous changes.       1. Redemonstration of extensive opacification of the left hemithorax with interval slight improvement in the aeration of left upper and mid lung field. Residual extensive consolidation throughout the left lung more dense in the left lower lung field likely due to combination of effusion, atelectasis/collapse and consolidation. Suggestion of abrupt termination of the left mainstem bronchus, slightly more proximally than was previously visualized on prior chest radiographs. Correlate with mucous plugging. 2. Right lung is grossly clear. 3. Medical devices as above.   I personally reviewed the image(s)/study and resident interpretation as dictated by Dr. Alycia Nam MD. I agree with the findings as stated. Data analyzed and images were interpreted at [facility], [place], [place].   MACRO: None   Signed by: Gilberto Solano 10/31/2023 5:23 PM Dictation workstation:   [id number]    Transthoracic Echo (TTE) Limited    Result Date: 10/31/2023   [facility], [address] [place] [address]                Tel [phone] and Fax [phone] TRANSTHORACIC ECHOCARDIOGRAM REPORT  Patient Name:      PIA RALPH        Reading Physician:    [id number] Mustapha Mazariegos MD Study Date:        10/31/2023           Ordering Provider:    [id number] JOSE FRIEDMAN MRN/PID:           [id number]             Fellow:               [id number] Ever Schuster MD PhD Accession#:        [id number]         Nurse: Date of Birth/Age: 1955 / 68 years Sonographer:          Nani Garcia RDCS Gender:            M                     Additional Staff: Height:            170.18 cm            Admit Date: Weight:            49.44 kg             Admission Status:     Inpatient -                                                               Critical/Stat                                                               (within 1-3 hours) BSA:               1.56 m2              Encounter#:           [id number]                                         [address] Blood Pressure: 128 /84 mmHg Study Type:    TRANSTHORACIC ECHO (TTE) LIMITED Diagnosis/ICD: Bacteremia-R78.81 Indication:    Uremia CPT Code:      Echo Limited-57712; Color Doppler-54182 Patient History: Pertinent History: HTN, Hyperlipidemia and PE. PHTN, opiod dependence. Study Detail: The following Echo studies were performed: 2D and color flow.               Technically challenging study due to patient lying in supine               position and body habitus.  PHYSICIAN INTERPRETATION: Left Ventricle: The left ventricular systolic function is hyperdynamic, with an estimated ejection fraction of 75-80%. There are no regional wall motion abnormalities. The left ventricular cavity size is normal. The left ventricular septal wall thickness is mildly increased. There is mildly increased left ventricular posterior wall thickness. There is left ventricular concentric remodeling. The interventricular septum is flattened in diastole ('D' shaped left ventricle), consistent with right ventricular volume overload. Left ventricular diastolic filling was not assessed. Left Atrium: The left atrium is normal in size. There is evidence of an atrial septal aneurysm. Right Ventricle: The right ventricle is mildly enlarged. There is normal right ventricular global systolic function. Right Atrium: The right atrium is mildly dilated. Aortic Valve: The aortic valve is trileaflet. There is trivial aortic valve regurgitation. Mitral Valve: The mitral valve is normal in structure. There is  trace mitral valve regurgitation. Tricuspid Valve: The tricuspid valve is structurally normal. There is moderate to severe tricuspid regurgitation. The tricuspid valve regurgitant jet flows toward the atrial septum. The Doppler estimated RVSP is moderate to severely elevated at 57.1 mmHg. There is a mobile echodensity attached to the posterior tricuspid leaflet with independent motion consistent with a vegetation measuring 1.7 x 0.8 cm. Pulmonic Valve: The pulmonic valve is structurally normal. There is trace to mild pulmonic valve regurgitation. Pericardium: There is no pericardial effusion noted. Pleural: There is a small left pleural effusion. Aorta: The aortic root is normal. Systemic Veins: The inferior vena cava was not well visualized. There is IVC inspiratory collapse greater than 50%. In comparison to the previous echocardiogram(s): Compared with study from 10/17/2023, the RV is more clearly seen and appears mildly enlarged. There is septal flattening during diastole suggestive of RV volume overload. RVSP is severely elevated (57mmHg). TR degree appears more moderate than mild on current study. This may be due to acute endocarditis of the tricuspid valve.  CONCLUSIONS:  1. Left ventricular systolic function is hyperdynamic with a 75-80% estimated ejection fraction.  2. Right ventricular volume overload.  3. Moderate to severe tricuspid regurgitation visualized.  4. Moderate to severely elevated right ventricular systolic pressure.  5. There is a mobile echodensity attached to the posterior tricuspid leaflet with independent motion consistent with a vegetation measuring 1.7 x 0.8 cm.  6. Atrial septal aneurysm present. QUANTITATIVE DATA SUMMARY: 2D MEASUREMENTS:                          Normal Ranges: IVSd:          1.26 cm   (0.6-1.1cm) LVPWd:         1.28 cm   (0.6-1.1cm) LVIDd:         2.65 cm   (3.9-5.9cm) LVIDs:         1.74 cm LV Mass Index: 65.1 g/m2 LV % FS        34.3 % RA VOLUME BY A/L METHOD:                        Normal Ranges: RA Area A4C: 20.0 cm2 AORTA MEASUREMENTS:                      Normal Ranges: Ao Sinus, d: 2.60 cm (2.1-3.5cm)  RIGHT VENTRICLE: RV Basal 4.02 cm TRICUSPID VALVE/RVSP:                             Normal Ranges: Peak TR Velocity: 3.68 m/s RV Syst Pressure: 57.1 mmHg (< 30mmHg)  [id number] Mustapha Mazariegos MD Electronically signed on 10/31/2023 at 4:34:25 PM  ** Final **     US renal complete    Result Date: 10/31/2023  Interpreted By:  Jeet Wang and Liller Gregory STUDY: US RENAL COMPLETE;  10/31/2023 2:20 pm   INDICATION: Abnormal renal function.   COMPARISON: Right upper quadrant ultrasound 10/25/2023 CT chest abdomen pelvis 10/16/2023   ACCESSION NUMBER(S): [id number]   ORDERING CLINICIAN: KASIA PERRY   TECHNIQUE: Multiple images of the kidneys were obtained.   FINDINGS: RIGHT KIDNEY: The right kidney measures 10.4 cm in length. The renal cortical echogenicity and thickness are within normal limits. No hydronephrosis is present; no evidence of nephrolithiasis. Cysts are seen within the inferior and superior pole measuring up to 1 x 0.6 x 0.6 and 0.9 x 0.7 x 0.8 cm respectively.   LEFT KIDNEY: The left kidney measures 9.1 cm in length. The renal cortical echogenicity and thickness are within normal limits. No hydronephrosis is present; no evidence of nephrolithiasis.   BLADDER: Bladder is decompressed with Cortez catheter.   Incidental note is made of increased echogenicity of the liver parenchyma, similar to prior examination.       Unremarkable sonographic evaluation of the kidneys.   I personally reviewed the images/study and I agree with the findings as stated above by resident physician, Dr. Timothy Hernández. The study was interpreted at [facility] in [facility].   MACRO: None   Signed by: Jeet Wang 10/31/2023 2:31 PM Dictation workstation:   [id number]    ECG 12 Lead    Result Date: 10/31/2023  Sinus tachycardia Possible  Left atrial enlargement Low voltage QRS Cannot rule out Anterior infarct (cited on or before 31-AUG-2023) Abnormal ECG Confirmed by Terrell Silva ([id number]) on 10/31/2023 1:54:03 PM    XR chest 1 view    Result Date: 10/31/2023  Interpreted By:  Gilberto Waldrop and Tippareddy Charit STUDY: XR CHEST 1 VIEW;  10/31/2023 4:24 am   INDICATION: Signs/Symptoms:left lung collapse.   COMPARISON: Radiograph 10/30/2023   ACCESSION NUMBER(S): [id number]   ORDERING CLINICIAN: NAMRATA ALCANTRAA   FINDINGS: AP radiograph of the chest was provided.   Dobhoff tube is visualized coursing beneath the diaphragm with the tip beyond the field of view. Right IJ central venous catheter with tip overlying the mid SVC.   CARDIOMEDIASTINAL SILHOUETTE: Complete obscuration of the left heart border due to near-complete opacification of the left lung field.   LUNGS: Similar appearance of complete opacification of the left lung field. Mild amount of air bronchograms are visualized in the left upper lung field. No evidence of right-sided pleural effusion or pneumothorax.   ABDOMEN: No remarkable upper abdominal findings.   BONES: No acute osseous changes.       1.  Similar appearance of complete opacification of the left lung field with small aerated component and air bronchograms in the left upper lung field. Correlate with collapse and consolidation of the left lung and associated pleural effusion. Correlate with mucous plugging. 2. Medical devices/lines as noted above.   I personally reviewed the images/study and I agree with the findings as stated by Kinjal Stubbs MD. This study was interpreted at [place], [place].   MACRO: None.   Signed by: Gilberto Solano 10/31/2023 10:23 AM Dictation workstation:   [id number]    XR chest 1 view    Result Date: 10/31/2023  Interpreted By:  Elia Shaffer and Barbat Antonio STUDY: XR CHEST 1 VIEW;  10/30/2023 10:13 pm   INDICATION:  Signs/Symptoms:s/p R trialysis line.   COMPARISON: None.   ACCESSION NUMBER(S): [id number]   ORDERING CLINICIAN: NAMRATA ALCANTARA   FINDINGS: AP radiograph of the chest was provided.   Enteric tube is visualized with the distal tip outside the field of view of this study. Right-sided IJ approach central venous catheter is visualized with the distal tip projecting over the expected region of the distal svc.   CARDIOMEDIASTINAL SILHOUETTE: Interval complete obscuration of the right heart border due to near complete opacification of the left lung field.   LUNGS: Interval near complete opacification of the left lung field, with some aerated lung noted in the upper lung zone. Redemonstration of left-sided volume loss. No evidence of right-sided effusion or focal consolidation.   ABDOMEN: No remarkable upper abdominal findings.   BONES: No acute osseous changes.       1. Interval near complete opacification of the left lung field, with some aerated lung noted in the upper lung zone. Associated obscuration of the left heart border. Findings may be the result of the previously described mucoid impaction in the left mainstem bronchus. 2. Right-sided IJ approach central venous catheter is visualized with the distal tip projecting over the expected region of the distal svc.   I personally reviewed the images/study and I agree with the findings as stated. This study was interpreted at [facility], [place], [place].   MACRO: None   Signed by: Elia Shaffer 10/31/2023 9:39 AM Dictation workstation:   [id number]    XR chest 1 view    Result Date: 10/28/2023  Interpreted By:  Nathan Lu, STUDY: XR CHEST 1 VIEW;  10/28/2023 9:24 am   INDICATION: Signs/Symptoms:Atelectasis/Mucous plugging.   COMPARISON: 10/27/2023   ACCESSION NUMBER(S): [id number]   ORDERING CLINICIAN: CORY PHILLIPSADJAYDYANA   FINDINGS: Status post extubation. Question left-sided endobronchial disease/mucoid impaction    CARDIOMEDIASTINAL SILHOUETTE: Left-sided mediastinal shift   LUNGS: Left basilar consolidation/effusions. Correlate with underlying volume loss   ABDOMEN: No remarkable upper abdominal findings.   BONES: No acute osseous changes.       1.  Status post extubation. Continued left basilar consolidation/effusion and correlate with underlying pneumonia/aspiration     Signed by: Nathan Lu 10/28/2023 11:55 AM Dictation workstation:   [id number]    XR chest 1 view    Result Date: 10/27/2023  Interpreted By:  Gilkeson, Nathan,  and [place] Alycia STUDY: XR CHEST 1 VIEW;  10/27/2023 9:26 am   INDICATION: Signs/Symptoms:Failed SBT, recurrent secretions, pneumonia.   COMPARISON: Chest radiographs 10/25/2023 and 10/24/2023, CT chest abdomen pelvis 10/16/2023.   ACCESSION NUMBER(S): [id number]   ORDERING CLINICIAN: THOMAS AU   FINDINGS: Single AP semi-upright portable radiograph of the chest was provided.   Endotracheal tube tip terminates 6.1 cm above the crispin. Enteric tube overlies the mid thorax with tip terminating at the level of the gastric antrum.   CARDIOMEDIASTINAL SILHOUETTE: The left border of the cardiomediastinal silhouette is obscured. Otherwise stable appearance of the cardiac silhouette.   LUNGS: Interval development of a moderate to large left-sided pleural effusion with associated overlying compressive atelectasis. The right lung is clear. No pneumothorax.   ABDOMEN: No remarkable upper abdominal findings.   BONES: No acute osseous changes.       1. Interval development of moderate to large left-sided pleural effusion with associated atelectasis. Correlate with developing left basilar pneumonia/atelectasis/aspiration follow-up to resolution recommended 2. Medical devices as above.   I personally reviewed the image(s)/study and resident interpretation as dictated by Dr. Alycia Nam MD. I agree with the findings as stated. Data analyzed and images were interpreted at [facility], [place], [place].   MACRO: None   Signed by: Nathan Lu 10/27/2023 10:07 AM Dictation workstation:   [id number]    ECG 12 lead    Result Date: 10/27/2023  Sinus tachycardia Left axis deviation Low voltage QRS  in limb leads Cannot rule out Anterior infarct Cannot rule out Inferior infarct Abnormal ECG Confirmed by Terrell Silva ([id number]) on 10/27/2023 8:30:52 AM    US right upper quadrant    Result Date: 10/25/2023  Interpreted By:  Jason Means and Liller Gregory STUDY: US RIGHT UPPER QUADRANT;  10/25/2023 3:19 pm   INDICATION: Signs/Symptoms:New RUQ abdominal pain with newly elevated LFTs.   COMPARISON: Right upper quadrant ultrasound 08/27/2023   ACCESSION NUMBER(S): [id number]   ORDERING CLINICIAN: ANTONI ARNDT   TECHNIQUE: Multiple images of the right upper quadrant were obtained.   FINDINGS: LIVER: The liver is mildly enlarged measuring up to 18 cm in maximal craniocaudal dimension. The liver parenchyma is diffusely coarsened echotexture. Liver contour is smooth.   GALLBLADDER: The gallbladder is mildly distended. Sludge seen layering within the dependent portion of the gallbladder. There is no evidence of gallbladder wall thickening or pericholecystic edema. Gallbladder wall measures up to 2 mm. No cholelithiasis.   BILIARY TREE: No intra or extrahepatic biliary dilatation is identified. The common bile duct measures 0.36 cm.   PANCREAS: The pancreas is poorly visualized due to overlying bowel gas.   RIGHT KIDNEY: The right kidney is normal in size, measuring 10 cm in craniocaudal dimension. The renal cortical echogenicity and thickness are within normal limits. No hydronephrosis or renal calculi are seen. Small renal cysts seen within the inferior pole measuring up to 1.0 x 0.7 x 0.8 cm.       1. Mild hepatomegaly with the diffuse coarse echotexture of the liver parenchyma. Correlate with liver function tests for liver parenchymal disease. 2. Gallbladder  sludge is seen layering within the dependent portion of the gallbladder without evidence of cholecystitis.   I personally reviewed the images/study and I agree with the findings as stated above by resident physician, Dr. Timothy Hernández. The study was interpreted at [facility] in [facility].   MACRO: None   Signed by: Jason Josetopher Means 10/25/2023 10:31 PM Dictation workstation:   [id number]    XR abdomen 1 view    Result Date: 10/25/2023  Interpreted By:  Nathan Lu and Sullivan Shannon STUDY: XR ABDOMEN 1 VIEW;  10/25/2023 1:00 pm   INDICATION: Signs/Symptoms:new abdominal pain.   COMPARISON: Correlation with CT chest abdomen pelvis 10/16/2023, KUB 10/17/2023   ACCESSION NUMBER(S): [id number]   ORDERING CLINICIAN: ANTONI ARNDT   FINDINGS: Single frontal radiograph of the abdomen was provided.   Enteric tube courses midline below the level of the diaphragm with the tip projecting over the gastric antrum.     Relative paucity of small bowel gas in an overall nonobstructive bowel gas pattern, decreased compared to prior exam 10/17/2023 and which may reflect decompressed or fluid-filled bowel loops. Limited evaluation of pneumoperitoneum on supine imaging, however no gross evidence of free air is noted.   Visualized lungs are clear.   Osseous structures demonstrate no acute bony changes.       1.  Decreased degree of small bowel gas compared to prior radiograph, which may reflect decompressed or fluid-filled bowel loops. Overall nonobstructive bowel gas pattern. 2. Enteric tube as above.     I personally reviewed the images/study and I agree with the findings as stated. This study was interpreted at [facility], [place], [place].   Signed by: Nathan Lu 10/25/2023 3:50 PM Dictation workstation:   [id number]    XR chest 1 view    Result Date: 10/25/2023  Interpreted By:  Gilberto Waldrop STUDY: XR CHEST 1 VIEW;   10/25/2023 7:41 am   INDICATION: Signs/Symptoms:worsening secretions and oxygenation level.   COMPARISON: Radiograph dated 10/24/2023   ACCESSION NUMBER(S): [id number]   ORDERING CLINICIAN: DEBORAH YEAGER   FINDINGS: ET tube is terminating 7.6 cm from the crispin. Enteric tube is in place with the tip projecting over proximal duodenum.   Cardiac silhouette size is grossly stable.   Interval improvement in the aeration of the left lung with residual left basilar consolidation and patchy airspace opacity in the left mid and upper lung. Right lung is grossly clear. No sizable pneumothorax.   No acute osseous abnormality.       1. Persistent left basilar pleural effusion and atelectasis/infiltrate with slight improvement in the aeration of the left lung. Correlate with left basilar/left lower lobe atelectasis/collapse and mucous plugging. 2. Residual patchy airspace opacity in the left mid and upper lung which may be due to edema or infiltrate. 3. Medical appliances as described above       Signed by: Gilberto Solano 10/25/2023 12:41 PM Dictation workstation:   [id number]    XR chest 1 view    Result Date: 10/24/2023  Interpreted By:  Gilberto Waldrop, STUDY: XR CHEST 1 VIEW;  10/24/2023 2:49 pm   INDICATION: Signs/Symptoms:Evaluate interval status of lungs.   COMPARISON: Radiograph dated 10/21/2023   ACCESSION NUMBER(S): [id number]   ORDERING CLINICIAN: KASIA PERRY   FINDINGS: ET tube is terminating 5.8 cm from the crispin. Enteric tube is in place with the tip outside of the liver.   The cardiac silhouette size is grossly stable, however the left heart border is obscured.   Persistent left basilar and retrocardiac opacity with component of volume loss. Patchy airspace opacity in the left upper lung field. No sizable pneumothorax. Right lung is grossly clear.   No acute osseous abnormality.       1. Left mid and lower lung consolidative opacity with component of volume loss. Correlate with concern  for infection and aspiration as well as mucous plugging and partial/complete left lower lobe collapse. 2. Small left pleural effusion.       Signed by: Gilberto Solano 10/24/2023 2:55 PM Dictation workstation:   [id number]    XR chest 1 view    Result Date: 10/21/2023  Interpreted By:  Beth Guy, STUDY: XR CHEST 1 VIEW;  10/21/2023 1:34 pm   INDICATION: Signs/Symptoms:pneumonia.   COMPARISON: 10/20/2023.   ACCESSION NUMBER(S): [id number]   ORDERING CLINICIAN: ANTONI ARNDT       1.  Improved aeration of the left lower lung consolidative opacities. Decreased left-sided pleural effusion. 2. Coarse interstitial, and ground-glass opacity involving the left upper lung, midlung, slightly improved. 3. Cardiac silhouette is moderately enlarged. Aorta is tortuous. Pulmonary vessels are congested with mild pulmonary edema. 4. Perihilar and basilar atelectasis of the right lung. 5. No right-sided pleural effusion or pneumothorax seen 6. ETT 7.2 cm above the crispin. Enteric tube with the tip below the lower margin of study. 7.       MACRO: None   Signed by: Beth Guy 10/21/2023 2:46 PM Dictation workstation:   [id number]    MR brain w and wo IV contrast    Result Date: 10/21/2023  Interpreted By:  Leslie Olson,  and Rio Yañez STUDY: MR BRAIN W AND WO IV CONTRAST; MR LUMBAR SPINE W AND WO IV CONTRAST; MR THORACIC SPINE W AND WO IV CONTRAST; MR CERVICAL SPINE W AND WO IV CONTRAST;  10/20/2023 11:41 pm   INDICATION: Signs/Symptoms:Altered mental status with b/l LE weakness; Signs/Symptoms:Altered mental status, b/l LE weakness, concern for epidural abscess; Signs/Symptoms:altered mental status, b/l LE weakness, c/f epidural abscess; Signs/Symptoms:Altered mental status, b/l LE weakness, concern for spinal/epidural abscess.   COMPARISON: CT head, 10/11/2023 Thoracic and lumbar spine, 10/11/2023 CT cervical spine, 08/26/2023   ACCESSION NUMBER(S): [id number]; [id number]; [id number]; [id number]    ORDERING CLINICIAN: THOMAS AU   TECHNIQUE: Multiplanar, multi-sequence images of the brain, cervical, thoracic, and lumbar spine were obtained before and after the intravenous administration of 9 mL Dotarem gadolinium.   FINDINGS: Evaluation is markedly limited due to patient motion.   Diffusion weighted images show no evidence of acute ischemic infarct.   There is no evidence of an acute intraparenchymal hemorrhage, mass, mass-effect, midline shift or extra-axial fluid collection. No abnormal parenchymal enhancement.   Nonspecific subcortical and periventricular T2/FLAIR hyperintensities may represent sequelae of chronic small vessel ischemic changes.   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   The ventricular size and cerebral volume are age-concordant.   The orbits and globes are unremarkable.   The paranasal sinuses show no hemorrhage or air-fluid levels.   Bilateral mastoid effusions, left more than right.     Cervical Spine:   Incidental note is made of an enteric tube.   PARASPINAL SOFT TISSUES: No significant abnormality.   SPINAL CORD: The cervical cord has a normal caliber and signal intensity.   BONE MARROW/VERTEBRAL BODIES: Overall bone marrow signal is within normal limits. Vertebral body heights are maintained. No acute fracture.   ALIGNMENT: There is reversal of the normal cervical lordosis and minimal anterolisthesis of C3-C4.   C1-C2: The atlantodental joint is intact. The predental space is not widened.   C2-C3: Disc osteophyte complex, uncovertebral joint hypertrophy and facet arthrosis. Mild spinal canal and mild-to-moderate bilateral neural foraminal stenosis.   C3-C4: Disc osteophyte complex, uncovertebral joint hypertrophy and facet arthrosis. Moderate to severe spinal canal and neural foraminal stenosis. There is mild flattening of the cervical cord question very subtle increased cord signal on the STIR sequence.   C4-C5: Disc osteophyte complex, uncovertebral joint  hypertrophy and facet arthrosis. No significant spinal canal stenosis. Moderate bilateral neural foraminal stenosis.   C5-C6: Disc osteophyte complex, uncovertebral joint hypertrophy and facet arthrosis. Mild spinal canal stenosis. Moderate neural foraminal narrowing, left greater than right.   C6-C7: Disc osteophyte complex, uncovertebral joint hypertrophy and facet arthrosis. No significant spinal canal stenosis. Moderate to severe neural foraminal stenosis, right greater than left.   C7-T1: Disc osteophyte complex and uncovertebral joint hypertrophy and facet arthrosis. Mild spinal canal stenosis. Moderate to severe bilateral neural foraminal stenosis.     Thoracic spine:   Visualized chest/abdomen: Scattered consolidative opacities in the left lung with a small left pleural effusion.   SPINAL CORD: The thoracic cord has a normal caliber and signal intensity. No cord compression.   There is prominent posterior epidural fat. No epidural fluid collection is noted.   No abnormal enhancement. There is incomplete fat suppression within the lower cervical cord on the postcontrast images. No significant spinal canal or neural foraminal stenosis.   BONE MARROW/VERTEBRAL BODIES: Overall bone marrow signal is within normal limits. There is compression of the superior endplate of T11 no associated increased T2/STIR signal. This was present on prior CT chest 10/16/2023. There is a proximally 25% loss of vertebral body height anteriorly and no osseous retropulsion into the spinal canal. There is subtle height loss of the superior endplate of the T3 vertebral body with no associated T2/STIR signal. This is also unchanged from the prior CT chest 10/16/2023. The remaining vertebral body heights are maintained. No acute fracture. Intervertebral disc height loss and disc desiccation at T11-T12.   ALIGNMENT: No spondylolisthesis.     Lumbar spine:   VISUALIZED ABDOMEN: T1 hyperintense lesion in the left upper renal pole and T1  hypointense, T2 hyperintense lesions in the kidneys bilaterally. These may represent cysts and/or hemorrhagic cysts. Please see dedicated imaging of the abdomen for further evaluation.   SPINAL CORD/CONUS: The conus medullaris terminates at L1-L2. There is no abnormal signal or enhancement within the distal cord or nerve roots.   BONE MARROW/VERTEBRAL BODIES: There are 5 lumbar type vertebral bodies. Overall bone marrow signal is within normal limits. Vertebral body heights are maintained. No acute fracture. Multilevel intervertebral disc height loss, most significant at L4-L5 and L5-S1.   ALIGNMENT: There is minimal retrolisthesis of L5-S1.   SPINAL CANAL, INTERVERTEBRAL DISCS, AND NEURAL FORAMINA:   T11-T12: No significant disc bulge, canal stenosis, or foraminal stenosis.   T12-L1: No significant disc bulge, canal stenosis, or foraminal stenosis.   L1-L2: No significant disc bulge, canal stenosis, or foraminal stenosis.   L2-L3: Trace bilateral paracentral disc protrusions. No spinal canal or neural foraminal stenosis.   L3-L4: Broad-based central disc bulge which results in minimal narrowing of bilateral neural foramen. No spinal canal stenosis.   L4-L5: Broad-based central disc bulge which results in mild narrowing of bilateral neural foramen, right more than left. No spinal canal stenosis.   L5-S1: Posterior disc osteophyte complex with mild narrowing of bilateral neural foramen, right more than left. No spinal canal stenosis.         Brain: 1. No acute ischemic infarct or intracranial hemorrhage. No abnormal parenchymal enhancement. 2. Nonspecific subcortical and periventricular T2/FLAIR hyperintensities may represent sequelae of chronic small vessel ischemic changes. 3. Remote lacunar infarcts in the left thalamus.     Cervical spine: 1. Multilevel degenerative disc disease and facet arthrosis most pronounced at C3-C4 with moderate to severe spinal canal stenosis and moderate to severe bilateral neural  foraminal stenosis. There is flattening of the cervical cord with question subtle increased cord signal on the STIR sequence. Please correlate clinically for symptoms of myelopathy. No abnormal cord enhancement. 2. Patchy consolidative opacities throughout the left lung with a small left pleural effusion. Correlate with dedicated chest imaging.   Thoracic spine: 1. No significant spinal canal or neural foraminal stenosis. No abnormal enhancement. 2. Nonacute minimal compression deformity at T3. Mild compression deformity at T11 with a proximally 25% loss of vertebral body height and no osseous retropulsion into the spinal canal. This was present on prior exam 10/16/2023.     Lumbar spine: 1. Multilevel lumbar spondylosis as detailed above. No spinal canal stenosis. 2. No abnormal signal or enhancement within the distal cord or nerve roots.     I personally reviewed the images/study and I agree with the findings as stated by resident physician Dr. Otilio Pate.   MACRO: None   Signed by: Leslie Olson 10/21/2023 1:29 AM Dictation workstation:   [id number]    MR cervical spine w and wo IV contrast    Result Date: 10/21/2023  Interpreted By:  Leslie Olson,  and Rio Yañez STUDY: MR BRAIN W AND WO IV CONTRAST; MR LUMBAR SPINE W AND WO IV CONTRAST; MR THORACIC SPINE W AND WO IV CONTRAST; MR CERVICAL SPINE W AND WO IV CONTRAST;  10/20/2023 11:41 pm   INDICATION: Signs/Symptoms:Altered mental status with b/l LE weakness; Signs/Symptoms:Altered mental status, b/l LE weakness, concern for epidural abscess; Signs/Symptoms:altered mental status, b/l LE weakness, c/f epidural abscess; Signs/Symptoms:Altered mental status, b/l LE weakness, concern for spinal/epidural abscess.   COMPARISON: CT head, 10/11/2023 Thoracic and lumbar spine, 10/11/2023 CT cervical spine, 08/26/2023   ACCESSION NUMBER(S): [id number]; [id number]; [id number]; [id number]   ORDERING CLINICIAN: THOMAS AU   TECHNIQUE: Multiplanar,  multi-sequence images of the brain, cervical, thoracic, and lumbar spine were obtained before and after the intravenous administration of 9 mL Dotarem gadolinium.   FINDINGS: Evaluation is markedly limited due to patient motion.   Diffusion weighted images show no evidence of acute ischemic infarct.   There is no evidence of an acute intraparenchymal hemorrhage, mass, mass-effect, midline shift or extra-axial fluid collection. No abnormal parenchymal enhancement.   Nonspecific subcortical and periventricular T2/FLAIR hyperintensities may represent sequelae of chronic small vessel ischemic changes.   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   The ventricular size and cerebral volume are age-concordant.   The orbits and globes are unremarkable.   The paranasal sinuses show no hemorrhage or air-fluid levels.   Bilateral mastoid effusions, left more than right.     Cervical Spine:   Incidental note is made of an enteric tube.   PARASPINAL SOFT TISSUES: No significant abnormality.   SPINAL CORD: The cervical cord has a normal caliber and signal intensity.   BONE MARROW/VERTEBRAL BODIES: Overall bone marrow signal is within normal limits. Vertebral body heights are maintained. No acute fracture.   ALIGNMENT: There is reversal of the normal cervical lordosis and minimal anterolisthesis of C3-C4.   C1-C2: The atlantodental joint is intact. The predental space is not widened.   C2-C3: Disc osteophyte complex, uncovertebral joint hypertrophy and facet arthrosis. Mild spinal canal and mild-to-moderate bilateral neural foraminal stenosis.   C3-C4: Disc osteophyte complex, uncovertebral joint hypertrophy and facet arthrosis. Moderate to severe spinal canal and neural foraminal stenosis. There is mild flattening of the cervical cord question very subtle increased cord signal on the STIR sequence.   C4-C5: Disc osteophyte complex, uncovertebral joint hypertrophy and facet arthrosis. No significant spinal canal  stenosis. Moderate bilateral neural foraminal stenosis.   C5-C6: Disc osteophyte complex, uncovertebral joint hypertrophy and facet arthrosis. Mild spinal canal stenosis. Moderate neural foraminal narrowing, left greater than right.   C6-C7: Disc osteophyte complex, uncovertebral joint hypertrophy and facet arthrosis. No significant spinal canal stenosis. Moderate to severe neural foraminal stenosis, right greater than left.   C7-T1: Disc osteophyte complex and uncovertebral joint hypertrophy and facet arthrosis. Mild spinal canal stenosis. Moderate to severe bilateral neural foraminal stenosis.     Thoracic spine:   Visualized chest/abdomen: Scattered consolidative opacities in the left lung with a small left pleural effusion.   SPINAL CORD: The thoracic cord has a normal caliber and signal intensity. No cord compression.   There is prominent posterior epidural fat. No epidural fluid collection is noted.   No abnormal enhancement. There is incomplete fat suppression within the lower cervical cord on the postcontrast images. No significant spinal canal or neural foraminal stenosis.   BONE MARROW/VERTEBRAL BODIES: Overall bone marrow signal is within normal limits. There is compression of the superior endplate of T11 no associated increased T2/STIR signal. This was present on prior CT chest 10/16/2023. There is a proximally 25% loss of vertebral body height anteriorly and no osseous retropulsion into the spinal canal. There is subtle height loss of the superior endplate of the T3 vertebral body with no associated T2/STIR signal. This is also unchanged from the prior CT chest 10/16/2023. The remaining vertebral body heights are maintained. No acute fracture. Intervertebral disc height loss and disc desiccation at T11-T12.   ALIGNMENT: No spondylolisthesis.     Lumbar spine:   VISUALIZED ABDOMEN: T1 hyperintense lesion in the left upper renal pole and T1 hypointense, T2 hyperintense lesions in the kidneys bilaterally.  These may represent cysts and/or hemorrhagic cysts. Please see dedicated imaging of the abdomen for further evaluation.   SPINAL CORD/CONUS: The conus medullaris terminates at L1-L2. There is no abnormal signal or enhancement within the distal cord or nerve roots.   BONE MARROW/VERTEBRAL BODIES: There are 5 lumbar type vertebral bodies. Overall bone marrow signal is within normal limits. Vertebral body heights are maintained. No acute fracture. Multilevel intervertebral disc height loss, most significant at L4-L5 and L5-S1.   ALIGNMENT: There is minimal retrolisthesis of L5-S1.   SPINAL CANAL, INTERVERTEBRAL DISCS, AND NEURAL FORAMINA:   T11-T12: No significant disc bulge, canal stenosis, or foraminal stenosis.   T12-L1: No significant disc bulge, canal stenosis, or foraminal stenosis.   L1-L2: No significant disc bulge, canal stenosis, or foraminal stenosis.   L2-L3: Trace bilateral paracentral disc protrusions. No spinal canal or neural foraminal stenosis.   L3-L4: Broad-based central disc bulge which results in minimal narrowing of bilateral neural foramen. No spinal canal stenosis.   L4-L5: Broad-based central disc bulge which results in mild narrowing of bilateral neural foramen, right more than left. No spinal canal stenosis.   L5-S1: Posterior disc osteophyte complex with mild narrowing of bilateral neural foramen, right more than left. No spinal canal stenosis.         Brain: 1. No acute ischemic infarct or intracranial hemorrhage. No abnormal parenchymal enhancement. 2. Nonspecific subcortical and periventricular T2/FLAIR hyperintensities may represent sequelae of chronic small vessel ischemic changes. 3. Remote lacunar infarcts in the left thalamus.     Cervical spine: 1. Multilevel degenerative disc disease and facet arthrosis most pronounced at C3-C4 with moderate to severe spinal canal stenosis and moderate to severe bilateral neural foraminal stenosis. There is flattening of the cervical cord with  question subtle increased cord signal on the STIR sequence. Please correlate clinically for symptoms of myelopathy. No abnormal cord enhancement. 2. Patchy consolidative opacities throughout the left lung with a small left pleural effusion. Correlate with dedicated chest imaging.   Thoracic spine: 1. No significant spinal canal or neural foraminal stenosis. No abnormal enhancement. 2. Nonacute minimal compression deformity at T3. Mild compression deformity at T11 with a proximally 25% loss of vertebral body height and no osseous retropulsion into the spinal canal. This was present on prior exam 10/16/2023.     Lumbar spine: 1. Multilevel lumbar spondylosis as detailed above. No spinal canal stenosis. 2. No abnormal signal or enhancement within the distal cord or nerve roots.     I personally reviewed the images/study and I agree with the findings as stated by resident physician Dr. Otilio Pate.   MACRO: None   Signed by: Leslie Olson 10/21/2023 1:29 AM Dictation workstation:   [id number]    MR thoracic spine w and wo IV contrast    Result Date: 10/21/2023  Interpreted By:  Leslie Olson,  and Rio Yañez STUDY: MR BRAIN W AND WO IV CONTRAST; MR LUMBAR SPINE W AND WO IV CONTRAST; MR THORACIC SPINE W AND WO IV CONTRAST; MR CERVICAL SPINE W AND WO IV CONTRAST;  10/20/2023 11:41 pm   INDICATION: Signs/Symptoms:Altered mental status with b/l LE weakness; Signs/Symptoms:Altered mental status, b/l LE weakness, concern for epidural abscess; Signs/Symptoms:altered mental status, b/l LE weakness, c/f epidural abscess; Signs/Symptoms:Altered mental status, b/l LE weakness, concern for spinal/epidural abscess.   COMPARISON: CT head, 10/11/2023 Thoracic and lumbar spine, 10/11/2023 CT cervical spine, 08/26/2023   ACCESSION NUMBER(S): [id number]; [id number]; [id number]; [id number]   ORDERING CLINICIAN: THOMAS AU   TECHNIQUE: Multiplanar, multi-sequence images of the brain, cervical, thoracic, and lumbar  spine were obtained before and after the intravenous administration of 9 mL Dotarem gadolinium.   FINDINGS: Evaluation is markedly limited due to patient motion.   Diffusion weighted images show no evidence of acute ischemic infarct.   There is no evidence of an acute intraparenchymal hemorrhage, mass, mass-effect, midline shift or extra-axial fluid collection. No abnormal parenchymal enhancement.   Nonspecific subcortical and periventricular T2/FLAIR hyperintensities may represent sequelae of chronic small vessel ischemic changes.   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   The ventricular size and cerebral volume are age-concordant.   The orbits and globes are unremarkable.   The paranasal sinuses show no hemorrhage or air-fluid levels.   Bilateral mastoid effusions, left more than right.     Cervical Spine:   Incidental note is made of an enteric tube.   PARASPINAL SOFT TISSUES: No significant abnormality.   SPINAL CORD: The cervical cord has a normal caliber and signal intensity.   BONE MARROW/VERTEBRAL BODIES: Overall bone marrow signal is within normal limits. Vertebral body heights are maintained. No acute fracture.   ALIGNMENT: There is reversal of the normal cervical lordosis and minimal anterolisthesis of C3-C4.   C1-C2: The atlantodental joint is intact. The predental space is not widened.   C2-C3: Disc osteophyte complex, uncovertebral joint hypertrophy and facet arthrosis. Mild spinal canal and mild-to-moderate bilateral neural foraminal stenosis.   C3-C4: Disc osteophyte complex, uncovertebral joint hypertrophy and facet arthrosis. Moderate to severe spinal canal and neural foraminal stenosis. There is mild flattening of the cervical cord question very subtle increased cord signal on the STIR sequence.   C4-C5: Disc osteophyte complex, uncovertebral joint hypertrophy and facet arthrosis. No significant spinal canal stenosis. Moderate bilateral neural foraminal stenosis.   C5-C6: Disc  osteophyte complex, uncovertebral joint hypertrophy and facet arthrosis. Mild spinal canal stenosis. Moderate neural foraminal narrowing, left greater than right.   C6-C7: Disc osteophyte complex, uncovertebral joint hypertrophy and facet arthrosis. No significant spinal canal stenosis. Moderate to severe neural foraminal stenosis, right greater than left.   C7-T1: Disc osteophyte complex and uncovertebral joint hypertrophy and facet arthrosis. Mild spinal canal stenosis. Moderate to severe bilateral neural foraminal stenosis.     Thoracic spine:   Visualized chest/abdomen: Scattered consolidative opacities in the left lung with a small left pleural effusion.   SPINAL CORD: The thoracic cord has a normal caliber and signal intensity. No cord compression.   There is prominent posterior epidural fat. No epidural fluid collection is noted.   No abnormal enhancement. There is incomplete fat suppression within the lower cervical cord on the postcontrast images. No significant spinal canal or neural foraminal stenosis.   BONE MARROW/VERTEBRAL BODIES: Overall bone marrow signal is within normal limits. There is compression of the superior endplate of T11 no associated increased T2/STIR signal. This was present on prior CT chest 10/16/2023. There is a proximally 25% loss of vertebral body height anteriorly and no osseous retropulsion into the spinal canal. There is subtle height loss of the superior endplate of the T3 vertebral body with no associated T2/STIR signal. This is also unchanged from the prior CT chest 10/16/2023. The remaining vertebral body heights are maintained. No acute fracture. Intervertebral disc height loss and disc desiccation at T11-T12.   ALIGNMENT: No spondylolisthesis.     Lumbar spine:   VISUALIZED ABDOMEN: T1 hyperintense lesion in the left upper renal pole and T1 hypointense, T2 hyperintense lesions in the kidneys bilaterally. These may represent cysts and/or hemorrhagic cysts. Please see  dedicated imaging of the abdomen for further evaluation.   SPINAL CORD/CONUS: The conus medullaris terminates at L1-L2. There is no abnormal signal or enhancement within the distal cord or nerve roots.   BONE MARROW/VERTEBRAL BODIES: There are 5 lumbar type vertebral bodies. Overall bone marrow signal is within normal limits. Vertebral body heights are maintained. No acute fracture. Multilevel intervertebral disc height loss, most significant at L4-L5 and L5-S1.   ALIGNMENT: There is minimal retrolisthesis of L5-S1.   SPINAL CANAL, INTERVERTEBRAL DISCS, AND NEURAL FORAMINA:   T11-T12: No significant disc bulge, canal stenosis, or foraminal stenosis.   T12-L1: No significant disc bulge, canal stenosis, or foraminal stenosis.   L1-L2: No significant disc bulge, canal stenosis, or foraminal stenosis.   L2-L3: Trace bilateral paracentral disc protrusions. No spinal canal or neural foraminal stenosis.   L3-L4: Broad-based central disc bulge which results in minimal narrowing of bilateral neural foramen. No spinal canal stenosis.   L4-L5: Broad-based central disc bulge which results in mild narrowing of bilateral neural foramen, right more than left. No spinal canal stenosis.   L5-S1: Posterior disc osteophyte complex with mild narrowing of bilateral neural foramen, right more than left. No spinal canal stenosis.         Brain: 1. No acute ischemic infarct or intracranial hemorrhage. No abnormal parenchymal enhancement. 2. Nonspecific subcortical and periventricular T2/FLAIR hyperintensities may represent sequelae of chronic small vessel ischemic changes. 3. Remote lacunar infarcts in the left thalamus.     Cervical spine: 1. Multilevel degenerative disc disease and facet arthrosis most pronounced at C3-C4 with moderate to severe spinal canal stenosis and moderate to severe bilateral neural foraminal stenosis. There is flattening of the cervical cord with question subtle increased cord signal on the STIR sequence. Please  correlate clinically for symptoms of myelopathy. No abnormal cord enhancement. 2. Patchy consolidative opacities throughout the left lung with a small left pleural effusion. Correlate with dedicated chest imaging.   Thoracic spine: 1. No significant spinal canal or neural foraminal stenosis. No abnormal enhancement. 2. Nonacute minimal compression deformity at T3. Mild compression deformity at T11 with a proximally 25% loss of vertebral body height and no osseous retropulsion into the spinal canal. This was present on prior exam 10/16/2023.     Lumbar spine: 1. Multilevel lumbar spondylosis as detailed above. No spinal canal stenosis. 2. No abnormal signal or enhancement within the distal cord or nerve roots.     I personally reviewed the images/study and I agree with the findings as stated by resident physician Dr. Otilio Pate.   MACRO: None   Signed by: Leslie Olson 10/21/2023 1:29 AM Dictation workstation:   [id number]    MR lumbar spine w and wo IV contrast    Result Date: 10/21/2023  Interpreted By:  Leslie Olson,  Vineet Yañez STUDY: MR BRAIN W AND WO IV CONTRAST; MR LUMBAR SPINE W AND WO IV CONTRAST; MR THORACIC SPINE W AND WO IV CONTRAST; MR CERVICAL SPINE W AND WO IV CONTRAST;  10/20/2023 11:41 pm   INDICATION: Signs/Symptoms:Altered mental status with b/l LE weakness; Signs/Symptoms:Altered mental status, b/l LE weakness, concern for epidural abscess; Signs/Symptoms:altered mental status, b/l LE weakness, c/f epidural abscess; Signs/Symptoms:Altered mental status, b/l LE weakness, concern for spinal/epidural abscess.   COMPARISON: CT head, 10/11/2023 Thoracic and lumbar spine, 10/11/2023 CT cervical spine, 08/26/2023   ACCESSION NUMBER(S): [id number]; [id number]; [id number]; [id number]   ORDERING CLINICIAN: THOMAS AU   TECHNIQUE: Multiplanar, multi-sequence images of the brain, cervical, thoracic, and lumbar spine were obtained before and after the intravenous administration of 9  mL Dotarem gadolinium.   FINDINGS: Evaluation is markedly limited due to patient motion.   Diffusion weighted images show no evidence of acute ischemic infarct.   There is no evidence of an acute intraparenchymal hemorrhage, mass, mass-effect, midline shift or extra-axial fluid collection. No abnormal parenchymal enhancement.   Nonspecific subcortical and periventricular T2/FLAIR hyperintensities may represent sequelae of chronic small vessel ischemic changes.   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   The ventricular size and cerebral volume are age-concordant.   The orbits and globes are unremarkable.   The paranasal sinuses show no hemorrhage or air-fluid levels.   Bilateral mastoid effusions, left more than right.     Cervical Spine:   Incidental note is made of an enteric tube.   PARASPINAL SOFT TISSUES: No significant abnormality.   SPINAL CORD: The cervical cord has a normal caliber and signal intensity.   BONE MARROW/VERTEBRAL BODIES: Overall bone marrow signal is within normal limits. Vertebral body heights are maintained. No acute fracture.   ALIGNMENT: There is reversal of the normal cervical lordosis and minimal anterolisthesis of C3-C4.   C1-C2: The atlantodental joint is intact. The predental space is not widened.   C2-C3: Disc osteophyte complex, uncovertebral joint hypertrophy and facet arthrosis. Mild spinal canal and mild-to-moderate bilateral neural foraminal stenosis.   C3-C4: Disc osteophyte complex, uncovertebral joint hypertrophy and facet arthrosis. Moderate to severe spinal canal and neural foraminal stenosis. There is mild flattening of the cervical cord question very subtle increased cord signal on the STIR sequence.   C4-C5: Disc osteophyte complex, uncovertebral joint hypertrophy and facet arthrosis. No significant spinal canal stenosis. Moderate bilateral neural foraminal stenosis.   C5-C6: Disc osteophyte complex, uncovertebral joint hypertrophy and facet arthrosis.  Mild spinal canal stenosis. Moderate neural foraminal narrowing, left greater than right.   C6-C7: Disc osteophyte complex, uncovertebral joint hypertrophy and facet arthrosis. No significant spinal canal stenosis. Moderate to severe neural foraminal stenosis, right greater than left.   C7-T1: Disc osteophyte complex and uncovertebral joint hypertrophy and facet arthrosis. Mild spinal canal stenosis. Moderate to severe bilateral neural foraminal stenosis.     Thoracic spine:   Visualized chest/abdomen: Scattered consolidative opacities in the left lung with a small left pleural effusion.   SPINAL CORD: The thoracic cord has a normal caliber and signal intensity. No cord compression.   There is prominent posterior epidural fat. No epidural fluid collection is noted.   No abnormal enhancement. There is incomplete fat suppression within the lower cervical cord on the postcontrast images. No significant spinal canal or neural foraminal stenosis.   BONE MARROW/VERTEBRAL BODIES: Overall bone marrow signal is within normal limits. There is compression of the superior endplate of T11 no associated increased T2/STIR signal. This was present on prior CT chest 10/16/2023. There is a proximally 25% loss of vertebral body height anteriorly and no osseous retropulsion into the spinal canal. There is subtle height loss of the superior endplate of the T3 vertebral body with no associated T2/STIR signal. This is also unchanged from the prior CT chest 10/16/2023. The remaining vertebral body heights are maintained. No acute fracture. Intervertebral disc height loss and disc desiccation at T11-T12.   ALIGNMENT: No spondylolisthesis.     Lumbar spine:   VISUALIZED ABDOMEN: T1 hyperintense lesion in the left upper renal pole and T1 hypointense, T2 hyperintense lesions in the kidneys bilaterally. These may represent cysts and/or hemorrhagic cysts. Please see dedicated imaging of the abdomen for further evaluation.   SPINAL CORD/CONUS:  The conus medullaris terminates at L1-L2. There is no abnormal signal or enhancement within the distal cord or nerve roots.   BONE MARROW/VERTEBRAL BODIES: There are 5 lumbar type vertebral bodies. Overall bone marrow signal is within normal limits. Vertebral body heights are maintained. No acute fracture. Multilevel intervertebral disc height loss, most significant at L4-L5 and L5-S1.   ALIGNMENT: There is minimal retrolisthesis of L5-S1.   SPINAL CANAL, INTERVERTEBRAL DISCS, AND NEURAL FORAMINA:   T11-T12: No significant disc bulge, canal stenosis, or foraminal stenosis.   T12-L1: No significant disc bulge, canal stenosis, or foraminal stenosis.   L1-L2: No significant disc bulge, canal stenosis, or foraminal stenosis.   L2-L3: Trace bilateral paracentral disc protrusions. No spinal canal or neural foraminal stenosis.   L3-L4: Broad-based central disc bulge which results in minimal narrowing of bilateral neural foramen. No spinal canal stenosis.   L4-L5: Broad-based central disc bulge which results in mild narrowing of bilateral neural foramen, right more than left. No spinal canal stenosis.   L5-S1: Posterior disc osteophyte complex with mild narrowing of bilateral neural foramen, right more than left. No spinal canal stenosis.         Brain: 1. No acute ischemic infarct or intracranial hemorrhage. No abnormal parenchymal enhancement. 2. Nonspecific subcortical and periventricular T2/FLAIR hyperintensities may represent sequelae of chronic small vessel ischemic changes. 3. Remote lacunar infarcts in the left thalamus.     Cervical spine: 1. Multilevel degenerative disc disease and facet arthrosis most pronounced at C3-C4 with moderate to severe spinal canal stenosis and moderate to severe bilateral neural foraminal stenosis. There is flattening of the cervical cord with question subtle increased cord signal on the STIR sequence. Please correlate clinically for symptoms of myelopathy. No abnormal cord enhancement.  2. Patchy consolidative opacities throughout the left lung with a small left pleural effusion. Correlate with dedicated chest imaging.   Thoracic spine: 1. No significant spinal canal or neural foraminal stenosis. No abnormal enhancement. 2. Nonacute minimal compression deformity at T3. Mild compression deformity at T11 with a proximally 25% loss of vertebral body height and no osseous retropulsion into the spinal canal. This was present on prior exam 10/16/2023.     Lumbar spine: 1. Multilevel lumbar spondylosis as detailed above. No spinal canal stenosis. 2. No abnormal signal or enhancement within the distal cord or nerve roots.     I personally reviewed the images/study and I agree with the findings as stated by resident physician Dr. Otilio Pate.   MACRO: None   Signed by: Leslie Olson 10/21/2023 1:29 AM Dictation workstation:   [id number]    XR chest 1 view    Result Date: 10/20/2023  Interpreted By:  Khanh Weaver, STUDY: XR CHEST 1 VIEW;  10/20/2023 10:40 am   INDICATION: Signs/Symptoms:intubated.   COMPARISON: Chest radiograph dated 10/19/2023and CT scan 10/16/2023   ACCESSION NUMBER(S): [id number]   ORDERING CLINICIAN: ANTONI ARNDT   FINDINGS: AP radiograph of the chest Enteric tube is again seen coursing below diaphragm and tip not included in field of view. Interval intubation with endotracheal tube tip approximately 5.5 cm superior to crispin   CARDIOMEDIASTINAL SILHOUETTE: Cardiomediastinal silhouette is again significantly obscured on the left, similar to prior.   LUNGS: There is again demonstration extensive left mid basilar lung predominant opacification with blunting of left costophrenic angle. Background emphysema. There is no pneumothorax.   ABDOMEN: No remarkable upper abdominal findings.   BONES: No acute osseous abnormality. Partially visualized left shoulder arthroplasty prosthesis.       1. No significant change in left mid basilar lung predominant atelectasis/consolidation with left  pleural effusion. Also correlate clinically with concern for central mucous plugging. 2. Medical devices as above. Endotracheal tube is in satisfactory position.   Signed by: Khanh Weaver 10/20/2023 11:05 AM Dictation workstation:   [id number]    XR chest 1 view    Result Date: 10/19/2023  Interpreted By:  Khanh Weaver  and Lb Whitehead STUDY: XR CHEST 1 VIEW;  10/19/2023 11:44 pm   INDICATION: Signs/Symptoms:worsening hypoxia.   COMPARISON: Chest radiograph 10/17/2023 and CT chest 10/16/2023   ACCESSION NUMBER(S): [id number]   ORDERING CLINICIAN: ROBERT SUGGS   FINDINGS: AP radiograph of the chest was provided.   Enteric tube seen coursing below the level diaphragm with tip overlying the expected location of the proximal duodenum. Partially visualized postsurgical changes from left total shoulder arthroplasty.   CARDIOMEDIASTINAL SILHOUETTE: The cardiomediastinal silhouette is now significantly obscured on the left.   LUNGS: Re-demonstration of coarsened interstitial markings bilaterally. Significant interval worsening in left mid basilar lung predominant extensive consolidative opacities with blunting of the left costophrenic angle. The right lung is essentially clear. No pneumothorax.   ABDOMEN: No remarkable upper abdominal findings.   BONES: No acute osseous changes.       1.  Significant interval worsening in left mid basilar lung predominant opacification which may represent increasing atelectasis and/or pleural effusions with or without superimposed consolidation. Also correlate clinically with concern for central mucous plugging. 2. Background chronic lung changes. 3.  Postsurgical changes and medical devices as described above.   I personally reviewed the images/study and I agree with the findings as stated. This study was interpreted at [facility], [place], [place].   MACRO: None   Signed by: Khanh Weaver 10/19/2023 11:54 PM Dictation workstation:    [id number]    XR abdomen 1 view    Result Date: 10/18/2023  Interpreted By:  Gilberto Waldrop and Fu Tianyuan STUDY: XR ABDOMEN 1 VIEW;  10/17/2023 5:59 pm   INDICATION: Signs/Symptoms:Confirm Dobhoff placement.   COMPARISON: Abdominal radiograph 08/31/2023. CT chest abdomen pelvis 10/16/2023.   ACCESSION NUMBER(S): [id number]   ORDERING CLINICIAN: THOMAS AU   FINDINGS: AP radiograph of the abdomen is provided for review.   Enteric tube courses below the diaphragm with the tip overlying the expected location of the distal stomach or proximal duodenum.   Nonobstructive bowel gas pattern.   There are hazy opacities within the left lung base.   Osseous structures demonstrate no acute bony changes.       1. Enteric tube with the tip overlying the distal stomach/proximal duodenum. 2. Nonobstructive bowel gas pattern. 3. Left lung base atelectasis versus consolidation.   MACRO: None   Signed by: Gilberto Solano 10/18/2023 9:40 AM Dictation workstation:   [id number]    XR chest 1 view    Result Date: 10/17/2023  Interpreted By:  Bang Owens and Dervishi Mario STUDY: XR CHEST 1 VIEW;  10/17/2023 8:48 pm   INDICATION: Signs/Symptoms:hypoxia.   COMPARISON: Chest radiograph 10/17/2023   ACCESSION NUMBER(S): [id number]   ORDERING CLINICIAN: JANINE ELLIS   FINDINGS: AP portable upright radiograph of the chest was provided with patient's rotation to the left.   Enteric tube crossing the diaphragm with the tip extending beyond the field of view.   CARDIOMEDIASTINAL SILHOUETTE: Cardiomediastinal silhouette is stable in size and configuration.   LUNGS: Redemonstration of coarse interstitial lung markings with hyperinflation. Obscuration of the left hemidiaphragm, suggesting pleural effusion and/or atelectasis. Slight interval worsening of left basilar and retrocardiac hazy opacity. Somewhat increased interstitial lung markings in the right lower lung field, peripherally. Right perihilar  haziness. Increased lucency in the right lung apex with pulmonary vascular markings and otherwise no obvious pneumothorax.   ABDOMEN: No remarkable upper abdominal findings.   BONES: No acute osseous changes.       1. Slight worsening of left basilar opacity compared to prior imaging, suggesting atelectasis and/or pleural effusion. 2. Probable interstitial pulmonary edema or fluid overload. Correlate with patient's fluid status.   I personally reviewed the images/study and I agree with the findings as stated. This study was interpreted at [place], [place].   MACRO: NONE.   Signed by: Bang Owens 10/17/2023 9:54 PM Dictation workstation:   [id number]    Transthoracic Echo (TTE) Complete    Result Date: 10/17/2023   [facility], [address] [place] [address]                Tel [phone] and Fax [phone] TRANSTHORACIC ECHOCARDIOGRAM REPORT  Patient Name:      PIA RALPH        Reading Physician:    [id number] Kirit Cain MD Study Date:        10/17/2023           Ordering Provider:    [id number] JEAN BURNETT MRN/PID:           [id number]             Fellow: Accession#:        [id number]         Nurse: Date of Birth/Age: 1955 / 68 years Sonographer:          Nani Garcia RDCS Gender:            M                    Additional Staff: Height:            170.18 cm            Admit Date: Weight:            49.44 kg             Admission Status:     Inpatient -                                                               Routine BSA:               1.56 m2              Encounter#:           [id number]                                         [address] Blood Pressure: 136 /91 mmHg Study Type:    TRANSTHORACIC ECHO (TTE) COMPLETE Diagnosis/ICD: Encounter for screening for cardiovascular disorders-Z13.6 Indication:    Polycythemia CPT Code:      Echo Complete w  Full Doppler-[id number] Patient History: Pertinent History: HTN and Hyperlipidemia. PHTN, Liver fibrosis, Hypoxemia,                    COPD, ETOH, Opiod usage,. Study Detail: The following Echo studies were performed: 2D, M-Mode, Doppler and               color flow. Technically challenging study due to poor acoustic               windows and body habitus. Definity used as a contrast agent for               endocardial border definition and agitated saline used as a               contrast agent for intraseptal flow evaluation. Total contrast               used for this procedure was 2.0 mL via IV push.  PHYSICIAN INTERPRETATION: Left Ventricle: The left ventricular systolic function is hyperdynamic, with an estimated ejection fraction of 75%. There are no regional wall motion abnormalities. The left ventricular cavity size is normal. The left ventricular septal wall thickness is mildly increased. There is left ventricular concentric remodeling. Left ventricular diastolic filling was not assessed. Left Atrium: The left atrium is normal in size. A bubble study using agitated saline was performed. Bubble study is positive. There is evidence of an atrial septal aneurysm. Right Ventricle: The right ventricle is normal in size. There is normal right ventricular global systolic function. Right Atrium: The right atrium is normal in size. Aortic Valve: The aortic valve is trileaflet. There is minimal aortic valve cusp calcification. There is no aortic valve thickening. There is no evidence of aortic valve regurgitation. The peak instantaneous gradient of the aortic valve is 3.2 mmHg. Mitral Valve: The mitral valve is normal in structure. There is trace mitral valve regurgitation. Tricuspid Valve: The tricuspid valve is structurally normal. There is mild tricuspid regurgitation. The Doppler estimated RVSP is mildly elevated at 39.2 mmHg. Pulmonic Valve: The pulmonic valve is not well visualized. Pulmonic valve regurgitation was  not assessed. Pericardium: There is no pericardial effusion noted. Aorta: The aortic root is normal. Systemic Veins: The inferior vena cava size appears small. In comparison to the previous echocardiogram(s): Compared with study from 8/29/2023, the RVSP today is mildly elevated, compared to moderately elevated in the prior echocardiogram (60 mmHg).  CONCLUSIONS:  1. Left ventricular systolic function is hyperdynamic with a 75% estimated ejection fraction.  2. The left ventricular septal wall thickness is mildly increased.  3. There is left ventricular concentric remodeling.  4. Atrial septal aneurysm present.  5. A bubble study using agitated saline was performed. Bubble study is positive.  6. Mildly elevated RVSP.  7. Poorly visualized anatomical structures due to suboptimal image quality.  8. Compared with study from 8/29/2023, the RVSP today is mildly elevated, compared to moderately elevated in the prior echocardiogram (60 mmHg). QUANTITATIVE DATA SUMMARY: 2D MEASUREMENTS:                          Normal Ranges: Ao Root d:     2.50 cm   (2.0-3.7cm) IVSd:          1.20 cm   (0.6-1.1cm) LVPWd:         0.90 cm   (0.6-1.1cm) LVIDd:         3.20 cm   (3.9-5.9cm) LVIDs:         2.50 cm LV Mass Index: 62.2 g/m2 LV % FS        21.9 % LA VOLUME:                               Normal Ranges: LA Vol A4C:        37.5 ml    (22+/-6mL/m2) LA Vol A2C:        36.5 ml LA Vol BP:         37.9 ml LA Vol Index A4C:  24.0ml/m2 LA Vol Index A2C:  23.3 ml/m2 LA Vol Index BP:   24.2 ml/m2 LA Area A4C:       15.9 cm2 LA Area A2C:       15.3 cm2 LA Major Axis A4C: 5.8 cm LA Major Axis A2C: 5.5 cm LA Volume Index:   24.0 ml/m2 AORTA MEASUREMENTS:                      Normal Ranges: Ao Sinus, d: 3.15 cm (2.1-3.5cm) AORTIC VALVE:                         Normal Ranges: AoV Vmax:      0.89 m/s (<=1.7m/s) AoV Peak PG:   3.2 mmHg (<20mmHg) LVOT Max Dony:  0.63 m/s (<=1.1m/s) LVOT VTI:      10.50 cm LVOT Diameter: 2.00 cm  (1.8-2.4cm) AoV  Area,Vmax: 2.24 cm2 (2.5-4.5cm2)  RIGHT VENTRICLE: RV s' 0.12 m/s TRICUSPID VALVE/RVSP:                             Normal Ranges: Peak TR Velocity: 3.01 m/s Est. RA Pressure: 3 mmHg RV Syst Pressure: 39.2 mmHg (< 30mmHg)  [id number] Kirit Cain MD Electronically signed on 10/17/2023 at 7:03:57 PM  ** Final **     XR chest 1 view    Result Date: 10/17/2023  Interpreted By:  Khanh Weaver and Maltbie Grace STUDY: XR CHEST 1 VIEW;  10/17/2023 3:16 pm   INDICATION: Signs/Symptoms:Acute high flow oxygen requirement, history of mucus plugging.   COMPARISON: CT chest, abdomen and pelvis 10/16/2023   ACCESSION NUMBER(S): [id number]   ORDERING CLINICIAN: THOMAS AU   FINDINGS: AP radiograph of the chest was provided.   CARDIOMEDIASTINAL SILHOUETTE: Cardiomediastinal silhouette is normal in size and configuration. Mild aortic knob calcification.   LUNGS: Background of coarse interstitial lung markings and hyperinflation are again seen. Left basilar and retrocardiac hazy opacity appears slightly less conspicuous than prior. There is no sizable pleural effusion or pneumothorax..   ABDOMEN: No remarkable upper abdominal findings.   BONES: Status post left shoulder arthroplasty.       1. Hazy left basilar opacity appears slightly less conspicuous than prior and correlate with decreasing atelectasis/infiltrate. 2. Redemonstrated background diffuse emphysema.   I personally reviewed the images/study and I agree with the findings as stated by radiology resident Mignon Malone MD. This study was interpreted at [facility], [place], [place].   Signed by: Khanh Weaver 10/17/2023 3:23 PM Dictation workstation:   [id number]    CT chest abdomen pelvis w IV contrast    Result Date: 10/16/2023  Interpreted By:  Jeet Wang and Kamau Nyokabi STUDY: CT CHEST ABDOMEN PELVIS W IV CONTRAST;  10/16/2023 12:30 pm   INDICATION: Signs/Symptoms:polycythemia looking for malignancy.   COMPARISON: None.    ACCESSION NUMBER(S): [id number]   ORDERING CLINICIAN: CALEB NGUYEN   TECHNIQUE: CT of the chest, abdomen, and pelvis was performed.  Contiguous axial images were obtained at 3 mm slice thickness through the chest, abdomen and pelvis. Coronal and sagittal reconstructions at 3 mm slice thickness were performed. 75 ml of contrast Omnipaque 350 were administered intravenously without immediate complication.   FINDINGS: CHEST:   LUNG/PLEURA/LARGE AIRWAYS: The trachea and central airways are patent. There is soft tissue density in the right lower lobe bronchi. There is  atelectasis of the right posterior lobe. Chronic emphysematous change in bilateral lung apices. There are no pulmonary nodule or masses. There is no pleural effusion or pneumothorax.   VESSELS: Aorta and pulmonary arteries are normal caliber.  Moderate atherosclerotic changes are noted of the aorta and branching vessels. Moderate coronary artery calcifications are present.   HEART: The heart is normal in size.  There is no pericardial effusion.   MEDIASTINUM AND KRISTOPHER: Unchanged appearance of nonenlarged paratracheal lymph nodes when compared to prior CT 08/15/2022. No hilar or axillary lymphadenopathy is present.  The esophagus is normal.   CHEST WALL AND LOWER NECK: The soft tissues of the chest wall demonstrate no gross abnormality. The visualized thyroid gland appears within normal limits.   ABDOMEN:   LIVER: The liver is normal in size and demonstrates diffusely decreased attenuation suggesting steatosis. The liver is similar in appearance when compared to prior CT 08/22/2022.   BILE DUCTS: Intrahepatic and extrahepatic bile ducts are prominent which is within normal limits given the status post cholecystectomy.   GALLBLADDER: The gallbladder is nondistended without radiopaque stones.   PANCREAS: The pancreas appears unremarkable.   SPLEEN: The spleen is normal in size without focal lesions.   ADRENAL GLANDS: Bilateral adrenal glands appear  normal.   KIDNEYS AND URETERS: The kidneys are normal in size and enhance symmetrically.  No hydroureteronephrosis or nephroureterolithiasis is identified. Unchanged appearance of well-circumscribed hypodensities in bilateral kidneys that are subcentimeter in size and likely represent simple renal cysts.   PELVIS:   BLADDER: The urinary bladder appears normal without abnormal wall thickening.   REPRODUCTIVE ORGANS: The prostate is not enlarged.   BOWEL: The stomach is unremarkable.  The small and large bowel are normal in caliber and demonstrate no wall thickening.  The appendix appears normal.     VESSELS: There is no aneurysmal dilatation of the abdominal aorta. The IVC appears normal. Moderate calcifications of the abdominal aorta its branches.   PERITONEUM/RETROPERITONEUM/LYMPH NODES: There is no free or loculated fluid collection, no free intraperitoneal air. The retroperitoneum appears normal.  No abdominopelvic lymphadenopathy is present. Unchanged appearance of nonenlarged bilateral external iliac lymph nodes when compared to prior CT 08/15/2022.   BONE AND SOFT TISSUE: Status post left total  shoulder arthroplasty. No suspicious osseous lesions are identified. Degenerative discogenic disease is noted in the lower thoracic and lumbar spine.  The abdominal wall soft tissues appear normal.       1. No evidence of primary neoplasm or metastatic disease. 2. Right lower lobe mucous plugging which may reflect aspiration.   I personally reviewed the images/study and I agree with the findings as stated. This study was interpreted at [place], [place].   MACRO: None   Signed by: Jeet Wang 10/16/2023 3:39 PM Dictation workstation:   [id number]    MR thoracic spine wo IV contrast    Result Date: 10/12/2023  Interpreted By:  Tyron Dixon and Ebai Jerky STUDY: MR THORACIC SPINE WO IV CONTRAST; MR LUMBAR SPINE WO IV CONTRAST; 10/11/2023 11:37 pm   INDICATION:  Signs/Symptoms:bilateral LE weakness.   Per EMR: 68-year-old male with a past medical history of PD, hypertension, alcohol and opioid abuse, hepatitis C, PE without cor pulmonale on Eliquis who presents today for an inability to walk. Patient states that about 3 days ago his legs gave out and he has not been able to walk since and has been stuck in a recliner.   COMPARISON: CT chest abdomen pelvis 08/15/2022.   ACCESSION NUMBER(S): [id number]; [id number]   ORDERING CLINICIAN: TEE BLAND   TECHNIQUE: Limited nondiagnostic MRI of the thoracic and lumbar spine. Only T2 sagittal and coronal  images of the thoracic and lumbar spine were obtained.  The patient requested termination of the examination due to pain.   FINDINGS: Limited MRI of thoracic and lumbar spine demonstrates scoliosis of the thoracic spine. No definitive evidence of substantial central canal stenosis. Mild cervical narrowing secondary spinal degenerative change. Evaluation of neural foraminal stenosis is markedly limited.    images demonstrated T2 hyperintense right renal cyst.       1. Nondiagnostic MRI of the thoracic and lumbar spine. The patient requested termination of the examination due to pain. A repeat examination can be obtained as clinically indicated after adequate pain control. 2. Within the severe limitations of this examination, there is no definite evidence of substantial central canal stenosis of the thoracic or lumbar spine.       I personally reviewed the images/study and I agree with the findings as stated. This study was interpreted at [place], [place].     Signed by: Tyron Dixon 10/12/2023 12:23 AM Dictation workstation:   [id number]    MR lumbar spine wo IV contrast    Result Date: 10/12/2023  Interpreted By:  Tyron Dixon,  Joseph Javed STUDY: MR THORACIC SPINE WO IV CONTRAST; MR LUMBAR SPINE WO IV CONTRAST; 10/11/2023 11:37 pm   INDICATION:  Signs/Symptoms:bilateral LE weakness.   Per EMR: 68-year-old male with a past medical history of PD, hypertension, alcohol and opioid abuse, hepatitis C, PE without cor pulmonale on Eliquis who presents today for an inability to walk. Patient states that about 3 days ago his legs gave out and he has not been able to walk since and has been stuck in a recliner.   COMPARISON: CT chest abdomen pelvis 08/15/2022.   ACCESSION NUMBER(S): [id number]; [id number]   ORDERING CLINICIAN: TEE BLAND   TECHNIQUE: Limited nondiagnostic MRI of the thoracic and lumbar spine. Only T2 sagittal and coronal  images of the thoracic and lumbar spine were obtained.  The patient requested termination of the examination due to pain.   FINDINGS: Limited MRI of thoracic and lumbar spine demonstrates scoliosis of the thoracic spine. No definitive evidence of substantial central canal stenosis. Mild cervical narrowing secondary spinal degenerative change. Evaluation of neural foraminal stenosis is markedly limited.    images demonstrated T2 hyperintense right renal cyst.       1. Nondiagnostic MRI of the thoracic and lumbar spine. The patient requested termination of the examination due to pain. A repeat examination can be obtained as clinically indicated after adequate pain control. 2. Within the severe limitations of this examination, there is no definite evidence of substantial central canal stenosis of the thoracic or lumbar spine.       I personally reviewed the images/study and I agree with the findings as stated. This study was interpreted at [place], [place].     Signed by: Tyron Dixon 10/12/2023 12:23 AM Dictation workstation:   [id number]    CT head wo IV contrast    Result Date: 10/11/2023  Interpreted By:  Tyron Dixon and Dervishi Mario STUDY: CT HEAD WO IV CONTRAST;  10/11/2023 9:42 pm   INDICATION: bilateral LE weakness, headache.   COMPARISON: CT  brain: 08/26/2023.   ACCESSION NUMBER(S): [id number]   ORDERING CLINICIAN: TEE BLAND   TECHNIQUE: Noncontrast axial CT scan of head was performed. Angled reformats in brain and bone windows were generated. The images were reviewed in bone, brain, blood and soft tissue windows.   FINDINGS: CSF Spaces: The ventricles, sulci and basal cisterns are concordant with patient's age and degree of global parenchymal atrophy.. There is no extraaxial fluid collection.   Parenchyma: There are hypodense focal areas in the subcortical and periventricular white matter regions consistent with the sequela of chronic microvascular ischemic changes. Punctate remote left thalamic and anterior limb internal capsule lacunar infarcts. Otherwise, the grey-white differentiation is overall preserved. There is no mass effect or midline shift.  There is no intracranial hemorrhage.   Calvarium: The calvarium is intact.   Paranasal sinuses and mastoids: Visualized paranasal sinuses and mastoids are clear.       1. No evidence of acute cortical infarct or intracranial hemorrhage. 2. Subcortical and periventricular white matter hypodensities consistent with the sequela of chronic microvascular ischemic changes. 3. Punctate remote left thalamic and anterior basal ganglia lacunar infarcts.     I personally reviewed the images/study and I agree with the findings as stated. This study was interpreted at [place], [place].   MACRO: None   Signed by: Tyron Dixon 10/11/2023 9:52 PM Dictation workstation:   [id number]    XR chest 2 views    Result Date: 10/11/2023  Interpreted By:  Tyron Dixon and Stephens Katherine STUDY: XR CHEST 2 VIEWS;  10/11/2023 9:32 pm   INDICATION: Signs/Symptoms:bilater LE weakness.   COMPARISON: Chest radiograph and CT chest 08/26/2023   ACCESSION NUMBER(S): [id number]   ORDERING CLINICIAN: TEE BLAND   FINDINGS: PA and lateral radiographs of the  chest were provided. New patchy left lower lobe airspace opacities. Consider pneumonia. Pulmonary hyperinflation and coarsened interstitial markings compatible with COPD/emphysema. No pleural effusion or pneumothorax diffuse osteopenia. No acute osseous abnormality. Partially visualized left shoulder arthroplasty. Upper abdomen is unremarkable.       1. New patchy left lower lobe airspace opacities. Consider pneumonia. 2. Findings of COPD/emphysema.   I personally reviewed the images/study and I agree with the findings as stated. This study was interpreted at [facility], [place], [place].   MACRO: None   Signed by: Tyron Dixon 10/11/2023 9:42 PM Dictation workstation:   [id number]       Assessment/Plan   Principal Problem:    Hospital-acquired pneumonia  Active Problems:    Essential hypertension, benign    Hepatitis C virus infection cured after antiviral drug therapy    Alcoholism (CMS/HCC)    Chronic pulmonary embolism (CMS/HCC)    COPD (chronic obstructive pulmonary disease) (CMS/HCC)    HLD (hyperlipidemia)    Uremia    Molina Calderon is a 68-year-old male with previous medical history of hypertension, COPD, HCV (treated), alcohol use disorder, and opioid use disorder was admitted to the medical intensive care unit due to concern for alcohol withdrawals with CIWA of score and was transferred to the floor and eventually readmitted to the MICU on 10/16 for acute hypoxic respiratory failure 2/2 mucous plugging versus severe COPD.  Patient was intubated on 10/20 and found to be groin stenotrophomonas and his respiratory culture and placed on Bactrim.  Patient was extubated on 10/27.  Course complicated by SIADH which is now resolved however has an unclear etiology.  Nephrology was consulted and managed with fluid restriction, urea packets and Lasix.  Blood cultures on 7/31 growing Candida albicans.(On micafungin).  Patient also started on CVVH (10/31 - 11/3 ) due to  increased BUN into the 180s.  S/P Bronch on 11/6     Updates 11/8:  Aggressive bronch/pulm hygiene  Spoke with sister in regards to improving pts compliance w/ non invasive ventilation techniques and airway clearing modalities by the RT.  Discontinue meds contributing to diarrhea and will schedule imodium vs PRN administration    Neuro:  #Metabolic encephalopathy 2/2 uremia versus hypoxia (improving)  -CT of head obtained on 10/11/2020 shows no acute intracranial process  -Continue with delirium precautions  -S/p CVVH with improvement in serum BUN     #Deconditioning/bilateral lower extremity weakness  -Repeat MRI brain/total spine on 10/20 unremarkable for acute process  -MRI spine negative for  -Neurology consulted appreciate recs--> suspect++ weakness is likely peripheral (L arm and proximal muscle) inflammatory degenerative disorder.  We will follow-up with EMG/NCS once patient was stable (Ordered on 11/8).  Low suspicion for GBS  -Follow-up with neurology as an outpatient  -Encephalopathy work-up negative  -PT/OT  -LSW for placement     Opthal:  #Dry Eyes  #Cataracts   -Cont with erythromycin QID both eyes  -Start Preservative free eye drops to both eyes q6  -no s/s of fungal endophthalmitis per Opthal   -Follow up with o/p opthal for cataracts      Pulm:  #Acute hypoxic respiratory failure 2/2 HAP  #Steno HAP  #Severe COPD  - Continue aggressive RT: Albuterol 2.5 mg q12h, chest PT, respiratory hygiene  - Ezpap, incentive spirometer, cough assist  -Bronchial washings with stenotrophomonas susceptible to Bactrim  -Blood culture 10/31 + for Candida albicans   -Bactrim 10/22-10/28  -8L NC this AM  -Cont with dieter IVPB      #Left lung consolidation.  With complete opacification  #Mucous plugging s/p bronch on 11/5 (beside by Dr. Friend) and 11/6 (bronch suit by Dr. Cuenca) and 10/20 (Dr. Mascorro) follow up w/cultures)  -Aggressive bronch/pulm hygiene   -PT/OT--> OOB or in sitting position  -BIPAP for lung recruitment TID  x 30 min  -Serial CXR  -Serial Bronchoscopy  - 7% NS neb q12h     #Suspected PAH  -10/17/23 Echo with atrial septal aneurysm, positive bubble study  - Will need outpatient workup, previously some suspicion for PAH but will need further evaluation      #Pulm Embolism RUL (partially occlusive)   #Multiple non occlusive filling defects of the RUL segmental pulm arteries   -Cont with heparin gtt   -INR 2-3 goal   -Per vasc med pt neds 3 months of of AC; on home apixaban 5mg BID      CV:  #PMHx CAD   -Continue holding statin I/s/o elevated transaminates      #Hypotension (improving)   #HTN (resolved)   #Tachycardia (resolved)     #c/f TV endocarditis (Resolved)   - TTE 10/17/23: LVEF 75%, LV concentric modeling, mildly increased LV septal wall thickness, atrial septal aneurysm, mildly elevated RSVP 39  - TTE 10/31/23: same as above with the following exceptions: 1.7 x 0.8 cm mobile echodensity of posterior tricuspid leaflet, mod-severe TR, RSVP increased to 57  - TTE 11/2/23: No definite valvular vegetations were visualized  - vanc/ari discontinued     Renal:  HypoNa 2/2 SIADH   -Renal US 10/31:  Unremarkable sonographic evaluation of the kidneys  -Fluid restriction; no FWF  - today   -RFP daily   -Nephro signed off on 11/5  -Believe fluid loss from Diarrhea; will schedule Imodium 4x per day vs PRN     #Uremia (resolved)  #KRYSTEN (resolved)   S/p CVVH 10/31-11/3     FEN/GI:  #HepC  #Elevated Transaminases  #PMHx of HCV   =RUQ US + for steatosis and biliary sludge   -Cortrak placed 10/17  -Diet:  Siteminis Peptide 1.5; 45; 200 goal of 45 ml/hr   -100 mg thiamine, folic acid 1mg, 8000u Vit D2  -HCV RNA 8/23 undetected  -LFT daily   -Avoid hepatic toxic meds      Heme/ONC:  #Leukocytosis (improving) 2/2 uremia   -WBC 12.4 today down from 13.5  -CBC daily     ID:  #Candida fungemia   #Leukocytosis I/s/o Steno Bal, Candida fungemia, and strep PNA   -c/w She 100 mg daily 11/3-present)  -Ari de-escated d/t nagative TV  endo   Micro: 11/15/2020 AFB culture/smear no growth today, 11/6/2023 fungal culture/smear no growth to date, 11/6/2023 respiratory culture/smear plus for abundant poly morphoneuclear leukocytes, 11/5/2023 respiratory culture/smear positive for stenotrophomonas maltophilia, 11/3/2023 blood culture x2 no growth to date, 11/2/2023 C. difficile PCR not detected, 11/1/3 respiratory culture/smear contaminated, 10/31/2023 urine culture no growth to date, 10/31/2023 blood culture positive for Candida albicans in the aerobic bottle, 10/28/2023 respiratory culture/smear positive for abundant stenotrophomonas maltophilia, 10/20/2023 MRSA negative, 10/20 sputum culture contaminated, 10/20/2023 MRSA negative, 10/20/2023 sputum culture contaminated, 10/17/23 Staph/MRSA negative     ATBCs: - Zosyn 10/20-10/24, Bactrim 10/22-10/28, Levaquin 10/23-10/25, vanc 11/1-11/2, ari 11/1-11/2, CTX 10/13-10/17     MSK/Rhem   #Chronic B/L LE weakness  --possible EMG once downgraded, neuro concern for peripheral (LMN + muscle) inflammatory or degenerative disorder considering distribution and chronology   -Unlikely GBS give no ascending paralysis      NOK: Sister Sarah Berman [phone] (updated today @ 0800)  Code Status:  FC      Pt discussed with Dr. Frazier seen and examined. All labs, VS and previous plan of care reviewed.     I spent 40 minutes in the professional and overall care of this patient.      Dakota Teresa, WOO-STEPHANIE      ------------------      This is a shared visit. I have reviewed the Advanced Practice Provider's encounter note, approve the Advanced Practice Provider's documentation, and provide the following additional information from my personal encounter.      Molina Godinez is a 68 y.o. male with with PMHx of COPD, HTN, AUD and opioid use disorder, HCV (treated), chronic PE  admitted 10/11/2023 for LE weakness, SIADH,  and CAP with AHRF eventually requiring intubation, but eventuall y extubated. Had CVVHD  sessions for uremia. 1/4 Cx from 10/31 with yeast and micafungin started. Had had persistent left lung opacification in setting of likely mucous plug. No TV vegetation on repeat TTE.     Principal Problem:    Hospital-acquired pneumonia  Active Problems:    Essential hypertension, benign    Hepatitis C virus infection cured after antiviral drug therapy    Alcoholism (CMS/HCC)    Chronic pulmonary embolism (CMS/HCC)    COPD (chronic obstructive pulmonary disease) (CMS/HCC)    HLD (hyperlipidemia)    Uremia     Continues on micafungin for candida in blood. Continues to require significant pulmonary clearance assistance as extremely poor cough. On 4lpm. May get repeat chest CT with contrast to see if any potential airway obstruction not obvious by bronch.    Jacob Frazier MD PhD

## 2023-11-09 PROBLEM — B49 FUNGEMIA: Status: ACTIVE | Noted: 2023-11-09

## 2023-11-09 LAB
ALBUMIN SERPL BCP-MCNC: 2.6 G/DL (ref 3.4–5)
ALBUMIN SERPL BCP-MCNC: 2.9 G/DL (ref 3.4–5)
ALP SERPL-CCNC: 177 U/L (ref 33–136)
ALT SERPL W P-5'-P-CCNC: 31 U/L (ref 10–52)
ANION GAP SERPL CALC-SCNC: 16 MMOL/L (ref 10–20)
AST SERPL W P-5'-P-CCNC: 59 U/L (ref 9–39)
BASOPHILS # BLD AUTO: 0.12 X10*3/UL (ref 0–0.1)
BASOPHILS NFR BLD AUTO: 1 %
BILIRUB DIRECT SERPL-MCNC: 0.3 MG/DL (ref 0–0.3)
BILIRUB SERPL-MCNC: 0.5 MG/DL (ref 0–1.2)
BUN SERPL-MCNC: 13 MG/DL (ref 6–23)
CALCIUM SERPL-MCNC: 8.3 MG/DL (ref 8.6–10.6)
CHLORIDE SERPL-SCNC: 93 MMOL/L (ref 98–107)
CO2 SERPL-SCNC: 27 MMOL/L (ref 21–32)
CREAT SERPL-MCNC: 0.31 MG/DL (ref 0.5–1.3)
EOSINOPHIL # BLD AUTO: 0.36 X10*3/UL (ref 0–0.7)
EOSINOPHIL NFR BLD AUTO: 2.9 %
ERYTHROCYTE [DISTWIDTH] IN BLOOD BY AUTOMATED COUNT: 13.2 % (ref 11.5–14.5)
GFR SERPL CREATININE-BSD FRML MDRD: >90 ML/MIN/1.73M*2
GLUCOSE SERPL-MCNC: 102 MG/DL (ref 74–99)
HCT VFR BLD AUTO: 31.2 % (ref 41–52)
HGB BLD-MCNC: 10.5 G/DL (ref 13.5–17.5)
IMM GRANULOCYTES # BLD AUTO: 0.24 X10*3/UL (ref 0–0.7)
IMM GRANULOCYTES NFR BLD AUTO: 1.9 % (ref 0–0.9)
LYMPHOCYTES # BLD AUTO: 1.22 X10*3/UL (ref 1.2–4.8)
LYMPHOCYTES NFR BLD AUTO: 9.9 %
MAGNESIUM SERPL-MCNC: 1.66 MG/DL (ref 1.6–2.4)
MCH RBC QN AUTO: 29.1 PG (ref 26–34)
MCHC RBC AUTO-ENTMCNC: 33.7 G/DL (ref 32–36)
MCV RBC AUTO: 86 FL (ref 80–100)
MONOCYTES # BLD AUTO: 1.13 X10*3/UL (ref 0.1–1)
MONOCYTES NFR BLD AUTO: 9.1 %
NEUTROPHILS # BLD AUTO: 9.31 X10*3/UL (ref 1.2–7.7)
NEUTROPHILS NFR BLD AUTO: 75.2 %
NRBC BLD-RTO: 0 /100 WBCS (ref 0–0)
PHOSPHATE SERPL-MCNC: 4.9 MG/DL (ref 2.5–4.9)
PLATELET # BLD AUTO: 362 X10*3/UL (ref 150–450)
POTASSIUM SERPL-SCNC: 5 MMOL/L (ref 3.5–5.3)
PROT SERPL-MCNC: 8.4 G/DL (ref 6.4–8.2)
RBC # BLD AUTO: 3.61 X10*6/UL (ref 4.5–5.9)
SODIUM SERPL-SCNC: 131 MMOL/L (ref 136–145)
UFH PPP CHRO-ACNC: 0.5 IU/ML
WBC # BLD AUTO: 12.4 X10*3/UL (ref 4.4–11.3)

## 2023-11-09 PROCEDURE — 94640 AIRWAY INHALATION TREATMENT: CPT

## 2023-11-09 PROCEDURE — 85025 COMPLETE CBC W/AUTO DIFF WBC: CPT | Performed by: NURSE PRACTITIONER

## 2023-11-09 PROCEDURE — 2500000001 HC RX 250 WO HCPCS SELF ADMINISTERED DRUGS (ALT 637 FOR MEDICARE OP): Performed by: NURSE PRACTITIONER

## 2023-11-09 PROCEDURE — 2500000005 HC RX 250 GENERAL PHARMACY W/O HCPCS: Performed by: NURSE PRACTITIONER

## 2023-11-09 PROCEDURE — 85520 HEPARIN ASSAY: CPT | Performed by: NURSE PRACTITIONER

## 2023-11-09 PROCEDURE — 83735 ASSAY OF MAGNESIUM: CPT | Performed by: NURSE PRACTITIONER

## 2023-11-09 PROCEDURE — 2500000001 HC RX 250 WO HCPCS SELF ADMINISTERED DRUGS (ALT 637 FOR MEDICARE OP)

## 2023-11-09 PROCEDURE — 2500000004 HC RX 250 GENERAL PHARMACY W/ HCPCS (ALT 636 FOR OP/ED)

## 2023-11-09 PROCEDURE — C9113 INJ PANTOPRAZOLE SODIUM, VIA: HCPCS

## 2023-11-09 PROCEDURE — 2060000001 HC INTERMEDIATE ICU ROOM DAILY

## 2023-11-09 PROCEDURE — 2500000004 HC RX 250 GENERAL PHARMACY W/ HCPCS (ALT 636 FOR OP/ED): Performed by: NURSE PRACTITIONER

## 2023-11-09 PROCEDURE — 80053 COMPREHEN METABOLIC PANEL: CPT | Performed by: NURSE PRACTITIONER

## 2023-11-09 PROCEDURE — 99233 SBSQ HOSP IP/OBS HIGH 50: CPT | Performed by: INTERNAL MEDICINE

## 2023-11-09 PROCEDURE — 2500000002 HC RX 250 W HCPCS SELF ADMINISTERED DRUGS (ALT 637 FOR MEDICARE OP, ALT 636 FOR OP/ED): Performed by: NURSE PRACTITIONER

## 2023-11-09 PROCEDURE — S4991 NICOTINE PATCH NONLEGEND: HCPCS | Performed by: NURSE PRACTITIONER

## 2023-11-09 PROCEDURE — 84100 ASSAY OF PHOSPHORUS: CPT | Performed by: NURSE PRACTITIONER

## 2023-11-09 PROCEDURE — 82040 ASSAY OF SERUM ALBUMIN: CPT

## 2023-11-09 PROCEDURE — 36415 COLL VENOUS BLD VENIPUNCTURE: CPT | Performed by: NURSE PRACTITIONER

## 2023-11-09 PROCEDURE — 36415 COLL VENOUS BLD VENIPUNCTURE: CPT

## 2023-11-09 RX ORDER — PANTOPRAZOLE SODIUM 40 MG/10ML
40 INJECTION, POWDER, LYOPHILIZED, FOR SOLUTION INTRAVENOUS DAILY
Status: DISCONTINUED | OUTPATIENT
Start: 2023-11-09 | End: 2023-11-17

## 2023-11-09 RX ORDER — MAGNESIUM SULFATE HEPTAHYDRATE 40 MG/ML
2 INJECTION, SOLUTION INTRAVENOUS ONCE
Status: COMPLETED | OUTPATIENT
Start: 2023-11-09 | End: 2023-11-09

## 2023-11-09 RX ORDER — LOPERAMIDE HCL 1MG/7.5ML
2 LIQUID (ML) ORAL 4 TIMES DAILY
Status: DISCONTINUED | OUTPATIENT
Start: 2023-11-09 | End: 2023-11-11

## 2023-11-09 RX ADMIN — LOPERAMIDE HYDROCHLORIDE 2 MG: 2 SOLUTION ORAL at 21:00

## 2023-11-09 RX ADMIN — CARBOXYMETHYLCELLULOSE SODIUM 1 DROP: 5 SOLUTION/ DROPS OPHTHALMIC at 15:38

## 2023-11-09 RX ADMIN — MIDODRINE HYDROCHLORIDE 10 MG: 10 TABLET ORAL at 17:20

## 2023-11-09 RX ADMIN — OXYCODONE HYDROCHLORIDE 5 MG: 5 TABLET ORAL at 05:29

## 2023-11-09 RX ADMIN — LIDOCAINE 1 PATCH: 4 PATCH TOPICAL at 08:22

## 2023-11-09 RX ADMIN — ACETAMINOPHEN 650 MG: 325 TABLET ORAL at 12:29

## 2023-11-09 RX ADMIN — MIDODRINE HYDROCHLORIDE 10 MG: 10 TABLET ORAL at 08:22

## 2023-11-09 RX ADMIN — Medication 8000 UNITS: at 08:23

## 2023-11-09 RX ADMIN — LOPERAMIDE HYDROCHLORIDE 2 MG: 2 SOLUTION ORAL at 17:20

## 2023-11-09 RX ADMIN — CARBOXYMETHYLCELLULOSE SODIUM 1 DROP: 5 SOLUTION/ DROPS OPHTHALMIC at 18:11

## 2023-11-09 RX ADMIN — LOPERAMIDE HYDROCHLORIDE 2 MG: 2 CAPSULE ORAL at 12:03

## 2023-11-09 RX ADMIN — CARBOXYMETHYLCELLULOSE SODIUM 1 DROP: 5 SOLUTION/ DROPS OPHTHALMIC at 12:03

## 2023-11-09 RX ADMIN — Medication 1 PATCH: at 08:23

## 2023-11-09 RX ADMIN — CARBOXYMETHYLCELLULOSE SODIUM 1 DROP: 5 SOLUTION/ DROPS OPHTHALMIC at 09:42

## 2023-11-09 RX ADMIN — LIDOCAINE 2 PATCH: 4 PATCH TOPICAL at 08:23

## 2023-11-09 RX ADMIN — MAGNESIUM SULFATE HEPTAHYDRATE 2 G: 40 INJECTION, SOLUTION INTRAVENOUS at 05:53

## 2023-11-09 RX ADMIN — Medication 3 ML: at 08:10

## 2023-11-09 RX ADMIN — CARBOXYMETHYLCELLULOSE SODIUM 1 DROP: 5 SOLUTION/ DROPS OPHTHALMIC at 22:00

## 2023-11-09 RX ADMIN — LOPERAMIDE HYDROCHLORIDE 2 MG: 2 CAPSULE ORAL at 06:44

## 2023-11-09 RX ADMIN — MICAFUNGIN SODIUM 100 MG: 50 INJECTION, POWDER, LYOPHILIZED, FOR SOLUTION INTRAVENOUS at 12:29

## 2023-11-09 RX ADMIN — ERYTHROMYCIN 1 CM: 5 OINTMENT OPHTHALMIC at 06:44

## 2023-11-09 RX ADMIN — HEPARIN SODIUM 1300 UNITS/HR: 10000 INJECTION, SOLUTION INTRAVENOUS at 17:20

## 2023-11-09 RX ADMIN — Medication 5 MG: at 21:00

## 2023-11-09 RX ADMIN — LOPERAMIDE HYDROCHLORIDE 2 MG: 2 SOLUTION ORAL at 14:57

## 2023-11-09 RX ADMIN — MIDODRINE HYDROCHLORIDE 10 MG: 10 TABLET ORAL at 12:03

## 2023-11-09 RX ADMIN — Medication 1 TABLET: at 08:22

## 2023-11-09 RX ADMIN — PANTOPRAZOLE SODIUM 40 MG: 40 INJECTION, POWDER, FOR SOLUTION INTRAVENOUS at 12:02

## 2023-11-09 RX ADMIN — CARBOXYMETHYLCELLULOSE SODIUM 1 DROP: 5 SOLUTION/ DROPS OPHTHALMIC at 06:44

## 2023-11-09 RX ADMIN — ALBUTEROL SULFATE 2.5 MG: 2.5 SOLUTION RESPIRATORY (INHALATION) at 08:10

## 2023-11-09 ASSESSMENT — PAIN SCALES - GENERAL
PAINLEVEL_OUTOF10: 6
PAINLEVEL_OUTOF10: 0 - NO PAIN
PAINLEVEL_OUTOF10: 7
PAINLEVEL_OUTOF10: 0 - NO PAIN

## 2023-11-09 ASSESSMENT — PAIN - FUNCTIONAL ASSESSMENT
PAIN_FUNCTIONAL_ASSESSMENT: 0-10

## 2023-11-09 ASSESSMENT — PAIN DESCRIPTION - LOCATION: LOCATION: FOOT

## 2023-11-09 ASSESSMENT — PAIN DESCRIPTION - ORIENTATION: ORIENTATION: RIGHT;LEFT

## 2023-11-09 NOTE — SIGNIFICANT EVENT
Rapid Response RN Note     11/09/23 0407   Onset Documentation   Rapid Response Initiated By  RADAR   Location/Room [facility]  ([facility] 0690)   Pager Time [phone]   Arrival Time 0407   Event End Time 0410   Primary Reason for Call RADAR  (RADAR score 6)     RADAR score 6 for the following VS: T 36.5 °Celsius; HR 95 ; RR 25; /67; SPO2 96%.     Reviewed above VS with bedside RN via phone.  VS within patient's current trends.  No interventions by rapid response team indicated at this time.      Staff to page rapid response for any concerns or acute change in condition/VS.

## 2023-11-09 NOTE — PROGRESS NOTES
Music Therapy Note    Molina Godinez     Therapy Session  Referral Type: New referral this admission  Visit Type: Follow-up visit  Session Start Time: 1447  Session End Time: 1449  Intervention Delivery: In-person  Conflict of Service: Declined treatment  Number of family members present: 0  Family Present for Session: None  Number of staff members present: 0     Pre-assessment  Pain Score:  (Feeling sick)  Mood/Affect: Flat/blunted, Tired/lethargic, Calm (Sick)    Post-assessment  Unable to Assess Reason: Did not provide expressive therapy intervention  Continue Visiting: Yes  Total Session Time (min): 2 minutes    Narrative  Assessment Detail: Pt found awake, lying in bed; calm and flat affect. Pt stated he was feeling sick and looked uncomfortable. Asked [facility]TI to hand him the suction. Declined MTx session. Respritory therapist entered room and also offered services, pt declined due to feeling sick. MTI will follow up with pt.  Plan: n/a  Intervention: n/a  Evaluation: n/a  Follow-up: MTI will continue to follow up with pt and provide MTx services as needed.    Education Documentation  No documentation found.

## 2023-11-09 NOTE — CARE PLAN
Problem: Skin  Goal: Participates in plan/prevention/treatment measures  11/9/2023 0338 by Soni Tillman RN  Outcome: Progressing  11/8/2023 2333 by Soni Tillman RN  Outcome: Progressing    Problem: Skin  Goal: Prevent/minimize sheer/friction injuries  11/9/2023 0338 by Soni Tillman RN  Outcome: Progressing  11/8/2023 2333 by Soni Tillman RN  Outcome: Progressing     Problem: Skin  Goal: Promote/optimize nutrition  11/9/2023 0338 by Soni Tillman RN  Outcome: Progressing  11/8/2023 2333 by Soni Tillman RN  Outcome: Progressing

## 2023-11-09 NOTE — SIGNIFICANT EVENT
RAPID RESPONSE RN NOTE:       11/09/23 0823   Onset Documentation   Rapid Response Initiated By Radar auto page   Location/Room [facility]  ([facility] 6019)   Pager Time 0823   Arrival Time 0850   Event End Time 0855   Level II Called No   Primary Reason for Call Radar auto page  (RADAR score = 6)     RADAR alert received.  Temp 36.7, , RR 26, /76, pulse ox 93%.  On my arrival, pt in no acute distress. Per bedside RN, no acute changes or concerns.  Please page rapid response for any concerns for deterioration or assistance needed.

## 2023-11-09 NOTE — PROGRESS NOTES
"Molina Godinez is a 68 y.o. male on day 28 of admission presenting with Hospital-acquired pneumonia.    Subjective   Chief Complaint: \"My right wrist hurts, what are you going to do about it\"  --> Plan to remove IV located in right wrist to aide in comfort    ROS: Endorses right wrist pain.  Endorses intermittent cough producing small amounts of clear sputum.  Denies fever, chills, night sweats, chest pain, abdominal pain, SOB, and N/V    Overnight: No acute events.  Patient with recurrent nightmares at night which have effected his ability to sleep. Will continue with Melatonin HS    Objective   Physical Exam:  Constitutional: Ill appearing elderly male pt in NAD, alert and cooperative  Eyes: PERRL, EOMI, no icterus   ENMT: mucous membranes moist  Head/Neck: Neck supple, no apparent injury  Respiratory/Thorax: Left lung field with minimal air movement noted, right lung diminshed throughout, non-labored breathing, intermittent cough with clear sputum production, on 2L NC  Cardiovascular: Regular, rate and rhythm, no murmurs, normal S1 and S2  Gastrointestinal: Nondistended, soft, non-tender, BS present x 4  : External catheter in place with clear yellow output noted in canister   Musculoskeletal: ROM intact, no joint swelling, normal strength  Extremities: normal extremities, no edema, contusions or wounds  Neurological: alert and oriented x 3, speech clear, follows commands appropriately, without focal deficits, sensation grossly intact  Skin: Warm and dry    Vital Signs  Blood pressure 112/67, pulse 100, temperature 36.5 °C (97.7 °F), temperature source Temporal, resp. rate 26, height 1.702 m (5' 7.01\"), weight 54 kg (119 lb 0.8 oz), SpO2 99 %.  Oxygen Therapy  SpO2: 99 %  Medical Gas Therapy: Supplemental oxygen  O2 Delivery Method: Nasal cannula  FiO2 (%):  [28 %] 28 %    Intake/Output last 3 Shifts:  I/O last 3 completed shifts:  In: 2628 (48.7 mL/kg) [I.V.:518 (9.6 mL/kg); NG/GT:2005; IV Piggyback:105]  Out: " 2400 (44.4 mL/kg) [Urine:2400 (1.2 mL/kg/hr)]  Weight: 54 kg     Lines and Tubes:  Peripheral IV 10/15/23 20 G Distal;Right;Upper;Ventral Arm (Active)   Placement Date/Time: 10/15/23 0900   Size (Gauge): 20 G  Orientation: Distal;Right;Upper;Ventral  Location: Arm  Site Prep: Chlorhexidine   Insertion attempts: 1  Patient Tolerance: Tolerated well   Number of days: 25       Peripheral IV 10/20/23 22 G Right;Anterior Wrist (Active)   Placement Date/Time: 10/20/23 1019   Size (Gauge): 22 G  Orientation: Right;Anterior  Location: Wrist  Site Prep: Alcohol;Chlorhexidine   Placed by: Cj Fox  Insertion attempts: 1  Patient Tolerance: Tolerated well   Number of days: 19       Peripheral IV 11/06/23 20 G Left Hand (Active)   Placement Date/Time: 11/06/23 (c) 1428   Size (Gauge): 20 G  Orientation: Left  Location: Hand  Site Prep: Alcohol  Technique: Anatomical landmarks  Insertion attempts: 1   Number of days: 2       NG/OG/Feeding Tube Left nostril (Active)   Placement Date/Time: 10/17/23 1755   Placed by: NP  Tube Length: 80 cm  Tube Location: Left nostril   Number of days: 22       External Urinary Catheter (Active)   Placement Date/Time: 11/04/23 1805     Number of days: 4         Relevant Results  Scheduled medications  albuterol, 2.5 mg, nebulization, q12h  ergocalciferol, 8,000 Units, oral, Daily  erythromycin, 1 cm, Both Eyes, 4x daily  lidocaine, 1 patch, transdermal, Daily  lidocaine, 2 patch, transdermal, Daily  loperamide, 2 mg, oral, 4x daily  artificial tears, 1 drop, Both Eyes, 6x daily  melatonin, 5 mg, oral, Nightly  micafungin, 100 mg, intravenous, q24h  midodrine, 10 mg, oral, TID with meals  multivitamin with minerals, 1 tablet, oral, Daily  nicotine, 1 patch, transdermal, Daily  sodium chloride, 3 mL, nebulization, q12h      Continuous medications  heparin, 0-4,500 Units/hr, Last Rate: 1,300 Units/hr (11/09/23 0700)      PRN medications  PRN medications: acetaminophen, dextrose 10 % in water (D10W),  dextrose, diclofenac sodium, glucagon, heparin, HYDROmorphone, moisturizing mouth, oxyCODONE, oxygen, white petrolatum    Results for orders placed or performed during the hospital encounter of 10/11/23 (from the past 24 hour(s))   CBC and Auto Differential   Result Value Ref Range    WBC 12.4 (H) 4.4 - 11.3 x10*3/uL    nRBC 0.0 0.0 - 0.0 /100 WBCs    RBC 3.61 (L) 4.50 - 5.90 x10*6/uL    Hemoglobin 10.5 (L) 13.5 - 17.5 g/dL    Hematocrit 31.2 (L) 41.0 - 52.0 %    MCV 86 80 - 100 fL    MCH 29.1 26.0 - 34.0 pg    MCHC 33.7 32.0 - 36.0 g/dL    RDW 13.2 11.5 - 14.5 %    Platelets 362 150 - 450 x10*3/uL    Neutrophils % 75.2 40.0 - 80.0 %    Immature Granulocytes %, Automated 1.9 (H) 0.0 - 0.9 %    Lymphocytes % 9.9 13.0 - 44.0 %    Monocytes % 9.1 2.0 - 10.0 %    Eosinophils % 2.9 0.0 - 6.0 %    Basophils % 1.0 0.0 - 2.0 %    Neutrophils Absolute 9.31 (H) 1.20 - 7.70 x10*3/uL    Immature Granulocytes Absolute, Automated 0.24 0.00 - 0.70 x10*3/uL    Lymphocytes Absolute 1.22 1.20 - 4.80 x10*3/uL    Monocytes Absolute 1.13 (H) 0.10 - 1.00 x10*3/uL    Eosinophils Absolute 0.36 0.00 - 0.70 x10*3/uL    Basophils Absolute 0.12 (H) 0.00 - 0.10 x10*3/uL   Renal function panel   Result Value Ref Range    Glucose 102 (H) 74 - 99 mg/dL    Sodium 131 (L) 136 - 145 mmol/L    Potassium 5.0 3.5 - 5.3 mmol/L    Chloride 93 (L) 98 - 107 mmol/L    Bicarbonate 27 21 - 32 mmol/L    Anion Gap 16 10 - 20 mmol/L    Urea Nitrogen 13 6 - 23 mg/dL    Creatinine 0.31 (L) 0.50 - 1.30 mg/dL    eGFR >90 >60 mL/min/1.73m*2    Calcium 8.3 (L) 8.6 - 10.6 mg/dL    Phosphorus 4.9 2.5 - 4.9 mg/dL    Albumin 2.6 (L) 3.4 - 5.0 g/dL   Magnesium   Result Value Ref Range    Magnesium 1.66 1.60 - 2.40 mg/dL   Heparin Assay, UFH   Result Value Ref Range    Heparin Unfractionated 0.5 See Comment Below for Therapeutic Ranges IU/mL     CT chest w IV contrast    Result Date: 11/9/2023  Interpreted By:  Nathan Lu, STUDY: CT CHEST W IV CONTRAST;  11/8/2023  12:36 pm   INDICATION: Signs/Symptoms:RE-eval of atelectasis and/or a/w narrowing.   COMPARISON: 10/16/2023   ACCESSION NUMBER(S): [id number]   ORDERING CLINICIAN: RAKESH ELLIS   TECHNIQUE: Helical data acquisition of the chest was obtained  with 63 mL of Omnipaque 350.. Images were reformatted in axial, coronal, and sagittal planes.   FINDINGS: LUNGS AND AIRWAYS: There is extensive soft tissue within the left mainstem bronchus.   There is layering soft tissue/mucus within the distal trachea. There is right-sided peribronchial thickening and scattered atelectatic changes. There is consolidation/collapse of the left lung. Minimal aerated lung is noted. There is a small left-sided pleural effusion. There are extensive right-sided emphysematous changes.   MEDIASTINUM AND KRISTOPHER, LOWER NECK AND AXILLA: The visualized thyroid gland is within normal limits.   Increased size and number of multiple mediastinal lymph nodes.   NG tube within the esophagus.   HEART AND VESSELS: There is mild atherosclerotic changes of the thoracic aorta.   Main pulmonary artery and its branches are normal in caliber.   There are severe coronary artery calcifications. The study is not optimized for evaluation of coronary arteries.   Right-sided chamber enlargement.   No evidence of pericardial effusion.   UPPER ABDOMEN: The visualized subdiaphragmatic structures demonstrate no remarkable findings.   CHEST WALL AND OSSEOUS STRUCTURES: There are no suspicious osseous lesions. Multilevel degenerative changes are present. The patient is status post left total shoulder arthroplasty.       1.  Consolidation/collapse of the left lung with extensive mucoid impaction/aspirated material within the left mainstem bronchus and segmental bronchi. Correlate with aspiration. Extensive atherosclerotic changes of the coronary arteries and right heart enlargement. Correlate with elevated right-sided pressures.   MACRO: None   Signed by: Nathan Lu  11/9/2023 8:00 AM Dictation workstation:   [id number]    XR chest 1 view    Result Date: 11/8/2023  FINDINGS: Single AP semi-upright portable radiograph of the chest was provided. The feeding tube traverses the esophagus. There is virtually complete opacification of the left thorax. The heart mediastinum are shifted to the left indicating volume loss. Compensatory hyperaeration of the right lung is similar to previous study.   The left shoulder arthroplasty is noted. Bones are diffusely osteopenic.       1. Virtually complete opacification of the left thorax and shift of the heart mediastinum to the left indicating volume loss. Truncation of the mainstem bronchus can be seen with endobronchial obstruction     I personally reviewed the image(s)/study and resident interpretation as stated by Dr. Alycia Nam MD. I agree with the findings as stated. This study was interpreted at [facility], [place], [place].   MACRO: None   Signed by: Magdy Zamudio 11/8/2023 10:19 AM Dictation workstation:   [id number]    CT chest w IV contrast 11/08/2023    Narrative  Interpreted By:  Nathan Lu,  STUDY:  CT CHEST W IV CONTRAST;  11/8/2023 12:36 pm    INDICATION:  Signs/Symptoms:RE-eval of atelectasis and/or a/w narrowing.    COMPARISON:  10/16/2023    ACCESSION NUMBER(S):  [id number]    ORDERING CLINICIAN:  RAKESH ELLIS    TECHNIQUE:  Helical data acquisition of the chest was obtained  with 63 mL of  Omnipaque 350.. Images were reformatted in axial, coronal, and  sagittal planes.    FINDINGS:  LUNGS AND AIRWAYS:  There is extensive soft tissue within the left mainstem bronchus.    There is layering soft tissue/mucus within the distal trachea. There  is right-sided peribronchial thickening and scattered atelectatic  changes. There is consolidation/collapse of the left lung. Minimal  aerated lung is noted. There is a small left-sided pleural effusion.  There are extensive  right-sided emphysematous changes.    MEDIASTINUM AND KRISTOPHER, LOWER NECK AND AXILLA:  The visualized thyroid gland is within normal limits.    Increased size and number of multiple mediastinal lymph nodes.    NG tube within the esophagus.    HEART AND VESSELS:  There is mild atherosclerotic changes of the thoracic aorta.    Main pulmonary artery and its branches are normal in caliber.    There are severe coronary artery calcifications. The study is not  optimized for evaluation of coronary arteries.    Right-sided chamber enlargement.    No evidence of pericardial effusion.    UPPER ABDOMEN:  The visualized subdiaphragmatic structures demonstrate no remarkable  findings.    CHEST WALL AND OSSEOUS STRUCTURES:  There are no suspicious osseous lesions. Multilevel degenerative  changes are present. The patient is status post left total shoulder  arthroplasty.    Impression  1.  Consolidation/collapse of the left lung with extensive mucoid  impaction/aspirated material within the left mainstem bronchus and  segmental bronchi. Correlate with aspiration.  Extensive atherosclerotic changes of the coronary arteries and right  heart enlargement. Correlate with elevated right-sided pressures.    MACRO:  None    Signed by: Nathan Lu 11/9/2023 8:00 AM  Dictation workstation:   [id number]      Assessment/Plan   Principal Problem:    Hospital-acquired pneumonia  Active Problems:    Fungemia    Essential hypertension, benign    Hepatitis C virus infection cured after antiviral drug therapy    Alcoholism (CMS/HCC)    Chronic pulmonary embolism (CMS/HCC)    COPD (chronic obstructive pulmonary disease) (CMS/HCC)    HLD (hyperlipidemia)    Uremia    Kvng Molina is a 68-year-old male with previous medical history of hypertension, COPD, HCV (treated), alcohol use disorder, and opioid use disorder was admitted to the medical intensive care unit due to concern for alcohol withdrawals with CIWA of score and was transferred to the  floor and eventually readmitted to the MICU on 10/16 for acute hypoxic respiratory failure 2/2 mucous plugging versus severe COPD.  Patient was intubated on 10/20 and found to be groin stenotrophomonas and his respiratory culture and placed on Bactrim.  Patient was extubated on 10/27.  Course complicated by SIADH which is now resolved however has an unclear etiology.  Nephrology was consulted and managed with fluid restriction, urea packets and Lasix.  Blood cultures on 7/31 growing Candida albicans.(On micafungin).  Patient also started on CVVH (10/31 - 11/3 ) due to increased BUN into the 180s.  S/P Bronch on 11/6     Updates 11/9:  - Continue with aggressive bronch/pulm hygiene.  Patient educated on importance of participating with these exercises and verbalized understanding   - Continue with schedule imodium for ongoing diarrhea, 4 liquid stools over past 24hrs C Dif negative 11/2  - Will need follow-up with neurology as an outpatient   - Will need follow up with opthal for cataracts as an outpatient     Neuro:  #Medical Hx of AUD and Opioid use disorder   #Metabolic encephalopathy 2/2 uremia versus hypoxia (improving)  #Deconditioning/bilateral lower extremity weakness  - Patient alert and oriented x 3 this morning with complaints of right wrist pain.  - CT of head obtained on 10/11/2020 shows no acute intracranial process  - Continue with delirium precautions  - S/p CVVH with improvement in serum BUN  - Repeat MRI brain/total spine on 10/20 unremarkable for acute process  - Neurology consulted appreciate recs--> suspect++ weakness is likely peripheral (L arm and proximal muscle) inflammatory degenerative disorder.  We will follow-up with EMG/NCS once patient was stable (Ordered on 11/8).  Low suspicion for GBS/MG  - Will need follow-up with neurology as an outpatient  - Encephalopathy work-up negative  - PT/OT  - Continue with Melatonin HS, Pain management with Lidocaine patches, Diclofenac gel prn,  Acetaminophen prn, Oxycodone 5 mg Q6, and Hydromorphone 0.2 mg Q6.      Opthal:  #Dry Eyes  #Cataracts   - Cont with erythromycin QID both eyes   - Continue Preservative free eye drops to both eyes q6  - No s/s of fungal endophthalmitis per Opthal   - Follow up with o/p opthal for cataracts      Pulm:  #Medical Hx COPD and Chronic PE  #Acute hypoxic respiratory failure 2/2 Stenotrophomonas HAP  #Left lung consolidation  #Mucous plugging s/p bronch on 11/5 (beside by Dr. Friend) and 11/6 (bronch suit by Dr. Cuenca) and 10/20 (Dr. Mascorro)  - On 2L NC on exam this morning.   - Continue Albuterol 2.5 mg q12h, 7% sodium Chloride nebulizer Q12 with Bipap 30 minutes prior to hypotonic saline tx to aide in lung recruitment--> Patient has been refusing, continue to educate importance.   - Continue Airway clearance techniques Q6 hrs while awake --> Ezpap, incentive spirometer, cough assist  - Bronchial washings with stenotrophomonas susceptible to Bactrim ( Completed course 10/22-10/28)  - 11/8 CT chest completed for surveillance --> Consolidation/collapse of the left lung with extensive mucoid impaction/aspirated material within the left mainstem bronchus and segmental bronchi. Extensive atherosclerotic changes of the coronary arteries and right heart enlargement.   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, resolved  - Continue Midodrine 10 mg TID  - Consider restarting Statin if LFTs remain  normal  - TTE 10/17/23: LVEF 75%, LV concentric modeling, mildly increased LV septal wall thickness, atrial septal aneurysm, mildly elevated RSVP 39  - TTE 10/31/23: same as above with the following exceptions: 1.7 x 0.8 cm mobile echodensity of posterior tricuspid leaflet, mod-severe TR, RSVP increased to 57  - TTE 11/2/23: No definite valvular vegetations were visualized     Renal:  #HypoNa 2/2 SIADH  #Uremia, resolved  #KRYSTEN, resolved  - Renal US 10/31:  Unremarkable sonographic evaluation of the kidneys  - Na 131 today, down from 132. Continue with fluid restriction, no FWF  - Hyponatremia likely also due to large amount of liquid stool. Plan to continue with scheduled Imodium 4x daily.   - RFP daily   - Nephro signed off on 11/5 --> Previously on CVVH 10/31 - 11/3  - Strict I's and O's        FEN/GI:  #Medical Hx HepC  #Transaminates, resolved   #Hypomagnesemia   #Dysphagia   - Replete Mag today  - Last LFT showing improvement, ALT 37 and AST 35.  ALT/AST peaked 10/31 256 and 413.  - SLP following, plan to re-evaluate patient early next week  - RUQ US + for steatosis and biliary sludge   - Cortrak placed 10/17; MeetCast Peptide 1.5 @ goal of  45 ml/hr   - Continue with MV daily, ergocalciferol 8,000 units daily, and Imodium 2 mg 4x daily   - HCV RNA 8/23 undetected  - LFT and Mag daily   - Start PPI today while on Heparin gtt     Heme/ONC:  - No active issues  - H/H stable.  Continue with daily CBC     ID:  #Candida fungemia   #Leukocytosis I/s/o Steno Bal, Candida fungemia, strep PNA, and Uremia    - Patient afebrile with WBC of 12.4 this A.M., down from 12.7 yesterday  - Continue with Micafungin 100 mg daily 11/3 - STOP 11/17  - Rachel de-escated d/t nagative TV endo     Micro:   - 10/12 Streptococcus Pneumoniae Ag urine positive   - 10/17 Staph/MRSA nares negative  - 10/20 Bronchial washing with abundant Stenotrophomonas   - 10/31 BC positive for Candida albicans in the aerobic bottle; Urine culture No  growth final  - 11/2 C. Dif PCR not detected   - 11/3 BC NG final  - 11/5 respiratory culture/smear positive for stenotrophomonas maltophilia     ATBCs: - Zosyn 10/20-10/24, Bactrim 10/22-10/28, Levaquin 10/23-10/25, vanc 11/1-11/2, ari 11/1-11/2, CTX 10/13-10/17     MSK/Rhem   #Chronic B/L LE weakness  - EMG order placed 11/8. Neuro concern for peripheral (LMN + muscle) inflammatory or degenerative disorder considering distribution and chronology   - Unlikely GBS give no ascending paralysis      PPX:   DVT: Heparin gtt and SCDs  GI: PPI     Access: PIV x2, Cortrak    NOK: Sister Sarah Berman [phone]   Code Status:  Full Code      Pt discussed with Dr. Frazier seen and examined. All labs, VS and previous plan of care reviewed.      Homero Pizano, APRN-CNP

## 2023-11-10 LAB
ALBUMIN SERPL BCP-MCNC: 2.7 G/DL (ref 3.4–5)
ALP SERPL-CCNC: 176 U/L (ref 33–136)
ALT SERPL W P-5'-P-CCNC: 28 U/L (ref 10–52)
ANION GAP SERPL CALC-SCNC: 16 MMOL/L (ref 10–20)
AST SERPL W P-5'-P-CCNC: 30 U/L (ref 9–39)
BACTERIA SPEC RESP CULT: ABNORMAL
BASOPHILS # BLD AUTO: 0.13 X10*3/UL (ref 0–0.1)
BASOPHILS NFR BLD AUTO: 1.1 %
BILIRUB DIRECT SERPL-MCNC: 0.1 MG/DL (ref 0–0.3)
BILIRUB SERPL-MCNC: 0.3 MG/DL (ref 0–1.2)
BUN SERPL-MCNC: 18 MG/DL (ref 6–23)
CALCIUM SERPL-MCNC: 8.7 MG/DL (ref 8.6–10.6)
CHLORIDE SERPL-SCNC: 92 MMOL/L (ref 98–107)
CO2 SERPL-SCNC: 27 MMOL/L (ref 21–32)
CREAT SERPL-MCNC: 0.39 MG/DL (ref 0.5–1.3)
EOSINOPHIL # BLD AUTO: 0.32 X10*3/UL (ref 0–0.7)
EOSINOPHIL NFR BLD AUTO: 2.7 %
ERYTHROCYTE [DISTWIDTH] IN BLOOD BY AUTOMATED COUNT: 13.4 % (ref 11.5–14.5)
GFR SERPL CREATININE-BSD FRML MDRD: >90 ML/MIN/1.73M*2
GLUCOSE SERPL-MCNC: 100 MG/DL (ref 74–99)
GRAM STN SPEC: ABNORMAL
GRAM STN SPEC: ABNORMAL
HCT VFR BLD AUTO: 33.2 % (ref 41–52)
HGB BLD-MCNC: 11.2 G/DL (ref 13.5–17.5)
IMM GRANULOCYTES # BLD AUTO: 0.25 X10*3/UL (ref 0–0.7)
IMM GRANULOCYTES NFR BLD AUTO: 2.1 % (ref 0–0.9)
LYMPHOCYTES # BLD AUTO: 1.4 X10*3/UL (ref 1.2–4.8)
LYMPHOCYTES NFR BLD AUTO: 11.8 %
MAGNESIUM SERPL-MCNC: 1.92 MG/DL (ref 1.6–2.4)
MCH RBC QN AUTO: 28.9 PG (ref 26–34)
MCHC RBC AUTO-ENTMCNC: 33.7 G/DL (ref 32–36)
MCV RBC AUTO: 86 FL (ref 80–100)
MONOCYTES # BLD AUTO: 1.07 X10*3/UL (ref 0.1–1)
MONOCYTES NFR BLD AUTO: 9 %
NEUTROPHILS # BLD AUTO: 8.74 X10*3/UL (ref 1.2–7.7)
NEUTROPHILS NFR BLD AUTO: 73.3 %
NRBC BLD-RTO: 0 /100 WBCS (ref 0–0)
PHOSPHATE SERPL-MCNC: 5.4 MG/DL (ref 2.5–4.9)
PLATELET # BLD AUTO: 357 X10*3/UL (ref 150–450)
POTASSIUM SERPL-SCNC: 4.9 MMOL/L (ref 3.5–5.3)
PROT SERPL-MCNC: 7.4 G/DL (ref 6.4–8.2)
RBC # BLD AUTO: 3.87 X10*6/UL (ref 4.5–5.9)
SODIUM SERPL-SCNC: 130 MMOL/L (ref 136–145)
UFH PPP CHRO-ACNC: 0.6 IU/ML
WBC # BLD AUTO: 11.9 X10*3/UL (ref 4.4–11.3)

## 2023-11-10 PROCEDURE — 95911 NRV CNDJ TEST 9-10 STUDIES: CPT | Performed by: STUDENT IN AN ORGANIZED HEALTH CARE EDUCATION/TRAINING PROGRAM

## 2023-11-10 PROCEDURE — 95886 MUSC TEST DONE W/N TEST COMP: CPT | Mod: GC | Performed by: STUDENT IN AN ORGANIZED HEALTH CARE EDUCATION/TRAINING PROGRAM

## 2023-11-10 PROCEDURE — 94668 MNPJ CHEST WALL SBSQ: CPT

## 2023-11-10 PROCEDURE — 2500000004 HC RX 250 GENERAL PHARMACY W/ HCPCS (ALT 636 FOR OP/ED)

## 2023-11-10 PROCEDURE — 85520 HEPARIN ASSAY: CPT | Performed by: NURSE PRACTITIONER

## 2023-11-10 PROCEDURE — 2500000005 HC RX 250 GENERAL PHARMACY W/O HCPCS: Performed by: NURSE PRACTITIONER

## 2023-11-10 PROCEDURE — 2500000001 HC RX 250 WO HCPCS SELF ADMINISTERED DRUGS (ALT 637 FOR MEDICARE OP): Performed by: NURSE PRACTITIONER

## 2023-11-10 PROCEDURE — 95911 NRV CNDJ TEST 9-10 STUDIES: CPT | Mod: GC | Performed by: STUDENT IN AN ORGANIZED HEALTH CARE EDUCATION/TRAINING PROGRAM

## 2023-11-10 PROCEDURE — 80053 COMPREHEN METABOLIC PANEL: CPT

## 2023-11-10 PROCEDURE — 84100 ASSAY OF PHOSPHORUS: CPT

## 2023-11-10 PROCEDURE — 2500000001 HC RX 250 WO HCPCS SELF ADMINISTERED DRUGS (ALT 637 FOR MEDICARE OP)

## 2023-11-10 PROCEDURE — 99233 SBSQ HOSP IP/OBS HIGH 50: CPT | Performed by: INTERNAL MEDICINE

## 2023-11-10 PROCEDURE — 99418 PROLNG IP/OBS E/M EA 15 MIN: CPT

## 2023-11-10 PROCEDURE — 2500000002 HC RX 250 W HCPCS SELF ADMINISTERED DRUGS (ALT 637 FOR MEDICARE OP, ALT 636 FOR OP/ED): Performed by: NURSE PRACTITIONER

## 2023-11-10 PROCEDURE — 2500000004 HC RX 250 GENERAL PHARMACY W/ HCPCS (ALT 636 FOR OP/ED): Performed by: NURSE PRACTITIONER

## 2023-11-10 PROCEDURE — C9113 INJ PANTOPRAZOLE SODIUM, VIA: HCPCS

## 2023-11-10 PROCEDURE — 2060000001 HC INTERMEDIATE ICU ROOM DAILY

## 2023-11-10 PROCEDURE — 36415 COLL VENOUS BLD VENIPUNCTURE: CPT | Performed by: NURSE PRACTITIONER

## 2023-11-10 PROCEDURE — 94660 CPAP INITIATION&MGMT: CPT

## 2023-11-10 PROCEDURE — 85025 COMPLETE CBC W/AUTO DIFF WBC: CPT

## 2023-11-10 PROCEDURE — 95886 MUSC TEST DONE W/N TEST COMP: CPT | Performed by: STUDENT IN AN ORGANIZED HEALTH CARE EDUCATION/TRAINING PROGRAM

## 2023-11-10 PROCEDURE — 94640 AIRWAY INHALATION TREATMENT: CPT

## 2023-11-10 PROCEDURE — 82248 BILIRUBIN DIRECT: CPT

## 2023-11-10 PROCEDURE — S4991 NICOTINE PATCH NONLEGEND: HCPCS | Performed by: NURSE PRACTITIONER

## 2023-11-10 PROCEDURE — 83735 ASSAY OF MAGNESIUM: CPT

## 2023-11-10 RX ORDER — ACETAMINOPHEN 500 MG
10 TABLET ORAL NIGHTLY
Status: DISCONTINUED | OUTPATIENT
Start: 2023-11-10 | End: 2023-11-30 | Stop reason: HOSPADM

## 2023-11-10 RX ORDER — ACETAMINOPHEN 500 MG
5 TABLET ORAL ONCE
Status: COMPLETED | OUTPATIENT
Start: 2023-11-10 | End: 2023-11-10

## 2023-11-10 RX ADMIN — Medication 3 ML: at 08:07

## 2023-11-10 RX ADMIN — Medication 10 MG: at 21:00

## 2023-11-10 RX ADMIN — MIDODRINE HYDROCHLORIDE 10 MG: 10 TABLET ORAL at 12:13

## 2023-11-10 RX ADMIN — LOPERAMIDE HYDROCHLORIDE 2 MG: 2 SOLUTION ORAL at 16:53

## 2023-11-10 RX ADMIN — CARBOXYMETHYLCELLULOSE SODIUM 1 DROP: 5 SOLUTION/ DROPS OPHTHALMIC at 09:40

## 2023-11-10 RX ADMIN — MIDODRINE HYDROCHLORIDE 10 MG: 10 TABLET ORAL at 16:54

## 2023-11-10 RX ADMIN — LIDOCAINE 1 PATCH: 4 PATCH TOPICAL at 09:36

## 2023-11-10 RX ADMIN — HEPARIN SODIUM 1300 UNITS/HR: 10000 INJECTION, SOLUTION INTRAVENOUS at 11:05

## 2023-11-10 RX ADMIN — MIDODRINE HYDROCHLORIDE 10 MG: 10 TABLET ORAL at 07:34

## 2023-11-10 RX ADMIN — CARBOXYMETHYLCELLULOSE SODIUM 1 DROP: 5 SOLUTION/ DROPS OPHTHALMIC at 16:54

## 2023-11-10 RX ADMIN — LIDOCAINE 2 PATCH: 4 PATCH TOPICAL at 09:36

## 2023-11-10 RX ADMIN — LOPERAMIDE HYDROCHLORIDE 2 MG: 2 SOLUTION ORAL at 21:00

## 2023-11-10 RX ADMIN — ERYTHROMYCIN 1 CM: 5 OINTMENT OPHTHALMIC at 12:15

## 2023-11-10 RX ADMIN — Medication 5 MG: at 00:15

## 2023-11-10 RX ADMIN — PANTOPRAZOLE SODIUM 40 MG: 40 INJECTION, POWDER, FOR SOLUTION INTRAVENOUS at 09:35

## 2023-11-10 RX ADMIN — Medication 1 TABLET: at 09:36

## 2023-11-10 RX ADMIN — OXYCODONE HYDROCHLORIDE 5 MG: 5 TABLET ORAL at 12:22

## 2023-11-10 RX ADMIN — CARBOXYMETHYLCELLULOSE SODIUM 1 DROP: 5 SOLUTION/ DROPS OPHTHALMIC at 07:35

## 2023-11-10 RX ADMIN — ACETAMINOPHEN 650 MG: 325 TABLET ORAL at 16:53

## 2023-11-10 RX ADMIN — ERYTHROMYCIN 1 CM: 5 OINTMENT OPHTHALMIC at 16:55

## 2023-11-10 RX ADMIN — MICAFUNGIN SODIUM 100 MG: 50 INJECTION, POWDER, LYOPHILIZED, FOR SOLUTION INTRAVENOUS at 12:00

## 2023-11-10 RX ADMIN — DICLOFENAC 1 APPLICATION: 10 GEL TOPICAL at 05:36

## 2023-11-10 RX ADMIN — ACETAMINOPHEN 650 MG: 325 TABLET ORAL at 00:03

## 2023-11-10 RX ADMIN — Medication 3 ML: at 20:00

## 2023-11-10 RX ADMIN — Medication 1 PATCH: at 09:36

## 2023-11-10 RX ADMIN — ALBUTEROL SULFATE 2.5 MG: 2.5 SOLUTION RESPIRATORY (INHALATION) at 20:32

## 2023-11-10 RX ADMIN — ERYTHROMYCIN 1 CM: 5 OINTMENT OPHTHALMIC at 07:50

## 2023-11-10 RX ADMIN — LOPERAMIDE HYDROCHLORIDE 2 MG: 2 SOLUTION ORAL at 12:13

## 2023-11-10 RX ADMIN — LOPERAMIDE HYDROCHLORIDE 2 MG: 2 SOLUTION ORAL at 07:34

## 2023-11-10 RX ADMIN — ALBUTEROL SULFATE 2.5 MG: 2.5 SOLUTION RESPIRATORY (INHALATION) at 08:07

## 2023-11-10 RX ADMIN — Medication 8000 UNITS: at 09:40

## 2023-11-10 RX ADMIN — CARBOXYMETHYLCELLULOSE SODIUM 1 DROP: 5 SOLUTION/ DROPS OPHTHALMIC at 22:00

## 2023-11-10 RX ADMIN — CARBOXYMETHYLCELLULOSE SODIUM 1 DROP: 5 SOLUTION/ DROPS OPHTHALMIC at 19:00

## 2023-11-10 RX ADMIN — ERYTHROMYCIN 1 CM: 5 OINTMENT OPHTHALMIC at 21:00

## 2023-11-10 RX ADMIN — CARBOXYMETHYLCELLULOSE SODIUM 1 DROP: 5 SOLUTION/ DROPS OPHTHALMIC at 12:13

## 2023-11-10 ASSESSMENT — PAIN SCALES - GENERAL
PAINLEVEL_OUTOF10: 6
PAINLEVEL_OUTOF10: 3
PAINLEVEL_OUTOF10: 6
PAINLEVEL_OUTOF10: 10 - WORST POSSIBLE PAIN
PAINLEVEL_OUTOF10: 2
PAINLEVEL_OUTOF10: 4

## 2023-11-10 ASSESSMENT — PAIN - FUNCTIONAL ASSESSMENT
PAIN_FUNCTIONAL_ASSESSMENT: 0-10

## 2023-11-10 ASSESSMENT — PAIN DESCRIPTION - ORIENTATION: ORIENTATION: LEFT

## 2023-11-10 ASSESSMENT — PAIN DESCRIPTION - LOCATION: LOCATION: ANKLE

## 2023-11-10 NOTE — SIGNIFICANT EVENT
11/10/23 1638   Onset Documentation   Rapid Response Initiated By Radar auto page   Location/Room [facility]   Pager Time 1638   Arrival Time 1641   Event End Time 1647   Level II Called No   Primary Reason for Call Radar auto page     Rapid Response Note    Radar auto-page received for a Radar score of 6 with the following vital signs: 36.7, 103, 25, 100/64, 92%.  Vital signs were reviewed with RN who has no concerns at this time. RN will contact Rapid Response with any future concerns or clinical deterioration.

## 2023-11-10 NOTE — SIGNIFICANT EVENT
Rapid Response RN Note     11/10/23 0425   Onset Documentation   Rapid Response Initiated By Radar auto page   Location/Room [facility]  ([facility])   Pager Time 0423   Arrival Time 0425   Event End Time 0428   Primary Reason for Call Radar auto page  (RADAR score 6)     RADAR score 6 due to the following VS: T 36.6 °Celsius; HR 97 ; RR 27; /76; SPO2 99% on supplemental oxygen.     Reviewed above VS with bedside RN via phone.  Vital signs within patient's current trends.  No acute change in condition.  No interventions by rapid response team indicated at this time.    Staff to page rapid response for any concerns or acute change in condition/VS.

## 2023-11-10 NOTE — PROGRESS NOTES
"MEDICAL STEPDOWN UNIT DAILY PROGRESS NOTE    Molina Godinez is a 68 y.o. male on day 29 of admission presenting with Hospital-acquired pneumonia.    Subjective   C/o L ankle pain   No events overnight    Objective     Constitutional: awake, alert, oriented x3, mild confusion, Cachetic, chronically ill appearing, pt in NAD, alert and cooperative  Eyes: PERRL, EOMI, no icterus   ENMT: mucous membranes moist, no apparent injury, no lesions seen  Head/Neck: Neck supple, no apparent injury  Respiratory/Thorax: Lungs CTA bilaterally, non-labored breathing, no cough, on RA  Cardiovascular: Regular, rate and rhythm, no murmurs, normal S1 and S2  Gastrointestinal: Nondistended, soft, non-tender, BS present x 4  Musculoskeletal: passive ROM intact, stiff movements, L ankle tenderness with palpation, warm to touch, no swelling  Extremities: normal extremities, no edema, contusions or wounds  Neurological: alert and oriented x 3, speech clear, follows commands appropriately, BLE 1/5, moves BUE equally  Skin: Warm and dry, no lesions, no rashes    Vital Signs  Blood pressure 122/90, pulse 95, temperature 36.8 °C (98.2 °F), temperature source Temporal, resp. rate (!) 28, height 1.702 m (5' 7.01\"), weight 54 kg (119 lb 0.8 oz), SpO2 98 %.  Oxygen Therapy  SpO2: 98 %  Medical Gas Therapy: Supplemental oxygen  O2 Delivery Method: Nasal cannula  FiO2 (%):  [28 %] 28 %    Intake/Output last 3 Shifts:  I/O last 3 completed shifts:  In: 1485.3 (27.5 mL/kg) [I.V.:165.3 (3.1 mL/kg); NG/GT:1320]  Out: 2150 (39.8 mL/kg) [Urine:2150 (1.1 mL/kg/hr)]  Weight: 54 kg     Lines and Tubes:  Peripheral IV 10/15/23 20 G Distal;Right;Upper;Ventral Arm (Active)   Placement Date/Time: 10/15/23 0900   Size (Gauge): 20 G  Orientation: Distal;Right;Upper;Ventral  Location: Arm  Site Prep: Chlorhexidine   Insertion attempts: 1  Patient Tolerance: Tolerated well   Number of days: 26       Peripheral IV 11/06/23 20 G Left Hand (Active)   Placement Date/Time: " 11/06/23 (c) 1428   Size (Gauge): 20 G  Orientation: Left  Location: Hand  Site Prep: Alcohol  Technique: Anatomical landmarks  Insertion attempts: 1   Number of days: 3       NG/OG/Feeding Tube Left nostril (Active)   Placement Date/Time: 10/17/23 1755   Placed by: NP  Tube Length: 80 cm  Tube Location: Left nostril   Number of days: 23       External Urinary Catheter (Active)   Placement Date/Time: 11/04/23 1805     Number of days: 5         Relevant Results  Results from last 7 days   Lab Units 11/10/23  0355 11/09/23 0322 11/08/23 0452   WBC AUTO x10*3/uL 11.9* 12.4* 12.7*   HEMOGLOBIN g/dL 11.2* 10.5* 10.4*   HEMATOCRIT % 33.2* 31.2* 32.3*   PLATELETS AUTO x10*3/uL 357 362 348     Results from last 7 days   Lab Units 11/10/23  0355 11/09/23  1000 11/09/23 0322 11/08/23 0452 11/07/23  1256   SODIUM mmol/L 130*  --  131* 132*  --    POTASSIUM mmol/L 4.9  --  5.0 4.9  --    CHLORIDE mmol/L 92*  --  93* 95*  --    CO2 mmol/L 27  --  27 28  --    BUN mg/dL 18  --  13 14  --    CREATININE mg/dL 0.39*  --  0.31* 0.36*  --    CALCIUM mg/dL 8.7  --  8.3* 8.3*  --    PROTEIN TOTAL g/dL 7.4 8.4*  --   --  6.0*   BILIRUBIN TOTAL mg/dL 0.3 0.5  --   --  0.3   ALK PHOS U/L 176* 177*  --   --  169*   ALT U/L 28 31  --   --  37   AST U/L 30 59*  --   --  35   GLUCOSE mg/dL 100*  --  102* 103*  --                       Scheduled medications  albuterol, 2.5 mg, nebulization, q12h  ergocalciferol, 8,000 Units, oral, Daily  erythromycin, 1 cm, Both Eyes, 4x daily  lidocaine, 1 patch, transdermal, Daily  lidocaine, 2 patch, transdermal, Daily  loperamide, 2 mg, oral, 4x daily  artificial tears, 1 drop, Both Eyes, 6x daily  melatonin, 10 mg, nasogastric tube, Nightly  micafungin, 100 mg, intravenous, q24h  midodrine, 10 mg, oral, TID with meals  multivitamin with minerals, 1 tablet, oral, Daily  nicotine, 1 patch, transdermal, Daily  pantoprazole, 40 mg, intravenous, Daily  sodium chloride, 3 mL, nebulization,  q12h      Continuous medications  heparin, 0-4,500 Units/hr, Last Rate: 1,300 Units/hr (11/09/23 1720)      PRN medications  PRN medications: acetaminophen, dextrose 10 % in water (D10W), dextrose, diclofenac sodium, glucagon, heparin, HYDROmorphone, moisturizing mouth, oxyCODONE, oxygen, white petrolatum    Results for orders placed or performed during the hospital encounter of 10/11/23 (from the past 24 hour(s))   Hepatic Function Panel   Result Value Ref Range    Albumin 2.9 (L) 3.4 - 5.0 g/dL    Bilirubin, Total 0.5 0.0 - 1.2 mg/dL    Bilirubin, Direct 0.3 0.0 - 0.3 mg/dL    Alkaline Phosphatase 177 (H) 33 - 136 U/L    ALT 31 10 - 52 U/L    AST 59 (H) 9 - 39 U/L    Total Protein 8.4 (H) 6.4 - 8.2 g/dL   CBC and Auto Differential   Result Value Ref Range    WBC 11.9 (H) 4.4 - 11.3 x10*3/uL    nRBC 0.0 0.0 - 0.0 /100 WBCs    RBC 3.87 (L) 4.50 - 5.90 x10*6/uL    Hemoglobin 11.2 (L) 13.5 - 17.5 g/dL    Hematocrit 33.2 (L) 41.0 - 52.0 %    MCV 86 80 - 100 fL    MCH 28.9 26.0 - 34.0 pg    MCHC 33.7 32.0 - 36.0 g/dL    RDW 13.4 11.5 - 14.5 %    Platelets 357 150 - 450 x10*3/uL    Neutrophils % 73.3 40.0 - 80.0 %    Immature Granulocytes %, Automated 2.1 (H) 0.0 - 0.9 %    Lymphocytes % 11.8 13.0 - 44.0 %    Monocytes % 9.0 2.0 - 10.0 %    Eosinophils % 2.7 0.0 - 6.0 %    Basophils % 1.1 0.0 - 2.0 %    Neutrophils Absolute 8.74 (H) 1.20 - 7.70 x10*3/uL    Immature Granulocytes Absolute, Automated 0.25 0.00 - 0.70 x10*3/uL    Lymphocytes Absolute 1.40 1.20 - 4.80 x10*3/uL    Monocytes Absolute 1.07 (H) 0.10 - 1.00 x10*3/uL    Eosinophils Absolute 0.32 0.00 - 0.70 x10*3/uL    Basophils Absolute 0.13 (H) 0.00 - 0.10 x10*3/uL   Magnesium   Result Value Ref Range    Magnesium 1.92 1.60 - 2.40 mg/dL   Hepatic Function Panel   Result Value Ref Range    Albumin 2.7 (L) 3.4 - 5.0 g/dL    Bilirubin, Total 0.3 0.0 - 1.2 mg/dL    Bilirubin, Direct 0.1 0.0 - 0.3 mg/dL    Alkaline Phosphatase 176 (H) 33 - 136 U/L    ALT 28 10 - 52  U/L    AST 30 9 - 39 U/L    Total Protein 7.4 6.4 - 8.2 g/dL   Phosphorus   Result Value Ref Range    Phosphorus 5.4 (H) 2.5 - 4.9 mg/dL   Basic Metabolic Panel   Result Value Ref Range    Glucose 100 (H) 74 - 99 mg/dL    Sodium 130 (L) 136 - 145 mmol/L    Potassium 4.9 3.5 - 5.3 mmol/L    Chloride 92 (L) 98 - 107 mmol/L    Bicarbonate 27 21 - 32 mmol/L    Anion Gap 16 10 - 20 mmol/L    Urea Nitrogen 18 6 - 23 mg/dL    Creatinine 0.39 (L) 0.50 - 1.30 mg/dL    eGFR >90 >60 mL/min/1.73m*2    Calcium 8.7 8.6 - 10.6 mg/dL   Heparin Assay, UFH   Result Value Ref Range    Heparin Unfractionated 0.6 See Comment Below for Therapeutic Ranges IU/mL    CT chest w IV contrast 11/08/2023    CT CHEST W IV CONTRAST;  11/8/2023 12:36 pm FINDINGS:  LUNGS AND AIRWAYS:  There is extensive soft tissue within the left mainstem bronchus.    There is layering soft tissue/mucus within the distal trachea. There  is right-sided peribronchial thickening and scattered atelectatic  changes. There is consolidation/collapse of the left lung. Minimal  aerated lung is noted. There is a small left-sided pleural effusion.  There are extensive right-sided emphysematous changes.    MEDIASTINUM AND KRISTOPHER, LOWER NECK AND AXILLA:  The visualized thyroid gland is within normal limits.    Increased size and number of multiple mediastinal lymph nodes.    NG tube within the esophagus.    HEART AND VESSELS:  There is mild atherosclerotic changes of the thoracic aorta.    Main pulmonary artery and its branches are normal in caliber.    There are severe coronary artery calcifications. The study is not  optimized for evaluation of coronary arteries.    Right-sided chamber enlargement.    No evidence of pericardial effusion.    UPPER ABDOMEN:  The visualized subdiaphragmatic structures demonstrate no remarkable  findings.    CHEST WALL AND OSSEOUS STRUCTURES:  There are no suspicious osseous lesions. Multilevel degenerative  changes are present. The patient is  status post left total shoulder  arthroplasty.    Impression  1.  Consolidation/collapse of the left lung with extensive mucoid  impaction/aspirated material within the left mainstem bronchus and  segmental bronchi. Correlate with aspiration.  Extensive atherosclerotic changes of the coronary arteries and right  heart enlargement. Correlate with elevated right-sided pressures.    Assessment/Plan   Principal Problem:    Hospital-acquired pneumonia  Active Problems:    Fungemia    Essential hypertension, benign    Hepatitis C virus infection cured after antiviral drug therapy    Alcoholism (CMS/HCC)    Chronic pulmonary embolism (CMS/HCC)    COPD (chronic obstructive pulmonary disease) (CMS/HCC)    HLD (hyperlipidemia)    Uremia    68 y.o. male with PMHx COPD, HTN,  alcohol and opioid use disorder, hepatitis C, chronic PE admitted 10/12 with BLE weakness, recently admitted in the MICU from 10/13-10/16 for suspected alcohol withdrawal refractory to ativan, started on phenobarb taper, treated for streptococcus pneumoniae PNA with Ceftriaxone and transferred to [facility].  RRT called for lethargy, transferred to MICU on 10/17 with new FiO2 requirements.    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  Hx of Etoh and Opioid use disorder  - awaiting further neuro recs==> per 10/29 note, would consider PLEX if GBS confirmed.  Talked with neuro.  Will re-eval and provide final recs.  Outside PLEX window  - Continue with Melatonin HS, Pain management with Lidocaine patches, Diclofenac gel prn, Acetaminophen prn, Oxycodone 5 mg Q6, and Hydromorphone 0.2 mg Q6.    - PT/OT/SLP following  - music therapy following    Optho: Dry eye, Cataracts, joseph eyes  - seen by Opthomology given c/o blurriness with new fungemia.  No signs of fungal endophthalmitis  - rec artifical tears, Erythromycin    Pulm: Acute on chronic hypoxic Respiratory failure, COPD, on 2-4 L home O2.  Chronic PE, Smoker  - s/p Bronch x2 with mucous plugging, L lung  consolidation  - weaned to 2 L NC  - continue aggressive Bronchial hygiene with Albuterol BID, 7% saline BID.   - trialed on bipap for 30 mins prior to bronchial hygiene.. pt has been refusing  - Acapella, IS while awake  - continue heparin drip.  Transition to home Apixiban 5 mg BID when no further procedures planned  - seen by Dr Klein for PAH work up on 10/2    Cardiac: HTN hx,  TV Endocarditis  - 10/31 Echo: LVEF 75-80%, RV overload, Mod-severe TV, Mod-severe RV systolic pressure, Tricuspid echodensity measuring 1.7 x 0.8 cm, atrial septal aneurysm  - repeat Echo on 11/2/23: No definite valvular vegetations were visualized   - seen by CT surgery for TV vegetation- too high risk for surgery at this time  - had SVT this admit.  Continue on tele    FEN/GI: Dysphagia, s/p Cortrak, Severe Protein Calorie Malnutrition. Hx of Hep C  - BM 11/10  - TF via Cortrak: [employer] [facility] Peptide 1.5 at 45 ml/hr.  No Free Water  - if switching to boluses, can do 275 ml QID with 60 ml before and after each bolus  - PRN Imodium for diarrhea    Renal: no acute issues.  KRYSTEN this admit s/p CVVH in MICU.  Now normal renal function  - voiding per external cath  - seen by renal in MICU, now signed off  - goal to keep MAP ~70. Continue on Midodrine to assist with renal perfusion    Heme: Anemia of chronic disease, Hx of DVT   - continue heparin drip.  Transition back to home Apixiban at discharge    ID: Fungemia, Strep pneumonaie PNA, Stenotrophomonas PNA, Hepatitis C  - +Stenotrophomonas on sputum cx 10/20, 11/5 (from BAL), 11/6.  10/31 BC with Candida  - 11/2 C. Diff negative  - seen by ID.  Continue on She (11/3-- STOP date 11/17)    Lines: PIV, FMS    Code Status: Full Code  NOK: Sister Sarah Godinez [phone]     Dispo: stable for transfer to [facility].  Will need SNF at discharge, PT/OT/SLP following.       I spent 45 minutes in the professional and overall care of this patient.    Paris Pace, APRN-CNP

## 2023-11-10 NOTE — PROGRESS NOTES
"Molina Godinez is a 68 y.o. male on day 29 of admission presenting with Hospital-acquired pneumonia.    Mr. Godinez is a 68 year-old male, COPD, HTN, AUD and opioid use disorder, HCV (treated), h/o PE, who was admitted to [facility] on 10/11/23 for alcohol withdrawl. Hospital course complicated increased O2 requirements on 10/16 and re-transfer to MICU for AHRF due to pneumonia. Neurology were consulted initally on 10/29 for weakness that started prior to admission. 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     Currently, kristopher is found to have worsening hyponatremia and rising BUN, in the setting of olguirc KRYSTEN. CVVH was started on 10/31. Also being trated for fungemia currently on micafungin.     Subjective      Patient recalls that he did not have any weakness and tingling prior to hospital admission. He started to notice the weakness in his legs first with tingling up to ankles when he was admitted to the hospital.     Weakness progressively got worse while hospitalized, to all 4 extremities, and tingling involved the hands. Reports intermittent double vision. Denied any incontinence. He thinks overall his weakness and symptoms are stable.     Objective     Last Recorded Vitals  Blood pressure 108/73, pulse 103, temperature 37 °C (98.6 °F), temperature source Temporal, resp. rate 25, height 1.702 m (5' 7.01\"), weight 54 kg (119 lb 0.8 oz), SpO2 92 %.    Physical Exam  General: Awake, alert, comfortable.   On 2L supplemental oxygen via NC.   Dobbhoff in place.    Neurological Exam     Cognitive Status Exam:  Orientation: alert and oriented to person, place, time, and situation  Language: expression, naming, repetition, and comprehension intact      Cranial Nerves:  II: pupils 3 mm equal and reactive.       VF-full   III, IV, VI: EOMI with smooth pursuits and no nystagmus  V: intact to LT  VII: intact facial strength " and symmetry; nasolabial folds symmetric; no facial droop  VIII: intact hearing to conversation  IX and X: mild flaccid dysarthria, worst with alveolar/postalveolar consonants  XII: midline tongue position at rest and intact movement, bulk, and strength     Motor:   Bulk: Diminished    Tone: Diminished  Tremor: Absent  Other: No adventitious movements.     Strength:                          R          L  Deltoid             3          2  Biceps              +4        4-  Triceps              3          2  Wrist Flex         3          2  Wrist Ext           3          3                    4-         3    Hip flexion       2          2  Knee ext          2           2  Knee flex         2          2  Planter flex      2          2  Planter ext       2          2     Reflexes:                          R          L  Biceps              abs       abs  Triceps             abs       abs  Brachioradialis abs       abs  Patellar             abs       abs  Achilles            abs       abs                                 Ankle Clonus: absent     Sensory:   Reports decreased sensation to light touch and PP on bilateral feet in length dependent pattern.      Coordination:   Mild ataxia in finger to nose bilaterally. Unable to test heel to shin due to weakness.      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 An incidental right, chronic, ulnar mononeuropathy across the wrist was noted. No evidence of myopathy or motor neuron disease.     Assessment/Plan      Molina Godinez is a 68 year-old, right handed male with a history notable for HTN, severe COPD, chronic PE, PSUD (EtOH, opiates), and HCV who is admitted to the [facility] MICU on 10/11/2023 for acute hypoxemic RF in the setting of CAP & HAP. Neurology is re-engaged for diffuse weakness - initially was reported to be  beginning prior to admission- which has significantly worsened in the setting of hospitalization. Exam notable for diffuse weakness and areflexia. 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 Patient exam, when compared to exam on 10/29, is overall stable.     Given the patient is already > 4 weeks into the course of his disease, benefit from PLEX would be less likely. Given history of PE, IVIG would be avoided. No on-going worsening beyond 8 weeks to suggest CIDP at this point.     Recommendations:   - Agree with PT/OT consult   - LP for albuminocytological dissociation to complete workup and treatment with PLEX both pending staffing with General Neurology Attending in the morning   - Please request follow up with neurology as outpatient     Please page with any further questions or concerns.   General neurology team pager: [phone]     Akira Salas MD  Neurology Resident PGY3

## 2023-11-11 ENCOUNTER — APPOINTMENT (OUTPATIENT)
Dept: RADIOLOGY | Facility: HOSPITAL | Age: 68
End: 2023-11-11
Payer: COMMERCIAL

## 2023-11-11 LAB
ALBUMIN SERPL BCP-MCNC: 2.8 G/DL (ref 3.4–5)
ALP SERPL-CCNC: 187 U/L (ref 33–136)
ALT SERPL W P-5'-P-CCNC: 26 U/L (ref 10–52)
ANION GAP BLDV CALCULATED.4IONS-SCNC: 5 MMOL/L (ref 10–25)
ANION GAP SERPL CALC-SCNC: 18 MMOL/L (ref 10–20)
APTT PPP: 127 SECONDS (ref 27–38)
AST SERPL W P-5'-P-CCNC: 32 U/L (ref 9–39)
BASE EXCESS BLDV CALC-SCNC: 5.5 MMOL/L (ref -2–3)
BASOPHILS # BLD AUTO: 0.13 X10*3/UL (ref 0–0.1)
BASOPHILS NFR BLD AUTO: 1.1 %
BILIRUB SERPL-MCNC: 0.3 MG/DL (ref 0–1.2)
BODY TEMPERATURE: 37 DEGREES CELSIUS
BUN SERPL-MCNC: 18 MG/DL (ref 6–23)
CA-I BLDV-SCNC: 1.09 MMOL/L (ref 1.1–1.33)
CALCIUM SERPL-MCNC: 8.9 MG/DL (ref 8.6–10.6)
CHLORIDE BLDV-SCNC: 95 MMOL/L (ref 98–107)
CHLORIDE SERPL-SCNC: 93 MMOL/L (ref 98–107)
CO2 SERPL-SCNC: 25 MMOL/L (ref 21–32)
CREAT SERPL-MCNC: 0.34 MG/DL (ref 0.5–1.3)
EOSINOPHIL # BLD AUTO: 0.27 X10*3/UL (ref 0–0.7)
EOSINOPHIL NFR BLD AUTO: 2.3 %
ERYTHROCYTE [DISTWIDTH] IN BLOOD BY AUTOMATED COUNT: 13.4 % (ref 11.5–14.5)
GFR SERPL CREATININE-BSD FRML MDRD: >90 ML/MIN/1.73M*2
GLUCOSE BLDV-MCNC: 114 MG/DL (ref 74–99)
GLUCOSE SERPL-MCNC: 105 MG/DL (ref 74–99)
HCO3 BLDV-SCNC: 28.9 MMOL/L (ref 22–26)
HCT VFR BLD AUTO: 34.6 % (ref 41–52)
HCT VFR BLD EST: 38 % (ref 41–52)
HGB BLD-MCNC: 11.3 G/DL (ref 13.5–17.5)
HGB BLDV-MCNC: 12.8 G/DL (ref 13.5–17.5)
IMM GRANULOCYTES # BLD AUTO: 0.23 X10*3/UL (ref 0–0.7)
IMM GRANULOCYTES NFR BLD AUTO: 2 % (ref 0–0.9)
INHALED O2 CONCENTRATION: 28 %
INR PPP: 1.1 (ref 0.9–1.1)
LACTATE BLDV-SCNC: 1.3 MMOL/L (ref 0.4–2)
LYMPHOCYTES # BLD AUTO: 1.5 X10*3/UL (ref 1.2–4.8)
LYMPHOCYTES NFR BLD AUTO: 12.8 %
MAGNESIUM SERPL-MCNC: 1.81 MG/DL (ref 1.6–2.4)
MCH RBC QN AUTO: 28 PG (ref 26–34)
MCHC RBC AUTO-ENTMCNC: 32.7 G/DL (ref 32–36)
MCV RBC AUTO: 86 FL (ref 80–100)
MONOCYTES # BLD AUTO: 1.03 X10*3/UL (ref 0.1–1)
MONOCYTES NFR BLD AUTO: 8.8 %
NEUTROPHILS # BLD AUTO: 8.6 X10*3/UL (ref 1.2–7.7)
NEUTROPHILS NFR BLD AUTO: 73 %
NRBC BLD-RTO: 0 /100 WBCS (ref 0–0)
OXYHGB MFR BLDV: 89.8 % (ref 45–75)
PCO2 BLDV: 37 MM HG (ref 41–51)
PH BLDV: 7.5 PH (ref 7.33–7.43)
PHOSPHATE SERPL-MCNC: 5.6 MG/DL (ref 2.5–4.9)
PLATELET # BLD AUTO: 365 X10*3/UL (ref 150–450)
PO2 BLDV: 63 MM HG (ref 35–45)
POTASSIUM BLDV-SCNC: 4.8 MMOL/L (ref 3.5–5.3)
POTASSIUM SERPL-SCNC: 5.2 MMOL/L (ref 3.5–5.3)
PROT SERPL-MCNC: 7.6 G/DL (ref 6.4–8.2)
PROTHROMBIN TIME: 12.1 SECONDS (ref 9.8–12.8)
RBC # BLD AUTO: 4.03 X10*6/UL (ref 4.5–5.9)
SAO2 % BLDV: 92 % (ref 45–75)
SODIUM BLDV-SCNC: 124 MMOL/L (ref 136–145)
SODIUM SERPL-SCNC: 131 MMOL/L (ref 136–145)
UFH PPP CHRO-ACNC: 0.7 IU/ML
WBC # BLD AUTO: 11.8 X10*3/UL (ref 4.4–11.3)

## 2023-11-11 PROCEDURE — 94640 AIRWAY INHALATION TREATMENT: CPT

## 2023-11-11 PROCEDURE — 99233 SBSQ HOSP IP/OBS HIGH 50: CPT | Performed by: INTERNAL MEDICINE

## 2023-11-11 PROCEDURE — 85025 COMPLETE CBC W/AUTO DIFF WBC: CPT

## 2023-11-11 PROCEDURE — 94667 MNPJ CHEST WALL 1ST: CPT

## 2023-11-11 PROCEDURE — S4991 NICOTINE PATCH NONLEGEND: HCPCS | Performed by: NURSE PRACTITIONER

## 2023-11-11 PROCEDURE — 2500000001 HC RX 250 WO HCPCS SELF ADMINISTERED DRUGS (ALT 637 FOR MEDICARE OP): Performed by: NURSE PRACTITIONER

## 2023-11-11 PROCEDURE — C9113 INJ PANTOPRAZOLE SODIUM, VIA: HCPCS

## 2023-11-11 PROCEDURE — 84132 ASSAY OF SERUM POTASSIUM: CPT

## 2023-11-11 PROCEDURE — 36415 COLL VENOUS BLD VENIPUNCTURE: CPT

## 2023-11-11 PROCEDURE — 85730 THROMBOPLASTIN TIME PARTIAL: CPT

## 2023-11-11 PROCEDURE — 83735 ASSAY OF MAGNESIUM: CPT

## 2023-11-11 PROCEDURE — 2500000001 HC RX 250 WO HCPCS SELF ADMINISTERED DRUGS (ALT 637 FOR MEDICARE OP)

## 2023-11-11 PROCEDURE — 85610 PROTHROMBIN TIME: CPT

## 2023-11-11 PROCEDURE — 94668 MNPJ CHEST WALL SBSQ: CPT

## 2023-11-11 PROCEDURE — 80053 COMPREHEN METABOLIC PANEL: CPT | Performed by: NURSE PRACTITIONER

## 2023-11-11 PROCEDURE — 36415 COLL VENOUS BLD VENIPUNCTURE: CPT | Performed by: NURSE PRACTITIONER

## 2023-11-11 PROCEDURE — 82805 BLOOD GASES W/O2 SATURATION: CPT

## 2023-11-11 PROCEDURE — 2500000005 HC RX 250 GENERAL PHARMACY W/O HCPCS: Performed by: NURSE PRACTITIONER

## 2023-11-11 PROCEDURE — 71045 X-RAY EXAM CHEST 1 VIEW: CPT | Mod: FY

## 2023-11-11 PROCEDURE — 85520 HEPARIN ASSAY: CPT | Performed by: NURSE PRACTITIONER

## 2023-11-11 PROCEDURE — 2500000004 HC RX 250 GENERAL PHARMACY W/ HCPCS (ALT 636 FOR OP/ED)

## 2023-11-11 PROCEDURE — 2500000002 HC RX 250 W HCPCS SELF ADMINISTERED DRUGS (ALT 637 FOR MEDICARE OP, ALT 636 FOR OP/ED): Performed by: NURSE PRACTITIONER

## 2023-11-11 PROCEDURE — 84100 ASSAY OF PHOSPHORUS: CPT

## 2023-11-11 PROCEDURE — 71045 X-RAY EXAM CHEST 1 VIEW: CPT | Performed by: STUDENT IN AN ORGANIZED HEALTH CARE EDUCATION/TRAINING PROGRAM

## 2023-11-11 PROCEDURE — 2060000001 HC INTERMEDIATE ICU ROOM DAILY

## 2023-11-11 RX ORDER — LOPERAMIDE HCL 1MG/7.5ML
2 LIQUID (ML) ORAL 3 TIMES DAILY
Status: DISCONTINUED | OUTPATIENT
Start: 2023-11-11 | End: 2023-11-14

## 2023-11-11 RX ORDER — IPRATROPIUM BROMIDE AND ALBUTEROL SULFATE 2.5; .5 MG/3ML; MG/3ML
3 SOLUTION RESPIRATORY (INHALATION) EVERY 4 HOURS PRN
Status: DISCONTINUED | OUTPATIENT
Start: 2023-11-11 | End: 2023-11-30 | Stop reason: HOSPADM

## 2023-11-11 RX ADMIN — CARBOXYMETHYLCELLULOSE SODIUM 1 DROP: 5 SOLUTION/ DROPS OPHTHALMIC at 15:06

## 2023-11-11 RX ADMIN — CARBOXYMETHYLCELLULOSE SODIUM 1 DROP: 5 SOLUTION/ DROPS OPHTHALMIC at 18:00

## 2023-11-11 RX ADMIN — LOPERAMIDE HYDROCHLORIDE 2 MG: 2 SOLUTION ORAL at 21:00

## 2023-11-11 RX ADMIN — CARBOXYMETHYLCELLULOSE SODIUM 1 DROP: 5 SOLUTION/ DROPS OPHTHALMIC at 22:00

## 2023-11-11 RX ADMIN — LOPERAMIDE HYDROCHLORIDE 2 MG: 2 SOLUTION ORAL at 07:00

## 2023-11-11 RX ADMIN — ALBUTEROL SULFATE 2.5 MG: 2.5 SOLUTION RESPIRATORY (INHALATION) at 07:53

## 2023-11-11 RX ADMIN — Medication 8000 UNITS: at 08:57

## 2023-11-11 RX ADMIN — ERYTHROMYCIN 1 CM: 5 OINTMENT OPHTHALMIC at 07:00

## 2023-11-11 RX ADMIN — MIDODRINE HYDROCHLORIDE 10 MG: 10 TABLET ORAL at 12:20

## 2023-11-11 RX ADMIN — ERYTHROMYCIN 1 CM: 5 OINTMENT OPHTHALMIC at 21:00

## 2023-11-11 RX ADMIN — Medication 1 PATCH: at 08:57

## 2023-11-11 RX ADMIN — MICAFUNGIN SODIUM 100 MG: 50 INJECTION, POWDER, LYOPHILIZED, FOR SOLUTION INTRAVENOUS at 12:00

## 2023-11-11 RX ADMIN — OXYCODONE HYDROCHLORIDE 5 MG: 5 TABLET ORAL at 12:20

## 2023-11-11 RX ADMIN — DICLOFENAC 1 APPLICATION: 10 GEL TOPICAL at 20:00

## 2023-11-11 RX ADMIN — ALBUTEROL SULFATE 2.5 MG: 2.5 SOLUTION RESPIRATORY (INHALATION) at 20:01

## 2023-11-11 RX ADMIN — CARBOXYMETHYLCELLULOSE SODIUM 1 DROP: 5 SOLUTION/ DROPS OPHTHALMIC at 12:20

## 2023-11-11 RX ADMIN — PANTOPRAZOLE SODIUM 40 MG: 40 INJECTION, POWDER, FOR SOLUTION INTRAVENOUS at 08:56

## 2023-11-11 RX ADMIN — Medication 10 MG: at 21:00

## 2023-11-11 RX ADMIN — ERYTHROMYCIN 1 CM: 5 OINTMENT OPHTHALMIC at 12:20

## 2023-11-11 RX ADMIN — LIDOCAINE 2 PATCH: 4 PATCH TOPICAL at 08:58

## 2023-11-11 RX ADMIN — Medication 3 ML: at 20:01

## 2023-11-11 RX ADMIN — Medication 3 ML: at 07:56

## 2023-11-11 RX ADMIN — LOPERAMIDE HYDROCHLORIDE 2 MG: 2 SOLUTION ORAL at 15:04

## 2023-11-11 RX ADMIN — Medication 1 TABLET: at 08:56

## 2023-11-11 RX ADMIN — MIDODRINE HYDROCHLORIDE 10 MG: 10 TABLET ORAL at 16:56

## 2023-11-11 RX ADMIN — CARBOXYMETHYLCELLULOSE SODIUM 1 DROP: 5 SOLUTION/ DROPS OPHTHALMIC at 09:00

## 2023-11-11 RX ADMIN — CARBOXYMETHYLCELLULOSE SODIUM 1 DROP: 5 SOLUTION/ DROPS OPHTHALMIC at 07:00

## 2023-11-11 RX ADMIN — ACETAMINOPHEN 650 MG: 325 TABLET ORAL at 07:40

## 2023-11-11 RX ADMIN — MIDODRINE HYDROCHLORIDE 10 MG: 10 TABLET ORAL at 07:41

## 2023-11-11 RX ADMIN — LIDOCAINE 1 PATCH: 4 PATCH TOPICAL at 08:57

## 2023-11-11 ASSESSMENT — PAIN SCALES - GENERAL
PAINLEVEL_OUTOF10: 6
PAINLEVEL_OUTOF10: 7
PAINLEVEL_OUTOF10: 3
PAINLEVEL_OUTOF10: 2

## 2023-11-11 ASSESSMENT — PAIN DESCRIPTION - LOCATION
LOCATION: GENERALIZED
LOCATION: HEAD

## 2023-11-11 ASSESSMENT — PAIN - FUNCTIONAL ASSESSMENT
PAIN_FUNCTIONAL_ASSESSMENT: 0-10

## 2023-11-11 NOTE — PROGRESS NOTES
"Molina Godinez is a 68 y.o. male on day 30 of admission presenting with Hospital-acquired pneumonia.    Subjective   Chief Complaint: \"My breathing feels different\"    ROS: Endorses congested productive cough producing small amounts white/tan/clear sputum.  Denies CP, SOB, palpitations, abdominal pain, fevers, chills, night sweats, and N/V.     Overnight: Patient with new complaint of feeling that his breathing was different, but was unable to articulate in what way.  Patient was slightly more tachycardic than previously (115bpm was high) and tachypnic with RR low 30s.  Chest x-ray ordered and breathing treatment ordered.  CxR grossly unchanged from previous imaging and patient's breathing improved following breathing treatment.  There was no increase in oxygen demand during this time, patient remains on therapeutic heparin gtt since 10/13.    Objective   Physical Exam:  Constitutional: Ill appearing elderly male pt in NAD, alert and cooperative  Eyes: PERRL, EOMI, no icterus   ENMT: mucous membranes moist  Head/Neck: Neck supple, no apparent injury  Respiratory/Thorax: Left lung field with minimal air movement noted, right lung diminshed throughout, breathing non-labored but tachypnic at times, intermittent cough with clear sputum production, on 2L NC  Cardiovascular: Regular, rate and rhythm, no murmurs, normal S1 and S2  Gastrointestinal: Nondistended, soft, non-tender, BS present x 4, cortrak in place  : External catheter in place with clear yellow output noted in canister   Musculoskeletal: ROM intact, no joint swelling, normal strength  Extremities: normal extremities, no edema  Neurological: alert and oriented x 3, speech clear, follows commands appropriately, without focal deficits, sensation grossly intact, generalized weakness  Skin: Warm and dry    Vital Signs  Blood pressure 109/68, pulse (!) 111, temperature 36.4 °C (97.5 °F), resp. rate 26, height 1.702 m (5' 7.01\"), weight 54 kg (119 lb 0.8 oz), SpO2 " 95 %.  Oxygen Therapy  SpO2: 95 %  Medical Gas Therapy: Supplemental oxygen  O2 Delivery Method: Nasal cannula  FiO2 (%):  [28 %] 28 %    Intake/Output last 3 Shifts:  I/O last 3 completed shifts:  In: 2111 (39.1 mL/kg) [I.V.:611 (11.3 mL/kg); NG/GT:1500]  Out: 2350 (43.5 mL/kg) [Urine:2350 (1.2 mL/kg/hr)]  Weight: 54 kg     Lines and Tubes:  Peripheral IV 10/15/23 20 G Distal;Right;Upper;Ventral Arm (Active)   Placement Date/Time: 10/15/23 0900   Size (Gauge): 20 G  Orientation: Distal;Right;Upper;Ventral  Location: Arm  Site Prep: Chlorhexidine   Insertion attempts: 1  Patient Tolerance: Tolerated well   Number of days: 27       Peripheral IV 11/06/23 20 G Left Hand (Active)   Placement Date/Time: 11/06/23 (c) 1428   Size (Gauge): 20 G  Orientation: Left  Location: Hand  Site Prep: Alcohol  Technique: Anatomical landmarks  Insertion attempts: 1   Number of days: 4       NG/OG/Feeding Tube Left nostril (Active)   Placement Date/Time: 10/17/23 1755   Placed by: NP  Tube Length: 80 cm  Tube Location: Left nostril   Number of days: 24       External Urinary Catheter (Active)   Placement Date/Time: 11/04/23 1805     Number of days: 6         Relevant Results  Scheduled medications  albuterol, 2.5 mg, nebulization, q12h  ergocalciferol, 8,000 Units, oral, Daily  erythromycin, 1 cm, Both Eyes, 4x daily  lidocaine, 1 patch, transdermal, Daily  lidocaine, 2 patch, transdermal, Daily  loperamide, 2 mg, oral, 4x daily  artificial tears, 1 drop, Both Eyes, 6x daily  melatonin, 10 mg, nasogastric tube, Nightly  micafungin, 100 mg, intravenous, q24h  midodrine, 10 mg, oral, TID with meals  multivitamin with minerals, 1 tablet, oral, Daily  nicotine, 1 patch, transdermal, Daily  pantoprazole, 40 mg, intravenous, Daily  sodium chloride, 3 mL, nebulization, q12h      Continuous medications  heparin, 0-4,500 Units/hr, Last Rate: Stopped (11/11/23 0751)      PRN medications  PRN medications: acetaminophen, dextrose 10 % in water  (D10W), dextrose, diclofenac sodium, glucagon, heparin, HYDROmorphone, ipratropium-albuteroL, moisturizing mouth, oxyCODONE, oxygen, white petrolatum    Results for orders placed or performed during the hospital encounter of 10/11/23 (from the past 24 hour(s))   CBC and Auto Differential   Result Value Ref Range    WBC 11.8 (H) 4.4 - 11.3 x10*3/uL    nRBC 0.0 0.0 - 0.0 /100 WBCs    RBC 4.03 (L) 4.50 - 5.90 x10*6/uL    Hemoglobin 11.3 (L) 13.5 - 17.5 g/dL    Hematocrit 34.6 (L) 41.0 - 52.0 %    MCV 86 80 - 100 fL    MCH 28.0 26.0 - 34.0 pg    MCHC 32.7 32.0 - 36.0 g/dL    RDW 13.4 11.5 - 14.5 %    Platelets 365 150 - 450 x10*3/uL    Neutrophils % 73.0 40.0 - 80.0 %    Immature Granulocytes %, Automated 2.0 (H) 0.0 - 0.9 %    Lymphocytes % 12.8 13.0 - 44.0 %    Monocytes % 8.8 2.0 - 10.0 %    Eosinophils % 2.3 0.0 - 6.0 %    Basophils % 1.1 0.0 - 2.0 %    Neutrophils Absolute 8.60 (H) 1.20 - 7.70 x10*3/uL    Immature Granulocytes Absolute, Automated 0.23 0.00 - 0.70 x10*3/uL    Lymphocytes Absolute 1.50 1.20 - 4.80 x10*3/uL    Monocytes Absolute 1.03 (H) 0.10 - 1.00 x10*3/uL    Eosinophils Absolute 0.27 0.00 - 0.70 x10*3/uL    Basophils Absolute 0.13 (H) 0.00 - 0.10 x10*3/uL   Magnesium   Result Value Ref Range    Magnesium 1.81 1.60 - 2.40 mg/dL   Comprehensive Metabolic Panel   Result Value Ref Range    Glucose 105 (H) 74 - 99 mg/dL    Sodium 131 (L) 136 - 145 mmol/L    Potassium 5.2 3.5 - 5.3 mmol/L    Chloride 93 (L) 98 - 107 mmol/L    Bicarbonate 25 21 - 32 mmol/L    Anion Gap 18 10 - 20 mmol/L    Urea Nitrogen 18 6 - 23 mg/dL    Creatinine 0.34 (L) 0.50 - 1.30 mg/dL    eGFR >90 >60 mL/min/1.73m*2    Calcium 8.9 8.6 - 10.6 mg/dL    Albumin 2.8 (L) 3.4 - 5.0 g/dL    Alkaline Phosphatase 187 (H) 33 - 136 U/L    Total Protein 7.6 6.4 - 8.2 g/dL    AST 32 9 - 39 U/L    Bilirubin, Total 0.3 0.0 - 1.2 mg/dL    ALT 26 10 - 52 U/L   Phosphorus   Result Value Ref Range    Phosphorus 5.6 (H) 2.5 - 4.9 mg/dL   Heparin  Assay, UFH   Result Value Ref Range    Heparin Unfractionated 0.7 See Comment Below for Therapeutic Ranges IU/mL       CT chest w IV contrast 11/08/2023    Narrative  Interpreted By:  Nathan Lu,  STUDY:  CT CHEST W IV CONTRAST;  11/8/2023 12:36 pm    INDICATION:  Signs/Symptoms:RE-eval of atelectasis and/or a/w narrowing.    COMPARISON:  10/16/2023    ACCESSION NUMBER(S):  [id number]    ORDERING CLINICIAN:  RAKESH ELLIS    TECHNIQUE:  Helical data acquisition of the chest was obtained  with 63 mL of  Omnipaque 350.. Images were reformatted in axial, coronal, and  sagittal planes.    FINDINGS:  LUNGS AND AIRWAYS:  There is extensive soft tissue within the left mainstem bronchus.    There is layering soft tissue/mucus within the distal trachea. There  is right-sided peribronchial thickening and scattered atelectatic  changes. There is consolidation/collapse of the left lung. Minimal  aerated lung is noted. There is a small left-sided pleural effusion.  There are extensive right-sided emphysematous changes.    MEDIASTINUM AND KRISTOPHER, LOWER NECK AND AXILLA:  The visualized thyroid gland is within normal limits.    Increased size and number of multiple mediastinal lymph nodes.    NG tube within the esophagus.    HEART AND VESSELS:  There is mild atherosclerotic changes of the thoracic aorta.    Main pulmonary artery and its branches are normal in caliber.    There are severe coronary artery calcifications. The study is not  optimized for evaluation of coronary arteries.    Right-sided chamber enlargement.    No evidence of pericardial effusion.    UPPER ABDOMEN:  The visualized subdiaphragmatic structures demonstrate no remarkable  findings.    CHEST WALL AND OSSEOUS STRUCTURES:  There are no suspicious osseous lesions. Multilevel degenerative  changes are present. The patient is status post left total shoulder  arthroplasty.    Impression  1.  Consolidation/collapse of the left lung with extensive  mucoid  impaction/aspirated material within the left mainstem bronchus and  segmental bronchi. Correlate with aspiration.  Extensive atherosclerotic changes of the coronary arteries and right  heart enlargement. Correlate with elevated right-sided pressures.    MACRO:  None    Signed by: Nathan Lu 11/9/2023 8:00 AM  Dictation workstation:   [id number]      Assessment/Plan   Principal Problem:    Hospital-acquired pneumonia  Active Problems:    Fungemia    Essential hypertension, benign    Hepatitis C virus infection cured after antiviral drug therapy    Alcoholism (CMS/HCC)    Chronic pulmonary embolism (CMS/HCC)    COPD (chronic obstructive pulmonary disease) (CMS/HCC)    HLD (hyperlipidemia)    Uremia    68 y.o. male with PMHx COPD, HTN,  alcohol and opioid use disorder, hepatitis C, chronic PE admitted 10/12 with BLE weakness, recently admitted in the MICU from 10/13-10/16 for suspected alcohol withdrawal refractory to ativan, started on phenobarb taper, treated for streptococcus pneumoniae PNA with Ceftriaxone and transferred to [facility].  RRT called for lethargy, transferred to MICU on 10/17 with new FiO2 requirements.       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  No plans for further testing or treatment at this time d/t fungemia.  Immune modulation could potentially worsen infection.  Once infection is cleared can re consult Neurology for potential treatment  - Continue with Melatonin HS, Pain management with Lidocaine patches, Diclofenac gel prn, Acetaminophen prn, Oxycodone 5 mg Q6, and Hydromorphone 0.2 mg Q6.    - PT/OT/SLP following  - music therapy following     Optho:   #Dry eye, Cataracts, asha eyes  - seen by Opthomology given c/o blurriness with new fungemia.  No signs of fungal endophthalmitis  -  rec artifical tears, Erythromycin     Pulm:   #Medical Hx of COPD, on 2-4 L home O2, Chronic PE, Smoker  #Acute on chronic hypoxic Respiratory failure   - Patient remains on 2L NC, intermittently tachypnic but improved following RT tx.  - s/p Bronch x2 with mucous plugging, L lung consolidation, Most recently on 11/6 with Dr. Cuenca  - Continue with Albuterol BID and 7% saline BID.  Airway clearance techniques TID.   - Continue to encourage bipap for 30 mins prior to bronchial hygiene, pt has been refusing  - Acapella, IS while awake  - Continue heparin drip.  Transition to home Apixiban 5 mg BID when no further procedures planned  - Trend daily FVC/NIF --> FVC 0.88 and NIF -20 today  - Seen by Dr Klein for PAH work up on 10/2     Cardiac:   #Medical Hx HTN  #TV Endocarditis  #Tachycardia  - HR ranging from  over last 24hrs, BP stable   - 10/31 Echo: LVEF 75-80%, RV overload, Mod-severe TV, Mod-severe RV systolic pressure, Tricuspid echodensity measuring 1.7 x 0.8 cm, atrial septal aneurysm  - Repeat Echo on 11/2/23: No definite valvular vegetations were visualized   - Seen by CT surgery for TV vegetation- too high risk for surgery at this time  - Had SVT this admit.  Continue on tele  - Continue Midodrine 10 mg TID     FEN/GI:   #Medical Hx of Hep C  #Dysphagia, s/p Cortrak  #Severe Protein Calorie Malnutrition  #Hyponatremia   -  11/11  - TF via Cortrak: Lonamayelin Mejia Peptide 1.5 at 45 ml/hr.  No Free Water  - if switching to boluses, can do 275 ml QID with 60 ml before and after each bolus  - Imodium changed from QID to TID for diarrhea  - Continue PPI, MV, and ergocalciferol.   - Daily RFP/Mag      Renal: no acute issues.    #KRYSTEN this admit s/p CVVH in MICU.  Now normal renal function  - voiding per external cath  - seen by renal in MICU, now signed off  - goal to keep MAP ~70. Continue on Midodrine to assist with renal perfusion  - Strict I's and O's      Heme:   #Medical Hx of Anemia of chronic disease  and PE  - continue heparin drip.  Transition back to home Apixiban at discharge  - Daily CBC     ID:   #Hx of Hepatitis C  #Fungemia, Strep pneumonaie PNA, Stenotrophomonas PNA  - Afebrile w/o leukocytosis   - +Stenotrophomonas on sputum cx 10/20, 11/5 (from BAL), 11/6.  10/31 BC with Candida  - 11/2 C. Diff negative  - seen by ID.  Continue on She (11/3-- STOP date 11/17)    Ppx:  DVT: Heparin gtt and scds  GI: PPI     Lines: PIV, FMS     Code Status: Full Code  NOK: Sister Sarah Godinez [phone]      Dispo: Patient to remain in SDU for aggressive BPH.  Will need SNF at discharge, PT/OT/SLP following.    Pt discussed with Dr. Cota, seen and examined. All labs, VS and previous plan of care reviewed.        Homero Pizano, APRN-CNP

## 2023-11-12 PROBLEM — G61.0 GBS (GUILLAIN BARRE SYNDROME) (MULTI): Status: ACTIVE | Noted: 2023-11-12

## 2023-11-12 LAB
ALBUMIN SERPL BCP-MCNC: 2.8 G/DL (ref 3.4–5)
ALP SERPL-CCNC: 169 U/L (ref 33–136)
ALT SERPL W P-5'-P-CCNC: 24 U/L (ref 10–52)
ANION GAP SERPL CALC-SCNC: 16 MMOL/L (ref 10–20)
AST SERPL W P-5'-P-CCNC: 39 U/L (ref 9–39)
BASE EXCESS BLDV CALC-SCNC: -0.4 MMOL/L (ref -2–3)
BASOPHILS # BLD AUTO: 0.12 X10*3/UL (ref 0–0.1)
BASOPHILS NFR BLD AUTO: 1.1 %
BILIRUB SERPL-MCNC: 0.4 MG/DL (ref 0–1.2)
BODY TEMPERATURE: 37 DEGREES CELSIUS
BUN SERPL-MCNC: 21 MG/DL (ref 6–23)
CALCIUM SERPL-MCNC: 8.7 MG/DL (ref 8.6–10.6)
CHLORIDE SERPL-SCNC: 93 MMOL/L (ref 98–107)
CO2 SERPL-SCNC: 26 MMOL/L (ref 21–32)
CREAT SERPL-MCNC: 0.34 MG/DL (ref 0.5–1.3)
EOSINOPHIL # BLD AUTO: 0.18 X10*3/UL (ref 0–0.7)
EOSINOPHIL NFR BLD AUTO: 1.6 %
ERYTHROCYTE [DISTWIDTH] IN BLOOD BY AUTOMATED COUNT: 13.3 % (ref 11.5–14.5)
GFR SERPL CREATININE-BSD FRML MDRD: >90 ML/MIN/1.73M*2
GLUCOSE BLD MANUAL STRIP-MCNC: 139 MG/DL (ref 74–99)
GLUCOSE SERPL-MCNC: 105 MG/DL (ref 74–99)
HCO3 BLDV-SCNC: 24.8 MMOL/L (ref 22–26)
HCT VFR BLD AUTO: 32.4 % (ref 41–52)
HGB BLD-MCNC: 10.7 G/DL (ref 13.5–17.5)
IMM GRANULOCYTES # BLD AUTO: 0.14 X10*3/UL (ref 0–0.7)
IMM GRANULOCYTES NFR BLD AUTO: 1.2 % (ref 0–0.9)
INHALED O2 CONCENTRATION: 28 %
LYMPHOCYTES # BLD AUTO: 1.36 X10*3/UL (ref 1.2–4.8)
LYMPHOCYTES NFR BLD AUTO: 12.1 %
MAGNESIUM SERPL-MCNC: 1.87 MG/DL (ref 1.6–2.4)
MCH RBC QN AUTO: 28.5 PG (ref 26–34)
MCHC RBC AUTO-ENTMCNC: 33 G/DL (ref 32–36)
MCV RBC AUTO: 86 FL (ref 80–100)
MONOCYTES # BLD AUTO: 0.86 X10*3/UL (ref 0.1–1)
MONOCYTES NFR BLD AUTO: 7.6 %
NEUTROPHILS # BLD AUTO: 8.61 X10*3/UL (ref 1.2–7.7)
NEUTROPHILS NFR BLD AUTO: 76.4 %
NRBC BLD-RTO: 0 /100 WBCS (ref 0–0)
OXYHGB MFR BLDV: 84.2 % (ref 45–75)
PCO2 BLDV: 42 MM HG (ref 41–51)
PH BLDV: 7.38 PH (ref 7.33–7.43)
PHOSPHATE SERPL-MCNC: 5.1 MG/DL (ref 2.5–4.9)
PLATELET # BLD AUTO: 369 X10*3/UL (ref 150–450)
PO2 BLDV: 58 MM HG (ref 35–45)
POTASSIUM SERPL-SCNC: 5.3 MMOL/L (ref 3.5–5.3)
PROT SERPL-MCNC: 7.6 G/DL (ref 6.4–8.2)
RBC # BLD AUTO: 3.76 X10*6/UL (ref 4.5–5.9)
SAO2 % BLDV: 87 % (ref 45–75)
SODIUM SERPL-SCNC: 130 MMOL/L (ref 136–145)
TEST COMMENT: ABNORMAL
UFH PPP CHRO-ACNC: 0.7 IU/ML
UFH PPP CHRO-ACNC: 0.7 IU/ML
UFH PPP CHRO-ACNC: 0.9 IU/ML
UFH PPP CHRO-ACNC: 1 IU/ML
WBC # BLD AUTO: 11.3 X10*3/UL (ref 4.4–11.3)

## 2023-11-12 PROCEDURE — 2500000002 HC RX 250 W HCPCS SELF ADMINISTERED DRUGS (ALT 637 FOR MEDICARE OP, ALT 636 FOR OP/ED): Performed by: NURSE PRACTITIONER

## 2023-11-12 PROCEDURE — 83735 ASSAY OF MAGNESIUM: CPT

## 2023-11-12 PROCEDURE — C9113 INJ PANTOPRAZOLE SODIUM, VIA: HCPCS

## 2023-11-12 PROCEDURE — 2500000001 HC RX 250 WO HCPCS SELF ADMINISTERED DRUGS (ALT 637 FOR MEDICARE OP): Performed by: NURSE PRACTITIONER

## 2023-11-12 PROCEDURE — 94668 MNPJ CHEST WALL SBSQ: CPT

## 2023-11-12 PROCEDURE — 85520 HEPARIN ASSAY: CPT | Performed by: NURSE PRACTITIONER

## 2023-11-12 PROCEDURE — 94640 AIRWAY INHALATION TREATMENT: CPT

## 2023-11-12 PROCEDURE — 82947 ASSAY GLUCOSE BLOOD QUANT: CPT

## 2023-11-12 PROCEDURE — S4991 NICOTINE PATCH NONLEGEND: HCPCS | Performed by: NURSE PRACTITIONER

## 2023-11-12 PROCEDURE — 99233 SBSQ HOSP IP/OBS HIGH 50: CPT | Performed by: INTERNAL MEDICINE

## 2023-11-12 PROCEDURE — 36415 COLL VENOUS BLD VENIPUNCTURE: CPT

## 2023-11-12 PROCEDURE — 85025 COMPLETE CBC W/AUTO DIFF WBC: CPT

## 2023-11-12 PROCEDURE — 2500000004 HC RX 250 GENERAL PHARMACY W/ HCPCS (ALT 636 FOR OP/ED): Performed by: NURSE PRACTITIONER

## 2023-11-12 PROCEDURE — 36415 COLL VENOUS BLD VENIPUNCTURE: CPT | Performed by: NURSE PRACTITIONER

## 2023-11-12 PROCEDURE — 84100 ASSAY OF PHOSPHORUS: CPT

## 2023-11-12 PROCEDURE — 2500000005 HC RX 250 GENERAL PHARMACY W/O HCPCS: Performed by: NURSE PRACTITIONER

## 2023-11-12 PROCEDURE — 2060000001 HC INTERMEDIATE ICU ROOM DAILY

## 2023-11-12 PROCEDURE — 2500000001 HC RX 250 WO HCPCS SELF ADMINISTERED DRUGS (ALT 637 FOR MEDICARE OP)

## 2023-11-12 PROCEDURE — 2500000004 HC RX 250 GENERAL PHARMACY W/ HCPCS (ALT 636 FOR OP/ED)

## 2023-11-12 PROCEDURE — 82805 BLOOD GASES W/O2 SATURATION: CPT

## 2023-11-12 PROCEDURE — 80053 COMPREHEN METABOLIC PANEL: CPT | Performed by: NURSE PRACTITIONER

## 2023-11-12 RX ADMIN — OXYCODONE HYDROCHLORIDE 5 MG: 5 TABLET ORAL at 12:02

## 2023-11-12 RX ADMIN — ERYTHROMYCIN 1 CM: 5 OINTMENT OPHTHALMIC at 17:00

## 2023-11-12 RX ADMIN — CARBOXYMETHYLCELLULOSE SODIUM 1 DROP: 5 SOLUTION/ DROPS OPHTHALMIC at 15:02

## 2023-11-12 RX ADMIN — OXYCODONE HYDROCHLORIDE 5 MG: 5 TABLET ORAL at 06:07

## 2023-11-12 RX ADMIN — MICAFUNGIN SODIUM 100 MG: 50 INJECTION, POWDER, LYOPHILIZED, FOR SOLUTION INTRAVENOUS at 11:46

## 2023-11-12 RX ADMIN — LIDOCAINE 2 PATCH: 4 PATCH TOPICAL at 09:58

## 2023-11-12 RX ADMIN — LOPERAMIDE HYDROCHLORIDE 2 MG: 2 SOLUTION ORAL at 20:01

## 2023-11-12 RX ADMIN — Medication 3 ML: at 08:00

## 2023-11-12 RX ADMIN — ALBUTEROL SULFATE 2.5 MG: 2.5 SOLUTION RESPIRATORY (INHALATION) at 08:42

## 2023-11-12 RX ADMIN — ACETAMINOPHEN 650 MG: 325 TABLET ORAL at 03:34

## 2023-11-12 RX ADMIN — LIDOCAINE 1 PATCH: 4 PATCH TOPICAL at 10:00

## 2023-11-12 RX ADMIN — Medication 3 ML: at 20:52

## 2023-11-12 RX ADMIN — Medication 8000 UNITS: at 09:59

## 2023-11-12 RX ADMIN — PANTOPRAZOLE SODIUM 40 MG: 40 INJECTION, POWDER, FOR SOLUTION INTRAVENOUS at 09:59

## 2023-11-12 RX ADMIN — Medication 2 L/MIN: at 12:02

## 2023-11-12 RX ADMIN — LOPERAMIDE HYDROCHLORIDE 2 MG: 2 SOLUTION ORAL at 09:59

## 2023-11-12 RX ADMIN — ALBUTEROL SULFATE 2.5 MG: 2.5 SOLUTION RESPIRATORY (INHALATION) at 20:52

## 2023-11-12 RX ADMIN — ERYTHROMYCIN 1 CM: 5 OINTMENT OPHTHALMIC at 13:46

## 2023-11-12 RX ADMIN — ACETAMINOPHEN 650 MG: 325 TABLET ORAL at 10:00

## 2023-11-12 RX ADMIN — MIDODRINE HYDROCHLORIDE 10 MG: 10 TABLET ORAL at 17:13

## 2023-11-12 RX ADMIN — Medication 1 TABLET: at 10:01

## 2023-11-12 RX ADMIN — LOPERAMIDE HYDROCHLORIDE 2 MG: 2 SOLUTION ORAL at 15:02

## 2023-11-12 RX ADMIN — CARBOXYMETHYLCELLULOSE SODIUM 1 DROP: 5 SOLUTION/ DROPS OPHTHALMIC at 21:48

## 2023-11-12 RX ADMIN — OXYCODONE HYDROCHLORIDE 5 MG: 5 TABLET ORAL at 17:12

## 2023-11-12 RX ADMIN — CARBOXYMETHYLCELLULOSE SODIUM 1 DROP: 5 SOLUTION/ DROPS OPHTHALMIC at 09:59

## 2023-11-12 RX ADMIN — Medication 1 PATCH: at 09:59

## 2023-11-12 RX ADMIN — ACETAMINOPHEN 650 MG: 325 TABLET ORAL at 17:13

## 2023-11-12 RX ADMIN — CARBOXYMETHYLCELLULOSE SODIUM 1 DROP: 5 SOLUTION/ DROPS OPHTHALMIC at 13:46

## 2023-11-12 RX ADMIN — MIDODRINE HYDROCHLORIDE 10 MG: 10 TABLET ORAL at 09:00

## 2023-11-12 RX ADMIN — CARBOXYMETHYLCELLULOSE SODIUM 1 DROP: 5 SOLUTION/ DROPS OPHTHALMIC at 19:59

## 2023-11-12 RX ADMIN — CARBOXYMETHYLCELLULOSE SODIUM 1 DROP: 5 SOLUTION/ DROPS OPHTHALMIC at 07:00

## 2023-11-12 RX ADMIN — HEPARIN SODIUM 1200 UNITS/HR: 10000 INJECTION, SOLUTION INTRAVENOUS at 21:20

## 2023-11-12 RX ADMIN — Medication 10 MG: at 20:01

## 2023-11-12 RX ADMIN — MIDODRINE HYDROCHLORIDE 10 MG: 10 TABLET ORAL at 13:00

## 2023-11-12 RX ADMIN — ERYTHROMYCIN 1 CM: 5 OINTMENT OPHTHALMIC at 07:00

## 2023-11-12 ASSESSMENT — PAIN - FUNCTIONAL ASSESSMENT
PAIN_FUNCTIONAL_ASSESSMENT: 0-10

## 2023-11-12 ASSESSMENT — PAIN SCALES - GENERAL
PAINLEVEL_OUTOF10: 10 - WORST POSSIBLE PAIN
PAINLEVEL_OUTOF10: 4
PAINLEVEL_OUTOF10: 3
PAINLEVEL_OUTOF10: 0 - NO PAIN
PAINLEVEL_OUTOF10: 6
PAINLEVEL_OUTOF10: 5 - MODERATE PAIN
PAINLEVEL_OUTOF10: 4
PAINLEVEL_OUTOF10: 10 - WORST POSSIBLE PAIN

## 2023-11-12 ASSESSMENT — PAIN DESCRIPTION - LOCATION
LOCATION: BACK
LOCATION: ANKLE
LOCATION: BACK
LOCATION: BACK

## 2023-11-12 ASSESSMENT — PAIN DESCRIPTION - ORIENTATION
ORIENTATION: LEFT
ORIENTATION: POSTERIOR

## 2023-11-12 NOTE — PROGRESS NOTES
"Molina Godinez is a 68 y.o. male on day 31 of admission presenting with Hospital-acquired pneumonia.    Subjective   Chief Complaint: \"My ankle is bothering me\"    ROS: Endorses weakness and productive cough.  Denies fever, chills, night sweats, CP, abdominal pain, and N/V    Overnight: No acute events      Objective   Physical Exam:  Constitutional: Ill appearing elderly male pt in NAD, alert and cooperative  Eyes: PERRL, EOMI, no icterus   ENMT: mucous membranes moist  Head/Neck: Neck supple, no apparent injury  Respiratory/Thorax: Left lung field with minimal air movement noted, right lung diminshed throughout, breathing non-labored but tachypnic at times, intermittent cough with clear sputum production, on 2L NC  Cardiovascular: Regular, rate and rhythm, no murmurs, normal S1 and S2  Gastrointestinal: Nondistended, soft, non-tender, BS present x 4, cortrak in place  : External catheter in place with clear yellow output noted in canister   Musculoskeletal: ROM intact, no joint swelling, normal strength  Extremities: normal extremities, no edema  Neurological: alert and oriented x 3, speech clear, follows commands appropriately, without focal deficits, sensation grossly intact, generalized weakness  Skin: Warm and dry    Vital Signs  Blood pressure 107/75, pulse 102, temperature 36.5 °C (97.7 °F), temperature source Temporal, resp. rate (!) 29, height 1.702 m (5' 7.01\"), weight 54 kg (119 lb 0.8 oz), SpO2 97 %.  Oxygen Therapy  SpO2: 97 %  Medical Gas Therapy: Supplemental oxygen  O2 Delivery Method: Nasal cannula       Intake/Output last 3 Shifts:  I/O last 3 completed shifts:  In: 1910.1 (35.4 mL/kg) [I.V.:635.1 (11.8 mL/kg); NG/GT:1275]  Out: 1350 (25 mL/kg) [Urine:1350 (0.7 mL/kg/hr)]  Weight: 54 kg     Lines and Tubes:  Peripheral IV 10/15/23 20 G Distal;Right;Upper;Ventral Arm (Active)   Placement Date/Time: 10/15/23 0900   Size (Gauge): 20 G  Orientation: Distal;Right;Upper;Ventral  Location: Arm  Site " Prep: Chlorhexidine   Insertion attempts: 1  Patient Tolerance: Tolerated well   Number of days: 28       Peripheral IV 11/06/23 20 G Left Hand (Active)   Placement Date/Time: 11/06/23 (c) 1428   Size (Gauge): 20 G  Orientation: Left  Location: Hand  Site Prep: Alcohol  Technique: Anatomical landmarks  Insertion attempts: 1   Number of days: 5       NG/OG/Feeding Tube Left nostril (Active)   Placement Date/Time: 10/17/23 1755   Placed by: NP  Tube Length: 80 cm  Tube Location: Left nostril   Number of days: 25       External Urinary Catheter (Active)   Placement Date/Time: 11/04/23 1805     Number of days: 7         Relevant Results  Scheduled medications  albuterol, 2.5 mg, nebulization, q12h  ergocalciferol, 8,000 Units, oral, Daily  erythromycin, 1 cm, Both Eyes, 4x daily  lidocaine, 1 patch, transdermal, Daily  lidocaine, 2 patch, transdermal, Daily  loperamide, 2 mg, oral, TID  artificial tears, 1 drop, Both Eyes, 6x daily  melatonin, 10 mg, nasogastric tube, Nightly  micafungin, 100 mg, intravenous, q24h  midodrine, 10 mg, oral, TID with meals  multivitamin with minerals, 1 tablet, oral, Daily  nicotine, 1 patch, transdermal, Daily  pantoprazole, 40 mg, intravenous, Daily  sodium chloride, 3 mL, nebulization, q12h      Continuous medications  heparin, 0-4,500 Units/hr, Last Rate: 1,200 Units/hr (11/12/23 1000)      PRN medications  PRN medications: acetaminophen, dextrose 10 % in water (D10W), dextrose, diclofenac sodium, glucagon, heparin, HYDROmorphone, ipratropium-albuteroL, moisturizing mouth, oxyCODONE, oxygen, white petrolatum    Results for orders placed or performed during the hospital encounter of 10/11/23 (from the past 24 hour(s))   Blood Gas Venous Full Panel   Result Value Ref Range    POCT pH, Venous 7.50 (H) 7.33 - 7.43 pH    POCT pCO2, Venous 37 (L) 41 - 51 mm Hg    POCT pO2, Venous 63 (H) 35 - 45 mm Hg    POCT SO2, Venous 92 (H) 45 - 75 %    POCT Oxy Hemoglobin, Venous 89.8 (H) 45.0 - 75.0 %     POCT Hematocrit Calculated, Venous 38.0 (L) 41.0 - 52.0 %    POCT Sodium, Venous 124 (L) 136 - 145 mmol/L    POCT Potassium, Venous 4.8 3.5 - 5.3 mmol/L    POCT Chloride, Venous 95 (L) 98 - 107 mmol/L    POCT Ionized Calicum, Venous 1.09 (L) 1.10 - 1.33 mmol/L    POCT Glucose, Venous 114 (H) 74 - 99 mg/dL    POCT Lactate, Venous 1.3 0.4 - 2.0 mmol/L    POCT Base Excess, Venous 5.5 (H) -2.0 - 3.0 mmol/L    POCT HCO3 Calculated, Venous 28.9 (H) 22.0 - 26.0 mmol/L    POCT Hemoglobin, Venous 12.8 (L) 13.5 - 17.5 g/dL    POCT Anion Gap, Venous 5.0 (L) 10.0 - 25.0 mmol/L    Patient Temperature 37.0 degrees Celsius    FiO2 28 %   CBC and Auto Differential   Result Value Ref Range    WBC 11.3 4.4 - 11.3 x10*3/uL    nRBC 0.0 0.0 - 0.0 /100 WBCs    RBC 3.76 (L) 4.50 - 5.90 x10*6/uL    Hemoglobin 10.7 (L) 13.5 - 17.5 g/dL    Hematocrit 32.4 (L) 41.0 - 52.0 %    MCV 86 80 - 100 fL    MCH 28.5 26.0 - 34.0 pg    MCHC 33.0 32.0 - 36.0 g/dL    RDW 13.3 11.5 - 14.5 %    Platelets 369 150 - 450 x10*3/uL    Neutrophils % 76.4 40.0 - 80.0 %    Immature Granulocytes %, Automated 1.2 (H) 0.0 - 0.9 %    Lymphocytes % 12.1 13.0 - 44.0 %    Monocytes % 7.6 2.0 - 10.0 %    Eosinophils % 1.6 0.0 - 6.0 %    Basophils % 1.1 0.0 - 2.0 %    Neutrophils Absolute 8.61 (H) 1.20 - 7.70 x10*3/uL    Immature Granulocytes Absolute, Automated 0.14 0.00 - 0.70 x10*3/uL    Lymphocytes Absolute 1.36 1.20 - 4.80 x10*3/uL    Monocytes Absolute 0.86 0.10 - 1.00 x10*3/uL    Eosinophils Absolute 0.18 0.00 - 0.70 x10*3/uL    Basophils Absolute 0.12 (H) 0.00 - 0.10 x10*3/uL   Magnesium   Result Value Ref Range    Magnesium 1.87 1.60 - 2.40 mg/dL   Comprehensive Metabolic Panel   Result Value Ref Range    Glucose 105 (H) 74 - 99 mg/dL    Sodium 130 (L) 136 - 145 mmol/L    Potassium 5.3 3.5 - 5.3 mmol/L    Chloride 93 (L) 98 - 107 mmol/L    Bicarbonate 26 21 - 32 mmol/L    Anion Gap 16 10 - 20 mmol/L    Urea Nitrogen 21 6 - 23 mg/dL    Creatinine 0.34 (L) 0.50 -  1.30 mg/dL    eGFR >90 >60 mL/min/1.73m*2    Calcium 8.7 8.6 - 10.6 mg/dL    Albumin 2.8 (L) 3.4 - 5.0 g/dL    Alkaline Phosphatase 169 (H) 33 - 136 U/L    Total Protein 7.6 6.4 - 8.2 g/dL    AST 39 9 - 39 U/L    Bilirubin, Total 0.4 0.0 - 1.2 mg/dL    ALT 24 10 - 52 U/L   BLOOD GAS VENOUS   Result Value Ref Range    POCT pH, Venous 7.38 7.33 - 7.43 pH    POCT pCO2, Venous 42 41 - 51 mm Hg    POCT pO2, Venous 58 (H) 35 - 45 mm Hg    POCT SO2, Venous 87 (H) 45 - 75 %    POCT Oxy Hemoglobin, Venous 84.2 (H) 45.0 - 75.0 %    POCT Base Excess, Venous -0.4 -2.0 - 3.0 mmol/L    POCT HCO3 Calculated, Venous 24.8 22.0 - 26.0 mmol/L    Patient Temperature 37.0 degrees Celsius    FiO2 28 %    Test Comment GEM 5K Core1-S    Phosphorus   Result Value Ref Range    Phosphorus 5.1 (H) 2.5 - 4.9 mg/dL   Heparin Assay, UFH   Result Value Ref Range    Heparin Unfractionated 0.9 See Comment Below for Therapeutic Ranges IU/mL   Heparin Assay, UFH   Result Value Ref Range    Heparin Unfractionated 0.7 See Comment Below for Therapeutic Ranges IU/mL     XR chest 1 view    Result Date: 11/11/2023  1. No significant change in complete opacification and volume loss of left hemithorax, correlating with combination of postobstructive atelectasis/consolidation and pleural effusion on comparative CT scan 11/08/2023. 2. Medical devices as above. No pneumothorax.   Signed by: Khanh Weaver 11/11/2023 10:35 AM Dictation workstation:   [id number]         XR chest 1 view 11/11/2023    Narrative  Interpreted By:  Khanh Weaver,  STUDY:  XR CHEST 1 VIEW;  11/11/2023 7:54 am    INDICATION:  Signs/Symptoms:SOB.    COMPARISON:  Chest radiograph dated 11/08/2023 and CT scan 11/08/2023    ACCESSION NUMBER(S):  [id number]    ORDERING CLINICIAN:  KYLAH NULL    FINDINGS:  AP radiograph of the chest  Enteric tube is again seen coursing below diaphragm and tip not  included in field of view.    CARDIOMEDIASTINAL SILHOUETTE:  The cardiomediastinal silhouette is  again completely obscured on the  left. There is similar leftward mediastinal shift noted.    LUNGS:  There is again demonstration of complete opacification and volume  loss of left hemithorax. Right lung is essentially clear. There is no  pneumothorax.    ABDOMEN:  No remarkable upper abdominal findings.    BONES:  No acute osseous abnormality. Status post left shoulder arthroplasty.    Impression  1. No significant change in complete opacification and volume loss of  left hemithorax, correlating with combination of postobstructive  atelectasis/consolidation and pleural effusion on comparative CT scan  11/08/2023.  2. Medical devices as above. No pneumothorax.    Signed by: Khanh Weaver 11/11/2023 10:35 AM  Dictation workstation:   [id number]      Assessment/Plan   Principal Problem:    Hospital-acquired pneumonia  Active Problems:    Fungemia    Essential hypertension, benign    Hepatitis C virus infection cured after antiviral drug therapy    Alcoholism (CMS/HCC)    Chronic pulmonary embolism (CMS/HCC)    COPD (chronic obstructive pulmonary disease) (CMS/HCC)    HLD (hyperlipidemia)    Uremia    68 y.o. male with PMHx COPD, HTN,  alcohol and opioid use disorder, hepatitis C, chronic PE admitted 10/12 with BLE weakness, recently admitted in the MICU from 10/13-10/16 for suspected alcohol withdrawal refractory to ativan, started on phenobarb taper, treated for streptococcus pneumoniae PNA with Ceftriaxone and transferred to [facility].  RRT called for lethargy, transferred to MICU on 10/17 with new FiO2 requirements.        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  No plans for further testing or treatment at this time d/t fungemia.  Immune modulation could potentially worsen infection.  Once infection is cleared can re  consult Neurology for potential treatment  - Continue with Melatonin HS, Pain management with Lidocaine patches, Diclofenac gel prn, Acetaminophen prn, Oxycodone 5 mg Q6, and Hydromorphone 0.2 mg Q6.    - PT/OT/SLP following  - music therapy following     Optho:   #Dry eye, Cataracts, asha eyes  - seen by Opthomology given c/o blurriness with new fungemia.  No signs of fungal endophthalmitis  - rec artifical tears, Erythromycin     Pulm:   #Medical Hx of COPD, on 2-4 L home O2, Chronic PE, Smoker  #Acute on chronic hypoxic Respiratory failure   - Patient remains on 2L NC, intermittently tachypnic but improved following RT tx.  - s/p Bronch x2 with mucous plugging, L lung consolidation, Most recently on 11/6 with Dr. Cuenca  - Continue with Albuterol BID and 7% saline BID.  Airway clearance techniques TID.   - Continue to encourage bipap for 30 mins prior to bronchial hygiene, pt has been refusing  - Acapella, IS while awake  - Continue heparin drip.  Transition to home Apixiban 5 mg BID when no further procedures planned  - Trend daily FVC/NIF --> FVC 0.88 and NIF -20 11/11  - Seen by Dr Klein for PAH work up on 10/2     Cardiac:   #Medical Hx HTN  #TV Endocarditis  #Tachycardia  - HR ranging from  over last 24hrs, BP stable   - 10/31 Echo: LVEF 75-80%, RV overload, Mod-severe TV, Mod-severe RV systolic pressure, Tricuspid echodensity measuring 1.7 x 0.8 cm, atrial septal aneurysm  - Repeat Echo on 11/2/23: No definite valvular vegetations were visualized   - Seen by CT surgery for TV vegetation- too high risk for surgery at this time  - Had SVT this admit.  Continue on tele  - Continue Midodrine 10 mg TID     FEN/GI:   #Medical Hx of Hep C  #Dysphagia, s/p Cortrak  #Severe Protein Calorie Malnutrition  #Hyponatremia   -  11/11  - TF via Cortrak: Lona Mejia Peptide 1.5 at 45 ml/hr.  No Free Water  - if switching to boluses, can do 275 ml QID with 60 ml before and after each bolus  - Imodium changed from QID  to TID for diarrhea  - Continue PPI, MV, and ergocalciferol.   - Daily RFP/Mag      Renal:   #KRYSTEN this admit s/p CVVH in MICU.  Now normal renal function  - voiding per external cath  - seen by renal in MICU, now signed off  - goal to keep MAP ~70. Continue on Midodrine to assist with renal perfusion  - Strict I's and O's      Heme:   #Medical Hx of Anemia of chronic disease and PE  - continue heparin drip.  Transition back to home Apixiban at discharge  - Daily CBC     ID:   #Hx of Hepatitis C  #Fungemia, Strep pneumonaie PNA, Stenotrophomonas PNA  - Afebrile w/o leukocytosis   - +Stenotrophomonas on sputum cx 10/20, 11/5 (from BAL), 11/6.  10/31 BC with Candida  - 11/2 C. Diff negative  - seen by ID.  Continue on She (11/3-- STOP date 11/17)     Ppx:  DVT: Heparin gtt and scds  GI: PPI     Lines: PIV, FMS     Code Status: Full Code  NOK: Sister Sarah Godinez [phone]      Dispo: Patient to remain in SDU for aggressive BPH.  Will need SNF at discharge, PT/OT/SLP following.    Pt discussed with Dr. Cota, seen and examined. All labs, VS and previous plan of care reviewed.      Homero Pizano, WOO-CNP

## 2023-11-13 ENCOUNTER — APPOINTMENT (OUTPATIENT)
Dept: RADIOLOGY | Facility: HOSPITAL | Age: 68
End: 2023-11-13
Payer: COMMERCIAL

## 2023-11-13 LAB
ALBUMIN SERPL BCP-MCNC: 2.8 G/DL (ref 3.4–5)
ALP SERPL-CCNC: 161 U/L (ref 33–136)
ALT SERPL W P-5'-P-CCNC: 22 U/L (ref 10–52)
ANION GAP SERPL CALC-SCNC: 16 MMOL/L (ref 10–20)
AST SERPL W P-5'-P-CCNC: 39 U/L (ref 9–39)
BASOPHILS # BLD AUTO: 0.15 X10*3/UL (ref 0–0.1)
BASOPHILS NFR BLD AUTO: 1.3 %
BILIRUB SERPL-MCNC: 0.4 MG/DL (ref 0–1.2)
BUN SERPL-MCNC: 27 MG/DL (ref 6–23)
CALCIUM SERPL-MCNC: 8.7 MG/DL (ref 8.6–10.6)
CHLORIDE SERPL-SCNC: 93 MMOL/L (ref 98–107)
CO2 SERPL-SCNC: 27 MMOL/L (ref 21–32)
CREAT SERPL-MCNC: 0.44 MG/DL (ref 0.5–1.3)
EOSINOPHIL # BLD AUTO: 0.16 X10*3/UL (ref 0–0.7)
EOSINOPHIL NFR BLD AUTO: 1.4 %
ERYTHROCYTE [DISTWIDTH] IN BLOOD BY AUTOMATED COUNT: 13.5 % (ref 11.5–14.5)
GFR SERPL CREATININE-BSD FRML MDRD: >90 ML/MIN/1.73M*2
GLUCOSE SERPL-MCNC: 101 MG/DL (ref 74–99)
HCT VFR BLD AUTO: 32.7 % (ref 41–52)
HGB BLD-MCNC: 10.7 G/DL (ref 13.5–17.5)
IMM GRANULOCYTES # BLD AUTO: 0.12 X10*3/UL (ref 0–0.7)
IMM GRANULOCYTES NFR BLD AUTO: 1.1 % (ref 0–0.9)
LYMPHOCYTES # BLD AUTO: 1.39 X10*3/UL (ref 1.2–4.8)
LYMPHOCYTES NFR BLD AUTO: 12.4 %
MAGNESIUM SERPL-MCNC: 2.11 MG/DL (ref 1.6–2.4)
MCH RBC QN AUTO: 28 PG (ref 26–34)
MCHC RBC AUTO-ENTMCNC: 32.7 G/DL (ref 32–36)
MCV RBC AUTO: 86 FL (ref 80–100)
MONOCYTES # BLD AUTO: 0.86 X10*3/UL (ref 0.1–1)
MONOCYTES NFR BLD AUTO: 7.7 %
NEUTROPHILS # BLD AUTO: 8.5 X10*3/UL (ref 1.2–7.7)
NEUTROPHILS NFR BLD AUTO: 76.1 %
NRBC BLD-RTO: 0 /100 WBCS (ref 0–0)
PHOSPHATE SERPL-MCNC: 6.4 MG/DL (ref 2.5–4.9)
PLATELET # BLD AUTO: 356 X10*3/UL (ref 150–450)
POTASSIUM SERPL-SCNC: 5.8 MMOL/L (ref 3.5–5.3)
PROT SERPL-MCNC: 7.7 G/DL (ref 6.4–8.2)
RBC # BLD AUTO: 3.82 X10*6/UL (ref 4.5–5.9)
SODIUM SERPL-SCNC: 130 MMOL/L (ref 136–145)
UFH PPP CHRO-ACNC: 0.6 IU/ML
WBC # BLD AUTO: 11.2 X10*3/UL (ref 4.4–11.3)

## 2023-11-13 PROCEDURE — 2500000004 HC RX 250 GENERAL PHARMACY W/ HCPCS (ALT 636 FOR OP/ED)

## 2023-11-13 PROCEDURE — 2500000001 HC RX 250 WO HCPCS SELF ADMINISTERED DRUGS (ALT 637 FOR MEDICARE OP): Performed by: NURSE PRACTITIONER

## 2023-11-13 PROCEDURE — 80053 COMPREHEN METABOLIC PANEL: CPT | Performed by: NURSE PRACTITIONER

## 2023-11-13 PROCEDURE — 85025 COMPLETE CBC W/AUTO DIFF WBC: CPT | Performed by: NURSE PRACTITIONER

## 2023-11-13 PROCEDURE — 2500000002 HC RX 250 W HCPCS SELF ADMINISTERED DRUGS (ALT 637 FOR MEDICARE OP, ALT 636 FOR OP/ED): Performed by: NURSE PRACTITIONER

## 2023-11-13 PROCEDURE — 2500000005 HC RX 250 GENERAL PHARMACY W/O HCPCS: Performed by: NURSE PRACTITIONER

## 2023-11-13 PROCEDURE — 94762 N-INVAS EAR/PLS OXIMTRY CONT: CPT

## 2023-11-13 PROCEDURE — 2060000001 HC INTERMEDIATE ICU ROOM DAILY

## 2023-11-13 PROCEDURE — 99233 SBSQ HOSP IP/OBS HIGH 50: CPT | Performed by: INTERNAL MEDICINE

## 2023-11-13 PROCEDURE — 2500000001 HC RX 250 WO HCPCS SELF ADMINISTERED DRUGS (ALT 637 FOR MEDICARE OP)

## 2023-11-13 PROCEDURE — 83735 ASSAY OF MAGNESIUM: CPT | Performed by: NURSE PRACTITIONER

## 2023-11-13 PROCEDURE — 94640 AIRWAY INHALATION TREATMENT: CPT

## 2023-11-13 PROCEDURE — 94660 CPAP INITIATION&MGMT: CPT

## 2023-11-13 PROCEDURE — 71045 X-RAY EXAM CHEST 1 VIEW: CPT | Mod: FY

## 2023-11-13 PROCEDURE — 85520 HEPARIN ASSAY: CPT | Performed by: NURSE PRACTITIONER

## 2023-11-13 PROCEDURE — S4991 NICOTINE PATCH NONLEGEND: HCPCS | Performed by: NURSE PRACTITIONER

## 2023-11-13 PROCEDURE — 36415 COLL VENOUS BLD VENIPUNCTURE: CPT | Performed by: NURSE PRACTITIONER

## 2023-11-13 PROCEDURE — 31720 CLEARANCE OF AIRWAYS: CPT

## 2023-11-13 PROCEDURE — 84100 ASSAY OF PHOSPHORUS: CPT

## 2023-11-13 PROCEDURE — 71045 X-RAY EXAM CHEST 1 VIEW: CPT | Performed by: RADIOLOGY

## 2023-11-13 PROCEDURE — C9113 INJ PANTOPRAZOLE SODIUM, VIA: HCPCS

## 2023-11-13 PROCEDURE — 94668 MNPJ CHEST WALL SBSQ: CPT

## 2023-11-13 RX ADMIN — LIDOCAINE 2 PATCH: 4 PATCH TOPICAL at 09:21

## 2023-11-13 RX ADMIN — OXYCODONE HYDROCHLORIDE 5 MG: 5 TABLET ORAL at 21:32

## 2023-11-13 RX ADMIN — CARBOXYMETHYLCELLULOSE SODIUM 1 DROP: 5 SOLUTION/ DROPS OPHTHALMIC at 09:21

## 2023-11-13 RX ADMIN — Medication 10 MG: at 20:25

## 2023-11-13 RX ADMIN — Medication 8000 UNITS: at 11:57

## 2023-11-13 RX ADMIN — MICAFUNGIN SODIUM 100 MG: 50 INJECTION, POWDER, LYOPHILIZED, FOR SOLUTION INTRAVENOUS at 11:57

## 2023-11-13 RX ADMIN — LOPERAMIDE HYDROCHLORIDE 2 MG: 2 SOLUTION ORAL at 20:25

## 2023-11-13 RX ADMIN — Medication 1 TABLET: at 09:22

## 2023-11-13 RX ADMIN — OXYCODONE HYDROCHLORIDE 5 MG: 5 TABLET ORAL at 15:35

## 2023-11-13 RX ADMIN — OXYCODONE HYDROCHLORIDE 5 MG: 5 TABLET ORAL at 04:19

## 2023-11-13 RX ADMIN — ACETAMINOPHEN 650 MG: 325 TABLET ORAL at 12:27

## 2023-11-13 RX ADMIN — ALBUTEROL SULFATE 2.5 MG: 2.5 SOLUTION RESPIRATORY (INHALATION) at 21:52

## 2023-11-13 RX ADMIN — PANTOPRAZOLE SODIUM 40 MG: 40 INJECTION, POWDER, FOR SOLUTION INTRAVENOUS at 09:21

## 2023-11-13 RX ADMIN — ACETAMINOPHEN 650 MG: 325 TABLET ORAL at 20:25

## 2023-11-13 RX ADMIN — Medication 3 ML: at 21:53

## 2023-11-13 RX ADMIN — MIDODRINE HYDROCHLORIDE 10 MG: 10 TABLET ORAL at 11:57

## 2023-11-13 RX ADMIN — ERYTHROMYCIN 1 CM: 5 OINTMENT OPHTHALMIC at 12:28

## 2023-11-13 RX ADMIN — CARBOXYMETHYLCELLULOSE SODIUM 1 DROP: 5 SOLUTION/ DROPS OPHTHALMIC at 16:00

## 2023-11-13 RX ADMIN — LOPERAMIDE HYDROCHLORIDE 2 MG: 2 SOLUTION ORAL at 09:21

## 2023-11-13 RX ADMIN — CARBOXYMETHYLCELLULOSE SODIUM 1 DROP: 5 SOLUTION/ DROPS OPHTHALMIC at 22:00

## 2023-11-13 RX ADMIN — CARBOXYMETHYLCELLULOSE SODIUM 1 DROP: 5 SOLUTION/ DROPS OPHTHALMIC at 19:00

## 2023-11-13 RX ADMIN — ERYTHROMYCIN 1 CM: 5 OINTMENT OPHTHALMIC at 17:39

## 2023-11-13 RX ADMIN — ACETAMINOPHEN 650 MG: 325 TABLET ORAL at 01:37

## 2023-11-13 RX ADMIN — ERYTHROMYCIN 1 CM: 5 OINTMENT OPHTHALMIC at 09:24

## 2023-11-13 RX ADMIN — OXYCODONE HYDROCHLORIDE 5 MG: 5 TABLET ORAL at 09:21

## 2023-11-13 RX ADMIN — Medication 1 PATCH: at 09:21

## 2023-11-13 RX ADMIN — ALBUTEROL SULFATE 2.5 MG: 2.5 SOLUTION RESPIRATORY (INHALATION) at 07:58

## 2023-11-13 RX ADMIN — LOPERAMIDE HYDROCHLORIDE 2 MG: 2 SOLUTION ORAL at 15:35

## 2023-11-13 RX ADMIN — MIDODRINE HYDROCHLORIDE 10 MG: 10 TABLET ORAL at 17:39

## 2023-11-13 RX ADMIN — Medication 3 ML: at 08:00

## 2023-11-13 RX ADMIN — CARBOXYMETHYLCELLULOSE SODIUM 1 DROP: 5 SOLUTION/ DROPS OPHTHALMIC at 12:28

## 2023-11-13 RX ADMIN — MIDODRINE HYDROCHLORIDE 10 MG: 10 TABLET ORAL at 09:21

## 2023-11-13 ASSESSMENT — PAIN - FUNCTIONAL ASSESSMENT
PAIN_FUNCTIONAL_ASSESSMENT: 0-10

## 2023-11-13 ASSESSMENT — PAIN SCALES - GENERAL
PAINLEVEL_OUTOF10: 9
PAINLEVEL_OUTOF10: 0 - NO PAIN
PAINLEVEL_OUTOF10: 10 - WORST POSSIBLE PAIN
PAINLEVEL_OUTOF10: 9
PAINLEVEL_OUTOF10: 7
PAINLEVEL_OUTOF10: 7
PAINLEVEL_OUTOF10: 9
PAINLEVEL_OUTOF10: 6

## 2023-11-13 ASSESSMENT — PAIN DESCRIPTION - LOCATION: LOCATION: ANKLE

## 2023-11-13 NOTE — PROGRESS NOTES
"Music Therapy Note    Molina Godinez     Therapy Session  Referral Type: New referral this admission  Visit Type: Follow-up visit  Session Start Time: 1434  Session End Time: 1506  Intervention Delivery: In-person  Conflict of Service: None  Number of family members present: 0  Family Present for Session: None  Number of staff members present: 0     Pre-assessment  Unable to Assess Reason: Outcomes not assessed  Mood/Affect: Calm, Appropriate, Flat/blunted, Tired/lethargic    Treatment/Interventions  Areas of Focus: Relaxation, Normalization, Isolation reduction  Music Therapy Interventions: Live music listening  Interruption: No  Patient Fell Asleep at End of Session: No    Post-assessment  Unable to Assess Reason: Outcomes not evaluated  Mood/Affect: Calm, Appropriate, Flat/blunted, Participative, Tired/lethargic  Verbalized Emotional State: Enjoyment, Acceptance, Relaxed  Continue Visiting: Yes  Total Session Time (min): 32 minutes    Narrative  Assessment Detail: Pt found awake and oriented, lying in bed, resting. Calm, flat, and appropriate affect. Stated he was doing okay, a little bit better than the previous days. Agreed to MTx session.  Plan: To engage pt in live preferred music listening to promote normalization, relaxation, and isolation reduction.  Intervention: MTI provided live preferred music via guitar and voice. Pt passively listened to music evidenced by watching MTI play guitar and occassionally closing his eyes.  Evaluation: Began session by asking pt what he would enjoy listening to, pt stated \"anything\". Played pt preferred music via guitar anc voice. Pt remained calm and relaxed throughout music listening. Requested something upbeat/uplifting toward end of session: played Three Little Birds by Prem Nava. Pt expressed gratitude toward MTI post session. Agreed to follow-up.  Follow-up: MTI will continue to follow up with pt and provide MTx services as needed.    Education Documentation  No " documentation found.

## 2023-11-13 NOTE — PROGRESS NOTES
"Molina Godinez is a 68 y.o. male on day 32 of admission presenting with Hospital-acquired pneumonia.    Subjective   C/o 9/1 left ankle pain. Weak productive cough during exam.  No events over night        Objective   Physical Exam:  Constitutional:  NAD, alert and cooperative  Eyes: PERRL, EOMI, no icterus   ENMT: mucous membranes moist, no apparent injury, no lesions seen  Head/Neck: Neck supple, no apparent injury  Respiratory/Thorax: Lungs diminished on right, minimal air movement on left,  moist, weak productive cough, on 2L NC  Cardiovascular: Regular, rate and rhythm, no murmurs, normal S1 and S2  Gastrointestinal: Nondistended, soft, non-tender, BS present x 4, Dobhoff tube in place  : external catheter in place with clear yellow urine  Musculoskeletal: generalized weakness, minimal movement of BLE   Extremities:  no edema, contusions or wounds  Neurological: alert and oriented x 3, speech clear, follows commands appropriately, no focal deficits  Skin: Warm and dry, no lesions, no rashes     Last Recorded Vitals  Blood pressure 109/69, pulse 95, temperature 36.3 °C (97.3 °F), temperature source Temporal, resp. rate 22, height 1.702 m (5' 7.01\"), weight 53.1 kg (117 lb 1 oz), SpO2 96 %.    Intake/Output last 3 Shifts:  I/O last 3 completed shifts:  In: 1478.7 (27.8 mL/kg) [I.V.:308.7 (5.8 mL/kg); NG/GT:1170]  Out: 1000 (18.8 mL/kg) [Urine:1000 (0.5 mL/kg/hr)]  Weight: 53.1 kg     Scheduled medications  albuterol, 2.5 mg, nebulization, q12h  ergocalciferol, 8,000 Units, oral, Daily  erythromycin, 1 cm, Both Eyes, 4x daily  lidocaine, 1 patch, transdermal, Daily  lidocaine, 2 patch, transdermal, Daily  loperamide, 2 mg, oral, TID  artificial tears, 1 drop, Both Eyes, 6x daily  melatonin, 10 mg, nasogastric tube, Nightly  micafungin, 100 mg, intravenous, q24h  midodrine, 10 mg, oral, TID with meals  multivitamin with minerals, 1 tablet, oral, Daily  nicotine, 1 patch, transdermal, Daily  pantoprazole, 40 mg, " intravenous, Daily  sodium chloride, 3 mL, nebulization, q12h    Continuous medications  heparin, 0-4,500 Units/hr, Last Rate: 1,200 Units/hr (11/13/23 0400)    PRN medications  PRN medications: acetaminophen, dextrose 10 % in water (D10W), dextrose, diclofenac sodium, glucagon, heparin, HYDROmorphone, ipratropium-albuteroL, moisturizing mouth, oxyCODONE, oxygen, white petrolatum     Relevant Results  Results for orders placed or performed during the hospital encounter of 10/11/23 (from the past 24 hour(s))   POCT GLUCOSE   Result Value Ref Range    POCT Glucose 139 (H) 74 - 99 mg/dL   Heparin Assay, UFH   Result Value Ref Range    Heparin Unfractionated 1.0 See Comment Below for Therapeutic Ranges IU/mL   Heparin Assay, UFH   Result Value Ref Range    Heparin Unfractionated 0.7 See Comment Below for Therapeutic Ranges IU/mL   Comprehensive Metabolic Panel   Result Value Ref Range    Glucose 101 (H) 74 - 99 mg/dL    Sodium 130 (L) 136 - 145 mmol/L    Potassium 5.8 (H) 3.5 - 5.3 mmol/L    Chloride 93 (L) 98 - 107 mmol/L    Bicarbonate 27 21 - 32 mmol/L    Anion Gap 16 10 - 20 mmol/L    Urea Nitrogen 27 (H) 6 - 23 mg/dL    Creatinine 0.44 (L) 0.50 - 1.30 mg/dL    eGFR >90 >60 mL/min/1.73m*2    Calcium 8.7 8.6 - 10.6 mg/dL    Albumin 2.8 (L) 3.4 - 5.0 g/dL    Alkaline Phosphatase 161 (H) 33 - 136 U/L    Total Protein 7.7 6.4 - 8.2 g/dL    AST 39 9 - 39 U/L    Bilirubin, Total 0.4 0.0 - 1.2 mg/dL    ALT 22 10 - 52 U/L   Phosphorus   Result Value Ref Range    Phosphorus 6.4 (H) 2.5 - 4.9 mg/dL   CBC and Auto Differential   Result Value Ref Range    WBC 11.2 4.4 - 11.3 x10*3/uL    nRBC 0.0 0.0 - 0.0 /100 WBCs    RBC 3.82 (L) 4.50 - 5.90 x10*6/uL    Hemoglobin 10.7 (L) 13.5 - 17.5 g/dL    Hematocrit 32.7 (L) 41.0 - 52.0 %    MCV 86 80 - 100 fL    MCH 28.0 26.0 - 34.0 pg    MCHC 32.7 32.0 - 36.0 g/dL    RDW 13.5 11.5 - 14.5 %    Platelets 356 150 - 450 x10*3/uL    Neutrophils % 76.1 40.0 - 80.0 %    Immature Granulocytes %,  Automated 1.1 (H) 0.0 - 0.9 %    Lymphocytes % 12.4 13.0 - 44.0 %    Monocytes % 7.7 2.0 - 10.0 %    Eosinophils % 1.4 0.0 - 6.0 %    Basophils % 1.3 0.0 - 2.0 %    Neutrophils Absolute 8.50 (H) 1.20 - 7.70 x10*3/uL    Immature Granulocytes Absolute, Automated 0.12 0.00 - 0.70 x10*3/uL    Lymphocytes Absolute 1.39 1.20 - 4.80 x10*3/uL    Monocytes Absolute 0.86 0.10 - 1.00 x10*3/uL    Eosinophils Absolute 0.16 0.00 - 0.70 x10*3/uL    Basophils Absolute 0.15 (H) 0.00 - 0.10 x10*3/uL   Magnesium   Result Value Ref Range    Magnesium 2.11 1.60 - 2.40 mg/dL   Heparin Assay, UFH   Result Value Ref Range    Heparin Unfractionated 0.6 See Comment Below for Therapeutic Ranges IU/mL      XR chest 1 view  Result Date: 11/11/2023  1. No significant change in complete opacification and volume loss of left hemithorax, correlating with combination of postobstructive atelectasis/consolidation and pleural effusion on comparative CT scan 11/08/2023.   2. Medical devices as above. No pneumothorax.      CT chest w IV contrast  Result Date: 11/9/2023  1.  Consolidation/collapse of the left lung with extensive mucoid impaction/aspirated material within the left mainstem bronchus and segmental bronchi. Correlate with aspiration.   Extensive atherosclerotic changes of the coronary arteries and right heart enlargement. Correlate with elevated right-sided pressures.      XR chest 1 view  Result Date: 11/8/2023  1. Virtually complete opacification of the left thorax and shift of the heart mediastinum to the left indicating volume loss. Truncation of the mainstem bronchus can be seen with endobronchial obstruction    Transthoracic Echo (TTE) Limited  Result Date: 11/2/2023  1. Left ventricular systolic function is hyperdynamic with a 70-75% estimated ejection fraction.    2. No definite valvular vegetations were visualized.    3. Right ventricular pressure overload.    4. Spectral Doppler shows an impaired relaxation pattern of left  ventricular diastolic filling.    5. There is mildly reduced right ventricular systolic function.    6. The right atrium is moderately dilated.    7. Mildly elevated RVSP.    8. Moderate tricuspid regurgitation visualized.    9. The PA systolic pressure may be underestimated. The previously noted posterior tricuspid leaflet vegetation is not clearly seen on this study,  10. Left ventricular cavity size is decreased.   11. The reasons for repeating the echo from 10/31 is not clear.     MR brain w and wo IV contrast  Result Date: 10/21/2023  Brain: 1. No acute ischemic infarct or intracranial hemorrhage. No abnormal parenchymal enhancement.   2. Nonspecific subcortical and periventricular T2/FLAIR hyperintensities may represent sequelae of chronic small vessel ischemic changes.   3. Remote lacunar infarcts in the left thalamus.       Cervical spine:   1. Multilevel degenerative disc disease and facet arthrosis most pronounced at C3-C4 with moderate to severe spinal canal stenosis and moderate to severe bilateral neural foraminal stenosis. There is flattening of the cervical cord with question subtle increased cord signal on the STIR sequence. Please correlate clinically for symptoms of myelopathy. No abnormal cord enhancement.   2. Patchy consolidative opacities throughout the left lung with a small left pleural effusion. Correlate with dedicated chest imaging.     Thoracic spine:   1. No significant spinal canal or neural foraminal stenosis. No abnormal enhancement.   2. Nonacute minimal compression deformity at T3. Mild compression deformity at T11 with a proximally 25% loss of vertebral body height and no osseous retropulsion into the spinal canal. This was present on prior exam 10/16/2023.       Lumbar spine:   1. Multilevel lumbar spondylosis as detailed above. No spinal canal stenosis.   2. No abnormal signal or enhancement within the distal cord or nerve roots.        Assessment/Plan   Principal Problem:     Hospital-acquired pneumonia  Active Problems:    Fungemia    Essential hypertension, benign    Hepatitis C virus infection cured after antiviral drug therapy    Alcoholism (CMS/HCC)    Chronic pulmonary embolism (CMS/HCC)    COPD (chronic obstructive pulmonary disease) (CMS/[facility])    HLD (hyperlipidemia)    Uremia    GBS (Guillain [place] syndrome) (CMS/[facility])    68 y.o. male with PMHx COPD, HTN,  alcohol and opioid use disorder, hepatitis C, chronic PE admitted 10/12 with BLE weakness, recently admitted to the MICU from 10/13-10/16 for suspected alcohol withdrawal refractory to ativan, started on phenobarb taper, treated for streptococcus pneumoniae PNA with Ceftriaxone and transferred to [facility].  RRT called for lethargy, transferred to MICU on 10/17 with new FiO2 requirements.        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  No plans for further testing or treatment at this time d/t fungemia.  Immune modulation could potentially worsen infection.  Once infection is cleared can re consult Neurology for potential treatment, although possibly in the recovery state of disease   - Continue with Melatonin HS, Pain management with Lidocaine patches, Diclofenac gel prn, Acetaminophen prn, Oxycodone 5 mg Q6, and Hydromorphone 0.2 mg Q6.    - PT/OT/SLP following  - music therapy following     Optho:   #Dry eye, Cataracts, asha eyes  - seen by Opthomology given c/o blurriness with new fungemia.  No signs of fungal endophthalmitis  - rec artifical tears, Erythromycin     Pulm:  Hx of COPD, on 2-4 L home O2, Chronic PE, Smoker, Acute on chronic hypoxic Respiratory failure   - currently patient is tolerating RA   -CXR with mild improvement   - s/p Bronch x2 with mucous plugging, L lung consolidation, Most recently on 11/6 with Dr. Cuenca  - Continue with Albuterol BID and 7% saline  BID.  Airway clearance increased to q4 hrs to promote airway clearance   - Continue to encourage bipap for 30 mins prior to bronchial hygiene, pt has been refusing  - Acapella, IS while awake  - Continue heparin drip.  Transition to home Apixiban 5 mg BID when no further procedures planned  - Trend daily FVC/NIF --> FVC 0.88 and NIF -20 11/11  - Seen by Dr Klein for PAH work up on 10/2     Cardiac:  Hx HTN,  TV Endocarditis, Tachycardia (improving)  - 10/31 Echo: LVEF 75-80%, RV overload, Mod-severe TV, Mod-severe RV systolic pressure, Tricuspid echodensity measuring 1.7 x 0.8 cm, atrial septal aneurysm  - Repeat Echo on 11/2/23: No definite valvular vegetations visualized   - Seen by CT surgery for TV vegetation- too high risk for surgery at this time  - Had SVT this admit.  Continue on tele  - Continue Midodrine 10 mg TID     FEN/GI:  Hx of Hep C,  Dysphagia, s/p Cortrak, Severe Protein Calorie Malnutrition, Hyponatremia   - continue TF via Cortrak: [employer] Peptide 1.5 at 45 ml/hr.  No Free Water  - if switching to boluses, can do 275 ml QID with 60 ml before and after each bolus  - continue Imodium TID for diarrhea  - Continue PPI, MV, and ergocalciferol.   - Daily RFP/Mag      Renal: KRYSTEN this admit s/p CVVH in MICU.  Now stable renal function  - voiding per external cath  -nephrology  signed off  -avoid hypotension and nephrotoxins  -Continue Midodrine   - Strict I's and O's      Heme: Hx of Anemia of chronic disease and PE  - continue heparin drip.  Transition back to home Apixiban at discharge  - Daily CBC     ID:  Hx of Hepatitis C, Fungemia, Strep pneumonaie PNA, Stenotrophomonas PNA  - Afebrile w/o leukocytosis   - +Stenotrophomonas on sputum cx 10/20, 11/5 (from BAL), 11/6.  10/31 BC with Candida  - 11/2 C. Diff negative  - seen by ID.    -Continue on She (11/3-- STOP date 11/17)     Ppx:  -Heparin gtt , SCDs  -PPI     Lines: PIV, FMS     Code Status: Full Code  NOK: Sister Sarah Godinez [phone]       Dispo:   will continue SDU care for now for aggressive BPH.  Will need SNF at discharge, PT/OT/SLP following.    D/w Dr Cipriano VANCE spent >35 minutes in the professional and overall care of this patient.      Linda Ha, WOO-CNP

## 2023-11-13 NOTE — PROGRESS NOTES
"Music Therapy Note    Molina Godinez     Therapy Session  Referral Type: New referral this admission  Visit Type: Follow-up visit  Session Start Time: 1304  Session End Time: 1306  Intervention Delivery: In-person  Conflict of Service: Declined treatment  Number of family members present: 0  Family Present for Session: None  Number of staff members present: 0     Pre-assessment  Unable to Assess Reason: Outcomes not assessed  Mood/Affect: Calm, Appropriate, Flat/blunted, Tired/lethargic    Treatment/Interventions    Post-assessment  Unable to Assess Reason: Did not provide expressive therapy intervention  Continue Visiting: Yes    Narrative  Assessment Detail: Pt found awake and oriented, lying in bed. Pt recognized MTI from previous visits. Stated he was not doing too well, stated: \"I'm getting too skinny\". Declined session due to not feeling well, expressed gratitude toward MTI for checking in, agreed to follow-up.  Plan: n/a  Intervention: n/a  Evaluation: n/a  Follow-up: MTI will continue to follow up with pt and provide MTx services as needed.    Education Documentation  No documentation found.          "

## 2023-11-13 NOTE — SIGNIFICANT EVENT
Rapid Response RN Note    Rapid response RN at bedside for RADAR score 6 due to the following VS: T 36 °Celsius; HR 98 ; RR 22; /83; SPO2 98%.     Called & spoke with bedside RN regarding vital signs and radar alert. Reviewed above VS with bedside RN.  No interventions by rapid response team indicated at this time.  Pt remains in stepdown. No rapid response needs at this time. Staff advised to call for any decline in condition or vital signs.     Patient denied pain, shortness of breath, dizziness or lightheadedness.      Staff to page rapid response for any concerns or acute change in condition/VS.

## 2023-11-14 ENCOUNTER — APPOINTMENT (OUTPATIENT)
Dept: RADIOLOGY | Facility: HOSPITAL | Age: 68
End: 2023-11-14
Payer: COMMERCIAL

## 2023-11-14 LAB
ALBUMIN SERPL BCP-MCNC: 2.9 G/DL (ref 3.4–5)
ALP SERPL-CCNC: 176 U/L (ref 33–136)
ALT SERPL W P-5'-P-CCNC: 21 U/L (ref 10–52)
ANION GAP SERPL CALC-SCNC: 16 MMOL/L (ref 10–20)
AST SERPL W P-5'-P-CCNC: 30 U/L (ref 9–39)
BASOPHILS # BLD AUTO: 0.15 X10*3/UL (ref 0–0.1)
BASOPHILS NFR BLD AUTO: 1.5 %
BILIRUB SERPL-MCNC: 0.3 MG/DL (ref 0–1.2)
BUN SERPL-MCNC: 31 MG/DL (ref 6–23)
CALCIUM SERPL-MCNC: 9 MG/DL (ref 8.6–10.6)
CHLORIDE SERPL-SCNC: 93 MMOL/L (ref 98–107)
CO2 SERPL-SCNC: 28 MMOL/L (ref 21–32)
CREAT SERPL-MCNC: 0.49 MG/DL (ref 0.5–1.3)
EOSINOPHIL # BLD AUTO: 0.17 X10*3/UL (ref 0–0.7)
EOSINOPHIL NFR BLD AUTO: 1.7 %
ERYTHROCYTE [DISTWIDTH] IN BLOOD BY AUTOMATED COUNT: 13.8 % (ref 11.5–14.5)
GFR SERPL CREATININE-BSD FRML MDRD: >90 ML/MIN/1.73M*2
GLUCOSE SERPL-MCNC: 93 MG/DL (ref 74–99)
HCT VFR BLD AUTO: 33.8 % (ref 41–52)
HGB BLD-MCNC: 11.3 G/DL (ref 13.5–17.5)
IMM GRANULOCYTES # BLD AUTO: 0.08 X10*3/UL (ref 0–0.7)
IMM GRANULOCYTES NFR BLD AUTO: 0.8 % (ref 0–0.9)
LYMPHOCYTES # BLD AUTO: 1.64 X10*3/UL (ref 1.2–4.8)
LYMPHOCYTES NFR BLD AUTO: 16.4 %
MAGNESIUM SERPL-MCNC: 2.09 MG/DL (ref 1.6–2.4)
MCH RBC QN AUTO: 29.1 PG (ref 26–34)
MCHC RBC AUTO-ENTMCNC: 33.4 G/DL (ref 32–36)
MCV RBC AUTO: 87 FL (ref 80–100)
MONOCYTES # BLD AUTO: 0.67 X10*3/UL (ref 0.1–1)
MONOCYTES NFR BLD AUTO: 6.7 %
NEUTROPHILS # BLD AUTO: 7.3 X10*3/UL (ref 1.2–7.7)
NEUTROPHILS NFR BLD AUTO: 72.9 %
NRBC BLD-RTO: 0 /100 WBCS (ref 0–0)
PLATELET # BLD AUTO: 361 X10*3/UL (ref 150–450)
POTASSIUM SERPL-SCNC: 5.1 MMOL/L (ref 3.5–5.3)
PROT SERPL-MCNC: 7.7 G/DL (ref 6.4–8.2)
RBC # BLD AUTO: 3.88 X10*6/UL (ref 4.5–5.9)
SODIUM SERPL-SCNC: 132 MMOL/L (ref 136–145)
UFH PPP CHRO-ACNC: 0.5 IU/ML
WBC # BLD AUTO: 10 X10*3/UL (ref 4.4–11.3)

## 2023-11-14 PROCEDURE — 2500000001 HC RX 250 WO HCPCS SELF ADMINISTERED DRUGS (ALT 637 FOR MEDICARE OP): Performed by: NURSE PRACTITIONER

## 2023-11-14 PROCEDURE — 2500000005 HC RX 250 GENERAL PHARMACY W/O HCPCS: Performed by: NURSE PRACTITIONER

## 2023-11-14 PROCEDURE — 99233 SBSQ HOSP IP/OBS HIGH 50: CPT | Performed by: INTERNAL MEDICINE

## 2023-11-14 PROCEDURE — 2500000001 HC RX 250 WO HCPCS SELF ADMINISTERED DRUGS (ALT 637 FOR MEDICARE OP)

## 2023-11-14 PROCEDURE — 97535 SELF CARE MNGMENT TRAINING: CPT | Mod: GO

## 2023-11-14 PROCEDURE — 83735 ASSAY OF MAGNESIUM: CPT | Performed by: NURSE PRACTITIONER

## 2023-11-14 PROCEDURE — 97530 THERAPEUTIC ACTIVITIES: CPT | Mod: GP

## 2023-11-14 PROCEDURE — 2500000002 HC RX 250 W HCPCS SELF ADMINISTERED DRUGS (ALT 637 FOR MEDICARE OP, ALT 636 FOR OP/ED): Performed by: NURSE PRACTITIONER

## 2023-11-14 PROCEDURE — 97530 THERAPEUTIC ACTIVITIES: CPT | Mod: GO

## 2023-11-14 PROCEDURE — 2500000004 HC RX 250 GENERAL PHARMACY W/ HCPCS (ALT 636 FOR OP/ED)

## 2023-11-14 PROCEDURE — C9113 INJ PANTOPRAZOLE SODIUM, VIA: HCPCS

## 2023-11-14 PROCEDURE — 71045 X-RAY EXAM CHEST 1 VIEW: CPT

## 2023-11-14 PROCEDURE — 80053 COMPREHEN METABOLIC PANEL: CPT | Performed by: NURSE PRACTITIONER

## 2023-11-14 PROCEDURE — 2060000001 HC INTERMEDIATE ICU ROOM DAILY

## 2023-11-14 PROCEDURE — 94668 MNPJ CHEST WALL SBSQ: CPT

## 2023-11-14 PROCEDURE — 85520 HEPARIN ASSAY: CPT | Performed by: NURSE PRACTITIONER

## 2023-11-14 PROCEDURE — S4991 NICOTINE PATCH NONLEGEND: HCPCS | Performed by: NURSE PRACTITIONER

## 2023-11-14 PROCEDURE — 94640 AIRWAY INHALATION TREATMENT: CPT

## 2023-11-14 PROCEDURE — 85025 COMPLETE CBC W/AUTO DIFF WBC: CPT | Performed by: NURSE PRACTITIONER

## 2023-11-14 PROCEDURE — 2500000004 HC RX 250 GENERAL PHARMACY W/ HCPCS (ALT 636 FOR OP/ED): Performed by: NURSE PRACTITIONER

## 2023-11-14 PROCEDURE — 71045 X-RAY EXAM CHEST 1 VIEW: CPT | Performed by: RADIOLOGY

## 2023-11-14 PROCEDURE — 97110 THERAPEUTIC EXERCISES: CPT | Mod: GP

## 2023-11-14 PROCEDURE — 36415 COLL VENOUS BLD VENIPUNCTURE: CPT | Performed by: NURSE PRACTITIONER

## 2023-11-14 RX ORDER — LOPERAMIDE HCL 1MG/7.5ML
2 LIQUID (ML) ORAL 3 TIMES DAILY PRN
Status: DISCONTINUED | OUTPATIENT
Start: 2023-11-14 | End: 2023-11-30 | Stop reason: HOSPADM

## 2023-11-14 RX ADMIN — CARBOXYMETHYLCELLULOSE SODIUM 1 DROP: 5 SOLUTION/ DROPS OPHTHALMIC at 09:39

## 2023-11-14 RX ADMIN — Medication: at 09:33

## 2023-11-14 RX ADMIN — MICAFUNGIN SODIUM 100 MG: 50 INJECTION, POWDER, LYOPHILIZED, FOR SOLUTION INTRAVENOUS at 12:00

## 2023-11-14 RX ADMIN — LOPERAMIDE HYDROCHLORIDE 2 MG: 2 SOLUTION ORAL at 09:35

## 2023-11-14 RX ADMIN — OXYCODONE HYDROCHLORIDE 5 MG: 5 TABLET ORAL at 20:28

## 2023-11-14 RX ADMIN — MIDODRINE HYDROCHLORIDE 10 MG: 10 TABLET ORAL at 12:19

## 2023-11-14 RX ADMIN — OXYCODONE HYDROCHLORIDE 5 MG: 5 TABLET ORAL at 10:51

## 2023-11-14 RX ADMIN — PANTOPRAZOLE SODIUM 40 MG: 40 INJECTION, POWDER, FOR SOLUTION INTRAVENOUS at 09:35

## 2023-11-14 RX ADMIN — Medication 10 MG: at 20:28

## 2023-11-14 RX ADMIN — Medication 3 ML: at 21:11

## 2023-11-14 RX ADMIN — Medication 1 TABLET: at 09:35

## 2023-11-14 RX ADMIN — Medication 8000 UNITS: at 09:35

## 2023-11-14 RX ADMIN — CARBOXYMETHYLCELLULOSE SODIUM 1 DROP: 5 SOLUTION/ DROPS OPHTHALMIC at 18:18

## 2023-11-14 RX ADMIN — MIDODRINE HYDROCHLORIDE 10 MG: 10 TABLET ORAL at 16:13

## 2023-11-14 RX ADMIN — ERYTHROMYCIN 1 CM: 5 OINTMENT OPHTHALMIC at 20:29

## 2023-11-14 RX ADMIN — LIDOCAINE 2 PATCH: 4 PATCH TOPICAL at 09:38

## 2023-11-14 RX ADMIN — Medication 1 PATCH: at 09:35

## 2023-11-14 RX ADMIN — CARBOXYMETHYLCELLULOSE SODIUM 1 DROP: 5 SOLUTION/ DROPS OPHTHALMIC at 16:00

## 2023-11-14 RX ADMIN — CARBOXYMETHYLCELLULOSE SODIUM 1 DROP: 5 SOLUTION/ DROPS OPHTHALMIC at 07:00

## 2023-11-14 RX ADMIN — ALBUTEROL SULFATE 2.5 MG: 2.5 SOLUTION RESPIRATORY (INHALATION) at 21:11

## 2023-11-14 RX ADMIN — Medication 3 ML: at 08:00

## 2023-11-14 RX ADMIN — MIDODRINE HYDROCHLORIDE 10 MG: 10 TABLET ORAL at 09:35

## 2023-11-14 RX ADMIN — ACETAMINOPHEN 650 MG: 325 TABLET ORAL at 15:37

## 2023-11-14 RX ADMIN — ALBUTEROL SULFATE 2.5 MG: 2.5 SOLUTION RESPIRATORY (INHALATION) at 09:20

## 2023-11-14 RX ADMIN — LIDOCAINE 1 PATCH: 4 PATCH TOPICAL at 09:35

## 2023-11-14 RX ADMIN — CARBOXYMETHYLCELLULOSE SODIUM 1 DROP: 5 SOLUTION/ DROPS OPHTHALMIC at 22:00

## 2023-11-14 RX ADMIN — CARBOXYMETHYLCELLULOSE SODIUM 1 DROP: 5 SOLUTION/ DROPS OPHTHALMIC at 13:00

## 2023-11-14 RX ADMIN — HEPARIN SODIUM 1200 UNITS/HR: 10000 INJECTION, SOLUTION INTRAVENOUS at 13:31

## 2023-11-14 ASSESSMENT — PAIN - FUNCTIONAL ASSESSMENT
PAIN_FUNCTIONAL_ASSESSMENT: 0-10

## 2023-11-14 ASSESSMENT — COGNITIVE AND FUNCTIONAL STATUS - GENERAL
PERSONAL GROOMING: A LOT
DAILY ACTIVITIY SCORE: 10
CLIMB 3 TO 5 STEPS WITH RAILING: TOTAL
WALKING IN HOSPITAL ROOM: TOTAL
DRESSING REGULAR LOWER BODY CLOTHING: TOTAL
TURNING FROM BACK TO SIDE WHILE IN FLAT BAD: A LOT
STANDING UP FROM CHAIR USING ARMS: TOTAL
MOVING FROM LYING ON BACK TO SITTING ON SIDE OF FLAT BED WITH BEDRAILS: A LOT
TOILETING: TOTAL
MOVING TO AND FROM BED TO CHAIR: TOTAL
MOBILITY SCORE: 8
DRESSING REGULAR UPPER BODY CLOTHING: A LOT
EATING MEALS: A LOT
HELP NEEDED FOR BATHING: A LOT

## 2023-11-14 ASSESSMENT — PAIN SCALES - GENERAL
PAINLEVEL_OUTOF10: 0 - NO PAIN
PAINLEVEL_OUTOF10: 10 - WORST POSSIBLE PAIN
PAINLEVEL_OUTOF10: 2
PAINLEVEL_OUTOF10: 4

## 2023-11-14 ASSESSMENT — ACTIVITIES OF DAILY LIVING (ADL): HOME_MANAGEMENT_TIME_ENTRY: 10

## 2023-11-14 NOTE — PROGRESS NOTES
Physical Therapy    Physical Therapy Treatment    Patient Name: Molina Godinez  MRN: [id number]  Today's Date: 11/14/2023  Time Calculation  Start Time: 1013  Stop Time: 1037  Time Calculation (min): 24 min       Assessment/Plan   End of Session Communication: Bedside nurse  End of Session Patient Position: Bed, 3 rail up, Alarm off, not on at start of session     PT Plan  Treatment/Interventions: Bed mobility, Transfer training, Gait training, Balance training, Neuromuscular re-education, Strengthening, Therapeutic exercise, Therapeutic activity, Positioning, Postural re-education  PT Plan: Skilled PT  PT Frequency: 4 times per week  PT Discharge Recommendations: Moderate intensity level of continued care  PT - OK to Discharge: Yes    General Visit Information:   PT  Visit  PT Received On: 11/14/23  General  Family/Caregiver Present: No  Co-Treatment: OT  Co-Treatment Reason: d/t AMPAC <10, patient has required MAX-DEPx2 for all mobility during previous therapy sessions  Prior to Session Communication: Bedside nurse  Patient Position Received: Bed, 3 rail up, Alarm off, not on at start of session  General Comment: pleasant and agreeable to participate in therapy; appears to have increase AROM of BUEs/BLEs this date. EMG on 11/10-> confirming GBS    Subjective   Precautions:  Precautions  Medical Precautions: Fall precautions, Oxygen therapy device and L/min  Vital Signs:  Vital Signs  Heart Rate:  (pre: 98 post: 106)  Resp:  (pre: 30 post: 27)  SpO2:  (pre: 95% sitting EOB, brief desaturation to 89-90%, reported mild SOB, able to recover with increase time to SpO2 >92%, post in supine: 94% on 2L NC)  BP:  (pre: 111/76 post: 117/75)  BP Location: Left arm  BP Method: Automatic    Objective   Pain:  Pain Assessment  Pain Assessment: 0-10  Pain Score: 10 - Worst possible pain  Pain Type: Acute pain  Pain Location:  (reported B feet (patient reported dorsal foot pain))  Clinical Progression:  (did not report B foot pain  sitting EOB with B feet on floor)  Pain Interventions: Repositioned  Cognition:  Cognition  Overall Cognitive Status: Within Functional Limits  Orientation Level:  (AxO x3)  Postural Control:  Postural Control  Postural Control: Impaired  Head Control: forward flexed posture, increase difficulty fully extending cervical spine, able to initiate c-spine ext however, limited available cervical ext AROM/AAROM/PROM; patient reports that his head normally doesn't fully extend even at home.  Trunk Control: fair-poor trunk control; MAXx1 sitting EOB with brief MINx1 after cueing and assist to shift COM more anterior over seated ARTEMIO, retrolean. EOB ~15 minutes. Patient does appear to attempt to demo limited trunk activation to assist with maintaining sitting balance, however, appears slightly improved since last PT session. Patient appears to be able to use BUEs to support self in midline with continued assist from PT.    Treatments:  Therapeutic Exercise  Therapeutic Exercise Performed: Yes  Therapeutic Exercise Activity 1: sitting EOB: LAQ 1x10 B    Therapeutic Activity  Therapeutic Activity 1: patient sat EOB x12 mins; mostly MAXx1, DEP x1 initially, progressed to brief MINx1 once positioned; patient with poor trunk control, increase FF posture, limited ability to active trunk musculature to remain in midline position.    Bed Mobility  Bed Mobility: Yes  Bed Mobility 1  Bed Mobility 1: Supine to sitting, Sitting to supine  Level of Assistance 1: Maximum assistance, Minimal verbal cues, Minimal tactile cues  Bed Mobility Comments 1: x2 with draw sheet with HOB elevated    Outcome Measures:  [facility] Basic Mobility  Turning from your back to your side while in a flat bed without using bedrails: A lot  Moving from lying on your back to sitting on the side of a flat bed without using bedrails: A lot  Moving to and from bed to chair (including a wheelchair): Total  Standing up from a chair using your arms (e.g. wheelchair or  bedside chair): Total  To walk in hospital room: Total  Climbing 3-5 steps with railing: Total  Basic Mobility - Total Score: 8    Education Documentation  Mobility Training, taught by Tameka Velasquez PT at 2023  3:24 PM.  Learner: Patient  Readiness: Acceptance  Method: Explanation  Response: Verbalizes Understanding    Education Comments  No comments found.      Encounter Problems       Encounter Problems (Active)       Balance       Patient will complete static (Cgx1) and dynamic (MINx1) using BUE support as needed in order to maintain midline without acute LOB   (Not met)       Start:  10/19/23    Expected End:  23    Resolved:  23    Updated to: Patient will complete static (Cgx1) and dynamic (MINx1) sitting balance activities using BUE support as needed in order to maintain midline without acute LOB    Update reason: goals           Patient will complete static (Cgx1) and dynamic (MINx1) sitting balance activities using BUE support as needed in order to maintain midline without acute LOB       Start:  23    Expected End:  23                   Mobility       STG - Patient will ambulate >/=10 ft using LRD as needed with MODx1 without acute LOB  (Not met)       Start:  10/19/23    Expected End:  23    Resolved:  23    Updated to: STG - Patient will ambulate >/=3 ft using LRD as needed with MODx1 without acute LOB    Update reason: goals           patient will complete BLE there-ex in order to improve strength and to assist with the completion of functional mobility tasks.  (Not met)       Start:  10/19/23    Expected End:  23    Resolved:  23    Updated to: patient will complete LE there-ex in order to improve strength and to assist with the completion of functional mobility tasks.    Update reason: goals           STG - Patient will ambulate >/=3 ft using LRD as needed with MODx1 without acute LOB       Start:  23    Expected End:   23                patient will complete LE there-ex in order to improve strength and to assist with the completion of functional mobility tasks.       Start:  23    Expected End:  23                     Transfers       STG - Transfer from bed to chair with MODx1 without acute LOB  (Not met)       Start:  10/19/23    Expected End:  23    Resolved:  23    Updated to: STG - Transfer from bed to chair with MODx1 without acute LOB using LRD as needed    Update reason: goals           STG - Patient will perform bed mobility with MINx1 assist  (Not met)       Start:  10/19/23    Expected End:  23    Resolved:  23    Updated to: STG - Patient will perform bed mobility with MODx1 assist using bedrailing as needed with HOB elevated    Update reason: goals           STG - Patient will transfer sit to and from stand with MODx1 using LRD as needed without acute LOB  (Not met)       Start:  10/19/23    Expected End:  23    Resolved:  23    Updated to: STG - Patient will transfer sit to and from stand with MODx1 using LRD as needed without acute LOB    Update reason: goals           STG - Transfer from bed to chair with MODx1 without acute LOB using LRD as needed       Start:  23    Expected End:  23                STG - Patient will perform bed mobility with MODx1 assist using bedrailing as needed with HOB elevated       Start:  23    Expected End:  23                STG - Patient will transfer sit to and from stand with MODx1 using LRD as needed without acute LOB       Start:  23    Expected End:  23                     Tameka Velasquez, PT, DPT

## 2023-11-14 NOTE — SIGNIFICANT EVENT
Rapid Response RN Note    Rapid response RN at bedside for RADAR score 6 due to the following VS: T 36.5 °Celsius; HR 80 ; RR 21; BP 93/62; SPO2 96%.     Reviewed above VS with bedside RN.  VS within patient's current trends.  No interventions by rapid response team indicated at this time.      Patient denied pain, shortness of breath, dizziness or lightheadedness.      Staff to page rapid response for any concerns or acute change in condition/VS.

## 2023-11-14 NOTE — PROGRESS NOTES
"Molina Godinez is a 68 y.o. male on day 33 of admission presenting with Hospital-acquired pneumonia.    Subjective   Chief Complaint: \"Can I have a breathing treatment\" --> RT notified.  Patient with SpO2 >92% on 2L NC and in no apparent distress.    No acute events overnight.  Patient reports feeling that his breathing is improving slowly and that he is beginning to feel slightly stronger.       Objective   Physical Exam:  Constitutional: Ill appearing elderly male pt in NAD, alert and cooperative  Eyes: PERRL, EOMI, no icterus   ENMT: mucous membranes moist  Head/Neck: Neck supple, no apparent injury  Respiratory/Thorax: Left lung field with minimal air movement noted JOSE ANTONIO with minimal to absent air movement Left lower lobe, right lung diminshed throughout, breathing non-labored, intermittent cough with clear sputum production, on 2L NC  Cardiovascular: Regular, rate and rhythm, no murmurs, normal S1 and S2  Gastrointestinal: Nondistended, soft, non-tender, BS present x 4, cortrak in place  : External catheter in place with clear yellow output noted in canister   Musculoskeletal: ROM intact, no joint swelling, normal strength  Extremities: normal extremities, no edema  Neurological: alert and oriented x 3, speech clear, follows commands appropriately, without focal deficits, sensation grossly intact, generalized weakness  Skin: Warm and dry    Vital Signs  Blood pressure 103/74, pulse 97, temperature 36.4 °C (97.5 °F), temperature source Temporal, resp. rate 19, height 1.702 m (5' 7.01\"), weight 53.1 kg (117 lb 1 oz), SpO2 97 %.  Oxygen Therapy  SpO2: 97 %  Medical Gas Therapy: Supplemental oxygen  O2 Delivery Method: High flow nasal cannula  FiO2 (%):  [28 %] 28 %    Intake/Output last 3 Shifts:  I/O last 3 completed shifts:  In: 1632 (30.7 mL/kg) [I.V.:492 (9.3 mL/kg); NG/GT:1140]  Out: 1750 (33 mL/kg) [Urine:1750 (0.9 mL/kg/hr)]  Weight: 53.1 kg     Lines and Tubes:  Peripheral IV 11/06/23 20 G Left Hand " (Active)   Placement Date/Time: 11/06/23 (c) 1428   Size (Gauge): 20 G  Orientation: Left  Location: Hand  Site Prep: Alcohol  Technique: Anatomical landmarks  Insertion attempts: 1   Number of days: 7       Peripheral IV 11/12/23 22 G Distal;Right;Ventral Forearm (Active)   Placement Date/Time: 11/12/23 1256   Size (Gauge): 22 G  Orientation: Distal;Right;Ventral  Location: Forearm  Local Anesthetic: None  Technique: Anatomical landmarks  Insertion attempts: 1  Patient Tolerance: Tolerated well   Number of days: 1       NG/OG/Feeding Tube Left nostril (Active)   Placement Date/Time: 10/17/23 1755   Placed by: NP  Tube Length: 80 cm  Tube Location: Left nostril   Number of days: 27       External Urinary Catheter (Active)   Placement Date/Time: 11/04/23 1805     Number of days: 9         Relevant Results  Scheduled medications  albuterol, 2.5 mg, nebulization, q12h  ergocalciferol, 8,000 Units, oral, Daily  erythromycin, 1 cm, Both Eyes, 4x daily  lidocaine, 1 patch, transdermal, Daily  lidocaine, 2 patch, transdermal, Daily  loperamide, 2 mg, oral, TID  artificial tears, 1 drop, Both Eyes, 6x daily  melatonin, 10 mg, nasogastric tube, Nightly  micafungin, 100 mg, intravenous, q24h  midodrine, 10 mg, oral, TID with meals  multivitamin with minerals, 1 tablet, oral, Daily  nicotine, 1 patch, transdermal, Daily  pantoprazole, 40 mg, intravenous, Daily  sodium chloride, 3 mL, nebulization, q12h  surgical lubricant, , ,       Continuous medications  heparin, 0-4,500 Units/hr, Last Rate: 1,200 Units/hr (11/13/23 0400)      PRN medications  PRN medications: acetaminophen, dextrose 10 % in water (D10W), dextrose, diclofenac sodium, glucagon, heparin, HYDROmorphone, ipratropium-albuteroL, moisturizing mouth, oxyCODONE, oxygen, surgical lubricant, white petrolatum    Results for orders placed or performed during the hospital encounter of 10/11/23 (from the past 24 hour(s))   Comprehensive Metabolic Panel   Result Value Ref  Range    Glucose 93 74 - 99 mg/dL    Sodium 132 (L) 136 - 145 mmol/L    Potassium 5.1 3.5 - 5.3 mmol/L    Chloride 93 (L) 98 - 107 mmol/L    Bicarbonate 28 21 - 32 mmol/L    Anion Gap 16 10 - 20 mmol/L    Urea Nitrogen 31 (H) 6 - 23 mg/dL    Creatinine 0.49 (L) 0.50 - 1.30 mg/dL    eGFR >90 >60 mL/min/1.73m*2    Calcium 9.0 8.6 - 10.6 mg/dL    Albumin 2.9 (L) 3.4 - 5.0 g/dL    Alkaline Phosphatase 176 (H) 33 - 136 U/L    Total Protein 7.7 6.4 - 8.2 g/dL    AST 30 9 - 39 U/L    Bilirubin, Total 0.3 0.0 - 1.2 mg/dL    ALT 21 10 - 52 U/L   CBC and Auto Differential   Result Value Ref Range    WBC 10.0 4.4 - 11.3 x10*3/uL    nRBC 0.0 0.0 - 0.0 /100 WBCs    RBC 3.88 (L) 4.50 - 5.90 x10*6/uL    Hemoglobin 11.3 (L) 13.5 - 17.5 g/dL    Hematocrit 33.8 (L) 41.0 - 52.0 %    MCV 87 80 - 100 fL    MCH 29.1 26.0 - 34.0 pg    MCHC 33.4 32.0 - 36.0 g/dL    RDW 13.8 11.5 - 14.5 %    Platelets 361 150 - 450 x10*3/uL    Neutrophils % 72.9 40.0 - 80.0 %    Immature Granulocytes %, Automated 0.8 0.0 - 0.9 %    Lymphocytes % 16.4 13.0 - 44.0 %    Monocytes % 6.7 2.0 - 10.0 %    Eosinophils % 1.7 0.0 - 6.0 %    Basophils % 1.5 0.0 - 2.0 %    Neutrophils Absolute 7.30 1.20 - 7.70 x10*3/uL    Immature Granulocytes Absolute, Automated 0.08 0.00 - 0.70 x10*3/uL    Lymphocytes Absolute 1.64 1.20 - 4.80 x10*3/uL    Monocytes Absolute 0.67 0.10 - 1.00 x10*3/uL    Eosinophils Absolute 0.17 0.00 - 0.70 x10*3/uL    Basophils Absolute 0.15 (H) 0.00 - 0.10 x10*3/uL   Magnesium   Result Value Ref Range    Magnesium 2.09 1.60 - 2.40 mg/dL   Heparin Assay, UFH   Result Value Ref Range    Heparin Unfractionated 0.5 See Comment Below for Therapeutic Ranges IU/mL     XR chest 1 view  Result Date: 11/14/2023  1.  Continued left lung consolidation/volume loss. Correlate with underlying endobronchial pathology.         XR chest 1 view  Result Date: 11/13/2023  1. Slight interval improvement in aeration of the left lung with small amount of aerated upper lung  zone. 2. Persistent dense opacification of the near entire left lung suggesting persistent areas of large pulmonary collapse.       XR chest 1 view 11/14/2023    Narrative  Interpreted By:  Nathan Lu,  STUDY:  XR CHEST 1 VIEW;  11/14/2023 8:12 am    INDICATION:  Signs/Symptoms:re-evaluation of Left complete opacification.    COMPARISON:  11/13/2023.    ACCESSION NUMBER(S):  [id number]    ORDERING CLINICIAN:  KYLAH NULL    FINDINGS:      CARDIOMEDIASTINAL SILHOUETTE:  Cardiomediastinal silhouette is normal in size and configuration.    LUNGS:  Continued opacification of left hemithorax with left-sided  consolidation and small effusion.    ABDOMEN:  Dobbhoff tube overlies the body of stomach.    BONES:  Patient is status post left TSA.    Impression  1.  Continued left lung consolidation/volume loss. Correlate with  underlying endobronchial pathology.      Signed by: Nathan Lu 11/14/2023 8:54 AM  Dictation workstation:   [id number]      Assessment/Plan   Principal Problem:    Hospital-acquired pneumonia  Active Problems:    Fungemia    Essential hypertension, benign    Hepatitis C virus infection cured after antiviral drug therapy    Alcoholism (CMS/HCC)    Chronic pulmonary embolism (CMS/HCC)    COPD (chronic obstructive pulmonary disease) (CMS/HCC)    HLD (hyperlipidemia)    Uremia    GBS (Guillain Eckerman syndrome) (CMS/HCC)    68 y.o. male with PMHx COPD, HTN,  alcohol and opioid use disorder, hepatitis C, chronic PE admitted 10/12 with BLE weakness, recently admitted in the MICU from 10/13-10/16 for suspected alcohol withdrawal refractory to ativan, started on phenobarb taper, treated for streptococcus pneumoniae PNA with Ceftriaxone and transferred to [facility].  RRT called for lethargy, transferred to MICU on 10/17 with new FiO2 requirements.        Neuro:   #Medical Hx of Etoh and Opioid use disorder  #GBS   - Alert and oriented x3 today  - EMG performed on 11/10 confirming GBS   - Neuro consulted,  recs appreciated --> 11/11 (see attestation on note from 11/10) EMG compatible with GBS.  No plans for further testing or treatment at this time d/t fungemia.  Immune modulation could potentially worsen infection.  Once infection is cleared can re consult Neurology for potential treatment  - Continue with Melatonin HS, Pain management with Lidocaine patches, Diclofenac gel prn, Acetaminophen prn, Oxycodone 5 mg Q6, and Hydromorphone 0.2 mg Q6.    - PT/OT/SLP following  - music therapy following     Optho:   #Dry eye, Cataracts, asha eyes  - seen by Opthomology given c/o blurriness with new fungemia.  No signs of fungal endophthalmitis  - rec artifical tears, Erythromycin     Pulm:   #Medical Hx of COPD, on 2-4 L home O2, Chronic PE, Smoker  #Acute on chronic hypoxic Respiratory failure   - Patient remains on 2L NC, intermittently tachypnic but improved from previous days  - 11/14 Chest xray with continued left lung consolidation/volume loss.  - s/p Bronch x2 with mucous plugging, L lung consolidation, Most recently on 11/6 with Dr. Cuenca  - Continue with Albuterol BID and 7% saline BID.  Airway clearance techniques (IPV) Q4 hours.   - Continue to encourage bipap for 30 mins prior to bronchial hygiene, pt has been refusing  - Acapella, IS while awake  - Continue heparin drip.  Transition to home Apixiban 5 mg BID when no further procedures planned  - Trend daily FVC/NIF --> NIF -40 today  - Seen by Dr Klein for PAH work up on 10/2     Cardiac:   #Medical Hx HTN  #TV Endocarditis  #Tachycardia  - 10/31 Echo: LVEF 75-80%, RV overload, Mod-severe TV, Mod-severe RV systolic pressure, Tricuspid echodensity measuring 1.7 x 0.8 cm, atrial septal aneurysm  - Repeat Echo on 11/2/23: No definite valvular vegetations were visualized   - Seen by CT surgery for TV vegetation- too high risk for surgery at this time  - Had SVT this admit.  Continue on tele  - Continue Midodrine 10 mg TID     FEN/GI:   #Medical Hx of Hep  C  #Dysphagia, s/p Cortrak  #Severe Protein Calorie Malnutrition  #Hyponatremia   - TF via Cortrak: Lona Mejia Peptide 1.5 at 45 ml/hr.  No Free Water d/t hyponatremia --> Stop at 0000 for potential bronch tomorrow  - If switching to boluses, can do 275 ml QID with 60 ml before and after each bolus  - Imodium changed from TID scheduled to TID PRN, last BM on 11/13, still loose per nursing  - Continue PPI, MV, and ergocalciferol.   - Daily RFP/Mag      Renal: no acute issues.    #KRYSTEN this admit s/p CVVH in MICU.  Now normal renal function  - Voiding per external cath  - Seen by Nephrology, signed off  - Continue on Midodrine   - Strict I's and O's      Heme:   #Medical Hx of Anemia of chronic disease and PE  - continue heparin drip.  Transition back to home Apixiban at discharge  - Daily CBC     ID:   #Hx of Hepatitis C  #Fungemia, Strep pneumonaie PNA, Stenotrophomonas PNA  - Afebrile w/o leukocytosis   - +Stenotrophomonas on sputum cx 10/20, 11/5 (from BAL), 11/6.  10/31 BC with Candida  - 11/2 C. Diff negative  - seen by ID.  Continue on She (11/3-- STOP date 11/17)     Ppx:  DVT: Heparin gtt and scds  GI: PPI     Lines: PIV, FMS     Code Status: Full Code  NOK: Sister Sarah Godinez [phone]      Dispo: Patient to remain in SDU for aggressive BPH.  Will need SNF at discharge, PT/OT/SLP following.     Pt discussed with Dr. Cota, seen and examined. All labs, VS and previous plan of care reviewed.        Homero Pizano, APRN-CNP

## 2023-11-14 NOTE — PROGRESS NOTES
Occupational Therapy    Occupational Therapy Treatment    Name: Molina Godinez  MRN: [id number]  : 1955  Date: 23  Time Calculation  Start Time: 1014  Stop Time: 1039  Time Calculation (min): 25 min    Assessment:  End of Session Communication: Bedside nurse  End of Session Patient Position: Bed, 3 rail up, Alarm off, not on at start of session  Plan:   Frequency: 3 times/week  Discharge Recommendation: Moderate Intensity    Subjective   General:  OT Last Visit  OT Received On: 23  General  Reason for Referral: patient now extubated, on AirVo  Past Medical History Relevant to Rehab: COPD, HTN, AUD and opioid use disorder, HCV, chronic PE; recent falls?  Family/Caregiver Present: No  Caregiver Feedback: sister initially in the room, left during session  Co-Treatment: PT  Co-Treatment Reason: d/t AMPAC <10, patient has required MAX-DEPx2 for all mobility during previous therapy sessions  Prior to Session Communication: Bedside nurse  Patient Position Received: Bed, 3 rail up, Alarm off, not on at start of session  Preferred Learning Style: verbal, visual  General Comment: Patient pleasant and agreeable to participate in therapy; appears to have increase AROM of BUEs/BLEs this date. EMG on 11/10-> confirming GBS  Vitals:  Vital Signs  Heart Rate:  (pre 98, post 105)  Resp:  (pre 30, post 27)  SpO2:  (pre 95%, post 94%)  BP:  (pre 111/76, sitting /73, post 117/75)  MAP (mmHg):  (during session MAP maintained >65, post 91)  Pain Assessment:  Pain Assessment  Pain Assessment: 0-10  Pain Score: 10 - Worst possible pain  Pain Type: Acute pain  Pain Location:  (patient report tops of (B) feet)     Objective   Activities of Daily Living: Grooming  Grooming Comments: max A for using shampoo cap and combing hair while sitting EOB, patient able to assist with his (R) hand for tasks    UE Dressing  UE Dressing Comments: max A for donning and doffing gowns, cues for participating in tasks and to use (B)  hands    LE Dressing  LE Dressing: Yes  Sock Level of Assistance: Dependent  LE Dressing Where Assessed: Edge of bed     Bed Mobility/Transfers: Bed Mobility  Bed Mobility: Yes  Bed Mobility 1  Bed Mobility 1: Supine to sitting, Sitting to supine  Level of Assistance 1: Maximum assistance, Minimal verbal cues, Minimal tactile cues  Bed Mobility Comments 1: x2 with draw sheet, HOB elevated for supine to sit  Bed Mobility 2  Bed Mobility  2: Supine to sitting, Sitting to supine  Level of Assistance 2: Dependent  Bed Mobility Comments 2: x2  assist, draw sheet, HOB elevated    Transfers  Transfer: No     Therapy/Activity: Therapeutic Activity  Therapeutic Activity Performed: Yes  Therapeutic Activity 1: Patient sat EOB x15 minutes with max A for majority of time 2/2 posterior LOB, did improve to brief moments of min A with increased cues, mod-max A for balance during dynamic grooming tasks.    Outcome Measures:  [facility] Daily Activity  Putting on and taking off regular lower body clothing: Total  Bathing (including washing, rinsing, drying): A lot  Putting on and taking off regular upper body clothing: A lot  Toileting, which includes using toilet, bedpan or urinal: Total  Taking care of personal grooming such as brushing teeth: A lot  Eating Meals: A lot  Daily Activity - Total Score: 10     and E = Exercise and Early Mobility  Current Activity: Sitting at edge of bed    Education Documentation  ADL Training, taught by Stephanie Salmon OT at 11/14/2023  3:28 PM.  Learner: Patient  Readiness: Acceptance  Method: Explanation  Response: Needs Reinforcement    Education Comments  No comments found.      Goals:  Encounter Problems       Encounter Problems (Active)       ADLs       Patient will complete grooming tasks with MOD A with min v/c.  (Progressing)       Start:  10/19/23    Expected End:  11/20/23               BALANCE       Pt. will sit EOB for at least 8 min with MOD A in prep for EOB ADLs.  (Progressing)        Start:  10/19/23    Expected End:  11/20/23               COGNITION/SAFETY       Patient will follow 1 step commands with 75% accuracy and min v/c.  (Progressing)       Start:  10/19/23    Expected End:  11/20/23               EXERCISE/STRENGTHENING       Patient will demo BUE strength of 3/5 for B elbow, wrist, hand.  (Progressing)       Start:  10/19/23    Expected End:  11/20/23              Stephanie Salmon OTR/L

## 2023-11-14 NOTE — PROGRESS NOTES
Patient refused BPH/Voleara at this time.  RT informed how beneficial it would be.  Patient still refused.      Dakota Mercado, RRT

## 2023-11-15 LAB
ALBUMIN SERPL BCP-MCNC: 2.9 G/DL (ref 3.4–5)
ALP SERPL-CCNC: 169 U/L (ref 33–136)
ALT SERPL W P-5'-P-CCNC: 19 U/L (ref 10–52)
ANION GAP SERPL CALC-SCNC: 18 MMOL/L
AST SERPL W P-5'-P-CCNC: 29 U/L (ref 9–39)
BASOPHILS # BLD AUTO: 0.13 X10*3/UL (ref 0–0.1)
BASOPHILS NFR BLD AUTO: 1.4 %
BILIRUB SERPL-MCNC: 0.4 MG/DL (ref 0–1.2)
BUN SERPL-MCNC: 25 MG/DL (ref 6–23)
CALCIUM SERPL-MCNC: 8.9 MG/DL (ref 8.6–10.6)
CHLORIDE SERPL-SCNC: 95 MMOL/L (ref 98–107)
CO2 SERPL-SCNC: 25 MMOL/L (ref 21–32)
CREAT SERPL-MCNC: 0.41 MG/DL (ref 0.5–1.3)
EOSINOPHIL # BLD AUTO: 0.16 X10*3/UL (ref 0–0.7)
EOSINOPHIL NFR BLD AUTO: 1.7 %
ERYTHROCYTE [DISTWIDTH] IN BLOOD BY AUTOMATED COUNT: 13.9 % (ref 11.5–14.5)
GFR SERPL CREATININE-BSD FRML MDRD: >90 ML/MIN/1.73M*2
GLUCOSE SERPL-MCNC: 81 MG/DL (ref 74–99)
HCT VFR BLD AUTO: 35.3 % (ref 41–52)
HGB BLD-MCNC: 11.8 G/DL (ref 13.5–17.5)
IMM GRANULOCYTES # BLD AUTO: 0.08 X10*3/UL (ref 0–0.7)
IMM GRANULOCYTES NFR BLD AUTO: 0.8 % (ref 0–0.9)
LYMPHOCYTES # BLD AUTO: 1.58 X10*3/UL (ref 1.2–4.8)
LYMPHOCYTES NFR BLD AUTO: 16.7 %
MAGNESIUM SERPL-MCNC: 1.99 MG/DL (ref 1.6–2.4)
MCH RBC QN AUTO: 28.9 PG (ref 26–34)
MCHC RBC AUTO-ENTMCNC: 33.4 G/DL (ref 32–36)
MCV RBC AUTO: 87 FL (ref 80–100)
MONOCYTES # BLD AUTO: 0.58 X10*3/UL (ref 0.1–1)
MONOCYTES NFR BLD AUTO: 6.1 %
NEUTROPHILS # BLD AUTO: 6.92 X10*3/UL (ref 1.2–7.7)
NEUTROPHILS NFR BLD AUTO: 73.3 %
NRBC BLD-RTO: 0 /100 WBCS (ref 0–0)
PLATELET # BLD AUTO: 365 X10*3/UL (ref 150–450)
POTASSIUM SERPL-SCNC: 4.9 MMOL/L (ref 3.5–5.3)
PROT SERPL-MCNC: 7.8 G/DL (ref 6.4–8.2)
RBC # BLD AUTO: 4.08 X10*6/UL (ref 4.5–5.9)
SODIUM SERPL-SCNC: 133 MMOL/L (ref 136–145)
WBC # BLD AUTO: 9.5 X10*3/UL (ref 4.4–11.3)

## 2023-11-15 PROCEDURE — C9113 INJ PANTOPRAZOLE SODIUM, VIA: HCPCS

## 2023-11-15 PROCEDURE — 94668 MNPJ CHEST WALL SBSQ: CPT

## 2023-11-15 PROCEDURE — 2500000001 HC RX 250 WO HCPCS SELF ADMINISTERED DRUGS (ALT 637 FOR MEDICARE OP): Performed by: NURSE PRACTITIONER

## 2023-11-15 PROCEDURE — 94640 AIRWAY INHALATION TREATMENT: CPT

## 2023-11-15 PROCEDURE — 36415 COLL VENOUS BLD VENIPUNCTURE: CPT | Performed by: NURSE PRACTITIONER

## 2023-11-15 PROCEDURE — 2500000001 HC RX 250 WO HCPCS SELF ADMINISTERED DRUGS (ALT 637 FOR MEDICARE OP)

## 2023-11-15 PROCEDURE — 2500000004 HC RX 250 GENERAL PHARMACY W/ HCPCS (ALT 636 FOR OP/ED): Performed by: NURSE PRACTITIONER

## 2023-11-15 PROCEDURE — 2500000005 HC RX 250 GENERAL PHARMACY W/O HCPCS: Performed by: NURSE PRACTITIONER

## 2023-11-15 PROCEDURE — 2500000004 HC RX 250 GENERAL PHARMACY W/ HCPCS (ALT 636 FOR OP/ED)

## 2023-11-15 PROCEDURE — 2500000002 HC RX 250 W HCPCS SELF ADMINISTERED DRUGS (ALT 637 FOR MEDICARE OP, ALT 636 FOR OP/ED): Performed by: NURSE PRACTITIONER

## 2023-11-15 PROCEDURE — 80053 COMPREHEN METABOLIC PANEL: CPT | Performed by: NURSE PRACTITIONER

## 2023-11-15 PROCEDURE — 2060000001 HC INTERMEDIATE ICU ROOM DAILY

## 2023-11-15 PROCEDURE — 85025 COMPLETE CBC W/AUTO DIFF WBC: CPT | Performed by: NURSE PRACTITIONER

## 2023-11-15 PROCEDURE — 83735 ASSAY OF MAGNESIUM: CPT | Performed by: NURSE PRACTITIONER

## 2023-11-15 PROCEDURE — S4991 NICOTINE PATCH NONLEGEND: HCPCS | Performed by: NURSE PRACTITIONER

## 2023-11-15 PROCEDURE — 99233 SBSQ HOSP IP/OBS HIGH 50: CPT | Performed by: INTERNAL MEDICINE

## 2023-11-15 RX ADMIN — LIDOCAINE 1 PATCH: 4 PATCH TOPICAL at 09:06

## 2023-11-15 RX ADMIN — MIDODRINE HYDROCHLORIDE 10 MG: 10 TABLET ORAL at 16:17

## 2023-11-15 RX ADMIN — MIDODRINE HYDROCHLORIDE 10 MG: 10 TABLET ORAL at 09:06

## 2023-11-15 RX ADMIN — CARBOXYMETHYLCELLULOSE SODIUM 1 DROP: 5 SOLUTION/ DROPS OPHTHALMIC at 07:00

## 2023-11-15 RX ADMIN — MIDODRINE HYDROCHLORIDE 10 MG: 10 TABLET ORAL at 12:15

## 2023-11-15 RX ADMIN — MICAFUNGIN SODIUM 100 MG: 50 INJECTION, POWDER, LYOPHILIZED, FOR SOLUTION INTRAVENOUS at 12:15

## 2023-11-15 RX ADMIN — OXYCODONE HYDROCHLORIDE 5 MG: 5 TABLET ORAL at 12:13

## 2023-11-15 RX ADMIN — Medication 8000 UNITS: at 09:05

## 2023-11-15 RX ADMIN — Medication 3 ML: at 21:27

## 2023-11-15 RX ADMIN — CARBOXYMETHYLCELLULOSE SODIUM 1 DROP: 5 SOLUTION/ DROPS OPHTHALMIC at 12:15

## 2023-11-15 RX ADMIN — Medication 10 MG: at 20:48

## 2023-11-15 RX ADMIN — CARBOXYMETHYLCELLULOSE SODIUM 1 DROP: 5 SOLUTION/ DROPS OPHTHALMIC at 09:05

## 2023-11-15 RX ADMIN — CARBOXYMETHYLCELLULOSE SODIUM 1 DROP: 5 SOLUTION/ DROPS OPHTHALMIC at 16:17

## 2023-11-15 RX ADMIN — HEPARIN SODIUM 1200 UNITS/HR: 10000 INJECTION, SOLUTION INTRAVENOUS at 16:25

## 2023-11-15 RX ADMIN — CARBOXYMETHYLCELLULOSE SODIUM 1 DROP: 5 SOLUTION/ DROPS OPHTHALMIC at 21:44

## 2023-11-15 RX ADMIN — ALBUTEROL SULFATE 2.5 MG: 2.5 SOLUTION RESPIRATORY (INHALATION) at 21:27

## 2023-11-15 RX ADMIN — LIDOCAINE 2 PATCH: 4 PATCH TOPICAL at 09:06

## 2023-11-15 RX ADMIN — CARBOXYMETHYLCELLULOSE SODIUM 1 DROP: 5 SOLUTION/ DROPS OPHTHALMIC at 18:36

## 2023-11-15 RX ADMIN — PANTOPRAZOLE SODIUM 40 MG: 40 INJECTION, POWDER, FOR SOLUTION INTRAVENOUS at 09:08

## 2023-11-15 RX ADMIN — Medication 1 TABLET: at 09:05

## 2023-11-15 RX ADMIN — Medication 3 ML: at 09:11

## 2023-11-15 RX ADMIN — ALBUTEROL SULFATE 2.5 MG: 2.5 SOLUTION RESPIRATORY (INHALATION) at 09:13

## 2023-11-15 ASSESSMENT — PAIN - FUNCTIONAL ASSESSMENT
PAIN_FUNCTIONAL_ASSESSMENT: 0-10

## 2023-11-15 ASSESSMENT — PAIN SCALES - GENERAL
PAINLEVEL_OUTOF10: 0 - NO PAIN
PAINLEVEL_OUTOF10: 3
PAINLEVEL_OUTOF10: 3

## 2023-11-15 ASSESSMENT — PAIN DESCRIPTION - LOCATION: LOCATION: BUTTOCKS

## 2023-11-15 NOTE — PROGRESS NOTES
Nutrition Assessment    Nutrition Follow Up Assessment:        Patient is a 68 y.o. male who remains in SDU for care.  He is being treated for hypoxic respiratory failure from HAP and metabolic encephalopathy.  History of EtOH abuse.  Intubated from 10/20-10/27 and was on CVVH from 10/31-11/3.  Bronch done on 11/6.  On NC for support.  New diagnosis of GBS via EMG.      Spoke with team.  NPO for possible bronch today.  Also hopeful to reconsult SLP to re-eval patient d/t overall improvement in condition.  This may determine possible PEG.  Remains with dobhoff for enteral access.  Changed to TMS [facility] product almost two weeks ago d/t issues with stooling--> this seems to be improved (see below for details).  Also with hyponatremia so minimizing free water flushes.     Anthropometrics:     IBW/kg (Dietitian Calculated): 67.3 kg  Percent of IBW: 74 %     11/13/23: 53.1kg  10/25/23: 49.7kg  10/19/23: 49.7kg  10/18/23: 49.7kg  10/2/23: 57.6kg      Nutrition Focused Physical Exam Findings:  Defer        Edema:  Edema: none       Physical Findings:              Skin: Positive (stage 2 butt)    Objective Data:    Last BM Date: 11/13/23  Stools remain loose but they appear less frequent with one BM so far today, nothing out 11/14 and then one on 11/13.  Per chart, Imodium changed from scheduled to PRN.  RDN had rec'd scheduled Imodium last visit d/t concern for increase in stool output.     Nutrition Significant Labs:  Na+ 133  K+ 4.9  Chloride 95  Bicarb 25  BUN 25  Creatinine 0.41  Calcium 8.9  Phos 6.4  Mag 1.99    Nutrition Specific Mediations:    Current Facility-Administered Medications:     acetaminophen (Tylenol) tablet 650 mg, 650 mg, oral, q8h PRN, DOLLY Paz, 650 mg at 11/14/23 1537    albuterol 2.5 mg /3 mL (0.083 %) nebulizer solution 2.5 mg, 2.5 mg, nebulization, q12h, DOLLY Paz, 2.5 mg at 11/15/23 0913    dextrose 10 % in water (D10W) infusion, 0.3 g/kg/hr, intravenous,  Once PRN, DOLLY Paz, Stopped at 10/18/23 0800    dextrose 50 % injection 25 g, 25 g, intravenous, q15 min PRN, DOLLY Paz    diclofenac sodium (Voltaren) 1 % gel 1 Application, 4 g, Topical, 4x daily PRN, DOLLY Paz, 1 Application at 11/11/23 2000    ergocalciferol (Vitamin D-2) drops 8,000 Units, 8,000 Units, oral, Daily, DOLLY Paz, 8,000 Units at 11/15/23 0905    erythromycin (Romycin) 5 mg/gram (0.5 %) ophthalmic ointment 1 cm, 1 cm, Both Eyes, 4x daily, DOLLY Paz, 1 cm at 11/14/23 2029    glucagon (Glucagen) injection 1 mg, 1 mg, intramuscular, q15 min PRN, DOLLY Paz    heparin (porcine) injection 2,000-4,000 Units, 2,000-4,000 Units, intravenous, q4h PRN, DOLLY Paz, 2,000 Units at 10/25/23 0857    [Held by provider] heparin 25,000 Units in dextrose 5% 250 mL (100 Units/mL) infusion (premix), 0-4,500 Units/hr, intravenous, Continuous, DOLLY Paz, Stopped at 11/15/23 0600    HYDROmorphone (Dilaudid) injection 0.2 mg, 0.2 mg, intravenous, q6h PRN, DOLLY Lagos, 0.2 mg at 11/08/23 1705    ipratropium-albuteroL (Duo-Neb) 0.5-2.5 mg/3 mL nebulizer solution 3 mL, 3 mL, nebulization, q4h PRN, DOLLY Noyola    lidocaine 4 % patch 1 patch, 1 patch, transdermal, Daily, DOLLY Paz, 1 patch at 11/15/23 0906    lidocaine 4 % patch 2 patch, 2 patch, transdermal, Daily, DOLLY Paz, 2 patch at 11/15/23 0906    loperamide (Imodium A-D) liquid 2 mg, 2 mg, oral, TID PRN, Homero Pizano, APRN-CNP    lubricating eye drops ophthalmic solution 1 drop, 1 drop, Both Eyes, 6x daily, Dakota Teresa, WOO-CNP, 1 drop at 11/15/23 0905    melatonin tablet 10 mg, 10 mg, nasogastric tube, Nightly, Sejal Monzon, WOO-CNP, 10 mg at 11/14/23 2028    micafungin (Mycamine) 100 mg in dextrose 5 % in water (D5W)  100 mL IV, 100 mg, intravenous, q24h, DOLLY Paz, Stopped at 11/14/23 1300    midodrine (Proamatine) tablet 10 mg, 10 mg, oral, TID with meals, DOLLY Paz, 10 mg at 11/15/23 0906    moisturizing mouth (Biotene Dry Mouth) solution 15 mL, 15 mL, Swish & Spit, TID PRN, DOLLY Paz, 15 mL at 11/05/23 0338    multivitamin with minerals 1 tablet, 1 tablet, oral, Daily, DOLLY Paz, 1 tablet at 11/15/23 0905    nicotine (Nicoderm CQ) 21 mg/24 hr patch 1 patch, 1 patch, transdermal, Daily, DOLLY Paz, 1 patch at 11/14/23 0935    oxyCODONE (Roxicodone) immediate release tablet 5 mg, 5 mg, oral, q6h PRN, DOLLY Paz, 5 mg at 11/14/23 2028    oxygen (O2) therapy, , inhalation, Continuous PRN - O2/gases, DOLLY Hernandez, 2 L/min at 11/14/23 1452    pantoprazole (ProtoNix) injection 40 mg, 40 mg, intravenous, Daily, DOLLY Lee, 40 mg at 11/15/23 0908    sodium chloride 7 % nebulizer solution 3 mL, 3 mL, nebulization, q12h, DOLLY Paz, 3 mL at 11/15/23 0911    white petrolatum (Aquaphor) 41 % ointment 1 Application, 1 Application, Topical, q1h PRN, DOLLY Paz        Dietary Orders (From admission, onward)       Start     Ordered    11/15/23 0001  NPO Diet; Effective midnight  Diet effective midnight        Comments: Bronch in am    11/14/23 1837                     Estimated Needs:   Total Energy Estimated Needs (kCal):  (7932-1519)   Method for Estimating Needs: MSJ= 1236    Total Protein Estimated Needs (g):  (70)   Method for Estimating Needs: 49.7kg x 1.4+          Nutrition Diagnosis   Nutrition Diagnosis:  Malnutrition Diagnosis  Patient has Malnutrition Diagnosis: Yes  Diagnosis Status: Ongoing  Malnutrition Diagnosis: Severe malnutrition related to chronic disease or condition  As Evidenced by: suspect poor PO intake PTA in light of EtOH abuse as  well as noted severe muscle and fat loss on physical exam; he is underweight for height with a BMI of 17.2 and DBW of 74%          Nutrition Interventions/Recommendations   Nutrition Interventions and Recommendations:        Nutrition Prescription:   Once pt is no longer in need of being NPO, can go back to previous TF regimen: 275mls of [employer] given 4 times daily. Fush with 60mls of water before and after each bolus feed.  Additional water flushes per team's discretion.  If passes SLP re-eval for PO diet, can adjust TF regimen to provide supplemental TF at night while he works on PO during the day.           Time Spent/Follow-up Reminder:   Follow Up  Time Spent (min): 60 minutes  Last Date of Nutrition Visit: 11/15/23  Nutrition Follow-Up Needed?: Dietitian to reassess per policy  Follow up Comment: TF via dobhoff, ?SLP re-eval

## 2023-11-15 NOTE — NURSING NOTE
RADAR @ 1655  Arrival 1700  END 1710  RADAR for score of 6. Upon arrival pt resting comfortably-spoke with nurse and no concerns-RR may have been elevated after a coughing episode. Pt now with RR 22 and appears comfortable. Pt ok to remain in SDU and will call with any concerns.

## 2023-11-15 NOTE — PROGRESS NOTES
"Music Therapy Note    Molina Godinez     Therapy Session  Referral Type: New referral this admission  Visit Type: Follow-up visit  Session Start Time: 1406  Session End Time: 1434  Intervention Delivery: In-person  Conflict of Service: None  Number of family members present: 1  Family Present for Session: Friend/Caregiver  Family Participation: Supportive  Number of staff members present: 0     Pre-assessment  Unable to Assess Reason: Outcomes not assessed  Mood/Affect: Calm, Appropriate, Flat/blunted  Verbalized Emotional State: Acceptance    Treatment/Interventions  Areas of Focus: Normalization, Relaxation  Music Therapy Interventions: Live music listening  Interruption: No  Patient Fell Asleep at End of Session: No    Post-assessment  Unable to Assess Reason: Outcomes not evaluated  Mood/Affect: Calm, Appropriate, Flat/blunted, Tired/lethargic  Verbalized Emotional State: Acceptance, Enjoyment, Gratitude  Continue Visiting: Yes  Total Session Time (min): 28 minutes    Narrative  Assessment Detail: Pt found awake and oriented, lying in bed, watching TV. Displayed calm, appropriate, and flat affect. Stated he was doing \"okay\", agreed to MTx session.  Plan: To engage pt in live preferred music listening to promote normalization and relaxation.  Intervention: MTI provided live preferred music via guitar and voice. Pt passively listened to music evidenced by watching MTI and closing his eyes.  Evaluation: Began session by checking in with pt and asking what he would enjoy listening to today. Pt stated he is doing okay and is open to listen to anything. Pt's friend entered room before music began, greeting pt and introduced self to MTI; stated he has been friends with the pt for a long time. Began playing upbeat, pt preferred music for pt and friend. Engaged in conversation about MTx, college, and how it helps pt's. Played a few more songs, pt's body language relaxed evidenced by closing eyes and steady breathing. " Expressed gratitude toward MTI post session, agreed to follow-up.  Follow-up: MTI will continue to follow up with pt and provide MTx services as needed.    Education Documentation  No documentation found.

## 2023-11-15 NOTE — PROGRESS NOTES
"Molina Godinez is a 68 y.o. male on day 34 of admission presenting with Hospital-acquired pneumonia.    Subjective   Chief Complaint: \"When can I try eating again\" --> Plan to have SLP re-evaluate patient following bronchoscopy    ROS: Endorses productive cough.  Denies SOB, CP, abdominal pain, N/V, fevers, chills, or night sweats    Overnight: No acute events      Objective   Physical Exam:  Constitutional: Ill appearing elderly male pt in NAD, alert and cooperative  Eyes: PERRL, EOMI, no icterus   ENMT: mucous membranes moist  Head/Neck: Neck supple, no apparent injury  Respiratory/Thorax: Left lung field with minimal air movement noted JOSE ANTONIO with minimal to absent air movement Left lower lobe, right lung diminshed throughout, breathing non-labored, intermittent cough with clear sputum production, on 2L NC  Cardiovascular: Regular, rate and rhythm, no murmurs, normal S1 and S2  Gastrointestinal: Nondistended, soft, non-tender, BS present x 4, cortrak in place  : External catheter in place with clear yellow output noted in canister   Musculoskeletal: ROM intact, no joint swelling, normal strength  Extremities: normal extremities, no edema  Neurological: alert and oriented x 3, speech clear, follows commands appropriately, without focal deficits, sensation grossly intact, generalized weakness  Skin: Warm and dry    Vital Signs  Blood pressure 133/85, pulse 103, temperature 36.4 °C (97.5 °F), temperature source Temporal, resp. rate 25, height 1.702 m (5' 7.01\"), weight 53.1 kg (117 lb 1 oz), SpO2 97 %.  Oxygen Therapy  SpO2: 97 %  Medical Gas Therapy: Supplemental oxygen  O2 Delivery Method: Nasal cannula       Intake/Output last 3 Shifts:  I/O last 3 completed shifts:  In: 1881 (35.4 mL/kg) [I.V.:576 (10.8 mL/kg); NG/GT:1305]  Out: 1600 (30.1 mL/kg) [Urine:1600 (0.8 mL/kg/hr)]  Weight: 53.1 kg     Lines and Tubes:  Peripheral IV 11/06/23 20 G Left Hand (Active)   Placement Date/Time: 11/06/23 (c) 7158   Size (Gauge): " 20 G  Orientation: Left  Location: Hand  Site Prep: Alcohol  Technique: Anatomical landmarks  Insertion attempts: 1   Number of days: 8       Peripheral IV 11/12/23 22 G Distal;Right;Ventral Forearm (Active)   Placement Date/Time: 11/12/23 1256   Size (Gauge): 22 G  Orientation: Distal;Right;Ventral  Location: Forearm  Local Anesthetic: None  Technique: Anatomical landmarks  Insertion attempts: 1  Patient Tolerance: Tolerated well   Number of days: 2       NG/OG/Feeding Tube Left nostril (Active)   Placement Date/Time: 10/17/23 1755   Placed by: NP  Tube Length: 80 cm  Tube Location: Left nostril   Number of days: 28       External Urinary Catheter (Active)   Placement Date/Time: 11/04/23 1805     Number of days: 10         Relevant Results  Scheduled medications  albuterol, 2.5 mg, nebulization, q12h  ergocalciferol, 8,000 Units, oral, Daily  erythromycin, 1 cm, Both Eyes, 4x daily  lidocaine, 1 patch, transdermal, Daily  lidocaine, 2 patch, transdermal, Daily  artificial tears, 1 drop, Both Eyes, 6x daily  melatonin, 10 mg, nasogastric tube, Nightly  micafungin, 100 mg, intravenous, q24h  midodrine, 10 mg, oral, TID with meals  multivitamin with minerals, 1 tablet, oral, Daily  nicotine, 1 patch, transdermal, Daily  pantoprazole, 40 mg, intravenous, Daily  sodium chloride, 3 mL, nebulization, q12h      Continuous medications  [Held by provider] heparin, 0-4,500 Units/hr, Last Rate: Stopped (11/15/23 0600)      PRN medications  PRN medications: acetaminophen, dextrose 10 % in water (D10W), dextrose, diclofenac sodium, glucagon, heparin, HYDROmorphone, ipratropium-albuteroL, loperamide, moisturizing mouth, oxyCODONE, oxygen, white petrolatum    Results for orders placed or performed during the hospital encounter of 10/11/23 (from the past 24 hour(s))   Comprehensive Metabolic Panel   Result Value Ref Range    Glucose 81 74 - 99 mg/dL    Sodium 133 (L) 136 - 145 mmol/L    Potassium 4.9 3.5 - 5.3 mmol/L    Chloride 95  (L) 98 - 107 mmol/L    Bicarbonate 25 21 - 32 mmol/L    Anion Gap 18 mmol/L    Urea Nitrogen 25 (H) 6 - 23 mg/dL    Creatinine 0.41 (L) 0.50 - 1.30 mg/dL    eGFR >90 >60 mL/min/1.73m*2    Calcium 8.9 8.6 - 10.6 mg/dL    Albumin 2.9 (L) 3.4 - 5.0 g/dL    Alkaline Phosphatase 169 (H) 33 - 136 U/L    Total Protein 7.8 6.4 - 8.2 g/dL    AST 29 9 - 39 U/L    Bilirubin, Total 0.4 0.0 - 1.2 mg/dL    ALT 19 10 - 52 U/L   CBC and Auto Differential   Result Value Ref Range    WBC 9.5 4.4 - 11.3 x10*3/uL    nRBC 0.0 0.0 - 0.0 /100 WBCs    RBC 4.08 (L) 4.50 - 5.90 x10*6/uL    Hemoglobin 11.8 (L) 13.5 - 17.5 g/dL    Hematocrit 35.3 (L) 41.0 - 52.0 %    MCV 87 80 - 100 fL    MCH 28.9 26.0 - 34.0 pg    MCHC 33.4 32.0 - 36.0 g/dL    RDW 13.9 11.5 - 14.5 %    Platelets 365 150 - 450 x10*3/uL    Neutrophils % 73.3 40.0 - 80.0 %    Immature Granulocytes %, Automated 0.8 0.0 - 0.9 %    Lymphocytes % 16.7 13.0 - 44.0 %    Monocytes % 6.1 2.0 - 10.0 %    Eosinophils % 1.7 0.0 - 6.0 %    Basophils % 1.4 0.0 - 2.0 %    Neutrophils Absolute 6.92 1.20 - 7.70 x10*3/uL    Immature Granulocytes Absolute, Automated 0.08 0.00 - 0.70 x10*3/uL    Lymphocytes Absolute 1.58 1.20 - 4.80 x10*3/uL    Monocytes Absolute 0.58 0.10 - 1.00 x10*3/uL    Eosinophils Absolute 0.16 0.00 - 0.70 x10*3/uL    Basophils Absolute 0.13 (H) 0.00 - 0.10 x10*3/uL   Magnesium   Result Value Ref Range    Magnesium 1.99 1.60 - 2.40 mg/dL     XR chest 1 view    Result Date: 11/14/2023  Interpreted By:  Nathan Lu, STUDY: XR CHEST 1 VIEW;  11/14/2023 8:12 am   INDICATION: Signs/Symptoms:re-evaluation of Left complete opacification.   COMPARISON: 11/13/2023.   ACCESSION NUMBER(S): [id number]   ORDERING CLINICIAN: KYLAH NULL   FINDINGS:     CARDIOMEDIASTINAL SILHOUETTE: Cardiomediastinal silhouette is normal in size and configuration.   LUNGS: Continued opacification of left hemithorax with left-sided consolidation and small effusion.   ABDOMEN: Dobbhoff tube overlies  the body of stomach.   BONES: Patient is status post left TSA.       1.  Continued left lung consolidation/volume loss. Correlate with underlying endobronchial pathology.     Signed by: Nathan Lu 11/14/2023 8:54 AM Dictation workstation:   [id number]     XR chest 1 view 11/14/2023    Narrative  Interpreted By:  Nathan Lu,  STUDY:  XR CHEST 1 VIEW;  11/14/2023 8:12 am    INDICATION:  Signs/Symptoms:re-evaluation of Left complete opacification.    COMPARISON:  11/13/2023.    ACCESSION NUMBER(S):  [id number]    ORDERING CLINICIAN:  KYLAH NULL    FINDINGS:      CARDIOMEDIASTINAL SILHOUETTE:  Cardiomediastinal silhouette is normal in size and configuration.    LUNGS:  Continued opacification of left hemithorax with left-sided  consolidation and small effusion.    ABDOMEN:  Dobbhoff tube overlies the body of stomach.    BONES:  Patient is status post left TSA.    Impression  1.  Continued left lung consolidation/volume loss. Correlate with  underlying endobronchial pathology.      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 male with PMHx COPD, HTN,  alcohol and opioid use disorder, hepatitis C, chronic PE admitted 10/12 with BLE weakness, recently admitted in the MICU from 10/13-10/16 for suspected alcohol withdrawal refractory to ativan, started on phenobarb taper, treated for streptococcus pneumoniae PNA with Ceftriaxone and transferred to [facility].  RRT called for lethargy, transferred to MICU on 10/17 with new FiO2 requirements.     11/15 Update  - Plan for  Bronchoscopy in Bronch Suite tomorrow --> Patient to be NPO at midnight. Heparin gtt to be stopped prior to bronchoscopy, awaiting work from Bronchoscopy Suite MD on exact timing.   - SLP to be contacted following bronchoscopy for re-evaluation.  Last seen on 11/8       Neuro:   #Medical Hx of Etoh and Opioid use disorder  #GBS   - Alert and oriented x3 today  - EMG performed on 11/10 confirming GBS   - Neuro consulted, recs appreciated --> 11/11 (see attestation on note from 11/10) EMG compatible with GBS.  No plans for further testing or treatment at this time d/t fungemia.  Immune modulation could potentially worsen infection.  Once infection is cleared can re consult Neurology for potential treatment  - Continue with Melatonin HS, Pain management with Lidocaine patches, Diclofenac gel prn, Acetaminophen prn, Oxycodone 5 mg Q6, and Hydromorphone 0.2 mg Q6.    - PT/OT/SLP following  - music therapy following     Optho:   #Dry eye, Cataracts, asha eyes  - seen by Opthomology given c/o blurriness with new fungemia.  No signs of fungal endophthalmitis  - rec artifical tears, Erythromycin     Pulm:   #Medical Hx of COPD, on 2-4 L home O2, Chronic PE, Smoker  #Acute on chronic hypoxic Respiratory failure   - Patient remains on 2L NC, intermittently tachypnic but improved from previous days  - 11/14 Chest xray with continued left lung consolidation/volume loss.  - s/p Bronch x2 with mucous plugging, L lung consolidation, Most recently on 11/6 with Dr. Cuenca  - Continue with Albuterol BID and 7% saline BID.  Airway clearance techniques (IPV) Q4 hours.   - Continue to encourage bipap for 30 mins prior to bronchial hygiene, pt has been refusing  - Acapella, IS while awake  - Continue heparin drip.  Transition to home Apixiban 5 mg BID when no further procedures planned  - Trend daily FVC/NIF --> NIF -40 11/14  - Seen by Dr Klein for PAH work up on 10/2  - Plan for Bronchoscopy in Bronch Suite today or tomorrow as an add  on     Cardiac:   #Medical Hx HTN  #TV Endocarditis  #Tachycardia  - 10/31 Echo: LVEF 75-80%, RV overload, Mod-severe TV, Mod-severe RV systolic pressure, Tricuspid echodensity measuring 1.7 x 0.8 cm, atrial septal aneurysm  - Repeat Echo on 11/2/23: No definite valvular vegetations were visualized   - Seen by CT surgery for TV vegetation- too high risk for surgery at this time  - Had SVT this admit.  Continue on tele  - Continue Midodrine 10 mg TID     FEN/GI:   #Medical Hx of Hep C  #Dysphagia, s/p Cortrak  #Severe Protein Calorie Malnutrition  #Hyponatremia   - TF via Cortrak: Duck Duck Moose Peptide 1.5 at 45 ml/hr.  No Free Water d/t hyponatremia --> Restarted as bolus feeds 275ml QID per nutritions recs.  NPO at Midnight for Bronchoscopy   - If switching to boluses, can do 275 ml QID with 60 ml before and after each bolus  - Imodium changed from TID scheduled to TID PRN, last BM on 11/15, still loose per nursing  - Continue PPI, MV, and ergocalciferol.   - Needs re-evaluated by SLP once Bronchoscopy completed, last seen on 11/8  - Daily RFP/Mag      Renal: no acute issues.    #KRYSTEN this admit s/p CVVH in MICU.  Now normal renal function  - Voiding per external cath  - Seen by Nephrology, signed off  - Continue on Midodrine   - Strict I's and O's      Heme:   #Medical Hx of Anemia of chronic disease and PE  - Continue heparin drip once bronchoscopy completed.  Transition back to home Apixiban at discharge  - Daily CBC     ID:   #Hx of Hepatitis C  #Fungemia, Strep pneumonaie PNA, Stenotrophomonas PNA  - Afebrile w/o leukocytosis   - +Stenotrophomonas on sputum cx 10/20, 11/5 (from BAL), 11/6.  10/31 BC with Candida  - 11/2 C. Diff negative  - seen by ID.  Continue on She (11/3-- STOP date 11/17)     Ppx:  DVT: Heparin gtt (hold for bronchoscopy) and scds  GI: PPI     Lines: PIV     Code Status: Full Code  NOK: Sister Sarah Godinez [phone]      Dispo: Patient to remain in SDU for aggressive BPH.  Will need SNF at  discharge, PT/OT/SLP following.     Pt discussed with Dr. Cota, seen and examined. All labs, VS and previous plan of care reviewed.    Homero Pizano, APRN-CNP

## 2023-11-16 ENCOUNTER — ANESTHESIA (OUTPATIENT)
Dept: GASTROENTEROLOGY | Facility: HOSPITAL | Age: 68
End: 2023-11-16
Payer: COMMERCIAL

## 2023-11-16 ENCOUNTER — APPOINTMENT (OUTPATIENT)
Dept: GASTROENTEROLOGY | Facility: HOSPITAL | Age: 68
End: 2023-11-16
Payer: COMMERCIAL

## 2023-11-16 ENCOUNTER — ANESTHESIA EVENT (OUTPATIENT)
Dept: GASTROENTEROLOGY | Facility: HOSPITAL | Age: 68
End: 2023-11-16
Payer: COMMERCIAL

## 2023-11-16 LAB
ALBUMIN SERPL BCP-MCNC: 3.1 G/DL (ref 3.4–5)
ALP SERPL-CCNC: 160 U/L (ref 33–136)
ALT SERPL W P-5'-P-CCNC: 20 U/L (ref 10–52)
ANION GAP SERPL CALC-SCNC: 15 MMOL/L (ref 10–20)
AST SERPL W P-5'-P-CCNC: 33 U/L (ref 9–39)
BILIRUB SERPL-MCNC: 0.5 MG/DL (ref 0–1.2)
BUN SERPL-MCNC: 25 MG/DL (ref 6–23)
CALCIUM SERPL-MCNC: 9.3 MG/DL (ref 8.6–10.6)
CHLORIDE SERPL-SCNC: 93 MMOL/L (ref 98–107)
CO2 SERPL-SCNC: 27 MMOL/L (ref 21–32)
CREAT SERPL-MCNC: 0.43 MG/DL (ref 0.5–1.3)
ERYTHROCYTE [DISTWIDTH] IN BLOOD BY AUTOMATED COUNT: 15.6 % (ref 11.5–14.5)
GFR SERPL CREATININE-BSD FRML MDRD: >90 ML/MIN/1.73M*2
GLUCOSE SERPL-MCNC: 84 MG/DL (ref 74–99)
HCT VFR BLD AUTO: 35.3 % (ref 41–52)
HGB BLD-MCNC: 12 G/DL (ref 13.5–17.5)
MAGNESIUM SERPL-MCNC: 2.03 MG/DL (ref 1.6–2.4)
MCH RBC QN AUTO: 28.6 PG (ref 26–34)
MCHC RBC AUTO-ENTMCNC: 34 G/DL (ref 32–36)
MCV RBC AUTO: 84 FL (ref 80–100)
NRBC BLD-RTO: 0 /100 WBCS (ref 0–0)
PLATELET # BLD AUTO: 202 X10*3/UL (ref 150–450)
POTASSIUM SERPL-SCNC: 5 MMOL/L (ref 3.5–5.3)
PROT SERPL-MCNC: 8.4 G/DL (ref 6.4–8.2)
RBC # BLD AUTO: 4.19 X10*6/UL (ref 4.5–5.9)
SODIUM SERPL-SCNC: 130 MMOL/L (ref 136–145)
WBC # BLD AUTO: 12.7 X10*3/UL (ref 4.4–11.3)

## 2023-11-16 PROCEDURE — 2500000005 HC RX 250 GENERAL PHARMACY W/O HCPCS: Performed by: NURSE ANESTHETIST, CERTIFIED REGISTERED

## 2023-11-16 PROCEDURE — 99233 SBSQ HOSP IP/OBS HIGH 50: CPT | Performed by: INTERNAL MEDICINE

## 2023-11-16 PROCEDURE — 3700000001 HC GENERAL ANESTHESIA TIME - INITIAL BASE CHARGE

## 2023-11-16 PROCEDURE — 2500000004 HC RX 250 GENERAL PHARMACY W/ HCPCS (ALT 636 FOR OP/ED): Performed by: NURSE ANESTHETIST, CERTIFIED REGISTERED

## 2023-11-16 PROCEDURE — 2500000001 HC RX 250 WO HCPCS SELF ADMINISTERED DRUGS (ALT 637 FOR MEDICARE OP): Performed by: NURSE PRACTITIONER

## 2023-11-16 PROCEDURE — 0B938ZZ DRAINAGE OF RIGHT MAIN BRONCHUS, VIA NATURAL OR ARTIFICIAL OPENING ENDOSCOPIC: ICD-10-PCS | Performed by: INTERNAL MEDICINE

## 2023-11-16 PROCEDURE — 80053 COMPREHEN METABOLIC PANEL: CPT | Performed by: NURSE PRACTITIONER

## 2023-11-16 PROCEDURE — C9113 INJ PANTOPRAZOLE SODIUM, VIA: HCPCS

## 2023-11-16 PROCEDURE — 85027 COMPLETE CBC AUTOMATED: CPT | Performed by: NURSE PRACTITIONER

## 2023-11-16 PROCEDURE — 7100000002 HC RECOVERY ROOM TIME - EACH INCREMENTAL 1 MINUTE

## 2023-11-16 PROCEDURE — 7100000009 HC PHASE TWO TIME - INITIAL BASE CHARGE

## 2023-11-16 PROCEDURE — A31622 PR BRONCHOSCOPY,DIAGNOSTIC: Performed by: NURSE ANESTHETIST, CERTIFIED REGISTERED

## 2023-11-16 PROCEDURE — 83735 ASSAY OF MAGNESIUM: CPT | Performed by: NURSE PRACTITIONER

## 2023-11-16 PROCEDURE — 7100000001 HC RECOVERY ROOM TIME - INITIAL BASE CHARGE

## 2023-11-16 PROCEDURE — 2500000005 HC RX 250 GENERAL PHARMACY W/O HCPCS: Performed by: NURSE PRACTITIONER

## 2023-11-16 PROCEDURE — 2500000004 HC RX 250 GENERAL PHARMACY W/ HCPCS (ALT 636 FOR OP/ED): Performed by: NURSE PRACTITIONER

## 2023-11-16 PROCEDURE — 7100000010 HC PHASE TWO TIME - EACH INCREMENTAL 1 MINUTE

## 2023-11-16 PROCEDURE — 31646 BRNCHSC W/THER ASPIR SBSQ: CPT | Performed by: INTERNAL MEDICINE

## 2023-11-16 PROCEDURE — 0B928ZZ DRAINAGE OF CARINA, VIA NATURAL OR ARTIFICIAL OPENING ENDOSCOPIC: ICD-10-PCS | Performed by: INTERNAL MEDICINE

## 2023-11-16 PROCEDURE — 0B978ZZ DRAINAGE OF LEFT MAIN BRONCHUS, VIA NATURAL OR ARTIFICIAL OPENING ENDOSCOPIC: ICD-10-PCS | Performed by: INTERNAL MEDICINE

## 2023-11-16 PROCEDURE — 87102 FUNGUS ISOLATION CULTURE: CPT | Performed by: INTERNAL MEDICINE

## 2023-11-16 PROCEDURE — 87186 SC STD MICRODIL/AGAR DIL: CPT | Performed by: INTERNAL MEDICINE

## 2023-11-16 PROCEDURE — A31622 PR BRONCHOSCOPY,DIAGNOSTIC: Performed by: ANESTHESIOLOGY

## 2023-11-16 PROCEDURE — 2500000004 HC RX 250 GENERAL PHARMACY W/ HCPCS (ALT 636 FOR OP/ED)

## 2023-11-16 PROCEDURE — 94668 MNPJ CHEST WALL SBSQ: CPT

## 2023-11-16 PROCEDURE — 2060000001 HC INTERMEDIATE ICU ROOM DAILY

## 2023-11-16 PROCEDURE — 2500000002 HC RX 250 W HCPCS SELF ADMINISTERED DRUGS (ALT 637 FOR MEDICARE OP, ALT 636 FOR OP/ED): Performed by: ANESTHESIOLOGY

## 2023-11-16 PROCEDURE — 87206 SMEAR FLUORESCENT/ACID STAI: CPT | Performed by: INTERNAL MEDICINE

## 2023-11-16 PROCEDURE — 36415 COLL VENOUS BLD VENIPUNCTURE: CPT | Performed by: NURSE PRACTITIONER

## 2023-11-16 PROCEDURE — 87116 MYCOBACTERIA CULTURE: CPT | Performed by: INTERNAL MEDICINE

## 2023-11-16 PROCEDURE — 3700000002 HC GENERAL ANESTHESIA TIME - EACH INCREMENTAL 1 MINUTE

## 2023-11-16 PROCEDURE — 2500000002 HC RX 250 W HCPCS SELF ADMINISTERED DRUGS (ALT 637 FOR MEDICARE OP, ALT 636 FOR OP/ED): Performed by: NURSE PRACTITIONER

## 2023-11-16 PROCEDURE — 94640 AIRWAY INHALATION TREATMENT: CPT

## 2023-11-16 PROCEDURE — 87070 CULTURE OTHR SPECIMN AEROBIC: CPT | Performed by: INTERNAL MEDICINE

## 2023-11-16 PROCEDURE — 2500000001 HC RX 250 WO HCPCS SELF ADMINISTERED DRUGS (ALT 637 FOR MEDICARE OP)

## 2023-11-16 RX ORDER — ALBUTEROL SULFATE 0.83 MG/ML
2.5 SOLUTION RESPIRATORY (INHALATION) ONCE
Status: COMPLETED | OUTPATIENT
Start: 2023-11-16 | End: 2023-11-16

## 2023-11-16 RX ORDER — LIDOCAINE HYDROCHLORIDE 20 MG/ML
INJECTION, SOLUTION INFILTRATION; PERINEURAL AS NEEDED
Status: DISCONTINUED | OUTPATIENT
Start: 2023-11-16 | End: 2023-11-16

## 2023-11-16 RX ORDER — FENTANYL CITRATE 50 UG/ML
INJECTION, SOLUTION INTRAMUSCULAR; INTRAVENOUS AS NEEDED
Status: DISCONTINUED | OUTPATIENT
Start: 2023-11-16 | End: 2023-11-16

## 2023-11-16 RX ORDER — ROCURONIUM BROMIDE 10 MG/ML
INJECTION, SOLUTION INTRAVENOUS AS NEEDED
Status: DISCONTINUED | OUTPATIENT
Start: 2023-11-16 | End: 2023-11-16

## 2023-11-16 RX ORDER — SODIUM CHLORIDE, SODIUM LACTATE, POTASSIUM CHLORIDE, CALCIUM CHLORIDE 600; 310; 30; 20 MG/100ML; MG/100ML; MG/100ML; MG/100ML
INJECTION, SOLUTION INTRAVENOUS CONTINUOUS PRN
Status: DISCONTINUED | OUTPATIENT
Start: 2023-11-16 | End: 2023-11-16

## 2023-11-16 RX ORDER — ONDANSETRON HYDROCHLORIDE 2 MG/ML
INJECTION, SOLUTION INTRAVENOUS AS NEEDED
Status: DISCONTINUED | OUTPATIENT
Start: 2023-11-16 | End: 2023-11-16

## 2023-11-16 RX ORDER — PROPOFOL 10 MG/ML
INJECTION, EMULSION INTRAVENOUS CONTINUOUS PRN
Status: DISCONTINUED | OUTPATIENT
Start: 2023-11-16 | End: 2023-11-16

## 2023-11-16 RX ORDER — DEXAMETHASONE SODIUM PHOSPHATE 4 MG/ML
INJECTION, SOLUTION INTRA-ARTICULAR; INTRALESIONAL; INTRAMUSCULAR; INTRAVENOUS; SOFT TISSUE AS NEEDED
Status: DISCONTINUED | OUTPATIENT
Start: 2023-11-16 | End: 2023-11-16

## 2023-11-16 RX ORDER — PROPOFOL 10 MG/ML
INJECTION, EMULSION INTRAVENOUS AS NEEDED
Status: DISCONTINUED | OUTPATIENT
Start: 2023-11-16 | End: 2023-11-16

## 2023-11-16 RX ORDER — MIDAZOLAM HYDROCHLORIDE 1 MG/ML
INJECTION, SOLUTION INTRAMUSCULAR; INTRAVENOUS AS NEEDED
Status: DISCONTINUED | OUTPATIENT
Start: 2023-11-16 | End: 2023-11-16

## 2023-11-16 RX ADMIN — FENTANYL CITRATE 50 MCG: 50 INJECTION, SOLUTION INTRAMUSCULAR; INTRAVENOUS at 13:53

## 2023-11-16 RX ADMIN — MICAFUNGIN SODIUM 100 MG: 50 INJECTION, POWDER, LYOPHILIZED, FOR SOLUTION INTRAVENOUS at 12:28

## 2023-11-16 RX ADMIN — Medication 3 ML: at 21:06

## 2023-11-16 RX ADMIN — LIDOCAINE 2 PATCH: 4 PATCH TOPICAL at 08:07

## 2023-11-16 RX ADMIN — MIDAZOLAM 1 MG: 1 INJECTION INTRAMUSCULAR; INTRAVENOUS at 13:50

## 2023-11-16 RX ADMIN — PROPOFOL 100 MCG/KG/MIN: 10 INJECTION, EMULSION INTRAVENOUS at 13:55

## 2023-11-16 RX ADMIN — Medication 3 ML: at 08:15

## 2023-11-16 RX ADMIN — CARBOXYMETHYLCELLULOSE SODIUM 1 DROP: 5 SOLUTION/ DROPS OPHTHALMIC at 10:00

## 2023-11-16 RX ADMIN — ROCURONIUM BROMIDE 50 MG: 50 INJECTION, SOLUTION INTRAVENOUS at 13:54

## 2023-11-16 RX ADMIN — ALBUTEROL SULFATE 2.5 MG: 2.5 SOLUTION RESPIRATORY (INHALATION) at 08:23

## 2023-11-16 RX ADMIN — OXYCODONE HYDROCHLORIDE 5 MG: 5 TABLET ORAL at 22:06

## 2023-11-16 RX ADMIN — OXYCODONE HYDROCHLORIDE 5 MG: 5 TABLET ORAL at 00:10

## 2023-11-16 RX ADMIN — LIDOCAINE 1 PATCH: 4 PATCH TOPICAL at 08:06

## 2023-11-16 RX ADMIN — MIDODRINE HYDROCHLORIDE 10 MG: 10 TABLET ORAL at 08:06

## 2023-11-16 RX ADMIN — PANTOPRAZOLE SODIUM 40 MG: 40 INJECTION, POWDER, FOR SOLUTION INTRAVENOUS at 08:05

## 2023-11-16 RX ADMIN — MIDODRINE HYDROCHLORIDE 10 MG: 10 TABLET ORAL at 12:27

## 2023-11-16 RX ADMIN — CARBOXYMETHYLCELLULOSE SODIUM 1 DROP: 5 SOLUTION/ DROPS OPHTHALMIC at 12:27

## 2023-11-16 RX ADMIN — PROPOFOL 20 MG: 10 INJECTION, EMULSION INTRAVENOUS at 13:54

## 2023-11-16 RX ADMIN — CARBOXYMETHYLCELLULOSE SODIUM 1 DROP: 5 SOLUTION/ DROPS OPHTHALMIC at 21:17

## 2023-11-16 RX ADMIN — SUGAMMADEX 200 MG: 100 INJECTION, SOLUTION INTRAVENOUS at 14:09

## 2023-11-16 RX ADMIN — ONDANSETRON 4 MG: 2 INJECTION INTRAMUSCULAR; INTRAVENOUS at 14:05

## 2023-11-16 RX ADMIN — ALBUTEROL SULFATE 2.5 MG: 2.5 SOLUTION RESPIRATORY (INHALATION) at 14:45

## 2023-11-16 RX ADMIN — ALBUTEROL SULFATE 2.5 MG: 2.5 SOLUTION RESPIRATORY (INHALATION) at 21:06

## 2023-11-16 RX ADMIN — MIDODRINE HYDROCHLORIDE 10 MG: 10 TABLET ORAL at 16:00

## 2023-11-16 RX ADMIN — CARBOXYMETHYLCELLULOSE SODIUM 1 DROP: 5 SOLUTION/ DROPS OPHTHALMIC at 15:56

## 2023-11-16 RX ADMIN — OXYCODONE HYDROCHLORIDE 5 MG: 5 TABLET ORAL at 08:06

## 2023-11-16 RX ADMIN — LIDOCAINE HYDROCHLORIDE 40 MG: 20 INJECTION, SOLUTION INFILTRATION; PERINEURAL at 13:53

## 2023-11-16 RX ADMIN — OXYCODONE HYDROCHLORIDE 5 MG: 5 TABLET ORAL at 15:56

## 2023-11-16 RX ADMIN — SODIUM CHLORIDE, POTASSIUM CHLORIDE, SODIUM LACTATE AND CALCIUM CHLORIDE: 600; 310; 30; 20 INJECTION, SOLUTION INTRAVENOUS at 13:45

## 2023-11-16 RX ADMIN — Medication 1 TABLET: at 08:06

## 2023-11-16 RX ADMIN — Medication 8000 UNITS: at 08:05

## 2023-11-16 RX ADMIN — DEXAMETHASONE SODIUM PHOSPHATE 6 MG: 4 INJECTION, SOLUTION INTRA-ARTICULAR; INTRALESIONAL; INTRAMUSCULAR; INTRAVENOUS; SOFT TISSUE at 14:05

## 2023-11-16 RX ADMIN — CARBOXYMETHYLCELLULOSE SODIUM 1 DROP: 5 SOLUTION/ DROPS OPHTHALMIC at 07:00

## 2023-11-16 RX ADMIN — PROPOFOL 50 MG: 10 INJECTION, EMULSION INTRAVENOUS at 13:53

## 2023-11-16 ASSESSMENT — PAIN - FUNCTIONAL ASSESSMENT
PAIN_FUNCTIONAL_ASSESSMENT: 0-10
PAIN_FUNCTIONAL_ASSESSMENT: FLACC (FACE, LEGS, ACTIVITY, CRY, CONSOLABILITY)
PAIN_FUNCTIONAL_ASSESSMENT: 0-10

## 2023-11-16 ASSESSMENT — PAIN SCALES - GENERAL
PAINLEVEL_OUTOF10: 0 - NO PAIN
PAINLEVEL_OUTOF10: 0 - NO PAIN
PAINLEVEL_OUTOF10: 10 - WORST POSSIBLE PAIN
PAINLEVEL_OUTOF10: 0 - NO PAIN
PAINLEVEL_OUTOF10: 9
PAINLEVEL_OUTOF10: 7
PAINLEVEL_OUTOF10: 9
PAINLEVEL_OUTOF10: 0 - NO PAIN
PAINLEVEL_OUTOF10: 4
PAINLEVEL_OUTOF10: 9

## 2023-11-16 ASSESSMENT — ENCOUNTER SYMPTOMS
SHORTNESS OF BREATH: 0
CHILLS: 0
COUGH: 0
DIZZINESS: 0
UNEXPECTED WEIGHT CHANGE: 0
ABDOMINAL PAIN: 0
FEVER: 0
CHEST TIGHTNESS: 0
WHEEZING: 0
APPETITE CHANGE: 0

## 2023-11-16 ASSESSMENT — PAIN DESCRIPTION - ORIENTATION: ORIENTATION: RIGHT;LEFT

## 2023-11-16 ASSESSMENT — PAIN DESCRIPTION - LOCATION
LOCATION: FOOT
LOCATION: FOOT
LOCATION: GENERALIZED

## 2023-11-16 NOTE — ANESTHESIA PROCEDURE NOTES
Airway  Date/Time: 11/16/2023 1:56 PM  Urgency: elective    Airway not difficult    Staffing  Performed: CRNA   Authorized by: Silvia Dubois MD    Performed by: WOO Zamora-CRNA  Patient location during procedure: OR    Indications and Patient Condition  Indications for airway management: anesthesia  Spontaneous ventilation: present  Sedation level: deep  Preoxygenated: yes  Patient position: sniffing  Mask difficulty assessment: 1 - vent by mask    Final Airway Details  Final airway type: endotracheal airway      Successful airway: ETT  Cuffed: yes   Successful intubation technique: direct laryngoscopy  Blade: Leyva  Blade size: #2  ETT size (mm): 8.5  Cormack-Lehane Classification: grade IIa - partial view of glottis  Placement verified by: chest auscultation and capnometry   Measured from: gums  ETT to gums (cm): 21  Number of attempts at approach: 1

## 2023-11-16 NOTE — H&P
History Of Present Illness  Molina Godinez is a 68 y.o. male presenting with left mucus plug referred for bronchoscopy for airway exam and wash.    PMHx COPD, HTN,  alcohol and opioid use disorder, hepatitis C, chronic PE admitted 10/12 with BLE weakness, receurrent admissions to MICU for alcohol withdrawal, streptococcus pneumoniae, and left lung collapse with mucus plugging.   Patient eventually was diagnosed to have GBS.   Today, he is doing well, no active symptoms.    Past Medical History  History reviewed. No pertinent past medical history.    Surgical History  History reviewed. No pertinent surgical history.     Social History  He reports that he has been smoking cigarettes. He has been smoking an average of .5 packs per day. He does not have any smokeless tobacco history on file. He reports current alcohol use. No history on file for drug use.    Family History  No family history on file.     Allergies  Aspirin and Nsaids (non-steroidal anti-inflammatory drug)    Review of Systems   Constitutional:  Negative for appetite change, chills, fever and unexpected weight change.   Respiratory:  Negative for cough, chest tightness, shortness of breath and wheezing.    Cardiovascular:  Negative for chest pain and leg swelling.   Gastrointestinal:  Negative for abdominal pain.   Neurological:  Negative for dizziness.        Physical Exam  Constitutional:       General: He is not in acute distress.     Appearance: He is not ill-appearing.   HENT:      Mouth/Throat:      Mouth: Mucous membranes are moist.      Pharynx: Oropharynx is clear. No oropharyngeal exudate.   Cardiovascular:      Rate and Rhythm: Normal rate and regular rhythm.      Pulses: Normal pulses.      Heart sounds: No murmur heard.  Pulmonary:      Effort: Pulmonary effort is normal. No respiratory distress.      Breath sounds: No stridor. No wheezing.      Comments: Decreased air entry to the left.  Abdominal:      General: There is no distension.       "Palpations: Abdomen is soft.      Tenderness: There is no abdominal tenderness.   Musculoskeletal:         General: No swelling or tenderness.   Skin:     General: Skin is warm and dry.   Neurological:      General: No focal deficit present.      Mental Status: He is alert.   Psychiatric:         Mood and Affect: Mood normal.          Last Recorded Vitals  Blood pressure 120/77, pulse 101, temperature 36.2 °C (97.2 °F), temperature source Temporal, resp. rate (!) 27, height 1.702 m (5' 7.01\"), weight 53.1 kg (117 lb 1 oz), SpO2 96 %.    Relevant Results  Images and labs: Reviewed.     Assessment/Plan   Principal Problem:    Hospital-acquired pneumonia  Active Problems:    Fungemia    Essential hypertension, benign    Hepatitis C virus infection cured after antiviral drug therapy    Alcoholism (CMS/HCC)    Chronic pulmonary embolism (CMS/HCC)    COPD (chronic obstructive pulmonary disease) (CMS/HCC)    HLD (hyperlipidemia)    Uremia    GBS (Guillain [place] syndrome) (CMS/[facility])    This is a 68-year-old with PMHx COPD, HTN,  alcohol and opioid use disorder, hepatitis C, chronic PE admitted 10/12 with BLE weakness, receurrent admissions to MICU for alcohol withdrawal, streptococcus pneumoniae, and left lung collapse with mucus plugging. Patient eventually was diagnosed to have GBS.  Patient underwent multiple bronchoscopies for airway clearance, last one was 11/6.  Today, he was again referred for another bronchoscopy for airway clearance and airway exam.  Heparin gtt has been on hold since last night.    Plan:  Will proceed with bronchoscopy for air ay exam and clearance.       Nash Delgadillo MD    "

## 2023-11-16 NOTE — ANESTHESIA POSTPROCEDURE EVALUATION
Patient: Molina Godinez    Procedure Summary       Date: 11/16/23 Room / Location: [facility]    Anesthesia Start: 1337 Anesthesia Stop: 1421    Procedure: BRONCHOSCOPY Diagnosis: Oxygen dependent    Scheduled Providers: Javed Kincaid MD; Silvia Dubois MD; Oleg Blum RN; WOO Zamora-CRNA Responsible Provider: Silvia Dubois MD    Anesthesia Type: general ASA Status: 3            Anesthesia Type: general    Vitals Value Taken Time   /88 11/16/23 1446   Temp 36 °C (96.8 °F) 11/16/23 1416   Pulse 93 11/16/23 1446   Resp 20 11/16/23 1446   SpO2 96 % 11/16/23 1446       Anesthesia Post Evaluation    Patient location during evaluation: PACU  Patient participation: complete - patient participated  Level of consciousness: awake and alert  Pain management: adequate  Airway patency: patent  Cardiovascular status: acceptable  Respiratory status: nasal cannula and acceptable  Hydration status: acceptable  Postoperative Nausea and Vomiting: none    No notable events documented.

## 2023-11-16 NOTE — PROGRESS NOTES
"Molina Godinez is a 68 y.o. male on day 35 of admission presenting with Hospital-acquired pneumonia.    Subjective   Patient is NPO since midnight. Awaiting Bronch.  Heparin gtt is off       Objective     Physical Exam:  Constitutional:  NAD, alert and cooperative  Eyes: PERRL, EOMI, no icterus   ENMT: mucous membranes moist, no apparent injury, no lesions seen  Head/Neck: Neck supple, no apparent injury  Respiratory/Thorax: Lungs diminished on right, minimal air movement on left,  moist, weak productive cough, on 2L NC  Cardiovascular: Regular, rate and rhythm, no murmurs, normal S1 and S2  Gastrointestinal: Nondistended, soft, non-tender, BS present x 4, Dobhoff tube in place  : external catheter in place with clear yellow urine  Musculoskeletal: generalized weakness, minimal movement of BLE   Extremities:  no edema, contusions or wounds  Neurological: alert and oriented x 3, speech clear, follows commands appropriately, no focal deficits  Skin: Warm and dry, no lesions, no rashes     Last Recorded Vitals  Blood pressure 130/89, pulse 98, temperature 36.3 °C (97.3 °F), temperature source Temporal, resp. rate 23, height 1.702 m (5' 7.01\"), weight 53.1 kg (117 lb 1 oz), SpO2 97 %.    Intake/Output last 3 Shifts:  I/O last 3 completed shifts:  In: 1156 (21.8 mL/kg) [I.V.:331 (6.2 mL/kg); NG/GT:825]  Out: 1150 (21.7 mL/kg) [Urine:1150 (0.6 mL/kg/hr)]  Weight: 53.1 kg     Scheduled medications  albuterol, 2.5 mg, nebulization, q12h  ergocalciferol, 8,000 Units, oral, Daily  erythromycin, 1 cm, Both Eyes, 4x daily  lidocaine, 1 patch, transdermal, Daily  lidocaine, 2 patch, transdermal, Daily  artificial tears, 1 drop, Both Eyes, 6x daily  melatonin, 10 mg, nasogastric tube, Nightly  micafungin, 100 mg, intravenous, q24h  midodrine, 10 mg, oral, TID with meals  multivitamin with minerals, 1 tablet, oral, Daily  nicotine, 1 patch, transdermal, Daily  pantoprazole, 40 mg, intravenous, Daily  sodium chloride, 3 mL, " nebulization, q12h    Continuous medications  [Held by provider] heparin, 0-4,500 Units/hr, Last Rate: 1,200 Units/hr (11/15/23 1625)      PRN medications: acetaminophen, dextrose 10 % in water (D10W), dextrose, diclofenac sodium, glucagon, heparin, HYDROmorphone, ipratropium-albuteroL, loperamide, moisturizing mouth, oxyCODONE, oxygen, white petrolatum     Relevant Results    Results for orders placed or performed during the hospital encounter of 10/11/23 (from the past 24 hour(s))   CBC   Result Value Ref Range    WBC 12.7 (H) 4.4 - 11.3 x10*3/uL    nRBC 0.0 0.0 - 0.0 /100 WBCs    RBC 4.19 (L) 4.50 - 5.90 x10*6/uL    Hemoglobin 12.0 (L) 13.5 - 17.5 g/dL    Hematocrit 35.3 (L) 41.0 - 52.0 %    MCV 84 80 - 100 fL    MCH 28.6 26.0 - 34.0 pg    MCHC 34.0 32.0 - 36.0 g/dL    RDW 15.6 (H) 11.5 - 14.5 %    Platelets 202 150 - 450 x10*3/uL   Comprehensive Metabolic Panel   Result Value Ref Range    Glucose 84 74 - 99 mg/dL    Sodium 130 (L) 136 - 145 mmol/L    Potassium 5.0 3.5 - 5.3 mmol/L    Chloride 93 (L) 98 - 107 mmol/L    Bicarbonate 27 21 - 32 mmol/L    Anion Gap 15 10 - 20 mmol/L    Urea Nitrogen 25 (H) 6 - 23 mg/dL    Creatinine 0.43 (L) 0.50 - 1.30 mg/dL    eGFR >90 >60 mL/min/1.73m*2    Calcium 9.3 8.6 - 10.6 mg/dL    Albumin 3.1 (L) 3.4 - 5.0 g/dL    Alkaline Phosphatase 160 (H) 33 - 136 U/L    Total Protein 8.4 (H) 6.4 - 8.2 g/dL    AST 33 9 - 39 U/L    Bilirubin, Total 0.5 0.0 - 1.2 mg/dL    ALT 20 10 - 52 U/L   Magnesium   Result Value Ref Range    Magnesium 2.03 1.60 - 2.40 mg/dL      XR chest 1 view  Result Date: 11/14/2023  1.  Continued left lung consolidation/volume loss. Correlate with underlying endobronchial pathology.         XR chest 1 view  Result Date: 11/11/2023  1. No significant change in complete opacification and volume loss of left hemithorax, correlating with combination of postobstructive atelectasis/consolidation and pleural effusion on comparative CT scan 11/08/2023.   2. Medical  devices as above. No pneumothorax.       CT chest w IV contrast  Result Date: 11/9/2023  1.  Consolidation/collapse of the left lung with extensive mucoid impaction/aspirated material within the left mainstem bronchus and segmental bronchi. Correlate with aspiration.   Extensive atherosclerotic changes of the coronary arteries and right heart enlargement. Correlate with elevated right-sided pressures.       Transthoracic Echo (TTE) Limited  Result Date: 11/2/2023  1. Left ventricular systolic function is hyperdynamic with a 70-75% estimated ejection fraction.    2. No definite valvular vegetations were visualized.    3. Right ventricular pressure overload.    4. Spectral Doppler shows an impaired relaxation pattern of left ventricular diastolic filling.    5. There is mildly reduced right ventricular systolic function.    6. The right atrium is moderately dilated.    7. Mildly elevated RVSP.    8. Moderate tricuspid regurgitation visualized.    9. The PA systolic pressure may be underestimated. The previously noted posterior tricuspid leaflet vegetation is not clearly seen on this study,  10. Left ventricular cavity size is decreased.   11. The reasons for repeating the echo from 10/31 is not clear.      MR brain w and wo IV contrast  Result Date: 10/21/2023  Brain: 1. No acute ischemic infarct or intracranial hemorrhage. No abnormal parenchymal enhancement.   2. Nonspecific subcortical and periventricular T2/FLAIR hyperintensities may represent sequelae of chronic small vessel ischemic changes.   3. Remote lacunar infarcts in the left thalamus.       Cervical spine:   1. Multilevel degenerative disc disease and facet arthrosis most pronounced at C3-C4 with moderate to severe spinal canal stenosis and moderate to severe bilateral neural foraminal stenosis. There is flattening of the cervical cord with question subtle increased cord signal on the STIR sequence. Please correlate clinically for symptoms of myelopathy.  No abnormal cord enhancement.   2. Patchy consolidative opacities throughout the left lung with a small left pleural effusion. Correlate with dedicated chest imaging.     Thoracic spine:   1. No significant spinal canal or neural foraminal stenosis. No abnormal enhancement.   2. Nonacute minimal compression deformity at T3. Mild compression deformity at T11 with a proximally 25% loss of vertebral body height and no osseous retropulsion into the spinal canal. This was present on prior exam 10/16/2023.       Lumbar spine:   1. Multilevel lumbar spondylosis as detailed above. No spinal canal stenosis.   2. No abnormal signal or enhancement within the distal cord or nerve roots.      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 male with PMHx COPD, HTN,  alcohol and opioid use disorder, hepatitis C, chronic PE admitted 10/12 with BLE weakness, recently admitted in the MICU from 10/13-10/16 for suspected alcohol withdrawal refractory to ativan, started on phenobarb taper, treated for streptococcus pneumoniae PNA with Ceftriaxone and transferred to [facility].  RRT called for lethargy, transferred to MICU on 10/17 with new FiO2 requirements.     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  No plans for further testing or treatment at this time d/t fungemia.  Immune modulation could potentially worsen infection.  Once infection is cleared can re consult Neurology for potential treatment  - Continue  Melatonin HS  -Pain management with Lidocaine patches, Diclofenac gel prn, Acetaminophen prn, Oxycodone 5 mg Q6, and Hydromorphone 0.2 mg Q6.    - PT/OT/SLP following  - music therapy following     Optho: Dry eye, Cataracts, asha eyes  - seen by Opthomology given c/o blurriness with new fungemia.  No signs of fungal endophthalmitis  - rec artifical tears, Erythromycin     Pulm: Hx of COPD, on 2-4 L home O2, Chronic PE, Smoker, Acute on chronic hypoxic Respiratory failure   -keep Pox goal >92%, on 2L NC  - 11/14 Chest xray with continued left lung consolidation/volume loss.  - s/p Bronch x2 with mucous plugging, L lung consolidation, Most recently on 11/6 with Dr. Cuenca  - Continue with Albuterol BID and 7% saline BID.  Airway clearance techniques (IPV) Q4 hours.   - Continue to encourage bipap for 30 mins prior to bronchial hygiene, pt has been refusing  - Acapella, IS while awake  - Continue heparin drip.  Transition to home Apixiban 5 mg BID when no further procedures planned  - Trend daily FVC/NIF --> NIF -40 11/14  - Seen by Dr Klein for PAH work up on 10/2  - Plan for Bronchoscopy in Bronch Suite today (scheduled as an add on)     Cardiac:  Hx HTN, TV Endocarditis, Tachycardia resolving  - 10/31 Echo: LVEF 75-80%, RV overload, Mod-severe TV, Mod-severe RV systolic pressure, Tricuspid echodensity measuring 1.7 x 0.8 cm, atrial septal aneurysm  - Repeat Echo on 11/2/23: with no definite valvular vegetations  - Seen by CT surgery for TV vegetation- too high risk for surgery at this time  - Had SVT this admit.  Continue on tele  - Continue Midodrine 10 mg TID     FEN/GI: Hx of Hep C, Dysphagia, s/p Cortrak, Severe Protein Calorie Malnutrition, Hyponatremia   - TF via Cortrak- switched to bolus feeds per nutrition recs 275ml QID , can give 60 ml before and after each bolus  - NPO since Midnight for Bronchoscopy today   - Imodium  PRN, for diarrhea  - Continue PPI, MV, and ergocalciferol.   - will ask speeck to  re-evaluated after Bronchoscopy , last seen on 11/8  - Daily RFP/Mag      Renal: KRYSTEN this admit s/p CVVH in MICU.  Now normal renal function  - Voiding per external cath  - Seen by Nephrology, signed off  - Continue on Midodrine   - Strict I's and O's      Heme: Hx of Anemia of chronic disease and PE  - will restart heparin drip once bronchoscopy completed.    -will transition back to home Apixiban at discharge  - Daily CBC     ID:  Hx of Hepatitis C, Fungemia, Strep pneumonaie PNA, Stenotrophomonas PNA  - Afebrile,  leukocytosis   - +Stenotrophomonas on sputum cx 10/20, 11/5 (from BAL), 11/6.  10/31 BC with Candida  - 11/2 C. Diff negative  - seen by ID.  Continue on She (11/3-- STOP date 11/17)     Ppx:  DVT: Heparin gtt (hold for bronchoscopy), SCDs  GI: PPI     Lines: PIV     Code Status: Full Code  NOK: Sister Sarah Godinez [phone]      Dispo: will continue SDU care for bronchial hygiene, Will reevaluate after bronch.  Will need SNF at discharge, PT/OT/SLP following.    D/w  Dr Cipriano VANCE spent >35 minutes in the professional and overall care of this patient.    WOO So-CNP

## 2023-11-16 NOTE — ANESTHESIA PREPROCEDURE EVALUATION
Patient: Molina Godinez    Procedure Information       Date/Time: 11/16/23 1330    Scheduled providers: Javed Kincaid MD; Silvia Dubois MD; Oleg Blum RN; TWIN Zamora    Procedure: BRONCHOSCOPY    Location: [facility]        68 y.o. male with PMHx COPD, HTN,  alcohol and opioid use disorder, hepatitis C, chronic PE admitted 10/12 with BLE weakness, recently admitted in the MICU from 10/13-10/16 for suspected alcohol withdrawal refractory to ativan, started on phenobarb taper, treated for streptococcus pneumoniae PNA with Ceftriaxone and transferred to [facility].  RRT called for lethargy, transferred to MICU on 10/17 with new FiO2 requirements.    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)       Relevant Problems   Cardiovascular   (+) Chronic pulmonary embolism (CMS/HCC)   (+) Essential hypertension, benign   (+) HLD (hyperlipidemia)   (+) Pulmonary hypertension (CMS/HCC)      /Renal   (+) Hepatitis C   (+) Hepatomegaly   (+) Liver fibrosis      Neuro/Psych   (+) 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)      Pulmonary   (+) COPD (chronic obstructive pulmonary disease) (CMS/HCC)   (+) Chronic pulmonary embolism (CMS/HCC)   (+) Hospital-acquired pneumonia   (+) Pulmonary hypertension (CMS/HCC)      GI/Hepatic   (+) Hepatitis C      Musculoskeletal   (+) DDD (degenerative disc disease), lumbosacral   (+) Osteoarthritis of left hip   (+) Spondylosis of lumbar region without myelopathy or radiculopathy      Infectious Disease   (+) Hepatitis C   (+) Hospital-acquired pneumonia       Chemistry    Lab Results   Component Value Date/Time     (L) 11/16/2023 0647    K 5.0  11/16/2023 0647    CL 93 (L) 11/16/2023 0647    CO2 27 11/16/2023 0647    BUN 25 (H) 11/16/2023 0647    CREATININE 0.43 (L) 11/16/2023 0647    Lab Results   Component Value Date/Time    CALCIUM 9.3 11/16/2023 0647    ALKPHOS 160 (H) 11/16/2023 0647    AST 33 11/16/2023 0647    ALT 20 11/16/2023 0647    BILITOT 0.5 11/16/2023 0647          Lab Results   Component Value Date/Time    WBC 12.7 (H) 11/16/2023 0415    HGB 12.0 (L) 11/16/2023 0415    HCT 35.3 (L) 11/16/2023 0415     11/16/2023 0415     Lab Results   Component Value Date/Time    PROTIME 12.1 11/11/2023 0416    INR 1.1 11/11/2023 0416     Encounter Date: 10/11/23   ECG 12 Lead   Result Value    Ventricular Rate 141    Atrial Rate 141    NC Interval 130    QRS Duration 86    QT Interval 276    QTC Calculation(Bazett) 422    P Axis 88    R Axis -46    T Axis 96    QRS Count 23    Q Onset 213    P Onset 148    P Offset 188    T Offset 351    QTC Fredericia 367    Narrative    Sinus tachycardia  Left atrial enlargement  Left axis deviation  Left ventricular hypertrophy  Nonspecific ST and T wave abnormality  Abnormal ECG  Confirmed by Terrell Silva ([id number]) on 11/2/2023 2:23:34 PM     Vitals:    11/16/23 1200   BP: 120/77   Pulse: 101   Resp: (!) 27   Temp:    SpO2: 96%      No results found for this or any previous visit from the past 1095 days.   Clinical information reviewed:   Tobacco  Allergies  Meds  Problems  Med Hx  Surg Hx   Fam Hx  Soc   Hx        NPO Detail:  No data recorded     Physical Exam    Airway  Mallampati: III  TM distance: >3 FB  Neck ROM: limited     Cardiovascular    Dental    Pulmonary    Abdominal      Other findings: Poor dentition         Anesthesia Plan    ASA 3     general     intravenous induction   Anesthetic plan and risks discussed with patient.

## 2023-11-17 ENCOUNTER — APPOINTMENT (OUTPATIENT)
Dept: RADIOLOGY | Facility: HOSPITAL | Age: 68
End: 2023-11-17
Payer: COMMERCIAL

## 2023-11-17 PROBLEM — N19 UREMIA: Status: RESOLVED | Noted: 2023-10-31 | Resolved: 2023-11-17

## 2023-11-17 LAB
ALBUMIN SERPL BCP-MCNC: 3.6 G/DL (ref 3.4–5)
ALP SERPL-CCNC: 175 U/L (ref 33–136)
ALT SERPL W P-5'-P-CCNC: 19 U/L (ref 10–52)
ANION GAP SERPL CALC-SCNC: 17 MMOL/L (ref 10–20)
AST SERPL W P-5'-P-CCNC: 26 U/L (ref 9–39)
BILIRUB SERPL-MCNC: 0.6 MG/DL (ref 0–1.2)
BUN SERPL-MCNC: 22 MG/DL (ref 6–23)
CALCIUM SERPL-MCNC: 10 MG/DL (ref 8.6–10.6)
CHLORIDE SERPL-SCNC: 91 MMOL/L (ref 98–107)
CO2 SERPL-SCNC: 26 MMOL/L (ref 21–32)
CREAT SERPL-MCNC: 0.5 MG/DL (ref 0.5–1.3)
ERYTHROCYTE [DISTWIDTH] IN BLOOD BY AUTOMATED COUNT: 13.8 % (ref 11.5–14.5)
GFR SERPL CREATININE-BSD FRML MDRD: >90 ML/MIN/1.73M*2
GLUCOSE SERPL-MCNC: 78 MG/DL (ref 74–99)
HCT VFR BLD AUTO: 40 % (ref 41–52)
HGB BLD-MCNC: 13.1 G/DL (ref 13.5–17.5)
MAGNESIUM SERPL-MCNC: 2.1 MG/DL (ref 1.6–2.4)
MCH RBC QN AUTO: 28.2 PG (ref 26–34)
MCHC RBC AUTO-ENTMCNC: 32.8 G/DL (ref 32–36)
MCV RBC AUTO: 86 FL (ref 80–100)
NRBC BLD-RTO: 0 /100 WBCS (ref 0–0)
PLATELET # BLD AUTO: 420 X10*3/UL (ref 150–450)
POTASSIUM SERPL-SCNC: 5.2 MMOL/L (ref 3.5–5.3)
PROT SERPL-MCNC: 9.5 G/DL (ref 6.4–8.2)
RBC # BLD AUTO: 4.64 X10*6/UL (ref 4.5–5.9)
SODIUM SERPL-SCNC: 129 MMOL/L (ref 136–145)
UFH PPP CHRO-ACNC: 0.4 IU/ML
UFH PPP CHRO-ACNC: 0.4 IU/ML
WBC # BLD AUTO: 11.8 X10*3/UL (ref 4.4–11.3)

## 2023-11-17 PROCEDURE — 2500000004 HC RX 250 GENERAL PHARMACY W/ HCPCS (ALT 636 FOR OP/ED): Performed by: NURSE PRACTITIONER

## 2023-11-17 PROCEDURE — 2500000005 HC RX 250 GENERAL PHARMACY W/O HCPCS: Performed by: NURSE PRACTITIONER

## 2023-11-17 PROCEDURE — C9113 INJ PANTOPRAZOLE SODIUM, VIA: HCPCS

## 2023-11-17 PROCEDURE — 80053 COMPREHEN METABOLIC PANEL: CPT | Performed by: NURSE PRACTITIONER

## 2023-11-17 PROCEDURE — 71045 X-RAY EXAM CHEST 1 VIEW: CPT | Performed by: RADIOLOGY

## 2023-11-17 PROCEDURE — 2500000001 HC RX 250 WO HCPCS SELF ADMINISTERED DRUGS (ALT 637 FOR MEDICARE OP): Performed by: NURSE PRACTITIONER

## 2023-11-17 PROCEDURE — 85520 HEPARIN ASSAY: CPT | Performed by: NURSE PRACTITIONER

## 2023-11-17 PROCEDURE — 2500000002 HC RX 250 W HCPCS SELF ADMINISTERED DRUGS (ALT 637 FOR MEDICARE OP, ALT 636 FOR OP/ED): Performed by: NURSE PRACTITIONER

## 2023-11-17 PROCEDURE — 2500000004 HC RX 250 GENERAL PHARMACY W/ HCPCS (ALT 636 FOR OP/ED)

## 2023-11-17 PROCEDURE — 2060000001 HC INTERMEDIATE ICU ROOM DAILY

## 2023-11-17 PROCEDURE — 71045 X-RAY EXAM CHEST 1 VIEW: CPT | Mod: FY

## 2023-11-17 PROCEDURE — 2500000001 HC RX 250 WO HCPCS SELF ADMINISTERED DRUGS (ALT 637 FOR MEDICARE OP)

## 2023-11-17 PROCEDURE — S4991 NICOTINE PATCH NONLEGEND: HCPCS | Performed by: NURSE PRACTITIONER

## 2023-11-17 PROCEDURE — 94640 AIRWAY INHALATION TREATMENT: CPT

## 2023-11-17 PROCEDURE — 85027 COMPLETE CBC AUTOMATED: CPT | Performed by: NURSE PRACTITIONER

## 2023-11-17 PROCEDURE — 83735 ASSAY OF MAGNESIUM: CPT | Performed by: NURSE PRACTITIONER

## 2023-11-17 PROCEDURE — 36415 COLL VENOUS BLD VENIPUNCTURE: CPT | Performed by: NURSE PRACTITIONER

## 2023-11-17 PROCEDURE — 99233 SBSQ HOSP IP/OBS HIGH 50: CPT | Performed by: INTERNAL MEDICINE

## 2023-11-17 PROCEDURE — 94668 MNPJ CHEST WALL SBSQ: CPT

## 2023-11-17 RX ORDER — ESOMEPRAZOLE MAGNESIUM 40 MG/1
40 GRANULE, DELAYED RELEASE ORAL
Status: DISCONTINUED | OUTPATIENT
Start: 2023-11-18 | End: 2023-11-30 | Stop reason: HOSPADM

## 2023-11-17 RX ADMIN — Medication 1 TABLET: at 08:30

## 2023-11-17 RX ADMIN — PANTOPRAZOLE SODIUM 40 MG: 40 INJECTION, POWDER, FOR SOLUTION INTRAVENOUS at 08:30

## 2023-11-17 RX ADMIN — ACETAMINOPHEN 650 MG: 325 TABLET ORAL at 08:33

## 2023-11-17 RX ADMIN — CARBOXYMETHYLCELLULOSE SODIUM 1 DROP: 5 SOLUTION/ DROPS OPHTHALMIC at 19:00

## 2023-11-17 RX ADMIN — OXYCODONE HYDROCHLORIDE 5 MG: 5 TABLET ORAL at 12:08

## 2023-11-17 RX ADMIN — OXYCODONE HYDROCHLORIDE 5 MG: 5 TABLET ORAL at 17:22

## 2023-11-17 RX ADMIN — CARBOXYMETHYLCELLULOSE SODIUM 1 DROP: 5 SOLUTION/ DROPS OPHTHALMIC at 11:00

## 2023-11-17 RX ADMIN — CARBOXYMETHYLCELLULOSE SODIUM 1 DROP: 5 SOLUTION/ DROPS OPHTHALMIC at 16:47

## 2023-11-17 RX ADMIN — Medication 8000 UNITS: at 08:29

## 2023-11-17 RX ADMIN — CARBOXYMETHYLCELLULOSE SODIUM 1 DROP: 5 SOLUTION/ DROPS OPHTHALMIC at 08:29

## 2023-11-17 RX ADMIN — Medication 3 ML: at 21:36

## 2023-11-17 RX ADMIN — CARBOXYMETHYLCELLULOSE SODIUM 1 DROP: 5 SOLUTION/ DROPS OPHTHALMIC at 21:54

## 2023-11-17 RX ADMIN — MIDODRINE HYDROCHLORIDE 10 MG: 10 TABLET ORAL at 12:05

## 2023-11-17 RX ADMIN — Medication 2 L/MIN: at 08:15

## 2023-11-17 RX ADMIN — Medication 1 PATCH: at 08:29

## 2023-11-17 RX ADMIN — CARBOXYMETHYLCELLULOSE SODIUM 1 DROP: 5 SOLUTION/ DROPS OPHTHALMIC at 13:54

## 2023-11-17 RX ADMIN — OXYCODONE HYDROCHLORIDE 5 MG: 5 TABLET ORAL at 21:53

## 2023-11-17 RX ADMIN — Medication 3 ML: at 08:15

## 2023-11-17 RX ADMIN — MIDODRINE HYDROCHLORIDE 10 MG: 10 TABLET ORAL at 16:46

## 2023-11-17 RX ADMIN — MIDODRINE HYDROCHLORIDE 10 MG: 10 TABLET ORAL at 08:29

## 2023-11-17 RX ADMIN — ALBUTEROL SULFATE 2.5 MG: 2.5 SOLUTION RESPIRATORY (INHALATION) at 08:15

## 2023-11-17 RX ADMIN — HEPARIN SODIUM 1200 UNITS/HR: 10000 INJECTION, SOLUTION INTRAVENOUS at 05:15

## 2023-11-17 RX ADMIN — SODIUM CHLORIDE, POTASSIUM CHLORIDE, SODIUM LACTATE AND CALCIUM CHLORIDE 500 ML: 600; 310; 30; 20 INJECTION, SOLUTION INTRAVENOUS at 11:00

## 2023-11-17 RX ADMIN — LIDOCAINE 2 PATCH: 4 PATCH TOPICAL at 08:30

## 2023-11-17 RX ADMIN — ERYTHROMYCIN 1 CM: 5 OINTMENT OPHTHALMIC at 12:05

## 2023-11-17 RX ADMIN — OXYCODONE HYDROCHLORIDE 5 MG: 5 TABLET ORAL at 05:07

## 2023-11-17 RX ADMIN — SODIUM CHLORIDE, POTASSIUM CHLORIDE, SODIUM LACTATE AND CALCIUM CHLORIDE 500 ML: 600; 310; 30; 20 INJECTION, SOLUTION INTRAVENOUS at 13:54

## 2023-11-17 RX ADMIN — ALBUTEROL SULFATE 2.5 MG: 2.5 SOLUTION RESPIRATORY (INHALATION) at 21:36

## 2023-11-17 RX ADMIN — MICAFUNGIN SODIUM 100 MG: 50 INJECTION, POWDER, LYOPHILIZED, FOR SOLUTION INTRAVENOUS at 12:05

## 2023-11-17 RX ADMIN — ERYTHROMYCIN 1 CM: 5 OINTMENT OPHTHALMIC at 08:33

## 2023-11-17 ASSESSMENT — PAIN SCALES - GENERAL
PAINLEVEL_OUTOF10: 9
PAINLEVEL_OUTOF10: 0 - NO PAIN
PAINLEVEL_OUTOF10: 8
PAINLEVEL_OUTOF10: 3
PAINLEVEL_OUTOF10: 0 - NO PAIN
PAINLEVEL_OUTOF10: 8
PAINLEVEL_OUTOF10: 9

## 2023-11-17 ASSESSMENT — PAIN - FUNCTIONAL ASSESSMENT
PAIN_FUNCTIONAL_ASSESSMENT: 0-10

## 2023-11-17 ASSESSMENT — COGNITIVE AND FUNCTIONAL STATUS - GENERAL
HELP NEEDED FOR BATHING: A LOT
MOBILITY SCORE: 8
DAILY ACTIVITIY SCORE: 12
TOILETING: A LOT
STANDING UP FROM CHAIR USING ARMS: TOTAL
MOVING FROM LYING ON BACK TO SITTING ON SIDE OF FLAT BED WITH BEDRAILS: A LOT
PERSONAL GROOMING: A LOT
EATING MEALS: A LOT
WALKING IN HOSPITAL ROOM: TOTAL
DRESSING REGULAR UPPER BODY CLOTHING: A LOT
CLIMB 3 TO 5 STEPS WITH RAILING: TOTAL
TURNING FROM BACK TO SIDE WHILE IN FLAT BAD: A LOT
DRESSING REGULAR LOWER BODY CLOTHING: A LOT
MOVING TO AND FROM BED TO CHAIR: TOTAL

## 2023-11-17 ASSESSMENT — PAIN DESCRIPTION - LOCATION
LOCATION: HEAD
LOCATION: LEG
LOCATION: LEG

## 2023-11-17 ASSESSMENT — PAIN DESCRIPTION - ORIENTATION: ORIENTATION: OTHER (COMMENT)

## 2023-11-17 NOTE — PROGRESS NOTES
Molina Godinez is a 68 y.o. male on day 36 of admission presenting with Hospital-acquired pneumonia.    TCC Note    Plan per Medical/Surgical Team: Bronchoscopy done yesterday. Pulmonary hygiene   Status: Inpatient  Payor Source: [facility]  Discharge disposition: Transfer to floor. Will need SNF.  Expected date of discharge: pending bed availability  Barriers:None   Will continue to follow patient for any discharge needs.

## 2023-11-17 NOTE — PROGRESS NOTES
"Music Therapy Note    Molina Godinez     Therapy Session  Referral Type: New referral this admission  Visit Type: Follow-up visit  Session Start Time: 1149  Session End Time: 1210  Intervention Delivery: In-person  Conflict of Service: None  Number of family members present: 0  Family Present for Session: None  Number of staff members present: 0     Pre-assessment  Unable to Assess Reason: Outcomes not assessed  Mood/Affect: Appropriate, Calm, Flat/blunted  Verbalized Emotional State: Acceptance    Treatment/Interventions  Areas of Focus: Isolation reduction, Normalization, Relaxation  Music Therapy Interventions: Live music listening  Interruption: No  Patient Fell Asleep at End of Session: No    Post-assessment  Unable to Assess Reason: Outcomes not evaluated  Mood/Affect: Appropriate, Calm, Flat/blunted, Tired/lethargic  Verbalized Emotional State: Acceptance, Enjoyment, Gratitude  Continue Visiting: Yes  Total Session Time (min): 21 minutes    Narrative  Assessment Detail: Pt found awake and oriented, sitting up in bed, watching TV. Displayed calm, flat, and appropriate affect. Stated he was doing \"so-so\", agreed to MTx session.  Plan: To engage pt in live preferred music listening to promote normalization, isolation reduction, and relaxation.  Intervention: MTI provided live preferred music via guitar and voice. Pt passivelly listened to music evidenced by watching MTI and closing his eyes.  Evaluation: Began session by asking pt what he would like to hear, he stated \"anything, play what you want\". Played upbeat song by The Beatles. Pt's body language relaxed and face brightened. Continued playing pt preferred music for pt, gradually choosing more calming songs. Pt requested \"Lean on Me\", toward end of session; smiling/bright affect. Expressed gratitude toward MTI post session.  Follow-up: MTI will continue to follow up with pt and provide MTx services as needed.    Education Documentation  No documentation " found.

## 2023-11-17 NOTE — PROGRESS NOTES
"Molina Godinez is a 68 y.o. male on day 36 of admission presenting with Hospital-acquired pneumonia.    Subjective   No complaints.  Denies pain, SOB, CP, N/V/D  S/p bronch yesterday in bronch suite,  found to have large mucous plugging on L.  Repeat CXR this am with some improvement.      Objective   Exam:   Constitutional: awake, alert, oriented x3, mild confusion, Cachetic, chronically ill appearing, pt in NAD, alert and cooperative  Eyes: PERRL, EOMI, no icterus   ENMT: mucous membranes moist, no apparent injury, no lesions seen  Head/Neck: Neck supple, no apparent injury  Respiratory/Thorax: Lungs CTA bilaterally, diminished asha bases, non-labored breathing, no cough, on 3 L NC  Cardiovascular: Regular, rate and rhythm, no murmurs, normal S1 and S2, sinus tachy  Gastrointestinal: Nondistended, soft, non-tender, BS present x 4, cortrak  Extremities: normal extremities, no edema, contusions or wounds  Neurological: alert and oriented x 3, speech clear, follows commands appropriately, BLE 1/5, moves BUE equally  Skin: Warm and dry, dry/flaky skin    Last Recorded Vitals  Blood pressure (!) 142/92, pulse 93, temperature 36.3 °C (97.3 °F), temperature source Temporal, resp. rate 16, height 1.702 m (5' 7.01\"), weight 53.1 kg (117 lb 1 oz), SpO2 100 %.  Intake/Output last 3 Shifts:  I/O last 3 completed shifts:  In: 908 (17.1 mL/kg) [I.V.:343 (6.5 mL/kg); NG/GT:565]  Out: 850 (16 mL/kg) [Urine:850 (0.4 mL/kg/hr)]  Weight: 53.1 kg     Relevant Results              Scheduled medications  albuterol, 2.5 mg, nebulization, q12h  ergocalciferol, 8,000 Units, oral, Daily  erythromycin, 1 cm, Both Eyes, 4x daily  lidocaine, 1 patch, transdermal, Daily  lidocaine, 2 patch, transdermal, Daily  artificial tears, 1 drop, Both Eyes, 6x daily  melatonin, 10 mg, nasogastric tube, Nightly  micafungin, 100 mg, intravenous, q24h  midodrine, 10 mg, oral, TID with meals  multivitamin with minerals, 1 tablet, oral, Daily  nicotine, 1 " patch, transdermal, Daily  pantoprazole, 40 mg, intravenous, Daily  sodium chloride, 3 mL, nebulization, q12h      Continuous medications  heparin, 0-4,500 Units/hr, Last Rate: 1,200 Units/hr (11/17/23 0515)      PRN medications  PRN medications: acetaminophen, dextrose 10 % in water (D10W), dextrose, diclofenac sodium, heparin, HYDROmorphone, ipratropium-albuteroL, loperamide, moisturizing mouth, oxyCODONE, oxygen, white petrolatum    Results for orders placed or performed during the hospital encounter of 10/11/23 (from the past 24 hour(s))   Fungal Culture/Smear    Specimen: TRACHEAL WASH; Fluid   Result Value Ref Range    Fungal Smear No fungal elements seen    Respiratory Culture/Smear    Specimen: TRACHEAL WASH; Fluid   Result Value Ref Range    Gram Stain (4+) Abundant Polymorphonuclear leukocytes     Gram Stain No organisms seen    Magnesium   Result Value Ref Range    Magnesium 2.10 1.60 - 2.40 mg/dL   CBC   Result Value Ref Range    WBC 11.8 (H) 4.4 - 11.3 x10*3/uL    nRBC 0.0 0.0 - 0.0 /100 WBCs    RBC 4.64 4.50 - 5.90 x10*6/uL    Hemoglobin 13.1 (L) 13.5 - 17.5 g/dL    Hematocrit 40.0 (L) 41.0 - 52.0 %    MCV 86 80 - 100 fL    MCH 28.2 26.0 - 34.0 pg    MCHC 32.8 32.0 - 36.0 g/dL    RDW 13.8 11.5 - 14.5 %    Platelets 420 150 - 450 x10*3/uL   Comprehensive Metabolic Panel   Result Value Ref Range    Glucose 78 74 - 99 mg/dL    Sodium 129 (L) 136 - 145 mmol/L    Potassium 5.2 3.5 - 5.3 mmol/L    Chloride 91 (L) 98 - 107 mmol/L    Bicarbonate 26 21 - 32 mmol/L    Anion Gap 17 10 - 20 mmol/L    Urea Nitrogen 22 6 - 23 mg/dL    Creatinine 0.50 0.50 - 1.30 mg/dL    eGFR >90 >60 mL/min/1.73m*2    Calcium 10.0 8.6 - 10.6 mg/dL    Albumin 3.6 3.4 - 5.0 g/dL    Alkaline Phosphatase 175 (H) 33 - 136 U/L    Total Protein 9.5 (H) 6.4 - 8.2 g/dL    AST 26 9 - 39 U/L    Bilirubin, Total 0.6 0.0 - 1.2 mg/dL    ALT 19 10 - 52 U/L        Assessment/Plan   Principal Problem:    Hospital-acquired pneumonia  Active  Problems:    Fungemia    Essential hypertension, benign    Hepatitis C virus infection cured after antiviral drug therapy    Alcoholism (CMS/HCC)    Chronic pulmonary embolism (CMS/HCC)    COPD (chronic obstructive pulmonary disease) (CMS/HCC)    HLD (hyperlipidemia)    Uremia    GBS (Guillain Hope syndrome) (CMS/[facility])    68 y.o. male with PMHx COPD, HTN,  alcohol and opioid use disorder, hepatitis C, chronic PE admitted 10/12 with BLE weakness, recently admitted in the MICU from 10/13-10/16 for suspected alcohol withdrawal refractory to ativan, started on phenobarb taper, treated for streptococcus pneumoniae PNA with Ceftriaxone and transferred to [facility].  RRT called for lethargy, transferred to MICU on 10/17 with new FiO2 requirements.     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  No plans for further testing or treatment at this time d/t fungemia.  Immune modulation could potentially worsen infection.  Once infection is cleared can re consult Neurology for potential treatment  - Continue Melatonin HS  -Pain management with Lidocaine patches, Diclofenac gel prn, Acetaminophen prn, Oxycodone 5 mg Q6,  - stop Hydromorphone 0.2 mg Q6 ( pt not requiring)  - PT/OT/SLP following  - music therapy following     Optho: Dry eye, Cataracts, asha eyes  - seen by Opthomology given c/o blurriness with new fungemia.  No signs of fungal endophthalmitis  - rec artifical tears, Erythromycin     Pulm: Hx of COPD, on 2-4 L home O2, Chronic PE, Smoker, Acute on chronic hypoxic Respiratory failure   - 11/17 CXR with improvement on L  - s/p Bronch x3 with mucous plugging, L lung consolidation, Most recently on 11/6 with Dr. Cuenca and 11/16 with Dr Kincaid  - Continue with Albuterol BID and 7% saline BID.  Airway clearance techniques (IPV) Q4 hours.   - Acapella,  IS while awake  - Continue heparin drip.  Transition to home Apixiban 5 mg BID when no further procedures planned  - Seen by Dr Klein for PAH work up on 10/2    Cardiac:  Hx HTN, TV Endocarditis, Tachycardia resolving  - 10/31 Echo: LVEF 75-80%, RV overload, Mod-severe TV, Mod-severe RV systolic pressure, Tricuspid echodensity measuring 1.7 x 0.8 cm, atrial septal aneurysm  - Repeat Echo on 11/2/23: with no definite valvular vegetations  - Seen by CT surgery for TV vegetation- too high risk for surgery at this time.  Follow up when stable  - Had SVT this admit.  Continue on tele  - Continue Midodrine 10 mg TID  - 11/17 sinus tachy- fluid responsive, given 1 L total LR     FEN/GI: Hx of Hep C, Dysphagia, s/p Cortrak, Severe Protein Calorie Malnutrition, Hyponatremia Na (133-- 130-- 129)  - TF via Cortrak- switched to bolus feeds per nutrition recs 275ml QID , can give 60 ml FWF before and after each bolus  - Continue PPI, MVI  - re-eval by SLP- will see today or 11/18  - Daily RFP/Mag      Renal: KRYSTEN this admit s/p CVVH in MICU.  Now normal renal function.  Vit D insuff  - Voiding per external cath  - Seen by Nephrology, signed off  - Continue on Midodrine   - continue Vit D supp, 8000 units for 8 weeks  - Strict I's and O's      Heme: Hx of Anemia of chronic disease,  Hx of PE  - restarted on heparin drip post bronch 11/16  -will transition back to home Apixiban at discharge  - Daily CBC     ID:  Hx of Hepatitis C, Fungemia (10/31 BC, 1/4), Strep pneumonaie PNA, Stenotrophomonas PNA  - Afebrile, leukocytosis downtrending  - +Stenotrophomonas on sputum cx 10/20, 11/5 (from BAL), 11/6 sputum.  10/31 BC with Candida (1/4 vials).  10/31 Urine cx neg, 11/1: sputum cancelled.  11/2 C. Diff neg, 11/3 BC x2 Neg,   - 11/6 tracheal wash AFB pending.  Fungal cx 1+ rare Candida albicans  - seen by ID.  Continue on She (11/3-- STOP date 11/17)  - 11/16 Bronch cx: Gram stain 4+ PMN leuks, final results pending, fungal cx  pending, AFB pending     Lines: PIV     Code Status: Full Code  NOK: Sister Sarah Godinez [phone]      Dispo: continue SDU for bronchial hygiene. Possible transfer to [facility] tomorrow.   Will need SNF at discharge, PT/OT/SLP following.       I spent 35 minutes in the professional and overall care of this patient.    Paris Pace, APRN-CNP

## 2023-11-18 LAB
ERYTHROCYTE [DISTWIDTH] IN BLOOD BY AUTOMATED COUNT: 14.2 % (ref 11.5–14.5)
HCT VFR BLD AUTO: 33.8 % (ref 41–52)
HGB BLD-MCNC: 11.2 G/DL (ref 13.5–17.5)
MCH RBC QN AUTO: 29.1 PG (ref 26–34)
MCHC RBC AUTO-ENTMCNC: 33.1 G/DL (ref 32–36)
MCV RBC AUTO: 88 FL (ref 80–100)
NRBC BLD-RTO: 0 /100 WBCS (ref 0–0)
PLATELET # BLD AUTO: 309 X10*3/UL (ref 150–450)
RBC # BLD AUTO: 3.85 X10*6/UL (ref 4.5–5.9)
UFH PPP CHRO-ACNC: 0.5 IU/ML
UFH PPP CHRO-ACNC: 1.1 IU/ML
WBC # BLD AUTO: 9.8 X10*3/UL (ref 4.4–11.3)

## 2023-11-18 PROCEDURE — S4991 NICOTINE PATCH NONLEGEND: HCPCS | Performed by: NURSE PRACTITIONER

## 2023-11-18 PROCEDURE — 85520 HEPARIN ASSAY: CPT | Performed by: NURSE PRACTITIONER

## 2023-11-18 PROCEDURE — 2500000001 HC RX 250 WO HCPCS SELF ADMINISTERED DRUGS (ALT 637 FOR MEDICARE OP): Performed by: NURSE PRACTITIONER

## 2023-11-18 PROCEDURE — 36415 COLL VENOUS BLD VENIPUNCTURE: CPT | Performed by: NURSE PRACTITIONER

## 2023-11-18 PROCEDURE — 2500000002 HC RX 250 W HCPCS SELF ADMINISTERED DRUGS (ALT 637 FOR MEDICARE OP, ALT 636 FOR OP/ED): Performed by: NURSE PRACTITIONER

## 2023-11-18 PROCEDURE — 2500000005 HC RX 250 GENERAL PHARMACY W/O HCPCS: Performed by: NURSE PRACTITIONER

## 2023-11-18 PROCEDURE — 2500000004 HC RX 250 GENERAL PHARMACY W/ HCPCS (ALT 636 FOR OP/ED): Performed by: NURSE PRACTITIONER

## 2023-11-18 PROCEDURE — 2060000001 HC INTERMEDIATE ICU ROOM DAILY

## 2023-11-18 PROCEDURE — 94668 MNPJ CHEST WALL SBSQ: CPT

## 2023-11-18 PROCEDURE — 94640 AIRWAY INHALATION TREATMENT: CPT

## 2023-11-18 PROCEDURE — 2500000001 HC RX 250 WO HCPCS SELF ADMINISTERED DRUGS (ALT 637 FOR MEDICARE OP)

## 2023-11-18 PROCEDURE — 85027 COMPLETE CBC AUTOMATED: CPT | Performed by: NURSE PRACTITIONER

## 2023-11-18 PROCEDURE — 92526 ORAL FUNCTION THERAPY: CPT | Mod: GN

## 2023-11-18 RX ORDER — MIDODRINE HYDROCHLORIDE 5 MG/1
5 TABLET ORAL
Status: DISCONTINUED | OUTPATIENT
Start: 2023-11-19 | End: 2023-11-21

## 2023-11-18 RX ADMIN — Medication 3 ML: at 20:22

## 2023-11-18 RX ADMIN — ALBUTEROL SULFATE 2.5 MG: 2.5 SOLUTION RESPIRATORY (INHALATION) at 09:37

## 2023-11-18 RX ADMIN — OXYCODONE HYDROCHLORIDE 5 MG: 5 TABLET ORAL at 12:50

## 2023-11-18 RX ADMIN — OXYCODONE HYDROCHLORIDE 5 MG: 5 TABLET ORAL at 08:32

## 2023-11-18 RX ADMIN — CARBOXYMETHYLCELLULOSE SODIUM 1 DROP: 5 SOLUTION/ DROPS OPHTHALMIC at 13:00

## 2023-11-18 RX ADMIN — LIDOCAINE 2 PATCH: 4 PATCH TOPICAL at 08:29

## 2023-11-18 RX ADMIN — ALBUTEROL SULFATE 2.5 MG: 2.5 SOLUTION RESPIRATORY (INHALATION) at 20:22

## 2023-11-18 RX ADMIN — HEPARIN SODIUM 12 UNITS/HR: 10000 INJECTION, SOLUTION INTRAVENOUS at 01:50

## 2023-11-18 RX ADMIN — ACETAMINOPHEN 650 MG: 325 TABLET ORAL at 17:17

## 2023-11-18 RX ADMIN — ESOMEPRAZOLE MAGNESIUM 40 MG: 40 FOR SUSPENSION ORAL at 06:16

## 2023-11-18 RX ADMIN — MIDODRINE HYDROCHLORIDE 10 MG: 10 TABLET ORAL at 08:32

## 2023-11-18 RX ADMIN — Medication 8000 UNITS: at 08:31

## 2023-11-18 RX ADMIN — LIDOCAINE 1 PATCH: 4 PATCH TOPICAL at 08:33

## 2023-11-18 RX ADMIN — MIDODRINE HYDROCHLORIDE 10 MG: 10 TABLET ORAL at 12:50

## 2023-11-18 RX ADMIN — ACETAMINOPHEN 650 MG: 325 TABLET ORAL at 08:32

## 2023-11-18 RX ADMIN — Medication 1 TABLET: at 08:32

## 2023-11-18 RX ADMIN — CARBOXYMETHYLCELLULOSE SODIUM 1 DROP: 5 SOLUTION/ DROPS OPHTHALMIC at 06:16

## 2023-11-18 RX ADMIN — Medication 3 ML: at 09:36

## 2023-11-18 RX ADMIN — OXYCODONE HYDROCHLORIDE 5 MG: 5 TABLET ORAL at 18:16

## 2023-11-18 ASSESSMENT — PAIN - FUNCTIONAL ASSESSMENT
PAIN_FUNCTIONAL_ASSESSMENT: 0-10

## 2023-11-18 ASSESSMENT — PAIN DESCRIPTION - LOCATION
LOCATION: GENERALIZED
LOCATION: GENERALIZED

## 2023-11-18 ASSESSMENT — PAIN SCALES - GENERAL
PAINLEVEL_OUTOF10: 0 - NO PAIN
PAINLEVEL_OUTOF10: 7
PAINLEVEL_OUTOF10: 0 - NO PAIN
PAINLEVEL_OUTOF10: 5 - MODERATE PAIN
PAINLEVEL_OUTOF10: 0 - NO PAIN
PAINLEVEL_OUTOF10: 7
PAINLEVEL_OUTOF10: 5 - MODERATE PAIN

## 2023-11-18 NOTE — CARE PLAN
The patient's goals for the shift include PATITO    The clinical goals for the shift include pt will maintain a clear airway for this shift    Over the shift, the patient did not make progress toward the following goals. Barriers to progression include . Recommendations to address these barriers include .

## 2023-11-18 NOTE — PROGRESS NOTES
"Molina Godinez is a 68 y.o. male on day 37 of admission presenting with Hospital-acquired pneumonia.    Subjective   Episodes of tachycardia over night with HR up to 115, thought to be 2/2 dehydration.  Got total of 1L IVF bolus, with improvement in HR    Objective   Physical Exam:  Constitutional:  NAD, alert and cooperative  Eyes: PERRL, EOMI, no icterus   ENMT: mucous membranes moist  Head/Neck: Neck supple, no apparent injury  Respiratory/Thorax: Lungs diminished on right, minimal air movement on left,  moist, weak productive cough, on 2L NC  Cardiovascular: HRR, no m/g/r  Gastrointestinal: Nondistended, soft, non-tender, BS present x 4, Corpak in place  : external catheter with clear yellow urine  Musculoskeletal: generalized weakness, minimal movement of BLE   Extremities:  no edema, contusions or wounds  Neurological: alert and oriented x 3, speech clear, follows commands appropriately, no focal deficits  Skin: Warm and dry, no lesions, no rashes     Last Recorded Vitals  Blood pressure 111/72, pulse 103, temperature 36.8 °C (98.2 °F), temperature source Temporal, resp. rate 21, height 1.702 m (5' 7.01\"), weight 55.5 kg (122 lb 5.7 oz), SpO2 92 %.    Intake/Output last 3 Shifts:  I/O last 3 completed shifts:  In: 1221 (23 mL/kg) [I.V.:756 (14.2 mL/kg); NG/GT:465]  Out: 1150 (21.7 mL/kg) [Urine:1150 (0.6 mL/kg/hr)]  Weight: 53.1 kg   Total net +911 mL     Scheduled medications  albuterol, 2.5 mg, nebulization, q12h  ergocalciferol, 8,000 Units, oral, Daily  esomeprazole, 40 mg, nasogastric tube, Daily before breakfast  lidocaine, 1 patch, transdermal, Daily  lidocaine, 2 patch, transdermal, Daily  artificial tears, 1 drop, Both Eyes, 6x daily  melatonin, 10 mg, nasogastric tube, Nightly  midodrine, 10 mg, oral, TID with meals  multivitamin with minerals, 1 tablet, oral, Daily  nicotine, 1 patch, transdermal, Daily  sodium chloride, 3 mL, nebulization, q12h    Continuous medications  heparin, 0-4,500 Units/hr, " "Last Rate: 1,200 Units/hr (11/18/23 0300)    PRN medications  PRN medications: acetaminophen, dextrose 10 % in water (D10W), dextrose, diclofenac sodium, heparin, ipratropium-albuteroL, loperamide, moisturizing mouth, oxyCODONE, oxygen, white petrolatum     Relevant Results  Results for orders placed or performed during the hospital encounter of 10/11/23 (from the past 24 hour(s))   Heparin Assay, UFH   Result Value Ref Range    Heparin Unfractionated 0.4 See Comment Below for Therapeutic Ranges IU/mL   CBC   Result Value Ref Range    WBC 9.8 4.4 - 11.3 x10*3/uL    nRBC 0.0 0.0 - 0.0 /100 WBCs    RBC 3.85 (L) 4.50 - 5.90 x10*6/uL    Hemoglobin 11.2 (L) 13.5 - 17.5 g/dL    Hematocrit 33.8 (L) 41.0 - 52.0 %    MCV 88 80 - 100 fL    MCH 29.1 26.0 - 34.0 pg    MCHC 33.1 32.0 - 36.0 g/dL    RDW 14.2 11.5 - 14.5 %    Platelets 309 150 - 450 x10*3/uL   Heparin Assay, UFH   Result Value Ref Range    Heparin Unfractionated 1.1 (HH) See Comment Below for Therapeutic Ranges IU/mL       XR chest 1 view  Result Date: 11/17/2023  1. Mild interval improvement opacification of the left hemithorax, particularly at the upper lung zone. Decreased left lung volume. Mild mediastinal shift to the left.   2. Similar to prior, there is suggestion of underlying consolidation.   3. Unable to exclude left pleural effusion.       Bronchoscopy Diagnostic  Result Date: 11/16/2023  FINDINGS: The distal trachea appeared normal. Inspection of RIGHT bronchial tree to the segmental level appeared normal. Inspection of LEFT bronchial tree to the segmental level appeared normal. Copious moderately thick secretions were present throughout the large airways. PROCEDURES: Bronchial washing was performed, see \"Specimens\" section for ordered related labs. Therapeutic aspiration was performed throughout the central airways. Post maneuver airway examination revealed patent central airways.     CT chest w IV contrast  Result Date: 11/9/2023  1.  " Consolidation/collapse of the left lung with extensive mucoid impaction/aspirated material within the left mainstem bronchus and segmental bronchi. Correlate with aspiration.   Extensive atherosclerotic changes of the coronary arteries and right heart enlargement. Correlate with elevated right-sided pressures.       Transthoracic Echo (TTE) Limited  Result Date: 11/2/2023  1. Left ventricular systolic function is hyperdynamic with a 70-75% estimated ejection fraction.    2. No definite valvular vegetations were visualized.    3. Right ventricular pressure overload.    4. Spectral Doppler shows an impaired relaxation pattern of left ventricular diastolic filling.    5. There is mildly reduced right ventricular systolic function.    6. The right atrium is moderately dilated.    7. Mildly elevated RVSP.    8. Moderate tricuspid regurgitation visualized.    9. The PA systolic pressure may be underestimated. The previously noted posterior tricuspid leaflet vegetation is not clearly seen on this study,  10. Left ventricular cavity size is decreased.   11. The reasons for repeating the echo from 10/31 is not clear.      MR brain w and wo IV contrast  Result Date: 10/21/2023  Brain: 1. No acute ischemic infarct or intracranial hemorrhage. No abnormal parenchymal enhancement.   2. Nonspecific subcortical and periventricular T2/FLAIR hyperintensities may represent sequelae of chronic small vessel ischemic changes.   3. Remote lacunar infarcts in the left thalamus.       Cervical spine:   1. Multilevel degenerative disc disease and facet arthrosis most pronounced at C3-C4 with moderate to severe spinal canal stenosis and moderate to severe bilateral neural foraminal stenosis. There is flattening of the cervical cord with question subtle increased cord signal on the STIR sequence. Please correlate clinically for symptoms of myelopathy. No abnormal cord enhancement.   2. Patchy consolidative opacities throughout the left lung  with a small left pleural effusion. Correlate with dedicated chest imaging.     Thoracic spine:   1. No significant spinal canal or neural foraminal stenosis. No abnormal enhancement.   2. Nonacute minimal compression deformity at T3. Mild compression deformity at T11 with a proximally 25% loss of vertebral body height and no osseous retropulsion into the spinal canal. This was present on prior exam 10/16/2023.       Lumbar spine:   1. Multilevel lumbar spondylosis as detailed above. No spinal canal stenosis.   2. No abnormal signal or enhancement within the distal cord or nerve roots.        Assessment/Plan   Principal Problem:    Hospital-acquired pneumonia  Active Problems:    Fungemia    Essential hypertension, benign    Hepatitis C virus infection cured after antiviral drug therapy    Alcoholism (CMS/HCC)    Chronic pulmonary embolism (CMS/HCC)    COPD (chronic obstructive pulmonary disease) (CMS/HCC)    HLD (hyperlipidemia)    GBS (Guillain Brandt syndrome) (CMS/HCC)    68 y.o. male with PMHx COPD, HTN,  alcohol and opioid use disorder, hepatitis C, chronic PE admitted 10/12 with BLE weakness, recently admitted in the MICU from 10/13-10/16 for suspected alcohol withdrawal refractory to ativan, started on phenobarb taper, treated for streptococcus pneumoniae PNA with Ceftriaxone and transferred to [facility]. 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  No plans for further testing or treatment at this time d/t fungemia.  Immune modulation could potentially worsen infection.  Once infection is cleared can re consult Neurology for potential treatment  - Continue Melatonin HS  -Pain management with Lidocaine patches, Diclofenac gel prn, Acetaminophen prn,  Oxycodone 5 mg Q6,  - PT/OT/SLP following  - music therapy following     Optho: Dry eye, Cataracts, asha eyes  - seen by Opthomology given c/o blurriness with new fungemia.  No signs of fungal endophthalmitis  - rec artifical tears, Erythromycin     Pulm: Hx of COPD, on 2-4 L home O2, Chronic PE, Smoker, Acute on chronic hypoxic Respiratory failure   - 11/17 CXR with improvement on L  - s/p Bronch x3 with mucous plugging, L lung consolidation, Most recently on 11/6 with Dr. Cuenca and 11/16 with Dr Kincaid  - Continue with Albuterol BID and 7% saline BID.  Airway clearance techniques (IPV) Q4 hours.   - Acapella, IS while awake  - Continue heparin drip.  Transition to home Apixiban 5 mg BID when no further procedures planned  - Seen by Dr Klein for PAH work up on 10/2     Cardiac:  Hx HTN, TV Endocarditis, Tachycardia resolving  - 10/31 Echo: LVEF 75-80%, RV overload, Mod-severe TV, Mod-severe RV systolic pressure, Tricuspid echodensity measuring 1.7 x 0.8 cm, atrial septal aneurysm  - Repeat Echo on 11/2/23: with no definite valvular vegetations  - Seen by CT surgery for TV vegetation- too high risk for surgery at this time.  Follow up when stable  - Had SVT this admit.  Continue on tele  - Continue Midodrine 10 mg TID  - 11/17 sinus tachy- fluid responsive, given 1 L total LR     FEN/GI: Hx of Hep C, Dysphagia, s/p Cortrak, Severe Protein Calorie Malnutrition, Hyponatremia Na improving  - TF via Cortrak- switched to bolus feeds per nutrition recs 275ml QID, now back to continuous d/t poor tolerance    - Continue PPI, MVI  - seen by SLP today 11/18 at bedside.    -Plan for MBS on Monday 11/20 (ordered)  - Daily RFP/Mag      Renal: KRYSTEN this admit s/p CVVH in MICU.  Now normal renal function.  Vit D insuff  - Voiding per external cath  - Seen by Nephrology, signed off  - Continue on Midodrine   - continue Vit D supp, 8000 units for 8 weeks  - Strict I's and O's      Heme: Hx of Anemia of chronic disease,  Hx of  PE  - restarted on heparin drip post bronch 11/16  -will transition back to home Apixiban at discharge when no further procedures   - Daily CBC     ID:  Hx of Hepatitis C, Fungemia (10/31 BC, 1/4), Strep pneumonaie PNA, Stenotrophomonas PNA  - Afebrile, leukocytosis downtrending  - +Stenotrophomonas on sputum cx 10/20, 11/5 (from BAL), 11/6 sputum.  10/31 BC with Candida (1/4 vials).  10/31 Urine cx neg, 11/1: sputum cancelled.  11/2 C. Diff neg, 11/3 BC x2 Neg,   - 11/6 tracheal wash AFB pending.  Fungal cx 1+ rare Candida albicans  - seen by ID.  She (11/3- 11/17)  - 11/16 Bronch cx: Gram stain 4+ PMN leuks, final results pending, fungal cx pending, AFB pending     Lines: PIV     Code Status: Full Code  NOK: Sister Sarah Godinez [phone]      Dispo: continue SDU for bronchial hygiene. consider transfer to medicine floor.   Will need SNF at discharge, PT/OT/SLP following.    D/w Dr Hao VANCE spent >45 minutes in the professional and overall care of this patient.      WOO So-CNP

## 2023-11-18 NOTE — PROGRESS NOTES
"Speech-Language Pathology        Inpatient Speech-Language Pathology Clinical Swallow Evaluation (Re-evaluation)    Patient Name: Molina Godinez  MRN: [id number]  Today's Date: 11/18/2023   Time Calculation  Start Time: 1042  Stop Time: 1100  Time Calculation (min): 18 min       Recommendations:  Solid Diet Recommendations : NPO  Liquid Diet Recommendations: NPO  Medication Administration Recommendations: Non Oral  Frequent oral care  May allow ice chips x3-5/hour with RN given oral care  MBSS Monday     Assessment:  Repeat evaluation completed given improvement in overall medical status. At this time pt continues to present with s/s aspiration though overall presentation and ability to participate improved since most recent eval 10/19. Recommend NPO with alternative means nutrition/hydration and MBSS to assess oropharyngeal swallow and determine swallow plan of care/least restrictive diet.     Plan:  Inpatient/Swing Bed or Outpatient: Inpatient  SLP Plan: Skilled SLP  SLP Frequency: 1x per week  Duration: 3 weeks  SLP Discharge Recommendations: Continue skilled speech therapy services post discharge  Discussed POC: Patient, Nursing, Physician  Discussed Risks/Benefits: Yes  Patient/Caregiver Agreeable: Yes  SLP - OK to Discharge: Yes        Subjective     History and Physical:    68 y.o. male with PMHx COPD, HTN,  alcohol and opioid use disorder, hepatitis C, chronic PE admitted 10/12 with BLE weakness, recently admitted in the MICU from 10/13-10/16 for suspected alcohol withdrawal refractory to ativan, started on phenobarb taper, treated for streptococcus pneumoniae PNA with Ceftriaxone and transferred to [facility].  RRT called for lethargy, transferred to MICU on 10/17 with new FiO2 requirements.   Pt with multiple intubations during admission.     Relevant SLP Hx:  CSE completed earlier this admit on 10/19: \"Clinical swallow evaluation revealing high c/f aspiration/pharyngeal dysphagia in setting of poor secretion " "management and compromised respiratory status. Given that pt with PNA, thick secretions, congested coughing, pt at high risk for respiratory complications 2/2 aspiration, thus would recommend NPO at this time.  Re-evaluation when pt managing secretions and respiratory status improved.  \"    Baseline Assessment:  Respiratory Status: Supplemental O2 via NC  History of Intubation: No  Behavior/Cognition: Alert, oriented, willing to participate.    Dobhoff in place.       Objective     Oral/Motor Assessment:  Oral Hygiene: Poor  Dentition: Poor Dental/Oral Hygiene (Most missing teeth)  Facial Symmetry: Within Functional Limits  Labial ROM: Within Functional Limits  Vocal Quality Impairment: Hoarse        Clinical Observations:  Consistencies Trialed: Yes  Consistencies Trialed: Ice Chips, Thin (IDDSI Level 0) - Spoon, Thin (IDDSI Level 0) - Straw    Pt with adequate extraction via tsp and straw. Noted cough with x1/2 ice chip, no s/s aspiration with tsp water. When cued for single and consecutive sips of water pt demonstrated overt s/s aspiration including dyspnea and intermittent cough.     Inpatient Education:  Pt educated on result and recommendations. Pt indicated understanding.        Goals: Patient/Family will verbalize/demonstrate comprehension of dysphagia education, strategies, recommendations and POC.                  "

## 2023-11-19 LAB
ALBUMIN SERPL BCP-MCNC: 3.3 G/DL (ref 3.4–5)
ALP SERPL-CCNC: 192 U/L (ref 33–136)
ALT SERPL W P-5'-P-CCNC: 22 U/L (ref 10–52)
ANION GAP SERPL CALC-SCNC: 15 MMOL/L (ref 10–20)
AST SERPL W P-5'-P-CCNC: 33 U/L (ref 9–39)
BILIRUB SERPL-MCNC: 0.3 MG/DL (ref 0–1.2)
BUN SERPL-MCNC: 25 MG/DL (ref 6–23)
CALCIUM SERPL-MCNC: 9.3 MG/DL (ref 8.6–10.6)
CHLORIDE SERPL-SCNC: 93 MMOL/L (ref 98–107)
CO2 SERPL-SCNC: 28 MMOL/L (ref 21–32)
CREAT SERPL-MCNC: 0.39 MG/DL (ref 0.5–1.3)
ERYTHROCYTE [DISTWIDTH] IN BLOOD BY AUTOMATED COUNT: 13.9 % (ref 11.5–14.5)
GFR SERPL CREATININE-BSD FRML MDRD: >90 ML/MIN/1.73M*2
GLUCOSE SERPL-MCNC: 109 MG/DL (ref 74–99)
HCT VFR BLD AUTO: 35.6 % (ref 41–52)
HGB BLD-MCNC: 11.7 G/DL (ref 13.5–17.5)
MAGNESIUM SERPL-MCNC: 1.89 MG/DL (ref 1.6–2.4)
MCH RBC QN AUTO: 28.4 PG (ref 26–34)
MCHC RBC AUTO-ENTMCNC: 32.9 G/DL (ref 32–36)
MCV RBC AUTO: 86 FL (ref 80–100)
NRBC BLD-RTO: 0 /100 WBCS (ref 0–0)
PLATELET # BLD AUTO: 319 X10*3/UL (ref 150–450)
POTASSIUM SERPL-SCNC: 4.6 MMOL/L (ref 3.5–5.3)
PROT SERPL-MCNC: 8.4 G/DL (ref 6.4–8.2)
RBC # BLD AUTO: 4.12 X10*6/UL (ref 4.5–5.9)
SODIUM SERPL-SCNC: 131 MMOL/L (ref 136–145)
UFH PPP CHRO-ACNC: 0.7 IU/ML
WBC # BLD AUTO: 8 X10*3/UL (ref 4.4–11.3)

## 2023-11-19 PROCEDURE — 2500000001 HC RX 250 WO HCPCS SELF ADMINISTERED DRUGS (ALT 637 FOR MEDICARE OP): Performed by: NURSE PRACTITIONER

## 2023-11-19 PROCEDURE — 2500000005 HC RX 250 GENERAL PHARMACY W/O HCPCS: Performed by: NURSE PRACTITIONER

## 2023-11-19 PROCEDURE — 36415 COLL VENOUS BLD VENIPUNCTURE: CPT | Performed by: NURSE PRACTITIONER

## 2023-11-19 PROCEDURE — 83735 ASSAY OF MAGNESIUM: CPT | Performed by: NURSE PRACTITIONER

## 2023-11-19 PROCEDURE — 2060000001 HC INTERMEDIATE ICU ROOM DAILY

## 2023-11-19 PROCEDURE — 94640 AIRWAY INHALATION TREATMENT: CPT

## 2023-11-19 PROCEDURE — 85027 COMPLETE CBC AUTOMATED: CPT | Performed by: NURSE PRACTITIONER

## 2023-11-19 PROCEDURE — 94668 MNPJ CHEST WALL SBSQ: CPT

## 2023-11-19 PROCEDURE — 2500000004 HC RX 250 GENERAL PHARMACY W/ HCPCS (ALT 636 FOR OP/ED): Performed by: NURSE PRACTITIONER

## 2023-11-19 PROCEDURE — 80053 COMPREHEN METABOLIC PANEL: CPT | Performed by: NURSE PRACTITIONER

## 2023-11-19 PROCEDURE — 2500000002 HC RX 250 W HCPCS SELF ADMINISTERED DRUGS (ALT 637 FOR MEDICARE OP, ALT 636 FOR OP/ED): Performed by: NURSE PRACTITIONER

## 2023-11-19 PROCEDURE — 85520 HEPARIN ASSAY: CPT | Performed by: NURSE PRACTITIONER

## 2023-11-19 RX ADMIN — HEPARIN SODIUM 1200 UNITS/HR: 10000 INJECTION, SOLUTION INTRAVENOUS at 00:27

## 2023-11-19 RX ADMIN — ALBUTEROL SULFATE 2.5 MG: 2.5 SOLUTION RESPIRATORY (INHALATION) at 09:13

## 2023-11-19 RX ADMIN — OXYCODONE HYDROCHLORIDE 5 MG: 5 TABLET ORAL at 06:47

## 2023-11-19 RX ADMIN — ALBUTEROL SULFATE 2.5 MG: 2.5 SOLUTION RESPIRATORY (INHALATION) at 20:15

## 2023-11-19 RX ADMIN — Medication 1 TABLET: at 08:21

## 2023-11-19 RX ADMIN — Medication 8000 UNITS: at 08:21

## 2023-11-19 RX ADMIN — ESOMEPRAZOLE MAGNESIUM 40 MG: 40 FOR SUSPENSION ORAL at 06:47

## 2023-11-19 RX ADMIN — MIDODRINE HYDROCHLORIDE 5 MG: 5 TABLET ORAL at 11:28

## 2023-11-19 RX ADMIN — OXYCODONE HYDROCHLORIDE 5 MG: 5 TABLET ORAL at 17:24

## 2023-11-19 RX ADMIN — MIDODRINE HYDROCHLORIDE 5 MG: 5 TABLET ORAL at 08:21

## 2023-11-19 RX ADMIN — Medication 3 ML: at 09:13

## 2023-11-19 RX ADMIN — ACETAMINOPHEN 650 MG: 325 TABLET ORAL at 10:04

## 2023-11-19 RX ADMIN — MIDODRINE HYDROCHLORIDE 5 MG: 5 TABLET ORAL at 16:26

## 2023-11-19 RX ADMIN — LIDOCAINE 2 PATCH: 4 PATCH TOPICAL at 08:21

## 2023-11-19 RX ADMIN — OXYCODONE HYDROCHLORIDE 5 MG: 5 TABLET ORAL at 00:18

## 2023-11-19 RX ADMIN — ACETAMINOPHEN 650 MG: 325 TABLET ORAL at 16:26

## 2023-11-19 RX ADMIN — HEPARIN SODIUM 1200 UNITS/HR: 10000 INJECTION, SOLUTION INTRAVENOUS at 22:11

## 2023-11-19 RX ADMIN — OXYCODONE HYDROCHLORIDE 5 MG: 5 TABLET ORAL at 11:31

## 2023-11-19 RX ADMIN — Medication 3 ML: at 20:14

## 2023-11-19 ASSESSMENT — COGNITIVE AND FUNCTIONAL STATUS - GENERAL
DAILY ACTIVITIY SCORE: 11
CLIMB 3 TO 5 STEPS WITH RAILING: TOTAL
HELP NEEDED FOR BATHING: A LOT
TURNING FROM BACK TO SIDE WHILE IN FLAT BAD: A LOT
DRESSING REGULAR UPPER BODY CLOTHING: A LOT
MOVING TO AND FROM BED TO CHAIR: TOTAL
DRESSING REGULAR LOWER BODY CLOTHING: A LOT
EATING MEALS: TOTAL
PERSONAL GROOMING: A LOT
MOVING FROM LYING ON BACK TO SITTING ON SIDE OF FLAT BED WITH BEDRAILS: A LOT
TURNING FROM BACK TO SIDE WHILE IN FLAT BAD: A LOT
STANDING UP FROM CHAIR USING ARMS: TOTAL
CLIMB 3 TO 5 STEPS WITH RAILING: TOTAL
DRESSING REGULAR UPPER BODY CLOTHING: A LOT
DAILY ACTIVITIY SCORE: 11
TOILETING: A LOT
MOBILITY SCORE: 8
STANDING UP FROM CHAIR USING ARMS: TOTAL
PERSONAL GROOMING: A LOT
MOVING FROM LYING ON BACK TO SITTING ON SIDE OF FLAT BED WITH BEDRAILS: A LOT
EATING MEALS: TOTAL
HELP NEEDED FOR BATHING: A LOT
MOVING TO AND FROM BED TO CHAIR: TOTAL
TOILETING: A LOT
WALKING IN HOSPITAL ROOM: TOTAL
DRESSING REGULAR LOWER BODY CLOTHING: A LOT
WALKING IN HOSPITAL ROOM: TOTAL
MOBILITY SCORE: 8

## 2023-11-19 ASSESSMENT — PAIN SCALES - GENERAL
PAINLEVEL_OUTOF10: 0 - NO PAIN
PAINLEVEL_OUTOF10: 0 - NO PAIN
PAINLEVEL_OUTOF10: 3
PAINLEVEL_OUTOF10: 0 - NO PAIN
PAINLEVEL_OUTOF10: 5 - MODERATE PAIN
PAINLEVEL_OUTOF10: 0 - NO PAIN
PAINLEVEL_OUTOF10: 9
PAINLEVEL_OUTOF10: 7
PAINLEVEL_OUTOF10: 0 - NO PAIN
PAINLEVEL_OUTOF10: 7

## 2023-11-19 ASSESSMENT — PAIN DESCRIPTION - LOCATION
LOCATION: LEG
LOCATION: FOOT
LOCATION: LEG

## 2023-11-19 ASSESSMENT — PAIN - FUNCTIONAL ASSESSMENT
PAIN_FUNCTIONAL_ASSESSMENT: 0-10

## 2023-11-19 ASSESSMENT — PAIN DESCRIPTION - ORIENTATION
ORIENTATION: RIGHT;LEFT

## 2023-11-19 NOTE — SIGNIFICANT EVENT
Rapid Response RN Note     11/19/23 0710   Onset Documentation   Rapid Response Initiated By Radar auto page   Location/Room [facility]  ([facility] 6073)   Pager Time [phone]   Arrival Time 0710   Event End Time 0713   Primary Reason for Call Radar auto page  (RADAR score 6)     Rapid response RN at bedside for RADAR score 6 due to the following VS: T 36.1 °Celsius;  ; RR 25; /83; SPO2 93%.     Reviewed above VS with bedside RN via phone.  VS within patient's current trends.  No interventions by rapid response team indicated at this time.      Staff to page rapid response for any concerns or acute change in condition/VS.

## 2023-11-19 NOTE — PROGRESS NOTES
"Molina Godinez is a 68 y.o. male on day 38 of admission presenting with Hospital-acquired pneumonia.    Subjective   No acute events over night    Objective   Physical Exam:  Constitutional:  NAD, alert and cooperative  Eyes: PERRL, EOMI, no icterus   ENMT: mucous membranes moist  Head/Neck: Neck supple, no apparent injury  Respiratory/Thorax: Lungs diminished on right, minimal air movement on left,  moist, weak productive cough, on 2L NC  Cardiovascular: HRR, no m/g/r  Gastrointestinal: Nondistended, soft, non-tender, BS present x 4, Corpak in place  : external catheter with clear yellow urine  Musculoskeletal: generalized weakness, minimal movement of BLE   Extremities:  no edema, contusions or wounds  Neurological: alert and oriented x 3, speech clear, follows commands appropriately, no focal deficits  Skin: Warm and dry, no lesions, no rashes     Last Recorded Vitals  Blood pressure 138/87, pulse 108, temperature 36.1 °C (97 °F), temperature source Temporal, resp. rate 25, height 1.702 m (5' 7.01\"), weight 56.8 kg (125 lb 3.5 oz), SpO2 93 %.    Intake/Output last 3 Shifts:  I/O last 3 completed shifts:  In: 2558 (45 mL/kg) [I.V.:538 (9.5 mL/kg); NG/GT:2020]  Out: 1370 (24.1 mL/kg) [Urine:1250 (0.6 mL/kg/hr); Emesis/NG output:120]  Weight: 56.8 kg     Scheduled medications  albuterol, 2.5 mg, nebulization, q12h  ergocalciferol, 8,000 Units, oral, Daily  esomeprazole, 40 mg, nasogastric tube, Daily before breakfast  lidocaine, 1 patch, transdermal, Daily  lidocaine, 2 patch, transdermal, Daily  melatonin, 10 mg, nasogastric tube, Nightly  midodrine, 5 mg, oral, TID with meals  multivitamin with minerals, 1 tablet, oral, Daily  nicotine, 1 patch, transdermal, Daily  sodium chloride, 3 mL, nebulization, q12h    Continuous medications  heparin, 0-4,500 Units/hr, Last Rate: 1,200 Units/hr (11/19/23 0027)    PRN medications  PRN medications: acetaminophen, dextrose 10 % in water (D10W), dextrose, diclofenac sodium, " heparin, ipratropium-albuteroL, loperamide, moisturizing mouth, oxyCODONE, oxygen, white petrolatum     Relevant Results  Results for orders placed or performed during the hospital encounter of 10/11/23 (from the past 24 hour(s))   Heparin Assay, UFH   Result Value Ref Range    Heparin Unfractionated 1.1 (HH) See Comment Below for Therapeutic Ranges IU/mL   Heparin Assay, UFH   Result Value Ref Range    Heparin Unfractionated 0.5 See Comment Below for Therapeutic Ranges IU/mL   Heparin Assay, UFH   Result Value Ref Range    Heparin Unfractionated 0.7 See Comment Below for Therapeutic Ranges IU/mL   Magnesium   Result Value Ref Range    Magnesium 1.89 1.60 - 2.40 mg/dL   Comprehensive Metabolic Panel   Result Value Ref Range    Glucose 109 (H) 74 - 99 mg/dL    Sodium 131 (L) 136 - 145 mmol/L    Potassium 4.6 3.5 - 5.3 mmol/L    Chloride 93 (L) 98 - 107 mmol/L    Bicarbonate 28 21 - 32 mmol/L    Anion Gap 15 10 - 20 mmol/L    Urea Nitrogen 25 (H) 6 - 23 mg/dL    Creatinine 0.39 (L) 0.50 - 1.30 mg/dL    eGFR >90 >60 mL/min/1.73m*2    Calcium 9.3 8.6 - 10.6 mg/dL    Albumin 3.3 (L) 3.4 - 5.0 g/dL    Alkaline Phosphatase 192 (H) 33 - 136 U/L    Total Protein 8.4 (H) 6.4 - 8.2 g/dL    AST 33 9 - 39 U/L    Bilirubin, Total 0.3 0.0 - 1.2 mg/dL    ALT 22 10 - 52 U/L   CBC   Result Value Ref Range    WBC 8.0 4.4 - 11.3 x10*3/uL    nRBC 0.0 0.0 - 0.0 /100 WBCs    RBC 4.12 (L) 4.50 - 5.90 x10*6/uL    Hemoglobin 11.7 (L) 13.5 - 17.5 g/dL    Hematocrit 35.6 (L) 41.0 - 52.0 %    MCV 86 80 - 100 fL    MCH 28.4 26.0 - 34.0 pg    MCHC 32.9 32.0 - 36.0 g/dL    RDW 13.9 11.5 - 14.5 %    Platelets 319 150 - 450 x10*3/uL      Imaging:  XR chest 1 view  Result Date: 11/17/2023  1. Mild interval improvement opacification of the left hemithorax, particularly at the upper lung zone. Decreased left lung volume. Mild mediastinal shift to the left.   2. Similar to prior, there is suggestion of underlying consolidation.   3. Unable to exclude  "left pleural effusion.        Bronchoscopy Diagnostic  Result Date: 11/16/2023  FINDINGS: The distal trachea appeared normal. Inspection of RIGHT bronchial tree to the segmental level appeared normal. Inspection of LEFT bronchial tree to the segmental level appeared normal. Copious moderately thick secretions were present throughout the large airways. PROCEDURES: Bronchial washing was performed, see \"Specimens\" section for ordered related labs. Therapeutic aspiration was performed throughout the central airways. Post maneuver airway examination revealed patent central airways.      CT chest w IV contrast  Result Date: 11/9/2023  1.  Consolidation/collapse of the left lung with extensive mucoid impaction/aspirated material within the left mainstem bronchus and segmental bronchi. Correlate with aspiration.   Extensive atherosclerotic changes of the coronary arteries and right heart enlargement. Correlate with elevated right-sided pressures.       Transthoracic Echo (TTE) Limited  Result Date: 11/2/2023  1. Left ventricular systolic function is hyperdynamic with a 70-75% estimated ejection fraction.    2. No definite valvular vegetations were visualized.    3. Right ventricular pressure overload.    4. Spectral Doppler shows an impaired relaxation pattern of left ventricular diastolic filling.    5. There is mildly reduced right ventricular systolic function.    6. The right atrium is moderately dilated.    7. Mildly elevated RVSP.    8. Moderate tricuspid regurgitation visualized.    9. The PA systolic pressure may be underestimated. The previously noted posterior tricuspid leaflet vegetation is not clearly seen on this study,  10. Left ventricular cavity size is decreased.   11. The reasons for repeating the echo from 10/31 is not clear.      MR brain w and wo IV contrast  Result Date: 10/21/2023  Brain: 1. No acute ischemic infarct or intracranial hemorrhage. No abnormal parenchymal enhancement.   2. Nonspecific " subcortical and periventricular T2/FLAIR hyperintensities may represent sequelae of chronic small vessel ischemic changes.   3. Remote lacunar infarcts in the left thalamus.       Cervical spine:   1. Multilevel degenerative disc disease and facet arthrosis most pronounced at C3-C4 with moderate to severe spinal canal stenosis and moderate to severe bilateral neural foraminal stenosis. There is flattening of the cervical cord with question subtle increased cord signal on the STIR sequence. Please correlate clinically for symptoms of myelopathy. No abnormal cord enhancement.   2. Patchy consolidative opacities throughout the left lung with a small left pleural effusion. Correlate with dedicated chest imaging.     Thoracic spine:   1. No significant spinal canal or neural foraminal stenosis. No abnormal enhancement.   2. Nonacute minimal compression deformity at T3. Mild compression deformity at T11 with a proximally 25% loss of vertebral body height and no osseous retropulsion into the spinal canal. This was present on prior exam 10/16/2023.       Lumbar spine:   1. Multilevel lumbar spondylosis as detailed above. No spinal canal stenosis.   2. No abnormal signal or enhancement within the distal cord or nerve roots.       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 male with PMHx COPD, HTN,  alcohol and opioid use disorder, hepatitis C, chronic PE admitted 10/12 with BLE weakness, recently admitted in the MICU from 10/13-10/16 for suspected alcohol withdrawal refractory to ativan, started on phenobarb taper, treated for streptococcus pneumoniae PNA with Ceftriaxone and  transferred to [facility]. 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  No plans for further testing or treatment at this time d/t fungemia.  Immune modulation could potentially worsen infection.  Once infection is cleared can re consult Neurology for potential treatment  - Continue Melatonin HS  -Pain management with Lidocaine patches, Diclofenac gel prn, Acetaminophen prn, Oxycodone 5 mg Q6,  - PT/OT/SLP following  - music therapy following     Optho: Dry eye, Cataracts, asha eyes  - seen by Opthomology given c/o blurriness with new fungemia.  No signs of fungal endophthalmitis  - rec artifical tears, Erythromycin     Pulm: Hx of COPD, on 2-4 L home O2, Chronic PE, Smoker, Acute on chronic hypoxic Respiratory failure   - s/p Bronch x3 with mucous plugging, L lung consolidation, Most recently on 11/6 with Dr. Cuenca and 11/16 with Dr Kincaid  - 11/17 CXR with improvement on L  -will repeat CXR 11/20  - Continue with Albuterol BID and 7% saline BID.  Airway clearance techniques (IPV) Q4 hours.   - Acapella, IS while awake  - Continue heparin drip.  Transition to home Apixiban 5 mg BID when no further procedures planned  - Seen by Dr Klein for PAH work up on 10/2     Cardiac:  Hx HTN, TV Endocarditis, Tachycardia resolved  - 10/31 Echo: LVEF 75-80%, RV overload, Mod-severe TV, Mod-severe RV systolic pressure, Tricuspid echodensity measuring 1.7 x 0.8 cm, atrial septal aneurysm  - Repeat Echo on 11/2/23: with no definite valvular vegetations  - Seen by CT surgery for TV vegetation- too high risk for surgery at this time.  Follow up when stable  - Had SVT this admit.  Continue on tele  -  Midodrine  dose decreased from 10 mg TID to 5 mg BID, consider stopping if BP  stable       FEN/GI: Hx of Hep C, Dysphagia, s/p Cortrak, Severe Protein Calorie Malnutrition, Hyponatremia Na improving  - TF via Cortrak- with continuous TF (didn't tolerate trial of bolus feeds)   - Continue PPI, MVI  - seen by SLP on 11/18 at bedside.    -Plan for MBS on Monday 11/20 (ordered)  - Daily RFP/Mag      Renal: KRYSTEN this admit s/p CVVH in MICU.  Now normal renal function.  Vit D insuff  - Voiding per external cath  - Seen by Nephrology, signed off  - Continue on Midodrine   - continue Vit D supp, 8000 units for 8 weeks  - Strict I's and O's      Heme: Hx of Anemia of chronic disease,  Hx of PE  - restarted on heparin drip post bronch 11/16  -will transition back to home Apixiban at discharge when no further procedures   - Daily CBC     ID:  Hx of Hepatitis C, Fungemia (10/31 BC, 1/4), Strep pneumonaie PNA, Stenotrophomonas PNA  - Afebrile, leukocytosis downtrending  - +Stenotrophomonas on sputum cx 10/20, 11/5 (from BAL), 11/6 sputum.  10/31 BC with Candida (1/4 vials).  10/31 Urine cx neg, 11/1: sputum cancelled.  11/2 C. Diff neg, 11/3 BC x2 Neg,   - 11/6 tracheal wash AFB pending.  Fungal cx 1+ rare Candida albicans  - seen by ID.    -completed She (11/3- 11/17)  - 11/16 Bronch cx: Gram stain 4+ PMN leuks, final results pending, fungal cx pending, AFB pending     Lines: PIV     Code Status: Full Code  NOK: Sister Sarah Godinez [phone]      Dispo: will transfer to medicine floor .   Will need SNF at discharge, PT/OT/SLP following.     D/w Dr Hao VANCE spent >45 minutes in the professional and overall care of this patient.    WOO So-CNP

## 2023-11-19 NOTE — CARE PLAN
Problem: Pain - Adult  Goal: Verbalizes/displays adequate comfort level or baseline comfort level  Outcome: Progressing     Problem: Discharge Planning  Goal: Discharge to home or other facility with appropriate resources  Outcome: Progressing     Problem: Chronic Conditions and Co-morbidities  Goal: Patient's chronic conditions and co-morbidity symptoms are monitored and maintained or improved  Outcome: Progressing     Problem: Skin  Goal: Participates in plan/prevention/treatment measures  Outcome: Progressing  Flowsheets (Taken 11/19/2023 0428)  Participates in plan/prevention/treatment measures: Elevate heels  Goal: Prevent/manage excess moisture  Outcome: Progressing  Flowsheets (Taken 11/19/2023 0428)  Prevent/manage excess moisture:   Cleanse incontinence/protect with barrier cream   Monitor for/manage infection if present   Moisturize dry skin  Goal: Prevent/minimize sheer/friction injuries  Outcome: Progressing  Flowsheets (Taken 11/19/2023 0428)  Prevent/minimize sheer/friction injuries:   Complete micro-shifts as needed if patient unable. Adjust patient position to relieve pressure points, not a full turn   Use pull sheet   HOB 30 degrees or less   Turn/reposition every 2 hours/use positioning/transfer devices   Utilize specialty bed per algorithm  Goal: Promote/optimize nutrition  Flowsheets (Taken 11/19/2023 0428)  Promote/optimize nutrition: Assist with feeding        Stable

## 2023-11-20 ENCOUNTER — APPOINTMENT (OUTPATIENT)
Dept: RADIOLOGY | Facility: HOSPITAL | Age: 68
End: 2023-11-20
Payer: COMMERCIAL

## 2023-11-20 LAB
ALBUMIN SERPL BCP-MCNC: 3.1 G/DL (ref 3.4–5)
ALP SERPL-CCNC: 201 U/L (ref 33–136)
ALT SERPL W P-5'-P-CCNC: 22 U/L (ref 10–52)
ANION GAP SERPL CALC-SCNC: 12 MMOL/L (ref 10–20)
AST SERPL W P-5'-P-CCNC: 35 U/L (ref 9–39)
BILIRUB SERPL-MCNC: 0.3 MG/DL (ref 0–1.2)
BUN SERPL-MCNC: 20 MG/DL (ref 6–23)
CALCIUM SERPL-MCNC: 9.5 MG/DL (ref 8.6–10.6)
CHLORIDE SERPL-SCNC: 93 MMOL/L (ref 98–107)
CO2 SERPL-SCNC: 32 MMOL/L (ref 21–32)
CREAT SERPL-MCNC: 0.39 MG/DL (ref 0.5–1.3)
ERYTHROCYTE [DISTWIDTH] IN BLOOD BY AUTOMATED COUNT: 13.8 % (ref 11.5–14.5)
FUNGUS SPEC CULT: ABNORMAL
FUNGUS SPEC CULT: ABNORMAL
FUNGUS SPEC FUNGUS STN: ABNORMAL
GFR SERPL CREATININE-BSD FRML MDRD: >90 ML/MIN/1.73M*2
GLUCOSE SERPL-MCNC: 102 MG/DL (ref 74–99)
HCT VFR BLD AUTO: 35 % (ref 41–52)
HGB BLD-MCNC: 11.5 G/DL (ref 13.5–17.5)
MAGNESIUM SERPL-MCNC: 1.99 MG/DL (ref 1.6–2.4)
MCH RBC QN AUTO: 28.5 PG (ref 26–34)
MCHC RBC AUTO-ENTMCNC: 32.9 G/DL (ref 32–36)
MCV RBC AUTO: 87 FL (ref 80–100)
NRBC BLD-RTO: 0 /100 WBCS (ref 0–0)
PLATELET # BLD AUTO: 333 X10*3/UL (ref 150–450)
POTASSIUM SERPL-SCNC: 5.1 MMOL/L (ref 3.5–5.3)
PROT SERPL-MCNC: 8.1 G/DL (ref 6.4–8.2)
RBC # BLD AUTO: 4.03 X10*6/UL (ref 4.5–5.9)
SODIUM SERPL-SCNC: 132 MMOL/L (ref 136–145)
UFH PPP CHRO-ACNC: 0.7 IU/ML
WBC # BLD AUTO: 8.2 X10*3/UL (ref 4.4–11.3)

## 2023-11-20 PROCEDURE — 83735 ASSAY OF MAGNESIUM: CPT | Performed by: NURSE PRACTITIONER

## 2023-11-20 PROCEDURE — 2500000001 HC RX 250 WO HCPCS SELF ADMINISTERED DRUGS (ALT 637 FOR MEDICARE OP): Performed by: NURSE PRACTITIONER

## 2023-11-20 PROCEDURE — 94640 AIRWAY INHALATION TREATMENT: CPT

## 2023-11-20 PROCEDURE — 36415 COLL VENOUS BLD VENIPUNCTURE: CPT | Performed by: NURSE PRACTITIONER

## 2023-11-20 PROCEDURE — 85520 HEPARIN ASSAY: CPT | Performed by: NURSE PRACTITIONER

## 2023-11-20 PROCEDURE — 2500000001 HC RX 250 WO HCPCS SELF ADMINISTERED DRUGS (ALT 637 FOR MEDICARE OP): Performed by: INTERNAL MEDICINE

## 2023-11-20 PROCEDURE — 74230 X-RAY XM SWLNG FUNCJ C+: CPT | Performed by: STUDENT IN AN ORGANIZED HEALTH CARE EDUCATION/TRAINING PROGRAM

## 2023-11-20 PROCEDURE — 94668 MNPJ CHEST WALL SBSQ: CPT

## 2023-11-20 PROCEDURE — 97110 THERAPEUTIC EXERCISES: CPT | Mod: GP

## 2023-11-20 PROCEDURE — 80053 COMPREHEN METABOLIC PANEL: CPT | Performed by: NURSE PRACTITIONER

## 2023-11-20 PROCEDURE — 97530 THERAPEUTIC ACTIVITIES: CPT | Mod: GO

## 2023-11-20 PROCEDURE — 85027 COMPLETE CBC AUTOMATED: CPT | Performed by: NURSE PRACTITIONER

## 2023-11-20 PROCEDURE — 97535 SELF CARE MNGMENT TRAINING: CPT | Mod: GO

## 2023-11-20 PROCEDURE — 99232 SBSQ HOSP IP/OBS MODERATE 35: CPT | Performed by: INTERNAL MEDICINE

## 2023-11-20 PROCEDURE — 92611 MOTION FLUOROSCOPY/SWALLOW: CPT | Mod: GN

## 2023-11-20 PROCEDURE — 2550000001 HC RX 255 CONTRASTS: Performed by: INTERNAL MEDICINE

## 2023-11-20 PROCEDURE — 2500000005 HC RX 250 GENERAL PHARMACY W/O HCPCS: Performed by: NURSE PRACTITIONER

## 2023-11-20 PROCEDURE — 74230 X-RAY XM SWLNG FUNCJ C+: CPT

## 2023-11-20 PROCEDURE — 2060000001 HC INTERMEDIATE ICU ROOM DAILY

## 2023-11-20 PROCEDURE — 97530 THERAPEUTIC ACTIVITIES: CPT | Mod: GP

## 2023-11-20 PROCEDURE — 2500000004 HC RX 250 GENERAL PHARMACY W/ HCPCS (ALT 636 FOR OP/ED): Performed by: NURSE PRACTITIONER

## 2023-11-20 PROCEDURE — 2500000002 HC RX 250 W HCPCS SELF ADMINISTERED DRUGS (ALT 637 FOR MEDICARE OP, ALT 636 FOR OP/ED): Performed by: NURSE PRACTITIONER

## 2023-11-20 RX ADMIN — HEPARIN SODIUM 1200 UNITS/HR: 10000 INJECTION, SOLUTION INTRAVENOUS at 17:02

## 2023-11-20 RX ADMIN — OXYCODONE HYDROCHLORIDE 5 MG: 5 TABLET ORAL at 14:43

## 2023-11-20 RX ADMIN — MIDODRINE HYDROCHLORIDE 5 MG: 5 TABLET ORAL at 07:39

## 2023-11-20 RX ADMIN — BARIUM SULFATE 15 ML: 0.81 POWDER, FOR SUSPENSION ORAL at 09:32

## 2023-11-20 RX ADMIN — OXYCODONE HYDROCHLORIDE 5 MG: 5 TABLET ORAL at 20:41

## 2023-11-20 RX ADMIN — LIDOCAINE 1 PATCH: 4 PATCH TOPICAL at 10:31

## 2023-11-20 RX ADMIN — OXYCODONE HYDROCHLORIDE 5 MG: 5 TABLET ORAL at 07:39

## 2023-11-20 RX ADMIN — Medication 3 ML: at 11:22

## 2023-11-20 RX ADMIN — MIDODRINE HYDROCHLORIDE 5 MG: 5 TABLET ORAL at 11:26

## 2023-11-20 RX ADMIN — Medication 3 ML: at 20:44

## 2023-11-20 RX ADMIN — Medication 1 TABLET: at 10:31

## 2023-11-20 RX ADMIN — OXYCODONE HYDROCHLORIDE 5 MG: 5 TABLET ORAL at 00:01

## 2023-11-20 RX ADMIN — BARIUM SULFATE 10 ML: 400 PASTE ORAL at 09:37

## 2023-11-20 RX ADMIN — BARIUM SULFATE 15 ML: 400 SUSPENSION ORAL at 09:36

## 2023-11-20 RX ADMIN — ALBUTEROL SULFATE 2.5 MG: 2.5 SOLUTION RESPIRATORY (INHALATION) at 20:45

## 2023-11-20 RX ADMIN — ESOMEPRAZOLE MAGNESIUM 40 MG: 40 FOR SUSPENSION ORAL at 07:00

## 2023-11-20 RX ADMIN — ACETAMINOPHEN 650 MG: 325 TABLET ORAL at 05:19

## 2023-11-20 RX ADMIN — LIDOCAINE 2 PATCH: 4 PATCH TOPICAL at 10:32

## 2023-11-20 RX ADMIN — ACETAMINOPHEN 650 MG: 325 TABLET ORAL at 17:52

## 2023-11-20 RX ADMIN — ALBUTEROL SULFATE 2.5 MG: 2.5 SOLUTION RESPIRATORY (INHALATION) at 11:22

## 2023-11-20 RX ADMIN — Medication 8000 UNITS: at 10:31

## 2023-11-20 RX ADMIN — BARIUM SULFATE 15 ML: 400 SUSPENSION ORAL at 09:37

## 2023-11-20 RX ADMIN — Medication 10 MG: at 21:00

## 2023-11-20 ASSESSMENT — PAIN DESCRIPTION - LOCATION
LOCATION: GENERALIZED
LOCATION: LEG

## 2023-11-20 ASSESSMENT — PAIN SCALES - GENERAL
PAINLEVEL_OUTOF10: 6
PAINLEVEL_OUTOF10: 10 - WORST POSSIBLE PAIN
PAINLEVEL_OUTOF10: 2
PAINLEVEL_OUTOF10: 6
PAINLEVEL_OUTOF10: 9
PAINLEVEL_OUTOF10: 10 - WORST POSSIBLE PAIN
PAINLEVEL_OUTOF10: 3
PAINLEVEL_OUTOF10: 7
PAINLEVEL_OUTOF10: 7
PAINLEVEL_OUTOF10: 9

## 2023-11-20 ASSESSMENT — COGNITIVE AND FUNCTIONAL STATUS - GENERAL
CLIMB 3 TO 5 STEPS WITH RAILING: TOTAL
STANDING UP FROM CHAIR USING ARMS: A LOT
MOBILITY SCORE: 8
TURNING FROM BACK TO SIDE WHILE IN FLAT BAD: TOTAL
DRESSING REGULAR UPPER BODY CLOTHING: A LOT
EATING MEALS: A LITTLE
HELP NEEDED FOR BATHING: A LOT
WALKING IN HOSPITAL ROOM: TOTAL
TOILETING: TOTAL
DRESSING REGULAR LOWER BODY CLOTHING: TOTAL
MOVING FROM LYING ON BACK TO SITTING ON SIDE OF FLAT BED WITH BEDRAILS: A LOT
PERSONAL GROOMING: A LOT
DAILY ACTIVITIY SCORE: 11
MOVING TO AND FROM BED TO CHAIR: TOTAL

## 2023-11-20 ASSESSMENT — PAIN - FUNCTIONAL ASSESSMENT
PAIN_FUNCTIONAL_ASSESSMENT: 0-10

## 2023-11-20 ASSESSMENT — ACTIVITIES OF DAILY LIVING (ADL): HOME_MANAGEMENT_TIME_ENTRY: 10

## 2023-11-20 NOTE — PROCEDURES
"Speech-Language Pathology    SLP Adult MBSS Evaluation  Patient Name: Molina Godinez  MRN: [id number]  Today's Date: 11/20/2023   Time Calculation  Start Time: 0840  Stop Time: 0905  Time Calculation (min): 25 min       Recommendations:  NPO with alternative means nutrition/hydration  Frequent oral care  Ice chips x5-10 hour in setting of frequent oral care  Ongoing SLP with repeat assessment in 1-2 weeks      Assessment/Impression:  Pt presenting with severe oropharyngeal dysphagia including deficits in efficiency and airway protection placing pt at risk for aspiration and malnutrition, as such recommend pt maintain NPO with alternative means nutrition/hydration. Plan to complete bolus trials of selected textures therapeutically to build up endurance given prolonged NPO status, and to also target pharyngeal strengthening exercises. As pt's endurance progresses, may be able to initiate modified PO diet at bedside in 1-2 weeks.       Plan:  Inpatient/Swing Bed or Outpatient: Inpatient  Treatment/Interventions: Bolus trials, Pharyngeal exercises  SLP Plan: Skilled SLP  SLP Frequency: 3x per week  Duration: 2 weeks  SLP Discharge Recommendations: Continue skilled speech therapy services post discharge  Solid Consistency: NPO  Discussed POC: Patient  Discussed Risks/Benefits: Yes  Patient/Caregiver Agreeable: Yes  SLP - OK to Discharge: Yes      Subjective     History and Physical:    68 y.o. male with PMHx COPD, HTN,  alcohol and opioid use disorder, hepatitis C, chronic PE admitted 10/12 with BLE weakness, recently admitted in the MICU from 10/13-10/16 for suspected alcohol withdrawal refractory to ativan, started on phenobarb taper, treated for streptococcus pneumoniae PNA with Ceftriaxone and transferred to [facility].  RRT called for lethargy, transferred to MICU on 10/17 with new FiO2 requirements.   Pt with multiple intubations during admission.     Relevant SLP Hx:  CSE completed earlier this admit on 10/19: \"Clinical " "swallow evaluation revealing high c/f aspiration/pharyngeal dysphagia in setting of poor secretion management and compromised respiratory status. Given that pt with PNA, thick secretions, congested coughing, pt at high risk for respiratory complications 2/2 aspiration, thus would recommend NPO at this time.  Re-evaluation when pt managing secretions and respiratory status improved.  \"     Repeat CSE completed  with pt demonstrating improvement in overall presentation, but c/f aspiration given overt s/s with thin liquids at bedside.    Baseline Assessment:  Respiratory Status: Room air  Behavior/Cognition: Alert, willing to participate.   Dobhoff tube in place.     Objective     Oral/Motor Assessment:  Oral Hygiene: Poor  Dentition: Poor Dental/Oral Hygiene (Most missing teeth)  Facial Symmetry: Within Functional Limits  Labial ROM: Within Functional Limits  Lingual ROM: Reduced right, Reduced left  Vocal Quality: Exceptions to WFL  Vocal Quality Impairment: Hoarse, Weak  Intelligibility: Intelligibility reduced  Intelligibility Ratin%-80%  Breath Support: Adequate for speech    Consistencies trialed: Thin liquid via tsp and straw, nectar (mildly) thick liquids via straw, honey (moderately) thick liquids via straw, puree via tsp.    Oral Phase:  Oral Phase: Impaired    Reduced Bolus Preparation noted with thickened liquid textures and puree  Noted incomplete recollection across textures  Reduced AP Bolus Transfer/Lingual Pumping observed across all textures though worsened as viscosity increased. Pt initiated multiple propulsions requiring 45 seconds to fully propel a 1/2 tsp of puree.   Oral Residue: Mild given numerous spontaneous swallows per bolus       Pharyngeal Phase:  Pharyngeal Phase: Impaired    Reduced Tongue Base Retraction  Reduced Anterior Hyoid Excursion  Reduced Pharyngeal Stripping Wave  Residue: Mild-mod vallecular residue with purees  Aspiration During Swallow: Thin (IDDSI Level 0) - " Straw  Aspiration After Swallow: Thin (IDDSI Level 0) - Straw         [place]:  Consistencies/Score: Thin: 8 - Material enters airway, passes below cords, no effort to eject (no cough)  Consistencies/Score: Nectar Liquid: 2 - Material enters airway, remains above cords, and spontaneously ejects at height of swallow  Consistencies/Score: Honey Liquid: 1 - Material does not enter airway  Consistencies/Score: Pudding/Puree: 1 - Material does not enter airway      Inpatient Education:  Education provided following completion of modified barium swallow study regarding results of exam including dysphagia pathophysiology, likely etiology, plan of care, prognosis, diet and strategy recommendations. Pt indicated disappointment but understanding of recs.         Goals:   Pt will verbalize/demonstrate comprehension of dysphagia education, strategies, recommendations and POC.  Pt will complete x30 trials of effortful swallows with cues as needed for accurate completion.   Pt will complete therapeutic trials of puree x4oz and nectar (mildly) thick liquids x4oz with no over difficulty and without signs of significant fatigue.

## 2023-11-20 NOTE — PROGRESS NOTES
"Molina Godinez is a 68 y.o. male on day 39 of admission presenting with Hospital-acquired pneumonia.    Subjective   No complaints.  States he in feeling better.  No pain, CP or SOB  +BM x2  Worked with PT/OT today and stood at side of the bed, \"It felt different\" to be able to stand  MBS this am and did not do well d/t weakness.       Objective     Constitutional: awake, alert, oriented x3, cachetic, chronically ill appearing, pt in NAD, alert and cooperative, smiling and in good spirits  Eyes: PERRL, EOMI, no icterus   ENMT: mucous membranes moist, no apparent injury, no lesions seen  Head/Neck: Neck supple, no apparent injury  Respiratory/Thorax: Lungs CTA bilaterally, diminished asha bases, non-labored breathing, no cough, on 1-2 L NC  Cardiovascular: Regular, rate and rhythm, no murmurs, normal S1 and S2, sinus tachy  Gastrointestinal: Nondistended, soft, non-tender, BS present x 4, cortrak  Extremities: normal extremities, no edema, contusions or wounds  Neurological: alert and oriented x 3, speech clear, follows commands,appropriately, able to move BLE off bed against gravity  Skin: Warm and dry, dry/flaky skin    Last Recorded Vitals  Blood pressure 133/80, pulse 96, temperature 36.6 °C (97.9 °F), temperature source Temporal, resp. rate 17, height 1.702 m (5' 7.01\"), weight 56.8 kg (125 lb 3.5 oz), SpO2 93 %.  Intake/Output last 3 Shifts:  I/O last 3 completed shifts:  In: 1966 (34.6 mL/kg) [I.V.:432 (7.6 mL/kg); NG/GT:1535]  Out: 2020 (35.6 mL/kg) [Urine:1900 (0.9 mL/kg/hr); Emesis/NG output:120]  Weight: 56.8 kg     Relevant Results                             Assessment/Plan   Principal Problem:    Hospital-acquired pneumonia  Active Problems:    Fungemia    Essential hypertension, benign    Hepatitis C virus infection cured after antiviral drug therapy    Alcoholism (CMS/HCC)    Chronic pulmonary embolism (CMS/HCC)    COPD (chronic obstructive pulmonary disease) (CMS/HCC)    HLD (hyperlipidemia)    GBS " (Guillain Kirby syndrome) (CMS/HCC)    68 y.o. male with PMHx COPD, HTN,  alcohol and opioid use disorder, hepatitis C, chronic PE admitted 10/12 with BLE weakness, recently admitted in the MICU from 10/13-10/16 for suspected alcohol withdrawal refractory to ativan, started on phenobarb taper, treated for streptococcus pneumoniae PNA with Ceftriaxone and transferred to [facility].  RRT called for lethargy, transferred to MICU on 10/17 with new FiO2 requirements.  In SDU continues to make good progress, weaning off O2, getting aggressive pulm hygiene and PT/OT/SLP.       Neuro: Hx of Etoh and Opioid use disorder, GBS (dx while inpatient)   A&Ox3, motor improving and able to move BLE now.  Pain ongoing issue and has been receiving PRN Tylenol and Oxy around the clock  - EMG performed on 11/10 confirming GBS   - Neuro consulted, recs appreciated --> 11/11 (see attestation on note from 11/10) EMG compatible with GBS.  No plans for further testing or treatment at this time d/t fungemia.  Immune modulation could potentially worsen infection.  Once infection is cleared can re consult Neurology for potential treatment  - Continue Melatonin 10 mg HS  -Pain management with Lidocaine patches, Acetaminophen prn, Oxycodone 5 mg Q6  - PT/OT/SLP following  - music therapy following     Optho: Dry eye, Cataracts, asha eyes  - seen by Opthomology given c/o blurriness with new fungemia.  No signs of fungal endophthalmitis  - treated with artifical tears and erythromycin oint.  Now resolved     Pulm: Hx of COPD, on 2-4 L home O2, Chronic PE, Smoker, Acute on chronic hypoxic Respiratory failure.  Weaned to 2 L NC today  - 11/17 CXR with improvement on L  - s/p Bronch x3 with mucous plugging, L lung consolidation, Most recently on 11/6 with Dr. Cuenca and 11/16 with Dr Kincaid  - Continue with Albuterol BID and 7% saline BID.  Airway clearance techniques (IPV) Q4 hours.   - Acapella, IS while awake  - Continue heparin drip.  Transition to home  Apixiban 5 mg BID when no further procedures planned  - Seen by Dr Klein for PAH work up on 10/2     Cardiac:  Hx HTN, TV Endocarditis, Tachycardia resolving  - 10/31 Echo: LVEF 75-80%, RV overload, Mod-severe TV, Mod-severe RV systolic pressure, Tricuspid echodensity measuring 1.7 x 0.8 cm, atrial septal aneurysm  - Repeat Echo on 11/2/23: with no definite valvular vegetations  - Seen by CT surgery for TV vegetation- too high risk for surgery at this time.  Follow up when stable  - Had SVT this admit.  Continue on tele  - Continue Midodrine  5 mg TID  - 11/17 sinus tachy- fluid responsive, given 1 L total LR     FEN/GI: Hx of Hep C, Dysphagia, s/p Cortrak, Severe Protein Calorie Malnutrition, Hyponatremia Na (133-- 130-- 129--132)  - TF via Cortrak- @ 45 ml/hr  - Continue PPI, MVI  - re-eval by SLP-  with MBS today  - Daily RFP/Mag      Renal: KRYSTEN this admit s/p CVVH in MICU.  Now normal renal function.  Vit D insuff  - Voiding per external cath  - Seen by Nephrology, signed off  - Continue on Midodrine   - continue Vit D supp, 8000 units for 8 weeks  - Strict I's and O's      Heme: Hx of Anemia of chronic disease,  Hx of PE  - restarted on heparin drip post bronch 11/16  -will transition back to home Apixiban at discharge  - Daily CBC     ID:  Hx of Hepatitis C, Fungemia (10/31 BC, 1/4), Strep pneumonaie PNA, Stenotrophomonas PNA  - Afebrile, leukocytosis downtrending  - +Stenotrophomonas on sputum cx 10/20, 11/5 (from BAL), 11/6 sputum.  10/31 BC with Candida (1/4 vials).  10/31 Urine cx neg, 11/1: sputum cancelled.  11/2 C. Diff neg, 11/3 BC x2 Neg,   - 11/6 tracheal wash AFB pending.  Fungal cx 1+ rare Candida albicans  - seen by ID.  Continue on She (11/3-- STOP date 11/17)  - 11/16 Bronch cx: Gram stain  + multidrug resistant PSA, Stenotrophmonas and 4+ Candida, AFB pending  - 11/20: ID reconsulted, they reached out to micro lab to get further sens on different meds, however, rec monitoring off atb as this  may be colonization.     Lines: PIV     Code Status: Full Code  NOK: Sister Sarah Godinez [phone]      Dispo: Continues to improve with aggressive pulm toileting.  Possible transfer to [facility] tomorrow.   Will need SNF at discharge-- SNF list provided by [facility].   PT/OT/SLP following.       I spent 35 minutes in the professional and overall care of this patient.      Paris Pace, APRN-CNP

## 2023-11-20 NOTE — PROGRESS NOTES
Molina Godinez is a 68 y.o. male on day 39 of admission presenting with Hospital-acquired pneumonia.    TCC Note    Plan per Medical/Surgical Team: Pulmonary Hygiene, GBS , pain management,   Status: Inpatient  Payor Source: [facility]  Discharge disposition: Transfer to medicine floor. When medically ready discharge to SNF need FOC  Expected date of discharge: TBD  Barriers: Medical  SNF List given to patient by SW.   Will continue to follow patient for any discharge needs.

## 2023-11-20 NOTE — PROGRESS NOTES
Molina Godinez is a 68 y.o. male on day 39 of admission presenting with Hospital-acquired pneumonia.    Subjective   Interval History:   68yoM with history of chronic pulmonary embolism, COPD, hypertension, treated hepatitis C, polysubstance dependence, treated syphilis, admitted with CC: lower extremity weakness on 10/11/23, TF to MICU 10/13-10/17 for EtOH withdrawal, acute hypoxic respiratory failure due to CAP and hyponatremia thought to be SIADH.   Tf back to MICU on 10/17 due to acute hypoxic respiratory failure due to HAP due to Stenotrophomonas maltophilia, required intubation and mechanical ventilation [10/20-10/27], was treated with TMP/SMX and levofloxacin. Course further complicated by acute renal failure w/ uremia, HAGMA, hyponatremia requiring CRRT 10/31, as well as diagnosis of Guillain-[place] syndrome.  Patient also found to have C.albicans fungemia line-related bloodstream infection 10/31, s/p course of micafungin. BlCx clear since 11/03.  Patient has had several bronchoscopies for recurrent mucus plugging. Most recently on 11/16, tracheal aspirate cx positive for MDR Pseudomonas monteilii and persistent Stenotrophomonas maltophilia.  Patient has been improving clinically with lower respiratory support requirements, currently on room air saturating 97% during my exam, speaking in full sentences. He denies any subjective fevers or chills, increased sputum production or worsened cough or shortness of breath.  Discussed with primary team WOO-STEPHANIE Pace, who also reports he is generally much improved, with disposition planning underway.    Objective   Range of Vitals (last 24 hours)  Heart Rate:  [89-97]   Temp:  [36.3 °C (97.3 °F)-36.6 °C (97.9 °F)]   Resp:  [15-22]   BP: (112-149)/(74-99)   SpO2:  [93 %-100 %]   Daily Weight  11/19/23 : 56.8 kg (125 lb 3.5 oz)    Body mass index is 19.61 kg/m².    Physical Exam    GENERAL APPEARANCE:  67y/o male, frail and chronically ill-appearing, alert and  cooperative, and appears to be in no acute distress. BMI 19.61.  HEAD: normocephalic  EYES: PERRL, EOMI. Conjunctivae clear  NOSE: No nasal discharge.  THROAT: Oral mucosa moist.   CARDIAC: Regular rate and rhythm. No murmurs appreciated. No pitting edema.  LUNGS: Scattered crackles, diminished BS throughout left lung, speaking in full sentences, saturating 97% on room air, normal work of breathing.  ABDOMEN: Positive bowel sounds. Soft, nondistended, nontender. No guarding or rebound. No masses appreciated.  MUSCULOSKELETAL: Generalized low muscle mass. Symmetric muscular development.  NEUROLOGICAL: Alert, normal speech.  SKIN: Skin dry, some fine scaling on face  PSYCHIATRIC: Appropriate affect.        Relevant Results  Labs  Results from last 72 hours   Lab Units 11/20/23  0505 11/19/23 0342 11/18/23 0348   WBC AUTO x10*3/uL 8.2 8.0 9.8   HEMOGLOBIN g/dL 11.5* 11.7* 11.2*   HEMATOCRIT % 35.0* 35.6* 33.8*   PLATELETS AUTO x10*3/uL 333 319 309     Results from last 72 hours   Lab Units 11/20/23  0505 11/19/23  0342   SODIUM mmol/L 132* 131*   POTASSIUM mmol/L 5.1 4.6   CHLORIDE mmol/L 93* 93*   CO2 mmol/L 32 28   BUN mg/dL 20 25*   CREATININE mg/dL 0.39* 0.39*   GLUCOSE mg/dL 102* 109*   CALCIUM mg/dL 9.5 9.3   ANION GAP mmol/L 12 15   EGFR mL/min/1.73m*2 >90 >90     Results from last 72 hours   Lab Units 11/20/23  0505 11/19/23  0342   ALK PHOS U/L 201* 192*   BILIRUBIN TOTAL mg/dL 0.3 0.3   PROTEIN TOTAL g/dL 8.1 8.4*   ALT U/L 22 22   AST U/L 35 33   ALBUMIN g/dL 3.1* 3.3*     Estimated Creatinine Clearance: 125 mL/min (A) (by C-G formula based on SCr of 0.39 mg/dL (L)).  C-Reactive Protein   Date Value Ref Range Status   11/05/2023 12.34 (H) <1.00 mg/dL Final   11/04/2023 14.95 (H) <1.00 mg/dL Final   11/03/2023 18.80 (H) <1.00 mg/dL Final     HIV 1 and 2 Screen   Date Value Ref Range Status   08/31/2023 NONREACTIVE NONREACTIVE Final       Comment:        HIV Ag/Ab screen is performed using the Siemens  [employer]   HIV Ag/Ab Combo assay which detects the presence of HIV    p24 antigen as well as antibodies to HIV-1   (Group M and O) and HIV-2.  .  No laboratory evidence of HIV infection. If acute HIV infection is   suspected, consider testing for HIV RNA by PCR (viral load).                 Hepatitis C Ab   Date Value Ref Range Status   08/28/2023 REACTIVE (A) NONREACTIVE Final       Comment:        Results from patients taking biotin supplements or receiving   high-dose biotin therapy should be interpreted with caution   due to possible interference with this test. Providers may    contact their local laboratory for further information.   HCV antibody detected. HCV RNA PCR has been ordered   to evaluate the possibility of a current infection.                 HCV PCR Quant   Date Value Ref Range Status   08/28/2023 NOT DETECTED IU/mL Final       Comment:       REF VALUE  NOT DETECTED          Microbiology  Respiratory Culture/Smear  Order: [id number] Collected 11/16/2023 14:02  Status: Preliminary result     Dx: Hospital-acquired pneumonia; Bacterem...    Specimen Information: TRACHEAL WASH; Fluid   0 Result Notes  Respiratory Culture/Smear (4+) Abundant Normal throat inocente      (4+) Abundant Pseudomonas Abnormal    Corrected result: Previously reported as Pseudomonas monteilii on 11/18/2023 at 1502 EST.   (4+) Abundant  Stenotrophomonas maltophilia group Abnormal                Gram Stain (4+) Abundant Polymorphonuclear leukocytes      No organisms seen              Resulting Agency: [facility]     Susceptibility     Pseudomonas     MICROSCAN (Preliminary)    $$ Amikacin Resistant    $ Aztreonam Resistant    $$ Cefepime Resistant    $$ Ceftazidime Resistant     Ceftriaxone Resistant    $ Ciprofloxacin Resistant    $ Gentamicin Resistant    $$$ Levofloxacin Resistant    $$$ Meropenem Resistant    $$ Piperacillin/Tazobactam Resistant    $ Tobramycin Resistant    $ Trimethoprim/Sulfamethoxazole Resistant                   Specimen Collected: 11/16/23 14:02 Last Resulted: 11/20/23 13:02               Imaging  XR CHEST 1 VIEW;  11/17/2023 7:56 am   IMPRESSION:  1. Mild interval improvement opacification of the left hemithorax,  particularly at the upper lung zone. Decreased left lung volume. Mild  mediastinal shift to the left.  2. Similar to prior, there is suggestion of underlying consolidation.  3. Unable to exclude left pleural effusion.      CT CHEST W IV CONTRAST;  11/8/2023 12:36 pm  FINDINGS:  LUNGS AND AIRWAYS:  There is extensive soft tissue within the left mainstem bronchus.  There is layering soft tissue/mucus within the distal trachea. There  is right-sided peribronchial thickening and scattered atelectatic  changes. There is consolidation/collapse of the left lung. Minimal  aerated lung is noted. There is a small left-sided pleural effusion.  There are extensive right-sided emphysematous changes.  IMPRESSION:  1.  Consolidation/collapse of the left lung with extensive mucoid  impaction/aspirated material within the left mainstem bronchus and  segmental bronchi. Correlate with aspiration.  Extensive atherosclerotic changes of the coronary arteries and right  heart enlargement. Correlate with elevated right-sided pressures.      TRANSTHORACIC ECHOCARDIOGRAM REPORT   CONCLUSIONS:   1. Left ventricular systolic function is hyperdynamic with a 70-75% estimated ejection fraction.   2. No definite valvular vegetations were visualized.   3. Right ventricular pressure overload.   4. Spectral Doppler shows an impaired relaxation pattern of left ventricular diastolic filling.   5. There is mildly reduced right ventricular systolic function.   6. The right atrium is moderately dilated.   7. Mildly elevated RVSP.   8. Moderate tricuspid regurgitation visualized.   9. The PA systolic pressure may be underestimated. The previously noted posterior tricuspid leaflet vegetation is not clearly seen on this study,.  10. Left ventricular  cavity size is decreased.  11. The reasons for repeating the echo from 10/31 is not clear.    TRANSTHORACIC ECHOCARDIOGRAM REPORT   CONCLUSIONS:   1. Left ventricular systolic function is hyperdynamic with a 75-80% estimated ejection fraction.   2. Right ventricular volume overload.   3. Moderate to severe tricuspid regurgitation visualized.   4. Moderate to severely elevated right ventricular systolic pressure.   5. There is a mobile echodensity attached to the posterior tricuspid leaflet with independent motion consistent with a vegetation measuring 1.7 x 0.8 cm.   6. Atrial septal aneurysm present.          Antimicrobials:  Ceftriaxone 10/13 - 10/17 for CAP  Piperacillin/tazobactam 10/11 - 10/13, 10/19 - 10/24  Levofloxacin 10/23 - 10/25 for Stenotrophomonas  Trimethoprim/sulfamethoxazole 10/22 - 10/28 for Stenotrophomonas  Meropenem 10/31 - 11/02  Vancomycin 10/11 - 10/13, 10/19, 10/31 - 11/02  Micafungin 11/3 - 11/17 for C. albicans fungemia           Assessment/Plan     67 y/o male admitted with CC: lower extremity weakness on 10/11/23, TF to MICU 10/13-10/17 for EtOH withdrawal, acute hypoxic respiratory failure due to CAP and hyponatremia thought to be SIADH.   Tf back to MICU on 10/17 due to acute hypoxic respiratory failure due to HAP due to Stenotrophomonas maltophilia, required intubation and mechanical ventilation [10/20-10/27], was treated with TMP/SMX and levofloxacin. Course further complicated by acute renal failure w/ uremia, HAGMA, hyponatremia requiring CRRT 10/31, as well as diagnosis of Guillain-[place] syndrome.  Patient also found to have C.albicans fungemia line-related bloodstream infection 10/31, s/p course of micafungin. BlCx clear since 11/03.  Patient has had several bronchoscopies for recurrent mucus plugging. Most recently on 11/16, tracheal aspirate cx positive for MDR Pseudomonas monteilii and persistent Stenotrophomonas maltophilia.    Patient has been improving clinically with lower  respiratory support requirements, currently on room air saturating 97% during my exam (nasal canula present, O2 flow 0L), speaking in full sentences. He denies any subjective fevers or chills, increased sputum production or worsened cough or shortness of breath.  Discussed with primary team WOO-STEPHANIE Pace, who also reports he is generally much improved, with disposition planning underway.      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    While not an innocuous finding, the nature of the aforementioned organisms precludes eradication with the use of a simple course of antibiotics. The Pseudomonas isolated is resistant to all above routinely tested antibiotics, including amikacin, aztreonam, cefepime, ceftazidime, ceftriaxone, cefotaxime, ciprofloxacin, gentamicin, levofloxacin, meropenem, piperacillin/tazobactam, tobramycin, and trimethoprim/sulfamethoxazole.      Recommendations:  Would not recommend antimicrobial therapy for organisms isolated on tracheal aspirate from bronchoscopy done on 11/16, patient does not appear to have acute bacterial pneumonia at this time.     We have requested susceptibility testing for Pseudomonas monteilii for salvage antibiotic regimens in the unfortunate circumstance that this patient develops clinical decompensation and development of acute pneumonia caused by this isolate.      Patient discussed with Dr. Leonard.  Plan of care discussed with patient and primary team.  ID to sign off.  Please do not hesitate to reach out with any questions or concerns.    Cassy Salgado MD  ID Fellow, PGY IV.  Pager Team B: [phone].

## 2023-11-20 NOTE — PROGRESS NOTES
Physical Therapy    Physical Therapy Treatment    Patient Name: Molina Godinez  MRN: [id number]  Today's Date: 11/20/2023  Time Calculation  Start Time: 1013  Stop Time: 1042  Time Calculation (min): 29 min     Assessment/Plan   PT Assessment  End of Session Communication: Bedside nurse  End of Session Patient Position: Bed, 3 rail up, Alarm off, not on at start of session     PT Plan  Treatment/Interventions: Bed mobility, Transfer training, Gait training, Balance training, Neuromuscular re-education, Strengthening, Therapeutic exercise, Therapeutic activity, Positioning, Postural re-education  PT Plan: Skilled PT  PT Frequency: 4 times per week  PT Discharge Recommendations: Moderate intensity level of continued care  PT - OK to Discharge: Yes    General Visit Information:   PT  Visit  PT Received On: 11/20/23  General  Family/Caregiver Present: No  Co-Treatment: OT  Co-Treatment Reason: d/t AMPAC <10, patient has required MAX-DEPx2 for all mobility during previous therapy sessions  Prior to Session Communication: Bedside nurse  Patient Position Received: Bed, 3 rail up, Alarm off, not on at start of session  General Comment: pleasant and agreeable to participate in therapy. Able to progress this date to partial standing with B arm in arm assist + B knee blocking + MAXx2.    Subjective   Precautions:  Precautions  Medical Precautions: Fall precautions  Precautions Comment: contact precautions  Vital Signs:  Vital Signs  Heart Rate:  (HR low 130s sitting EOB performing dynamic balance activities)    Objective   Pain:  Pain Assessment  Pain Assessment: 0-10  Pain Score:  (did not rate numerically)  Pain Location:  (plantar aspect of B feet, R>L)  Cognition:  Cognition  Orientation Level:  (AxO x3)  Following Commands: Follows one step commands without difficulty  Postural Control:  Postural Control  Postural Control: Impaired  Head Control: good head control within patient's available ROM, favors a FF posture and  downward head gaze however  Trunk Control: fair trunk control, continues to demo retrolean; MOD-MAXx1  Posture Comment: attempts to use BUEs to support self sitting EOB; sat EOB x15 mins.    Activity Tolerance:  Activity Tolerance  Early Mobility/Exercise Safety Screen: Proceed with mobilization - No exclusion criteria met  Treatments:  Therapeutic Exercise  Therapeutic Exercise Performed: Yes  Therapeutic Exercise Activity 1: sitting EOB: scapular retraction x2 B    Bed Mobility  Bed Mobility: Yes  Bed Mobility 1  Bed Mobility 1: Supine to sitting, Sitting to supine  Level of Assistance 1: Maximum assistance, Minimal verbal cues, Minimal tactile cues  Bed Mobility Comments 1: x2 with draw sheet, HOB elevated for supine to sit  Bed Mobility 2  Bed Mobility  2: Supine to sitting, Sitting to supine  Level of Assistance 2: Dependent  Bed Mobility Comments 2: x2 assist, draw sheet, HOB elevated  Transfer   Transfer: Yes  Transfer 1  Transfer 1: Sit<->Stand  Level of assistance 1: Maximum assistance, Moderate tactile cueing, minimal verbal cueing  Transfer comments: B arm in arm assist, draw sheet for additional assist, B knee and foot blocking; achieved ~75% of full standing.     Outcome Measures:  [facility] Basic Mobility  Turning from your back to your side while in a flat bed without using bedrails: A lot  Moving from lying on your back to sitting on the side of a flat bed without using bedrails: Total  Moving to and from bed to chair (including a wheelchair): Total  Standing up from a chair using your arms (e.g. wheelchair or bedside chair): A lot  To walk in hospital room: Total  Climbing 3-5 steps with railing: Total  Basic Mobility - Total Score: 8    Education Documentation  Mobility Training, taught by Tameka Velasquez PT at 11/20/2023 11:33 AM.  Learner: Patient  Readiness: Acceptance  Method: Explanation  Response: Needs Reinforcement    Education Comments  No comments found.        Encounter Problems        Encounter Problems (Active)       Balance       Patient will complete static (Cgx1) and dynamic (MINx1) using BUE support as needed in order to maintain midline without acute LOB   (Not met)       Start:  10/19/23    Expected End:  23    Resolved:  23    Updated to: Patient will complete static (Cgx1) and dynamic (MINx1) sitting balance activities using BUE support as needed in order to maintain midline without acute LOB    Update reason: goals           Patient will complete static (Cgx1) and dynamic (MINx1) sitting balance activities using BUE support as needed in order to maintain midline without acute LOB (Progressing)       Start:  23    Expected End:  23                   Mobility       STG - Patient will ambulate >/=10 ft using LRD as needed with MODx1 without acute LOB  (Not met)       Start:  10/19/23    Expected End:  23    Resolved:  23    Updated to: STG - Patient will ambulate >/=3 ft using LRD as needed with MODx1 without acute LOB    Update reason: goals           patient will complete BLE there-ex in order to improve strength and to assist with the completion of functional mobility tasks.  (Not met)       Start:  10/19/23    Expected End:  23    Resolved:  23    Updated to: patient will complete LE there-ex in order to improve strength and to assist with the completion of functional mobility tasks.    Update reason: goals           STG - Patient will ambulate >/=3 ft using LRD as needed with MODx1 without acute LOB (Not Progressing)       Start:  23    Expected End:  23                patient will complete LE there-ex in order to improve strength and to assist with the completion of functional mobility tasks. (Progressing)       Start:  23    Expected End:  23                     Transfers       STG - Transfer from bed to chair with MODx1 without acute LOB  (Not met)       Start:  10/19/23    Expected End:   23    Resolved:  23    Updated to: STG - Transfer from bed to chair with MODx1 without acute LOB using LRD as needed    Update reason: goals           STG - Patient will perform bed mobility with MINx1 assist  (Not met)       Start:  10/19/23    Expected End:  23    Resolved:  23    Updated to: STG - Patient will perform bed mobility with MODx1 assist using bedrailing as needed with HOB elevated    Update reason: goals           STG - Patient will transfer sit to and from stand with MODx1 using LRD as needed without acute LOB  (Not met)       Start:  10/19/23    Expected End:  23    Resolved:  23    Updated to: STG - Patient will transfer sit to and from stand with MODx1 using LRD as needed without acute LOB    Update reason: goals           STG - Transfer from bed to chair with MODx1 without acute LOB using LRD as needed (Not Progressing)       Start:  23    Expected End:  23                STG - Patient will perform bed mobility with MODx1 assist using bedrailing as needed with HOB elevated (Progressing)       Start:  23    Expected End:  23                STG - Patient will transfer sit to and from stand with MODx1 using LRD as needed without acute LOB (Progressing)       Start:  23    Expected End:  23                     Tameka Velasquez, PT, DPT

## 2023-11-20 NOTE — PROGRESS NOTES
Occupational Therapy    Occupational Therapy Treatment    Name: Molina Godinez  MRN: [id number]  : 1955  Date: 23  Time Calculation  Start Time: 1012  Stop Time: 1042  Time Calculation (min): 30 min    Assessment:  End of Session Communication: Bedside nurse  End of Session Patient Position: Bed, 3 rail up, Alarm off, not on at start of session  Plan:  Treatment Interventions: ADL retraining, Functional transfer training, UE strengthening/ROM, Endurance training, Cognitive reorientation, Patient/family training, Neuromuscular reeducation, Compensatory technique education, Fine motor coordination activities  OT Frequency: 3 times per week  OT Discharge Recommendations: Moderate intensity level of continued care  OT Recommended Transfer Status:  (unsafe for OOB transfer at this time)  OT - Requires Next F/U Appointment: 10/20/23  OT - OK to Discharge: Yes (when medically appropriate)    Subjective   Previous Visit Info:  OT Last Visit  OT Received On: 23  General:  General  Family/Caregiver Present: No  Co-Treatment: PT  Co-Treatment Reason: AMPAC <10, requires x2 skilled assist  Prior to Session Communication: Bedside nurse  Patient Position Received: Bed, 3 rail up, Alarm off, not on at start of session  General Comment: patient pleasant and agreeable to OT, able to progress to partial stand with max A x2, participated in ADLs at EOB  Precautions:  Medical Precautions: Fall precautions  Precautions Comment: contact precautions  Vitals:     Pain Assessment:  Pain Assessment  Pain Assessment: 0-10  Pain Score:  (patient c/o pain in bottoms of (B) feet, did not rate)     Objective   Activities of Daily Living: Grooming  Grooming Comments: mod A for combing hair while sitting EOB; set-up and SBA for applying lotion to (B) hands    LE Dressing  LE Dressing: Yes  Sock Level of Assistance: Dependent  LE Dressing Where Assessed: Edge of bed  LE Dressing Comments: dependent A for socks sitting EOB, patient  unable to fully bring feet up towards self even with physical assistance, unable to reach forward to socks     Bed Mobility/Transfers: Bed Mobility  Bed Mobility: Yes  Bed Mobility 1  Bed Mobility 1: Supine to sitting, Sitting to supine  Level of Assistance 1: Maximum assistance, Minimal verbal cues, Minimal tactile cues  Bed Mobility Comments 1: x2 assist with draw sheet and HOB elevated for supine to sit  Bed Mobility 2  Bed Mobility  2: Supine to sitting, Sitting to supine  Level of Assistance 2: Dependent  Bed Mobility Comments 2: x2  assist, draw sheet, HOB elevated    Transfers  Transfer: Yes  Transfer 1  Transfer From 1: Sit to, Stand to  Transfer to 1: Sit, Stand  Technique 1: Sit to stand, Stand to sit  Transfer Level of Assistance 1:  (max A x2, (B) arm in arm assist)  Trials/Comments 1: achieved ~75% of full standing, use of draw sheet under patient's hips     Therapy/Activity: Therapeutic Activity  Therapeutic Activity Performed: Yes  Therapeutic Activity 1: Patient sat EOB x15 minutes with mod-max A for balance, patient with posterior lean and posterolateral (R) and (L) leans with decreased assistance; with cues patient able to shift self anteriorly to improve balance however only able to sustain balance for short moments with mod A.  Outcome Measures:  [facility] Daily Activity  Putting on and taking off regular lower body clothing: Total  Bathing (including washing, rinsing, drying): A lot  Putting on and taking off regular upper body clothing: A lot  Toileting, which includes using toilet, bedpan or urinal: Total  Taking care of personal grooming such as brushing teeth: A lot  Eating Meals: A little  Daily Activity - Total Score: 11    , Confusion Assessment Method-ICU (CAM-ICU)  Feature 1: Acute Onset or Fluctuating Course: Negative  Overall CAM-ICU: Negative  ,      , and E = Exercise and Early Mobility  Current Activity: Standing    Education Documentation  ADL Training, taught by Stephanie Salmon, OT at  11/20/2023 12:58 PM.  Learner: Patient  Readiness: Acceptance  Method: Explanation  Response: Needs Reinforcement    Education Comments  No comments found.      Goals:  Encounter Problems       Encounter Problems (Active)       ADLs       Patient with complete upper body dressing with stand by assist level of assistance donning and doffing all UE clothes with PRN adaptive equipment.       Start:  11/20/23    Expected End:  12/04/23            Patient with complete lower body dressing with moderate assist level of assistance donning and doffing all LE clothes  with PRN adaptive equipment.       Start:  11/20/23    Expected End:  12/04/23            Patient will complete toileting including hygiene clothing management/hygiene with moderate assist level of assistance.       Start:  11/20/23    Expected End:  12/04/23               ADLs       Patient will complete grooming tasks with MOD A with min v/c.  (Progressing)       Start:  10/19/23    Expected End:  12/04/23               BALANCE       Pt. will sit EOB for at least 8 min with MOD A in prep for EOB ADLs.  (Progressing)       Start:  10/19/23    Expected End:  12/04/23               COGNITION/SAFETY       Patient will follow 1 step commands with 75% accuracy and min v/c.  (Progressing)       Start:  10/19/23    Expected End:  12/04/23               EXERCISE/STRENGTHENING       Patient will demo BUE strength of 3/5 for B elbow, wrist, hand.  (Progressing)       Start:  10/19/23    Expected End:  12/04/23               TRANSFERS       Patient will perform bed mobility moderate assist level of assistance in order to improve safety and independence with mobility       Start:  11/20/23    Expected End:  12/04/23            Patient will complete functional transfers with least restrictive device with moderate assist level of assistance.       Start:  11/20/23    Expected End:  12/04/23              LINCOLN Arteaga/PAULA

## 2023-11-21 PROBLEM — A49.8 PSEUDOMONAS INFECTION: Status: ACTIVE | Noted: 2023-11-21

## 2023-11-21 PROBLEM — A49.8 PSEUDOMONAS AERUGINOSA INFECTION: Status: ACTIVE | Noted: 2023-11-21

## 2023-11-21 LAB
ALBUMIN SERPL BCP-MCNC: 3 G/DL (ref 3.4–5)
ALP SERPL-CCNC: 182 U/L (ref 33–136)
ALT SERPL W P-5'-P-CCNC: 20 U/L (ref 10–52)
ANION GAP SERPL CALC-SCNC: 14 MMOL/L (ref 10–20)
AST SERPL W P-5'-P-CCNC: 28 U/L (ref 9–39)
BILIRUB SERPL-MCNC: 0.3 MG/DL (ref 0–1.2)
BUN SERPL-MCNC: 19 MG/DL (ref 6–23)
CALCIUM SERPL-MCNC: 8.8 MG/DL (ref 8.6–10.6)
CHLORIDE SERPL-SCNC: 93 MMOL/L (ref 98–107)
CO2 SERPL-SCNC: 27 MMOL/L (ref 21–32)
CREAT SERPL-MCNC: 0.33 MG/DL (ref 0.5–1.3)
ERYTHROCYTE [DISTWIDTH] IN BLOOD BY AUTOMATED COUNT: 13.9 % (ref 11.5–14.5)
GFR SERPL CREATININE-BSD FRML MDRD: >90 ML/MIN/1.73M*2
GLUCOSE SERPL-MCNC: 99 MG/DL (ref 74–99)
HCT VFR BLD AUTO: 30.1 % (ref 41–52)
HGB BLD-MCNC: 10.1 G/DL (ref 13.5–17.5)
MAGNESIUM SERPL-MCNC: 1.79 MG/DL (ref 1.6–2.4)
MCH RBC QN AUTO: 29 PG (ref 26–34)
MCHC RBC AUTO-ENTMCNC: 33.6 G/DL (ref 32–36)
MCV RBC AUTO: 87 FL (ref 80–100)
NRBC BLD-RTO: 0 /100 WBCS (ref 0–0)
PLATELET # BLD AUTO: 313 X10*3/UL (ref 150–450)
POTASSIUM SERPL-SCNC: 4.7 MMOL/L (ref 3.5–5.3)
PROT SERPL-MCNC: 7.5 G/DL (ref 6.4–8.2)
RBC # BLD AUTO: 3.48 X10*6/UL (ref 4.5–5.9)
SODIUM SERPL-SCNC: 129 MMOL/L (ref 136–145)
UFH PPP CHRO-ACNC: 0.6 IU/ML
WBC # BLD AUTO: 7.8 X10*3/UL (ref 4.4–11.3)

## 2023-11-21 PROCEDURE — 2500000005 HC RX 250 GENERAL PHARMACY W/O HCPCS: Performed by: NURSE PRACTITIONER

## 2023-11-21 PROCEDURE — 80053 COMPREHEN METABOLIC PANEL: CPT | Performed by: NURSE PRACTITIONER

## 2023-11-21 PROCEDURE — 2500000001 HC RX 250 WO HCPCS SELF ADMINISTERED DRUGS (ALT 637 FOR MEDICARE OP): Performed by: NURSE PRACTITIONER

## 2023-11-21 PROCEDURE — 2500000004 HC RX 250 GENERAL PHARMACY W/ HCPCS (ALT 636 FOR OP/ED): Performed by: NURSE PRACTITIONER

## 2023-11-21 PROCEDURE — 94668 MNPJ CHEST WALL SBSQ: CPT

## 2023-11-21 PROCEDURE — 83735 ASSAY OF MAGNESIUM: CPT | Performed by: NURSE PRACTITIONER

## 2023-11-21 PROCEDURE — 36415 COLL VENOUS BLD VENIPUNCTURE: CPT | Performed by: NURSE PRACTITIONER

## 2023-11-21 PROCEDURE — 85520 HEPARIN ASSAY: CPT | Performed by: NURSE PRACTITIONER

## 2023-11-21 PROCEDURE — 94640 AIRWAY INHALATION TREATMENT: CPT

## 2023-11-21 PROCEDURE — 85027 COMPLETE CBC AUTOMATED: CPT | Performed by: NURSE PRACTITIONER

## 2023-11-21 PROCEDURE — 92526 ORAL FUNCTION THERAPY: CPT | Mod: GN

## 2023-11-21 PROCEDURE — 2500000002 HC RX 250 W HCPCS SELF ADMINISTERED DRUGS (ALT 637 FOR MEDICARE OP, ALT 636 FOR OP/ED): Performed by: NURSE PRACTITIONER

## 2023-11-21 PROCEDURE — 1200000002 HC GENERAL ROOM WITH TELEMETRY DAILY

## 2023-11-21 RX ORDER — LANOLIN ALCOHOL/MO/W.PET/CERES
400 CREAM (GRAM) TOPICAL DAILY
Status: DISCONTINUED | OUTPATIENT
Start: 2023-11-21 | End: 2023-11-30 | Stop reason: HOSPADM

## 2023-11-21 RX ORDER — MIDODRINE HYDROCHLORIDE 2.5 MG/1
2.5 TABLET ORAL
Status: DISCONTINUED | OUTPATIENT
Start: 2023-11-21 | End: 2023-11-24

## 2023-11-21 RX ORDER — MIDODRINE HYDROCHLORIDE 5 MG/1
5 TABLET ORAL
Qty: 90 TABLET | Refills: 0 | Status: CANCELLED | OUTPATIENT
Start: 2023-11-21 | End: 2023-12-21

## 2023-11-21 RX ADMIN — OXYCODONE HYDROCHLORIDE 5 MG: 5 TABLET ORAL at 12:05

## 2023-11-21 RX ADMIN — Medication 3 ML: at 09:12

## 2023-11-21 RX ADMIN — Medication 3 ML: at 20:34

## 2023-11-21 RX ADMIN — APIXABAN 5 MG: 5 TABLET, FILM COATED ORAL at 21:00

## 2023-11-21 RX ADMIN — Medication 8000 UNITS: at 09:07

## 2023-11-21 RX ADMIN — Medication 1 TABLET: at 09:07

## 2023-11-21 RX ADMIN — ALBUTEROL SULFATE 2.5 MG: 2.5 SOLUTION RESPIRATORY (INHALATION) at 20:32

## 2023-11-21 RX ADMIN — HEPARIN SODIUM 1200 UNITS/HR: 10000 INJECTION, SOLUTION INTRAVENOUS at 12:05

## 2023-11-21 RX ADMIN — LIDOCAINE 2 PATCH: 4 PATCH TOPICAL at 09:08

## 2023-11-21 RX ADMIN — MIDODRINE HYDROCHLORIDE 2.5 MG: 2.5 TABLET ORAL at 12:05

## 2023-11-21 RX ADMIN — LIDOCAINE 1 PATCH: 4 PATCH TOPICAL at 09:08

## 2023-11-21 RX ADMIN — ACETAMINOPHEN 650 MG: 325 TABLET ORAL at 15:49

## 2023-11-21 RX ADMIN — Medication 10 MG: at 21:00

## 2023-11-21 RX ADMIN — MAGNESIUM OXIDE TAB 400 MG (241.3 MG ELEMENTAL MG) 400 MG: 400 (241.3 MG) TAB at 09:07

## 2023-11-21 RX ADMIN — ALBUTEROL SULFATE 2.5 MG: 2.5 SOLUTION RESPIRATORY (INHALATION) at 09:12

## 2023-11-21 RX ADMIN — ESOMEPRAZOLE MAGNESIUM 40 MG: 40 FOR SUSPENSION ORAL at 07:00

## 2023-11-21 RX ADMIN — OXYCODONE HYDROCHLORIDE 5 MG: 5 TABLET ORAL at 05:51

## 2023-11-21 RX ADMIN — OXYCODONE HYDROCHLORIDE 5 MG: 5 TABLET ORAL at 18:09

## 2023-11-21 ASSESSMENT — PAIN SCALES - GENERAL
PAINLEVEL_OUTOF10: 10 - WORST POSSIBLE PAIN
PAINLEVEL_OUTOF10: 10 - WORST POSSIBLE PAIN
PAINLEVEL_OUTOF10: 7
PAINLEVEL_OUTOF10: 9
PAINLEVEL_OUTOF10: 2

## 2023-11-21 ASSESSMENT — PAIN DESCRIPTION - LOCATION: LOCATION: GENERALIZED

## 2023-11-21 ASSESSMENT — PAIN - FUNCTIONAL ASSESSMENT
PAIN_FUNCTIONAL_ASSESSMENT: 0-10

## 2023-11-21 NOTE — SIGNIFICANT EVENT
Rapid Response RN Note    Rapid response RN at bedside for RADAR score 6 due to the following VS: T 36.5 °Celsius; HR 99 ; RR 24; /83; SPO2 95%.     Reviewed above VS with bedside RN.  Per bedside RN, pt in pain & awaiting to be given pain meds;  No interventions by rapid response team indicated at this time.      Patient denied pain, shortness of breath, dizziness or lightheadedness.      Staff to page rapid response for any concerns or acute change in condition/VS.

## 2023-11-21 NOTE — PROGRESS NOTES
Speech-Language Pathology    Inpatient SLP Swallow Treatment     Patient Name: Molina Godinez  MRN: [id number]  Today's Date: 11/21/2023  Time Calculation  Start Time: 1000  Stop Time: 1023  Time Calculation (min): 23 min         Recommendations:   NPO with alternative means nutrition/hydration  Frequent oral care  Ice chips x5-10 hour in setting of frequent oral care  Ongoing SLP with repeat assessment within 1 week.     SLP Assessment:  SLP TX Intervention Outcome: Making progress  Prognosis: Good       Plan:  Inpatient/Swing Bed or Outpatient: Inpatient  SLP TX Plan: Continue Plan of Care  SLP Plan: Skilled SLP  SLP Frequency: 3x per week  Duration: 2 weeks  SLP Discharge Recommendations: Continue skilled SLP services at the next level of care  Discussed POC: Patient  Discussed Risks/Benefits: Yes  Patient/Caregiver Agreeable: Yes  SLP - OK to Discharge: Yes      Subjective   Current Presentation: Pt alert and fully oriented. Breathing comfortably on nasal cannula.           Objective       Therapeutic Swallow Therapy:  Therapeutic Swallow Intervention : PO Trials     Pt consumed x3 oz puree and x2 oz nectar thick liquids, alternating textures given slow rate and direct feed assist. Pt with timely oral manipulation with puree, improved from assessment previous date. Pt with x1 instance of coughing, do not suspect aspiration based on MBSS completed previous date however cannot fully rule-out at bedside. Pt endorsed fullness given small volume of trials. He denied perceived difficulty with trials and endorsed enjoying it.     Pt educated and cued for effortful swallow with ice chips. Pt completed x5 ice chip trials given min cues for effortful swallow. Instructed pt to complete x10 ice chips/hour with use of effortful swallow throughout the day for swallow stimulation. Pt indicated understanding of recs.     Inpatient Education:  Extensive education completed regarding MBSS completed previous date including results,  plan of care, and recommendations. Pt indicated understanding and endorsed some relief given establishment of a plan for swallowing.       Goals:     Pt will verbalize/demonstrate comprehension of dysphagia education, strategies, recommendations and POC.  Pt will complete x30 trials of effortful swallows with cues as needed for accurate completion.   Pt will complete therapeutic trials of puree x4oz and nectar (mildly) thick liquids x4oz with no over difficulty and without signs of significant fatigue.

## 2023-11-21 NOTE — PROGRESS NOTES
"Molina Godinez is a 68 y.o. male on day 40 of admission presenting with Hospital-acquired pneumonia.    Subjective   No events overnight       Objective   Constitutional: awake, alert, oriented x3, cachetic, chronically ill appearing, pt in NAD, alert and cooperative, smiling and in good spirits  Eyes: PERRL, EOMI, no icterus   ENMT: mucous membranes moist, no apparent injury, no lesions seen  Head/Neck: Neck supple, no apparent injury  Respiratory/Thorax: Lungs CTA bilaterally, diminished asha bases, non-labored breathing, no cough, on 1-2 L NC  Cardiovascular: Regular, rate and rhythm, no murmurs, normal S1 and S2, sinus tachy  Gastrointestinal: Nondistended, soft, non-tender, BS present x 4, cortrak  Extremities: normal extremities, no edema, contusions or wounds  Neurological: alert and oriented x 3, speech clear, follows commands,appropriately, able to move BLE off bed against gravity  Skin: Warm and dry, dry/flaky skin    Last Recorded Vitals  Blood pressure (!) 142/97, pulse 106, temperature 36.5 °C (97.7 °F), temperature source Temporal, resp. rate 19, height 1.702 m (5' 7.01\"), weight 56.8 kg (125 lb 3.5 oz), SpO2 99 %.  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 male with PMHx COPD, HTN,  alcohol and opioid use disorder, hepatitis C, chronic PE " admitted 10/12 with BLE weakness, recently admitted in the MICU from 10/13-10/16 for suspected alcohol withdrawal refractory to ativan, started on phenobarb taper, treated for streptococcus pneumoniae PNA with Ceftriaxone and transferred to [facility].  RRT called for lethargy, transferred to MICU on 10/17 with new FiO2 requirements.  In SDU continues to make good progress, weaning off O2, getting aggressive pulm hygiene and PT/OT/SLP.       Neuro: Hx of Etoh and Opioid use disorder, GBS (dx while inpatient)   A&Ox3, motor improving and able to move BLE now.  Pain ongoing issue and has been receiving PRN Tylenol and Oxy around the clock  - EMG performed on 11/10 confirming GBS   - Neuro consulted, recs appreciated --> 11/11 (see attestation on note from 11/10) EMG compatible with GBS.  No plans for further testing or treatment at this time d/t fungemia.  Immune modulation could potentially worsen infection.  Once infection is cleared can re consult Neurology for potential treatment  - Continue Melatonin 10 mg HS  -Pain management with Lidocaine patches, Acetaminophen prn, Oxycodone 5 mg Q6  - PT/OT/SLP following  - music therapy following     Optho: Dry eye, Cataracts, asha eyes  - seen by Opthomology given c/o blurriness with new fungemia.  No signs of fungal endophthalmitis  - treated with artifical tears and erythromycin oint.  Now resolved     Pulm: Hx of COPD, on 2-4 L home O2, Chronic PE, Smoker, Acute on chronic hypoxic Respiratory failure.  Weaned to 2 L NC today  - 11/17 CXR with improvement on L  - s/p Bronch x3 with mucous plugging, L lung consolidation, Most recently on 11/6 with Dr. Cuenca and 11/16 with Dr Kincaid  - Continue with Albuterol BID and 7% saline BID.  Airway clearance techniques (IPV) Q4 hours.   - Acapella, IS while awake  - Continue heparin drip. ==>Transition to home Apixiban 5 mg BID this evening  - Seen by Dr Klein for PAH work up on 10/2     Cardiac:  Hx HTN, TV Endocarditis, Tachycardia  resolving  - BP uptrending with Midodrine, now decreasing Midodrine dose  - HOME meds on HOLD: Norvasc Atenolol, Atorva  - 10/31 Echo: LVEF 75-80%, RV overload, Mod-severe TV, Mod-severe RV systolic pressure, Tricuspid echodensity measuring 1.7 x 0.8 cm, atrial septal aneurysm  - Repeat Echo on 11/2/23: with no definite valvular vegetations  - Seen by CT surgery for TV vegetation- too high risk for surgery at this time.  Follow up when stable  - Had SVT this admit.  Continue on tele  - Continue Midodrine  5 mg TID==> decrease to 2.5 mg TID  - 11/17 sinus tachy- fluid responsive, given 1 L total LR     FEN/GI: Hx of Hep C, Dysphagia, s/p Cortrak, Severe Protein Calorie Malnutrition, Hyponatremia (133-- 130-- 129--132), HypoMag  - TF via Cortrak- @ 45 ml/hr  - Continue PPI, MVI  - replete Mag today  - re-eval by SLP-  with MBS today  - Daily RFP/Mag      Renal: KRYSTEN this admit s/p CVVH in MICU.  Now normal renal function.  Vit D insuff  - Voiding per external cath  - Seen by Nephrology, signed off  - Continue on Midodrine   - continue Vit D supp, 8000 units for 8 weeks  - Strict I's and O's      Heme: Hx of Anemia of chronic disease,  Hx of PE  - restarted on heparin drip post bronch 11/16  -will transition back to home Apixiban at discharge  - Daily CBC     ID:  Hx of Hepatitis C, Fungemia (10/31 BC, 1/4), Strep pneumonaie PNA, Stenotrophomonas PNA  - Afebrile, leukocytosis downtrending  - +Stenotrophomonas on sputum cx 10/20, 11/5 (from BAL), 11/6 sputum.  10/31 BC with Candida (1/4 vials).  10/31 Urine cx neg, 11/1: sputum cancelled.  11/2 C. Diff neg, 11/3 BC x2 Neg,   - 11/6 tracheal wash AFB pending.  Fungal cx 1+ rare Candida albicans  - seen by ID.  Continue on She (11/3-- STOP date 11/17)  - 11/16 Bronch cx: Gram stain  + multidrug resistant PSA, Stenotrophmonas and 4+ Candida, AFB pending  - 11/20: ID reconsulted, they reached out to micro lab to get further sens on different meds, however, rec monitoring  off atb as this may be colonization.     Lines: PIV     Code Status: Full Code  NOK: Sister Sarah Godinez [phone]      Dispo: Continues to improve with aggressive pulm toileting.  Possible transfer to [facility] tomorrow.   Will need SNF at discharge-- SNF list provided by [facility].   PT/OT/SLP following.       I spent 35 minutes in the professional and overall care of this patient.      Paris Pace, APRN-CNP

## 2023-11-22 LAB
ALBUMIN SERPL BCP-MCNC: 3.3 G/DL (ref 3.4–5)
ALP SERPL-CCNC: 185 U/L (ref 33–136)
ALT SERPL W P-5'-P-CCNC: 21 U/L (ref 10–52)
ANION GAP SERPL CALC-SCNC: 16 MMOL/L (ref 10–20)
AST SERPL W P-5'-P-CCNC: 31 U/L (ref 9–39)
BILIRUB SERPL-MCNC: 0.3 MG/DL (ref 0–1.2)
BUN SERPL-MCNC: 23 MG/DL (ref 6–23)
CALCIUM SERPL-MCNC: 9.3 MG/DL (ref 8.6–10.6)
CHLORIDE SERPL-SCNC: 91 MMOL/L (ref 98–107)
CO2 SERPL-SCNC: 28 MMOL/L (ref 21–32)
CREAT SERPL-MCNC: 0.4 MG/DL (ref 0.5–1.3)
ERYTHROCYTE [DISTWIDTH] IN BLOOD BY AUTOMATED COUNT: 14.1 % (ref 11.5–14.5)
GFR SERPL CREATININE-BSD FRML MDRD: >90 ML/MIN/1.73M*2
GLUCOSE SERPL-MCNC: 103 MG/DL (ref 74–99)
HCT VFR BLD AUTO: 37.9 % (ref 41–52)
HGB BLD-MCNC: 12.2 G/DL (ref 13.5–17.5)
MAGNESIUM SERPL-MCNC: 1.99 MG/DL (ref 1.6–2.4)
MCH RBC QN AUTO: 28.7 PG (ref 26–34)
MCHC RBC AUTO-ENTMCNC: 32.2 G/DL (ref 32–36)
MCV RBC AUTO: 89 FL (ref 80–100)
NRBC BLD-RTO: 0 /100 WBCS (ref 0–0)
PLATELET # BLD AUTO: 341 X10*3/UL (ref 150–450)
POTASSIUM SERPL-SCNC: 4.9 MMOL/L (ref 3.5–5.3)
PROT SERPL-MCNC: 8.3 G/DL (ref 6.4–8.2)
RBC # BLD AUTO: 4.25 X10*6/UL (ref 4.5–5.9)
SODIUM SERPL-SCNC: 130 MMOL/L (ref 136–145)
WBC # BLD AUTO: 8.9 X10*3/UL (ref 4.4–11.3)

## 2023-11-22 PROCEDURE — 2500000001 HC RX 250 WO HCPCS SELF ADMINISTERED DRUGS (ALT 637 FOR MEDICARE OP): Performed by: NURSE PRACTITIONER

## 2023-11-22 PROCEDURE — 83735 ASSAY OF MAGNESIUM: CPT | Performed by: NURSE PRACTITIONER

## 2023-11-22 PROCEDURE — 2500000005 HC RX 250 GENERAL PHARMACY W/O HCPCS: Performed by: NURSE PRACTITIONER

## 2023-11-22 PROCEDURE — 94668 MNPJ CHEST WALL SBSQ: CPT

## 2023-11-22 PROCEDURE — 85027 COMPLETE CBC AUTOMATED: CPT | Performed by: NURSE PRACTITIONER

## 2023-11-22 PROCEDURE — 99231 SBSQ HOSP IP/OBS SF/LOW 25: CPT | Performed by: NURSE PRACTITIONER

## 2023-11-22 PROCEDURE — 1100000001 HC PRIVATE ROOM DAILY

## 2023-11-22 PROCEDURE — 94640 AIRWAY INHALATION TREATMENT: CPT

## 2023-11-22 PROCEDURE — 36415 COLL VENOUS BLD VENIPUNCTURE: CPT | Performed by: NURSE PRACTITIONER

## 2023-11-22 PROCEDURE — 80053 COMPREHEN METABOLIC PANEL: CPT | Performed by: NURSE PRACTITIONER

## 2023-11-22 PROCEDURE — 2500000002 HC RX 250 W HCPCS SELF ADMINISTERED DRUGS (ALT 637 FOR MEDICARE OP, ALT 636 FOR OP/ED): Performed by: NURSE PRACTITIONER

## 2023-11-22 PROCEDURE — 2500000004 HC RX 250 GENERAL PHARMACY W/ HCPCS (ALT 636 FOR OP/ED): Performed by: NURSE PRACTITIONER

## 2023-11-22 RX ORDER — LOPERAMIDE HCL 1MG/7.5ML
2 LIQUID (ML) ORAL 3 TIMES DAILY PRN
Qty: 150 ML | Refills: 0 | Status: CANCELLED | OUTPATIENT
Start: 2023-11-22

## 2023-11-22 RX ORDER — ERGOCALCIFEROL (VITAMIN D2) 200 MCG/ML
8000 DROPS ORAL DAILY
Qty: 30 ML | Refills: 1 | Status: CANCELLED | OUTPATIENT
Start: 2023-11-22 | End: 2024-01-21

## 2023-11-22 RX ORDER — MULTIVIT-MIN/IRON FUM/FOLIC AC 7.5 MG-4
1 TABLET ORAL DAILY
Qty: 30 TABLET | Refills: 0 | Status: CANCELLED
Start: 2023-11-22 | End: 2023-12-22

## 2023-11-22 RX ORDER — ESOMEPRAZOLE MAGNESIUM 40 MG/1
40 GRANULE, DELAYED RELEASE ORAL
Qty: 30 PACKET | Refills: 0 | Status: CANCELLED
Start: 2023-11-22 | End: 2023-12-22

## 2023-11-22 RX ORDER — ALBUTEROL SULFATE 0.83 MG/ML
2.5 SOLUTION RESPIRATORY (INHALATION)
Qty: 180 ML | Refills: 0 | Status: CANCELLED
Start: 2023-11-22 | End: 2023-12-22

## 2023-11-22 RX ORDER — LIDOCAINE 560 MG/1
2 PATCH PERCUTANEOUS; TOPICAL; TRANSDERMAL DAILY
Qty: 60 PATCH | Refills: 0 | Status: CANCELLED
Start: 2023-11-22 | End: 2023-12-22

## 2023-11-22 RX ORDER — ACETAMINOPHEN, DIPHENHYDRAMINE HCL, PHENYLEPHRINE HCL 325; 25; 5 MG/1; MG/1; MG/1
10 TABLET ORAL NIGHTLY
Qty: 30 TABLET | Refills: 0 | Status: CANCELLED
Start: 2023-11-22 | End: 2023-12-22

## 2023-11-22 RX ORDER — LIDOCAINE 560 MG/1
1 PATCH PERCUTANEOUS; TOPICAL; TRANSDERMAL DAILY
Qty: 30 PATCH | Refills: 0 | Status: CANCELLED
Start: 2023-11-22 | End: 2023-12-22

## 2023-11-22 RX ADMIN — Medication 1 TABLET: at 09:04

## 2023-11-22 RX ADMIN — ALBUTEROL SULFATE 2.5 MG: 2.5 SOLUTION RESPIRATORY (INHALATION) at 21:51

## 2023-11-22 RX ADMIN — OXYCODONE HYDROCHLORIDE 5 MG: 5 TABLET ORAL at 06:13

## 2023-11-22 RX ADMIN — ALBUTEROL SULFATE 2.5 MG: 2.5 SOLUTION RESPIRATORY (INHALATION) at 08:39

## 2023-11-22 RX ADMIN — MIDODRINE HYDROCHLORIDE 2.5 MG: 2.5 TABLET ORAL at 12:06

## 2023-11-22 RX ADMIN — OXYCODONE HYDROCHLORIDE 5 MG: 5 TABLET ORAL at 17:49

## 2023-11-22 RX ADMIN — MIDODRINE HYDROCHLORIDE 2.5 MG: 2.5 TABLET ORAL at 08:45

## 2023-11-22 RX ADMIN — LIDOCAINE 2 PATCH: 4 PATCH TOPICAL at 09:06

## 2023-11-22 RX ADMIN — LIDOCAINE 1 PATCH: 4 PATCH TOPICAL at 09:05

## 2023-11-22 RX ADMIN — APIXABAN 5 MG: 5 TABLET, FILM COATED ORAL at 20:18

## 2023-11-22 RX ADMIN — ESOMEPRAZOLE MAGNESIUM 40 MG: 40 FOR SUSPENSION ORAL at 06:00

## 2023-11-22 RX ADMIN — Medication 8000 UNITS: at 09:05

## 2023-11-22 RX ADMIN — OXYCODONE HYDROCHLORIDE 5 MG: 5 TABLET ORAL at 00:04

## 2023-11-22 RX ADMIN — MAGNESIUM OXIDE TAB 400 MG (241.3 MG ELEMENTAL MG) 400 MG: 400 (241.3 MG) TAB at 09:04

## 2023-11-22 RX ADMIN — Medication 3 ML: at 08:39

## 2023-11-22 RX ADMIN — OXYCODONE HYDROCHLORIDE 5 MG: 5 TABLET ORAL at 12:15

## 2023-11-22 RX ADMIN — SODIUM CHLORIDE, POTASSIUM CHLORIDE, SODIUM LACTATE AND CALCIUM CHLORIDE 500 ML: 600; 310; 30; 20 INJECTION, SOLUTION INTRAVENOUS at 08:46

## 2023-11-22 RX ADMIN — MIDODRINE HYDROCHLORIDE 2.5 MG: 2.5 TABLET ORAL at 16:53

## 2023-11-22 RX ADMIN — APIXABAN 5 MG: 5 TABLET, FILM COATED ORAL at 09:04

## 2023-11-22 RX ADMIN — OXYCODONE HYDROCHLORIDE 5 MG: 5 TABLET ORAL at 23:45

## 2023-11-22 ASSESSMENT — PAIN - FUNCTIONAL ASSESSMENT
PAIN_FUNCTIONAL_ASSESSMENT: 0-10

## 2023-11-22 ASSESSMENT — PAIN SCALES - GENERAL
PAINLEVEL_OUTOF10: 10 - WORST POSSIBLE PAIN
PAINLEVEL_OUTOF10: 8
PAINLEVEL_OUTOF10: 10 - WORST POSSIBLE PAIN
PAINLEVEL_OUTOF10: 6
PAINLEVEL_OUTOF10: 7

## 2023-11-22 ASSESSMENT — PAIN DESCRIPTION - DESCRIPTORS: DESCRIPTORS: ACHING

## 2023-11-22 ASSESSMENT — PAIN DESCRIPTION - LOCATION: LOCATION: LEG

## 2023-11-22 NOTE — PROGRESS NOTES
Molina Godinez is a 68 y.o. male on day 41 of admission presenting with Hospital-acquired pneumonia.   [facility] will [facility] patient. Told facility to begin precert.       FLOYD LI RN

## 2023-11-22 NOTE — PROGRESS NOTES
"Molina Godinez is a 68 y.o. male on day 41 of admission presenting with Hospital-acquired pneumonia.    Subjective   Endorses pain asha feet and low back.  Denies cough, SOB       Objective   Constitutional: awake, alert, oriented x3, cachetic, chronically ill appearing, pt in NAD, alert and cooperative, smiling and in good spirits  Eyes: PERRL, EOMI, no icterus   ENMT: mucous membranes moist, no apparent injury, no lesions seen  Head/Neck: Neck supple, no apparent injury  Respiratory/Thorax: Lungs CTA bilaterally, diminished asha bases, non-labored breathing, no cough, on 1-2 L NC  Cardiovascular: Regular, rate and rhythm, no murmurs, normal S1 and S2, sinus tachy  Gastrointestinal: Nondistended, soft, non-tender, BS present x 4, cortrak  Extremities: normal extremities, no edema, contusions or wounds  Neurological: alert and oriented x 3, speech clear, follows commands,appropriately, able to move BLE off bed against gravity  Skin: Warm and dry, dry/flaky skin      Last Recorded Vitals  Blood pressure 109/77, pulse (!) 129, temperature 36.4 °C (97.5 °F), temperature source Temporal, resp. rate 21, height 1.702 m (5' 7.01\"), weight 56.8 kg (125 lb 3.5 oz), SpO2 95 %.  Intake/Output last 3 Shifts:  I/O last 3 completed shifts:  In: 1689 (29.7 mL/kg) [I.V.:414 (7.3 mL/kg); NG/GT:1275]  Out: 2700 (47.5 mL/kg) [Urine:2700 (1.3 mL/kg/hr)]  Weight: 56.8 kg     Relevant Results                             Assessment/Plan   Principal Problem:    Hospital-acquired pneumonia  Active Problems:    Fungemia    Essential hypertension, benign    Hepatitis C virus infection cured after antiviral drug therapy    Alcoholism (CMS/HCC)    Chronic pulmonary embolism (CMS/HCC)    COPD (chronic obstructive pulmonary disease) (CMS/HCC)    HLD (hyperlipidemia)    Oxygen dependent    GBS (Guillain Pansey syndrome) (CMS/HCC)    Infection due to Stenotrophomonas maltophilia    Pseudomonas infection    68 y.o. male with PMHx COPD, HTN,  alcohol and " opioid use disorder, hepatitis C, chronic PE admitted 10/12 with BLE weakness, recently admitted in the MICU from 10/13-10/16 for suspected alcohol withdrawal refractory to ativan, started on phenobarb taper, treated for streptococcus pneumoniae PNA with Ceftriaxone and transferred to [facility].  RRT called for lethargy, transferred to MICU on 10/17 with new FiO2 requirements.  In SDU continues to make good progress, weaning off O2, getting aggressive pulm hygiene and PT/OT/SLP.       Neuro: Hx of Etoh and Opioid use disorder, GBS (dx while inpatient)   A&Ox3, motor improving and able to move BLE now.  Pain ongoing issue and has been receiving PRN Tylenol and Oxy around the clock  - EMG performed on 11/10 confirming GBS   - Neuro consulted, recs appreciated --> 11/11 (see attestation on note from 11/10) EMG compatible with GBS.  No plans for further testing or treatment at this time d/t fungemia.  Immune modulation could potentially worsen infection.  Once infection is cleared can re consult Neurology for potential treatment  - Continue Melatonin 10 mg HS  -Pain management with Lidocaine patches, Acetaminophen prn, Oxycodone 5 mg Q6  - PT/OT/SLP following  - music therapy following     Optho: Dry eye, Cataracts, asha eyes  - seen by Opthomology given c/o blurriness with new fungemia.  No signs of fungal endophthalmitis  - treated with artifical tears and erythromycin oint.  Now resolved     Pulm: Hx of COPD, on 2-4 L home O2, Chronic PE, Smoker, Acute on chronic hypoxic Respiratory failure.  Weaned to 2 L NC today  - 11/17 CXR with improvement on L  - s/p Bronch x3 with mucous plugging, L lung consolidation, Most recently on 11/6 with Dr. Cuenca and 11/16 with Dr Kincaid  - Continue with Albuterol BID and 7% saline BID.  Airway clearance techniques (IPV) Q4 hours.   - Acapella, IS while awake  - Continue heparin drip. ==>Transition to home Apixiban 5 mg BID this evening  - Seen by Dr Klein for PAH work up on 10/2      Cardiac:  Hx HTN, TV Endocarditis, Tachycardia resolving  - BP uptrending with Midodrine, now decreasing Midodrine dose  - HOME meds on HOLD: Norvasc Atenolol, Atorva  - 10/31 Echo: LVEF 75-80%, RV overload, Mod-severe TV, Mod-severe RV systolic pressure, Tricuspid echodensity measuring 1.7 x 0.8 cm, atrial septal aneurysm  - Repeat Echo on 11/2/23: with no definite valvular vegetations  - Seen by CT surgery for TV vegetation- too high risk for surgery at this time.  Follow up when stable  - Had SVT this admit.  Continue on tele  - Continue Midodrine  5 mg TID==> decreased   to 2.5 mg TID  - ST today and overnight-- giving 500 ml LR bolus     FEN/GI: Hx of Hep C, Dysphagia, s/p Cortrak, Severe Protein Calorie Malnutrition, Hyponatremia (133-- 130-- 129--132--130  - TF via Cortrak- @ 45 ml/hr  - Continue PPI, MVI  - re-eval by SLP-  with MBS today  - Daily RFP/Mag      Renal: KRYSTEN this admit s/p CVVH in MICU.  Now normal renal function.  Vit D insuff  - Voiding per external cath  - Seen by Nephrology, signed off  - Continue on Midodrine   - continue Vit D supp, 8000 units for 8 weeks  - Strict I's and O's      Heme: Hx of Anemia of chronic disease,  Hx of PE  - restarted on heparin drip post bronch 11/16  -will transition back to home Apixiban at discharge  - Daily CBC     ID:  Hx of Hepatitis C, Fungemia (10/31 BC, 1/4), Strep pneumonaie PNA, Stenotrophomonas PNA  - Afebrile, no leukocytosis   - +Stenotrophomonas on sputum cx 10/20, 11/5 (from BAL), 11/6 sputum.  10/31 BC with Candida (1/4 vials).  10/31 Urine cx neg, 11/1: sputum cancelled.  11/2 C. Diff neg, 11/3 BC x2 Neg,   - 11/6 tracheal wash AFB pending.  Fungal cx 1+ rare Candida albicans  - seen by ID.  Continue on She (11/3-- STOP date 11/17)  - 11/16 Bronch cx:  multidrug resistant PSA, Stenotrophmonas and 4+ Candida, AFB pending  - 11/20: ID reconsulted, they reached out to micro lab to get further sens on different meds, however, rec monitoring off  atb as this may be colonization.     Lines: PIV     Code Status: Full Code  NOK: Sister Sarah Godinez [phone]      Dispo: Continues to improve with aggressive pulm toileting.  Medically ready for discharge to SNF.  Awaiting precert.  Can transfer to [facility] today.    PT/OT/SLP following.          I spent 35 minutes in the professional and overall care of this patient.      Paris Pace, APRN-CNP

## 2023-11-22 NOTE — CARE PLAN
Problem: Pain - Adult  Goal: Verbalizes/displays adequate comfort level or baseline comfort level  Outcome: Progressing     Problem: Discharge Planning  Goal: Discharge to home or other facility with appropriate resources  Outcome: Progressing     Problem: Chronic Conditions and Co-morbidities  Goal: Patient's chronic conditions and co-morbidity symptoms are monitored and maintained or improved  Outcome: Progressing     Problem: Skin  Goal: Participates in plan/prevention/treatment measures  Outcome: Progressing  Goal: Prevent/manage excess moisture  Outcome: Progressing  Goal: Prevent/minimize sheer/friction injuries  Outcome: Progressing  Goal: Promote/optimize nutrition  Outcome: Progressing   The patient's goals for the shift include PATITO    The clinical goals for the shift include patient will reman safe and free from injury

## 2023-11-23 LAB
ALBUMIN SERPL BCP-MCNC: 3.4 G/DL (ref 3.4–5)
ANION GAP SERPL CALC-SCNC: 17 MMOL/L (ref 10–20)
BUN SERPL-MCNC: 24 MG/DL (ref 6–23)
CALCIUM SERPL-MCNC: 9.7 MG/DL (ref 8.6–10.6)
CHLORIDE SERPL-SCNC: 91 MMOL/L (ref 98–107)
CO2 SERPL-SCNC: 26 MMOL/L (ref 21–32)
CREAT SERPL-MCNC: 0.48 MG/DL (ref 0.5–1.3)
ERYTHROCYTE [DISTWIDTH] IN BLOOD BY AUTOMATED COUNT: 14 % (ref 11.5–14.5)
GFR SERPL CREATININE-BSD FRML MDRD: >90 ML/MIN/1.73M*2
GLUCOSE SERPL-MCNC: 108 MG/DL (ref 74–99)
HCT VFR BLD AUTO: 38.5 % (ref 41–52)
HGB BLD-MCNC: 12.2 G/DL (ref 13.5–17.5)
MAGNESIUM SERPL-MCNC: 2.15 MG/DL (ref 1.6–2.4)
MCH RBC QN AUTO: 28.4 PG (ref 26–34)
MCHC RBC AUTO-ENTMCNC: 31.7 G/DL (ref 32–36)
MCV RBC AUTO: 90 FL (ref 80–100)
NRBC BLD-RTO: 0 /100 WBCS (ref 0–0)
PHOSPHATE SERPL-MCNC: 6 MG/DL (ref 2.5–4.9)
PLATELET # BLD AUTO: 353 X10*3/UL (ref 150–450)
POTASSIUM SERPL-SCNC: 5.1 MMOL/L (ref 3.5–5.3)
RBC # BLD AUTO: 4.3 X10*6/UL (ref 4.5–5.9)
SODIUM SERPL-SCNC: 129 MMOL/L (ref 136–145)
WBC # BLD AUTO: 9.6 X10*3/UL (ref 4.4–11.3)

## 2023-11-23 PROCEDURE — 80069 RENAL FUNCTION PANEL: CPT | Performed by: NURSE PRACTITIONER

## 2023-11-23 PROCEDURE — 2500000001 HC RX 250 WO HCPCS SELF ADMINISTERED DRUGS (ALT 637 FOR MEDICARE OP): Performed by: NURSE PRACTITIONER

## 2023-11-23 PROCEDURE — 1100000001 HC PRIVATE ROOM DAILY

## 2023-11-23 PROCEDURE — 99233 SBSQ HOSP IP/OBS HIGH 50: CPT | Performed by: STUDENT IN AN ORGANIZED HEALTH CARE EDUCATION/TRAINING PROGRAM

## 2023-11-23 PROCEDURE — 2500000005 HC RX 250 GENERAL PHARMACY W/O HCPCS: Performed by: NURSE PRACTITIONER

## 2023-11-23 PROCEDURE — 2500000001 HC RX 250 WO HCPCS SELF ADMINISTERED DRUGS (ALT 637 FOR MEDICARE OP): Performed by: STUDENT IN AN ORGANIZED HEALTH CARE EDUCATION/TRAINING PROGRAM

## 2023-11-23 PROCEDURE — 36415 COLL VENOUS BLD VENIPUNCTURE: CPT | Performed by: NURSE PRACTITIONER

## 2023-11-23 PROCEDURE — 83735 ASSAY OF MAGNESIUM: CPT | Performed by: NURSE PRACTITIONER

## 2023-11-23 PROCEDURE — 94640 AIRWAY INHALATION TREATMENT: CPT

## 2023-11-23 PROCEDURE — 2500000004 HC RX 250 GENERAL PHARMACY W/ HCPCS (ALT 636 FOR OP/ED): Performed by: NURSE PRACTITIONER

## 2023-11-23 PROCEDURE — 2500000002 HC RX 250 W HCPCS SELF ADMINISTERED DRUGS (ALT 637 FOR MEDICARE OP, ALT 636 FOR OP/ED): Performed by: NURSE PRACTITIONER

## 2023-11-23 PROCEDURE — 85027 COMPLETE CBC AUTOMATED: CPT | Performed by: NURSE PRACTITIONER

## 2023-11-23 RX ORDER — OXYCODONE HYDROCHLORIDE 5 MG/1
5 CAPSULE ORAL EVERY 6 HOURS PRN
Status: DISCONTINUED | OUTPATIENT
Start: 2023-11-23 | End: 2023-11-23

## 2023-11-23 RX ORDER — OXYCODONE HYDROCHLORIDE 5 MG/1
5 TABLET ORAL EVERY 6 HOURS PRN
Status: DISCONTINUED | OUTPATIENT
Start: 2023-11-23 | End: 2023-11-25

## 2023-11-23 RX ORDER — TRAMADOL HYDROCHLORIDE 50 MG/1
50 TABLET ORAL EVERY 8 HOURS PRN
Status: DISCONTINUED | OUTPATIENT
Start: 2023-11-23 | End: 2023-11-23

## 2023-11-23 RX ADMIN — MAGNESIUM OXIDE TAB 400 MG (241.3 MG ELEMENTAL MG) 400 MG: 400 (241.3 MG) TAB at 08:23

## 2023-11-23 RX ADMIN — Medication 10 MG: at 20:59

## 2023-11-23 RX ADMIN — OXYCODONE HYDROCHLORIDE 5 MG: 5 TABLET ORAL at 05:58

## 2023-11-23 RX ADMIN — TRAMADOL HYDROCHLORIDE 50 MG: 50 TABLET, COATED ORAL at 09:48

## 2023-11-23 RX ADMIN — ALBUTEROL SULFATE 2.5 MG: 2.5 SOLUTION RESPIRATORY (INHALATION) at 22:28

## 2023-11-23 RX ADMIN — ESOMEPRAZOLE MAGNESIUM 40 MG: 40 FOR SUSPENSION ORAL at 08:22

## 2023-11-23 RX ADMIN — MIDODRINE HYDROCHLORIDE 2.5 MG: 2.5 TABLET ORAL at 08:23

## 2023-11-23 RX ADMIN — ACETAMINOPHEN 650 MG: 325 TABLET ORAL at 12:13

## 2023-11-23 RX ADMIN — APIXABAN 5 MG: 5 TABLET, FILM COATED ORAL at 08:23

## 2023-11-23 RX ADMIN — Medication 3 ML: at 22:30

## 2023-11-23 RX ADMIN — TRAMADOL HYDROCHLORIDE 50 MG: 50 TABLET, COATED ORAL at 18:02

## 2023-11-23 RX ADMIN — APIXABAN 5 MG: 5 TABLET, FILM COATED ORAL at 20:59

## 2023-11-23 RX ADMIN — MIDODRINE HYDROCHLORIDE 2.5 MG: 2.5 TABLET ORAL at 12:13

## 2023-11-23 RX ADMIN — Medication 8000 UNITS: at 08:23

## 2023-11-23 RX ADMIN — Medication 1 TABLET: at 08:23

## 2023-11-23 RX ADMIN — OXYCODONE HYDROCHLORIDE 5 MG: 5 TABLET ORAL at 20:59

## 2023-11-23 RX ADMIN — LIDOCAINE 1 PATCH: 4 PATCH TOPICAL at 08:23

## 2023-11-23 RX ADMIN — MIDODRINE HYDROCHLORIDE 2.5 MG: 2.5 TABLET ORAL at 16:49

## 2023-11-23 RX ADMIN — Medication 3 ML: at 10:02

## 2023-11-23 RX ADMIN — ALBUTEROL SULFATE 2.5 MG: 2.5 SOLUTION RESPIRATORY (INHALATION) at 10:02

## 2023-11-23 RX ADMIN — LIDOCAINE 2 PATCH: 4 PATCH TOPICAL at 08:24

## 2023-11-23 ASSESSMENT — COGNITIVE AND FUNCTIONAL STATUS - GENERAL
TOILETING: TOTAL
CLIMB 3 TO 5 STEPS WITH RAILING: TOTAL
DRESSING REGULAR LOWER BODY CLOTHING: TOTAL
MOVING FROM LYING ON BACK TO SITTING ON SIDE OF FLAT BED WITH BEDRAILS: A LOT
MOBILITY SCORE: 8
EATING MEALS: A LITTLE
PERSONAL GROOMING: A LOT
DAILY ACTIVITIY SCORE: 11
DRESSING REGULAR UPPER BODY CLOTHING: A LOT
TURNING FROM BACK TO SIDE WHILE IN FLAT BAD: TOTAL
HELP NEEDED FOR BATHING: A LOT
MOVING TO AND FROM BED TO CHAIR: TOTAL
WALKING IN HOSPITAL ROOM: TOTAL
STANDING UP FROM CHAIR USING ARMS: A LOT

## 2023-11-23 ASSESSMENT — PAIN SCALES - GENERAL
PAINLEVEL_OUTOF10: 10 - WORST POSSIBLE PAIN

## 2023-11-23 ASSESSMENT — PAIN - FUNCTIONAL ASSESSMENT
PAIN_FUNCTIONAL_ASSESSMENT: 0-10

## 2023-11-23 ASSESSMENT — PAIN DESCRIPTION - LOCATION: LOCATION: LEG

## 2023-11-23 NOTE — CARE PLAN
The patient's goals for the shift include manage pain    The clinical goals for the shift include patient will reman safe and free from injury      Problem: Pain - Adult  Goal: Verbalizes/displays adequate comfort level or baseline comfort level  Outcome: Progressing     Problem: Discharge Planning  Goal: Discharge to home or other facility with appropriate resources  Outcome: Progressing     Problem: Chronic Conditions and Co-morbidities  Goal: Patient's chronic conditions and co-morbidity symptoms are monitored and maintained or improved  Outcome: Progressing     Problem: Skin  Goal: Participates in plan/prevention/treatment measures  Outcome: Progressing  Goal: Prevent/manage excess moisture  Outcome: Progressing  Goal: Prevent/minimize sheer/friction injuries  Outcome: Progressing  Goal: Promote/optimize nutrition  Outcome: Progressing

## 2023-11-23 NOTE — PROGRESS NOTES
Assessment/Plan   68 y.o. male with PMHx COPD, HTN,  alcohol and opioid use disorder, hepatitis C, chronic PE admitted 10/12 with BLE weakness, recently admitted in the MICU from 10/13-10/16 for suspected alcohol withdrawal refractory to ativan, started on phenobarb taper, treated for streptococcus pneumoniae PNA with Ceftriaxone and transferred to [facility].  RRT called for lethargy, transferred to MICU on 10/17 with new FiO2 requirements.  In SDU continues to make good progress, weaning O2 to baseline, continuing aggressive pulm hygiene and PT/OT/SLP.   He was recommended moderate intensity therapy and transfer to the floor pending placement for postacute care.    #Acute on chronic hypoxemic respiratory failure  #Strep pneumonaie PNA, Stenotrophomonas PNA  #COPD on 2 to 3 L baseline O2  # Chronic PE  -Status post antibiotics and completed per ID, weaned to 2 L nasal cannula at rest.  Patient -had bronc with mucous plugging left lung consolidation on 11/16.  -Cultures growing multidrug resistant PSA, Stenotrophmonas and 4+ Candida, AFB pending, ID suspects colonization and no further antibiotic recommended.  Will need -to follow-up AFB stains.  -Continue aggressive bronchial hygiene, Albuterol BID and 7% saline BID.  Airway clearance techniques (IPV) Q4 hours.  Acapella, IS while awake  -At this time respiratory status is stabilized.  Will do walking O2 evaluation for oxygen needs.  -Continue Eliquis  -Patient is being worked up for pulmonary artery hypertension, will need follow-up with pulmonary.    # Hypotension with history of hypertension  -Currently midodrine is being weaned.  Attempting to wean off prior to discharge.  Continue to hold Norvasc and atenolol.  - Continue Midodrine  5 mg TID==> decreased to 2.5 mg TID.  Continue to wean as tolerated  -Patient still intermittently sinus tachycardic, intermittently requiring fluids.    #GBS  # Generalized weakness and deconditioning  # Dysphagia  # Severe protein  calorie malnutrition  -Currently at baseline mentation.  His extremity weakness is improving.  EMG confirmed GBS on 11/10.  He is primarily having issues with pain now.  This prolonged hospitalization as well which is likely also contributing to his weakness.  Neurology was consulted and no plans for further testing or treatment due to fungemia, as immunomodulators could potentially worsen infection.  Patient to see neurology after infection cleared.  -For pain continue Lidocaine patches, Acetaminophen prn, Oxycodone 5 mg Q6 as needed  -Currently on Dobbhoff tube feeds and will need an MBS with SLP.  -Continue PPI  -Continue PT OT and SLP/nutrition.    #Alcohol withdrawal  #Alcohol use disorder  #Opiate use disorder  -Status post management of refractory withdrawal in the ICU.  Resolved.  -Social work for resources.  Counseling education provided today.    #Hyperlipidemia  -Will consider timing for resuming statin     #Dry eye, Cataracts, asha eyes  - seen by Opthomology given c/o blurriness with new fungemia.  No signs of fungal endophthalmitis  - treated with artifical tears and erythromycin oint.  Now resolved    #TV Endocarditis  - 10/31 Echo: LVEF 75-80%, RV overload, Mod-severe TV, Mod-severe RV systolic pressure, Tricuspid echodensity measuring 1.7 x 0.8 cm, atrial septal aneurysm  - Repeat Echo on 11/2/23: with no definite valvular vegetations  - Seen by CT surgery for TV vegetation- too high risk for surgery at this time.  Follow up when stable    #SVT resolved.   -Evaluating need for continued telemetry.     Hep C  -Will need to review viral studies and likely refer to hepatology outpatient     Hyponatremia  -Continue Daily RFP adjust free water flushes as tolerated.  Suspecting SIADH per record.  Will reevaluate but will need free water restriction for now.    Acute renal failure -resolved  -Required CVVH and MICU for acute renal failure, renal failure now resolved creatinine down to baseline.  He is still  "hypotensive and requiring midodrine.  He is voiding by an external cath.  Continue to monitor I's and O's and avoid nephrotoxins.  Nephrology has signed off.  -Continue Vit D supp, 8000 units for 8 weeks    # Fungemia  Completed micafungin per ID on 11/17.      Code Status: Full Code  NOK: Sister Sarah Godinez [phone]      Dispo: Continues to improve with aggressive pulm toileting.  Medically ready for discharge to SNF.  Pre-CERT submitted 11/22 per ICU.   PT/OT/SLP continuing to follow.      .       Scheduled outpatient appointments in system:   No future appointments.  ---------------------------------------------------------------------------------------------------  Subjective   No acute events overnight.  Patient is reporting significant improvement overall, he reports no breathing difficulties.  He is on his baseline oxygen.  Encouraged to keep using incentive spirometry and Acapella.  He reports pain in his legs still.  He is on top of feeds and discussed continued evaluation, goals of care regarding feeding.  He is in agreement with continuing to work with speech for medically recommended route of nutrition     ---------------------------------------------------------------------------------------------------  Objective   Last Recorded Vitals  Blood pressure 119/74, pulse (!) 112, temperature 36 °C (96.8 °F), temperature source Temporal, resp. rate 19, height 1.702 m (5' 7.01\"), weight 53.8 kg (118 lb 9.7 oz), SpO2 96 %.  Intake/Output last 3 Shifts:  I/O last 3 completed shifts:  In: 390 (7.2 mL/kg) [NG/GT:390]  Out: 2360 (43.9 mL/kg) [Urine:2360 (1.2 mL/kg/hr)]  Weight: 53.8 kg     Physical Exam  Vitals and nursing note reviewed.   Constitutional:       General: He is not in acute distress.     Appearance: He is ill-appearing. He is not toxic-appearing.      Comments: Cachectic   HENT:      Head: Normocephalic and atraumatic.      Mouth/Throat:      Mouth: Mucous membranes are moist.   Eyes:      " General: No scleral icterus.     Extraocular Movements: Extraocular movements intact.      Conjunctiva/sclera: Conjunctivae normal.   Cardiovascular:      Rate and Rhythm: Regular rhythm. Tachycardia present.      Heart sounds: S1 normal and S2 normal. No murmur heard.  Pulmonary:      Effort: Pulmonary effort is normal. No respiratory distress.      Breath sounds: No wheezing, rhonchi or rales.      Comments: Diminished throughout, on nasal cannula  Abdominal:      General: Bowel sounds are normal. There is no distension.      Palpations: Abdomen is soft.      Tenderness: There is no abdominal tenderness. There is no guarding or rebound.   Musculoskeletal:         General: No swelling or deformity.      Cervical back: Neck supple.   Skin:     General: Skin is warm and dry.      Findings: No rash.   Neurological:      Mental Status: He is alert and oriented to person, place, and time.      Comments: Generalized weakness   Psychiatric:         Mood and Affect: Mood normal.         Relevant Results  Lab Results   Component Value Date    WBC 9.6 11/23/2023    HGB 12.2 (L) 11/23/2023    HCT 38.5 (L) 11/23/2023    MCV 90 11/23/2023     11/23/2023      Lab Results   Component Value Date    GLUCOSE 108 (H) 11/23/2023    CALCIUM 9.7 11/23/2023     (L) 11/23/2023    K 5.1 11/23/2023    CO2 26 11/23/2023    CL 91 (L) 11/23/2023    BUN 24 (H) 11/23/2023    CREATININE 0.48 (L) 11/23/2023     Scheduled medications  albuterol, 2.5 mg, nebulization, q12h  apixaban, 5 mg, nasogastric tube, BID  ergocalciferol, 8,000 Units, oral, Daily  esomeprazole, 40 mg, nasogastric tube, Daily before breakfast  lidocaine, 1 patch, transdermal, Daily  lidocaine, 2 patch, transdermal, Daily  magnesium oxide, 400 mg, oral, Daily  melatonin, 10 mg, nasogastric tube, Nightly  midodrine, 2.5 mg, oral, TID with meals  multivitamin with minerals, 1 tablet, oral, Daily  sodium chloride, 3 mL, nebulization, q12h      Continuous medications      PRN medications  PRN medications: acetaminophen, dextrose 10 % in water (D10W), dextrose, ipratropium-albuteroL, loperamide, oxygen, traMADol    Gwyn Hernandez MD

## 2023-11-24 LAB
ALBUMIN SERPL BCP-MCNC: 3.3 G/DL (ref 3.4–5)
ANION GAP SERPL CALC-SCNC: 15 MMOL/L (ref 10–20)
BUN SERPL-MCNC: 26 MG/DL (ref 6–23)
CALCIUM SERPL-MCNC: 9.5 MG/DL (ref 8.6–10.6)
CHLORIDE SERPL-SCNC: 93 MMOL/L (ref 98–107)
CO2 SERPL-SCNC: 26 MMOL/L (ref 21–32)
CREAT SERPL-MCNC: 0.41 MG/DL (ref 0.5–1.3)
CREAT UR-MCNC: 78.6 MG/DL (ref 20–370)
ERYTHROCYTE [DISTWIDTH] IN BLOOD BY AUTOMATED COUNT: 13.9 % (ref 11.5–14.5)
GFR SERPL CREATININE-BSD FRML MDRD: >90 ML/MIN/1.73M*2
GLUCOSE SERPL-MCNC: 108 MG/DL (ref 74–99)
HCT VFR BLD AUTO: 38.8 % (ref 41–52)
HGB BLD-MCNC: 12.1 G/DL (ref 13.5–17.5)
MAGNESIUM SERPL-MCNC: 2.09 MG/DL (ref 1.6–2.4)
MCH RBC QN AUTO: 28 PG (ref 26–34)
MCHC RBC AUTO-ENTMCNC: 31.2 G/DL (ref 32–36)
MCV RBC AUTO: 90 FL (ref 80–100)
NRBC BLD-RTO: 0 /100 WBCS (ref 0–0)
OSMOLALITY UR: 771 MOSM/KG (ref 200–1200)
PHOSPHATE SERPL-MCNC: 5.8 MG/DL (ref 2.5–4.9)
PLATELET # BLD AUTO: 328 X10*3/UL (ref 150–450)
POTASSIUM SERPL-SCNC: 4.9 MMOL/L (ref 3.5–5.3)
RBC # BLD AUTO: 4.32 X10*6/UL (ref 4.5–5.9)
SODIUM SERPL-SCNC: 129 MMOL/L (ref 136–145)
SODIUM UR-SCNC: 50 MMOL/L
SODIUM/CREAT UR-RTO: 64 MMOL/G CREAT
WBC # BLD AUTO: 9.1 X10*3/UL (ref 4.4–11.3)

## 2023-11-24 PROCEDURE — 2500000001 HC RX 250 WO HCPCS SELF ADMINISTERED DRUGS (ALT 637 FOR MEDICARE OP): Performed by: STUDENT IN AN ORGANIZED HEALTH CARE EDUCATION/TRAINING PROGRAM

## 2023-11-24 PROCEDURE — 2500000005 HC RX 250 GENERAL PHARMACY W/O HCPCS: Performed by: NURSE PRACTITIONER

## 2023-11-24 PROCEDURE — 80069 RENAL FUNCTION PANEL: CPT | Performed by: NURSE PRACTITIONER

## 2023-11-24 PROCEDURE — 1100000001 HC PRIVATE ROOM DAILY

## 2023-11-24 PROCEDURE — 94640 AIRWAY INHALATION TREATMENT: CPT

## 2023-11-24 PROCEDURE — 2500000002 HC RX 250 W HCPCS SELF ADMINISTERED DRUGS (ALT 637 FOR MEDICARE OP, ALT 636 FOR OP/ED): Performed by: NURSE PRACTITIONER

## 2023-11-24 PROCEDURE — 84300 ASSAY OF URINE SODIUM: CPT | Performed by: STUDENT IN AN ORGANIZED HEALTH CARE EDUCATION/TRAINING PROGRAM

## 2023-11-24 PROCEDURE — 2500000001 HC RX 250 WO HCPCS SELF ADMINISTERED DRUGS (ALT 637 FOR MEDICARE OP): Performed by: NURSE PRACTITIONER

## 2023-11-24 PROCEDURE — 85027 COMPLETE CBC AUTOMATED: CPT | Performed by: NURSE PRACTITIONER

## 2023-11-24 PROCEDURE — 97530 THERAPEUTIC ACTIVITIES: CPT | Mod: GP

## 2023-11-24 PROCEDURE — 82570 ASSAY OF URINE CREATININE: CPT | Performed by: STUDENT IN AN ORGANIZED HEALTH CARE EDUCATION/TRAINING PROGRAM

## 2023-11-24 PROCEDURE — 97110 THERAPEUTIC EXERCISES: CPT | Mod: GP

## 2023-11-24 PROCEDURE — 97112 NEUROMUSCULAR REEDUCATION: CPT | Mod: GP

## 2023-11-24 PROCEDURE — 83735 ASSAY OF MAGNESIUM: CPT | Performed by: NURSE PRACTITIONER

## 2023-11-24 PROCEDURE — 83935 ASSAY OF URINE OSMOLALITY: CPT | Performed by: STUDENT IN AN ORGANIZED HEALTH CARE EDUCATION/TRAINING PROGRAM

## 2023-11-24 PROCEDURE — 94760 N-INVAS EAR/PLS OXIMETRY 1: CPT

## 2023-11-24 PROCEDURE — 36415 COLL VENOUS BLD VENIPUNCTURE: CPT | Performed by: NURSE PRACTITIONER

## 2023-11-24 PROCEDURE — 2500000004 HC RX 250 GENERAL PHARMACY W/ HCPCS (ALT 636 FOR OP/ED): Performed by: NURSE PRACTITIONER

## 2023-11-24 PROCEDURE — 99233 SBSQ HOSP IP/OBS HIGH 50: CPT | Performed by: STUDENT IN AN ORGANIZED HEALTH CARE EDUCATION/TRAINING PROGRAM

## 2023-11-24 RX ORDER — LIDOCAINE 40 MG/G
CREAM TOPICAL 3 TIMES DAILY
Status: DISCONTINUED | OUTPATIENT
Start: 2023-11-24 | End: 2023-11-30 | Stop reason: HOSPADM

## 2023-11-24 RX ORDER — MIDODRINE HYDROCHLORIDE 2.5 MG/1
2.5 TABLET ORAL 3 TIMES DAILY PRN
Status: DISCONTINUED | OUTPATIENT
Start: 2023-11-24 | End: 2023-11-30 | Stop reason: HOSPADM

## 2023-11-24 RX ORDER — DICLOFENAC SODIUM 10 MG/G
4 GEL TOPICAL 3 TIMES DAILY
Status: DISCONTINUED | OUTPATIENT
Start: 2023-11-24 | End: 2023-11-30 | Stop reason: HOSPADM

## 2023-11-24 RX ORDER — TRAMADOL HYDROCHLORIDE 50 MG/1
100 TABLET ORAL EVERY 8 HOURS PRN
Status: DISCONTINUED | OUTPATIENT
Start: 2023-11-24 | End: 2023-11-24

## 2023-11-24 RX ADMIN — TRAMADOL HYDROCHLORIDE 100 MG: 50 TABLET, COATED ORAL at 15:41

## 2023-11-24 RX ADMIN — Medication 4 ML: at 20:34

## 2023-11-24 RX ADMIN — MIDODRINE HYDROCHLORIDE 2.5 MG: 2.5 TABLET ORAL at 12:24

## 2023-11-24 RX ADMIN — OXYCODONE HYDROCHLORIDE 5 MG: 5 TABLET ORAL at 03:07

## 2023-11-24 RX ADMIN — Medication 10 MG: at 21:00

## 2023-11-24 RX ADMIN — DICLOFENAC 1 APPLICATION: 10 GEL TOPICAL at 15:44

## 2023-11-24 RX ADMIN — APIXABAN 5 MG: 5 TABLET, FILM COATED ORAL at 21:00

## 2023-11-24 RX ADMIN — APIXABAN 5 MG: 5 TABLET, FILM COATED ORAL at 09:22

## 2023-11-24 RX ADMIN — ESOMEPRAZOLE MAGNESIUM 40 MG: 40 FOR SUSPENSION ORAL at 06:11

## 2023-11-24 RX ADMIN — MAGNESIUM OXIDE TAB 400 MG (241.3 MG ELEMENTAL MG) 400 MG: 400 (241.3 MG) TAB at 09:22

## 2023-11-24 RX ADMIN — Medication 1 TABLET: at 09:22

## 2023-11-24 RX ADMIN — Medication 3 ML: at 11:36

## 2023-11-24 RX ADMIN — MIDODRINE HYDROCHLORIDE 2.5 MG: 2.5 TABLET ORAL at 09:22

## 2023-11-24 RX ADMIN — LIDOCAINE 1 PATCH: 4 PATCH TOPICAL at 09:27

## 2023-11-24 RX ADMIN — Medication 8000 UNITS: at 09:22

## 2023-11-24 RX ADMIN — ALBUTEROL SULFATE 2.5 MG: 2.5 SOLUTION RESPIRATORY (INHALATION) at 11:36

## 2023-11-24 RX ADMIN — DICLOFENAC 1 APPLICATION: 10 GEL TOPICAL at 21:00

## 2023-11-24 RX ADMIN — ALBUTEROL SULFATE 2.5 MG: 2.5 SOLUTION RESPIRATORY (INHALATION) at 20:34

## 2023-11-24 RX ADMIN — LIDOCAINE 4% 1 APPLICATION: 4 CREAM TOPICAL at 21:00

## 2023-11-24 RX ADMIN — OXYCODONE HYDROCHLORIDE 5 MG: 5 TABLET ORAL at 10:27

## 2023-11-24 RX ADMIN — LIDOCAINE 2 PATCH: 4 PATCH TOPICAL at 09:28

## 2023-11-24 RX ADMIN — OXYCODONE HYDROCHLORIDE 5 MG: 5 TABLET ORAL at 20:18

## 2023-11-24 ASSESSMENT — COGNITIVE AND FUNCTIONAL STATUS - GENERAL
MOVING FROM LYING ON BACK TO SITTING ON SIDE OF FLAT BED WITH BEDRAILS: A LOT
STANDING UP FROM CHAIR USING ARMS: A LOT
CLIMB 3 TO 5 STEPS WITH RAILING: TOTAL
TURNING FROM BACK TO SIDE WHILE IN FLAT BAD: A LOT
WALKING IN HOSPITAL ROOM: TOTAL
WALKING IN HOSPITAL ROOM: TOTAL
DAILY ACTIVITIY SCORE: 24
TURNING FROM BACK TO SIDE WHILE IN FLAT BAD: A LOT
MOVING FROM LYING ON BACK TO SITTING ON SIDE OF FLAT BED WITH BEDRAILS: A LOT
MOBILITY SCORE: 10
MOVING TO AND FROM BED TO CHAIR: A LOT
MOBILITY SCORE: 10
MOVING TO AND FROM BED TO CHAIR: A LOT
STANDING UP FROM CHAIR USING ARMS: A LOT
CLIMB 3 TO 5 STEPS WITH RAILING: TOTAL

## 2023-11-24 ASSESSMENT — PAIN - FUNCTIONAL ASSESSMENT
PAIN_FUNCTIONAL_ASSESSMENT: 0-10

## 2023-11-24 ASSESSMENT — PAIN SCALES - GENERAL
PAINLEVEL_OUTOF10: 3
PAINLEVEL_OUTOF10: 1
PAINLEVEL_OUTOF10: 8
PAINLEVEL_OUTOF10: 9
PAINLEVEL_OUTOF10: 2
PAINLEVEL_OUTOF10: 3
PAINLEVEL_OUTOF10: 7
PAINLEVEL_OUTOF10: 3

## 2023-11-24 ASSESSMENT — PAIN DESCRIPTION - DESCRIPTORS: DESCRIPTORS: ACHING

## 2023-11-24 ASSESSMENT — PAIN DESCRIPTION - LOCATION
LOCATION: LEG

## 2023-11-24 ASSESSMENT — PAIN DESCRIPTION - ORIENTATION
ORIENTATION: RIGHT;LEFT;LOWER
ORIENTATION: RIGHT;LEFT
ORIENTATION: RIGHT;LEFT

## 2023-11-24 NOTE — PROGRESS NOTES
"Physical Therapy    Physical Therapy Treatment    Patient Name: Molina Godinez  MRN: [id number]  Today's Date: 11/24/2023  Time Calculation  Start Time: 1150  Stop Time: 1246  Time Calculation (min): 56 min       Assessment/Plan   PT Assessment  End of Session Communication: Bedside nurse, PCT/NA/CTA  Assessment Comment: Pt motivated with good effort.  Able to progress to OOB to chair for first time.  Remains appropriate for Mod intensity PT at time of d/c  End of Session Patient Position: Up in chair, Alarm on     PT Plan  Treatment/Interventions: Bed mobility, Transfer training, Gait training, Balance training, Neuromuscular re-education, Strengthening, Therapeutic exercise, Therapeutic activity, Positioning, Postural re-education  PT Plan: Skilled PT  PT Frequency: 4 times per week  PT Discharge Recommendations: Moderate intensity level of continued care  PT - OK to Discharge: Yes      General Visit Information:   PT  Visit  PT Received On: 11/24/23  Response to Previous Treatment: Patient with no complaints from previous session.  General  Missed Visit: Yes  Missed Visit Reason:  (Conflict of service.  Pt reporting laying in \"mess\" and needing cleaned up prior to PT.  Pt not wanting to let RN staff know bc he'll probably go again soon. RN alerted, and RN and CTA preparing to help pt get cleaned up.)  Family/Caregiver Present: No  Prior to Session Communication: Bedside nurse  Patient Position Received: Bed, 3 rail up, Alarm off, not on at start of session  General Comment: Supine.  Pt pleasant, cooperative, agreeable to PT.  Participating well in therex, and improved participation in transfers and sitting balance.  Able to progress to OOB to chair for first time since admission 44 days ago.    Subjective   Precautions:  Precautions  Precautions Comment: Contact Plus  Vital Signs:  Vital Signs  Heart Rate: 98  SpO2: 97 %    Objective   Pain:  Pain Assessment  Pain Assessment: 0-10  Pain Score: 3  Pain Location:  " (BLE parasthesia)  Cognition:  Cognition  Arousal/Alertness:  (Flat affect)  Orientation Level: Disoriented X4    Activity Tolerance:  Activity Tolerance  Endurance: Tolerates 30 min exercise with multiple rests  Treatments:  Therapeutic Exercise  Therapeutic Exercise Activity 1: Supine: AP, HS x10.  Sitting in chair: LAQ, hip flx x10 Ind but partial ROM    Balance/Neuromuscular Re-Education  Balance/Neuromuscular Re-Education Activity 1: Sitting EOB 24 minutes, pt initially Mod A but able to progress to Min A, with moments of CGA with RUE on rail for static sitting.  Variable Min/Mod A for dynamic activities.    Bed Mobility 1  Bed Mobility 1: Supine to sitting  Level of Assistance 1: Moderate assistance       Transfer 1  Transfer From 1: Sit to, Stand to  Transfer to 1: Sit  Transfer Device 1:  (PT standing in front of pt, supporting trunk)  Transfer Level of Assistance 1: Maximum assistance  Transfers 2  Transfer From 2: Bed to  Transfer to 2: Chair with arms  Transfer Device 2:  (PT standing in front of pt, supporting trunk)  Transfer Level of Assistance 2: Maximum verbal cues    Outcome Measures:    [facility] Basic Mobility  Turning from your back to your side while in a flat bed without using bedrails: A lot  Moving from lying on your back to sitting on the side of a flat bed without using bedrails: A lot  Moving to and from bed to chair (including a wheelchair): A lot  Standing up from a chair using your arms (e.g. wheelchair or bedside chair): A lot  To walk in hospital room: Total  Climbing 3-5 steps with railing: Total  Basic Mobility - Total Score: 10        Education Documentation  Mobility Training, taught by Ever Fowler, PT at 11/24/2023  3:17 PM.  Learner: Patient  Readiness: Eager  Method: Explanation  Response: Verbalizes Understanding, Demonstrated Understanding    Education Comments  No comments found.        OP EDUCATION:       Encounter Problems       Encounter Problems (Active)       Balance        Patient will complete static (Cgx1) and dynamic (MINx1) using BUE support as needed in order to maintain midline without acute LOB   (Not met)       Start:  10/19/23    Expected End:  23    Resolved:  23    Updated to: Patient will complete static (Cgx1) and dynamic (MINx1) sitting balance activities using BUE support as needed in order to maintain midline without acute LOB    Update reason: goals           Patient will complete static (Cgx1) and dynamic (MINx1) sitting balance activities using BUE support as needed in order to maintain midline without acute LOB (Progressing)       Start:  23    Expected End:  23                   Mobility       STG - Patient will ambulate >/=10 ft using LRD as needed with MODx1 without acute LOB  (Not met)       Start:  10/19/23    Expected End:  23    Resolved:  23    Updated to: STG - Patient will ambulate >/=3 ft using LRD as needed with MODx1 without acute LOB    Update reason: goals           patient will complete BLE there-ex in order to improve strength and to assist with the completion of functional mobility tasks.  (Not met)       Start:  10/19/23    Expected End:  23    Resolved:  23    Updated to: patient will complete LE there-ex in order to improve strength and to assist with the completion of functional mobility tasks.    Update reason: goals           STG - Patient will ambulate >/=3 ft using LRD as needed with MODx1 without acute LOB (Not Progressing)       Start:  23    Expected End:  23                patient will complete LE there-ex in order to improve strength and to assist with the completion of functional mobility tasks. (Progressing)       Start:  23    Expected End:  23                   Pain - Adult          Safety       LTG - Patient will adhere to hip precautions during ADL's and transfers       Start:  10/31/23            LTG - Patient will demonstrate  safety requirements appropriate to situation/environment       Start:  10/31/23            LTG - Patient will utilize safety techniques       Start:  10/31/23            STG - Patient locks brakes on wheelchair       Start:  10/31/23            STG - Patient uses call light consistently to request assistance with transfers       Start:  10/31/23            STG - Patient uses gait belt during all transfers       Start:  10/31/23            Goal 1       Start:  10/31/23            Goal 2       Start:  10/31/23            Goal 3       Start:  10/31/23               Transfers       STG - Transfer from bed to chair with MODx1 without acute LOB  (Not met)       Start:  10/19/23    Expected End:  23    Resolved:  23    Updated to: STG - Transfer from bed to chair with MODx1 without acute LOB using LRD as needed    Update reason: goals           STG - Patient will perform bed mobility with MINx1 assist  (Not met)       Start:  10/19/23    Expected End:  23    Resolved:  23    Updated to: STG - Patient will perform bed mobility with MODx1 assist using bedrailing as needed with HOB elevated    Update reason: goals           STG - Patient will transfer sit to and from stand with MODx1 using LRD as needed without acute LOB  (Not met)       Start:  10/19/23    Expected End:  23    Resolved:  23    Updated to: STG - Patient will transfer sit to and from stand with MODx1 using LRD as needed without acute LOB    Update reason: goals           STG - Transfer from bed to chair with MODx1 without acute LOB using LRD as needed (Not Progressing)       Start:  23    Expected End:  23                STG - Patient will perform bed mobility with MODx1 assist using bedrailing as needed with HOB elevated (Progressing)       Start:  23    Expected End:  23                STG - Patient will transfer sit to and from stand with MODx1 using LRD as needed without acute  LOB (Progressing)       Start:  11/14/23    Expected End:  12/05/23

## 2023-11-24 NOTE — PROGRESS NOTES
Assessment/Plan   68 y.o. male with PMHx COPD, HTN,  alcohol and opioid use disorder, hepatitis C, chronic PE admitted 10/12 with BLE weakness, recently admitted in the MICU from 10/13-10/16 for suspected alcohol withdrawal refractory to ativan, started on phenobarb taper, treated for streptococcus pneumoniae PNA with Ceftriaxone and transferred to [facility].  RRT called for lethargy, transferred to MICU on 10/17 with new FiO2 requirements.  In SDU continues to make good progress, weaning O2 to baseline, continuing aggressive pulm hygiene and PT/OT/SLP.   He was recommended moderate intensity therapy and transfer to the floor pending placement for postacute care.  At this time his respiratory status has been stable.  He has pending SLP evaluation for his swallowing and decision regarding his ability to swallow versus need for a PEG tube.    #Acute on chronic hypoxemic respiratory failure  #Strep pneumonaie PNA, Stenotrophomonas PNA  #COPD on 2 to 3 L baseline O2  # Chronic PE  -Status post antibiotics and completed per ID, weaned to 2 L nasal cannula at rest.  Patient -had bronc with mucous plugging left lung consolidation on 11/16.  -Cultures growing multidrug resistant PSA, Stenotrophmonas and 4+ Candida, AFB pending, ID suspects colonization and no further antibiotic recommended.  Will need -to follow-up AFB stains.  -Continue aggressive bronchial hygiene, Albuterol BID and 7% saline BID.  Airway clearance techniques (IPV) Q4 hours.  Acapella, IS while awake  -At this time respiratory status is stabilized.  Will do walking O2 evaluation for oxygen needs.  -Continue Eliquis  -Patient is being worked up for pulmonary artery hypertension, will need follow-up with pulmonary.    # Hypotension with history of hypertension  -Currently midodrine is being weaned.  Attempting to wean off prior to discharge.  Continue to hold Norvasc and atenolol.  - Continue Midodrine  5 mg TID==> decreased to 2.5 mg TID.  Continue to wean as  tolerated.  At this time we will make it as needed for SBP less than 90 and reassess usage.  -Patient still intermittently sinus tachycardic, intermittently requiring fluids.    #GBS  # Generalized weakness and deconditioning  # Dysphagia  # Severe protein calorie malnutrition  -Currently at baseline mentation.  His extremity weakness is improving.  EMG confirmed GBS on 11/10.  He is primarily having issues with pain now.  This prolonged hospitalization as well which is likely also contributing to his weakness.  Neurology was consulted and no plans for further testing or treatment due to fungemia, as immunomodulators could potentially worsen infection.  Patient to see neurology after infection cleared.  -For pain continue Lidocaine patches, Acetaminophen prn, Oxycodone 5 mg Q6 as needed.  Discussed his pain and possible neuropathic components, patient agreeable to alternative regimen trialing tramadol 100 mg every 8 hour as needed.  If not sufficient patient will let us know and we will switch him back to the prior regimen of oxycodone 5 mg every 6prn, we are also trialing alternating topical diclofenac gel and lidocaine cream, also if not sufficient patient will change back to lidocaine patches.  Oxycodone and lidocaine patches were placed on hold for now.  -Currently on Dobbhoff tube feeds and will need an MBS with SLP.  Will check with SLP on timing.  -Continue PPI  -Continue PT OT and SLP/nutrition.  Will need goals of care regarding swallowing if unable to start a diet.    #Alcohol withdrawal  #Alcohol use disorder  #Opiate use disorder  -Status post management of refractory withdrawal in the ICU.  Resolved.  -Social work for resources.  Counseling education provided today.    #Hyperlipidemia  -Will consider timing for resuming statin     #Dry eye, Cataracts, asha eyes  - seen by Opthomology given c/o blurriness with new fungemia.  No signs of fungal endophthalmitis  - treated with artifical tears and  erythromycin oint.  Now resolved    #TV Endocarditis  - 10/31 Echo: LVEF 75-80%, RV overload, Mod-severe TV, Mod-severe RV systolic pressure, Tricuspid echodensity measuring 1.7 x 0.8 cm, atrial septal aneurysm  - Repeat Echo on 11/2/23: with no definite valvular vegetations  - Seen by CT surgery for TV vegetation- too high risk for surgery at this time.  Follow up when stable    #SVT resolved.   -Evaluating need for continued telemetry.     #Hep C  -Will need to review viral studies and likely refer to hepatology outpatient     #Hyponatremia  -Continue Daily RFP adjust free water flushes as tolerated.  Suspecting SIADH per record.  Will reevaluate but will need free water restriction for now.  -Sodium currently at 129, if not improving will need to talk to nutrition about his formula and how much water he has intake.  He is not on any extra free water.  Will add urine sodium and awesome's in the a.m.    #Acute renal failure -resolved  -Required CVVH and MICU for acute renal failure, renal failure now resolved creatinine down to baseline.  He is still hypotensive and requiring midodrine.  He is voiding by an external cath.  Continue to monitor I's and O's and avoid nephrotoxins.  Nephrology has signed off.  -Continue Vit D supp, 8000 units for 8 weeks    # Fungemia  Completed micafungin per ID on 11/17.      Code Status: Full Code  NOK: Sister Sarah Godinez [phone]      Dispo: Continues to improve with aggressive pulm toileting.  Medically ready for discharge to SNF.  Pre-CERT submitted 11/22 per ICU.   PT/OT/SLP continuing to follow.      .       Scheduled outpatient appointments in system:   No future appointments.  ---------------------------------------------------------------------------------------------------  Subjective   No acute events overnight.  Patient is stable and feeling overall improved.  He is sitting in a chair at bedside.  Had discussion over SNF and plan for how to approach his therapy.   "Encourage patient to be in chair as long as possible.  Rechecked heart rate and heart rate was down in the 90s on [place].  He was saturating 97% on 2 L, advises likely too much oxygen may like to titrate.  He would like to wait.  Patient would like to change his suction from his external cath to use for suctioning his secretions.  Nursing informed.  He is continuing to use his incentive spirometer and Acapella.  Starting ice chips with oral hygiene.     ---------------------------------------------------------------------------------------------------  Objective   Last Recorded Vitals  Blood pressure (!) 134/99, pulse (!) 125, temperature 36.4 °C (97.5 °F), resp. rate 16, height 1.702 m (5' 7.01\"), weight 53.8 kg (118 lb 9.7 oz), SpO2 95 %.  Intake/Output last 3 Shifts:  I/O last 3 completed shifts:  In: - (0 mL/kg)   Out: 1560 (29 mL/kg) [Urine:1560 (0.8 mL/kg/hr)]  Weight: 53.8 kg     Physical Exam  Vitals and nursing note reviewed.   Constitutional:       General: He is not in acute distress.     Appearance: He is ill-appearing. He is not toxic-appearing.      Comments: Cachectic   HENT:      Head: Normocephalic and atraumatic.      Mouth/Throat:      Mouth: Mucous membranes are moist.   Eyes:      General: No scleral icterus.     Extraocular Movements: Extraocular movements intact.      Conjunctiva/sclera: Conjunctivae normal.   Cardiovascular:      Rate and Rhythm: Regular rhythm. Tachycardia present.      Heart sounds: S1 normal and S2 normal. No murmur heard.  Pulmonary:      Effort: Pulmonary effort is normal. No respiratory distress.      Breath sounds: No wheezing, rhonchi or rales.      Comments: Diminished throughout, on nasal cannula  Abdominal:      General: Bowel sounds are normal. There is no distension.      Palpations: Abdomen is soft.      Tenderness: There is no abdominal tenderness. There is no guarding or rebound.   Musculoskeletal:         General: No swelling or deformity.      Cervical " back: Neck supple.   Skin:     General: Skin is warm and dry.      Findings: No rash.   Neurological:      Mental Status: He is alert and oriented to person, place, and time.      Comments: Generalized weakness   Psychiatric:         Mood and Affect: Mood normal.         Relevant Results  Lab Results   Component Value Date    WBC 9.1 11/24/2023    HGB 12.1 (L) 11/24/2023    HCT 38.8 (L) 11/24/2023    MCV 90 11/24/2023     11/24/2023      Lab Results   Component Value Date    GLUCOSE 108 (H) 11/24/2023    CALCIUM 9.5 11/24/2023     (L) 11/24/2023    K 4.9 11/24/2023    CO2 26 11/24/2023    CL 93 (L) 11/24/2023    BUN 26 (H) 11/24/2023    CREATININE 0.41 (L) 11/24/2023     Scheduled medications  albuterol, 2.5 mg, nebulization, q12h  apixaban, 5 mg, nasogastric tube, BID  diclofenac sodium, 4 g, Topical, TID  ergocalciferol, 8,000 Units, oral, Daily  esomeprazole, 40 mg, nasogastric tube, Daily before breakfast  lidocaine, , Topical, TID  [Held by provider] lidocaine, 2 patch, transdermal, Daily  magnesium oxide, 400 mg, oral, Daily  melatonin, 10 mg, nasogastric tube, Nightly  multivitamin with minerals, 1 tablet, oral, Daily  sodium chloride, 3 mL, nebulization, q12h      Continuous medications     PRN medications  PRN medications: acetaminophen, dextrose 10 % in water (D10W), dextrose, ipratropium-albuteroL, loperamide, midodrine, [Held by provider] oxyCODONE, oxygen, traMADol    Gwyn Hernandez MD

## 2023-11-24 NOTE — PROGRESS NOTES
Molina is not medically cleared for discharge at this time, ADOD is unknown he is recommended SNF at discharge referral placed with [facility] which states that they will accept him when cleared fr discharge. I will continue to follow with a safe discharge plan.

## 2023-11-24 NOTE — CARE PLAN
The patient's goals for the shift include PATITO    The clinical goals for the shift include maintain safety    Over the shift, the patient did not make progress toward the following goals. Barriers to progression include notifying staff that he is soiled. Recommendations to address these barriers include asking patient if soiled more frequent.

## 2023-11-24 NOTE — CARE PLAN
The patient's goals for the shift include PATITO    The clinical goals for the shift include Patient will remain safe and free off falls throughout shift.      Problem: Pain - Adult  Goal: Verbalizes/displays adequate comfort level or baseline comfort level  Outcome: Progressing     Problem: Discharge Planning  Goal: Discharge to home or other facility with appropriate resources  Outcome: Progressing     Problem: Chronic Conditions and Co-morbidities  Goal: Patient's chronic conditions and co-morbidity symptoms are monitored and maintained or improved  Outcome: Progressing     Problem: Skin  Goal: Participates in plan/prevention/treatment measures  Outcome: Progressing  Goal: Prevent/manage excess moisture  Outcome: Progressing  Goal: Prevent/minimize sheer/friction injuries  Outcome: Progressing  Goal: Promote/optimize nutrition  Outcome: Progressing

## 2023-11-24 NOTE — CARE PLAN
The patient's goals for the shift include PATITO    The clinical goals for the shift include pain managed and safety maintained    Over the shift, the patient did not make progress toward the following goals. Barriers to progression include weakness. Recommendations to address these barriers include improving strength.

## 2023-11-24 NOTE — PROGRESS NOTES
"Physical Therapy                 Therapy Communication Note    Patient Name: Molina Godinez  MRN: [id number]  Today's Date: 11/24/2023     Discipline: Physical Therapy    Missed Visit Reason: Missed Visit Reason:  (Conflict of service.  Pt reporting laying in \"mess\" and needing cleaned up prior to PT.  Pt not wanting to let RN staff know bc he'll probably go again soon. RN alerted, and RN and CTA preparing to help pt get cleaned up.)    Missed Time: Attempt    Comment:  "

## 2023-11-25 LAB
ALBUMIN SERPL BCP-MCNC: 3.1 G/DL (ref 3.4–5)
ANION GAP SERPL CALC-SCNC: 15 MMOL/L (ref 10–20)
BACTERIA SPEC RESP CULT: ABNORMAL
BACTERIA SPEC RESP CULT: ABNORMAL
BUN SERPL-MCNC: 28 MG/DL (ref 6–23)
CALCIUM SERPL-MCNC: 9.3 MG/DL (ref 8.6–10.6)
CHLORIDE SERPL-SCNC: 92 MMOL/L (ref 98–107)
CO2 SERPL-SCNC: 29 MMOL/L (ref 21–32)
CREAT SERPL-MCNC: 0.34 MG/DL (ref 0.5–1.3)
ERYTHROCYTE [DISTWIDTH] IN BLOOD BY AUTOMATED COUNT: 14.1 % (ref 11.5–14.5)
GFR SERPL CREATININE-BSD FRML MDRD: >90 ML/MIN/1.73M*2
GLUCOSE SERPL-MCNC: 114 MG/DL (ref 74–99)
GRAM STN SPEC: ABNORMAL
GRAM STN SPEC: ABNORMAL
HCT VFR BLD AUTO: 38.6 % (ref 41–52)
HGB BLD-MCNC: 12.1 G/DL (ref 13.5–17.5)
MAGNESIUM SERPL-MCNC: 2 MG/DL (ref 1.6–2.4)
MCH RBC QN AUTO: 28.5 PG (ref 26–34)
MCHC RBC AUTO-ENTMCNC: 31.3 G/DL (ref 32–36)
MCV RBC AUTO: 91 FL (ref 80–100)
NRBC BLD-RTO: 0 /100 WBCS (ref 0–0)
PHOSPHATE SERPL-MCNC: 5 MG/DL (ref 2.5–4.9)
PLATELET # BLD AUTO: 312 X10*3/UL (ref 150–450)
POTASSIUM SERPL-SCNC: 4.6 MMOL/L (ref 3.5–5.3)
RBC # BLD AUTO: 4.24 X10*6/UL (ref 4.5–5.9)
SODIUM SERPL-SCNC: 131 MMOL/L (ref 136–145)
WBC # BLD AUTO: 10.8 X10*3/UL (ref 4.4–11.3)

## 2023-11-25 PROCEDURE — 2500000001 HC RX 250 WO HCPCS SELF ADMINISTERED DRUGS (ALT 637 FOR MEDICARE OP): Performed by: NURSE PRACTITIONER

## 2023-11-25 PROCEDURE — 1100000001 HC PRIVATE ROOM DAILY

## 2023-11-25 PROCEDURE — 85027 COMPLETE CBC AUTOMATED: CPT | Performed by: NURSE PRACTITIONER

## 2023-11-25 PROCEDURE — 80069 RENAL FUNCTION PANEL: CPT | Performed by: NURSE PRACTITIONER

## 2023-11-25 PROCEDURE — 83735 ASSAY OF MAGNESIUM: CPT | Performed by: NURSE PRACTITIONER

## 2023-11-25 PROCEDURE — 2500000002 HC RX 250 W HCPCS SELF ADMINISTERED DRUGS (ALT 637 FOR MEDICARE OP, ALT 636 FOR OP/ED): Performed by: NURSE PRACTITIONER

## 2023-11-25 PROCEDURE — 36415 COLL VENOUS BLD VENIPUNCTURE: CPT | Performed by: NURSE PRACTITIONER

## 2023-11-25 PROCEDURE — 2500000004 HC RX 250 GENERAL PHARMACY W/ HCPCS (ALT 636 FOR OP/ED): Performed by: NURSE PRACTITIONER

## 2023-11-25 PROCEDURE — 2500000001 HC RX 250 WO HCPCS SELF ADMINISTERED DRUGS (ALT 637 FOR MEDICARE OP): Performed by: STUDENT IN AN ORGANIZED HEALTH CARE EDUCATION/TRAINING PROGRAM

## 2023-11-25 PROCEDURE — 94640 AIRWAY INHALATION TREATMENT: CPT

## 2023-11-25 PROCEDURE — 99233 SBSQ HOSP IP/OBS HIGH 50: CPT | Performed by: STUDENT IN AN ORGANIZED HEALTH CARE EDUCATION/TRAINING PROGRAM

## 2023-11-25 PROCEDURE — 2500000005 HC RX 250 GENERAL PHARMACY W/O HCPCS: Performed by: NURSE PRACTITIONER

## 2023-11-25 RX ORDER — OXYCODONE HYDROCHLORIDE 5 MG/1
5 TABLET ORAL EVERY 4 HOURS PRN
Status: DISCONTINUED | OUTPATIENT
Start: 2023-11-25 | End: 2023-11-30 | Stop reason: HOSPADM

## 2023-11-25 RX ADMIN — Medication 1 TABLET: at 09:05

## 2023-11-25 RX ADMIN — ESOMEPRAZOLE MAGNESIUM 40 MG: 40 FOR SUSPENSION ORAL at 07:00

## 2023-11-25 RX ADMIN — APIXABAN 5 MG: 5 TABLET, FILM COATED ORAL at 09:05

## 2023-11-25 RX ADMIN — ALBUTEROL SULFATE 2.5 MG: 2.5 SOLUTION RESPIRATORY (INHALATION) at 08:28

## 2023-11-25 RX ADMIN — LIDOCAINE 4%: 4 CREAM TOPICAL at 22:00

## 2023-11-25 RX ADMIN — OXYCODONE HYDROCHLORIDE 5 MG: 5 TABLET ORAL at 15:13

## 2023-11-25 RX ADMIN — Medication 10 MG: at 21:00

## 2023-11-25 RX ADMIN — OXYCODONE HYDROCHLORIDE 5 MG: 5 TABLET ORAL at 02:33

## 2023-11-25 RX ADMIN — MAGNESIUM OXIDE TAB 400 MG (241.3 MG ELEMENTAL MG) 400 MG: 400 (241.3 MG) TAB at 09:05

## 2023-11-25 RX ADMIN — IPRATROPIUM BROMIDE AND ALBUTEROL SULFATE 3 ML: .5; 3 SOLUTION RESPIRATORY (INHALATION) at 22:09

## 2023-11-25 RX ADMIN — ALBUTEROL SULFATE 2.5 MG: 2.5 SOLUTION RESPIRATORY (INHALATION) at 20:00

## 2023-11-25 RX ADMIN — Medication 8000 UNITS: at 09:05

## 2023-11-25 RX ADMIN — APIXABAN 5 MG: 5 TABLET, FILM COATED ORAL at 21:00

## 2023-11-25 RX ADMIN — DICLOFENAC 1 APPLICATION: 10 GEL TOPICAL at 09:00

## 2023-11-25 RX ADMIN — DICLOFENAC 1 APPLICATION: 10 GEL TOPICAL at 15:00

## 2023-11-25 RX ADMIN — OXYCODONE HYDROCHLORIDE 5 MG: 5 TABLET ORAL at 19:37

## 2023-11-25 RX ADMIN — OXYCODONE HYDROCHLORIDE 5 MG: 5 TABLET ORAL at 23:54

## 2023-11-25 RX ADMIN — Medication 3 ML: at 09:00

## 2023-11-25 RX ADMIN — DICLOFENAC 1 APPLICATION: 10 GEL TOPICAL at 21:00

## 2023-11-25 RX ADMIN — Medication 3 ML: at 22:10

## 2023-11-25 RX ADMIN — OXYCODONE HYDROCHLORIDE 5 MG: 5 TABLET ORAL at 11:06

## 2023-11-25 ASSESSMENT — PAIN - FUNCTIONAL ASSESSMENT
PAIN_FUNCTIONAL_ASSESSMENT: 0-10

## 2023-11-25 ASSESSMENT — COGNITIVE AND FUNCTIONAL STATUS - GENERAL
TURNING FROM BACK TO SIDE WHILE IN FLAT BAD: A LOT
WALKING IN HOSPITAL ROOM: TOTAL
STANDING UP FROM CHAIR USING ARMS: A LOT
CLIMB 3 TO 5 STEPS WITH RAILING: TOTAL
MOVING TO AND FROM BED TO CHAIR: A LOT
MOVING FROM LYING ON BACK TO SITTING ON SIDE OF FLAT BED WITH BEDRAILS: A LOT
MOBILITY SCORE: 10
DAILY ACTIVITIY SCORE: 24

## 2023-11-25 ASSESSMENT — PAIN DESCRIPTION - ORIENTATION
ORIENTATION: RIGHT;LEFT
ORIENTATION: RIGHT;LEFT

## 2023-11-25 ASSESSMENT — PAIN SCALES - GENERAL
PAINLEVEL_OUTOF10: 3
PAINLEVEL_OUTOF10: 7
PAINLEVEL_OUTOF10: 3
PAINLEVEL_OUTOF10: 8
PAINLEVEL_OUTOF10: 1
PAINLEVEL_OUTOF10: 5 - MODERATE PAIN

## 2023-11-25 ASSESSMENT — PAIN DESCRIPTION - DESCRIPTORS
DESCRIPTORS: ACHING
DESCRIPTORS: ACHING

## 2023-11-25 ASSESSMENT — PAIN DESCRIPTION - LOCATION
LOCATION: LEG
LOCATION: LEG

## 2023-11-25 NOTE — CARE PLAN
Problem: Pain - Adult  Goal: Verbalizes/displays adequate comfort level or baseline comfort level  11/24/2023 2223 by Hilario Mercado RN  Outcome: Progressing  Flowsheets (Taken 11/24/2023 2223)  Verbalizes/displays adequate comfort level or baseline comfort level: Encourage patient to monitor pain and request assistance  11/24/2023 2222 by Hilario Mercado RN  Outcome: Progressing   The patient's goals for the shift include PATITO    The clinical goals for the shift include maintain safety    Over the shift, the patient did not make progress toward the following goals. Barriers to progression include n. Recommendations to address these barriers include n.

## 2023-11-25 NOTE — CARE PLAN
Problem: Pain - Adult  Goal: Verbalizes/displays adequate comfort level or baseline comfort level  Outcome: Progressing     Problem: Chronic Conditions and Co-morbidities  Goal: Patient's chronic conditions and co-morbidity symptoms are monitored and maintained or improved  Outcome: Progressing   The patient's goals for the shift include PATITO    The clinical goals for the shift include pain control    Over the shift, the patient did not make progress toward the following goals. Barriers to progression include . Recommendations to address these barriers include .

## 2023-11-25 NOTE — PROGRESS NOTES
Molina Godinez is a 68 y.o. male on day 44 of admission presenting with Hospital-acquired pneumonia.    Secure chat message sent to SLP Nahed Salazar because MD inquiring if they were going to do MBS.    Nahed said she could do on Monday because radiology was not available sooner.   Updated MD- Dr Hernandez.    Sarah Beth Bojorquez RN [facility] weekend  epic messenger

## 2023-11-25 NOTE — CARE PLAN
Problem: Pain - Adult  Goal: Verbalizes/displays adequate comfort level or baseline comfort level  Outcome: Progressing     Problem: Chronic Conditions and Co-morbidities  Goal: Patient's chronic conditions and co-morbidity symptoms are monitored and maintained or improved  Outcome: Progressing     Problem: Skin  Goal: Prevent/manage excess moisture  Outcome: Progressing  Goal: Prevent/minimize sheer/friction injuries  Outcome: Progressing  Goal: Promote/optimize nutrition  Outcome: Progressing   The patient's goals for the shift include PATITO    The clinical goals for the shift include pain control    Over the shift, the patient did not make progress toward the following goals. Barriers to progression include . Recommendations to address these barriers include .

## 2023-11-25 NOTE — PROGRESS NOTES
Assessment/Plan   68 y.o. male with PMHx COPD, HTN,  alcohol and opioid use disorder, hepatitis C, chronic PE admitted 10/12 with BLE weakness, recently admitted in the MICU from 10/13-10/16 for suspected alcohol withdrawal refractory to ativan, started on phenobarb taper, treated for streptococcus pneumoniae PNA with Ceftriaxone and transferred to [facility].  RRT called for lethargy, transferred to MICU on 10/17 with new FiO2 requirements.  In SDU continues to make good progress, weaning O2 to baseline, continuing aggressive pulm hygiene and PT/OT/SLP.   He was recommended moderate intensity therapy and transfer to the floor pending placement for postacute care.  At this time his respiratory status has been stable.  He has pending SLP evaluation for his swallowing and decision regarding his ability to swallow versus need for a PEG tube.     #Acute on chronic hypoxemic respiratory failure  #Strep pneumonaie PNA, Stenotrophomonas PNA  #COPD on 2 to 3 L baseline O2  # Chronic PE  -Status post antibiotics and completed per ID, weaned to 2 L nasal cannula at rest.  Patient -had bronc with mucous plugging left lung consolidation on 11/16.  -Cultures growing multidrug resistant PSA, Stenotrophmonas and 4+ Candida, AFB pending, ID suspects colonization and no further antibiotic recommended.  Will need -to follow-up AFB stains.  -Continue aggressive bronchial hygiene, Albuterol BID and 7% saline BID.  Airway clearance techniques (IPV) Q4 hours.  Acapella, IS while awake  -At this time respiratory status is stabilized.  Will do walking O2 evaluation for oxygen needs.  -Continue Eliquis  -Patient is being worked up for pulmonary artery hypertension, will need follow-up with pulmonary.    # Hypotension with history of hypertension  -Currently midodrine is being weaned.  Attempting to wean off prior to discharge.  Continue to hold Norvasc and atenolol.  - Continue Midodrine  5 mg TID==> decreased to 2.5 mg TID.  Continue to wean as  tolerated.  At this time we will make it as needed for SBP less than 90 and reassess usage.  -Patient still intermittently sinus tachycardic, intermittently requiring fluids.    #GBS  # Generalized weakness and deconditioning  # Dysphagia  # Severe protein calorie malnutrition  -Currently at baseline mentation.  His extremity weakness is improving.  EMG confirmed GBS on 11/10.  He is primarily having issues with pain now.  This prolonged hospitalization as well which is likely also contributing to his weakness.  Neurology was consulted and no plans for further testing or treatment due to fungemia, as immunomodulators could potentially worsen infection.  Patient to see neurology after infection cleared.  -For pain change Acetaminophen to scheduled, Oxycodone 5 mg changed to every 4 as needed.  DC lidocaine patch.  Starting diclofenac gel and lidocaine cream 3 times daily alternating to legs and feet  -Currently on Dobbhoff tube feeds and will need an MBS with SLP.  Will check with SLP on timing.  Will need to touch base with SLP for opinion on if we can get an MBS on Monday. May need goals of care regarding swallowing if unable to start a diet.  -Continue PPI  -Continue PT OT and SLP/nutrition.      #Alcohol withdrawal  #Alcohol use disorder  #Opiate use disorder  -Status post management of refractory withdrawal in the ICU.  Resolved.  -Social work for resources.  Counseling education provided today.    #Hyperlipidemia  -Will consider timing for resuming statin     #Dry eye, Cataracts, asha eyes  - seen by Opthomology given c/o blurriness with new fungemia.  No signs of fungal endophthalmitis  - treated with artifical tears and erythromycin oint.  Now resolved    #TV Endocarditis  - 10/31 Echo: LVEF 75-80%, RV overload, Mod-severe TV, Mod-severe RV systolic pressure, Tricuspid echodensity measuring 1.7 x 0.8 cm, atrial septal aneurysm  - Repeat Echo on 11/2/23: with no definite valvular vegetations  - Seen by CT  surgery for TV vegetation- too high risk for surgery at this time.  Follow up when stable    #SVT resolved.   -Evaluating need for continued telemetry.     #Hep C  -Will need to review viral studies and likely refer to hepatology outpatient     #Hyponatremia  -Continue Daily RFP adjust free water flushes as tolerated.  Suspecting SIADH per record.  Will reevaluate but will need free water restriction for now.  -Sodium currently at 129, if not improving will need to talk to nutrition about his formula and how much water he has intake.  He is not on any extra free water.  Will add urine sodium and awesome's in the a.m.    #Acute renal failure -resolved  -Required CVVH and MICU for acute renal failure, renal failure now resolved creatinine down to baseline.  He is still hypotensive and requiring midodrine.  He is voiding by an external cath.  Continue to monitor I's and O's and avoid nephrotoxins.  Nephrology has signed off.  -Continue Vit D supp, 8000 units for 8 weeks    # Fungemia  Completed micafungin per ID on 11/17.      Code Status: Full Code  NOK: Sister Sarah Godinez [phone]      Dispo: Continues to improve with aggressive pulm toileting.  Patient has a facility selected.  He is pending dysphagia evaluation for discharge, otherwise medically ready.   PT/OT/SLP continuing to follow.      .       Scheduled outpatient appointments in system:   No future appointments.  ---------------------------------------------------------------------------------------------------  Subjective   No acute events overnight.  Patient's main concern is pain in his lower leg and feet.  This been uncontrolled.  Topicals to be applied to feet in addition, adjusting analgesic regimen.  Breathing is stable, cognition is stable.  He is saturating well and likely not requiring oxygen at rest, oxygen removed, to be rechecked.  Discussed with patient okay to update sister care plan, will contact when able.    "  ---------------------------------------------------------------------------------------------------  Objective   Last Recorded Vitals  Blood pressure (!) 153/99, pulse 106, temperature 36 °C (96.8 °F), temperature source Temporal, resp. rate 16, height 1.702 m (5' 7.01\"), weight 53.8 kg (118 lb 9.7 oz), SpO2 99 %.  Intake/Output last 3 Shifts:  I/O last 3 completed shifts:  In: - (0 mL/kg)   Out: 950 (17.7 mL/kg) [Urine:950 (0.5 mL/kg/hr)]  Weight: 53.8 kg     Physical Exam  Vitals and nursing note reviewed.   Constitutional:       General: He is not in acute distress.     Appearance: He is ill-appearing. He is not toxic-appearing.      Comments: Cachectic   HENT:      Head: Normocephalic and atraumatic.      Mouth/Throat:      Mouth: Mucous membranes are moist.   Eyes:      General: No scleral icterus.     Extraocular Movements: Extraocular movements intact.      Conjunctiva/sclera: Conjunctivae normal.   Cardiovascular:      Rate and Rhythm: Regular rhythm. Tachycardia present.      Heart sounds: S1 normal and S2 normal. No murmur heard.  Pulmonary:      Effort: Pulmonary effort is normal. No respiratory distress.      Breath sounds: No wheezing, rhonchi or rales.      Comments: Diminished throughout, on nasal cannula  Abdominal:      General: Bowel sounds are normal. There is no distension.      Palpations: Abdomen is soft.      Tenderness: There is no abdominal tenderness. There is no guarding or rebound.   Musculoskeletal:         General: No swelling or deformity.      Cervical back: Neck supple.   Skin:     General: Skin is warm and dry.      Findings: No rash.   Neurological:      Mental Status: He is alert and oriented to person, place, and time.      Comments: Generalized weakness   Psychiatric:         Mood and Affect: Mood normal.         Relevant Results  Lab Results   Component Value Date    WBC 10.8 11/25/2023    HGB 12.1 (L) 11/25/2023    HCT 38.6 (L) 11/25/2023    MCV 91 11/25/2023     " 11/25/2023      Lab Results   Component Value Date    GLUCOSE 114 (H) 11/25/2023    CALCIUM 9.3 11/25/2023     (L) 11/25/2023    K 4.6 11/25/2023    CO2 29 11/25/2023    CL 92 (L) 11/25/2023    BUN 28 (H) 11/25/2023    CREATININE 0.34 (L) 11/25/2023     Scheduled medications  albuterol, 2.5 mg, nebulization, q12h  apixaban, 5 mg, nasogastric tube, BID  diclofenac sodium, 4 g, Topical, TID  ergocalciferol, 8,000 Units, oral, Daily  esomeprazole, 40 mg, nasogastric tube, Daily before breakfast  lidocaine, , Topical, TID  [Held by provider] lidocaine, 2 patch, transdermal, Daily  magnesium oxide, 400 mg, oral, Daily  melatonin, 10 mg, nasogastric tube, Nightly  multivitamin with minerals, 1 tablet, oral, Daily  sodium chloride, 3 mL, nebulization, q12h      Continuous medications     PRN medications  PRN medications: acetaminophen, dextrose 10 % in water (D10W), dextrose, ipratropium-albuteroL, loperamide, midodrine, oxyCODONE, oxygen    Gwyn Hernandez MD       11

## 2023-11-26 LAB
ALBUMIN SERPL BCP-MCNC: 3.4 G/DL (ref 3.4–5)
ANION GAP SERPL CALC-SCNC: 16 MMOL/L (ref 10–20)
BUN SERPL-MCNC: 28 MG/DL (ref 6–23)
CALCIUM SERPL-MCNC: 9.4 MG/DL (ref 8.6–10.6)
CHLORIDE SERPL-SCNC: 92 MMOL/L (ref 98–107)
CHLORIDE UR-SCNC: 66 MMOL/L
CHLORIDE/CREATININE (MMOL/G) IN URINE: 78 MMOL/G CREAT (ref 23–275)
CO2 SERPL-SCNC: 28 MMOL/L (ref 21–32)
CREAT SERPL-MCNC: 0.39 MG/DL (ref 0.5–1.3)
CREAT UR-MCNC: 84.1 MG/DL (ref 20–370)
ERYTHROCYTE [DISTWIDTH] IN BLOOD BY AUTOMATED COUNT: 13.7 % (ref 11.5–14.5)
GFR SERPL CREATININE-BSD FRML MDRD: >90 ML/MIN/1.73M*2
GLUCOSE SERPL-MCNC: 107 MG/DL (ref 74–99)
HCT VFR BLD AUTO: 38.9 % (ref 41–52)
HGB BLD-MCNC: 12.3 G/DL (ref 13.5–17.5)
MAGNESIUM SERPL-MCNC: 2.01 MG/DL (ref 1.6–2.4)
MCH RBC QN AUTO: 28.3 PG (ref 26–34)
MCHC RBC AUTO-ENTMCNC: 31.6 G/DL (ref 32–36)
MCV RBC AUTO: 89 FL (ref 80–100)
NRBC BLD-RTO: 0 /100 WBCS (ref 0–0)
OSMOLALITY SERPL: 282 MOSM/KG (ref 280–300)
OSMOLALITY UR: 955 MOSM/KG (ref 200–1200)
PHOSPHATE SERPL-MCNC: 5.9 MG/DL (ref 2.5–4.9)
PLATELET # BLD AUTO: 286 X10*3/UL (ref 150–450)
POTASSIUM SERPL-SCNC: 4.6 MMOL/L (ref 3.5–5.3)
POTASSIUM UR-SCNC: 80 MMOL/L
POTASSIUM/CREAT UR-RTO: 95 MMOL/G CREAT
RBC # BLD AUTO: 4.35 X10*6/UL (ref 4.5–5.9)
SODIUM SERPL-SCNC: 131 MMOL/L (ref 136–145)
SODIUM UR-SCNC: 34 MMOL/L
SODIUM/CREAT UR-RTO: 40 MMOL/G CREAT
WBC # BLD AUTO: 9.1 X10*3/UL (ref 4.4–11.3)

## 2023-11-26 PROCEDURE — 85027 COMPLETE CBC AUTOMATED: CPT | Performed by: NURSE PRACTITIONER

## 2023-11-26 PROCEDURE — 82436 ASSAY OF URINE CHLORIDE: CPT | Performed by: STUDENT IN AN ORGANIZED HEALTH CARE EDUCATION/TRAINING PROGRAM

## 2023-11-26 PROCEDURE — 2500000004 HC RX 250 GENERAL PHARMACY W/ HCPCS (ALT 636 FOR OP/ED): Performed by: NURSE PRACTITIONER

## 2023-11-26 PROCEDURE — 1100000001 HC PRIVATE ROOM DAILY

## 2023-11-26 PROCEDURE — 2500000005 HC RX 250 GENERAL PHARMACY W/O HCPCS: Performed by: NURSE PRACTITIONER

## 2023-11-26 PROCEDURE — 94640 AIRWAY INHALATION TREATMENT: CPT

## 2023-11-26 PROCEDURE — 2500000001 HC RX 250 WO HCPCS SELF ADMINISTERED DRUGS (ALT 637 FOR MEDICARE OP): Performed by: NURSE PRACTITIONER

## 2023-11-26 PROCEDURE — 99233 SBSQ HOSP IP/OBS HIGH 50: CPT | Performed by: STUDENT IN AN ORGANIZED HEALTH CARE EDUCATION/TRAINING PROGRAM

## 2023-11-26 PROCEDURE — 36415 COLL VENOUS BLD VENIPUNCTURE: CPT | Performed by: NURSE PRACTITIONER

## 2023-11-26 PROCEDURE — 2500000002 HC RX 250 W HCPCS SELF ADMINISTERED DRUGS (ALT 637 FOR MEDICARE OP, ALT 636 FOR OP/ED): Performed by: NURSE PRACTITIONER

## 2023-11-26 PROCEDURE — 83930 ASSAY OF BLOOD OSMOLALITY: CPT | Performed by: STUDENT IN AN ORGANIZED HEALTH CARE EDUCATION/TRAINING PROGRAM

## 2023-11-26 PROCEDURE — 83935 ASSAY OF URINE OSMOLALITY: CPT | Performed by: STUDENT IN AN ORGANIZED HEALTH CARE EDUCATION/TRAINING PROGRAM

## 2023-11-26 PROCEDURE — 84133 ASSAY OF URINE POTASSIUM: CPT | Performed by: STUDENT IN AN ORGANIZED HEALTH CARE EDUCATION/TRAINING PROGRAM

## 2023-11-26 PROCEDURE — 83735 ASSAY OF MAGNESIUM: CPT | Performed by: NURSE PRACTITIONER

## 2023-11-26 PROCEDURE — 80069 RENAL FUNCTION PANEL: CPT | Performed by: NURSE PRACTITIONER

## 2023-11-26 PROCEDURE — 2500000001 HC RX 250 WO HCPCS SELF ADMINISTERED DRUGS (ALT 637 FOR MEDICARE OP): Performed by: STUDENT IN AN ORGANIZED HEALTH CARE EDUCATION/TRAINING PROGRAM

## 2023-11-26 RX ADMIN — APIXABAN 5 MG: 5 TABLET, FILM COATED ORAL at 08:09

## 2023-11-26 RX ADMIN — LIDOCAINE 4%: 4 CREAM TOPICAL at 22:00

## 2023-11-26 RX ADMIN — OXYCODONE HYDROCHLORIDE 5 MG: 5 TABLET ORAL at 03:51

## 2023-11-26 RX ADMIN — Medication 3 ML: at 09:54

## 2023-11-26 RX ADMIN — Medication 3 ML: at 21:30

## 2023-11-26 RX ADMIN — ALBUTEROL SULFATE 2.5 MG: 2.5 SOLUTION RESPIRATORY (INHALATION) at 09:56

## 2023-11-26 RX ADMIN — DICLOFENAC 1 APPLICATION: 10 GEL TOPICAL at 15:00

## 2023-11-26 RX ADMIN — OXYCODONE HYDROCHLORIDE 5 MG: 5 TABLET ORAL at 20:04

## 2023-11-26 RX ADMIN — OXYCODONE HYDROCHLORIDE 5 MG: 5 TABLET ORAL at 16:03

## 2023-11-26 RX ADMIN — DICLOFENAC 1 APPLICATION: 10 GEL TOPICAL at 20:04

## 2023-11-26 RX ADMIN — LIDOCAINE 4%: 4 CREAM TOPICAL at 07:00

## 2023-11-26 RX ADMIN — ALBUTEROL SULFATE 2.5 MG: 2.5 SOLUTION RESPIRATORY (INHALATION) at 21:30

## 2023-11-26 RX ADMIN — Medication 1 TABLET: at 08:09

## 2023-11-26 RX ADMIN — ESOMEPRAZOLE MAGNESIUM 40 MG: 40 FOR SUSPENSION ORAL at 07:00

## 2023-11-26 RX ADMIN — OXYCODONE HYDROCHLORIDE 5 MG: 5 TABLET ORAL at 12:02

## 2023-11-26 RX ADMIN — MAGNESIUM OXIDE TAB 400 MG (241.3 MG ELEMENTAL MG) 400 MG: 400 (241.3 MG) TAB at 08:09

## 2023-11-26 RX ADMIN — Medication 10 MG: at 20:04

## 2023-11-26 RX ADMIN — APIXABAN 5 MG: 5 TABLET, FILM COATED ORAL at 20:04

## 2023-11-26 RX ADMIN — OXYCODONE HYDROCHLORIDE 5 MG: 5 TABLET ORAL at 08:09

## 2023-11-26 RX ADMIN — DICLOFENAC 1 APPLICATION: 10 GEL TOPICAL at 08:09

## 2023-11-26 RX ADMIN — Medication 8000 UNITS: at 08:09

## 2023-11-26 ASSESSMENT — PAIN SCALES - GENERAL
PAINLEVEL_OUTOF10: 10 - WORST POSSIBLE PAIN
PAINLEVEL_OUTOF10: 2
PAINLEVEL_OUTOF10: 8
PAINLEVEL_OUTOF10: 8
PAINLEVEL_OUTOF10: 9

## 2023-11-26 ASSESSMENT — PAIN DESCRIPTION - LOCATION
LOCATION: FOOT
LOCATION: FOOT

## 2023-11-26 ASSESSMENT — PAIN DESCRIPTION - ORIENTATION: ORIENTATION: RIGHT;LEFT

## 2023-11-26 ASSESSMENT — PAIN - FUNCTIONAL ASSESSMENT
PAIN_FUNCTIONAL_ASSESSMENT: 0-10
PAIN_FUNCTIONAL_ASSESSMENT: 0-10

## 2023-11-26 ASSESSMENT — PAIN DESCRIPTION - DESCRIPTORS: DESCRIPTORS: ACHING

## 2023-11-26 NOTE — CARE PLAN
Problem: Pain - Adult  Goal: Verbalizes/displays adequate comfort level or baseline comfort level  Outcome: Progressing     Problem: Discharge Planning  Goal: Discharge to home or other facility with appropriate resources  Outcome: Progressing     Problem: Chronic Conditions and Co-morbidities  Goal: Patient's chronic conditions and co-morbidity symptoms are monitored and maintained or improved  Outcome: Progressing     Problem: Skin  Goal: Participates in plan/prevention/treatment measures  Outcome: Progressing  Goal: Prevent/manage excess moisture  Outcome: Progressing  Goal: Prevent/minimize sheer/friction injuries  Outcome: Progressing  Goal: Promote/optimize nutrition  Outcome: Progressing   The patient's goals for the shift include PATITO    The clinical goals for the shift include increase mobility    Over the shift, the patient did not make progress toward the following goals. Barriers to progression include patient refusal Recommendations to address these barriers include educate patient on importance of increasing mobility

## 2023-11-26 NOTE — PROGRESS NOTES
Molina Godinez is a 68 y.o. male on day 45 of admission presenting with Hospital-acquired pneumonia.    Per secure chat w/SLP on Sat, they can do MBS on Monday when radiology available.    Dr Hernandez, if pt passes and servando diet, poss DC Tues. If he bojorquez not pass will need to discuss PEG.    Sarah Beth Bojorquez RN [facility] weekend  epic messenger

## 2023-11-26 NOTE — CARE PLAN
Problem: Pain - Adult  Goal: Verbalizes/displays adequate comfort level or baseline comfort level  Outcome: Progressing   The patient's goals for the shift include PATITO    The clinical goals for the shift include pain control    Over the shift, the patient did not make progress toward the following goals. Barriers to progression include n. Recommendations to address these barriers include n.

## 2023-11-26 NOTE — PROGRESS NOTES
Up to chair with 2 assist and much encouragement.  Chair alarm on.  Tolerating fair.  Medicated for pain prior to getting up in chair.

## 2023-11-26 NOTE — PROGRESS NOTES
Assessment/Plan   68 y.o. male with PMHx COPD, HTN,  alcohol and opioid use disorder, hepatitis C, chronic PE admitted 10/12 with BLE weakness, recently admitted in the MICU from 10/13-10/16 for suspected alcohol withdrawal refractory to ativan, started on phenobarb taper, treated for streptococcus pneumoniae PNA with Ceftriaxone, completed antifungals for fungemia and transferred to [facility].  RRT called for lethargy, transferred to MICU on 10/17 with new FiO2 requirements.  In SDU continued to make good progress, weaning O2 to baseline, continuing aggressive pulm hygiene and PT/OT/SLP.   He was recommended moderate intensity therapy and transfer to the floor pending placement for postacute care.  At this time his respiratory status has been stable.  He has pending SLP evaluation for his swallowing and decision regarding his ability to swallow versus need for a PEG tube.     #Acute on chronic hypoxemic respiratory failure  #Strep pneumonaie PNA, Stenotrophomonas PNA  #COPD on 2 to 3 L baseline O2  # Chronic PE  - Patient had bronc with mucous plugging left lung consolidation on 11/16. Status post antibiotics and completed per ID, weaned to 2 L nasal cannula at rest.   -Cultures grew multidrug resistant PSA, Stenotrophmonas and 4+ Candida, AFB pending, ID suspects colonization and no further antibiotic recommended.  Will need to follow-up AFB stains.  -Continue aggressive bronchial hygiene, Albuterol BID and 7% saline BID.  Airway clearance techniques (IPV) Q4 hours.  Acapella, IS while awake.  Informed patient that he should take his devices and continue after discharge.  -At this time respiratory status is stabilized.    -Continue Eliquis  -Patient is being worked up for pulmonary artery hypertension, will need follow-up with pulmonary OP.     # Hypotension with history of hypertension  -Currently midodrine is being weaned.   decreased to 2.5 mg TID prn.  as needed for SBP less than 90 and reassess usage.  If not used  will discontinue.  -Patient still intermittently sinus tachycardic    #GBS  # Generalized weakness and deconditioning  # Dysphagia  # Severe protein calorie malnutrition  -Currently at baseline mentation.  His extremity weakness is improving.  EMG confirmed GBS on 11/10.  He is primarily having issues with pain now.  This prolonged hospitalization as well which is likely also contributing to his weakness.  Neurology was consulted and no plans for further testing or treatment due to fungemia, as immunomodulators could potentially worsen infection.  Patient to see neurology after infection cleared.  -For pain change Acetaminophen to scheduled, Oxycodone 5 mg changed to every 4 as needed.  DC lidocaine patch.  Starting diclofenac gel and lidocaine cream 3 times daily alternating to legs and feet  -Currently on Dobbhoff tube feeds and will need an MBS with SLP.  Planning for MBS on Monday. May need goals of care regarding swallowing if unable to start a diet.  -Continue PPI  -Continue PT OT and SLP/nutrition.      #Alcohol withdrawal  #Alcohol use disorder  #Opiate use disorder  -Status post management of refractory withdrawal in the ICU.  Resolved.  -Social work for resources.  Counseling education provided today.    #Hyperlipidemia  -Will consider timing for resuming statin     #Dry eye, Cataracts, asha eyes  - seen by Opthomology given c/o blurriness with new fungemia.  No signs of fungal endophthalmitis  - treated with artifical tears and erythromycin oint.  Now resolved    #TV Endocarditis  # Fungemia  - Completed micafungin per ID on 11/17.    - 10/31 Echo: LVEF 75-80%, RV overload, Mod-severe TV, Mod-severe RV systolic pressure, Tricuspid echodensity measuring 1.7 x 0.8 cm, atrial septal aneurysm  - Seen by CT surgery for TV vegetation- too high risk for surgery at this time.  Follow up when stable  - Repeat Echo on 11/2/23: with no definite valvular vegetations    #SVT resolved.   -Evaluating need for continued  telemetry.     #Hep C  -Will need to review viral studies and likely refer to hepatology outpatient     #Hyponatremia  -Continue Daily RFP adjust free water flushes as tolerated.  Suspecting SIADH per record.  Will reevaluate but will need free water restriction for now.  -Sodium currently at 129, if not improving will need to talk to nutrition about his formula and how much water he has intake.  He is not on any extra free water.  Will add urine sodium and awesome's in the a.m.   -Plan to repeat serum awesome/sodium and urine awesome/sodium to evaluate.  Noted prior serum awesome was actually elevated at 315.  Unclear at this time we will repeat.    #Acute renal failure -resolved  -Required CVVH and MICU for acute renal failure, renal failure now resolved creatinine down to baseline.  He is still hypotensive and requiring midodrine.  He is voiding by an external cath.  Continue to monitor I's and O's and avoid nephrotoxins.  Nephrology has signed off.  -Continue Vit D supp, 8000 units for 8 weeks        Code Status: Full Code  NOK: Sister Sarah Godinez [phone]      Dispo: Continues to improve with aggressive pulm toileting.  Patient has a facility selected.  He is pending dysphagia evaluation for discharge, otherwise medically ready.   PT/OT/SLP continuing to follow.      .       Scheduled outpatient appointments in system:   No future appointments.  ---------------------------------------------------------------------------------------------------  Subjective   No acute events overnight.  Had discussion over course and treatment plan with sister.  Had discussion this morning as well with patient and sister will be coming in this afternoon.  Patient is feeling overall well he is very focused on trying to pass his swallow exam and get a diet.  He is trying to use ice chips every hour to work on the swallowing.  We have discussed if he passes it we will attempt that modified diet and if he does not we will discuss  "a PEG tube likely with interventional radiology.  He is likely close to being ready for discharge planning nutritional means established.     ---------------------------------------------------------------------------------------------------  Objective   Last Recorded Vitals  Blood pressure 98/62, pulse (!) 112, temperature 36.4 °C (97.5 °F), temperature source Temporal, resp. rate 18, height 1.702 m (5' 7.01\"), weight 53.8 kg (118 lb 9.7 oz), SpO2 97 %.  Intake/Output last 3 Shifts:  I/O last 3 completed shifts:  In: - (0 mL/kg)   Out: 400 (7.4 mL/kg) [Urine:400 (0.2 mL/kg/hr)]  Weight: 53.8 kg     Physical Exam  Vitals and nursing note reviewed.   Constitutional:       General: He is not in acute distress.     Appearance: He is ill-appearing. He is not toxic-appearing.      Comments: Cachectic   HENT:      Head: Normocephalic and atraumatic.      Mouth/Throat:      Mouth: Mucous membranes are moist.   Eyes:      General: No scleral icterus.     Extraocular Movements: Extraocular movements intact.      Conjunctiva/sclera: Conjunctivae normal.   Cardiovascular:      Rate and Rhythm: Regular rhythm. Tachycardia present.      Heart sounds: S1 normal and S2 normal. No murmur heard.  Pulmonary:      Effort: Pulmonary effort is normal. No respiratory distress.      Breath sounds: No wheezing, rhonchi or rales.      Comments: Diminished throughout, on nasal cannula  Abdominal:      General: Bowel sounds are normal. There is no distension.      Palpations: Abdomen is soft.      Tenderness: There is no abdominal tenderness. There is no guarding or rebound.   Musculoskeletal:         General: No swelling or deformity.      Cervical back: Neck supple.   Skin:     General: Skin is warm and dry.      Findings: No rash.   Neurological:      Mental Status: He is alert and oriented to person, place, and time.      Comments: Generalized weakness   Psychiatric:         Mood and Affect: Mood normal.         Relevant Results  Lab " Results   Component Value Date    WBC 10.8 11/25/2023    HGB 12.1 (L) 11/25/2023    HCT 38.6 (L) 11/25/2023    MCV 91 11/25/2023     11/25/2023      Lab Results   Component Value Date    GLUCOSE 114 (H) 11/25/2023    CALCIUM 9.3 11/25/2023     (L) 11/25/2023    K 4.6 11/25/2023    CO2 29 11/25/2023    CL 92 (L) 11/25/2023    BUN 28 (H) 11/25/2023    CREATININE 0.34 (L) 11/25/2023     Scheduled medications  albuterol, 2.5 mg, nebulization, q12h  apixaban, 5 mg, nasogastric tube, BID  diclofenac sodium, 4 g, Topical, TID  ergocalciferol, 8,000 Units, oral, Daily  esomeprazole, 40 mg, nasogastric tube, Daily before breakfast  lidocaine, , Topical, TID  [Held by provider] lidocaine, 2 patch, transdermal, Daily  magnesium oxide, 400 mg, oral, Daily  melatonin, 10 mg, nasogastric tube, Nightly  multivitamin with minerals, 1 tablet, oral, Daily  sodium chloride, 3 mL, nebulization, q12h      Continuous medications     PRN medications  PRN medications: acetaminophen, dextrose 10 % in water (D10W), dextrose, ipratropium-albuteroL, loperamide, midodrine, oxyCODONE, oxygen    Gwyn Hernandez MD

## 2023-11-26 NOTE — CARE PLAN
The patient's goals for the shift include PATITO    The clinical goals for the shift include increase mobility      Problem: Skin  Goal: Participates in plan/prevention/treatment measures  11/26/2023 1005 by Ailyn Allen RN  Outcome: Progressing  11/26/2023 1003 by Ailyn Allen RN  Outcome: Progressing     Problem: Skin  Goal: Prevent/manage excess moisture  11/26/2023 1005 by Ailyn Allen RN  Outcome: Progressing  11/26/2023 1003 by Ailyn Allen RN  Outcome: Progressing     Problem: Skin  Goal: Prevent/minimize sheer/friction injuries  11/26/2023 1005 by Ailyn Allen RN  Outcome: Progressing  11/26/2023 1003 by Ailyn Allen RN  Outcome: Progressing     Problem: Skin  Goal: Promote/optimize nutrition  11/26/2023 1005 by Ailyn Allen RN  Outcome: Progressing  11/26/2023 1003 by Ailyn Allen RN  Outcome: Progressing

## 2023-11-27 ENCOUNTER — APPOINTMENT (OUTPATIENT)
Dept: RADIOLOGY | Facility: HOSPITAL | Age: 68
End: 2023-11-27
Payer: COMMERCIAL

## 2023-11-27 LAB
ERYTHROCYTE [DISTWIDTH] IN BLOOD BY AUTOMATED COUNT: 14 % (ref 11.5–14.5)
HCT VFR BLD AUTO: 45.3 % (ref 41–52)
HGB BLD-MCNC: 13.3 G/DL (ref 13.5–17.5)
MCH RBC QN AUTO: 28.6 PG (ref 26–34)
MCHC RBC AUTO-ENTMCNC: 29.4 G/DL (ref 32–36)
MCV RBC AUTO: 97 FL (ref 80–100)
NRBC BLD-RTO: 0 /100 WBCS (ref 0–0)
PLATELET # BLD AUTO: 294 X10*3/UL (ref 150–450)
RBC # BLD AUTO: 4.65 X10*6/UL (ref 4.5–5.9)
WBC # BLD AUTO: 10.3 X10*3/UL (ref 4.4–11.3)

## 2023-11-27 PROCEDURE — 0CJS8ZZ INSPECTION OF LARYNX, VIA NATURAL OR ARTIFICIAL OPENING ENDOSCOPIC: ICD-10-PCS

## 2023-11-27 PROCEDURE — 1100000001 HC PRIVATE ROOM DAILY

## 2023-11-27 PROCEDURE — 2500000001 HC RX 250 WO HCPCS SELF ADMINISTERED DRUGS (ALT 637 FOR MEDICARE OP): Performed by: NURSE PRACTITIONER

## 2023-11-27 PROCEDURE — 2500000005 HC RX 250 GENERAL PHARMACY W/O HCPCS: Performed by: NURSE PRACTITIONER

## 2023-11-27 PROCEDURE — 2500000004 HC RX 250 GENERAL PHARMACY W/ HCPCS (ALT 636 FOR OP/ED): Performed by: NURSE PRACTITIONER

## 2023-11-27 PROCEDURE — 97110 THERAPEUTIC EXERCISES: CPT | Mod: GO

## 2023-11-27 PROCEDURE — 36415 COLL VENOUS BLD VENIPUNCTURE: CPT | Performed by: NURSE PRACTITIONER

## 2023-11-27 PROCEDURE — 2500000002 HC RX 250 W HCPCS SELF ADMINISTERED DRUGS (ALT 637 FOR MEDICARE OP, ALT 636 FOR OP/ED): Performed by: NURSE PRACTITIONER

## 2023-11-27 PROCEDURE — 85027 COMPLETE CBC AUTOMATED: CPT | Performed by: NURSE PRACTITIONER

## 2023-11-27 PROCEDURE — 2550000001 HC RX 255 CONTRASTS: Performed by: STUDENT IN AN ORGANIZED HEALTH CARE EDUCATION/TRAINING PROGRAM

## 2023-11-27 PROCEDURE — 97110 THERAPEUTIC EXERCISES: CPT | Mod: GP

## 2023-11-27 PROCEDURE — 97129 THER IVNTJ 1ST 15 MIN: CPT | Mod: GO

## 2023-11-27 PROCEDURE — 92612 ENDOSCOPY SWALLOW (FEES) VID: CPT | Mod: GN

## 2023-11-27 PROCEDURE — 74230 X-RAY XM SWLNG FUNCJ C+: CPT

## 2023-11-27 PROCEDURE — 94668 MNPJ CHEST WALL SBSQ: CPT

## 2023-11-27 PROCEDURE — 97530 THERAPEUTIC ACTIVITIES: CPT | Mod: GP

## 2023-11-27 PROCEDURE — 97530 THERAPEUTIC ACTIVITIES: CPT | Mod: GO

## 2023-11-27 PROCEDURE — 99221 1ST HOSP IP/OBS SF/LOW 40: CPT | Performed by: OTOLARYNGOLOGY

## 2023-11-27 PROCEDURE — 2500000001 HC RX 250 WO HCPCS SELF ADMINISTERED DRUGS (ALT 637 FOR MEDICARE OP): Performed by: STUDENT IN AN ORGANIZED HEALTH CARE EDUCATION/TRAINING PROGRAM

## 2023-11-27 PROCEDURE — 99235 HOSP IP/OBS SAME DATE MOD 70: CPT | Performed by: STUDENT IN AN ORGANIZED HEALTH CARE EDUCATION/TRAINING PROGRAM

## 2023-11-27 PROCEDURE — 92526 ORAL FUNCTION THERAPY: CPT | Mod: GN

## 2023-11-27 PROCEDURE — 92611 MOTION FLUOROSCOPY/SWALLOW: CPT | Mod: GN

## 2023-11-27 PROCEDURE — 31575 DIAGNOSTIC LARYNGOSCOPY: CPT | Performed by: OTOLARYNGOLOGY

## 2023-11-27 PROCEDURE — 94640 AIRWAY INHALATION TREATMENT: CPT

## 2023-11-27 PROCEDURE — 74230 X-RAY XM SWLNG FUNCJ C+: CPT | Performed by: STUDENT IN AN ORGANIZED HEALTH CARE EDUCATION/TRAINING PROGRAM

## 2023-11-27 RX ORDER — ATORVASTATIN CALCIUM 40 MG/1
40 TABLET, FILM COATED ORAL NIGHTLY
Status: DISCONTINUED | OUTPATIENT
Start: 2023-11-27 | End: 2023-11-30 | Stop reason: HOSPADM

## 2023-11-27 RX ADMIN — BARIUM SULFATE 30 ML: 400 SUSPENSION ORAL at 09:13

## 2023-11-27 RX ADMIN — Medication 8000 UNITS: at 09:39

## 2023-11-27 RX ADMIN — Medication 10 MG: at 20:11

## 2023-11-27 RX ADMIN — APIXABAN 5 MG: 5 TABLET, FILM COATED ORAL at 09:39

## 2023-11-27 RX ADMIN — OXYCODONE HYDROCHLORIDE 5 MG: 5 TABLET ORAL at 00:09

## 2023-11-27 RX ADMIN — OXYCODONE HYDROCHLORIDE 5 MG: 5 TABLET ORAL at 04:09

## 2023-11-27 RX ADMIN — OXYCODONE HYDROCHLORIDE 5 MG: 5 TABLET ORAL at 14:23

## 2023-11-27 RX ADMIN — ESOMEPRAZOLE MAGNESIUM 40 MG: 40 FOR SUSPENSION ORAL at 07:00

## 2023-11-27 RX ADMIN — DICLOFENAC 1 APPLICATION: 10 GEL TOPICAL at 09:00

## 2023-11-27 RX ADMIN — MAGNESIUM OXIDE TAB 400 MG (241.3 MG ELEMENTAL MG) 400 MG: 400 (241.3 MG) TAB at 09:39

## 2023-11-27 RX ADMIN — ATORVASTATIN CALCIUM 40 MG: 40 TABLET, FILM COATED ORAL at 20:11

## 2023-11-27 RX ADMIN — BARIUM SULFATE 15 ML: 0.81 POWDER, FOR SUSPENSION ORAL at 09:11

## 2023-11-27 RX ADMIN — BARIUM SULFATE 40 ML: 400 SUSPENSION ORAL at 09:13

## 2023-11-27 RX ADMIN — DICLOFENAC 1 APPLICATION: 10 GEL TOPICAL at 15:00

## 2023-11-27 RX ADMIN — LIDOCAINE 4%: 4 CREAM TOPICAL at 07:00

## 2023-11-27 RX ADMIN — APIXABAN 5 MG: 5 TABLET, FILM COATED ORAL at 20:11

## 2023-11-27 RX ADMIN — OXYCODONE HYDROCHLORIDE 5 MG: 5 TABLET ORAL at 19:05

## 2023-11-27 RX ADMIN — Medication 3 ML: at 10:31

## 2023-11-27 RX ADMIN — LIDOCAINE 4% 1 APPLICATION: 4 CREAM TOPICAL at 22:00

## 2023-11-27 RX ADMIN — DICLOFENAC 1 APPLICATION: 10 GEL TOPICAL at 21:00

## 2023-11-27 RX ADMIN — OXYCODONE HYDROCHLORIDE 5 MG: 5 TABLET ORAL at 09:39

## 2023-11-27 RX ADMIN — BARIUM SULFATE 30 ML: 400 PASTE ORAL at 09:14

## 2023-11-27 RX ADMIN — Medication 1 TABLET: at 09:39

## 2023-11-27 RX ADMIN — ALBUTEROL SULFATE 2.5 MG: 2.5 SOLUTION RESPIRATORY (INHALATION) at 10:32

## 2023-11-27 ASSESSMENT — COGNITIVE AND FUNCTIONAL STATUS - GENERAL
EATING MEALS: A LITTLE
WALKING IN HOSPITAL ROOM: TOTAL
MOVING TO AND FROM BED TO CHAIR: A LOT
DRESSING REGULAR UPPER BODY CLOTHING: A LITTLE
DAILY ACTIVITIY SCORE: 13
DRESSING REGULAR LOWER BODY CLOTHING: TOTAL
PERSONAL GROOMING: A LITTLE
STANDING UP FROM CHAIR USING ARMS: A LOT
PERSONAL GROOMING: A LITTLE
MOBILITY SCORE: 10
EATING MEALS: A LITTLE
MOVING FROM LYING ON BACK TO SITTING ON SIDE OF FLAT BED WITH BEDRAILS: A LOT
DRESSING REGULAR LOWER BODY CLOTHING: TOTAL
MOBILITY SCORE: 10
MOVING TO AND FROM BED TO CHAIR: A LOT
TURNING FROM BACK TO SIDE WHILE IN FLAT BAD: A LOT
HELP NEEDED FOR BATHING: A LOT
CLIMB 3 TO 5 STEPS WITH RAILING: TOTAL
TOILETING: TOTAL
STANDING UP FROM CHAIR USING ARMS: A LOT
WALKING IN HOSPITAL ROOM: TOTAL
CLIMB 3 TO 5 STEPS WITH RAILING: TOTAL
HELP NEEDED FOR BATHING: A LOT
DAILY ACTIVITIY SCORE: 13
MOVING FROM LYING ON BACK TO SITTING ON SIDE OF FLAT BED WITH BEDRAILS: A LOT
TOILETING: TOTAL
TURNING FROM BACK TO SIDE WHILE IN FLAT BAD: A LOT
DRESSING REGULAR UPPER BODY CLOTHING: A LITTLE

## 2023-11-27 ASSESSMENT — PAIN SCALES - GENERAL
PAINLEVEL_OUTOF10: 8
PAINLEVEL_OUTOF10: 7
PAINLEVEL_OUTOF10: 10 - WORST POSSIBLE PAIN
PAINLEVEL_OUTOF10: 5 - MODERATE PAIN
PAINLEVEL_OUTOF10: 5 - MODERATE PAIN
PAINLEVEL_OUTOF10: 2
PAINLEVEL_OUTOF10: 10 - WORST POSSIBLE PAIN
PAINLEVEL_OUTOF10: 2
PAINLEVEL_OUTOF10: 8
PAINLEVEL_OUTOF10: 2

## 2023-11-27 ASSESSMENT — PAIN - FUNCTIONAL ASSESSMENT
PAIN_FUNCTIONAL_ASSESSMENT: 0-10

## 2023-11-27 ASSESSMENT — PAIN DESCRIPTION - DESCRIPTORS
DESCRIPTORS: ACHING
DESCRIPTORS: ACHING

## 2023-11-27 ASSESSMENT — PAIN DESCRIPTION - ORIENTATION
ORIENTATION: RIGHT;LEFT;LOWER

## 2023-11-27 ASSESSMENT — PAIN DESCRIPTION - LOCATION
LOCATION: LEG

## 2023-11-27 NOTE — PROCEDURES
Speech-Language Pathology    SLP Adult FEES Evaluation  Patient Name: Molina Godinez  MRN: [id number]  Today's Date: 11/27/2023   Time Calculation  Start Time: 1115  Stop Time: 1200  Time Calculation (min): 45 min       Recommendations:  Solid Consistency: Minced & moist/ground (IDDSI Level 5)  Liquid Consistency: Honey thick/moderately thick (IDDS Level 3)  Medication Administration: Pills whole in pureed  1:1 Feed Assist  Alternating liquids and solids, Small bites, Small sips  Recommendations: ENT Evaluation, Continue skilled dysphagia services      Assessment/Impression:  FEES completed revealing moderate oropharyngeal dysphagia. Recommend initiating modified diet with use of strategies. Also rec ENT in IP/OP for assessment of pharyngeal abnormalities and glottic insufficiency with dysphonia. SLP to continue targeting oropharyngeal function and target use of chin tuck with nectar thick liquids at bedside for possible diet advancement.     Plan:  Inpatient/Swing Bed or Outpatient: Inpatient  Treatment/Interventions: Respiratory muscle strength training, Pharyngeal exercises  SLP Plan: Skilled SLP  SLP Frequency: 2x per week  Duration: 2 weeks  SLP Discharge Recommendations: Continue skilled SLP services at the next level of care  Solid Consistency: Minced & moist/ground (IDDSI Level 5)  Liquid Consistency: Honey thick/moderately thick (IDDS Level 3)  Discussed POC: Patient, Nursing, Physician  Discussed Risks/Benefits: Yes  Patient/Caregiver Agreeable: Yes  SLP - OK to Discharge: Yes      Subjective   History and Physical:    68 y.o. male with PMHx COPD, HTN,  alcohol and opioid use disorder, hepatitis C, chronic PE admitted 10/12 with BLE weakness, recently admitted in the MICU from 10/13-10/16 for suspected alcohol withdrawal refractory to ativan, started on phenobarb taper, treated for streptococcus pneumoniae PNA with Ceftriaxone and transferred to [facility].  RRT called for lethargy, transferred to MICU on 10/17 with  "new FiO2 requirements.   Pt with multiple intubations during admission.     Relevant SLP Hx:  Updated MBSS completed earlier this date revealing \"ongoing oropharyngeal dysphagia including deficits in efficiency and airway protection though improved from previous study completed . Pt presenting with abnormal pharyngeal anatomy obscuring view airway events, thus cannot definitively rule-out aspiration risk with various PO textures. Recommend FEES procedure to be completed for further evaluation of the pharyngeal phase of swallow given abnormal anatomy and persistent dysphonia in order to make accurate/appropriate diet recommendations. \"    Baseline Assessment:  Respiratory Status: Room air  History of Intubation: No  Behavior/Cognition: Alert, willing to participate.   Corpak tube in place. Pt positioned upright with wedge behind right side of back leaning pt slightly to left.     Objective     Oral/Motor Assessment:  Oral Hygiene: Poor  Dentition: Poor Dental/Oral Hygiene (Most missing teeth)  Facial Symmetry: Within Functional Limits  Labial ROM: Within Functional Limits  Lingual ROM: Reduced right, Reduced left  Vocal Quality: Exceptions to WFL  Vocal Quality Impairment: Hoarse, Wet  Intelligibility: Intelligibility reduced  Intelligibility Ratin%-80%  Breath Support: Adequate for speech    FEES:  FEES Verbal Consent: Fiberoptic endoscopic evaluation of the swallow exam was completed once informed verbal consent was obtained - Yes  Scope Passed: Through right nare  Following Application Of:  Surgical lubricant  Patient Tolerated Procedure: Well     FEES Anatomy: Structural Appearances  Nasal Passage: Corpak bridle in place  Velopharryngeal Port Closure: Complete  Valleculae: Right vallecula with a small round hole, unclear the depth of hole however did note collection of saliva and residues of liquid in space    Pyriform Sinuses: Deep sinuses  Protrusion on inferior portion of lateral pharyngeal wall, " left side; consistent with horn of hyoid  Laryngeal Function:  Vocal Fold Appearance:  erythema on posterior 1/3, ?atrophy given bowed appearance on phonation  Arytenoids: Erythema  Adduction: slight gap in middle of cords on phonation  Abduction: Full  Arytenoid Movement: Symmetrical  Vocal Quality: Weak/harsh  Secretion Management  Amounts/Color/Texture: Moderate, Frothy, Thin, did note an instance in which pt coughed up a large amount of thick yellow secretion from lower airway  Patient Response to Secretions: cough response-effective to clearance      Consistencies trialed: Thin liquid via straw with chin tuck, nectar (mildly) thick liquids via straw with chin tuck, honey (moderately) thick liquids via tsp and straw, puree via tsp, and minced-moist solids.     Oral Phase:  Oral Phase: Impaired    Reduced Bolus Preparation/Mastication; poor dentition with majority of teeth missing  Reduced AP Bolus Transfer/Lingual Pumping  Oral Residue: with puree and minced-moist solids, cleared with cued liquid wash         Pharyngeal Phase:  Pharyngeal Phase: Impaired    Pharyngeal swallow onset with bolus head in pyriforms with liquid textures. Reduced tongue base retraction and pharyngeal constriction as evidenced by mild residues with in vallecula and pyriform with puree/minced-moist solids. Pt with trace-mild residues along Lt side of vallecula, lateral channel, and pyriform with all liquid textures; suspect positioning impacting direction of flow down left side. Pt with mistiming of laryngeal vestibule closure with thin liquids resulting aspiration of thin liquid with chin tuck during the swallow  as evidenced by ejection of green material via spontaneous cough directly after swallow. Noted x2 instances of shallow penetration of nectar with chin tuck and x1 trace shallow penetration with minced-moist solids, clearing on secondary swallow. No penetration/aspiration observed with honey thick liquids, pureed solids.           Esophageal Phase: No retrograde flow observed through UES.                   Swallow Therapy:  Education provided following completion of fiberoptic endoscopic exam of swallow  regarding results of exam including dysphagia pathophysiology, likely etiology, plan of care, prognosis, diet and strategy recommendations. Additional recs educated including repeat swallow evaluation in 2-3 weeks pending completion of therapy, possible diet advancement at bedside given training of positional maneuvers, and ENT consult. Pt indicated understanding and denied further questions. Trained pt on use of strategies including small/single sips/bites and alternating bites sips. Pt accurately taught back strategies independently.            Goals:   Pt will verbalize/demonstrate comprehension of dysphagia education, strategies, recommendations and POC.  2. Patient will complete respiratory muscle strength training (RMST) adhering to progressive overload protocol with cues as needed for accurate completion.   3. Pt will accurately utilize swallow strategies with advanced textures over course of meal with no overt difficulty and without signs of significant fatigue to inform readiness for diet advancement.

## 2023-11-27 NOTE — PROGRESS NOTES
Physical Therapy    Physical Therapy Treatment    Patient Name: Molina Godinez  MRN: [id number]  Today's Date: 11/27/2023  Time Calculation  Start Time: 1615  Stop Time: 1654  Time Calculation (min): 39 min       Assessment/Plan   PT Assessment  End of Session Communication: Bedside nurse  End of Session Patient Position: Up in chair, Alarm on     PT Plan  Treatment/Interventions: Bed mobility, Transfer training, Gait training, Balance training, Neuromuscular re-education, Strengthening, Therapeutic exercise, Therapeutic activity, Positioning, Postural re-education  PT Plan: Skilled PT  PT Frequency: 4 times per week  PT Discharge Recommendations: Moderate intensity level of continued care  PT - OK to Discharge: Yes      General Visit Information:   PT  Visit  PT Received On: 11/27/23  General  Prior to Session Communication: Bedside nurse  Patient Position Received: Up in chair, Alarm on  General Comment: Pt supine.  Pleasant, cooperative and agreeable to PT.  Better participation in bed mobility today.  Able to transfer OOB to chair, and excited about having his first meal, since he passed for a diet.  Tolerated well.    Subjective   Precautions:     Vital Signs:       Objective   Pain:  Pain Assessment  Pain Assessment: 0-10  Pain Score: 5 - Moderate pain (Behind, and BLE with gentle stretching.  Improved in chair.)  Cognition:  Cognition  Orientation Level: Oriented X4  Following Commands: Follows one step commands without difficulty    Activity Tolerance:  Activity Tolerance  Endurance: Tolerates 10 - 20 min exercise with multiple rests  Treatments:  Therapeutic Exercise  Therapeutic Exercise Performed: Yes  Therapeutic Exercise Activity 1: Supine: AP, HS x10.  Sitting in chair: hip flx, LAQ x10.  Able to perform full range with encouragmewnt (B DF stretch 2x 30 seconds.  Improvement from -5 to neutral)    Balance/Neuromuscular Re-Education  Balance/Neuromuscular Re-Education Activity 1: Sitting EOB 24 minutes, pt  initially Mod A but able to progress to Min A, with moments of CGA with RUE on rail for static sitting.  Variable Min/Mod A for dynamic activities.    Bed Mobility 1  Bed Mobility 1: Supine to sitting  Level of Assistance 1: Moderate assistance  Bed Mobility Comments 1: Better ability to advance LEs toward EOB today.  Still Mod A.    Ambulation/Gait Training  Ambulation/Gait Training Performed: No (Encourage pt to attempt forward steps with Ax2 and chair follow, but pt reports feeling not ready for that.  Declining still with further encouragement)  Transfer 1  Transfer From 1: Sit to, Stand to  Transfer to 1: Sit  Transfer Device 1:  (PT standing in front of pt, supporting trunk)  Transfer Level of Assistance 1: Maximum verbal cues  Transfers 2  Transfer From 2: Bed to  Transfer to 2: Chair with arms  Transfer Device 2:  (PT standing in front of pt, supporting trunk)  Transfer Level of Assistance 2: Maximum assistance    Outcome Measures:    [facility] Basic Mobility  Turning from your back to your side while in a flat bed without using bedrails: A lot  Moving from lying on your back to sitting on the side of a flat bed without using bedrails: A lot  Moving to and from bed to chair (including a wheelchair): A lot  Standing up from a chair using your arms (e.g. wheelchair or bedside chair): A lot  To walk in hospital room: Total  Climbing 3-5 steps with railing: Total  Basic Mobility - Total Score: 10    Education Documentation  Mobility Training, taught by Ever Fowler, PT at 11/27/2023  5:15 PM.  Learner: Patient  Readiness: Acceptance  Method: Explanation  Response: Verbalizes Understanding    Education Comments  No comments found.        OP EDUCATION:       Encounter Problems       Encounter Problems (Active)       Balance       Patient will complete static (Cgx1) and dynamic (MINx1) using BUE support as needed in order to maintain midline without acute LOB   (Not met)       Start:  10/19/23    Expected End:   23    Resolved:  23    Updated to: Patient will complete static (Cgx1) and dynamic (MINx1) sitting balance activities using BUE support as needed in order to maintain midline without acute LOB    Update reason: goals           Patient will complete static (Cgx1) and dynamic (MINx1) sitting balance activities using BUE support as needed in order to maintain midline without acute LOB (Progressing)       Start:  23    Expected End:  23                   Mobility       STG - Patient will ambulate >/=10 ft using LRD as needed with MODx1 without acute LOB  (Not met)       Start:  10/19/23    Expected End:  23    Resolved:  23    Updated to: STG - Patient will ambulate >/=3 ft using LRD as needed with MODx1 without acute LOB    Update reason: goals           patient will complete BLE there-ex in order to improve strength and to assist with the completion of functional mobility tasks.  (Not met)       Start:  10/19/23    Expected End:  23    Resolved:  23    Updated to: patient will complete LE there-ex in order to improve strength and to assist with the completion of functional mobility tasks.    Update reason: goals           STG - Patient will ambulate >/=3 ft using LRD as needed with MODx1 without acute LOB (Progressing)       Start:  23    Expected End:  23                patient will complete LE there-ex in order to improve strength and to assist with the completion of functional mobility tasks. (Progressing)       Start:  23    Expected End:  23                   Pain - Adult          Safety       LTG - Patient will adhere to hip precautions during ADL's and transfers       Start:  10/31/23    Expected End:  23            LTG - Patient will demonstrate safety requirements appropriate to situation/environment       Start:  10/31/23    Expected End:  23            LTG - Patient will utilize safety techniques        Start:  10/31/23    Expected End:  23            STG - Patient locks brakes on wheelchair       Start:  10/31/23    Expected End:  23            STG - Patient uses call light consistently to request assistance with transfers       Start:  10/31/23    Expected End:  23            STG - Patient uses gait belt during all transfers       Start:  10/31/23    Expected End:  23            Goal 1       Start:  10/31/23    Expected End:  23            Goal 2       Start:  10/31/23    Expected End:  23            Goal 3       Start:  10/31/23    Expected End:  23               Transfers       STG - Transfer from bed to chair with MODx1 without acute LOB  (Not met)       Start:  10/19/23    Expected End:  23    Resolved:  23    Updated to: STG - Transfer from bed to chair with MODx1 without acute LOB using LRD as needed    Update reason: goals           STG - Patient will perform bed mobility with MINx1 assist  (Not met)       Start:  10/19/23    Expected End:  23    Resolved:  23    Updated to: STG - Patient will perform bed mobility with MODx1 assist using bedrailing as needed with HOB elevated    Update reason: goals           STG - Patient will transfer sit to and from stand with MODx1 using LRD as needed without acute LOB  (Not met)       Start:  10/19/23    Expected End:  23    Resolved:  23    Updated to: STG - Patient will transfer sit to and from stand with MODx1 using LRD as needed without acute LOB    Update reason: goals           STG - Transfer from bed to chair with MODx1 without acute LOB using LRD as needed (Progressing)       Start:  23    Expected End:  23                STG - Patient will perform bed mobility with MODx1 assist using bedrailing as needed with HOB elevated (Progressing)       Start:  23    Expected End:  23                STG - Patient will transfer sit to and from stand  with MODx1 using LRD as needed without acute LOB (Progressing)       Start:  11/14/23    Expected End:  11/28/23

## 2023-11-27 NOTE — CARE PLAN
The patient's goals for the shift include PATITO    The clinical goals for the shift include increase mobility      Problem: Pain - Adult  Goal: Verbalizes/displays adequate comfort level or baseline comfort level  Outcome: Progressing     Problem: Discharge Planning  Goal: Discharge to home or other facility with appropriate resources  Outcome: Progressing     Problem: Chronic Conditions and Co-morbidities  Goal: Patient's chronic conditions and co-morbidity symptoms are monitored and maintained or improved  Outcome: Progressing     Problem: Skin  Goal: Participates in plan/prevention/treatment measures  Outcome: Progressing  Goal: Prevent/manage excess moisture  Outcome: Progressing  Goal: Prevent/minimize sheer/friction injuries  Outcome: Progressing  Goal: Promote/optimize nutrition  Outcome: Progressing

## 2023-11-27 NOTE — SIGNIFICANT EVENT
Rapid Response RN Note    Rapid response RN at bedside for RADAR score 6 due to the following VS: T 35.7 °Celsius;  ; RR 18; /84; SPO2 91%.     Reviewed above VS with bedside RN.  VS within patient's current trends.  No interventions by rapid response team indicated at this time.      Patient denied pain, shortness of breath, dizziness or lightheadedness.      Staff to page rapid response for any concerns or acute change in condition/VS.

## 2023-11-27 NOTE — PROGRESS NOTES
Assessment/Plan       Mr. Godinez 68 y.o. male with PMHx COPD, HTN,  alcohol and opioid use disorder, hepatitis C, chronic PE admitted 10/12 with BLE weakness, recently admitted in the MICU from 10/13-10/16 for suspected alcohol withdrawal refractory to ativan, started on phenobarb taper, treated for streptococcus pneumoniae PNA with Ceftriaxone, completed antifungals for fungemia and transferred to [facility].  RRT called for lethargy, transferred to MICU on 10/17 with new FiO2 requirements.  In SDU continued to make good progress, weaning O2 to baseline, continuing aggressive pulm hygiene and PT/OT/SLP.   He was recommended moderate intensity therapy and transfer to the floor pending placement for postacute care.  At this time his respiratory status has been stable, still requires O2 supply.        -SLP FEES completed 11/27, recommended honey thick liquids and minced-moist solids, with direct feed assist with a plan to  advance diet in 1-2 weeks pending further therapy, will also involve ENT as he has some vocal cord dysfunction and an abnormality in vallecula, concerning for a fistula.   -Respiratory status has been stable.  He is requiring 1 to 2 L at rest, likely what is going to go on.  He may not need any oxygen is borderline.  Cannot determine activity requirement.  will make sure he leaves with his Acapella and incentive spirometer and brings it to the facility  -We need to figure out his hyponatremia, he is not getting any extra free water he is still low.  Consulted nephrology.  -Completed antibiotics and antifungals, there was some concern for vegetation, cardiac surgery said not a candidate, repeat echo showed no vegetation but he should have follow-up with cardiology to evaluate.  He should follow-up with ID as well given his multiple infections.  He needs neurology follow-up for the Guillain-Barré.  He needs pulmonary follow-up for the respiratory failure.  He will need PT OT and speech and nutrition at the  facility.  I have counseled him extensively on therapy and getting out of bed, had more talks with the sister present.  Hopefully will stay motivated but we need to continue to to talk to nursing to encourage him to get out of bed.  Will also need GI follow-up for the hepatitis C.      #Hyponatremia, possible SIADH, pain induced   -Continue Daily RFP Suspecting SIADH per record.  Will reevaluate but will need free water restriction for now.  -Nephro consulted       #Acute on chronic hypoxemic respiratory failure  #Strep pneumonaie PNA, Stenotrophomonas PNA  #COPD on 2 to 3 L baseline O2  #Chronic PE  - Patient had bronc with mucous plugging left lung consolidation on 11/16. Status post antibiotics and completed per ID, weaned to 2 L nasal cannula at rest.   -Cultures grew multidrug resistant PSA, Stenotrophmonas and 4+ Candida, AFB pending, ID suspects colonization and no further antibiotic recommended.  Will need to follow-up AFB stains.  -Continue aggressive bronchial hygiene, Albuterol BID and 7% saline BID.  Airway clearance techniques (IPV) Q4 hours.  Acapella, IS while awake.  Informed patient that he should take his devices and continue after discharge.  -At this time respiratory status is stabilized.    -Continue Eliquis  -Patient is being worked up for pulmonary artery hypertension, will need follow-up with pulmonary OP.         # GBS  # Generalized weakness and deconditioning  # Dysphagia  # Severe protein calorie malnutrition  -Currently at baseline mentation.  His extremity weakness is improving.  EMG confirmed GBS on 11/10.  He is primarily having issues with pain now.  This prolonged hospitalization as well which is likely also contributing to his weakness.  Neurology was consulted and no plans for further testing or treatment due to fungemia, as immunomodulators could potentially worsen infection.  Patient to see neurology after infection cleared.  -For pain change Acetaminophen to scheduled,  Oxycodone 5 mg changed to every 4 as needed.  DC lidocaine patch.  Starting diclofenac gel and lidocaine cream 3 times daily alternating to legs and feet  -SLP FEES completed 11/27, recommended honey thick liquids and minced-moist solids, with direct feed assist with a plan to  advance diet in 1-2 weeks pending further therapy, will also involve ENT as he has some vocal cord dysfunction and an abnormality in vallecula, concerning for a fistula.   -Continue PPI  -Continue PT OT and SLP/nutrition.         #Alcohol withdrawal  #Alcohol use disorder  #Opiate use disorder  -Status post management of refractory withdrawal in the ICU.  Resolved.  -Social work for resources.  Counseling education provided today.     #Hyperlipidemia  -resuming statin     #Dry eye, Cataracts, asha eyes  - seen by Opthomology given c/o blurriness with new fungemia.  No signs of fungal endophthalmitis  - treated with artifical tears and erythromycin oint.  Now resolved     #TV Endocarditis  # Fungemia  - Completed micafungin per ID on 11/17.    - 10/31 Echo: LVEF 75-80%, RV overload, Mod-severe TV, Mod-severe RV systolic pressure, Tricuspid echodensity measuring 1.7 x 0.8 cm, atrial septal aneurysm  - Seen by CT surgery for TV vegetation- too high risk for surgery at this time.  Follow up when stable  - Repeat Echo on 11/2/23: with no definite valvular vegetations     #SVT resolved.   -Evaluating need for continued telemetry.      #Hep C  -Will need to review viral studies and likely refer to hepatology outpatient           #Acute renal failure -resolved  -Required CVVH and MICU for acute renal failure, renal failure now resolved creatinine down to baseline.  He is still hypotensive and requiring midodrine.  He is voiding by an external cath.  Continue to monitor I's and O's and avoid nephrotoxins.  Nephrology has signed off.  -Continue Vit D supp, 8000 units for 8 weeks           Code Status: Full Code  NOK: Sister Sarah Godinez [phone]     "  Dispo: SNF             Scheduled outpatient appointments in system:   No future appointments.  ---------------------------------------------------------------------------------------------------  Subjective   No acute events overnight.        ---------------------------------------------------------------------------------------------------  Objective   Last Recorded Vitals  Blood pressure 119/74, pulse 104, temperature 36.4 °C (97.5 °F), resp. rate 16, height 1.702 m (5' 7.01\"), weight 53.8 kg (118 lb 9.7 oz), SpO2 94 %.  Intake/Output last 3 Shifts:  I/O last 3 completed shifts:  In: 698 (13 mL/kg) [NG/GT:698]  Out: 1400 (26 mL/kg) [Urine:1400 (0.7 mL/kg/hr)]  Weight: 53.8 kg     Physical Exam  Vitals and nursing note reviewed.   Constitutional:       General: He is not in acute distress.     Appearance: He is ill-appearing. He is not toxic-appearing.      Comments: Cachectic   HENT:      Head: Normocephalic and atraumatic.      Mouth/Throat:      Mouth: Mucous membranes are moist.   Eyes:      General: No scleral icterus.     Extraocular Movements: Extraocular movements intact.      Conjunctiva/sclera: Conjunctivae normal.   Cardiovascular:      Rate and Rhythm: Regular rhythm. Tachycardia present.      Heart sounds: S1 normal and S2 normal. No murmur heard.  Pulmonary:      Effort: Pulmonary effort is normal. No respiratory distress.      Breath sounds: No wheezing, rhonchi or rales.      Comments: Diminished throughout, on nasal cannula  Abdominal:      General: Bowel sounds are normal. There is no distension.      Palpations: Abdomen is soft.      Tenderness: There is no abdominal tenderness. There is no guarding or rebound.   Musculoskeletal:         General: No swelling or deformity.      Cervical back: Neck supple.   Skin:     General: Skin is warm and dry.      Findings: No rash.   Neurological:      Mental Status: He is alert and oriented to person, place, and time.      Comments: Generalized weakness "   Psychiatric:         Mood and Affect: Mood normal.         Relevant Results  Lab Results   Component Value Date    WBC 9.1 11/26/2023    HGB 12.3 (L) 11/26/2023    HCT 38.9 (L) 11/26/2023    MCV 89 11/26/2023     11/26/2023      Lab Results   Component Value Date    GLUCOSE 107 (H) 11/26/2023    CALCIUM 9.4 11/26/2023     (L) 11/26/2023    K 4.6 11/26/2023    CO2 28 11/26/2023    CL 92 (L) 11/26/2023    BUN 28 (H) 11/26/2023    CREATININE 0.39 (L) 11/26/2023     Scheduled medications  albuterol, 2.5 mg, nebulization, q12h  apixaban, 5 mg, nasogastric tube, BID  diclofenac sodium, 4 g, Topical, TID  ergocalciferol, 8,000 Units, oral, Daily  esomeprazole, 40 mg, nasogastric tube, Daily before breakfast  lidocaine, , Topical, TID  [Held by provider] lidocaine, 2 patch, transdermal, Daily  magnesium oxide, 400 mg, oral, Daily  melatonin, 10 mg, nasogastric tube, Nightly  multivitamin with minerals, 1 tablet, oral, Daily  sodium chloride, 3 mL, nebulization, q12h      Continuous medications     PRN medications  PRN medications: acetaminophen, dextrose 10 % in water (D10W), dextrose, ipratropium-albuteroL, loperamide, midodrine, oxyCODONE, oxygen    Julien Ennis MD

## 2023-11-27 NOTE — PROCEDURES
Speech-Language Pathology    SLP Adult MBSS Evaluation  Patient Name: Molina Godinez  MRN: [id number]  Today's Date: 11/27/2023   Time Calculation  Start Time: 0830  Stop Time: 0850  Time Calculation (min): 20 min       Recommendations:  NPO, continue non-oral nutrition  Frequent oral care  Ice chips x3-5/hour   Recommend FEES procedure this date for further assessment of pharyngeal swallow    Assessment/Impression:  Updated MBSS completed revealing ongoing oropharyngeal dysphagia including deficits in efficiency and airway protection though improved from previous study completed 11/20. Pt presenting with abnormal pharyngeal anatomy obscuring view airway events, thus cannot definitively rule-out aspiration risk with various PO textures. Recommend FEES procedure to be completed for further evaluation of the pharyngeal phase of swallow given abnormal anatomy and persistent dysphonia in order to make accurate/appropriate diet recommendations.         Plan:  Inpatient/Swing Bed or Outpatient: Inpatient  Treatment/Interventions: Bolus trials, Pharyngeal exercises  SLP Plan: Skilled SLP  SLP Frequency: 3x per week  Duration: 1 week  SLP Discharge Recommendations: Continue skilled speech therapy services post discharge  Solid Consistency: NPO  Liquid Consistency: NPO  Discussed POC: Patient  Discussed Risks/Benefits: Yes  Patient/Caregiver Agreeable: Yes  SLP - OK to Discharge: Yes      Subjective     History and Physical:    68 y.o. male with PMHx COPD, HTN,  alcohol and opioid use disorder, hepatitis C, chronic PE admitted 10/12 with BLE weakness, recently admitted in the MICU from 10/13-10/16 for suspected alcohol withdrawal refractory to ativan, started on phenobarb taper, treated for streptococcus pneumoniae PNA with Ceftriaxone and transferred to [facility].  RRT called for lethargy, transferred to MICU on 10/17 with new FiO2 requirements.   Pt with multiple intubations during admission.     Relevant SLP Hx:  MBSS completed  "11/20\" Pt presenting with severe oropharyngeal dysphagia including deficits in efficiency and airway protection placing pt at risk for aspiration and malnutrition, as such recommend pt maintain NPO with alternative means nutrition/hydration. Plan to complete bolus trials of selected textures therapeutically to build up endurance given prolonged NPO status, and to also target pharyngeal strengthening exercises. As pt's endurance progresses, may be able to initiate modified PO diet at bedside in 1-2 weeks. \"      Baseline Assessment:  Respiratory Status: 2L O2 via NC  Behavior/Cognition: Alert, willing to participate  Dobhoff in place    Objective       Consistencies trialed: Thin liquid via tsp and straw, nectar (mildly) thick liquids via tsp and straw, honey (moderately) thick liquids via straw, puree via tsp, and regular solids.    Oral Phase:  Oral Phase: Impaired    Reduced Bolus Preparation noted with thickened liquid textures and puree  Noted incomplete recollection across textures  Reduced AP Bolus Transfer/Lingual Pumping observed across all textures, though improved from previous study particularly with puree boluses  Oral Residue: Mild given numerous spontaneous swallows per bolus       Pharyngeal Phase:  Pharyngeal Phase: Impaired    Reduced Tongue Base Retraction: All Consistencies Trialed  Reduced Anterior Hyoid Excursion: All Consistencies Trialed  Reduced Pharyngeal Stripping Wave: All Consistencies Trialed  Residue: Valleculae: All Consistencies Trialed    Pt with spillage of liquid textures to pyriforms prior to swallow onset, resulting in intermittent shallow penetration of thin, nectar, and honey thick liquid textures. Noted trace coating of contrast on suspected lateral channels and pyriforms laryngeal surface of arytenoid after the swallow with all liquids textures, however cannot rule-out coating in airway given location of contrast coating throughout various structures of the pharynx. Overt " aspiration of nectar thick liquids d/t mistiming of swallow onset, which appeared to eject via spontaneous cough though again could not adequately visualize laryngeal/pharyngeal structures to inform full ejection of aspirate. Do not suspect aspiration with thin liquids, honey thick liquids and puree given large volume of trials.               Inpatient Education:  Pt educated on result and recommendations. Pt indicated understanding.       Goals:   Pt will verbalize/demonstrate comprehension of dysphagia education, strategies, recommendations and POC.  Pt will complete x30 trials of effortful swallows with cues as needed for accurate completion.   Pt will complete therapeutic trials of puree x4oz and nectar (mildly) thick liquids x4oz with no over difficulty and without signs of significant fatigue.

## 2023-11-27 NOTE — PROGRESS NOTES
Occupational Therapy    Occupational Therapy    OT Treatment    Patient Name: Molina Godinez  MRN: [id number]  Today's Date: 11/27/2023  Time Calculation  Start Time: 1453  Stop Time: 1534  Time Calculation (min): 41 min         Assessment:  OT Assessment: Patient receptive to education provided regarding alcohol/drug use and UE HEP. Pt. would benefit from continued OT services.  Prognosis: Good  Barriers to Discharge: Decreased caregiver support  Evaluation/Treatment Tolerance: Patient limited by fatigue  Medical Staff Made Aware: Yes  End of Session Communication: Bedside nurse  End of Session Patient Position: Bed, 3 rail up  OT Assessment Results: Decreased ADL status, Decreased endurance, Decreased upper extremity range of motion, Decreased upper extremity strength, Decreased cognition  Prognosis: Good  Barriers to Discharge: Decreased caregiver support  Evaluation/Treatment Tolerance: Patient limited by fatigue  Medical Staff Made Aware: Yes    Plan:  Treatment Interventions: ADL retraining, Functional transfer training, UE strengthening/ROM, Endurance training, Cognitive reorientation, Patient/family training, Neuromuscular reeducation, Fine motor coordination activities, Compensatory technique education  OT Frequency: 3 times per week  OT Discharge Recommendations: Moderate intensity level of continued care  OT Recommended Transfer Status:  (unsafe for OOB transfer at this time)  OT - Requires Next F/U Appointment:  (.)  OT - OK to Discharge: Yes (when medically appropriate)  Treatment Interventions: ADL retraining, Functional transfer training, UE strengthening/ROM, Endurance training, Cognitive reorientation, Patient/family training, Neuromuscular reeducation, Fine motor coordination activities, Compensatory technique education  Subjective     Outcome Measures:[facility] Daily Activity  Putting on and taking off regular lower body clothing: Total  Bathing (including washing, rinsing, drying): A lot  Putting on and  taking off regular upper body clothing: A little  Toileting, which includes using toilet, bedpan or urinal: Total  Taking care of personal grooming such as brushing teeth: A little  Eating Meals: A little  Daily Activity - Total Score: 13  Education Documentation  Body Mechanics, taught by Jessica Hurtado OT at 11/27/2023  4:04 PM.  Learner: Patient  Readiness: Acceptance  Method: Explanation  Response: Verbalizes Understanding, Needs Reinforcement    Home Exercise Program, taught by Jessica Hurtado OT at 11/27/2023  4:04 PM.  Learner: Patient  Readiness: Acceptance  Method: Explanation  Response: Verbalizes Understanding, Needs Reinforcement    ADL Training, taught by Jessica Hurtado OT at 11/27/2023  4:04 PM.  Learner: Patient  Readiness: Acceptance  Method: Explanation  Response: Verbalizes Understanding, Needs Reinforcement    Education Comments  No comments found.       11/27/23 1453   OT Last Visit   OT Received On 11/27/23   General   Family/Caregiver Present No   Prior to Session Communication Bedside nurse   Patient Position Received Bed, 3 rail up;Alarm off, not on at start of session   Preferred Learning Style verbal;visual   General Comment Agreeble to therapy session. Requesting in bed session 2/2 buttock pain and requesting repositioning of wedges for increased comfort. Reporting improvement with repositioning.   Precautions   Medical Precautions Fall precautions   Precautions Comment Contact Plus   Pain Assessment   Pain Assessment 0-10   Pain Score 5 - Moderate pain   Pain Location Buttocks   Pain Descriptors Aching   Pain Frequency Constant/continuous   Pain Interventions Repositioned   Response to Interventions reporting improvement and tolerated OT   Cognition   Overall Cognitive Status   (flat affect)   Cognition Comments Facilitation of discussion regarding drug/alcohol use. Patient reporting that is what brought him to the hospital. Pt. reporting he wanted to quit. Pt. reporting he has  "supportive sister. When asked about prevention plan, pt reporting that \"just thinking about this hospital stay is all\" he needs. Patient expressing gratitude for this discussion.   Feeding   Feeding Comments Pt. demo ability to bring spoon to mouth with set up assist. Providing honey thick drink at end of session as this is patient current diet per SLP note. RN aware.   Bed Mobility   Bed Mobility Yes   Bed Mobility 1   Bed Mobility 1 Rolling left   Level of Assistance 1 Moderate assistance   Bed Mobility Comments 1 cues for body positioning and sequencing. OT placing wedges.   Therapeutic Exercise   Therapeutic Exercise Performed Yes   Therapeutic Exercise Activity 1 Facilitation of UE strengthening exercises. L shoulder limited ROM at baseline. Completing B elbow flex/ext, R shoulder flex/ext and R forward reaching x 10 reps per exercise with use of light weight and rest breaks as needed. Educated pt on benefit of completing as HEP. Pt. verbalized understanding.   Strength   Strength Comments RUE 3+/5; LUE shoulder 3-/5, distally 3+/5   IP OT Assessment   OT Assessment Patient receptive to education provided regarding alcohol/drug use and UE HEP. Pt. would benefit from continued OT services.   Prognosis Good   End of Session Communication Bedside nurse   End of Session Patient Position Bed, 3 rail up   Inpatient Plan   Treatment Interventions ADL retraining;Functional transfer training;UE strengthening/ROM;Endurance training;Cognitive reorientation;Patient/family training;Neuromuscular reeducation;Fine motor coordination activities;Compensatory technique education   OT Frequency 3 times per week   OT Discharge Recommendations Moderate intensity level of continued care           Goals:  Encounter Problems       Encounter Problems (Active)       ADLs       Patient with complete upper body dressing with stand by assist level of assistance donning and doffing all UE clothes with PRN adaptive equipment. (Progressing)  "      Start:  11/20/23    Expected End:  11/28/23            Patient with complete lower body dressing with moderate assist level of assistance donning and doffing all LE clothes  with PRN adaptive equipment. (Progressing)       Start:  11/20/23    Expected End:  11/28/23            Patient will complete toileting including hygiene clothing management/hygiene with moderate assist level of assistance. (Progressing)       Start:  11/20/23    Expected End:  11/28/23               BALANCE       Pt. will sit EOB for at least 8 min with MOD A in prep for EOB ADLs.  (Progressing)       Start:  10/19/23    Expected End:  11/28/23                               TRANSFERS       Patient will perform bed mobility moderate assist level of assistance in order to improve safety and independence with mobility (Progressing)       Start:  11/20/23    Expected End:  11/28/23            Patient will complete functional transfers with least restrictive device with moderate assist level of assistance. (Progressing)       Start:  11/20/23    Expected End:  11/28/23                        Encounter Problems (Resolved)       ADLs       Patient will complete grooming tasks with MOD A with min v/c.  (Met)       Start:  10/19/23    Expected End:  11/28/23    Resolved:  11/27/23            COGNITION/SAFETY       Patient will follow 1 step commands with 75% accuracy and min v/c.  (Met)       Start:  10/19/23    Expected End:  11/28/23    Resolved:  11/27/23            EXERCISE/STRENGTHENING       Patient will demo BUE strength of 3/5 for B elbow, wrist, hand.  (Met)       Start:  10/19/23    Expected End:  11/28/23    Resolved:  11/27/23                Jessica Hurtado OT

## 2023-11-27 NOTE — CONSULTS
"Nutrition Follow Up Assessment:   Nutrition Assessment    Reason for Assessment: Dietitian discretion (enteral nutrition follow up)    Patient is a 68 y.o. male who is hospital day 47 with Acute on chronic hypoxemic respiratory failure, Strep pneumonaie PNA, Stenotrophomonas PNA, COPD on 2 to 3 L baseline O2, Chronic PE, GBS- new dx, hyponatremia;     Pt completed MBSS and FEES studies today and diet advanced to Minced and Moist with Moderately thick liquids. RDN adjusted diet order to accurately provide the diet recommended by SLP.       DHT has been in place > 1 month.     Nutrition History:  Food and Nutrient History: Pt happy that he is able to start eating. Discussed starting supplements with him. He is willing to try Magic Cup.     Anthropometrics:  Height: 170.2 cm (5' 7.01\")   Weight: 53.8 kg (118 lb 9.7 oz)   BMI (Calculated): 18.57  IBW/kg (Dietitian Calculated): 67.3 kg  Percent of IBW: 80 %       Weight History:   Weight         11/18/2023  0400 11/19/2023  0600 11/23/2023  0400 11/25/2023  0600 11/26/2023  0530    Weight: 55.5 kg (122 lb 5.7 oz) 56.8 kg (125 lb 3.5 oz) 53.8 kg (118 lb 9.7 oz) 53.8 kg (118 lb 9.7 oz) 53.8 kg (118 lb 9.7 oz)           Weight fairly stable over last week.     Nutrition Significant Labs:  CBC Trend:   Results from last 7 days   Lab Units 11/26/23  1342 11/25/23  0734 11/24/23  0704 11/23/23  0631   WBC AUTO x10*3/uL 9.1 10.8 9.1 9.6   RBC AUTO x10*6/uL 4.35* 4.24* 4.32* 4.30*   HEMOGLOBIN g/dL 12.3* 12.1* 12.1* 12.2*   HEMATOCRIT % 38.9* 38.6* 38.8* 38.5*   MCV fL 89 91 90 90   PLATELETS AUTO x10*3/uL 286 312 328 353    , BMP Trend:   Results from last 7 days   Lab Units 11/26/23  1342 11/25/23  0734 11/24/23  0704 11/23/23  0631   GLUCOSE mg/dL 107* 114* 108* 108*   CALCIUM mg/dL 9.4 9.3 9.5 9.7   SODIUM mmol/L 131* 131* 129* 129*   POTASSIUM mmol/L 4.6 4.6 4.9 5.1   CO2 mmol/L 28 29 26 26   CHLORIDE mmol/L 92* 92* 93* 91*   BUN mg/dL 28* 28* 26* 24*   CREATININE mg/dL " 0.39* 0.34* 0.41* 0.48*    , BG POCT trend:        Nutrition Specific Medications:      I/O:   Last BM Date: 11/23/23; Stool Appearance: Soft (11/24/23 1000)        Dietary Orders (From admission, onward)       Start     Ordered    11/27/23 1431  Oral nutritional supplements  Until discontinued        Question Answer Comment   Deliver with All meals    Select supplement: Magic Cup        11/27/23 1430    11/27/23 1429  Adult diet Regular; Minced/moist food 5; Moderately thick 3; 1:1 Feeding  Diet effective now        Comments: honey thick liquids and minced-moist solids, with direct feed assist   Question Answer Comment   Diet type Regular    Texture Minced/moist food 5    Fluid consistency Moderately thick 3    Select tray type: 1:1 Feeding        11/27/23 1429                     Estimated Needs:   Total Energy Estimated Needs (kCal): 1700 kCal  Method for Estimating Needs: MSJ= 1236  Total Protein Estimated Needs (g): 80 g  Method for Estimating Needs: 1.2gm/kg IBW   Fluid Needs:  Per team        Nutrition Diagnosis   Nutrition Diagnosis:  Malnutrition Diagnosis  Patient has Malnutrition Diagnosis: Yes  Diagnosis Status: Ongoing  Malnutrition Diagnosis: Severe malnutrition related to chronic disease or condition  As Evidenced by: suspect poor PO intake PTA in light of EtOH abuse as well as noted severe muscle and fat loss on physical exam; he is underweight for height with a BMI of 17.2 and a DBW of 74%            Nutrition Interventions/Recommendations   Nutrition Interventions and Recommendations:        Nutrition Prescription:  Individualized Nutrition Prescription Provided for : Add Magic Cup TID to provide 290 calories and 9 gm pro per serving.        Nutrition Recommendation:    Consider removing DHT and monitor intake for need to resume nutrition support if intake is inadequate.   Fluid needs per Team.  Tube feeding is providing 756ml free water per day with minimal free water flushes.   Magic Cup TID -  RDN to order.   If tube feeding stopped recommend MVI.     Nutrition Education:    Discussed diet advancement, diet texture and need for supplements with pt.        Nutrition Monitoring and Evaluation   Monitoring/Evaluation:   Food/Nutrient Related History Monitoring  Monitoring and Evaluation Plan: Energy intake  Criteria: consume >75% of meals and supplements.    Body Composition/Growth/Weight History  Monitoring and Evaluation Plan: Weight  Criteria: stable weight    Biochemical Data, Medical Tests and Procedures  Monitoring and Evaluation Plan: Electrolyte/renal panel  Criteria: labs WNL      Time Spent/Follow-up Reminder:   Follow Up  Time Spent (min): 60 minutes  Last Date of Nutrition Visit: 11/27/23  Nutrition Follow-Up Needed?: Dietitian to reassess per policy

## 2023-11-28 LAB
ALBUMIN SERPL BCP-MCNC: 3.3 G/DL (ref 3.4–5)
ANION GAP SERPL CALC-SCNC: 16 MMOL/L (ref 10–20)
BUN SERPL-MCNC: 27 MG/DL (ref 6–23)
CALCIUM SERPL-MCNC: 9.7 MG/DL (ref 8.6–10.6)
CHLORIDE SERPL-SCNC: 94 MMOL/L (ref 98–107)
CO2 SERPL-SCNC: 30 MMOL/L (ref 21–32)
CREAT SERPL-MCNC: 0.5 MG/DL (ref 0.5–1.3)
ERYTHROCYTE [DISTWIDTH] IN BLOOD BY AUTOMATED COUNT: 13.7 % (ref 11.5–14.5)
GFR SERPL CREATININE-BSD FRML MDRD: >90 ML/MIN/1.73M*2
GLUCOSE SERPL-MCNC: 93 MG/DL (ref 74–99)
HCT VFR BLD AUTO: 39.4 % (ref 41–52)
HGB BLD-MCNC: 12.1 G/DL (ref 13.5–17.5)
MAGNESIUM SERPL-MCNC: 2.03 MG/DL (ref 1.6–2.4)
MCH RBC QN AUTO: 27.6 PG (ref 26–34)
MCHC RBC AUTO-ENTMCNC: 30.7 G/DL (ref 32–36)
MCV RBC AUTO: 90 FL (ref 80–100)
NRBC BLD-RTO: 0 /100 WBCS (ref 0–0)
PHOSPHATE SERPL-MCNC: 5.6 MG/DL (ref 2.5–4.9)
PLATELET # BLD AUTO: 261 X10*3/UL (ref 150–450)
POTASSIUM SERPL-SCNC: 4.8 MMOL/L (ref 3.5–5.3)
RBC # BLD AUTO: 4.39 X10*6/UL (ref 4.5–5.9)
SODIUM SERPL-SCNC: 135 MMOL/L (ref 136–145)
WBC # BLD AUTO: 11.2 X10*3/UL (ref 4.4–11.3)

## 2023-11-28 PROCEDURE — 2500000001 HC RX 250 WO HCPCS SELF ADMINISTERED DRUGS (ALT 637 FOR MEDICARE OP): Performed by: NURSE PRACTITIONER

## 2023-11-28 PROCEDURE — 2500000001 HC RX 250 WO HCPCS SELF ADMINISTERED DRUGS (ALT 637 FOR MEDICARE OP): Performed by: STUDENT IN AN ORGANIZED HEALTH CARE EDUCATION/TRAINING PROGRAM

## 2023-11-28 PROCEDURE — 2500000005 HC RX 250 GENERAL PHARMACY W/O HCPCS: Performed by: NURSE PRACTITIONER

## 2023-11-28 PROCEDURE — 1100000001 HC PRIVATE ROOM DAILY

## 2023-11-28 PROCEDURE — 2500000004 HC RX 250 GENERAL PHARMACY W/ HCPCS (ALT 636 FOR OP/ED): Performed by: NURSE PRACTITIONER

## 2023-11-28 PROCEDURE — 97530 THERAPEUTIC ACTIVITIES: CPT | Mod: GP

## 2023-11-28 PROCEDURE — 99234 HOSP IP/OBS SM DT SF/LOW 45: CPT | Performed by: STUDENT IN AN ORGANIZED HEALTH CARE EDUCATION/TRAINING PROGRAM

## 2023-11-28 PROCEDURE — 94668 MNPJ CHEST WALL SBSQ: CPT

## 2023-11-28 PROCEDURE — 2500000002 HC RX 250 W HCPCS SELF ADMINISTERED DRUGS (ALT 637 FOR MEDICARE OP, ALT 636 FOR OP/ED): Performed by: NURSE PRACTITIONER

## 2023-11-28 PROCEDURE — 92526 ORAL FUNCTION THERAPY: CPT | Mod: GN

## 2023-11-28 PROCEDURE — 36415 COLL VENOUS BLD VENIPUNCTURE: CPT | Performed by: NURSE PRACTITIONER

## 2023-11-28 PROCEDURE — 83735 ASSAY OF MAGNESIUM: CPT | Performed by: NURSE PRACTITIONER

## 2023-11-28 PROCEDURE — 80069 RENAL FUNCTION PANEL: CPT | Performed by: STUDENT IN AN ORGANIZED HEALTH CARE EDUCATION/TRAINING PROGRAM

## 2023-11-28 PROCEDURE — 85027 COMPLETE CBC AUTOMATED: CPT | Performed by: NURSE PRACTITIONER

## 2023-11-28 PROCEDURE — 97110 THERAPEUTIC EXERCISES: CPT | Mod: GP

## 2023-11-28 RX ORDER — ERGOCALCIFEROL (VITAMIN D2) 200 MCG/ML
8000 DROPS ORAL DAILY
Qty: 30 ML | Refills: 0
Start: 2023-11-29 | End: 2023-12-29

## 2023-11-28 RX ORDER — ACETAMINOPHEN, DIPHENHYDRAMINE HCL, PHENYLEPHRINE HCL 325; 25; 5 MG/1; MG/1; MG/1
10 TABLET ORAL NIGHTLY
Qty: 30 TABLET | Refills: 0
Start: 2023-11-28 | End: 2023-12-28

## 2023-11-28 RX ORDER — MULTIVIT-MIN/IRON FUM/FOLIC AC 7.5 MG-4
1 TABLET ORAL DAILY
Qty: 30 TABLET | Refills: 0
Start: 2023-11-29 | End: 2023-12-29

## 2023-11-28 RX ORDER — DICLOFENAC SODIUM 10 MG/G
4 GEL TOPICAL 3 TIMES DAILY
Qty: 360 G | Refills: 0
Start: 2023-11-28 | End: 2023-12-28

## 2023-11-28 RX ORDER — OXYCODONE HYDROCHLORIDE 5 MG/1
5 TABLET ORAL EVERY 4 HOURS PRN
Qty: 15 TABLET | Refills: 0
Start: 2023-11-28 | End: 2023-11-30 | Stop reason: SDUPTHER

## 2023-11-28 RX ORDER — ALBUTEROL SULFATE 0.83 MG/ML
2.5 SOLUTION RESPIRATORY (INHALATION)
Qty: 180 ML | Refills: 0
Start: 2023-11-28 | End: 2023-12-28

## 2023-11-28 RX ORDER — POLYETHYLENE GLYCOL 3350 17 G/17G
17 POWDER, FOR SOLUTION ORAL DAILY PRN
Qty: 100 G | Refills: 0
Start: 2023-11-28 | End: 2023-12-28

## 2023-11-28 RX ORDER — LANOLIN ALCOHOL/MO/W.PET/CERES
400 CREAM (GRAM) TOPICAL DAILY
Qty: 30 TABLET | Refills: 0
Start: 2023-11-29 | End: 2023-12-29

## 2023-11-28 RX ORDER — SODIUM CHLORIDE FOR INHALATION 7 %
3 VIAL, NEBULIZER (ML) INHALATION
Qty: 240 ML | Refills: 0
Start: 2023-11-28 | End: 2023-12-28

## 2023-11-28 RX ORDER — LOPERAMIDE HCL 1MG/7.5ML
2 LIQUID (ML) ORAL 3 TIMES DAILY PRN
Qty: 150 ML | Refills: 0
Start: 2023-11-28 | End: 2024-04-18

## 2023-11-28 RX ADMIN — DICLOFENAC 1 APPLICATION: 10 GEL TOPICAL at 21:00

## 2023-11-28 RX ADMIN — LIDOCAINE 4%: 4 CREAM TOPICAL at 07:00

## 2023-11-28 RX ADMIN — APIXABAN 5 MG: 5 TABLET, FILM COATED ORAL at 09:51

## 2023-11-28 RX ADMIN — Medication 1 TABLET: at 09:51

## 2023-11-28 RX ADMIN — OXYCODONE HYDROCHLORIDE 5 MG: 5 TABLET ORAL at 04:17

## 2023-11-28 RX ADMIN — ESOMEPRAZOLE MAGNESIUM 40 MG: 40 FOR SUSPENSION ORAL at 07:00

## 2023-11-28 RX ADMIN — ALBUTEROL SULFATE 2.5 MG: 2.5 SOLUTION RESPIRATORY (INHALATION) at 09:36

## 2023-11-28 RX ADMIN — Medication 3 ML: at 09:37

## 2023-11-28 RX ADMIN — APIXABAN 5 MG: 5 TABLET, FILM COATED ORAL at 20:37

## 2023-11-28 RX ADMIN — Medication 8000 UNITS: at 09:52

## 2023-11-28 RX ADMIN — LIDOCAINE 4%: 4 CREAM TOPICAL at 22:00

## 2023-11-28 RX ADMIN — Medication 10 MG: at 20:37

## 2023-11-28 RX ADMIN — ATORVASTATIN CALCIUM 40 MG: 40 TABLET, FILM COATED ORAL at 20:37

## 2023-11-28 RX ADMIN — OXYCODONE HYDROCHLORIDE 5 MG: 5 TABLET ORAL at 19:07

## 2023-11-28 RX ADMIN — OXYCODONE HYDROCHLORIDE 5 MG: 5 TABLET ORAL at 00:19

## 2023-11-28 RX ADMIN — MAGNESIUM OXIDE TAB 400 MG (241.3 MG ELEMENTAL MG) 400 MG: 400 (241.3 MG) TAB at 09:51

## 2023-11-28 RX ADMIN — OXYCODONE HYDROCHLORIDE 5 MG: 5 TABLET ORAL at 23:33

## 2023-11-28 RX ADMIN — OXYCODONE HYDROCHLORIDE 5 MG: 5 TABLET ORAL at 14:21

## 2023-11-28 RX ADMIN — DICLOFENAC 1 APPLICATION: 10 GEL TOPICAL at 14:53

## 2023-11-28 RX ADMIN — DICLOFENAC 1 APPLICATION: 10 GEL TOPICAL at 09:52

## 2023-11-28 RX ADMIN — OXYCODONE HYDROCHLORIDE 5 MG: 5 TABLET ORAL at 09:51

## 2023-11-28 ASSESSMENT — PAIN SCALES - GENERAL
PAINLEVEL_OUTOF10: 7
PAINLEVEL_OUTOF10: 7
PAINLEVEL_OUTOF10: 5 - MODERATE PAIN
PAINLEVEL_OUTOF10: 10 - WORST POSSIBLE PAIN
PAINLEVEL_OUTOF10: 5 - MODERATE PAIN
PAINLEVEL_OUTOF10: 8
PAINLEVEL_OUTOF10: 10 - WORST POSSIBLE PAIN
PAINLEVEL_OUTOF10: 2
PAINLEVEL_OUTOF10: 3

## 2023-11-28 ASSESSMENT — COGNITIVE AND FUNCTIONAL STATUS - GENERAL
STANDING UP FROM CHAIR USING ARMS: A LOT
CLIMB 3 TO 5 STEPS WITH RAILING: TOTAL
TURNING FROM BACK TO SIDE WHILE IN FLAT BAD: A LOT
MOVING TO AND FROM BED TO CHAIR: A LOT
CLIMB 3 TO 5 STEPS WITH RAILING: TOTAL
MOVING TO AND FROM BED TO CHAIR: A LOT
WALKING IN HOSPITAL ROOM: TOTAL
MOVING FROM LYING ON BACK TO SITTING ON SIDE OF FLAT BED WITH BEDRAILS: A LOT
STANDING UP FROM CHAIR USING ARMS: A LOT
MOBILITY SCORE: 10
MOBILITY SCORE: 10
MOVING FROM LYING ON BACK TO SITTING ON SIDE OF FLAT BED WITH BEDRAILS: A LOT
DAILY ACTIVITIY SCORE: 24
TURNING FROM BACK TO SIDE WHILE IN FLAT BAD: A LOT
WALKING IN HOSPITAL ROOM: TOTAL

## 2023-11-28 ASSESSMENT — PAIN DESCRIPTION - LOCATION
LOCATION: LEG

## 2023-11-28 ASSESSMENT — PAIN - FUNCTIONAL ASSESSMENT
PAIN_FUNCTIONAL_ASSESSMENT: 0-10

## 2023-11-28 ASSESSMENT — PAIN DESCRIPTION - DESCRIPTORS: DESCRIPTORS: ACHING

## 2023-11-28 ASSESSMENT — PAIN DESCRIPTION - ORIENTATION
ORIENTATION: RIGHT;LEFT;LOWER

## 2023-11-28 NOTE — PROGRESS NOTES
Assessment/Plan    Mr. Godinez 68 y.o. male with PMHx COPD, HTN,  alcohol and opioid use disorder, hepatitis C, chronic PE admitted 10/12 with BLE weakness, recently admitted in the MICU from 10/13-10/16 for suspected alcohol withdrawal refractory to ativan, started on phenobarb taper, treated for streptococcus pneumoniae PNA with Ceftriaxone, completed antifungals for fungemia and transferred to [facility].  RRT called for lethargy, transferred to MICU on 10/17 with new FiO2 requirements.  In SDU continued to make good progress, weaning O2 to baseline, continuing aggressive pulm hygiene and PT/OT/SLP.   He was recommended moderate intensity therapy and transfer to the floor pending placement for postacute care.  At this time his respiratory status has been stable, still requires O2 supply.  Medically clear for SNF. Completed antibiotics and antifungals, there was some concern for vegetation, cardiac surgery said not a candidate, repeat echo showed no vegetation but he should have follow-up with cardiology to evaluate.  He should follow-up with ID as well given his multiple infections.  He needs neurology follow-up for the Guillain-Barré.  He needs pulmonary follow-up for the respiratory failure.  He will need PT OT and speech and nutrition at the facility.  I have counseled him extensively on therapy and getting out of bed, had more talks with the sister present.  Hopefully will stay motivated but we need to continue to to talk to nursing to encourage him to get out of bed.  Will also need GI follow-up for the hepatitis C.      #Hyponatremia, possible SIADH, pain induced - resolved  -Continue Daily RFP Suspecting SIADH per record.   Water restriction.   -Nephro consulted      #Acute on chronic hypoxemic respiratory failure, on 2 L NC  #Strep pneumonaie PNA, Stenotrophomonas PNA  #COPD on 2 to 3 L baseline O2  #Chronic PE  - Patient had bronc with mucous plugging left lung consolidation on 11/16. Status post antibiotics and  completed per ID, weaned to 2 L nasal cannula at rest.   -Cultures grew multidrug resistant PSA, Stenotrophmonas and 4+ Candida, AFB pending, ID suspects colonization and no further antibiotic recommended.  Will need to follow-up AFB stains.  -Continue aggressive bronchial hygiene, Albuterol BID and 7% saline BID.  Airway clearance techniques (IPV) Q4 hours.  Acapella, IS while awake.  Informed patient that he should take his devices and continue after discharge.  -At this time respiratory status is stabilized.    -Continue Eliquis  -Patient is being worked up for pulmonary artery hypertension, will need follow-up with pulmonary OP.   -Respiratory status has been stable.  He is requiring 1 to 2 L at rest, likely what is going to go on.  He may not need any oxygen is borderline.  Cannot determine activity requirement.  Will make sure he leaves with his Acapella and incentive spirometer and brings it to the facility      #Dysphagia  #Vocal cord dysfunction  -Nasopharyngolaryngoscopy 11/27: mild bowing of cord, likely secondary to intubation or an element of chronicity. Otherwise, exam within normal limits. No further ENT interventions inpatient, ENT will consider rescope if in house later this week with laryngology, ENT will schedule outpatient laryngology f/up  -SLP FEES completed 11/27, recommended honey thick liquids and minced-moist solids, with direct feed assist with a plan to  advance diet in 1-2 weeks pending further therapy.         # GBS  # Generalized weakness and deconditioning  # Dysphagia  # Severe protein calorie malnutrition  -Currently at baseline mentation.  His extremity weakness is improving.  EMG confirmed GBS on 11/10.  He is primarily having issues with pain now.  This prolonged hospitalization as well which is likely also contributing to his weakness.  Neurology was consulted and no plans for further testing or treatment due to fungemia, as immunomodulators could potentially worsen infection.   Patient to see neurology after infection cleared.  -For pain change Acetaminophen to scheduled, Oxycodone 5 mg changed to every 4 as needed.  DC lidocaine patch.  Starting diclofenac gel and lidocaine cream 3 times daily alternating to legs and feet   -Continue PPI  -Continue PT OT and SLP/nutrition.          #Alcohol use disorder  #Opiate use disorder  -Status post management of refractory withdrawal in the ICU.  Resolved.  -Social work for resources.  Counseling education provided today.     #Hyperlipidemia  -resuming statin     #Dry eye, Cataracts, asha eyes  - seen by Opthomology given c/o blurriness with new fungemia.  No signs of fungal endophthalmitis  - treated with artifical tears and erythromycin oint.  Now resolved     #TV Endocarditis  # Fungemia  - Completed micafungin per ID on 11/17.    - 10/31 Echo: LVEF 75-80%, RV overload, Mod-severe TV, Mod-severe RV systolic pressure, Tricuspid echodensity measuring 1.7 x 0.8 cm, atrial septal aneurysm  - Seen by CT surgery for TV vegetation- too high risk for surgery at this time.  Follow up when stable  - Repeat Echo on 11/2/23: with no definite valvular vegetations     #SVT resolved.   -Evaluating need for continued telemetry.      #Hep C  -Will need to review viral studies and likely refer to hepatology outpatient       #Acute renal failure -resolved  -Required CVVH and MICU for acute renal failure, renal failure now resolved creatinine down to baseline.  He is still hypotensive and requiring midodrine.  He is voiding by an external cath.  Continue to monitor I's and O's and avoid nephrotoxins.  Nephrology has signed off.  -Continue Vit D supp, 8000 units for 8 weeks           Code Status: Full Code  NOK: Sister Sarah Godinez [phone]      Dispo: [facility]             Scheduled outpatient appointments in system:   No future appointments.  ---------------------------------------------------------------------------------------------------  Subjective   No acute  "events overnight.   Hyponatremia resolved with pain control ad fluid restriction      ---------------------------------------------------------------------------------------------------  Objective   Last Recorded Vitals  Blood pressure (!) 156/98, pulse 101, temperature 37 °C (98.6 °F), resp. rate 16, height 1.702 m (5' 7.01\"), weight 53.8 kg (118 lb 9.7 oz), SpO2 95 %.  Intake/Output last 3 Shifts:  I/O last 3 completed shifts:  In: - (0 mL/kg)   Out: 900 (16.7 mL/kg) [Urine:900 (0.5 mL/kg/hr)]  Weight: 53.8 kg     Physical Exam  Vitals and nursing note reviewed.   Constitutional:       General: He is not in acute distress.     Appearance: He is ill-appearing. He is not toxic-appearing.      Comments: Cachectic   HENT:      Head: Normocephalic and atraumatic.      Mouth/Throat:      Mouth: Mucous membranes are moist.   Eyes:      General: No scleral icterus.     Extraocular Movements: Extraocular movements intact.      Conjunctiva/sclera: Conjunctivae normal.   Cardiovascular:      Rate and Rhythm: Regular rhythm. Tachycardia present.      Heart sounds: S1 normal and S2 normal. No murmur heard.  Pulmonary:      Effort: Pulmonary effort is normal. No respiratory distress.      Breath sounds: No wheezing, rhonchi or rales.      Comments: Diminished throughout, on nasal cannula  Abdominal:      General: Bowel sounds are normal. There is no distension.      Palpations: Abdomen is soft.      Tenderness: There is no abdominal tenderness. There is no guarding or rebound.   Musculoskeletal:         General: No swelling or deformity.      Cervical back: Neck supple.   Skin:     General: Skin is warm and dry.      Findings: No rash.   Neurological:      Mental Status: He is alert and oriented to person, place, and time.      Comments: Generalized weakness   Psychiatric:         Mood and Affect: Mood normal.         Relevant Results  Lab Results   Component Value Date    WBC 11.2 11/28/2023    HGB 12.1 (L) 11/28/2023    HCT " 39.4 (L) 11/28/2023    MCV 90 11/28/2023     11/28/2023      Lab Results   Component Value Date    GLUCOSE 93 11/28/2023    CALCIUM 9.7 11/28/2023     (L) 11/28/2023    K 4.8 11/28/2023    CO2 30 11/28/2023    CL 94 (L) 11/28/2023    BUN 27 (H) 11/28/2023    CREATININE 0.50 11/28/2023     Scheduled medications  albuterol, 2.5 mg, nebulization, q12h  apixaban, 5 mg, nasogastric tube, BID  atorvastatin, 40 mg, oral, Nightly  diclofenac sodium, 4 g, Topical, TID  ergocalciferol, 8,000 Units, oral, Daily  esomeprazole, 40 mg, nasogastric tube, Daily before breakfast  lidocaine, , Topical, TID  [Held by provider] lidocaine, 2 patch, transdermal, Daily  magnesium oxide, 400 mg, oral, Daily  melatonin, 10 mg, nasogastric tube, Nightly  multivitamin with minerals, 1 tablet, oral, Daily  sodium chloride, 3 mL, nebulization, q12h      Continuous medications     PRN medications  PRN medications: acetaminophen, dextrose 10 % in water (D10W), dextrose, ipratropium-albuteroL, loperamide, midodrine, oxyCODONE, oxygen    Julien Ennis MD

## 2023-11-28 NOTE — PROGRESS NOTES
11/28/23 PCN note:   Per [facility] pt is now medically ready for dc, updated therapy notes sent and 7000 completed in [facility] , [facility] was asked to initiate precert.     Anahi Noel

## 2023-11-28 NOTE — PROGRESS NOTES
Physical Therapy    Physical Therapy Treatment    Patient Name: Molina Godinez  MRN: [id number]  Today's Date: 11/28/2023  Time Calculation  Start Time: 1020  Stop Time: 1100  Time Calculation (min): 40 min       Assessment/Plan   PT Assessment  PT Assessment Results: Decreased strength, Decreased range of motion, Decreased endurance, Impaired balance, Decreased mobility, Pain  Rehab Prognosis: Good  Barriers to Discharge: none  Evaluation/Treatment Tolerance: Patient limited by fatigue, Patient limited by pain  Medical Staff Made Aware: Yes  Strengths: Attitude of self  Barriers to Participation:  (none)  End of Session Communication: Bedside nurse  Assessment Comment: The pt presented significant generalized weakness.  End of Session Patient Position: Up in chair, Alarm off, not on at start of session  PT Plan  Inpatient/Swing Bed or Outpatient: Inpatient  PT Plan  Treatment/Interventions: Bed mobility, Transfer training, Gait training, Balance training, Strengthening, Endurance training, Range of motion, Therapeutic exercise, Therapeutic activity, Home exercise program  PT Plan: Skilled PT  PT Frequency: 4 times per week  PT Discharge Recommendations: Moderate intensity level of continued care  Equipment Recommended upon Discharge:  (none)  PT Recommended Transfer Status: Assist x1  PT - OK to Discharge: Yes      General Visit Information:   PT  Visit  PT Received On: 11/28/23  Response to Previous Treatment: Patient reporting fatigue but able to participate.  General  Prior to Session Communication: Bedside nurse  Patient Position Received: Bed, 3 rail up, Alarm off, not on at start of session  Preferred Learning Style: verbal, visual  General Comment: The pt was pleasant, cooperative and willing to participate in therapy.    Subjective   Precautions:  Precautions  Medical Precautions: Fall precautions     Objective   Pain:  Pain Assessment  Pain Assessment: 0-10  Pain Score: 10 - Worst possible pain  Pain Type:  Acute pain  Pain Location: Buttocks  Pain Orientation: Upper  Multiple Pain Sites: Two  Pain 2  Pain Score 2: 10 - Worst possible pain  Pain Type 2: Chronic pain  Pain Location 2: Ankle  Pain Orientation 2: Right, Left  Cognition:  Cognition  Overall Cognitive Status: Within Functional Limits  Postural Control:  Postural Control  Postural Control: Within Functional Limits  Extremity/Trunk Assessments:        RUE   RUE : Exceptions to WFL  RUE AROM (degrees)  RUE AROM Comment: WFL  RUE Strength  R Shoulder Flexion: 4-/5  R Elbow Flexion: 4/5  R Elbow Extension: 4/5  R Wrist Flexion: 5/5  R Wrist Extension: 5/5  LUE   LUE: Exceptions to WFL  LUE AROM (degrees)  LUE AROM Comment: WFL  LUE Strength  L Shoulder Flexion: 4-/5  L Elbow Flexion: 4/5  L Elbow Extension: 4/5  L Wrist Flexion: 5/5  L Wrist Extension: 5/5  RLE   RLE : Exceptions to WFL  AROM RLE (degrees)  RLE AROM Comment: decreased hip flexion  Strength RLE  R Hip Flexion: 3+/5  R Knee Flexion: 4/5  R Knee Extension: 4/5  R Ankle Dorsiflexion: 4/5  R Ankle Plantar Flexion: 4/5  LLE   LLE : Exceptions to WFL  AROM LLE (degrees)  LLE AROM Comment: decreased hip flexion  Strength LLE  L Hip Flexion: 3+/5  L Knee Flexion: 4/5  L Knee Extension: 4/5  L Ankle Dorsiflexion: 4/5  L Ankle Plantar Flexion: 4/5  Activity Tolerance:  Activity Tolerance  Endurance: Decreased tolerance for upright activites  Treatments:  Therapeutic Exercise  Therapeutic Exercise Performed: Yes  Therapeutic Exercise Activity 1: ankle pumps x 15  Therapeutic Exercise Activity 2: heel slides x 15  Therapeutic Exercise Activity 3: SAQ x 15  Therapeutic Exercise Activity 4: SLR x 15  Therapeutic Exercise Activity 5: Hip ABD x 15    Balance/Neuromuscular Re-Education  Balance/Neuromuscular Re-Education Activity Performed: Yes  Balance/Neuromuscular Re-Education Activity 1: CGA static sitting balance using Joseph UE and LE support.  Balance/Neuromuscular Re-Education Activity 2: CGA dynamic sitting  using Joseph UE and LE support.  Balance/Neuromuscular Re-Education Activity 3: max assist static standing balance using face-to-face HHA  Balance/Neuromuscular Re-Education Activity 4: max assist dynamic standing balance using face-to-face HHA    Bed Mobility  Bed Mobility: Yes  Bed Mobility 1  Bed Mobility 1: Supine to sitting  Level of Assistance 1: Moderate assistance  Bed Mobility Comments 1: HOB elevated    Transfers  Transfer: Yes  Transfer 1  Transfer From 1: Sit to  Transfer to 1: Stand  Transfer Device 1:  (none)  Transfer Level of Assistance 1: Maximum assistance  Trials/Comments 1: face-to-face HHA  Transfers 2  Transfer From 2: Stand to  Transfer to 2: Sit  Transfer Device 2:  (none)  Transfer Level of Assistance 2: Maximum assistance  Trials/Comments 2: face-to-face HHA  Transfers 3  Transfer From 3: Bed to  Transfer to 3: Chair with arms  Transfer Device 3:  (none)  Transfer Level of Assistance 3: Maximum assistance  Trials/Comments 3: face-to-face HHA    Outcome Measures:  [facility] Basic Mobility  Turning from your back to your side while in a flat bed without using bedrails: A lot  Moving from lying on your back to sitting on the side of a flat bed without using bedrails: A lot  Moving to and from bed to chair (including a wheelchair): A lot  Standing up from a chair using your arms (e.g. wheelchair or bedside chair): A lot  To walk in hospital room: Total  Climbing 3-5 steps with railing: Total  Basic Mobility - Total Score: 10    Education Documentation  Mobility Training, taught by Eulalio James PT at 11/28/2023 11:26 AM.  Learner: Patient  Readiness: Acceptance  Method: Explanation, Demonstration  Response: Verbalizes Understanding, Demonstrated Understanding    Education Comments  No comments found.        OP EDUCATION:  Outpatient Education  Individual(s) Educated: Patient  Education Provided: Body Mechanics, Fall Risk, Home Exercise Program  Patient Response to Education: Patient/Caregiver  Verbalized Understanding of Information, Patient/Caregiver Performed Return Demonstration of Exercises/Activities    Encounter Problems       Encounter Problems (Active)       Balance       Patient will complete static (Cgx1) and dynamic (MINx1) using BUE support as needed in order to maintain midline without acute LOB   (Not met)       Start:  10/19/23    Expected End:  23    Resolved:  23    Updated to: Patient will complete static (Cgx1) and dynamic (MINx1) sitting balance activities using BUE support as needed in order to maintain midline without acute LOB    Update reason: goals           Patient will complete static (Cgx1) and dynamic (MINx1) sitting balance activities using BUE support as needed in order to maintain midline without acute LOB (Met)       Start:  23    Expected End:  23    Resolved:  23    Updated to: Patient will complete SBA static and dynamic sitting balance activities using BUE support as needed in order to maintain midline without acute LOB    Update reason: Pt demonstrated functional progression.             Patient will complete SBA static and dynamic sitting balance activities using BUE support as needed in order to maintain midline without acute LOB (Progressing)       Start:  23    Expected End:  23                   Mobility       STG - Patient will ambulate >/=10 ft using LRD as needed with MODx1 without acute LOB  (Not met)       Start:  10/19/23    Expected End:  23    Resolved:  23    Updated to: STG - Patient will ambulate >/=3 ft using LRD as needed with MODx1 without acute LOB    Update reason: goals           patient will complete BLE there-ex in order to improve strength and to assist with the completion of functional mobility tasks.  (Not met)       Start:  10/19/23    Expected End:  23    Resolved:  23    Updated to: patient will complete LE there-ex in order to improve strength and to assist  with the completion of functional mobility tasks.    Update reason: goals           STG - Patient will ambulate >/=3 ft using LRD as needed with MODx1 without acute LOB (Progressing)       Start:  23    Expected End:  23                patient will complete LE there-ex in order to improve strength and to assist with the completion of functional mobility tasks. (Progressing)       Start:  23    Expected End:  23                   Pain - Adult          Safety       LTG - Patient will adhere to hip precautions during ADL's and transfers (Progressing)       Start:  10/31/23    Expected End:  23            LTG - Patient will demonstrate safety requirements appropriate to situation/environment (Progressing)       Start:  10/31/23    Expected End:  23            LTG - Patient will utilize safety techniques (Progressing)       Start:  10/31/23    Expected End:  23            STG - Patient locks brakes on wheelchair (Progressing)       Start:  10/31/23    Expected End:  23            STG - Patient uses call light consistently to request assistance with transfers (Progressing)       Start:  10/31/23    Expected End:  23            STG - Patient uses gait belt during all transfers (Progressing)       Start:  10/31/23    Expected End:  23            Goal 1 (Progressing)       Start:  10/31/23    Expected End:  23            Goal 2 (Progressing)       Start:  10/31/23    Expected End:  23            Goal 3 (Progressing)       Start:  10/31/23    Expected End:  23               Transfers       STG - Transfer from bed to chair with MODx1 without acute LOB  (Not met)       Start:  10/19/23    Expected End:  23    Resolved:  23    Updated to: STG - Transfer from bed to chair with MODx1 without acute LOB using LRD as needed    Update reason: goals           STG - Patient will perform bed mobility with MINx1 assist  (Not met)        Start:  10/19/23    Expected End:  23    Resolved:  23    Updated to: STG - Patient will perform bed mobility with MODx1 assist using bedrailing as needed with HOB elevated    Update reason: goals           STG - Patient will transfer sit to and from stand with MODx1 using LRD as needed without acute LOB  (Not met)       Start:  10/19/23    Expected End:  23    Resolved:  23    Updated to: STG - Patient will transfer sit to and from stand with MODx1 using LRD as needed without acute LOB    Update reason: goals           STG - Transfer from bed to chair with MODx1 without acute LOB using LRD as needed (Progressing)       Start:  23    Expected End:  23                STG - Patient will perform bed mobility with MODx1 assist using bedrailing as needed with HOB elevated (Met)       Start:  23    Expected End:  23    Resolved:  23    Updated to: STG - Patient will perform bed mobility with Minx1 assist using bedrailing as needed with HOB elevated    Update reason: Pt demonstrated functional progression.             STG - Patient will transfer sit to and from stand with MODx1 using LRD as needed without acute LOB (Progressing)       Start:  23    Expected End:  23                STG - Patient will perform bed mobility with Minx1 assist using bedrailing as needed with HOB elevated (Progressing)       Start:  23    Expected End:  23

## 2023-11-28 NOTE — CARE PLAN
Problem: Pain - Adult  Goal: Verbalizes/displays adequate comfort level or baseline comfort level  11/27/2023 2224 by Hilario Mercado RN  Outcome: Progressing  11/27/2023 2224 by Hilario Mercado RN  Outcome: Progressing   The patient's goals for the shift include PATITO    The clinical goals for the shift include pain managed and safety maintained    Over the shift, the patient did not make progress toward the following goals. Barriers to progression include n. Recommendations to address these barriers include n.

## 2023-11-28 NOTE — CONSULTS
ENT DEPARTMENT CONSULTATION NOTE  Name: Molina Godinez  MRN: [id number]  : 1955  Consulting Team: Dr. Ennis  Reason for Consult: vocal cord dysfunction    History of Present Illness  The patient is a 68 y.o. male who presented to [facility] on 10/11/2023 for PMHx of COPD, HTN,  alcohol and opioid use disorder, hepatitis C, chronic PE admitted 10/12 with BLE weakness and suspected alcohol withdrawal refractory to ativan. Patient intubated on 10/20 for worsening respiratory status, and was extubated on 10/27. SLP FEES completed , recommended honey thick liquids and minced-moist solids, with direct feed assist with a plan to advance diet in 1-2 weeks pending further therapy.     ENT was consulted for vocal cord dysfunction and an abnormality in vallecula, concerning for a fistula. Patient states he has had hoarseness since childhood, but does not remember if it was worked up.     Review of Systems  14 point review of systems completed and all negative except as noted in HPI.    Past Medical History  History reviewed. No pertinent past medical history.    Past Surgical History  Past Surgical History:   Procedure Laterality Date    HC SLP ENDOSCOPIC EVAL,SWALLOW,CINE/VIDEO  2023       Allergies  Allergies   Allergen Reactions    Aspirin Unknown     States he gets excited    Nsaids (Non-Steroidal Anti-Inflammatory Drug) Unknown     Elevates BP       Medications    Current Facility-Administered Medications:     acetaminophen (Tylenol) tablet 650 mg, 650 mg, oral, q8h PRN, WOO Paz-CNP, 650 mg at 23 1213    albuterol 2.5 mg /3 mL (0.083 %) nebulizer solution 2.5 mg, 2.5 mg, nebulization, q12h, DOLLY Paz, 2.5 mg at 23 1032    apixaban (Eliquis) tablet 5 mg, 5 mg, nasogastric tube, BID, DOLLY Hernandez, 5 mg at 23 0939    atorvastatin (Lipitor) tablet 40 mg, 40 mg, oral, Nightly, Julien Ennis MD    dextrose 10 % in water (D10W) infusion, 0.3  g/kg/hr, intravenous, Once PRN, DOLLY Paz, Stopped at 10/18/23 0800    dextrose 50 % injection 25 g, 25 g, intravenous, q15 min PRN, DOLLY Paz    diclofenac sodium (Voltaren) 1 % gel 1 Application, 4 g, Topical, TID, Gwyn Hernandez MD, 1 Application at 11/27/23 1500    ergocalciferol (Vitamin D-2) drops 8,000 Units, 8,000 Units, oral, Daily, DOLLY Paz, 8,000 Units at 11/27/23 0939    esomeprazole (NexIUM) suspension 40 mg, 40 mg, nasogastric tube, Daily before breakfast, DOLLY Hernandez, 40 mg at 11/27/23 0700    ipratropium-albuteroL (Duo-Neb) 0.5-2.5 mg/3 mL nebulizer solution 3 mL, 3 mL, nebulization, q4h PRN, DOLLY Noyola, 3 mL at 11/25/23 2209    lidocaine (LMX) 4 % cream, , Topical, TID, Gwyn Hernandez MD, Given at 11/27/23 0700    [Held by provider] lidocaine 4 % patch 2 patch, 2 patch, transdermal, Daily, DOLLY Paz, 2 patch at 11/24/23 0928    loperamide (Imodium A-D) liquid 2 mg, 2 mg, oral, TID PRN, DOLLY Lee    magnesium oxide (Mag-Ox) tablet 400 mg, 400 mg, oral, Daily, DOLLY Hernandez, 400 mg at 11/27/23 0939    melatonin tablet 10 mg, 10 mg, nasogastric tube, Nightly, DOLLY Noyola, 10 mg at 11/26/23 2004    midodrine (Proamatine) tablet 2.5 mg, 2.5 mg, oral, TID PRN, Gwyn Hernandez MD    multivitamin with minerals 1 tablet, 1 tablet, oral, Daily, DOLLY Paz, 1 tablet at 11/27/23 0939    oxyCODONE (Roxicodone) immediate release tablet 5 mg, 5 mg, oral, q4h PRN, Gwyn Hernandez MD, 5 mg at 11/27/23 1905    oxygen (O2) therapy, , inhalation, Continuous PRN - O2/gases, Paris Pace, WOO-CNP, Rate Verify at 11/20/23 1122    sodium chloride 7 % nebulizer solution 3 mL, 3 mL, nebulization, q12h, DOLLY Paz, 3 mL at 11/27/23 1031    Family History  No family history on file.    Social History  Social  History     Socioeconomic History    Marital status: Single     Spouse name: Not on file    Number of children: Not on file    Years of education: Not on file    Highest education level: Not on file   Occupational History    Not on file   Tobacco Use    Smoking status: Every Day     Packs/day: .5     Types: Cigarettes    Smokeless tobacco: Not on file   Substance and Sexual Activity    Alcohol use: Yes     Comment: 1.5 pints Ju daily    Drug use: Not on file    Sexual activity: Not on file   Other Topics Concern    Not on file   Social History Narrative    Not on file     Social Determinants of Health     Financial Resource Strain: Low Risk  (10/16/2023)    Overall Financial Resource Strain (CARDIA)     Difficulty of Paying Living Expenses: Not hard at all   Food Insecurity: No Food Insecurity (10/16/2023)    Hunger Vital Sign     Worried About Running Out of Food in the Last Year: Never true     Ran Out of Food in the Last Year: Never true   Transportation Needs: No Transportation Needs (10/16/2023)    PRAPARE - Transportation     Lack of Transportation (Medical): No     Lack of Transportation (Non-Medical): No   Physical Activity: Not on file   Stress: Not on file   Social Connections: Not on file   Intimate Partner Violence: Not At Risk (10/16/2023)    Humiliation, Afraid, Rape, and Kick questionnaire     Fear of Current or Ex-Partner: No     Emotionally Abused: No     Physically Abused: No     Sexually Abused: No   Housing Stability: Low Risk  (10/16/2023)    Housing Stability Vital Sign     Unable to Pay for Housing in the Last Year: No     Number of Places Lived in the Last Year: 1     Unstable Housing in the Last Year: No       Vital Signs  Vitals:    11/27/23 1627   BP: 108/69   Pulse: 96   Resp: 18   Temp: 36.6 °C (97.9 °F)   SpO2: 94%       Physical Examination  GEN: The patient appears stated age in no acute distress  VOICE: moderate dysphonia  RESP: Unlabored on room air with no audible stertor or  stridor  CV: Clinically well perfused   EYES: EOM grossly intact with no scleral icterus  NEURO: Alert and oriented with no focal deficits and CN II-XII symmetric and intact bilaterally  HEAD: Scalp is normocephalic and atraumatic  FACE: No abrasions or lacerations, no maxillary or mandibular stepoffs  SAL: No parotid or submandibular masses or tenderness to palpation and clear saliva expressed from ducts  EARS: Normal external ears, normal hearing to spoken voice  NOSE: External nose appears normal, no significant septal deviation, anterior rhinoscopy limited with no active bleeding or lesions  OC: Normal lips, normal buccal mucosa, normal alveolar ridge, normal floor of mouth, normal tongue, normal hard palate, normal retromolar trigone  OP: normal pharyngeal walls, normal soft palate  NECK: Trachea midline, no significant lymphadenopathy    Laboratory and Data  No results found for this or any previous visit (from the past 24 hour(s)).        PROCEDURE NOTE:  Nasopharyngolaryngoscopy    For better visualization, a flexible fiberoptic nasopharyngolaryngoscopy was performed. Patient was correctly identified and verbal consent obtained.  After topical anesthesia with atomized 4% Lidocaine with Afrin, the flexible scope was introduced into the R naris.    The following areas were visualized:  Nasal septum, turbinates, nasopharynx, oropharynx, hypopharynx, soft palate, base of tongue, epiglottis, and larynx.    These structures were found to be normal with the following notable findings:     - Mild left cord bowing with incomplete contact in central portion  - No other masses or lesions   - Full bilateral vocal cord mobility    Assessment  Molina Godinez is a 68 y.o. male who presented to [facility] on 10/11/2023 for PMHx of COPD, HTN,  alcohol and opioid use disorder, hepatitis C, chronic PE admitted 10/12 with BLE weakness secondary to suspected alcohol use disorder. ENT consulted for vocal cord dysfunction with concern  for fistula. Scope exam showing mild bowing of cord, likely secondary to intubation or an element of chronicity. Otherwise, exam within normal limits.    Recommendations  - No further ENT interventions inpatient, will consider rescope if in house later this week with laryngology  - Please call back with any questions or concerns  - ENT will schedule outpatient laryngology f/up    Seen and discussed with Dr. Garcia who agrees with assessment and plan.     Marv Valdes, PGY2  Otolaryngology - Head & Neck Surgery  ENT Consult pager: [phone]  Peds pager: [phone]  Adult Head & Neck Phone: [phone]  ENT subspecialty team: [place] individual resident who wrote today's note  Please page if urgent    I am the day consult resident. I can only be contacted from 6am to 5pm M-F. Page on weekends or off hours.

## 2023-11-28 NOTE — PROGRESS NOTES
Speech-Language Pathology    Inpatient SLP Swallow Treatment     Patient Name: Molina Godinez  MRN: [id number]  Today's Date: 11/28/2023  Time Calculation  Start Time: 0932  Stop Time: 0943  Time Calculation (min): 11 min         Recommendations:   Solid Consistency: Minced & moist/ground (IDDSI Level 5)  Liquid Consistency: Honey thick/moderately thick (IDDS Level 3)  Medication Administration: Pills whole in pureed  Alternating liquids and solids, Small bites, Small sips  Recommendations: ontinue skilled dysphagia services    SLP Assessment:  SLP TX Intervention Outcome: Making Progress Towards Goals  Prognosis: Good  Treatment Tolerance: Treatment limited secondary to medical complications  Medical Staff Made Aware: Yes       Plan:  Inpatient/Swing Bed or Outpatient: Inpatient  SLP TX Plan: Continue Plan of Care  SLP Plan: Skilled SLP  SLP Frequency: 1x per week  Duration: 1 week  SLP Discharge Recommendations: Continue skilled SLP services at the next level of care  Discussed POC: Patient, Nursing  Discussed Risks/Benefits: Yes  Patient/Caregiver Agreeable: Yes  SLP - OK to Discharge: Yes      Subjective   Current Presentation:   Alert, upright, good mood. Dobhoff in place. NC in place.       General Visit Information:   Prior to Session Communication: Bedside nurse          Objective       Therapeutic Swallow Therapy:  Therapeutic Swallow Intervention : PO Trials    Pt consuming breakfast at SLP arrival. Pt self-feeding trials of minced-moist solids and honey thick liquids. Given an initial cue for alternating bites and sips pt adequately carried over for remaining x6 bites and sips. He independently administered small bites and in a slow rate. No overt difficulty or s/s aspiration observed across trials. He denied any overt difficulty with dinner or breakfast. Pt reported enjoying his meals so far.     RN also denied overt difficulty noted with dinner previous night.       Inpatient Education:  Pt denied any  questions regarding FEES completed previous date. Pt was then educated on care plan.       Goals:   Pt will verbalize/demonstrate comprehension of dysphagia education, strategies, recommendations and POC.  2. Patient will complete respiratory muscle strength training (RMST) adhering to progressive overload protocol with cues as needed for accurate completion.   3. Pt will accurately utilize swallow strategies with advanced textures over course of meal with no overt difficulty and without signs of significant fatigue to inform readiness for diet advancement.

## 2023-11-28 NOTE — CARE PLAN
Problem: Pain - Adult  Goal: Verbalizes/displays adequate comfort level or baseline comfort level  Outcome: Progressing   The patient's goals for the shift include PATITO    The clinical goals for the shift include pain managed and safety maintained    Over the shift, the patient did not make progress toward the following goals. Barriers to progression include n. Recommendations to address these barriers include n.

## 2023-11-28 NOTE — CARE PLAN
Problem: Pain - Adult  Goal: Verbalizes/displays adequate comfort level or baseline comfort level  Outcome: Progressing     Problem: Skin  Goal: Prevent/manage excess moisture  Outcome: Progressing  Goal: Promote/optimize nutrition  Outcome: Progressing   The patient's goals for the shift include PATITO    The clinical goals for the shift include pain control    Over the shift, the patient did not make progress toward the following goals. Barriers to progression include . Recommendations to address these barriers include .

## 2023-11-29 LAB
ALBUMIN SERPL BCP-MCNC: 3.2 G/DL (ref 3.4–5)
ANION GAP SERPL CALC-SCNC: 17 MMOL/L (ref 10–20)
BUN SERPL-MCNC: 27 MG/DL (ref 6–23)
CALCIUM SERPL-MCNC: 9.5 MG/DL (ref 8.6–10.6)
CHLORIDE SERPL-SCNC: 94 MMOL/L (ref 98–107)
CO2 SERPL-SCNC: 27 MMOL/L (ref 21–32)
CREAT SERPL-MCNC: 0.56 MG/DL (ref 0.5–1.3)
ERYTHROCYTE [DISTWIDTH] IN BLOOD BY AUTOMATED COUNT: 13.8 % (ref 11.5–14.5)
GFR SERPL CREATININE-BSD FRML MDRD: >90 ML/MIN/1.73M*2
GLUCOSE SERPL-MCNC: 104 MG/DL (ref 74–99)
HCT VFR BLD AUTO: 37.7 % (ref 41–52)
HGB BLD-MCNC: 11.9 G/DL (ref 13.5–17.5)
MAGNESIUM SERPL-MCNC: 2.02 MG/DL (ref 1.6–2.4)
MCH RBC QN AUTO: 28.3 PG (ref 26–34)
MCHC RBC AUTO-ENTMCNC: 31.6 G/DL (ref 32–36)
MCV RBC AUTO: 90 FL (ref 80–100)
NRBC BLD-RTO: 0 /100 WBCS (ref 0–0)
PHOSPHATE SERPL-MCNC: 5.8 MG/DL (ref 2.5–4.9)
PLATELET # BLD AUTO: 283 X10*3/UL (ref 150–450)
POTASSIUM SERPL-SCNC: 4.7 MMOL/L (ref 3.5–5.3)
RBC # BLD AUTO: 4.21 X10*6/UL (ref 4.5–5.9)
SODIUM SERPL-SCNC: 133 MMOL/L (ref 136–145)
WBC # BLD AUTO: 8.4 X10*3/UL (ref 4.4–11.3)

## 2023-11-29 PROCEDURE — 2500000001 HC RX 250 WO HCPCS SELF ADMINISTERED DRUGS (ALT 637 FOR MEDICARE OP): Performed by: STUDENT IN AN ORGANIZED HEALTH CARE EDUCATION/TRAINING PROGRAM

## 2023-11-29 PROCEDURE — 94640 AIRWAY INHALATION TREATMENT: CPT

## 2023-11-29 PROCEDURE — 2500000001 HC RX 250 WO HCPCS SELF ADMINISTERED DRUGS (ALT 637 FOR MEDICARE OP): Performed by: NURSE PRACTITIONER

## 2023-11-29 PROCEDURE — 83735 ASSAY OF MAGNESIUM: CPT | Performed by: NURSE PRACTITIONER

## 2023-11-29 PROCEDURE — 99233 SBSQ HOSP IP/OBS HIGH 50: CPT | Performed by: STUDENT IN AN ORGANIZED HEALTH CARE EDUCATION/TRAINING PROGRAM

## 2023-11-29 PROCEDURE — 1100000001 HC PRIVATE ROOM DAILY

## 2023-11-29 PROCEDURE — 2500000004 HC RX 250 GENERAL PHARMACY W/ HCPCS (ALT 636 FOR OP/ED): Performed by: NURSE PRACTITIONER

## 2023-11-29 PROCEDURE — 80069 RENAL FUNCTION PANEL: CPT | Performed by: STUDENT IN AN ORGANIZED HEALTH CARE EDUCATION/TRAINING PROGRAM

## 2023-11-29 PROCEDURE — 2500000002 HC RX 250 W HCPCS SELF ADMINISTERED DRUGS (ALT 637 FOR MEDICARE OP, ALT 636 FOR OP/ED): Performed by: NURSE PRACTITIONER

## 2023-11-29 PROCEDURE — 85027 COMPLETE CBC AUTOMATED: CPT | Performed by: NURSE PRACTITIONER

## 2023-11-29 PROCEDURE — 99221 1ST HOSP IP/OBS SF/LOW 40: CPT | Performed by: INTERNAL MEDICINE

## 2023-11-29 PROCEDURE — 2500000005 HC RX 250 GENERAL PHARMACY W/O HCPCS: Performed by: NURSE PRACTITIONER

## 2023-11-29 PROCEDURE — 36415 COLL VENOUS BLD VENIPUNCTURE: CPT | Performed by: NURSE PRACTITIONER

## 2023-11-29 RX ADMIN — ESOMEPRAZOLE MAGNESIUM 40 MG: 40 FOR SUSPENSION ORAL at 07:00

## 2023-11-29 RX ADMIN — DICLOFENAC 1 APPLICATION: 10 GEL TOPICAL at 09:00

## 2023-11-29 RX ADMIN — Medication 8000 UNITS: at 08:15

## 2023-11-29 RX ADMIN — DICLOFENAC 1 APPLICATION: 10 GEL TOPICAL at 21:00

## 2023-11-29 RX ADMIN — LIDOCAINE 4% 1 APPLICATION: 4 CREAM TOPICAL at 07:00

## 2023-11-29 RX ADMIN — OXYCODONE HYDROCHLORIDE 5 MG: 5 TABLET ORAL at 18:34

## 2023-11-29 RX ADMIN — Medication 3 ML: at 11:00

## 2023-11-29 RX ADMIN — MAGNESIUM OXIDE TAB 400 MG (241.3 MG ELEMENTAL MG) 400 MG: 400 (241.3 MG) TAB at 08:14

## 2023-11-29 RX ADMIN — Medication 1 TABLET: at 08:14

## 2023-11-29 RX ADMIN — APIXABAN 5 MG: 5 TABLET, FILM COATED ORAL at 08:14

## 2023-11-29 RX ADMIN — OXYCODONE HYDROCHLORIDE 5 MG: 5 TABLET ORAL at 14:34

## 2023-11-29 RX ADMIN — LIDOCAINE 4%: 4 CREAM TOPICAL at 22:00

## 2023-11-29 RX ADMIN — DICLOFENAC 1 APPLICATION: 10 GEL TOPICAL at 15:00

## 2023-11-29 RX ADMIN — APIXABAN 5 MG: 5 TABLET, FILM COATED ORAL at 20:08

## 2023-11-29 RX ADMIN — ACETAMINOPHEN 650 MG: 325 TABLET ORAL at 06:04

## 2023-11-29 RX ADMIN — ATORVASTATIN CALCIUM 40 MG: 40 TABLET, FILM COATED ORAL at 20:08

## 2023-11-29 RX ADMIN — OXYCODONE HYDROCHLORIDE 5 MG: 5 TABLET ORAL at 10:10

## 2023-11-29 RX ADMIN — OXYCODONE HYDROCHLORIDE 5 MG: 5 TABLET ORAL at 22:30

## 2023-11-29 RX ADMIN — ALBUTEROL SULFATE 2.5 MG: 2.5 SOLUTION RESPIRATORY (INHALATION) at 14:29

## 2023-11-29 ASSESSMENT — PAIN SCALES - GENERAL
PAINLEVEL_OUTOF10: 2
PAINLEVEL_OUTOF10: 10 - WORST POSSIBLE PAIN
PAINLEVEL_OUTOF10: 9
PAINLEVEL_OUTOF10: 10 - WORST POSSIBLE PAIN
PAINLEVEL_OUTOF10: 8
PAINLEVEL_OUTOF10: 9
PAINLEVEL_OUTOF10: 10 - WORST POSSIBLE PAIN

## 2023-11-29 ASSESSMENT — PAIN - FUNCTIONAL ASSESSMENT
PAIN_FUNCTIONAL_ASSESSMENT: 0-10

## 2023-11-29 ASSESSMENT — PAIN DESCRIPTION - DESCRIPTORS: DESCRIPTORS: ACHING

## 2023-11-29 ASSESSMENT — PAIN SCALES - WONG BAKER
WONGBAKER_NUMERICALRESPONSE: HURTS LITTLE MORE
WONGBAKER_NUMERICALRESPONSE: HURTS WHOLE LOT

## 2023-11-29 NOTE — PROGRESS NOTES
Physical Therapy                 Therapy Communication Note    Patient Name: Molina Godinez  MRN: [id number]  Today's Date: 11/29/2023     Discipline: Physical Therapy    Missed Visit Reason: Missed Visit Reason: Other (Comment)    Missed Time: Attempt    Comment: Pt needs to cleaned up due to BM, CTA notified.

## 2023-11-29 NOTE — CONSULTS
NEPHROLOGY NEW CONSULT NOTE   Molina Godinez   68 y.o.    53kg   MRN/Room: [id number]    Reason for consult: Hyponatremia    HPI:  Molina Godinez is a 68 y.o. male with a past medical hx of COPD (2-4L at home), HTN, alcohol and opioid use disorder, hepatitis C, and PE (on apixaban). He has had a prolonged hospital course and was originally admitted on 10/11 for alcohol withdrawal which was treated with phenobarbital taper (MICU from 10/13-10/16). On admission patient was found to have +strep pneumo antigen so was treated with ceftriaxone for c/f pneumonia. Patient was then transferred back to the MICU on 10/17 for septic shock and AHRF 2/2 to stenotrophomonas (mucous and BAL) pneumonia requiring intubation on 10/20 and subsequently bronchoscopy for L lung collapse from mucous plug on 10/22. He was treated with bactrim (10/22- 10/28) and levaquin (10/23- 10/25). He also developed SIADH at that time which was treated with fluid restriction and urea packets. He was started on CVVH (started on 10/31) for uremia (BUN was 180), hypervolemia, and oliguria. Unable to do HD due to low blood pressures. CVVH was stopped on 11/3 due to improvement in urine output. He had TTE showing TV endocarditis, but on repeat showing no abnormality. Briefly with candida albicans fungemia (treated with 10 days of micafungin). He has had recurrent mucous plugging requiring bronchoscopy, most recently on 11/16. Course was also complicated by bilateral lower extremity weakness, found to have Guillan [place] ( no definitive treatment due to ongoing infectious concern).  Currently, patients active issues are dysphagia, airway clearance, general deconditioning, and severe malnutrition.     Regarding previous workup for the patient hyponatremia, nephrology was initially consulted on 10/25. He was noted to have mild hyponatremia on admission of 130 which was thought to be due to patient's underlying alcohol use disorder. Patient's sodium was  "in 126-130 range until 10/15 when decreased to 124, and then on 10/16 sodium was found to be 121. On 10/20, was as high as back to 130, and then on 10/21 started downtrending to 125, then 122 on 10/22, and has been in the low 120s since 10/22 until 10/25. Serum osmolality was 248 on 10/16, and then 246 again on 10/22. Urine osmolality was 492 on 10/16, 718 on 10/18, and 353 on 10/22. Patient was felt to be euvolemic on exam, and laboratory studies most consistent w/ SIADH 2/2 pulmonary pathology and patient was started on urea 15g daily on 10/23 without much interval improvement in serum sodium levels. Subsquently lasix added with improvement in sodium to 143. He also had TSH (wnl) and AM cortisol 34.2 on 10/25 which was elevated. Nephrology reengaged for rising BUN and worsening hyponatremia for CVVH which was initiated on 10/31- 11/3 (stopped for improving urine output). Nephrology reengaged on 11/27 for hyponatremia to 131. sOsm was 282 and uOsm 955 was Robbie 34. Fluid restriction to 1.2L was started overnight and morning sodium was 135 .          Baseline creatinine is ~0.7  Creatinine at the time of admission was 0.56 and started trending up to 2.32 and now at 0.5.  Blood pressure is currently stable but he has requiring pressors throughout his course.  Urine output was ~400 last 24hrs ( not sure if accurately charted)  Recent contrast studies on 11/8 (CT with IV contrast).  No current nephrotoxic medications on list           In The ER: /73   Pulse 104   Temp 36.7 °C (98.1 °F)   Resp 16   Ht 1.702 m (5' 7.01\")   Wt 53.8 kg (118 lb 9.7 oz)   SpO2 92%   BMI 18.57 kg/m²      History reviewed. No pertinent past medical history.   Past Surgical History:   Procedure Laterality Date    [facility] ENDOSCOPIC EVAL,SWALLOW,CINE/VIDEO  11/27/2023      No family history on file.  Social History     Socioeconomic History    Marital status: Single     Spouse name: Not on file    Number of children: Not on file    " Years of education: Not on file    Highest education level: Not on file   Occupational History    Not on file   Tobacco Use    Smoking status: Every Day     Packs/day: .5     Types: Cigarettes    Smokeless tobacco: Not on file   Substance and Sexual Activity    Alcohol use: Yes     Comment: 1.5 pints Ju daily    Drug use: Not on file    Sexual activity: Not on file   Other Topics Concern    Not on file   Social History Narrative    Not on file     Social Determinants of Health     Financial Resource Strain: Low Risk  (10/16/2023)    Overall Financial Resource Strain (CARDIA)     Difficulty of Paying Living Expenses: Not hard at all   Food Insecurity: No Food Insecurity (10/16/2023)    Hunger Vital Sign     Worried About Running Out of Food in the Last Year: Never true     Ran Out of Food in the Last Year: Never true   Transportation Needs: No Transportation Needs (10/16/2023)    PRAPARE - Transportation     Lack of Transportation (Medical): No     Lack of Transportation (Non-Medical): No   Physical Activity: Not on file   Stress: Not on file   Social Connections: Not on file   Intimate Partner Violence: Not At Risk (10/16/2023)    Humiliation, Afraid, Rape, and Kick questionnaire     Fear of Current or Ex-Partner: No     Emotionally Abused: No     Physically Abused: No     Sexually Abused: No   Housing Stability: Low Risk  (10/16/2023)    Housing Stability Vital Sign     Unable to Pay for Housing in the Last Year: No     Number of Places Lived in the Last Year: 1     Unstable Housing in the Last Year: No       Allergies   Allergen Reactions    Aspirin Unknown     States he gets excited    Nsaids (Non-Steroidal Anti-Inflammatory Drug) Unknown     Elevates BP        Medications Prior to Admission   Medication Sig Dispense Refill Last Dose    acetaminophen (Tylenol) 325 mg tablet TAKE 2 TABLETS BY MOUTH EVERY 6 HOURS AS NEEDED FOR PAIN. 120 tablet 0 Unknown    amLODIPine (Norvasc) 5 mg tablet TAKE 1 TABLET BY MOUTH  ONCE DAILY 90 tablet 0 10/11/2023    atenolol (Tenormin) 50 mg tablet TAKE 1 TABLET BY MOUTH ONCE DAILY 90 tablet 1 10/11/2023    atorvastatin (Lipitor) 40 mg tablet Take 1 tablet (40 mg) by mouth once daily.   Past Week    folic acid (Folvite) 1 mg tablet TAKE 1 TABLET BY MOUTH ONCE DAILY 100 tablet 0 Past Week    ipratropium-albuteroL (Combivent Respimat)  mcg/actuation inhaler INHALE 1 PUFF EVERY 6 HOURS AS NEEDED FOR SHORTNESS OF BREATH 4 g 2     naloxone (Narcan) 4 mg/0.1 mL nasal spray Use 1 Spray in one nostril (alternate sides) as needed for Drug Overdose (and call 911). every 2-3 min, until assistance arrives.       nicotine (Nicoderm CQ) 21 mg/24 hr patch APPLY 1 PATCH TRANSDERMALLY EVERY 24 HOURS. 28 patch 0     sennosides (Senokot) 8.6 mg tablet TAKE 1 TABLET BY MOUTH TWO TIMES A DAY AS NEEDED FOR CONSTIPATION 100 tablet 0 Unknown    thiamine (Vitamin B-1) 100 mg tablet TAKE 1 TABLET BY MOUTH ONCE DAILY 100 tablet 2 Past Week    [DISCONTINUED] polyethylene glycol (Glycolax, Miralax) 17 gram/dose powder    Unknown        Meds:   albuterol, 2.5 mg, q12h  apixaban, 5 mg, BID  atorvastatin, 40 mg, Nightly  diclofenac sodium, 4 g, TID  ergocalciferol, 8,000 Units, Daily  esomeprazole, 40 mg, Daily before breakfast  lidocaine, , TID  [Held by provider] lidocaine, 2 patch, Daily  magnesium oxide, 400 mg, Daily  melatonin, 10 mg, Nightly  multivitamin with minerals, 1 tablet, Daily  sodium chloride, 3 mL, q12h         acetaminophen, 650 mg, q8h PRN  dextrose 10 % in water (D10W), 0.3 g/kg/hr, Once PRN  dextrose, 25 g, q15 min PRN  ipratropium-albuteroL, 3 mL, q4h PRN  loperamide, 2 mg, TID PRN  midodrine, 2.5 mg, TID PRN  oxyCODONE, 5 mg, q4h PRN  oxygen, , Continuous PRN - O2/gases        Vitals:    11/28/23 2300   BP: 107/73   Pulse: 104   Resp: 16   Temp: 36.7 °C (98.1 °F)   SpO2: 92%        11/27 1900 - 11/29 0659  In: -   Out: 400 [Urine:400]   Weight change: 0 kg (0 lb)     General appearance: AAOx3.  No distress  Heart: RRR  Lungs: CTA bilat.    Abdomen: soft, nt/nd  Extremities: trace edema bilat  Neuro: No FND        Blood Labs:  Results for orders placed or performed during the hospital encounter of 10/11/23 (from the past 24 hour(s))   Magnesium   Result Value Ref Range    Magnesium 2.02 1.60 - 2.40 mg/dL   CBC   Result Value Ref Range    WBC 8.4 4.4 - 11.3 x10*3/uL    nRBC 0.0 0.0 - 0.0 /100 WBCs    RBC 4.21 (L) 4.50 - 5.90 x10*6/uL    Hemoglobin 11.9 (L) 13.5 - 17.5 g/dL    Hematocrit 37.7 (L) 41.0 - 52.0 %    MCV 90 80 - 100 fL    MCH 28.3 26.0 - 34.0 pg    MCHC 31.6 (L) 32.0 - 36.0 g/dL    RDW 13.8 11.5 - 14.5 %    Platelets 283 150 - 450 x10*3/uL   Renal Function Panel   Result Value Ref Range    Glucose 104 (H) 74 - 99 mg/dL    Sodium 133 (L) 136 - 145 mmol/L    Potassium 4.7 3.5 - 5.3 mmol/L    Chloride 94 (L) 98 - 107 mmol/L    Bicarbonate 27 21 - 32 mmol/L    Anion Gap 17 10 - 20 mmol/L    Urea Nitrogen 27 (H) 6 - 23 mg/dL    Creatinine 0.56 0.50 - 1.30 mg/dL    eGFR >90 >60 mL/min/1.73m*2    Calcium 9.5 8.6 - 10.6 mg/dL    Phosphorus 5.8 (H) 2.5 - 4.9 mg/dL    Albumin 3.2 (L) 3.4 - 5.0 g/dL         ASSESSMENT:  Molina Godinez is a 68 y.o. male with a past medical hx of COPD (2-4L at home), HTN, alcohol and opioid use disorder, hepatitis C, and PE (on apixaban). He has had a prolonged hospital course and was originally admitted on 10/11 for alcohol withdrawal. Hospital course c/b pna requiring intubation, KRYSTEN on CVVH with recovery in UOP and fungemia. Pt also had b/l LE weakness and diagnosed with GBS. Had intermittent hyponatremia throughout stay, nephrology consulted for hyponatremia.     #Hyponatremia, euvolemic  -pt with prior workup during hospitalization for hyponatremia suggesting SIADH that was treated with fluid restriction and urea tablets   -Tiana 34, Uosm 940  -pt started on 1.2L fluid restriction 11/27  -hemodynamically stable  Differentials include SIADH vs solute diuresis given his  prolonged hospitalization and large volume of IVF      Recommendations:  - Sodium improving with slight downtrend today. Ok to remove his fluid restriction however would discuss with patient on not having excessive free water intake  - Patient remains stable so at this time will continue to monitor with no further interventions  - strict I/O      Herman Oliveros DO  Nephrology Resident  24 hour Renal Pager - [phone]    Discussed with attending nephrologist

## 2023-11-29 NOTE — PROGRESS NOTES
Assessment/Plan    Mr. Godinez 68 y.o. male with PMHx COPD, HTN,  alcohol and opioid use disorder, hepatitis C, chronic PE admitted 10/12 with BLE weakness, recently admitted in the MICU from 10/13-10/16 for suspected alcohol withdrawal refractory to ativan, started on phenobarb taper, treated for streptococcus pneumoniae PNA with Ceftriaxone, completed antifungals for fungemia and transferred to [facility].  RRT called for lethargy, transferred to MICU on 10/17 with new FiO2 requirements.  In SDU continued to make good progress, weaning O2 to baseline, continuing aggressive pulm hygiene and PT/OT/SLP.   He was recommended moderate intensity therapy and transfer to the floor pending placement for postacute care.  At this time his respiratory status has been stable, still requires O2 supply.  Medically clear for SNF. Completed antibiotics and antifungals, there was some concern for vegetation, cardiac surgery said not a candidate, repeat echo showed no vegetation but he should have follow-up with cardiology to evaluate.  He should follow-up with ID as well given his multiple infections.  He needs neurology follow-up for the Guillain-Barré.  He needs pulmonary follow-up for the respiratory failure.  He will need PT OT and speech and nutrition at the facility.  I have counseled him extensively on therapy and getting out of bed, had more talks with the sister present.  Hopefully will stay motivated but we need to continue to to talk to nursing to encourage him to get out of bed.  Will also need GI follow-up for the hepatitis C.      #Hyponatremia, possible SIADH, pain induced - resolved  -Continue Daily RFP Suspecting SIADH per record.   Water restriction.   -Nephro consulted      #Acute on chronic hypoxemic respiratory failure, on 2 L NC  #Strep pneumonaie PNA, Stenotrophomonas PNA  #COPD on 2 to 3 L baseline O2  #Chronic PE  - Patient had bronc with mucous plugging left lung consolidation on 11/16. Status post antibiotics and  completed per ID, weaned to 2 L nasal cannula at rest.   -Cultures grew multidrug resistant PSA, Stenotrophmonas and 4+ Candida, AFB pending, ID suspects colonization and no further antibiotic recommended.  Will need to follow-up AFB stains.  -Continue aggressive bronchial hygiene, Albuterol BID and 7% saline BID.  Airway clearance techniques (IPV) Q4 hours.  Acapella, IS while awake.  Informed patient that he should take his devices and continue after discharge.  -At this time respiratory status is stabilized.    -Continue Eliquis  -Patient is being worked up for pulmonary artery hypertension, will need follow-up with pulmonary OP.   -Respiratory status has been stable.  He is requiring 1 to 2 L at rest, likely what is going to go on.  He may not need any oxygen is borderline.  Cannot determine activity requirement.  Will make sure he leaves with his Acapella and incentive spirometer and brings it to the facility      #Dysphagia  #Vocal cord dysfunction  -Nasopharyngolaryngoscopy 11/27: mild bowing of cord, likely secondary to intubation or an element of chronicity. Otherwise, exam within normal limits. No further ENT interventions inpatient, ENT will consider rescope if in house later this week with laryngology, ENT will schedule outpatient laryngology f/up  -SLP FEES completed 11/27, recommended honey thick liquids and minced-moist solids, with direct feed assist with a plan to  advance diet in 1-2 weeks pending further therapy.         # GBS  # Generalized weakness and deconditioning  # Dysphagia  # Severe protein calorie malnutrition  -Currently at baseline mentation.  His extremity weakness is improving.  EMG confirmed GBS on 11/10.  He is primarily having issues with pain now.  This prolonged hospitalization as well which is likely also contributing to his weakness.  Neurology was consulted and no plans for further testing or treatment due to fungemia, as immunomodulators could potentially worsen infection.   Patient to see neurology after infection cleared.  -For pain change Acetaminophen to scheduled, Oxycodone 5 mg changed to every 4 as needed.  DC lidocaine patch.  Starting diclofenac gel and lidocaine cream 3 times daily alternating to legs and feet   -Continue PPI  -Continue PT OT and SLP/nutrition.          #Alcohol use disorder  #Opiate use disorder  -Status post management of refractory withdrawal in the ICU.  Resolved.  -Social work for resources.  Counseling education provided today.     #Hyperlipidemia  -resuming statin     #Dry eye, Cataracts, asha eyes  - seen by Opthomology given c/o blurriness with new fungemia.  No signs of fungal endophthalmitis  - treated with artifical tears and erythromycin oint.  Now resolved     #TV Endocarditis  # Fungemia  - Completed micafungin per ID on 11/17.    - 10/31 Echo: LVEF 75-80%, RV overload, Mod-severe TV, Mod-severe RV systolic pressure, Tricuspid echodensity measuring 1.7 x 0.8 cm, atrial septal aneurysm  - Seen by CT surgery for TV vegetation- too high risk for surgery at this time.  Follow up when stable  - Repeat Echo on 11/2/23: with no definite valvular vegetations     #SVT resolved.   -Evaluating need for continued telemetry.      #Hep C  -Will need to review viral studies and likely refer to hepatology outpatient       #Acute renal failure -resolved  -Required CVVH and MICU for acute renal failure, renal failure now resolved creatinine down to baseline.  He is still hypotensive and requiring midodrine.  He is voiding by an external cath.  Continue to monitor I's and O's and avoid nephrotoxins.  Nephrology has signed off.  -Continue Vit D supp, 8000 units for 8 weeks           Code Status: Full Code  NOK: Sister Sarah Godinez [phone]      Dispo: [facility]             Scheduled outpatient appointments in system:   No future appointments.  ---------------------------------------------------------------------------------------------------  Subjective   No acute  "events overnight.   Hyponatremia resolved with pain control ad fluid restriction      ---------------------------------------------------------------------------------------------------  Objective   Last Recorded Vitals  Blood pressure 100/66, pulse 101, temperature 36.5 °C (97.7 °F), resp. rate 16, height 1.702 m (5' 7.01\"), weight 53.8 kg (118 lb 9.7 oz), SpO2 95 %.  Intake/Output last 3 Shifts:  I/O last 3 completed shifts:  In: - (0 mL/kg)   Out: 400 (7.4 mL/kg) [Urine:400 (0.2 mL/kg/hr)]  Weight: 53.8 kg     Physical Exam  Vitals and nursing note reviewed.   Constitutional:       General: He is not in acute distress.     Appearance: He is ill-appearing. He is not toxic-appearing.      Comments: Cachectic   HENT:      Head: Normocephalic and atraumatic.      Mouth/Throat:      Mouth: Mucous membranes are moist.   Eyes:      General: No scleral icterus.     Extraocular Movements: Extraocular movements intact.      Conjunctiva/sclera: Conjunctivae normal.   Cardiovascular:      Rate and Rhythm: Regular rhythm. Tachycardia present.      Heart sounds: S1 normal and S2 normal. No murmur heard.  Pulmonary:      Effort: Pulmonary effort is normal. No respiratory distress.      Breath sounds: No wheezing, rhonchi or rales.      Comments: Diminished throughout, on nasal cannula  Abdominal:      General: Bowel sounds are normal. There is no distension.      Palpations: Abdomen is soft.      Tenderness: There is no abdominal tenderness. There is no guarding or rebound.   Musculoskeletal:         General: No swelling or deformity.      Cervical back: Neck supple.   Skin:     General: Skin is warm and dry.      Findings: No rash.   Neurological:      Mental Status: He is alert and oriented to person, place, and time.      Comments: Generalized weakness   Psychiatric:         Mood and Affect: Mood normal.         Relevant Results  Lab Results   Component Value Date    WBC 8.4 11/29/2023    HGB 11.9 (L) 11/29/2023    HCT 37.7 " (L) 11/29/2023    MCV 90 11/29/2023     11/29/2023      Lab Results   Component Value Date    GLUCOSE 104 (H) 11/29/2023    CALCIUM 9.5 11/29/2023     (L) 11/29/2023    K 4.7 11/29/2023    CO2 27 11/29/2023    CL 94 (L) 11/29/2023    BUN 27 (H) 11/29/2023    CREATININE 0.56 11/29/2023     Scheduled medications  albuterol, 2.5 mg, nebulization, q12h  apixaban, 5 mg, nasogastric tube, BID  atorvastatin, 40 mg, oral, Nightly  diclofenac sodium, 4 g, Topical, TID  ergocalciferol, 8,000 Units, oral, Daily  esomeprazole, 40 mg, nasogastric tube, Daily before breakfast  lidocaine, , Topical, TID  [Held by provider] lidocaine, 2 patch, transdermal, Daily  magnesium oxide, 400 mg, oral, Daily  melatonin, 10 mg, nasogastric tube, Nightly  multivitamin with minerals, 1 tablet, oral, Daily  sodium chloride, 3 mL, nebulization, q12h      Continuous medications     PRN medications  PRN medications: acetaminophen, dextrose 10 % in water (D10W), dextrose, ipratropium-albuteroL, loperamide, midodrine, oxyCODONE, oxygen    Julien Ennis MD

## 2023-11-29 NOTE — CARE PLAN
Problem: Pain - Adult  Goal: Verbalizes/displays adequate comfort level or baseline comfort level  Outcome: Progressing   The patient's goals for the shift include PATITO    The clinical goals for the shift include manage pain    Over the shift, the patient did not make progress toward the following goals. Barriers to progression include n. Recommendations to address these barriers include n.

## 2023-11-29 NOTE — PROGRESS NOTES
11/29/23 PCN note:   Request for precert escalation sent to Direct Submit team.     Anahi Noel

## 2023-11-30 VITALS
WEIGHT: 118.83 LBS | OXYGEN SATURATION: 92 % | SYSTOLIC BLOOD PRESSURE: 116 MMHG | TEMPERATURE: 98.1 F | HEIGHT: 67 IN | RESPIRATION RATE: 15 BRPM | HEART RATE: 104 BPM | DIASTOLIC BLOOD PRESSURE: 76 MMHG | BODY MASS INDEX: 18.65 KG/M2

## 2023-11-30 LAB
ERYTHROCYTE [DISTWIDTH] IN BLOOD BY AUTOMATED COUNT: 13.3 % (ref 11.5–14.5)
HCT VFR BLD AUTO: 38.5 % (ref 41–52)
HGB BLD-MCNC: 12.1 G/DL (ref 13.5–17.5)
HOLD SPECIMEN: NORMAL
MAGNESIUM SERPL-MCNC: 1.9 MG/DL (ref 1.6–2.4)
MCH RBC QN AUTO: 28.3 PG (ref 26–34)
MCHC RBC AUTO-ENTMCNC: 31.4 G/DL (ref 32–36)
MCV RBC AUTO: 90 FL (ref 80–100)
NRBC BLD-RTO: 0 /100 WBCS (ref 0–0)
PLATELET # BLD AUTO: 260 X10*3/UL (ref 150–450)
RBC # BLD AUTO: 4.27 X10*6/UL (ref 4.5–5.9)
WBC # BLD AUTO: 7.6 X10*3/UL (ref 4.4–11.3)

## 2023-11-30 PROCEDURE — 2500000001 HC RX 250 WO HCPCS SELF ADMINISTERED DRUGS (ALT 637 FOR MEDICARE OP): Performed by: NURSE PRACTITIONER

## 2023-11-30 PROCEDURE — 36415 COLL VENOUS BLD VENIPUNCTURE: CPT | Performed by: NURSE PRACTITIONER

## 2023-11-30 PROCEDURE — 99238 HOSP IP/OBS DSCHRG MGMT 30/<: CPT | Performed by: STUDENT IN AN ORGANIZED HEALTH CARE EDUCATION/TRAINING PROGRAM

## 2023-11-30 PROCEDURE — 2500000001 HC RX 250 WO HCPCS SELF ADMINISTERED DRUGS (ALT 637 FOR MEDICARE OP): Performed by: STUDENT IN AN ORGANIZED HEALTH CARE EDUCATION/TRAINING PROGRAM

## 2023-11-30 PROCEDURE — 2500000004 HC RX 250 GENERAL PHARMACY W/ HCPCS (ALT 636 FOR OP/ED): Performed by: NURSE PRACTITIONER

## 2023-11-30 PROCEDURE — 83735 ASSAY OF MAGNESIUM: CPT | Performed by: NURSE PRACTITIONER

## 2023-11-30 PROCEDURE — 85027 COMPLETE CBC AUTOMATED: CPT | Performed by: NURSE PRACTITIONER

## 2023-11-30 RX ORDER — OXYCODONE HYDROCHLORIDE 5 MG/1
5 TABLET ORAL EVERY 4 HOURS PRN
Qty: 15 TABLET | Refills: 0 | Status: SHIPPED | OUTPATIENT
Start: 2023-11-30 | End: 2024-03-25 | Stop reason: WASHOUT

## 2023-11-30 RX ADMIN — Medication 8000 UNITS: at 08:28

## 2023-11-30 RX ADMIN — MAGNESIUM OXIDE TAB 400 MG (241.3 MG ELEMENTAL MG) 400 MG: 400 (241.3 MG) TAB at 08:27

## 2023-11-30 RX ADMIN — DICLOFENAC 1 APPLICATION: 10 GEL TOPICAL at 09:00

## 2023-11-30 RX ADMIN — OXYCODONE HYDROCHLORIDE 5 MG: 5 TABLET ORAL at 03:39

## 2023-11-30 RX ADMIN — LIDOCAINE 4%: 4 CREAM TOPICAL at 08:00

## 2023-11-30 RX ADMIN — APIXABAN 5 MG: 5 TABLET, FILM COATED ORAL at 08:28

## 2023-11-30 RX ADMIN — Medication 1 TABLET: at 08:27

## 2023-11-30 RX ADMIN — OXYCODONE HYDROCHLORIDE 5 MG: 5 TABLET ORAL at 08:28

## 2023-11-30 ASSESSMENT — PAIN SCALES - GENERAL
PAINLEVEL_OUTOF10: 7
PAINLEVEL_OUTOF10: 8

## 2023-11-30 NOTE — NURSING NOTE
Report called to Sasha at [facility]. Patient transport at bedside. Patient safely discharging.

## 2023-11-30 NOTE — PROGRESS NOTES
Assessment/Plan    Mr. Godinez 68 y.o. male with PMHx COPD, HTN,  alcohol and opioid use disorder, hepatitis C, chronic PE admitted 10/12 with BLE weakness, recently admitted in the MICU from 10/13-10/16 for suspected alcohol withdrawal refractory to ativan, started on phenobarb taper, treated for streptococcus pneumoniae PNA with Ceftriaxone, completed antifungals for fungemia and transferred to [facility].  RRT called for lethargy, transferred to MICU on 10/17 with new FiO2 requirements.  In SDU continued to make good progress, weaning O2 to baseline, continuing aggressive pulm hygiene and PT/OT/SLP.   He was recommended moderate intensity therapy and transfer to the floor pending placement for postacute care.  At this time his respiratory status has been stable, still requires O2 supply.  Medically clear for SNF. Completed antibiotics and antifungals, there was some concern for vegetation, cardiac surgery said not a candidate, repeat echo showed no vegetation but he should have follow-up with cardiology to evaluate.  He should follow-up with ID as well given his multiple infections.  He needs neurology follow-up for the Guillain-Barré.  He needs pulmonary follow-up for the respiratory failure.  He will need PT OT and speech and nutrition at the facility.  I have counseled him extensively on therapy and getting out of bed, had more talks with the sister present.  Hopefully will stay motivated but we need to continue to to talk to nursing to encourage him to get out of bed.  Will also need GI follow-up for the hepatitis C.      #Hyponatremia, possible SIADH, pain induced - resolved  -Continue Daily RFP Suspecting SIADH per record.   Water restriction.   -Nephro consulted      #Acute on chronic hypoxemic respiratory failure, on 2 L NC  #Strep pneumonaie PNA, Stenotrophomonas PNA  #COPD on 2 to 3 L baseline O2  #Chronic PE  - Patient had bronc with mucous plugging left lung consolidation on 11/16. Status post antibiotics and  completed per ID, weaned to 2 L nasal cannula at rest.   -Cultures grew multidrug resistant PSA, Stenotrophmonas and 4+ Candida, AFB pending, ID suspects colonization and no further antibiotic recommended.  Will need to follow-up AFB stains.  -Continue aggressive bronchial hygiene, Albuterol BID and 7% saline BID.  Airway clearance techniques (IPV) Q4 hours.  Acapella, IS while awake.  Informed patient that he should take his devices and continue after discharge.  -At this time respiratory status is stabilized.    -Continue Eliquis  -Patient is being worked up for pulmonary artery hypertension, will need follow-up with pulmonary OP.   -Respiratory status has been stable.  He is requiring 1 to 2 L at rest, likely what is going to go on.  He may not need any oxygen is borderline.  Cannot determine activity requirement.  Will make sure he leaves with his Acapella and incentive spirometer and brings it to the facility      #Dysphagia  #Vocal cord dysfunction  -Nasopharyngolaryngoscopy 11/27: mild bowing of cord, likely secondary to intubation or an element of chronicity. Otherwise, exam within normal limits. No further ENT interventions inpatient, ENT will consider rescope if in house later this week with laryngology, ENT will schedule outpatient laryngology f/up  -SLP FEES completed 11/27, recommended honey thick liquids and minced-moist solids, with direct feed assist with a plan to  advance diet in 1-2 weeks pending further therapy.         # GBS  # Generalized weakness and deconditioning  # Dysphagia  # Severe protein calorie malnutrition  -Currently at baseline mentation.  His extremity weakness is improving.  EMG confirmed GBS on 11/10.  He is primarily having issues with pain now.  This prolonged hospitalization as well which is likely also contributing to his weakness.  Neurology was consulted and no plans for further testing or treatment due to fungemia, as immunomodulators could potentially worsen infection.   Patient to see neurology after infection cleared.  -For pain change Acetaminophen to scheduled, Oxycodone 5 mg changed to every 4 as needed.  DC lidocaine patch.  Starting diclofenac gel and lidocaine cream 3 times daily alternating to legs and feet   -Continue PPI  -Continue PT OT and SLP/nutrition.          #Alcohol use disorder  #Opiate use disorder  -Status post management of refractory withdrawal in the ICU.  Resolved.  -Social work for resources.  Counseling education provided today.     #Hyperlipidemia  -resuming statin     #Dry eye, Cataracts, asha eyes  - seen by Opthomology given c/o blurriness with new fungemia.  No signs of fungal endophthalmitis  - treated with artifical tears and erythromycin oint.  Now resolved     #TV Endocarditis  # Fungemia  - Completed micafungin per ID on 11/17.    - 10/31 Echo: LVEF 75-80%, RV overload, Mod-severe TV, Mod-severe RV systolic pressure, Tricuspid echodensity measuring 1.7 x 0.8 cm, atrial septal aneurysm  - Seen by CT surgery for TV vegetation- too high risk for surgery at this time.  Follow up when stable  - Repeat Echo on 11/2/23: with no definite valvular vegetations     #SVT resolved.   -Evaluating need for continued telemetry.      #Hep C  -Will need to review viral studies and likely refer to hepatology outpatient       #Acute renal failure -resolved  -Required CVVH and MICU for acute renal failure, renal failure now resolved creatinine down to baseline.  He is still hypotensive and requiring midodrine.  He is voiding by an external cath.  Continue to monitor I's and O's and avoid nephrotoxins.  Nephrology has signed off.  -Continue Vit D supp, 8000 units for 8 weeks           Code Status: Full Code  NOK: Sister Sarah Godinez [phone]      Dispo: [facility]             Scheduled outpatient appointments in system:   Future Appointments   Date Time Provider Department Center   12/20/2023  3:30 PM Alysia Denis MD [id number]  "    ---------------------------------------------------------------------------------------------------  Subjective   No acute events overnight.   Hyponatremia resolved with pain control ad fluid restriction      ---------------------------------------------------------------------------------------------------  Objective   Last Recorded Vitals  Blood pressure 116/76, pulse 104, temperature 36.7 °C (98.1 °F), temperature source Temporal, resp. rate 11, height 1.702 m (5' 7.01\"), weight 53.9 kg (118 lb 13.3 oz), SpO2 92 %.  Intake/Output last 3 Shifts:  I/O last 3 completed shifts:  In: - (0 mL/kg)   Out: 400 (7.4 mL/kg) [Urine:400 (0.2 mL/kg/hr)]  Weight: 53.9 kg     Physical Exam  Vitals and nursing note reviewed.   Constitutional:       General: He is not in acute distress.     Appearance: He is ill-appearing. He is not toxic-appearing.      Comments: Cachectic   HENT:      Head: Normocephalic and atraumatic.      Mouth/Throat:      Mouth: Mucous membranes are moist.   Eyes:      General: No scleral icterus.     Extraocular Movements: Extraocular movements intact.      Conjunctiva/sclera: Conjunctivae normal.   Cardiovascular:      Rate and Rhythm: Regular rhythm. Tachycardia present.      Heart sounds: S1 normal and S2 normal. No murmur heard.  Pulmonary:      Effort: Pulmonary effort is normal. No respiratory distress.      Breath sounds: No wheezing, rhonchi or rales.      Comments: Diminished throughout, on nasal cannula  Abdominal:      General: Bowel sounds are normal. There is no distension.      Palpations: Abdomen is soft.      Tenderness: There is no abdominal tenderness. There is no guarding or rebound.   Musculoskeletal:         General: No swelling or deformity.      Cervical back: Neck supple.   Skin:     General: Skin is warm and dry.      Findings: No rash.   Neurological:      Mental Status: He is alert and oriented to person, place, and time.      Comments: Generalized weakness   Psychiatric:    "      Mood and Affect: Mood normal.         Relevant Results  Lab Results   Component Value Date    WBC 7.6 11/30/2023    HGB 12.1 (L) 11/30/2023    HCT 38.5 (L) 11/30/2023    MCV 90 11/30/2023     11/30/2023      Lab Results   Component Value Date    GLUCOSE 104 (H) 11/29/2023    CALCIUM 9.5 11/29/2023     (L) 11/29/2023    K 4.7 11/29/2023    CO2 27 11/29/2023    CL 94 (L) 11/29/2023    BUN 27 (H) 11/29/2023    CREATININE 0.56 11/29/2023     Scheduled medications  albuterol, 2.5 mg, nebulization, q12h  apixaban, 5 mg, nasogastric tube, BID  atorvastatin, 40 mg, oral, Nightly  diclofenac sodium, 4 g, Topical, TID  ergocalciferol, 8,000 Units, oral, Daily  esomeprazole, 40 mg, nasogastric tube, Daily before breakfast  lidocaine, , Topical, TID  [Held by provider] lidocaine, 2 patch, transdermal, Daily  magnesium oxide, 400 mg, oral, Daily  melatonin, 10 mg, nasogastric tube, Nightly  multivitamin with minerals, 1 tablet, oral, Daily  sodium chloride, 3 mL, nebulization, q12h      Continuous medications     PRN medications  PRN medications: acetaminophen, dextrose 10 % in water (D10W), dextrose, ipratropium-albuteroL, loperamide, midodrine, oxyCODONE, oxygen    Julien Ennis MD

## 2023-11-30 NOTE — CARE PLAN
Problem: Pain - Adult  Goal: Verbalizes/displays adequate comfort level or baseline comfort level  Outcome: Met     Problem: Discharge Planning  Goal: Discharge to home or other facility with appropriate resources  Outcome: Met     Problem: Chronic Conditions and Co-morbidities  Goal: Patient's chronic conditions and co-morbidity symptoms are monitored and maintained or improved  Outcome: Met     Problem: Skin  Goal: Participates in plan/prevention/treatment measures  Outcome: Met  Goal: Prevent/manage excess moisture  Outcome: Met  Goal: Prevent/minimize sheer/friction injuries  Outcome: Met  Goal: Promote/optimize nutrition  Outcome: Met   The patient's goals for the shift include PATITO    The clinical goals for the shift include manage pain

## 2023-11-30 NOTE — DISCHARGE SUMMARY
Discharge Diagnosis  Hospital-acquired pneumonia    Issues Requiring Follow-Up  FU with Pcp     Test Results Pending At Discharge  Pending Labs       Order Current Status    CBC Collected (10/18/23 3841)    Comprehensive Metabolic Panel Collected (10/13/23 7998)    Renal function panel Collected (10/19/23 1159)    Magnesium In process    AFB Culture/Smear Preliminary result    AFB Culture/Smear Preliminary result    Extra Tubes Preliminary result    Extra Tubes Preliminary result    Green Top Preliminary result    Light Blue Top Preliminary result    Respiratory Culture/Smear Preliminary result            Hospital Course  Mr. Godinez 68 y.o. male with PMHx COPD, HTN,  alcohol and opioid use disorder, hepatitis C, chronic PE admitted 10/12 with BLE weakness, recently admitted in the MICU from 10/13-10/16 for suspected alcohol withdrawal refractory to ativan, started on phenobarb taper, treated for streptococcus pneumoniae PNA with Ceftriaxone, completed antifungals for fungemia and transferred to [facility].  RRT called for lethargy, transferred to MICU on 10/17 with new FiO2 requirements.  In SDU continued to make good progress, weaning O2 to baseline, continuing aggressive pulm hygiene and PT/OT/SLP.   He was recommended moderate intensity therapy and transfer to the floor pending placement for postacute care.  At this time his respiratory status has been stable, still requires O2 supply.  Medically clear for SNF. Completed antibiotics and antifungals, there was some concern for vegetation, cardiac surgery said not a candidate, repeat echo showed no vegetation but he should have follow-up with cardiology to evaluate.  He should follow-up with ID as well given his multiple infections.  He needs neurology follow-up for the Guillain-Barré.  He needs pulmonary follow-up for the respiratory failure.  He will need PT OT and speech and nutrition at the facility.  I have counseled him extensively on therapy and getting out of bed,  had more talks with the sister present.  Hopefully will stay motivated but we need to continue to to talk to nursing to encourage him to get out of bed.  Will also need GI follow-up for the hepatitis C.       Pertinent Physical Exam At Time of Discharge  Physical Exam    Home Medications     Medication List      START taking these medications     acetaminophen 325 mg tablet; Commonly known as: Tylenol; TAKE 2 TABLETS   BY MOUTH EVERY 6 HOURS AS NEEDED FOR PAIN.   albuterol 2.5 mg /3 mL (0.083 %) nebulizer solution; Take 3 mL (2.5 mg)   by nebulization every 12 hours.   apixaban 5 mg tablet; Commonly known as: Eliquis; 1 tablet (5 mg) by   nasogastric tube route 2 times a day.   diclofenac sodium 1 % gel gel; Commonly known as: Voltaren; Apply 1   Application topically 3 times a day.   ergocalciferol 200 mcg/mL (8,000 unit/mL) drops; Commonly known as:   Vitamin D-2; Take 1 mL (8,000 Units) by mouth once daily. Do not start   before November 29, 2023.   folic acid 1 mg tablet; Commonly known as: Folvite; TAKE 1 TABLET BY   MOUTH ONCE DAILY   loperamide 1 mg/7.5 mL liquid; Commonly known as: Imodium A-D; Take 15   mL (2 mg) by mouth 3 times a day as needed for diarrhea.   magnesium oxide 400 mg (241.3 mg magnesium) tablet; Commonly known as:   Mag-Ox; Take 1 tablet (400 mg) by mouth once daily. Do not start before   November 29, 2023.   melatonin 10 mg tablet; 1 tablet (10 mg) by nasogastric tube route once   daily at bedtime.   multivitamin with minerals tablet; Take 1 tablet by mouth once daily. Do   not start before November 29, 2023.   nicotine 21 mg/24 hr patch; Commonly known as: Nicoderm CQ; APPLY 1   PATCH TRANSDERMALLY EVERY 24 HOURS.   oxyCODONE 5 mg immediate release tablet; Commonly known as: Roxicodone;   Take 1 tablet (5 mg) by mouth every 4 hours if needed for moderate pain (4   - 6) or severe pain (7 - 10).   oxygen gas therapy; Commonly known as: O2; Inhale 2 L/min once every 24   hours.   senna 8.6  mg tablet; Generic drug: sennosides; TAKE 1 TABLET BY MOUTH   TWO TIMES A DAY AS NEEDED FOR CONSTIPATION   sodium chloride 7 % solution for nebulization nebulizer solution; Take 3   mL by nebulization every 12 hours.   thiamine 100 mg tablet; Commonly known as: Vitamin B-1; TAKE 1 TABLET BY   MOUTH ONCE DAILY     CHANGE how you take these medications     polyethylene glycol 17 gram/dose powder; Commonly known as: Glycolax,   Miralax; Take 17 g by mouth once daily as needed (constipation).; What   changed: See the new instructions.     CONTINUE taking these medications     amLODIPine 5 mg tablet; Commonly known as: Norvasc; TAKE 1 TABLET BY   MOUTH ONCE DAILY   atorvastatin 40 mg tablet; Commonly known as: Lipitor   Combivent Respimat  mcg/actuation inhaler; Generic drug:   ipratropium-albuteroL; INHALE 1 PUFF EVERY 6 HOURS AS NEEDED FOR SHORTNESS   OF BREATH   naloxone 4 mg/0.1 mL nasal spray; Commonly known as: Narcan     STOP taking these medications     atenolol 50 mg tablet; Commonly known as: Tenormin       Outpatient Follow-Up  Future Appointments   Date Time Provider Department Center   12/20/2023  3:30 PM Alysia Denis MD [id number]       Julien Ennis MD

## 2023-12-13 LAB — HOLD SPECIMEN: NORMAL

## 2023-12-20 ENCOUNTER — OFFICE VISIT (OUTPATIENT)
Dept: OTOLARYNGOLOGY | Facility: HOSPITAL | Age: 68
End: 2023-12-20
Payer: COMMERCIAL

## 2023-12-20 DIAGNOSIS — F11.20 OPIOID DEPENDENCE ON AGONIST THERAPY (MULTI): ICD-10-CM

## 2023-12-20 DIAGNOSIS — R49.0 DYSPHONIA: Primary | ICD-10-CM

## 2023-12-20 DIAGNOSIS — J38.3 VOCAL CORD DYSFUNCTION: ICD-10-CM

## 2023-12-20 DIAGNOSIS — R13.10 DYSPHAGIA, UNSPECIFIED TYPE: ICD-10-CM

## 2023-12-20 PROCEDURE — 99214 OFFICE O/P EST MOD 30 MIN: CPT | Performed by: OTOLARYNGOLOGY

## 2023-12-20 PROCEDURE — 1125F AMNT PAIN NOTED PAIN PRSNT: CPT | Performed by: OTOLARYNGOLOGY

## 2023-12-20 PROCEDURE — 3008F BODY MASS INDEX DOCD: CPT | Performed by: OTOLARYNGOLOGY

## 2023-12-20 PROCEDURE — 4004F PT TOBACCO SCREEN RCVD TLK: CPT | Performed by: OTOLARYNGOLOGY

## 2023-12-20 PROCEDURE — 1111F DSCHRG MED/CURRENT MED MERGE: CPT | Performed by: OTOLARYNGOLOGY

## 2023-12-20 PROCEDURE — 1159F MED LIST DOCD IN RCRD: CPT | Performed by: OTOLARYNGOLOGY

## 2023-12-20 ASSESSMENT — PATIENT HEALTH QUESTIONNAIRE - PHQ9
2. FEELING DOWN, DEPRESSED OR HOPELESS: NOT AT ALL
1. LITTLE INTEREST OR PLEASURE IN DOING THINGS: NOT AT ALL
SUM OF ALL RESPONSES TO PHQ9 QUESTIONS 1 & 2: 0

## 2023-12-20 NOTE — PROGRESS NOTES
Patient: Molina Godinez   MRN: [id number] YOB: 1955   Sex: male Age: 68 y.o.  Date of Service: 2023       ASSESSMENT AND PLAN  I discussed the findings with Molina Godinez and have recommended the followin. Post-extubation dysphonia, dysphagia, now improved. Declined laryngoscopy today  - Follow-up as needed      CHIEF COMPLAINT  Chief Complaint   Patient presents with    not sure why hes here     Sounds hoarse  Wants stool softener       HISTORY OF PRESENT ILLNESS  Molina Godinez is a 68 y.o. male referred by Julien Ceja MD for evaluation of dysphonia. He has a PMHx of COPD, HTN, alcohol and opioid use disorder, hepatitis C, chronic PE and was recently admitted to [facility] 10/12 with BLE weakness secondary to suspected alcohol use disorder. He was intubated, post extubation our ENT team vocal cord dysfunction with concern for fistula. Scope exam in house showed mild bowing of cord, likely secondary to intubation or an element of chronicity. Otherwise, exam within normal limits.     He is now discharged to SNF. He denies issues with his voice (baseline), breathing, or swallowing. Currently on soft minced diet and thins at his facility. He is working swallow therapy there. He declined a laryngoscopy today.      ADDITIONAL HISTORY  Past Medical History  He has no past medical history on file. Surgical History  He has a past surgical history that includes  slp endoscopic eval,swallow,cine/video (2023).   Social History  He reports that he has been smoking cigarettes. He has been smoking an average of .5 packs per day. He does not have any smokeless tobacco history on file. He reports current alcohol use. No history on file for drug use. Allergies  Aspirin and Nsaids (non-steroidal anti-inflammatory drug)     Family History  No family history on file.     REVIEW OF SYSTEMS  All 10 systems were reviewed and negative except for above.      PHYSICAL EXAM  ENT Physical Exam   GENERAL:   Thin older gentleman, alert and appropriate, no distress, voice [id number], high pitched  RESPIRATORY: Breathing quietly, no stridor  HEAD: Normocephalic atraumatic  FACE: Symmetric, no masses or lesions  EYES:  Pupils reactive, sclera clear, external ocular muscles intact, no nystagmus.    EARS:  Pinnae normal. External auditory canals bilateral cerumen impaction  NOSE:  No anterior lesions, masses or polyps.  ORAL CAVITY/OROPHARYNX:  Buccal mucosa is moist without lesions or masses, tongue midline and palate elevates symmetrically. Tongue mobility intact.  NECK:  Soft. There is no lymphadenopathy or thyromegaly.    NEUROLOGIC:  Cranial nerves II-XII grossly intact.       Last Recorded Vitals  There were no vitals taken for this visit.    RESULTS    Patient Reported Outcome Measures  N/A    Laboratory, Radiology, and Pathology  I personally reviewed the following results, with the following interpretation:   N/A    ----------------------------------------------------------------------  Alysia Denis MD, MAEd    [facility]  Department of Otolaryngology - Head and Neck Surgery  [facility]

## 2023-12-27 LAB
ACID FAST STN SPEC: NORMAL
MYCOBACTERIUM SPEC CULT: NORMAL

## 2024-01-09 LAB
ACID FAST STN SPEC: NORMAL
MYCOBACTERIUM SPEC CULT: NORMAL

## 2024-03-25 ENCOUNTER — APPOINTMENT (OUTPATIENT)
Dept: RADIOLOGY | Facility: HOSPITAL | Age: 69
End: 2024-03-25
Payer: COMMERCIAL

## 2024-03-25 ENCOUNTER — CLINICAL SUPPORT (OUTPATIENT)
Dept: EMERGENCY MEDICINE | Facility: HOSPITAL | Age: 69
End: 2024-03-25
Payer: COMMERCIAL

## 2024-03-25 ENCOUNTER — HOSPITAL ENCOUNTER (OUTPATIENT)
Facility: HOSPITAL | Age: 69
Setting detail: OBSERVATION
Discharge: HOME | End: 2024-03-26
Attending: STUDENT IN AN ORGANIZED HEALTH CARE EDUCATION/TRAINING PROGRAM | Admitting: NURSE PRACTITIONER
Payer: COMMERCIAL

## 2024-03-25 DIAGNOSIS — I26.99 OTHER ACUTE PULMONARY EMBOLISM, UNSPECIFIED WHETHER ACUTE COR PULMONALE PRESENT (MULTI): ICD-10-CM

## 2024-03-25 DIAGNOSIS — A49.8 PSEUDOMONAS INFECTION: ICD-10-CM

## 2024-03-25 DIAGNOSIS — T21.32XA: ICD-10-CM

## 2024-03-25 DIAGNOSIS — R09.02 HYPOXIA: Primary | ICD-10-CM

## 2024-03-25 DIAGNOSIS — F10.20 ALCOHOLISM (MULTI): ICD-10-CM

## 2024-03-25 DIAGNOSIS — T31.10: ICD-10-CM

## 2024-03-25 PROBLEM — D69.6 DISORDER INVOLVING THROMBOCYTOPENIA (CMS-HCC): Status: ACTIVE | Noted: 2024-03-25

## 2024-03-25 PROBLEM — G61.0 GUILLAIN-BARRE SYNDROME (MULTI): Status: ACTIVE | Noted: 2023-10-11

## 2024-03-25 PROBLEM — R73.03 PREDIABETES: Status: RESOLVED | Noted: 2022-12-15 | Resolved: 2024-03-25

## 2024-03-25 PROBLEM — J18.9 HOSPITAL-ACQUIRED PNEUMONIA: Status: RESOLVED | Noted: 2023-10-12 | Resolved: 2024-03-25

## 2024-03-25 PROBLEM — R93.1 ABNORMAL ECHOCARDIOGRAM: Status: RESOLVED | Noted: 2023-10-02 | Resolved: 2024-03-25

## 2024-03-25 PROBLEM — K59.00 CONSTIPATION: Status: ACTIVE | Noted: 2024-03-25

## 2024-03-25 PROBLEM — R93.89 ABNORMAL CT OF THE CHEST: Status: RESOLVED | Noted: 2023-10-02 | Resolved: 2024-03-25

## 2024-03-25 PROBLEM — I38 ENDOCARDITIS, VALVE UNSPECIFIED: Status: ACTIVE | Noted: 2023-10-11

## 2024-03-25 PROBLEM — R94.31 PROLONGED QT INTERVAL: Status: ACTIVE | Noted: 2023-10-02
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
## 2024-03-25 PROBLEM — B49 FUNGEMIA: Status: RESOLVED | Noted: 2023-11-09 | Resolved: 2024-03-25

## 2024-03-25 PROBLEM — J43.9 PULMONARY EMPHYSEMA (MULTI): Status: ACTIVE | Noted: 2023-08-30

## 2024-03-25 PROBLEM — E41 NUTRITIONAL MARASMUS (MULTI): Status: ACTIVE | Noted: 2023-09-26

## 2024-03-25 LAB
ALBUMIN SERPL BCP-MCNC: 3.5 G/DL (ref 3.4–5)
ALP SERPL-CCNC: 119 U/L (ref 33–136)
ALT SERPL W P-5'-P-CCNC: 8 U/L (ref 10–52)
AMPHETAMINES UR QL SCN: ABNORMAL
ANION GAP SERPL CALC-SCNC: 19 MMOL/L (ref 10–20)
AST SERPL W P-5'-P-CCNC: 33 U/L (ref 9–39)
ATRIAL RATE: 109 BPM
BARBITURATES UR QL SCN: ABNORMAL
BASOPHILS # BLD AUTO: 0.06 X10*3/UL (ref 0–0.1)
BASOPHILS NFR BLD AUTO: 0.9 %
BENZODIAZ UR QL SCN: ABNORMAL
BILIRUB SERPL-MCNC: 0.6 MG/DL (ref 0–1.2)
BNP SERPL-MCNC: 54 PG/ML (ref 0–99)
BUN SERPL-MCNC: 13 MG/DL (ref 6–23)
BZE UR QL SCN: ABNORMAL
CALCIUM SERPL-MCNC: 9.1 MG/DL (ref 8.6–10.6)
CANNABINOIDS UR QL SCN: ABNORMAL
CARDIAC TROPONIN I PNL SERPL HS: 32 NG/L (ref 0–53)
CARDIAC TROPONIN I PNL SERPL HS: 37 NG/L (ref 0–53)
CHLORIDE SERPL-SCNC: 97 MMOL/L (ref 98–107)
CO2 SERPL-SCNC: 25 MMOL/L (ref 21–32)
CREAT SERPL-MCNC: 0.59 MG/DL (ref 0.5–1.3)
EGFRCR SERPLBLD CKD-EPI 2021: >90 ML/MIN/1.73M*2
EOSINOPHIL # BLD AUTO: 0.02 X10*3/UL (ref 0–0.7)
EOSINOPHIL NFR BLD AUTO: 0.3 %
ERYTHROCYTE [DISTWIDTH] IN BLOOD BY AUTOMATED COUNT: 18.6 % (ref 11.5–14.5)
ETHANOL SERPL-MCNC: 32 MG/DL
FENTANYL+NORFENTANYL UR QL SCN: ABNORMAL
GLUCOSE SERPL-MCNC: 110 MG/DL (ref 74–99)
HCT VFR BLD AUTO: 44.1 % (ref 41–52)
HGB BLD-MCNC: 14.3 G/DL (ref 13.5–17.5)
IMM GRANULOCYTES # BLD AUTO: 0.02 X10*3/UL (ref 0–0.7)
IMM GRANULOCYTES NFR BLD AUTO: 0.3 % (ref 0–0.9)
LYMPHOCYTES # BLD AUTO: 0.85 X10*3/UL (ref 1.2–4.8)
LYMPHOCYTES NFR BLD AUTO: 13.2 %
MAGNESIUM SERPL-MCNC: 1.57 MG/DL (ref 1.6–2.4)
MCH RBC QN AUTO: 23.8 PG (ref 26–34)
MCHC RBC AUTO-ENTMCNC: 32.4 G/DL (ref 32–36)
MCV RBC AUTO: 73 FL (ref 80–100)
METHADONE UR QL SCN: ABNORMAL
MONOCYTES # BLD AUTO: 0.43 X10*3/UL (ref 0.1–1)
MONOCYTES NFR BLD AUTO: 6.7 %
NEUTROPHILS # BLD AUTO: 5.04 X10*3/UL (ref 1.2–7.7)
NEUTROPHILS NFR BLD AUTO: 78.6 %
NRBC BLD-RTO: 0 /100 WBCS (ref 0–0)
OPIATES UR QL SCN: ABNORMAL
OXYCODONE+OXYMORPHONE UR QL SCN: ABNORMAL
P AXIS: 74 DEGREES
P OFFSET: 197 MS
P ONSET: 138 MS
PCP UR QL SCN: ABNORMAL
PHOSPHATE SERPL-MCNC: 3.2 MG/DL (ref 2.5–4.9)
PLATELET # BLD AUTO: 282 X10*3/UL (ref 150–450)
POTASSIUM SERPL-SCNC: 3.1 MMOL/L (ref 3.5–5.3)
PR INTERVAL: 176 MS
PROT SERPL-MCNC: 8.6 G/DL (ref 6.4–8.2)
Q ONSET: 226 MS
QRS COUNT: 18 BEATS
QRS DURATION: 82 MS
QT INTERVAL: 346 MS
QTC CALCULATION(BAZETT): 465 MS
QTC FREDERICIA: 422 MS
R AXIS: -72 DEGREES
RBC # BLD AUTO: 6.02 X10*6/UL (ref 4.5–5.9)
SODIUM SERPL-SCNC: 138 MMOL/L (ref 136–145)
T AXIS: 76 DEGREES
T OFFSET: 399 MS
VENTRICULAR RATE: 109 BPM
WBC # BLD AUTO: 6.4 X10*3/UL (ref 4.4–11.3)

## 2024-03-25 PROCEDURE — 99285 EMERGENCY DEPT VISIT HI MDM: CPT | Performed by: STUDENT IN AN ORGANIZED HEALTH CARE EDUCATION/TRAINING PROGRAM

## 2024-03-25 PROCEDURE — 84100 ASSAY OF PHOSPHORUS: CPT | Performed by: STUDENT IN AN ORGANIZED HEALTH CARE EDUCATION/TRAINING PROGRAM

## 2024-03-25 PROCEDURE — 2500000004 HC RX 250 GENERAL PHARMACY W/ HCPCS (ALT 636 FOR OP/ED): Mod: SE | Performed by: NURSE PRACTITIONER

## 2024-03-25 PROCEDURE — 96365 THER/PROPH/DIAG IV INF INIT: CPT

## 2024-03-25 PROCEDURE — 82077 ASSAY SPEC XCP UR&BREATH IA: CPT | Performed by: NURSE PRACTITIONER

## 2024-03-25 PROCEDURE — G0378 HOSPITAL OBSERVATION PER HR: HCPCS

## 2024-03-25 PROCEDURE — 99223 1ST HOSP IP/OBS HIGH 75: CPT | Performed by: NURSE PRACTITIONER

## 2024-03-25 PROCEDURE — 71275 CT ANGIOGRAPHY CHEST: CPT

## 2024-03-25 PROCEDURE — 93005 ELECTROCARDIOGRAM TRACING: CPT

## 2024-03-25 PROCEDURE — 2500000002 HC RX 250 W HCPCS SELF ADMINISTERED DRUGS (ALT 637 FOR MEDICARE OP, ALT 636 FOR OP/ED): Mod: SE | Performed by: STUDENT IN AN ORGANIZED HEALTH CARE EDUCATION/TRAINING PROGRAM

## 2024-03-25 PROCEDURE — 36415 COLL VENOUS BLD VENIPUNCTURE: CPT | Performed by: STUDENT IN AN ORGANIZED HEALTH CARE EDUCATION/TRAINING PROGRAM

## 2024-03-25 PROCEDURE — 99285 EMERGENCY DEPT VISIT HI MDM: CPT | Mod: 25

## 2024-03-25 PROCEDURE — 84484 ASSAY OF TROPONIN QUANT: CPT | Performed by: STUDENT IN AN ORGANIZED HEALTH CARE EDUCATION/TRAINING PROGRAM

## 2024-03-25 PROCEDURE — 16020 DRESS/DEBRID P-THICK BURN S: CPT | Performed by: STUDENT IN AN ORGANIZED HEALTH CARE EDUCATION/TRAINING PROGRAM

## 2024-03-25 PROCEDURE — 80053 COMPREHEN METABOLIC PANEL: CPT | Performed by: STUDENT IN AN ORGANIZED HEALTH CARE EDUCATION/TRAINING PROGRAM

## 2024-03-25 PROCEDURE — 2550000001 HC RX 255 CONTRASTS: Mod: SE | Performed by: STUDENT IN AN ORGANIZED HEALTH CARE EDUCATION/TRAINING PROGRAM

## 2024-03-25 PROCEDURE — 80307 DRUG TEST PRSMV CHEM ANLYZR: CPT | Performed by: NURSE PRACTITIONER

## 2024-03-25 PROCEDURE — 2500000004 HC RX 250 GENERAL PHARMACY W/ HCPCS (ALT 636 FOR OP/ED): Mod: SE | Performed by: STUDENT IN AN ORGANIZED HEALTH CARE EDUCATION/TRAINING PROGRAM

## 2024-03-25 PROCEDURE — 85520 HEPARIN ASSAY: CPT | Performed by: EMERGENCY MEDICINE

## 2024-03-25 PROCEDURE — 83880 ASSAY OF NATRIURETIC PEPTIDE: CPT | Performed by: STUDENT IN AN ORGANIZED HEALTH CARE EDUCATION/TRAINING PROGRAM

## 2024-03-25 PROCEDURE — 2500000001 HC RX 250 WO HCPCS SELF ADMINISTERED DRUGS (ALT 637 FOR MEDICARE OP): Mod: SE | Performed by: STUDENT IN AN ORGANIZED HEALTH CARE EDUCATION/TRAINING PROGRAM

## 2024-03-25 PROCEDURE — 96366 THER/PROPH/DIAG IV INF ADDON: CPT

## 2024-03-25 PROCEDURE — 83735 ASSAY OF MAGNESIUM: CPT | Performed by: STUDENT IN AN ORGANIZED HEALTH CARE EDUCATION/TRAINING PROGRAM

## 2024-03-25 PROCEDURE — 71275 CT ANGIOGRAPHY CHEST: CPT | Performed by: RADIOLOGY

## 2024-03-25 PROCEDURE — 96375 TX/PRO/DX INJ NEW DRUG ADDON: CPT

## 2024-03-25 PROCEDURE — 85025 COMPLETE CBC W/AUTO DIFF WBC: CPT | Performed by: STUDENT IN AN ORGANIZED HEALTH CARE EDUCATION/TRAINING PROGRAM

## 2024-03-25 PROCEDURE — 96367 TX/PROPH/DG ADDL SEQ IV INF: CPT

## 2024-03-25 RX ORDER — KETOROLAC TROMETHAMINE 15 MG/ML
15 INJECTION, SOLUTION INTRAMUSCULAR; INTRAVENOUS EVERY 6 HOURS PRN
Status: DISCONTINUED | OUTPATIENT
Start: 2024-03-25 | End: 2024-03-26 | Stop reason: HOSPADM

## 2024-03-25 RX ORDER — MULTIVIT-MIN/IRON FUM/FOLIC AC 7.5 MG-4
1 TABLET ORAL ONCE
Status: COMPLETED | OUTPATIENT
Start: 2024-03-25 | End: 2024-03-25

## 2024-03-25 RX ORDER — BACITRACIN ZINC 500 UNIT/G
OINTMENT IN PACKET (EA) TOPICAL ONCE
Status: COMPLETED | OUTPATIENT
Start: 2024-03-25 | End: 2024-03-25

## 2024-03-25 RX ORDER — LANOLIN ALCOHOL/MO/W.PET/CERES
100 CREAM (GRAM) TOPICAL DAILY
Status: DISCONTINUED | OUTPATIENT
Start: 2024-03-28 | End: 2024-03-26 | Stop reason: HOSPADM

## 2024-03-25 RX ORDER — AMOXICILLIN 250 MG
2 CAPSULE ORAL 2 TIMES DAILY
Status: DISCONTINUED | OUTPATIENT
Start: 2024-03-25 | End: 2024-03-26 | Stop reason: HOSPADM

## 2024-03-25 RX ORDER — LOSARTAN POTASSIUM 25 MG/1
25 TABLET ORAL DAILY
COMMUNITY
End: 2024-03-25 | Stop reason: WASHOUT

## 2024-03-25 RX ORDER — FOLIC ACID 1 MG/1
1 TABLET ORAL ONCE
Status: COMPLETED | OUTPATIENT
Start: 2024-03-25 | End: 2024-03-25

## 2024-03-25 RX ORDER — ACETAMINOPHEN 325 MG/1
975 TABLET ORAL ONCE
Status: COMPLETED | OUTPATIENT
Start: 2024-03-25 | End: 2024-03-25

## 2024-03-25 RX ORDER — POTASSIUM CHLORIDE 20 MEQ/1
60 TABLET, EXTENDED RELEASE ORAL DAILY
Status: DISCONTINUED | OUTPATIENT
Start: 2024-03-25 | End: 2024-03-26 | Stop reason: HOSPADM

## 2024-03-25 RX ORDER — MIRTAZAPINE 7.5 MG/1
7.5 TABLET, FILM COATED ORAL NIGHTLY
COMMUNITY
Start: 2023-12-28 | End: 2024-03-25 | Stop reason: WASHOUT

## 2024-03-25 RX ORDER — SILVER SULFADIAZINE 10 G/1000G
CREAM TOPICAL ONCE
Status: COMPLETED | OUTPATIENT
Start: 2024-03-25 | End: 2024-03-25

## 2024-03-25 RX ORDER — FOLIC ACID 1 MG/1
1 TABLET ORAL DAILY
Status: DISCONTINUED | OUTPATIENT
Start: 2024-03-25 | End: 2024-03-26 | Stop reason: HOSPADM

## 2024-03-25 RX ORDER — HYDROMORPHONE HYDROCHLORIDE 1 MG/ML
1 INJECTION, SOLUTION INTRAMUSCULAR; INTRAVENOUS; SUBCUTANEOUS EVERY 4 HOURS PRN
Status: DISCONTINUED | OUTPATIENT
Start: 2024-03-25 | End: 2024-03-26 | Stop reason: HOSPADM

## 2024-03-25 RX ORDER — HEPARIN SODIUM 5000 [USP'U]/ML
2000-4000 INJECTION, SOLUTION INTRAVENOUS; SUBCUTANEOUS EVERY 4 HOURS PRN
Status: DISCONTINUED | OUTPATIENT
Start: 2024-03-25 | End: 2024-03-26

## 2024-03-25 RX ORDER — AMLODIPINE BESYLATE 5 MG/1
5 TABLET ORAL DAILY
Status: DISCONTINUED | OUTPATIENT
Start: 2024-03-25 | End: 2024-03-26 | Stop reason: HOSPADM

## 2024-03-25 RX ORDER — LANOLIN ALCOHOL/MO/W.PET/CERES
100 CREAM (GRAM) TOPICAL ONCE
Status: COMPLETED | OUTPATIENT
Start: 2024-03-25 | End: 2024-03-25

## 2024-03-25 RX ORDER — IBUPROFEN 200 MG
1 TABLET ORAL DAILY
Status: DISCONTINUED | OUTPATIENT
Start: 2024-03-25 | End: 2024-03-26 | Stop reason: HOSPADM

## 2024-03-25 RX ORDER — HYDROMORPHONE HYDROCHLORIDE 1 MG/ML
0.5 INJECTION, SOLUTION INTRAMUSCULAR; INTRAVENOUS; SUBCUTANEOUS ONCE
Status: COMPLETED | OUTPATIENT
Start: 2024-03-25 | End: 2024-03-25

## 2024-03-25 RX ORDER — HEPARIN SODIUM 5000 [USP'U]/ML
80 INJECTION, SOLUTION INTRAVENOUS; SUBCUTANEOUS ONCE
Status: COMPLETED | OUTPATIENT
Start: 2024-03-25 | End: 2024-03-25

## 2024-03-25 RX ORDER — LORAZEPAM 0.5 MG/1
1 TABLET ORAL EVERY 2 HOUR PRN
Status: DISCONTINUED | OUTPATIENT
Start: 2024-03-25 | End: 2024-03-26 | Stop reason: HOSPADM

## 2024-03-25 RX ORDER — HEPARIN SODIUM 10000 [USP'U]/100ML
0-4500 INJECTION, SOLUTION INTRAVENOUS CONTINUOUS
Status: DISCONTINUED | OUTPATIENT
Start: 2024-03-25 | End: 2024-03-26

## 2024-03-25 RX ORDER — LORAZEPAM 0.5 MG/1
0.5 TABLET ORAL EVERY 2 HOUR PRN
Status: DISCONTINUED | OUTPATIENT
Start: 2024-03-25 | End: 2024-03-26 | Stop reason: HOSPADM

## 2024-03-25 RX ORDER — THIAMINE HYDROCHLORIDE 100 MG/ML
100 INJECTION, SOLUTION INTRAMUSCULAR; INTRAVENOUS DAILY
Status: DISCONTINUED | OUTPATIENT
Start: 2024-03-25 | End: 2024-03-26 | Stop reason: HOSPADM

## 2024-03-25 RX ORDER — POTASSIUM CHLORIDE 14.9 MG/ML
20 INJECTION INTRAVENOUS
Status: DISCONTINUED | OUTPATIENT
Start: 2024-03-25 | End: 2024-03-25

## 2024-03-25 RX ORDER — FOLIC ACID 1 MG/1
1 TABLET ORAL DAILY
Status: DISCONTINUED | OUTPATIENT
Start: 2024-03-25 | End: 2024-03-25

## 2024-03-25 RX ORDER — OXYCODONE HYDROCHLORIDE 5 MG/1
10 TABLET ORAL EVERY 6 HOURS PRN
Status: DISCONTINUED | OUTPATIENT
Start: 2024-03-25 | End: 2024-03-26 | Stop reason: HOSPADM

## 2024-03-25 RX ORDER — MULTIVIT-MIN/IRON FUM/FOLIC AC 7.5 MG-4
1 TABLET ORAL DAILY
Status: DISCONTINUED | OUTPATIENT
Start: 2024-03-25 | End: 2024-03-26 | Stop reason: HOSPADM

## 2024-03-25 RX ORDER — MULTIVITAMIN
1 TABLET ORAL DAILY
COMMUNITY
Start: 2024-02-07 | End: 2024-03-25 | Stop reason: WASHOUT

## 2024-03-25 RX ORDER — MAGNESIUM SULFATE HEPTAHYDRATE 40 MG/ML
2 INJECTION, SOLUTION INTRAVENOUS ONCE
Status: COMPLETED | OUTPATIENT
Start: 2024-03-25 | End: 2024-03-25

## 2024-03-25 RX ORDER — CHOLECALCIFEROL (VITAMIN D3) 50 MCG
2000 TABLET ORAL
COMMUNITY
Start: 2023-12-29 | End: 2024-03-25 | Stop reason: WASHOUT

## 2024-03-25 RX ORDER — ACETAMINOPHEN 325 MG/1
975 TABLET ORAL EVERY 8 HOURS
Status: DISCONTINUED | OUTPATIENT
Start: 2024-03-25 | End: 2024-03-26 | Stop reason: HOSPADM

## 2024-03-25 RX ADMIN — FOLIC ACID 1 MG: 1 TABLET ORAL at 09:21

## 2024-03-25 RX ADMIN — ACETAMINOPHEN 975 MG: 325 TABLET ORAL at 18:48

## 2024-03-25 RX ADMIN — BACITRACIN 1 APPLICATION: 500 OINTMENT TOPICAL at 09:24

## 2024-03-25 RX ADMIN — THIAMINE HCL TAB 100 MG 100 MG: 100 TAB at 09:21

## 2024-03-25 RX ADMIN — HYDROMORPHONE HYDROCHLORIDE 0.5 MG: 1 INJECTION, SOLUTION INTRAMUSCULAR; INTRAVENOUS; SUBCUTANEOUS at 09:21

## 2024-03-25 RX ADMIN — SILVER SULFADIAZINE: 10 CREAM TOPICAL at 08:20

## 2024-03-25 RX ADMIN — IOHEXOL 80 ML: 350 INJECTION, SOLUTION INTRAVENOUS at 11:50

## 2024-03-25 RX ADMIN — HEPARIN SODIUM 1000 UNITS/HR: 10000 INJECTION, SOLUTION INTRAVENOUS at 18:49

## 2024-03-25 RX ADMIN — HEPARIN SODIUM 4550 UNITS: 5000 INJECTION INTRAVENOUS; SUBCUTANEOUS at 18:48

## 2024-03-25 RX ADMIN — Medication 1 TABLET: at 09:21

## 2024-03-25 RX ADMIN — KETOROLAC TROMETHAMINE 15 MG: 15 INJECTION, SOLUTION INTRAMUSCULAR; INTRAVENOUS at 18:45

## 2024-03-25 RX ADMIN — MAGNESIUM SULFATE HEPTAHYDRATE 2 G: 40 INJECTION, SOLUTION INTRAVENOUS at 13:27

## 2024-03-25 RX ADMIN — POTASSIUM CHLORIDE 20 MEQ: 14.9 INJECTION, SOLUTION INTRAVENOUS at 13:27

## 2024-03-25 RX ADMIN — SODIUM CHLORIDE, POTASSIUM CHLORIDE, SODIUM LACTATE AND CALCIUM CHLORIDE 1000 ML: 600; 310; 30; 20 INJECTION, SOLUTION INTRAVENOUS at 09:21

## 2024-03-25 RX ADMIN — POTASSIUM CHLORIDE 60 MEQ: 1500 TABLET, EXTENDED RELEASE ORAL at 14:47

## 2024-03-25 RX ADMIN — ACETAMINOPHEN 975 MG: 325 TABLET ORAL at 09:21

## 2024-03-25 ASSESSMENT — ENCOUNTER SYMPTOMS
MUSCULOSKELETAL NEGATIVE: 1
EYES NEGATIVE: 1
NEUROLOGICAL NEGATIVE: 1
CONSTITUTIONAL NEGATIVE: 1
WHEEZING: 0
CARDIOVASCULAR NEGATIVE: 1
WOUND: 1
RESPIRATORY NEGATIVE: 1
GASTROINTESTINAL NEGATIVE: 1

## 2024-03-25 ASSESSMENT — PAIN DESCRIPTION - LOCATION: LOCATION: ABDOMEN

## 2024-03-25 ASSESSMENT — PAIN SCALES - GENERAL
PAINLEVEL_OUTOF10: 9
PAINLEVEL_OUTOF10: 8

## 2024-03-25 ASSESSMENT — PAIN - FUNCTIONAL ASSESSMENT
PAIN_FUNCTIONAL_ASSESSMENT: 0-10
PAIN_FUNCTIONAL_ASSESSMENT: 0-10
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
## 2024-03-25 NOTE — DISCHARGE INSTRUCTIONS
Please call [facility] burn clinic for immediate follow-up- [phone].  Do this ASAP!!    Do NOT stop taking your elequis
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
## 2024-03-25 NOTE — PROGRESS NOTES
Pharmacy Medication History Review    Molina Godinez is a 68 y.o. male admitted for PE (pulmonary thromboembolism) (CMS/[facility]). Pharmacy reviewed the patient's [id number] medications and allergies for accuracy.    The list below reflects the updated PTA list. Comments regarding how patient may be taking medications differently can be found in the Admit Orders Activity  Prior to Admission Medications   Prescriptions Last Dose Informant   acetaminophen (Tylenol) 325 mg tablet Not Taking Self   Sig: TAKE 2 TABLETS BY MOUTH EVERY 6 HOURS AS NEEDED FOR PAIN.   Patient not taking: Reported on 3/25/2024   albuterol 2.5 mg /3 mL (0.083 %) nebulizer solution Not Taking    Sig: Take 3 mL (2.5 mg) by nebulization every 12 hours.   Patient not taking: Reported on 3/25/2024   amLODIPine (Norvasc) 5 mg tablet 3/25/2024 Self   Sig: TAKE 1 TABLET BY MOUTH ONCE DAILY   apixaban (Eliquis) 5 mg tablet Past Month Self   Si tablet (5 mg) by nasogastric tube route 2 times a day.   folic acid (Folvite) 1 mg tablet 3/25/2024 Self   Sig: TAKE 1 TABLET BY MOUTH ONCE DAILY   loperamide (Imodium A-D) 1 mg/7.5 mL liquid Not Taking Self   Sig: Take 15 mL (2 mg) by mouth 3 times a day as needed for diarrhea.   Patient not taking: Reported on 3/25/2024   nicotine (Nicoderm CQ) 21 mg/24 hr patch  Self   Sig: APPLY 1 PATCH TRANSDERMALLY EVERY 24 HOURS.   Patient not taking: Reported on 2023   sennosides (Senokot) 8.6 mg tablet Unknown Self   Sig: TAKE 1 TABLET BY MOUTH TWO TIMES A DAY AS NEEDED FOR CONSTIPATION   thiamine (Vitamin B-1) 100 mg tablet 3/25/2024 Self   Sig: TAKE 1 TABLET BY MOUTH ONCE DAILY      Facility-Administered Medications: None       The list below reflects the updated allergy list. Please review each documented allergy for additional clarification and justification.  Allergies  Reviewed by WOO Ambriz-STEPHANIE on 3/25/2024        Severity Reactions Comments    Aspirin Not Specified Unknown States he gets  excited    Nsaids (non-steroidal Anti-inflammatory Drug) Not Specified Unknown Elevates BP            Patient accepts M2B at discharge. Pharmacy has been updated to [facility].    Sources used to complete the med history include out patient fill history, OARRS, and patient interview   Modest historian    Below are additional concerns with the patient's PTA list.  Patient reported only taking amlodipine 5mg, thiamine (vitamin B1), and folic acid regularly  Patient self-stopped apixiban 5mg roughly 1 month ago  Patient reports having an inhaler at home for as needed use- type unknown  Remote fill history for combivent respimat    Lux Hooker, PharmD  Transitions of Care Pharmacist  [facility]  Please reach out via Secure Chat for questions, or if no response call [facility] or [facility]

## 2024-03-26 ENCOUNTER — PHARMACY VISIT (OUTPATIENT)
Dept: PHARMACY | Facility: CLINIC | Age: 69
End: 2024-03-26
Payer: MEDICAID

## 2024-03-26 VITALS
HEIGHT: 68 IN | DIASTOLIC BLOOD PRESSURE: 99 MMHG | RESPIRATION RATE: 18 BRPM | OXYGEN SATURATION: 100 % | SYSTOLIC BLOOD PRESSURE: 160 MMHG | HEART RATE: 110 BPM | WEIGHT: 125 LBS | TEMPERATURE: 98.6 F | BODY MASS INDEX: 18.94 KG/M2

## 2024-03-26 PROBLEM — T31.10: Status: ACTIVE | Noted: 2024-03-26

## 2024-03-26 LAB
ALBUMIN SERPL BCP-MCNC: 3.1 G/DL (ref 3.4–5)
ANION GAP SERPL CALC-SCNC: 10 MMOL/L (ref 10–20)
BUN SERPL-MCNC: 13 MG/DL (ref 6–23)
CALCIUM SERPL-MCNC: 8.7 MG/DL (ref 8.6–10.6)
CHLORIDE SERPL-SCNC: 99 MMOL/L (ref 98–107)
CO2 SERPL-SCNC: 32 MMOL/L (ref 21–32)
CREAT SERPL-MCNC: 0.65 MG/DL (ref 0.5–1.3)
EGFRCR SERPLBLD CKD-EPI 2021: >90 ML/MIN/1.73M*2
ERYTHROCYTE [DISTWIDTH] IN BLOOD BY AUTOMATED COUNT: 17.4 % (ref 11.5–14.5)
GLUCOSE SERPL-MCNC: 99 MG/DL (ref 74–99)
HCT VFR BLD AUTO: 40.4 % (ref 41–52)
HGB BLD-MCNC: 13.1 G/DL (ref 13.5–17.5)
MAGNESIUM SERPL-MCNC: 1.87 MG/DL (ref 1.6–2.4)
MCH RBC QN AUTO: 24 PG (ref 26–34)
MCHC RBC AUTO-ENTMCNC: 32.4 G/DL (ref 32–36)
MCV RBC AUTO: 74 FL (ref 80–100)
NRBC BLD-RTO: 0 /100 WBCS (ref 0–0)
PHOSPHATE SERPL-MCNC: 2 MG/DL (ref 2.5–4.9)
PLATELET # BLD AUTO: 258 X10*3/UL (ref 150–450)
POTASSIUM SERPL-SCNC: 3.3 MMOL/L (ref 3.5–5.3)
RBC # BLD AUTO: 5.46 X10*6/UL (ref 4.5–5.9)
SODIUM SERPL-SCNC: 138 MMOL/L (ref 136–145)
UFH PPP CHRO-ACNC: 0.4 IU/ML
UFH PPP CHRO-ACNC: 0.6 IU/ML
WBC # BLD AUTO: 7.8 X10*3/UL (ref 4.4–11.3)

## 2024-03-26 PROCEDURE — 2500000001 HC RX 250 WO HCPCS SELF ADMINISTERED DRUGS (ALT 637 FOR MEDICARE OP): Mod: SE | Performed by: STUDENT IN AN ORGANIZED HEALTH CARE EDUCATION/TRAINING PROGRAM

## 2024-03-26 PROCEDURE — RXMED WILLOW AMBULATORY MEDICATION CHARGE

## 2024-03-26 PROCEDURE — 2500000002 HC RX 250 W HCPCS SELF ADMINISTERED DRUGS (ALT 637 FOR MEDICARE OP, ALT 636 FOR OP/ED): Mod: SE | Performed by: STUDENT IN AN ORGANIZED HEALTH CARE EDUCATION/TRAINING PROGRAM

## 2024-03-26 PROCEDURE — 99239 HOSP IP/OBS DSCHRG MGMT >30: CPT | Performed by: STUDENT IN AN ORGANIZED HEALTH CARE EDUCATION/TRAINING PROGRAM

## 2024-03-26 PROCEDURE — 96375 TX/PRO/DX INJ NEW DRUG ADDON: CPT

## 2024-03-26 PROCEDURE — G0378 HOSPITAL OBSERVATION PER HR: HCPCS

## 2024-03-26 PROCEDURE — 96376 TX/PRO/DX INJ SAME DRUG ADON: CPT

## 2024-03-26 PROCEDURE — 80069 RENAL FUNCTION PANEL: CPT | Performed by: NURSE PRACTITIONER

## 2024-03-26 PROCEDURE — 2500000004 HC RX 250 GENERAL PHARMACY W/ HCPCS (ALT 636 FOR OP/ED): Mod: SE | Performed by: NURSE PRACTITIONER

## 2024-03-26 PROCEDURE — 2500000001 HC RX 250 WO HCPCS SELF ADMINISTERED DRUGS (ALT 637 FOR MEDICARE OP): Mod: SE | Performed by: NURSE PRACTITIONER

## 2024-03-26 PROCEDURE — 83735 ASSAY OF MAGNESIUM: CPT | Performed by: NURSE PRACTITIONER

## 2024-03-26 PROCEDURE — 85027 COMPLETE CBC AUTOMATED: CPT | Performed by: NURSE PRACTITIONER

## 2024-03-26 PROCEDURE — 36415 COLL VENOUS BLD VENIPUNCTURE: CPT | Performed by: NURSE PRACTITIONER

## 2024-03-26 PROCEDURE — 85520 HEPARIN ASSAY: CPT | Performed by: STUDENT IN AN ORGANIZED HEALTH CARE EDUCATION/TRAINING PROGRAM

## 2024-03-26 RX ORDER — ACETAMINOPHEN 500 MG
1000 TABLET ORAL EVERY 8 HOURS
Qty: 42 TABLET | Refills: 0 | Status: SHIPPED | OUTPATIENT
Start: 2024-03-26 | End: 2024-04-02

## 2024-03-26 RX ORDER — BURN DRESSING 4.5"X148"
BANDAGE TOPICAL
Qty: 80 EACH | Refills: 0 | Status: SHIPPED | OUTPATIENT
Start: 2024-03-26 | End: 2024-04-18

## 2024-03-26 RX ORDER — FOLIC ACID 1 MG/1
1 TABLET ORAL DAILY
Qty: 30 TABLET | Refills: 0 | Status: SHIPPED | OUTPATIENT
Start: 2024-03-26 | End: 2024-04-25

## 2024-03-26 RX ORDER — OXYCODONE HYDROCHLORIDE 10 MG/1
10 TABLET ORAL EVERY 6 HOURS PRN
Qty: 6 TABLET | Refills: 0 | Status: SHIPPED | OUTPATIENT
Start: 2024-03-26 | End: 2024-03-29

## 2024-03-26 RX ORDER — LANOLIN ALCOHOL/MO/W.PET/CERES
100 CREAM (GRAM) TOPICAL DAILY
Qty: 30 TABLET | Refills: 0 | Status: SHIPPED | OUTPATIENT
Start: 2024-03-26 | End: 2024-04-25

## 2024-03-26 RX ORDER — BISMUTH TRIBROMOPH/PETROLATUM 4" X 4"
BANDAGE TOPICAL
Qty: 50 EACH | Refills: 0 | Status: SHIPPED | OUTPATIENT
Start: 2024-03-26 | End: 2024-04-18

## 2024-03-26 RX ORDER — MULTIVIT-MIN/IRON FUM/FOLIC AC 7.5 MG-4
1 TABLET ORAL DAILY
Qty: 30 TABLET | Refills: 0 | Status: SHIPPED | OUTPATIENT
Start: 2024-03-27 | End: 2024-04-26

## 2024-03-26 RX ADMIN — HYDROMORPHONE HYDROCHLORIDE 1 MG: 1 INJECTION, SOLUTION INTRAMUSCULAR; INTRAVENOUS; SUBCUTANEOUS at 00:02

## 2024-03-26 RX ADMIN — FOLIC ACID 1 MG: 1 TABLET ORAL at 11:51

## 2024-03-26 RX ADMIN — ACETAMINOPHEN 975 MG: 325 TABLET ORAL at 11:51

## 2024-03-26 RX ADMIN — HYDROMORPHONE HYDROCHLORIDE 1 MG: 1 INJECTION, SOLUTION INTRAMUSCULAR; INTRAVENOUS; SUBCUTANEOUS at 13:56

## 2024-03-26 RX ADMIN — OXYCODONE HYDROCHLORIDE 10 MG: 5 TABLET ORAL at 11:51

## 2024-03-26 RX ADMIN — Medication 1 TABLET: at 11:51

## 2024-03-26 RX ADMIN — AMLODIPINE BESYLATE 5 MG: 5 TABLET ORAL at 11:51

## 2024-03-26 RX ADMIN — THIAMINE HYDROCHLORIDE 100 MG: 100 INJECTION, SOLUTION INTRAMUSCULAR; INTRAVENOUS at 11:51

## 2024-03-26 RX ADMIN — POTASSIUM CHLORIDE 60 MEQ: 1500 TABLET, EXTENDED RELEASE ORAL at 11:51

## 2024-03-26 RX ADMIN — APIXABAN 10 MG: 5 TABLET, FILM COATED ORAL at 13:56

## 2024-03-26 ASSESSMENT — PAIN SCALES - GENERAL
PAINLEVEL_OUTOF10: 9
PAINLEVEL_OUTOF10: 10 - WORST POSSIBLE PAIN

## 2024-03-26 ASSESSMENT — PAIN DESCRIPTION - LOCATION: LOCATION: ABDOMEN

## 2024-03-26 ASSESSMENT — PAIN - FUNCTIONAL ASSESSMENT
PAIN_FUNCTIONAL_ASSESSMENT: 0-10
PAIN_FUNCTIONAL_ASSESSMENT: 0-10
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
## 2024-03-26 NOTE — PROGRESS NOTES
Emergency Medicine Transition of Care Note.    I received Molina Godinez in signout from Dr. Pride.  Please see the previous ED provider note for all HPI, PE and MDM up to the time of signout at 1500. This is in addition to the primary record.    In brief Molina Godinez is an 68 y.o. male presenting for   Chief Complaint   Patient presents with    Burn     At the time of signout we were awaiting: CT angio imaging    Diagnoses as of 03/25/24 2049   Hypoxia   Other acute pulmonary embolism, unspecified whether acute cor pulmonale present (CMS/[facility])       Medical Decision Making  Patient was signed out to me pending CT angio imaging of chest. CT imaging showed nonocclusive filling defects concern for acute PE.  There is no evidence of right heart strain.  Given patient new O2 requirement as well as PE, I will plan to admit patient to the hospital.  Patient was discussed with admission coordinator who ultimately excepted patient for admission under medicine team.  Heparin started for patient.    Final diagnoses:   [R09.02] Hypoxia   [I26.99] Other acute pulmonary embolism, unspecified whether acute cor pulmonale present (CMS/[facility])           Procedure  Procedures    Spencer Presley DO
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
## 2024-03-27 NOTE — PROGRESS NOTES
Molina Godinez is a 68 y.o. male on day 0 of admission presenting with PE (pulmonary thromboembolism) (CMS/HCC).    Assessment/Plan   Call received from Pt's friend whom Pt identified yesterday that assists with wound care. Nahomi Cervantes was with Pt who spoke with this SW and gave permission to speak with her  She stated she works for [employer] and to call [employer] [phone]. SW called [employer] and  they confirmed orders can be faxed to [phone]. Documents sent.      ZOLTAN North

## 2024-03-29 NOTE — PROGRESS NOTES
Molina Godinez is a 68 y.o. male on day 0 of admission presenting with PE (pulmonary thromboembolism) (CMS/[facility]).    Assessment/Plan   Message received from Dr Mcrae [phone] from [facility]. He explained order insufficent as they don't designate the discipline needed. He also stated he didn't have Pt's records or demographics. FABY agreed to re-fax packet to [employer] of demographics, provider note, and Mar. FABY messaged Dr Garcia. FABY called [employer] at [phone] and left her a message inquiring about status of documents sent. FABY also called Dr Fisher back to tell him Pt's PCP's name to request new orders from. Documents re-faxed to [phone]. FABY also emailed documents to [email or web address]. Dr Garcia modified the orders today to articulate need for RN and PT services.   No further needs.      ZOLTAN North

## 2024-04-17 ENCOUNTER — HOSPITAL ENCOUNTER (EMERGENCY)
Facility: HOSPITAL | Age: 69
End: 2024-04-17
Attending: EMERGENCY MEDICINE
Payer: COMMERCIAL

## 2024-04-17 VITALS — DIASTOLIC BLOOD PRESSURE: 66 MMHG | SYSTOLIC BLOOD PRESSURE: 85 MMHG | HEART RATE: 35 BPM | RESPIRATION RATE: 20 BRPM

## 2024-04-17 DIAGNOSIS — I46.9 CARDIAC ARREST (MULTI): Primary | ICD-10-CM

## 2024-04-17 LAB
ANION GAP BLDV CALCULATED.4IONS-SCNC: 14 MMOL/L (ref 10–25)
BASE EXCESS BLDV CALC-SCNC: -8.7 MMOL/L (ref -2–3)
BODY TEMPERATURE: 37 DEGREES CELSIUS
CA-I BLDV-SCNC: 1.12 MMOL/L (ref 1.1–1.33)
CHLORIDE BLDV-SCNC: 100 MMOL/L (ref 98–107)
GLUCOSE BLD MANUAL STRIP-MCNC: 173 MG/DL (ref 74–99)
GLUCOSE BLDV-MCNC: 212 MG/DL (ref 74–99)
HCO3 BLDV-SCNC: 24.7 MMOL/L (ref 22–26)
HCT VFR BLD EST: 41 % (ref 41–52)
HGB BLDV-MCNC: 13.7 G/DL (ref 13.5–17.5)
LACTATE BLDV-SCNC: 8 MMOL/L (ref 0.4–2)
OXYHGB MFR BLDV: 7.9 % (ref 45–75)
PCO2 BLDV: 98 MM HG (ref 41–51)
PH BLDV: 7.01 PH (ref 7.33–7.43)
PO2 BLDV: 16 MM HG (ref 35–45)
POTASSIUM BLDV-SCNC: 3.3 MMOL/L (ref 3.5–5.3)
SAO2 % BLDV: 8 % (ref 45–75)
SODIUM BLDV-SCNC: 135 MMOL/L (ref 136–145)

## 2024-04-17 PROCEDURE — 92950 HEART/LUNG RESUSCITATION CPR: CPT

## 2024-04-17 PROCEDURE — 99285 EMERGENCY DEPT VISIT HI MDM: CPT | Performed by: EMERGENCY MEDICINE

## 2024-04-17 PROCEDURE — 99285 EMERGENCY DEPT VISIT HI MDM: CPT | Mod: 25

## 2024-04-17 PROCEDURE — 84132 ASSAY OF SERUM POTASSIUM: CPT

## 2024-04-17 PROCEDURE — 92950 HEART/LUNG RESUSCITATION CPR: CPT | Performed by: EMERGENCY MEDICINE

## 2024-04-17 PROCEDURE — 2500000004 HC RX 250 GENERAL PHARMACY W/ HCPCS (ALT 636 FOR OP/ED): Mod: SE | Performed by: EMERGENCY MEDICINE

## 2024-04-17 PROCEDURE — 83605 ASSAY OF LACTIC ACID: CPT

## 2024-04-17 PROCEDURE — 31500 INSERT EMERGENCY AIRWAY: CPT | Performed by: PHYSICIAN ASSISTANT

## 2024-04-17 PROCEDURE — 82947 ASSAY GLUCOSE BLOOD QUANT: CPT

## 2024-04-17 RX ORDER — SODIUM CHLORIDE 9 MG/ML
INJECTION, SOLUTION INTRAVENOUS
Status: COMPLETED | OUTPATIENT
Start: 2024-04-17 | End: 2024-04-17

## 2024-04-17 RX ORDER — EPINEPHRINE 0.1 MG/ML
INJECTION INTRACARDIAC; INTRAVENOUS CODE/TRAUMA/SEDATION MEDICATION
Status: COMPLETED | OUTPATIENT
Start: 2024-04-17 | End: 2024-04-17

## 2024-04-17 RX ADMIN — EPINEPHRINE 1 MG: 0.1 INJECTION INTRAVENOUS at 10:06

## 2024-04-17 RX ADMIN — EPINEPHRINE 1 MG: 0.1 INJECTION INTRAVENOUS at 10:17

## 2024-04-17 RX ADMIN — EPINEPHRINE 1 MG: 0.1 INJECTION INTRAVENOUS at 10:10

## 2024-04-17 RX ADMIN — SODIUM CHLORIDE 1000 ML/HR: 9 INJECTION, SOLUTION INTRAVENOUS at 10:12

## 2024-04-17 RX ADMIN — EPINEPHRINE 1 MG: 0.1 INJECTION INTRAVENOUS at 10:13

## 2024-04-17 ASSESSMENT — COLUMBIA-SUICIDE SEVERITY RATING SCALE - C-SSRS
6. HAVE YOU EVER DONE ANYTHING, STARTED TO DO ANYTHING, OR PREPARED TO DO ANYTHING TO END YOUR LIFE?: NO
1. IN THE PAST MONTH, HAVE YOU WISHED YOU WERE DEAD OR WISHED YOU COULD GO TO SLEEP AND NOT WAKE UP?: NO
2. HAVE YOU ACTUALLY HAD ANY THOUGHTS OF KILLING YOURSELF?: NO

## 2024-04-17 NOTE — ED TRIAGE NOTES
Pt had witness cardiac arrest. PEA en route and on arrival. ECFD was squad.    3 rounds of EPI en route, and I jell in place on arrival. Glucose 194

## 2024-04-17 NOTE — ED PROVIDER NOTES
HPI   Chief Complaint   Patient presents with    Cardiac Arrest       68-year-old male presents to the ED unresponsive.  He is unable to provide any history.  Per EMS reports they were called to patient's home for report of weakness.  When they arrived reportedly he had a witnessed cardiac arrest.  Initial rhythm asystole.  They started CPR and placed a supraglottic airway.  He received a total of 3 A of epinephrine during CPR rounds.                          Beatriz Coma Scale Score: 4                     Patient History   Past Medical History:   Diagnosis Date    Alcohol use disorder     Cachectic (Multi)     COPD (chronic obstructive pulmonary disease) (Multi)     Hepatitis C     HLD (hyperlipidemia)     Hypertension     Opioid abuse (Multi)     Psoriasis     Pulmonary embolism (Multi)      Past Surgical History:   Procedure Laterality Date     SLP ENDOSCOPIC EVAL,SWALLOW,CINE/VIDEO  11/27/2023    HERNIA REPAIR      TONSILLECTOMY       Family History   Family history unknown: Yes     Social History     Tobacco Use    Smoking status: Every Day     Current packs/day: 0.50     Types: Cigarettes    Smokeless tobacco: Not on file   Substance Use Topics    Alcohol use: Yes     Comment: 1.5 pints Ju daily    Drug use: Yes     Types: Cocaine, Oxycodone       Physical Exam   ED Triage Vitals   Temp Heart Rate Respirations BP   -- 04/17/24 1006 04/17/24 1014 04/17/24 1006    (!) 35 20 85/66      SpO2 Temp src Heart Rate Source Patient Position   -- -- -- --            BP Location FiO2 (%)     -- --             Physical Exam  Vitals and nursing note reviewed.   Constitutional:       Comments: Unresponsive   HENT:      Head: Normocephalic and atraumatic.   Eyes:      Comments: Dilated pupils   Cardiovascular:      Comments: Asystole  Musculoskeletal:      Cervical back: Neck supple.   Skin:     General: Skin is dry.   Psychiatric:      Comments: Unable to assess; patient unresponsive         ED Course & MDM   Diagnoses  as of 04/17/24 1027   Cardiac arrest (Multi)       Medical Decision Making  68-year-old male presents to the cardiac arrest.  Upon arrival to the CPR ongoing.  He was transition to our room monitors.  Michael device applying CPR.  He received epinephrine upon arrival to the ED.  A definitive airway was placed.  Please see procedure note.  Ongoing CPR continued.  He received several rounds of ACLS including total of 4 additional amps of epinephrine at appropriate times.  He had brief return of circulation.  This was accompanied by bradycardia.  Peripheral pressors were administered.  However patient then had loss of pulses quickly. Venous gas showed likely mixed respiratory and metabolic acidosis.  Potassium not critically high.  Glucose not low.  With definitive airway and Peep valve applied he still had difficulty oxygenating.  He had breath sounds bilaterally.  Suspect severe lung disease.  Unfortunately were unable to resuscitate patient and he had additional pulse checks that showed PEA.  After a total of 7 A of epinephrine and ongoing CPR resuscitative efforts were ceased.  Time of death 10:19.        Procedure  Procedures     Isai Gurrola MD  04/17/24 1028

## 2024-04-17 NOTE — ED PROCEDURE NOTE
Procedure  Intubation    Performed by: Michelle Ovalle PA-C  Authorized by: Isai Gurrola MD    Consent:     Consent obtained:  Emergent situation  Universal protocol:     Relevant documents present and verified: yes      Test results available: yes      Required blood products, implants, devices, and special equipment available: yes      Site/side marked: yes      Patient identity confirmed:  Anonymous protocol, patient vented/unresponsive  Pre-procedure details:     Indications: cardio/pulmonary arrest      Patient status:  Unresponsive    Look externally: no concerns      Mouth opening - incisor distance:  3 or more finger widths    Hyoid-mental distance: 2 finger widths      Mallampati score:  I    Obstruction: none      Neck mobility: reduced      Pharmacologic strategy: none      Induction agents:  None    Paralytics:  None  Procedure details:     Preoxygenation:  Bag valve mask    CPR in progress: yes      Number of attempts:  2  Successful intubation attempt details:     Intubation method:  Oral    Intubation technique: video assisted      Laryngoscope blade:  Hypercurved    Bougie used: no      Grade view: I      Tube size (mm):  7.5    Tube type:  Cuffed  First unsuccessful intubation attempt details:     Intubation method:  Oral    Intubation technique:  Video assisted    Laryngoscope blade:  Hypercurved    Bougie used: no      Grade view: I      Tube size (mm):  7.5    Tube type:  Cuffed    Ventilation between 1st and 2nd attempt: yes with extraglottic device      Tube visualized through cords: yes    Placement assessment:     ETT at teeth/gumline (cm):  25    Tube secured with:  ETT pyle    Breath sounds:  Equal    Placement verification: chest rise, colorimetric ETCO2, direct visualization, endoscopic, equal breath sounds, numeric ETCO2, tube exhalation and waveform ETCO2    Post-procedure details:     Procedure completion:  Tolerated               Michelle Ovalle PA-C  04/17/24 1027

## 2024-10-25 NOTE — CARE PLAN
Problem: Skin  Goal: Participates in plan/prevention/treatment measures  Outcome: Progressing  Goal: Prevent/manage excess moisture  Outcome: Progressing  Goal: Prevent/minimize sheer/friction injuries  Outcome: Progressing  Goal: Promote/optimize nutrition  Outcome: Progressing        History Of Present Illness  Bola Barfield is a 70 y.o. male presenting with for coronary angiography.  He is a very pleasant 70-year-old gentleman with diabetes, hypertension, dyslipidemia.  He has been having dyspnea on exertion for several months, concern for anginal equivalent in the setting of a coronary calcium score of 947.     Past Medical History  Past Medical History:   Diagnosis Date    Anxiety disorder, unspecified     Anxiety    Benign neoplasm of cranial nerves     Acoustic neuroma    Benign neoplasm of cranial nerves     Vestibular schwannoma    Benign prostatic hyperplasia without lower urinary tract symptoms     BPH (benign prostatic hypertrophy)    Chronic pain syndrome     Chronic pain syndrome    Obstructive sleep apnea (adult) (pediatric)     DAYAN (obstructive sleep apnea)    Other obesity due to excess calories     Obesity due to excess calories, unspecified obesity severity    Personal history of other diseases of the nervous system and sense organs     History of peripheral neuropathy    Personal history of other mental and behavioral disorders 07/21/2013    History of depression    Personal history of other specified conditions     History of vertigo    Syncope and collapse     Cough syncope       Surgical History  Past Surgical History:   Procedure Laterality Date    CARPAL TUNNEL RELEASE  02/08/2017    Neuroplasty Decompression Median Nerve At Carpal Tunnel    HERNIA REPAIR  02/08/2017    Inguinal Hernia Repair    KNEE SURGERY  02/08/2017    Knee Surgery    OTHER SURGICAL HISTORY  02/03/2017    Craniotomy Excision Of Acoustic Neuroma    UMBILICAL HERNIA REPAIR  02/03/2017    Umbilical Hernia Repair        Social History  He reports that he has quit smoking. His smoking use included cigarettes. He has never used smokeless tobacco. He reports that he does not currently use alcohol. He reports that he does not use drugs.    Family History  No family history on file.     Allergies  Phenergan  "[promethazine], Parafon forte dsc [chlorzoxazone], and Vicodin [hydrocodone-acetaminophen]    Review of Systems   Constitutional: Negative.    HENT: Negative.     Eyes: Negative.    Respiratory: Negative.     Cardiovascular: Negative.    Gastrointestinal: Negative.    Endocrine: Negative.    Genitourinary: Negative.    Musculoskeletal: Negative.    Skin: Negative.    Allergic/Immunologic: Negative.    Neurological: Negative.    Hematological: Negative.    Psychiatric/Behavioral: Negative.        Constitutional:       Appearance: Healthy appearance. Not in distress.   Neck:      Vascular: No JVR. JVD normal.   Pulmonary:      Effort: Pulmonary effort is normal.      Breath sounds: Normal breath sounds. No wheezing. No rhonchi. No rales.   Chest:      Chest wall: Not tender to palpatation.   Cardiovascular:      Normal rate. Regular rhythm.      Murmurs: There is no murmur.   Edema:     Peripheral edema absent.   Abdominal:      General: Bowel sounds are normal.      Palpations: Abdomen is soft.      Tenderness: There is no abdominal tenderness.   Musculoskeletal: Normal range of motion. Skin:     General: Skin is warm and dry.   Neurological:      General: No focal deficit present.      Mental Status: Alert and oriented to person, place and time.             Last Recorded Vitals  Blood pressure (!) 169/98, pulse 65, resp. rate 16, height 1.778 m (5' 10\"), weight 139 kg (307 lb), SpO2 97%.    Relevant Results           Assessment/Plan   Assessment & Plan      Left heart cath today             Holland Pires MD    "